# Patient Record
Sex: FEMALE | Race: BLACK OR AFRICAN AMERICAN | Employment: UNEMPLOYED | URBAN - METROPOLITAN AREA
[De-identification: names, ages, dates, MRNs, and addresses within clinical notes are randomized per-mention and may not be internally consistent; named-entity substitution may affect disease eponyms.]

---

## 2017-07-25 ENCOUNTER — HOSPITAL ENCOUNTER (OUTPATIENT)
Dept: PREADMISSION TESTING | Age: 62
Discharge: HOME | End: 2017-07-25
Payer: MEDICARE

## 2017-07-25 VITALS
HEIGHT: 63 IN | SYSTOLIC BLOOD PRESSURE: 127 MMHG | BODY MASS INDEX: 37.74 KG/M2 | HEART RATE: 73 BPM | WEIGHT: 213 LBS | DIASTOLIC BLOOD PRESSURE: 84 MMHG | OXYGEN SATURATION: 99 % | TEMPERATURE: 97.6 F

## 2017-07-25 LAB
ABO + RH BLD: NORMAL
ANION GAP BLD CALC-SCNC: 3 MMOL/L (ref 5–15)
APPEARANCE UR: CLEAR
ATRIAL RATE: 69 BPM
BACTERIA URNS QL MICRO: NEGATIVE /HPF
BILIRUB UR QL: NEGATIVE
BLOOD GROUP ANTIBODIES SERPL: NORMAL
BUN SERPL-MCNC: 17 MG/DL (ref 6–20)
BUN/CREAT SERPL: 19 (ref 12–20)
CALCIUM SERPL-MCNC: 9.1 MG/DL (ref 8.5–10.1)
CALCULATED P AXIS, ECG09: 11 DEGREES
CALCULATED R AXIS, ECG10: -14 DEGREES
CALCULATED T AXIS, ECG11: -61 DEGREES
CHLORIDE SERPL-SCNC: 103 MMOL/L (ref 97–108)
CO2 SERPL-SCNC: 30 MMOL/L (ref 21–32)
COLOR UR: ABNORMAL
CREAT SERPL-MCNC: 0.88 MG/DL (ref 0.55–1.02)
DIAGNOSIS, 93000: NORMAL
EPITH CASTS URNS QL MICRO: ABNORMAL /LPF
ERYTHROCYTE [DISTWIDTH] IN BLOOD BY AUTOMATED COUNT: 15.4 % (ref 11.5–14.5)
EST. AVERAGE GLUCOSE BLD GHB EST-MCNC: 223 MG/DL
GLUCOSE SERPL-MCNC: 174 MG/DL (ref 65–100)
GLUCOSE UR STRIP.AUTO-MCNC: 250 MG/DL
HBA1C MFR BLD: 9.4 % (ref 4.2–6.3)
HCT VFR BLD AUTO: 37.6 % (ref 35–47)
HGB BLD-MCNC: 11.9 G/DL (ref 11.5–16)
HGB UR QL STRIP: NEGATIVE
HYALINE CASTS URNS QL MICRO: ABNORMAL /LPF (ref 0–5)
INR PPP: 1 (ref 0.9–1.1)
KETONES UR QL STRIP.AUTO: NEGATIVE MG/DL
LEUKOCYTE ESTERASE UR QL STRIP.AUTO: NEGATIVE
MCH RBC QN AUTO: 25.4 PG (ref 26–34)
MCHC RBC AUTO-ENTMCNC: 31.6 G/DL (ref 30–36.5)
MCV RBC AUTO: 80.3 FL (ref 80–99)
NITRITE UR QL STRIP.AUTO: NEGATIVE
P-R INTERVAL, ECG05: 154 MS
PH UR STRIP: 5.5 [PH] (ref 5–8)
PLATELET # BLD AUTO: 272 K/UL (ref 150–400)
POTASSIUM SERPL-SCNC: 4.2 MMOL/L (ref 3.5–5.1)
PROT UR STRIP-MCNC: NEGATIVE MG/DL
PROTHROMBIN TIME: 9.9 SEC (ref 9–11.1)
Q-T INTERVAL, ECG07: 380 MS
QRS DURATION, ECG06: 90 MS
QTC CALCULATION (BEZET), ECG08: 407 MS
RBC # BLD AUTO: 4.68 M/UL (ref 3.8–5.2)
RBC #/AREA URNS HPF: ABNORMAL /HPF (ref 0–5)
SODIUM SERPL-SCNC: 136 MMOL/L (ref 136–145)
SP GR UR REFRACTOMETRY: 1.02 (ref 1–1.03)
SPECIMEN EXP DATE BLD: NORMAL
UA: UC IF INDICATED,UAUC: ABNORMAL
UROBILINOGEN UR QL STRIP.AUTO: 0.2 EU/DL (ref 0.2–1)
VENTRICULAR RATE, ECG03: 69 BPM
WBC # BLD AUTO: 10.7 K/UL (ref 3.6–11)
WBC URNS QL MICRO: ABNORMAL /HPF (ref 0–4)

## 2017-07-25 PROCEDURE — 85610 PROTHROMBIN TIME: CPT | Performed by: ORTHOPAEDIC SURGERY

## 2017-07-25 PROCEDURE — 93005 ELECTROCARDIOGRAM TRACING: CPT

## 2017-07-25 PROCEDURE — 81001 URINALYSIS AUTO W/SCOPE: CPT | Performed by: ORTHOPAEDIC SURGERY

## 2017-07-25 PROCEDURE — 86900 BLOOD TYPING SEROLOGIC ABO: CPT | Performed by: ORTHOPAEDIC SURGERY

## 2017-07-25 PROCEDURE — 80048 BASIC METABOLIC PNL TOTAL CA: CPT | Performed by: ORTHOPAEDIC SURGERY

## 2017-07-25 PROCEDURE — 85027 COMPLETE CBC AUTOMATED: CPT | Performed by: ORTHOPAEDIC SURGERY

## 2017-07-25 PROCEDURE — 36415 COLL VENOUS BLD VENIPUNCTURE: CPT | Performed by: ORTHOPAEDIC SURGERY

## 2017-07-25 PROCEDURE — 83036 HEMOGLOBIN GLYCOSYLATED A1C: CPT | Performed by: ORTHOPAEDIC SURGERY

## 2017-07-25 RX ORDER — INSULIN ASPART 100 [IU]/ML
20 INJECTION, SUSPENSION SUBCUTANEOUS 2 TIMES DAILY
COMMUNITY
End: 2018-11-15

## 2017-07-25 RX ORDER — FLUTICASONE PROPIONATE AND SALMETEROL 250; 50 UG/1; UG/1
1 POWDER RESPIRATORY (INHALATION) EVERY 12 HOURS
COMMUNITY
End: 2021-05-18 | Stop reason: SDUPTHER

## 2017-07-25 RX ORDER — IBUPROFEN 200 MG
200 TABLET ORAL
COMMUNITY
End: 2017-08-17

## 2017-07-25 NOTE — ADVANCED PRACTICE NURSE
Orthopedic pre-op assessment and planning form sent for scanning. Preoperative instructions reviewed with patient. Patient given 2-6 packs of CHG wipes. Instructions reviewed on use of CHG wipes. Patient given SSI infection FAQS sheet, as well as a  MRSA/MSSA treatment instruction sheet  With an explanation to patient that they will be notified if treatment instructions need to be initiated. Patient was given the opportunity to ask questions on the information provided. The patient was counseled on the dangers of tobacco use, and was advised to quit. Reviewed strategies to maximize success, including written materials. GIVEN DIABETES CONTROL MATTERS HANDOUT. Patient informed of need for follow up with PCP regarding HIGH risk for SAM and possibility of need for sleep study. Advised that patients with SAM are at increased risk for adverse clinical outcomes ranging from decreased daytime alertness and quality of life to cardiovascular morbidities and mortality to increase risk for hospitalization.

## 2017-07-26 ENCOUNTER — TELEPHONE (OUTPATIENT)
Dept: DIABETES SERVICES | Age: 62
End: 2017-07-26

## 2017-07-26 LAB
BACTERIA SPEC CULT: NORMAL
BACTERIA SPEC CULT: NORMAL
SERVICE CMNT-IMP: NORMAL

## 2017-07-26 NOTE — ADVANCED PRACTICE NURSE
Results faxed and called to  Dr. Dorla Najjar office and message left with Gregory JASSO and order [facility] for [facility]. Hemoglobin A1C 9.4. Also informed of laceration with metal object mid-6/2017, and abnormal EKG being sent to anesthesia for review. Labs and EKG faxed to PCP at request of patient with confirmation of receipt.

## 2017-07-26 NOTE — ADVANCED PRACTICE NURSE
Abnormal EKG reviewed by Dr Tino Mixon, anesthesiologist. States \"patient needs cardiologist appt\". Faxed PAT testing reports (and fax confirmation received) to Dr. Dorla Najjar office. Called at 1600 on 7/26/17 (left message on Radha's voice mail) RE: abnormal EKG, patient needs cardiac clearance per Dr Tino Mixon.

## 2017-08-07 ENCOUNTER — OFFICE VISIT (OUTPATIENT)
Dept: CARDIOLOGY CLINIC | Age: 62
End: 2017-08-07

## 2017-08-07 VITALS
HEART RATE: 76 BPM | BODY MASS INDEX: 38.45 KG/M2 | WEIGHT: 217 LBS | HEIGHT: 63 IN | RESPIRATION RATE: 20 BRPM | SYSTOLIC BLOOD PRESSURE: 140 MMHG | OXYGEN SATURATION: 97 % | DIASTOLIC BLOOD PRESSURE: 78 MMHG

## 2017-08-07 DIAGNOSIS — R94.31 ABNORMAL ECG: Primary | ICD-10-CM

## 2017-08-07 PROBLEM — J45.909 ASTHMA: Status: ACTIVE | Noted: 2017-08-07

## 2017-08-07 PROBLEM — E11.9 TYPE II DIABETES MELLITUS (HCC): Status: ACTIVE | Noted: 2017-08-07

## 2017-08-07 PROBLEM — I10 HTN (HYPERTENSION): Status: ACTIVE | Noted: 2017-08-07

## 2017-08-07 PROBLEM — F20.9 SCHIZOPHRENIA (HCC): Status: ACTIVE | Noted: 2017-08-07

## 2017-08-07 NOTE — PROGRESS NOTES
HISTORY OF PRESENT ILLNESS  Jesús Rosa is a 63 y.o. female. Patient with h/o AODM, HTN , TOBACCO abuse, Asthma, here for cardiac evaluation prior to cervical laminectomy  She has also h/o schizophrenia followed by psychiatrist she tells me and on disability for it  Past Medical History:   Diagnosis Date    Arthritis     Asthma     Diabetes ([address]     Hypertension     Pancreatitis      Past Surgical History:   Procedure Laterality Date    HX BUNIONECTOMY Bilateral 1977    HX HERNIA REPAIR  2002    HX PARTIAL HYSTERECTOMY  1980     Social History     Social History    Marital status: SINGLE     Spouse name: N/A    Number of children: N/A    Years of education: N/A     Occupational History    Not on file. Social History Main Topics    Smoking status: Current Every Day Smoker     Packs/day: 0.25     Years: 20.00    Smokeless tobacco: Never Used    Alcohol use Yes      Comment: RARE Q 3 MONTHS    Drug use: No    Sexual activity: Not on file     Other Topics Concern    Not on file     Social History Narrative       HPI  She denies cp or sob but not very active mostly c/o cervical pain with numbness and radiation down to her left arm  Review of Systems   Respiratory: Negative. Cardiovascular: Negative. Physical Exam  Physical Exam   Blood pressure 140/78, pulse 76, resp. rate 20, height 5' 3\" (1.6 m), weight 98.4 kg (217 lb), SpO2 97 %. Constitutional: She is oriented to person, place, and time. She appears well-developed and well-nourished. No distress. HENT: Head: Normocephalic. Eyes: No scleral icterus. Neck: Normal range of motion. Neck supple. No JVD present. No tracheal deviation present. Cardiovascular: Normal rate, regular rhythm, normal heart sounds and intact distal pulses. Exam reveals no gallop and no friction rub. No murmur heard. Pulmonary/Chest: Effort normal and breath sounds normal. No stridor. No respiratory distress, wheezes or  rales.    Abdominal: She exhibits no distension. Musculoskeletal: She exhibits no edema. Neurological: She is alert and oriented to person, place, and time. Coordination normal.   Skin: Skin is warm. No rash noted. Not diaphoretic. No erythema. Psychiatric:  Normal mood and affect. Behavior is normal.   Current Outpatient Prescriptions on File Prior to Visit   Medication Sig Dispense Refill    fluticasone-salmeterol (ADVAIR DISKUS) 250-50 mcg/dose diskus inhaler Take 1 Puff by inhalation every twelve (12) hours.  insulin aspart protamine/insulin aspart (NOVOLOG MIX 70-30 FLEXPEN) 100 unit/mL (70-30) inpn 20 Units by SubCUTAneous route two (2) times a day. Indications: type 2 diabetes mellitus      ibuprofen (ADVIL) 200 mg tablet Take 200 mg by mouth every six (6) hours as needed for Pain.  glimepiride (AMARYL) 4 mg tablet Take 4 mg by mouth two (2) times a day.  estradiol (ESTRACE) 1 mg tablet Take 1 mg by mouth daily.  metoprolol tartrate (LOPRESSOR) 25 mg tablet Take 25 mg by mouth two (2) times a day.  amLODIPine (NORVASC) 5 mg tablet Take 5 mg by mouth daily.  montelukast (SINGULAIR) 10 mg tablet Take 10 mg by mouth nightly. No current facility-administered medications on file prior to visit. ASSESSMENT and PLAN  Abnormal ECG:  ECG on 7/25/17 with NSR st and ilt and LVH, she has multiple risk factors for CAD including DM, tobacco abuse, htn and strong FH for cad. St and t changes may  Be related only to lvh but this needs to be further proven  Discussed options. Proceed with nuclear stress test prior to surgery. If normal proceed as planned. we will schedule it at [facility] since she lives in that area  HTN: controlled  HLD: closely followed by her pcp  Tobacco abuse: cessation stressed  Schizophrenia: closely followed by psych no suicidal or homicidal ideation  For cervical laminectomy with Dr. Tayler Aquino  See her back prn if stress test is normal    ADDENDUM of 8/15/17: nuclear stress test with no ischemia and EF 52%  At this time the patient is at an acceptable cardiac risk to proceed with cervical laminectomy as planned

## 2017-08-07 NOTE — PATIENT INSTRUCTIONS
lexiscan test [facility]  will call you with date and time) will call result

## 2017-08-07 NOTE — MR AVS SNAPSHOT
Visit Information Date & Time Provider Department Dept. Phone Encounter #  
 8/7/2017 10:00 AM Hang Johnson MD [facility] [phone] [id number] Upcoming Health Maintenance Date Due Hepatitis C Screening 1955 Pneumococcal 19-64 Medium Risk (1 of 1 - PPSV23) 12/13/1976 DTaP/Tdap/Td series (1 - Tdap) 12/13/1978 PAP AKA CERVICAL CYTOLOGY 12/13/1978 BREAST CANCER SCRN MAMMOGRAM 12/13/2007 FOBT Q 1 YEAR AGE 50-75 12/13/2007 INFLUENZA AGE 9 TO ADULT 8/1/2017 Allergies as of 8/7/2017  Review Complete On: 8/7/9506 By: Charla Valdez RN No Known Allergies Current Immunizations  Never Reviewed No immunizations on file. Not reviewed this visit Vitals BP Pulse Resp Height(growth percentile) Weight(growth percentile) SpO2  
 140/78 (BP 1 Location: Right arm, BP Patient Position: Sitting) 76 20 5' 3\" (1.6 m) 217 lb (98.4 kg) 97% BMI OB Status Smoking Status 38.44 kg/m2 Hysterectomy Current Every Day Smoker BMI and BSA Data Body Mass Index Body Surface Area  
 38.44 kg/m 2 2.09 m 2 Preferred Pharmacy Pharmacy Name Phone [facility] [phone] Your Updated Medication List  
  
   
This list is accurate as of: 8/7/17 11:15 AM.  Always use your most recent med list.  
  
  
  
  
 Ekaterina Shelby 250-50 mcg/dose diskus inhaler Generic drug:  fluticasone-salmeterol Take 1 Puff by inhalation every twelve (12) hours. ADVIL 200 mg tablet Generic drug:  ibuprofen Take 200 mg by mouth every six (6) hours as needed for Pain. amLODIPine 5 mg tablet Commonly known as:  Sherrye Acron Take 5 mg by mouth daily. estradiol 1 mg tablet Commonly known as:  ESTRACE Take 1 mg by mouth daily. glimepiride 4 mg tablet Commonly known as:  AMARYL Take 4 mg by mouth two (2) times a day. metoprolol tartrate 25 mg tablet Commonly known as:  LOPRESSOR Take 25 mg by mouth two (2) times a day. montelukast 10 mg tablet Commonly known as:  SINGULAIR Take 10 mg by mouth nightly. NovoLOG Mix 70-30 FlexPen 100 unit/mL (70-30) Inpn Generic drug:  insulin aspart protamine/insulin aspart 20 Units by SubCUTAneous route two (2) times a day. Indications: type 2 diabetes mellitus Patient Instructions   
lexiscan test [facility]  will call you wit resulth date and time) will call Introducing [facility]! [facility] introduces [employer] patient portal. Now you can access parts of your medical record, email your doctor's office, and request medication refills online. 1. In your internet browser, go to [email or web address] "[employer] 2. Click on the First Time User? Click Here link in the Sign In box. You will see the New Member Sign Up page. 3. Enter your [employer] Access Code exactly as it appears below. You will not need to use this code after youve completed the sign-up process. If you do not sign up before the expiration date, you must request a new code. · [employer] Access Code: [email or web address] Expires: 10/31/2017  8:00 AM 
 
4. Enter the last four digits of your Social Security Number (xxxx) and Date of Birth (mm/dd/yyyy) as indicated and click Submit. You will be taken to the next sign-up page. 5. Create a [employer] ID. This will be your [employer] login ID and cannot be changed, so think of one that is secure and easy to remember. 6. Create a [employer] password. You can change your password at any time. 7. Enter your Password Reset Question and Answer. This can be used at a later time if you forget your password. 8. Enter your e-mail address. You will receive e-mail notification when new information is available in [address] 9. Click Sign Up. You can now view and download portions of your medical record. 10. Click the Download Summary menu link to download a portable copy of your medical information. If you have questions, please visit the Frequently Asked Questions section of the [employer] website. Remember, [employer] is NOT to be used for urgent needs. For medical emergencies, dial 911. Now available from your iPhone and Android! Please provide this summary of care documentation to your next provider. Your primary care clinician is listed as Merle Pérez. If you have any questions after today's visit, please call [phone].

## 2017-08-16 ENCOUNTER — APPOINTMENT (OUTPATIENT)
Dept: GENERAL RADIOLOGY | Age: 62
End: 2017-08-16
Attending: ORTHOPAEDIC SURGERY
Payer: MEDICARE

## 2017-08-16 ENCOUNTER — ANESTHESIA (OUTPATIENT)
Dept: SURGERY | Age: 62
End: 2017-08-16
Payer: MEDICARE

## 2017-08-16 ENCOUNTER — ANESTHESIA EVENT (OUTPATIENT)
Dept: SURGERY | Age: 62
End: 2017-08-16
Payer: MEDICARE

## 2017-08-16 ENCOUNTER — HOSPITAL ENCOUNTER (OUTPATIENT)
Age: 62
Setting detail: OBSERVATION
Discharge: HOME | End: 2017-08-17
Attending: ORTHOPAEDIC SURGERY | Admitting: ORTHOPAEDIC SURGERY
Payer: MEDICARE

## 2017-08-16 PROBLEM — M48.02 CERVICAL STENOSIS OF SPINAL CANAL: Status: ACTIVE | Noted: 2017-08-16

## 2017-08-16 LAB
ABO + RH BLD: NORMAL
BLOOD GROUP ANTIBODIES SERPL: NORMAL
GLUCOSE BLD STRIP.AUTO-MCNC: 123 MG/DL (ref 65–100)
GLUCOSE BLD STRIP.AUTO-MCNC: 194 MG/DL (ref 65–100)
GLUCOSE BLD STRIP.AUTO-MCNC: 90 MG/DL (ref 65–100)
SERVICE CMNT-IMP: ABNORMAL
SERVICE CMNT-IMP: ABNORMAL
SERVICE CMNT-IMP: NORMAL
SPECIMEN EXP DATE BLD: NORMAL

## 2017-08-16 PROCEDURE — 99218 HC RM OBSERVATION: CPT

## 2017-08-16 PROCEDURE — 76000 FLUOROSCOPY <1 HR PHYS/QHP: CPT

## 2017-08-16 PROCEDURE — 77030019908 HC STETH ESOPH SIMS -A: Performed by: ANESTHESIOLOGY

## 2017-08-16 PROCEDURE — C1713 ANCHOR/SCREW BN/BN,TIS/BN: HCPCS | Performed by: ORTHOPAEDIC SURGERY

## 2017-08-16 PROCEDURE — 74011000250 HC RX REV CODE- 250

## 2017-08-16 PROCEDURE — 82962 GLUCOSE BLOOD TEST: CPT

## 2017-08-16 PROCEDURE — 77030008684 HC TU ET CUF COVD -B: Performed by: ANESTHESIOLOGY

## 2017-08-16 PROCEDURE — 77030029684 HC NEB SM VOL KT MONA -A

## 2017-08-16 PROCEDURE — 76010000162 HC OR TIME 1.5 TO 2 HR INTENSV-TIER 1: Performed by: ORTHOPAEDIC SURGERY

## 2017-08-16 PROCEDURE — 77030011267 HC ELECTRD BLD COVD -A: Performed by: ORTHOPAEDIC SURGERY

## 2017-08-16 PROCEDURE — 74011000250 HC RX REV CODE- 250: Performed by: ANESTHESIOLOGY

## 2017-08-16 PROCEDURE — 72040 X-RAY EXAM NECK SPINE 2-3 VW: CPT

## 2017-08-16 PROCEDURE — 74011636637 HC RX REV CODE- 636/637: Performed by: ORTHOPAEDIC SURGERY

## 2017-08-16 PROCEDURE — 77030018836 HC SOL IRR NACL ICUM -A: Performed by: ORTHOPAEDIC SURGERY

## 2017-08-16 PROCEDURE — 77030020268 HC MISC GENERAL SUPPLY: Performed by: ORTHOPAEDIC SURGERY

## 2017-08-16 PROCEDURE — 77030031139 HC SUT VCRL2 J&J -A: Performed by: ORTHOPAEDIC SURGERY

## 2017-08-16 PROCEDURE — C1821 INTERSPINOUS IMPLANT: HCPCS | Performed by: ORTHOPAEDIC SURGERY

## 2017-08-16 PROCEDURE — 77030013079 HC BLNKT BAIR HGGR 3M -A: Performed by: ANESTHESIOLOGY

## 2017-08-16 PROCEDURE — 77030032490 HC SLV COMPR SCD KNE COVD -B: Performed by: ORTHOPAEDIC SURGERY

## 2017-08-16 PROCEDURE — 77030020782 HC GWN BAIR PAWS FLX 3M -B

## 2017-08-16 PROCEDURE — 74011250636 HC RX REV CODE- 250/636

## 2017-08-16 PROCEDURE — 74011000250 HC RX REV CODE- 250: Performed by: ORTHOPAEDIC SURGERY

## 2017-08-16 PROCEDURE — 77030026438 HC STYL ET INTUB CARD -A: Performed by: ANESTHESIOLOGY

## 2017-08-16 PROCEDURE — 77030034475 HC MISC IMPL SPN: Performed by: ORTHOPAEDIC SURGERY

## 2017-08-16 PROCEDURE — 77030010507 HC ADH SKN DERMBND J&J -B: Performed by: ORTHOPAEDIC SURGERY

## 2017-08-16 PROCEDURE — 51798 US URINE CAPACITY MEASURE: CPT

## 2017-08-16 PROCEDURE — 77030004391 HC BUR FLUT MEDT -C: Performed by: ORTHOPAEDIC SURGERY

## 2017-08-16 PROCEDURE — 77030012893

## 2017-08-16 PROCEDURE — 74011000258 HC RX REV CODE- 258

## 2017-08-16 PROCEDURE — 74011000272 HC RX REV CODE- 272: Performed by: ORTHOPAEDIC SURGERY

## 2017-08-16 PROCEDURE — 76210000016 HC OR PH I REC 1 TO 1.5 HR: Performed by: ORTHOPAEDIC SURGERY

## 2017-08-16 PROCEDURE — 76060000034 HC ANESTHESIA 1.5 TO 2 HR: Performed by: ORTHOPAEDIC SURGERY

## 2017-08-16 PROCEDURE — 74011250636 HC RX REV CODE- 250/636: Performed by: ORTHOPAEDIC SURGERY

## 2017-08-16 PROCEDURE — 77030003666 HC NDL SPINAL BD -A: Performed by: ORTHOPAEDIC SURGERY

## 2017-08-16 PROCEDURE — 74011250636 HC RX REV CODE- 250/636: Performed by: ANESTHESIOLOGY

## 2017-08-16 PROCEDURE — 94640 AIRWAY INHALATION TREATMENT: CPT

## 2017-08-16 PROCEDURE — 74011250637 HC RX REV CODE- 250/637: Performed by: NURSE PRACTITIONER

## 2017-08-16 PROCEDURE — 77030014647 HC SEAL FBRN TISSL BAXT -D: Performed by: ORTHOPAEDIC SURGERY

## 2017-08-16 PROCEDURE — 77030012406 HC DRN WND PENRS BARD -A: Performed by: ORTHOPAEDIC SURGERY

## 2017-08-16 PROCEDURE — 74011250637 HC RX REV CODE- 250/637: Performed by: ORTHOPAEDIC SURGERY

## 2017-08-16 PROCEDURE — 36415 COLL VENOUS BLD VENIPUNCTURE: CPT | Performed by: NURSE PRACTITIONER

## 2017-08-16 PROCEDURE — 86900 BLOOD TYPING SEROLOGIC ABO: CPT | Performed by: NURSE PRACTITIONER

## 2017-08-16 DEVICE — PLATE SPNL L14MM ANT CERV TI 1 LEV SKYLINE: Type: IMPLANTABLE DEVICE | Site: SPINE CERVICAL | Status: FUNCTIONAL

## 2017-08-16 DEVICE — GRAFT BNE SUB SM CANC FRZN MORSELIZED W/ VIABLE CELL: Type: IMPLANTABLE DEVICE | Site: SPINE CERVICAL | Status: FUNCTIONAL

## 2017-08-16 DEVICE — SCREW SPNL L14MM DIA4MM ANT CERV TI SELF DRL VAR ANG FULL: Type: IMPLANTABLE DEVICE | Site: SPINE CERVICAL | Status: FUNCTIONAL

## 2017-08-16 RX ORDER — LIDOCAINE HYDROCHLORIDE 10 MG/ML
0.1 INJECTION, SOLUTION EPIDURAL; INFILTRATION; INTRACAUDAL; PERINEURAL AS NEEDED
Status: DISCONTINUED | OUTPATIENT
Start: 2017-08-16 | End: 2017-08-16 | Stop reason: HOSPADM

## 2017-08-16 RX ORDER — FENTANYL CITRATE 50 UG/ML
50 INJECTION, SOLUTION INTRAMUSCULAR; INTRAVENOUS AS NEEDED
Status: DISCONTINUED | OUTPATIENT
Start: 2017-08-16 | End: 2017-08-16 | Stop reason: HOSPADM

## 2017-08-16 RX ORDER — SODIUM CHLORIDE 0.9 % (FLUSH) 0.9 %
5-10 SYRINGE (ML) INJECTION AS NEEDED
Status: DISCONTINUED | OUTPATIENT
Start: 2017-08-16 | End: 2017-08-16 | Stop reason: HOSPADM

## 2017-08-16 RX ORDER — SODIUM CHLORIDE, SODIUM LACTATE, POTASSIUM CHLORIDE, CALCIUM CHLORIDE 600; 310; 30; 20 MG/100ML; MG/100ML; MG/100ML; MG/100ML
75 INJECTION, SOLUTION INTRAVENOUS CONTINUOUS
Status: DISCONTINUED | OUTPATIENT
Start: 2017-08-16 | End: 2017-08-16 | Stop reason: HOSPADM

## 2017-08-16 RX ORDER — HYDROMORPHONE HYDROCHLORIDE 2 MG/ML
INJECTION, SOLUTION INTRAMUSCULAR; INTRAVENOUS; SUBCUTANEOUS AS NEEDED
Status: DISCONTINUED | OUTPATIENT
Start: 2017-08-16 | End: 2017-08-16 | Stop reason: HOSPADM

## 2017-08-16 RX ORDER — BUDESONIDE 0.5 MG/2ML
500 INHALANT ORAL
Status: DISCONTINUED | OUTPATIENT
Start: 2017-08-16 | End: 2017-08-17 | Stop reason: HOSPADM

## 2017-08-16 RX ORDER — DEXAMETHASONE SODIUM PHOSPHATE 4 MG/ML
INJECTION, SOLUTION INTRA-ARTICULAR; INTRALESIONAL; INTRAMUSCULAR; INTRAVENOUS; SOFT TISSUE AS NEEDED
Status: DISCONTINUED | OUTPATIENT
Start: 2017-08-16 | End: 2017-08-16 | Stop reason: HOSPADM

## 2017-08-16 RX ORDER — CEFAZOLIN SODIUM IN 0.9 % NACL 2 G/50 ML
2 INTRAVENOUS SOLUTION, PIGGYBACK (ML) INTRAVENOUS EVERY 8 HOURS
Status: COMPLETED | OUTPATIENT
Start: 2017-08-16 | End: 2017-08-17

## 2017-08-16 RX ORDER — ONDANSETRON 2 MG/ML
4 INJECTION INTRAMUSCULAR; INTRAVENOUS AS NEEDED
Status: DISCONTINUED | OUTPATIENT
Start: 2017-08-16 | End: 2017-08-16 | Stop reason: HOSPADM

## 2017-08-16 RX ORDER — PROPOFOL 10 MG/ML
INJECTION, EMULSION INTRAVENOUS AS NEEDED
Status: DISCONTINUED | OUTPATIENT
Start: 2017-08-16 | End: 2017-08-16 | Stop reason: HOSPADM

## 2017-08-16 RX ORDER — MAGNESIUM SULFATE 100 %
4 CRYSTALS MISCELLANEOUS AS NEEDED
Status: DISCONTINUED | OUTPATIENT
Start: 2017-08-16 | End: 2017-08-17 | Stop reason: HOSPADM

## 2017-08-16 RX ORDER — SODIUM CHLORIDE 9 MG/ML
50 INJECTION, SOLUTION INTRAVENOUS CONTINUOUS
Status: DISCONTINUED | OUTPATIENT
Start: 2017-08-16 | End: 2017-08-16 | Stop reason: HOSPADM

## 2017-08-16 RX ORDER — AMLODIPINE BESYLATE 5 MG/1
5 TABLET ORAL DAILY
Status: DISCONTINUED | OUTPATIENT
Start: 2017-08-17 | End: 2017-08-17 | Stop reason: HOSPADM

## 2017-08-16 RX ORDER — SODIUM CHLORIDE 0.9 % (FLUSH) 0.9 %
5-10 SYRINGE (ML) INJECTION EVERY 8 HOURS
Status: DISCONTINUED | OUTPATIENT
Start: 2017-08-17 | End: 2017-08-17 | Stop reason: HOSPADM

## 2017-08-16 RX ORDER — CEFAZOLIN SODIUM IN 0.9 % NACL 2 G/100 ML
PLASTIC BAG, INJECTION (ML) INTRAVENOUS AS NEEDED
Status: DISCONTINUED | OUTPATIENT
Start: 2017-08-16 | End: 2017-08-16 | Stop reason: HOSPADM

## 2017-08-16 RX ORDER — ACETAMINOPHEN 325 MG/1
650 TABLET ORAL EVERY 6 HOURS
Status: DISCONTINUED | OUTPATIENT
Start: 2017-08-16 | End: 2017-08-17 | Stop reason: HOSPADM

## 2017-08-16 RX ORDER — MIDAZOLAM HYDROCHLORIDE 1 MG/ML
1 INJECTION, SOLUTION INTRAMUSCULAR; INTRAVENOUS AS NEEDED
Status: DISCONTINUED | OUTPATIENT
Start: 2017-08-16 | End: 2017-08-16 | Stop reason: HOSPADM

## 2017-08-16 RX ORDER — CLONIDINE HYDROCHLORIDE 0.1 MG/1
0.1 TABLET ORAL
Status: DISCONTINUED | OUTPATIENT
Start: 2017-08-16 | End: 2017-08-17 | Stop reason: HOSPADM

## 2017-08-16 RX ORDER — SODIUM CHLORIDE, SODIUM LACTATE, POTASSIUM CHLORIDE, CALCIUM CHLORIDE 600; 310; 30; 20 MG/100ML; MG/100ML; MG/100ML; MG/100ML
INJECTION, SOLUTION INTRAVENOUS
Status: DISCONTINUED | OUTPATIENT
Start: 2017-08-16 | End: 2017-08-16 | Stop reason: HOSPADM

## 2017-08-16 RX ORDER — ROCURONIUM BROMIDE 10 MG/ML
INJECTION, SOLUTION INTRAVENOUS AS NEEDED
Status: DISCONTINUED | OUTPATIENT
Start: 2017-08-16 | End: 2017-08-16 | Stop reason: HOSPADM

## 2017-08-16 RX ORDER — MIDAZOLAM HYDROCHLORIDE 1 MG/ML
INJECTION, SOLUTION INTRAMUSCULAR; INTRAVENOUS AS NEEDED
Status: DISCONTINUED | OUTPATIENT
Start: 2017-08-16 | End: 2017-08-16 | Stop reason: HOSPADM

## 2017-08-16 RX ORDER — OXYCODONE HYDROCHLORIDE 5 MG/1
5 TABLET ORAL
Status: DISCONTINUED | OUTPATIENT
Start: 2017-08-16 | End: 2017-08-17 | Stop reason: HOSPADM

## 2017-08-16 RX ORDER — FENTANYL CITRATE 50 UG/ML
INJECTION, SOLUTION INTRAMUSCULAR; INTRAVENOUS AS NEEDED
Status: DISCONTINUED | OUTPATIENT
Start: 2017-08-16 | End: 2017-08-16 | Stop reason: HOSPADM

## 2017-08-16 RX ORDER — MIDAZOLAM HYDROCHLORIDE 1 MG/ML
0.5 INJECTION, SOLUTION INTRAMUSCULAR; INTRAVENOUS
Status: DISCONTINUED | OUTPATIENT
Start: 2017-08-16 | End: 2017-08-16 | Stop reason: HOSPADM

## 2017-08-16 RX ORDER — HYDROMORPHONE HCL IN 0.9% NACL 15 MG/30ML
PATIENT CONTROLLED ANALGESIA VIAL INTRAVENOUS CONTINUOUS
Status: DISCONTINUED | OUTPATIENT
Start: 2017-08-16 | End: 2017-08-17 | Stop reason: HOSPADM

## 2017-08-16 RX ORDER — ONDANSETRON 2 MG/ML
4 INJECTION INTRAMUSCULAR; INTRAVENOUS
Status: DISCONTINUED | OUTPATIENT
Start: 2017-08-16 | End: 2017-08-17 | Stop reason: HOSPADM

## 2017-08-16 RX ORDER — ONDANSETRON 2 MG/ML
INJECTION INTRAMUSCULAR; INTRAVENOUS AS NEEDED
Status: DISCONTINUED | OUTPATIENT
Start: 2017-08-16 | End: 2017-08-16 | Stop reason: HOSPADM

## 2017-08-16 RX ORDER — FACIAL-BODY WIPES
10 EACH TOPICAL DAILY PRN
Status: DISCONTINUED | OUTPATIENT
Start: 2017-08-18 | End: 2017-08-17 | Stop reason: HOSPADM

## 2017-08-16 RX ORDER — METOPROLOL TARTRATE 25 MG/1
25 TABLET, FILM COATED ORAL 2 TIMES DAILY
Status: DISCONTINUED | OUTPATIENT
Start: 2017-08-16 | End: 2017-08-17 | Stop reason: HOSPADM

## 2017-08-16 RX ORDER — FENTANYL CITRATE 50 UG/ML
25 INJECTION, SOLUTION INTRAMUSCULAR; INTRAVENOUS
Status: DISCONTINUED | OUTPATIENT
Start: 2017-08-16 | End: 2017-08-16 | Stop reason: HOSPADM

## 2017-08-16 RX ORDER — SUCCINYLCHOLINE CHLORIDE 20 MG/ML
INJECTION INTRAMUSCULAR; INTRAVENOUS AS NEEDED
Status: DISCONTINUED | OUTPATIENT
Start: 2017-08-16 | End: 2017-08-16 | Stop reason: HOSPADM

## 2017-08-16 RX ORDER — MORPHINE SULFATE 10 MG/ML
2 INJECTION, SOLUTION INTRAMUSCULAR; INTRAVENOUS
Status: DISCONTINUED | OUTPATIENT
Start: 2017-08-16 | End: 2017-08-16 | Stop reason: HOSPADM

## 2017-08-16 RX ORDER — HYDROMORPHONE HYDROCHLORIDE 1 MG/ML
0.2 INJECTION, SOLUTION INTRAMUSCULAR; INTRAVENOUS; SUBCUTANEOUS
Status: DISCONTINUED | OUTPATIENT
Start: 2017-08-16 | End: 2017-08-16 | Stop reason: HOSPADM

## 2017-08-16 RX ORDER — AMOXICILLIN 250 MG
1 CAPSULE ORAL 2 TIMES DAILY
Status: DISCONTINUED | OUTPATIENT
Start: 2017-08-16 | End: 2017-08-17 | Stop reason: HOSPADM

## 2017-08-16 RX ORDER — CEFAZOLIN SODIUM IN 0.9 % NACL 2 G/50 ML
2 INTRAVENOUS SOLUTION, PIGGYBACK (ML) INTRAVENOUS ONCE
Status: DISCONTINUED | OUTPATIENT
Start: 2017-08-16 | End: 2017-08-16 | Stop reason: HOSPADM

## 2017-08-16 RX ORDER — OXYCODONE AND ACETAMINOPHEN 5; 325 MG/1; MG/1
1 TABLET ORAL AS NEEDED
Status: DISCONTINUED | OUTPATIENT
Start: 2017-08-16 | End: 2017-08-16 | Stop reason: HOSPADM

## 2017-08-16 RX ORDER — POLYETHYLENE GLYCOL 3350 17 G/17G
17 POWDER, FOR SOLUTION ORAL DAILY
Status: DISCONTINUED | OUTPATIENT
Start: 2017-08-17 | End: 2017-08-17 | Stop reason: HOSPADM

## 2017-08-16 RX ORDER — DIPHENHYDRAMINE HCL 12.5MG/5ML
12.5 ELIXIR ORAL
Status: DISCONTINUED | OUTPATIENT
Start: 2017-08-16 | End: 2017-08-17 | Stop reason: HOSPADM

## 2017-08-16 RX ORDER — OXYCODONE HYDROCHLORIDE 5 MG/1
10 TABLET ORAL
Status: DISCONTINUED | OUTPATIENT
Start: 2017-08-16 | End: 2017-08-17 | Stop reason: HOSPADM

## 2017-08-16 RX ORDER — DEXTROSE MONOHYDRATE 100 MG/ML
125-250 INJECTION, SOLUTION INTRAVENOUS AS NEEDED
Status: DISCONTINUED | OUTPATIENT
Start: 2017-08-16 | End: 2017-08-17 | Stop reason: HOSPADM

## 2017-08-16 RX ORDER — DIPHENHYDRAMINE HYDROCHLORIDE 50 MG/ML
12.5 INJECTION, SOLUTION INTRAMUSCULAR; INTRAVENOUS AS NEEDED
Status: DISCONTINUED | OUTPATIENT
Start: 2017-08-16 | End: 2017-08-16 | Stop reason: HOSPADM

## 2017-08-16 RX ORDER — GLIMEPIRIDE 2 MG/1
4 TABLET ORAL 2 TIMES DAILY
Status: DISCONTINUED | OUTPATIENT
Start: 2017-08-16 | End: 2017-08-17 | Stop reason: HOSPADM

## 2017-08-16 RX ORDER — BUPIVACAINE HYDROCHLORIDE AND EPINEPHRINE 5; 5 MG/ML; UG/ML
INJECTION, SOLUTION EPIDURAL; INTRACAUDAL; PERINEURAL AS NEEDED
Status: DISCONTINUED | OUTPATIENT
Start: 2017-08-16 | End: 2017-08-16 | Stop reason: HOSPADM

## 2017-08-16 RX ORDER — SODIUM CHLORIDE 0.9 % (FLUSH) 0.9 %
5-10 SYRINGE (ML) INJECTION EVERY 8 HOURS
Status: DISCONTINUED | OUTPATIENT
Start: 2017-08-16 | End: 2017-08-16 | Stop reason: HOSPADM

## 2017-08-16 RX ORDER — SODIUM CHLORIDE 0.9 % (FLUSH) 0.9 %
5-10 SYRINGE (ML) INJECTION AS NEEDED
Status: DISCONTINUED | OUTPATIENT
Start: 2017-08-16 | End: 2017-08-17 | Stop reason: HOSPADM

## 2017-08-16 RX ORDER — NALOXONE HYDROCHLORIDE 0.4 MG/ML
0.4 INJECTION, SOLUTION INTRAMUSCULAR; INTRAVENOUS; SUBCUTANEOUS AS NEEDED
Status: DISCONTINUED | OUTPATIENT
Start: 2017-08-16 | End: 2017-08-17 | Stop reason: HOSPADM

## 2017-08-16 RX ORDER — MONTELUKAST SODIUM 10 MG/1
10 TABLET ORAL
Status: DISCONTINUED | OUTPATIENT
Start: 2017-08-16 | End: 2017-08-17 | Stop reason: HOSPADM

## 2017-08-16 RX ORDER — FLUTICASONE PROPIONATE AND SALMETEROL 250; 50 UG/1; UG/1
1 POWDER RESPIRATORY (INHALATION) EVERY 12 HOURS
Status: DISCONTINUED | OUTPATIENT
Start: 2017-08-16 | End: 2017-08-16 | Stop reason: CLARIF

## 2017-08-16 RX ORDER — ARFORMOTEROL TARTRATE 15 UG/2ML
15 SOLUTION RESPIRATORY (INHALATION)
Status: DISCONTINUED | OUTPATIENT
Start: 2017-08-16 | End: 2017-08-17 | Stop reason: HOSPADM

## 2017-08-16 RX ORDER — PHENYLEPHRINE HCL IN 0.9% NACL 0.4MG/10ML
SYRINGE (ML) INTRAVENOUS AS NEEDED
Status: DISCONTINUED | OUTPATIENT
Start: 2017-08-16 | End: 2017-08-16 | Stop reason: HOSPADM

## 2017-08-16 RX ORDER — INSULIN LISPRO 100 [IU]/ML
INJECTION, SOLUTION INTRAVENOUS; SUBCUTANEOUS
Status: DISCONTINUED | OUTPATIENT
Start: 2017-08-16 | End: 2017-08-17 | Stop reason: HOSPADM

## 2017-08-16 RX ORDER — SODIUM CHLORIDE 9 MG/ML
125 INJECTION, SOLUTION INTRAVENOUS CONTINUOUS
Status: DISCONTINUED | OUTPATIENT
Start: 2017-08-16 | End: 2017-08-17 | Stop reason: HOSPADM

## 2017-08-16 RX ORDER — ESTRADIOL 1 MG/1
1 TABLET ORAL DAILY
Status: DISCONTINUED | OUTPATIENT
Start: 2017-08-17 | End: 2017-08-17 | Stop reason: HOSPADM

## 2017-08-16 RX ADMIN — MIDAZOLAM HYDROCHLORIDE 2 MG: 1 INJECTION, SOLUTION INTRAMUSCULAR; INTRAVENOUS at 11:07

## 2017-08-16 RX ADMIN — ACETAMINOPHEN 650 MG: 325 TABLET, FILM COATED ORAL at 22:36

## 2017-08-16 RX ADMIN — LIDOCAINE HYDROCHLORIDE 0.1 ML: 10 INJECTION, SOLUTION EPIDURAL; INFILTRATION; INTRACAUDAL; PERINEURAL at 10:17

## 2017-08-16 RX ADMIN — Medication 80 MCG: at 12:23

## 2017-08-16 RX ADMIN — BUDESONIDE 500 MCG: 0.5 INHALANT RESPIRATORY (INHALATION) at 20:12

## 2017-08-16 RX ADMIN — SODIUM CHLORIDE, SODIUM LACTATE, POTASSIUM CHLORIDE, AND CALCIUM CHLORIDE 75 ML/HR: 600; 310; 30; 20 INJECTION, SOLUTION INTRAVENOUS at 10:17

## 2017-08-16 RX ADMIN — GLIMEPIRIDE 4 MG: 2 TABLET ORAL at 18:46

## 2017-08-16 RX ADMIN — ROCURONIUM BROMIDE 10 MG: 10 INJECTION, SOLUTION INTRAVENOUS at 12:25

## 2017-08-16 RX ADMIN — INSULIN LISPRO 2 UNITS: 100 INJECTION, SOLUTION INTRAVENOUS; SUBCUTANEOUS at 16:06

## 2017-08-16 RX ADMIN — DEXAMETHASONE SODIUM PHOSPHATE 4 MG: 4 INJECTION, SOLUTION INTRA-ARTICULAR; INTRALESIONAL; INTRAMUSCULAR; INTRAVENOUS; SOFT TISSUE at 11:13

## 2017-08-16 RX ADMIN — ACETAMINOPHEN 650 MG: 325 TABLET, FILM COATED ORAL at 16:06

## 2017-08-16 RX ADMIN — ARFORMOTEROL TARTRATE 15 MCG: 15 SOLUTION RESPIRATORY (INHALATION) at 20:12

## 2017-08-16 RX ADMIN — METOPROLOL TARTRATE 25 MG: 25 TABLET ORAL at 20:51

## 2017-08-16 RX ADMIN — ROCURONIUM BROMIDE 35 MG: 10 INJECTION, SOLUTION INTRAVENOUS at 11:32

## 2017-08-16 RX ADMIN — Medication 80 MCG: at 12:28

## 2017-08-16 RX ADMIN — ROCURONIUM BROMIDE 10 MG: 10 INJECTION, SOLUTION INTRAVENOUS at 11:48

## 2017-08-16 RX ADMIN — FENTANYL CITRATE 50 MCG: 50 INJECTION, SOLUTION INTRAMUSCULAR; INTRAVENOUS at 11:13

## 2017-08-16 RX ADMIN — FENTANYL CITRATE 25 MCG: 50 INJECTION, SOLUTION INTRAMUSCULAR; INTRAVENOUS at 13:46

## 2017-08-16 RX ADMIN — PROPOFOL 170 MG: 10 INJECTION, EMULSION INTRAVENOUS at 11:13

## 2017-08-16 RX ADMIN — MONTELUKAST SODIUM 10 MG: 10 TABLET, FILM COATED ORAL at 22:36

## 2017-08-16 RX ADMIN — DOCUSATE SODIUM AND SENNOSIDES 1 TABLET: 8.6; 5 TABLET, FILM COATED ORAL at 18:46

## 2017-08-16 RX ADMIN — Medication: at 14:08

## 2017-08-16 RX ADMIN — Medication 80 MCG: at 11:59

## 2017-08-16 RX ADMIN — PROPOFOL 50 MG: 10 INJECTION, EMULSION INTRAVENOUS at 12:45

## 2017-08-16 RX ADMIN — SODIUM CHLORIDE, SODIUM LACTATE, POTASSIUM CHLORIDE, CALCIUM CHLORIDE: 600; 310; 30; 20 INJECTION, SOLUTION INTRAVENOUS at 11:07

## 2017-08-16 RX ADMIN — SODIUM CHLORIDE 125 ML/HR: 900 INJECTION, SOLUTION INTRAVENOUS at 14:40

## 2017-08-16 RX ADMIN — Medication 2 G: at 11:20

## 2017-08-16 RX ADMIN — HYDROMORPHONE HYDROCHLORIDE 1 MG: 2 INJECTION, SOLUTION INTRAMUSCULAR; INTRAVENOUS; SUBCUTANEOUS at 11:32

## 2017-08-16 RX ADMIN — FENTANYL CITRATE 25 MCG: 50 INJECTION, SOLUTION INTRAMUSCULAR; INTRAVENOUS at 13:26

## 2017-08-16 RX ADMIN — SUCCINYLCHOLINE CHLORIDE 150 MG: 20 INJECTION INTRAMUSCULAR; INTRAVENOUS at 11:13

## 2017-08-16 RX ADMIN — ONDANSETRON 4 MG: 2 INJECTION INTRAMUSCULAR; INTRAVENOUS at 12:45

## 2017-08-16 RX ADMIN — ROCURONIUM BROMIDE 5 MG: 10 INJECTION, SOLUTION INTRAVENOUS at 11:13

## 2017-08-16 RX ADMIN — CEFAZOLIN 2 G: 1 INJECTION, POWDER, FOR SOLUTION INTRAMUSCULAR; INTRAVENOUS; PARENTERAL at 18:46

## 2017-08-16 RX ADMIN — SODIUM CHLORIDE 125 ML/HR: 900 INJECTION, SOLUTION INTRAVENOUS at 22:34

## 2017-08-16 NOTE — PROGRESS NOTES
Bedside and Verbal shift change report given to Dalton Pizarro RN (oncoming nurse) by Danny Curling (offgoing nurse). Report included the following information SBAR, Kardex, OR Summary, Intake/Output and MAR.

## 2017-08-16 NOTE — PROGRESS NOTES
Primary Nurse Radha Velasquez and Santino Head, RN performed a dual skin assessment on this patient No impairment noted  Miguel Ángel score is 21

## 2017-08-16 NOTE — ANESTHESIA PREPROCEDURE EVALUATION
Anesthetic History   No history of anesthetic complications            Review of Systems / Medical History  Patient summary reviewed, nursing notes reviewed and pertinent labs reviewed    Pulmonary  Within defined limits          Asthma        Neuro/Psych   Within defined limits           Cardiovascular  Within defined limits  Hypertension                   GI/Hepatic/Renal  Within defined limits              Endo/Other  Within defined limits  Diabetes         Other Findings              Physical Exam    Airway  Mallampati: II  TM Distance: > 6 cm  Neck ROM: normal range of motion   Mouth opening: Normal     Cardiovascular  Regular rate and rhythm,  S1 and S2 normal,  no murmur, click, rub, or gallop             Dental  No notable dental hx       Pulmonary  Breath sounds clear to auscultation               Abdominal  GI exam deferred       Other Findings            Anesthetic Plan    ASA: 2  Anesthesia type: general          Induction: Intravenous  Anesthetic plan and risks discussed with: Patient

## 2017-08-16 NOTE — BRIEF OP NOTE
BRIEF OPERATIVE NOTE    Date of Procedure: 8/16/2017   Preoperative Diagnosis: CERVICAL STENOSIS, CERVICAL DISC HERNIATION  Postoperative Diagnosis: CERVICAL STENOSIS, CERVICAL DISC HERNIATION    Procedure(s):  C6-C7 ACDF  Surgeon(s) and Role: Belén Abdullahi MD - Primary         Assistant Staff:  Nurse Practitioner: Shaan Pandya NP    Surgical Staff:  Circ-1: Estevan Kaba RN  Circ-Relief: Ya Wilson RN  Circ-Intern: Vivek Wooten  Radiology Technician: RT William  Scrub RN-1: Leann Miles RN  Scrub RN-Relief: Jd Monzon RN  Nurse Practitioner: Shaan Pandya NP  Float Staff: Ana Rosa River RN  Event Time In   Incision Start 1135   Incision Close      Anesthesia: General   Estimated Blood Loss: min  Specimens: * No specimens in log *   Findings: stenosis   Complications: none  Implants:   Implant Name Type Inv.  Item Serial No.  Lot No. LRB No. Used Action   GRAFT BNE ELITE UNRULY  --  - [id number]  GRAFT BNE ELITE [employer]  --  [id number] MUSCULOSKELETAL TRANS N/A N/A 1 Implanted   Cervical Spacer Ti Coated, Interbody Fusion Device   [id number] [employer] N/A N/A 1 Implanted   PLATE CERV ANTR 1 LVL 14MM TI -- SKYLINE - SN/A  PLATE CERV ANTR 1 LVL 14MM TI -- SKYLINE N/A JNJ DEPUY SPINE N/A N/A 1 Implanted   SCR CERV ANTR VA SD 14MM TI -- SKYLINE - SN/A   SCR CERV ANTR VA SD 14MM TI -- SKYLINE N/A JNJ DEPUY SPINE N/A N/A 1 Implanted

## 2017-08-16 NOTE — PERIOP NOTES
TRANSFER - OUT REPORT:    Verbal report given to Samson(name) on Ajit Kingsley  being transferred to [facility](unit) for progression of care. Report consisted of patients Situation, Background, Assessment and   Recommendations(SBAR). Time Pre op antibiotic given:1120  Anesthesia Stop time: 6127  Mcclain Present on Transfer to floor:n  Order for Mcclain on Chart:0    Information from the following report(s) SBAR, OR Summary, Intake/Output and MAR was reviewed with the receiving nurse. Opportunity for questions and clarification was provided. Is the patient on 02? YES       L/Min 2       Other 0    Is the patient on a monitor? NO    Is the nurse transporting with the patient? NO    Surgical Waiting Area notified of patient's transfer from PACU? YES      The following personal items collected during your admission accompanied patient upon transfer:   Dental Appliance: Dental Appliances: None  Vision: Visual Aid: Glasses, At home  Hearing Aid:    Jewelry: Jewelry: None  Clothing: Clothing: Pants, Footwear, Undergarments, Socks, Shirt (sent to [employer])  Other Valuables:  Other Valuables: None  Valuables sent to safe:

## 2017-08-16 NOTE — PERIOP NOTES
Patient: Zackery Marquez MRN: [id number]  SSN: [id number]   YOB: 1955  Age: 63 y.o. Sex: female     Patient is status post Procedure(s):  C6-C7 ACDF. Surgeon(s) and Role: Carloz Cancino MD - Primary    Local/Dose/Irrigation:                    Peripheral IV 08/16/17 Left Hand (Active)          Hemovac Left; Anterior Neck (Active)   Site Assessment Clean, dry, & intact 8/16/2017 12:28 PM   Dressing Status Clean, dry, & intact 8/16/2017 12:28 PM   Drainage Description Serosanguinous 8/16/2017 12:28 PM   Status Patent; Charged 8/16/2017 12:28 PM      Airway - Endotracheal Tube 08/16/17 Oral (Active)                   Dressing/Packing:  Wound Neck Anterior-DRESSING TYPE: 4 x 4;Topical skin adhesive/glue (Tape) (08/16/17 1240)  Splint/Cast: Wound Neck Anterior-SPLINT TYPE/MATERIAL: Cervical Collar]    Other:

## 2017-08-16 NOTE — ANESTHESIA POSTPROCEDURE EVALUATION
Post-Anesthesia Evaluation and Assessment    Patient: Piotr Porras MRN: [id number]  SSN: [id number]    YOB: 1955  Age: 63 y.o. Sex: female       Cardiovascular Function/Vital Signs  Visit Vitals    /76    Pulse 77    Temp 36.3 °C (97.3 °F)    Resp 16    Ht 5' 3\" (1.6 m)    Wt 96.6 kg (213 lb)    SpO2 94%    BMI 37.73 kg/m2       Patient is status post general anesthesia for Procedure(s):  C6-C7 ANTERIOR CERVICAL DISCECTOMY WITH FUSION. Nausea/Vomiting: None    Postoperative hydration reviewed and adequate. Pain:  Pain Scale 1: Visual (08/16/17 1324)  Pain Intensity 1: 5 (08/16/17 1324)   Managed    Neurological Status:   Neuro (WDL): Within Defined Limits (08/16/17 1307)  Neuro  LUE Motor Response: Purposeful (moderate ) (08/16/17 1307)  RUE Motor Response: Purposeful (moderate ) (08/16/17 1307)   At baseline    Mental Status and Level of Consciousness: Alert and oriented     Pulmonary Status:   O2 Device: Nasal cannula (08/16/17 1651)   Adequate oxygenation and airway patent    Complications related to anesthesia: None    Post-anesthesia assessment completed.  No concerns    Signed By: Anabel García DO     August 16, 2017

## 2017-08-17 VITALS
HEIGHT: 63 IN | WEIGHT: 213 LBS | SYSTOLIC BLOOD PRESSURE: 165 MMHG | OXYGEN SATURATION: 93 % | TEMPERATURE: 98.6 F | DIASTOLIC BLOOD PRESSURE: 75 MMHG | BODY MASS INDEX: 37.74 KG/M2 | HEART RATE: 74 BPM | RESPIRATION RATE: 14 BRPM

## 2017-08-17 LAB
GLUCOSE BLD STRIP.AUTO-MCNC: 142 MG/DL (ref 65–100)
SERVICE CMNT-IMP: ABNORMAL

## 2017-08-17 PROCEDURE — 74011250636 HC RX REV CODE- 250/636: Performed by: ORTHOPAEDIC SURGERY

## 2017-08-17 PROCEDURE — 99218 HC RM OBSERVATION: CPT

## 2017-08-17 PROCEDURE — 74011000250 HC RX REV CODE- 250: Performed by: ORTHOPAEDIC SURGERY

## 2017-08-17 PROCEDURE — 74011636637 HC RX REV CODE- 636/637: Performed by: ORTHOPAEDIC SURGERY

## 2017-08-17 PROCEDURE — 74011250637 HC RX REV CODE- 250/637: Performed by: NURSE PRACTITIONER

## 2017-08-17 PROCEDURE — 74011250637 HC RX REV CODE- 250/637: Performed by: ORTHOPAEDIC SURGERY

## 2017-08-17 PROCEDURE — 94640 AIRWAY INHALATION TREATMENT: CPT

## 2017-08-17 PROCEDURE — 74011636637 HC RX REV CODE- 636/637: Performed by: NURSE PRACTITIONER

## 2017-08-17 PROCEDURE — 77010033678 HC OXYGEN DAILY

## 2017-08-17 PROCEDURE — 82962 GLUCOSE BLOOD TEST: CPT

## 2017-08-17 RX ORDER — CYCLOBENZAPRINE HCL 10 MG
5 TABLET ORAL
Status: DISCONTINUED | OUTPATIENT
Start: 2017-08-17 | End: 2017-08-17 | Stop reason: HOSPADM

## 2017-08-17 RX ORDER — OXYCODONE HYDROCHLORIDE 5 MG/1
5-10 TABLET ORAL
Qty: 60 TAB | Refills: 0 | Status: SHIPPED | OUTPATIENT
Start: 2017-08-17 | End: 2018-06-18

## 2017-08-17 RX ADMIN — AMLODIPINE BESYLATE 5 MG: 5 TABLET ORAL at 08:56

## 2017-08-17 RX ADMIN — SODIUM CHLORIDE 125 ML/HR: 900 INJECTION, SOLUTION INTRAVENOUS at 07:20

## 2017-08-17 RX ADMIN — CLONIDINE HYDROCHLORIDE 0.1 MG: 0.1 TABLET ORAL at 07:16

## 2017-08-17 RX ADMIN — CYCLOBENZAPRINE HYDROCHLORIDE 5 MG: 10 TABLET, FILM COATED ORAL at 08:58

## 2017-08-17 RX ADMIN — METOPROLOL TARTRATE 25 MG: 25 TABLET ORAL at 08:56

## 2017-08-17 RX ADMIN — ESTRADIOL 1 MG: 1 TABLET ORAL at 08:56

## 2017-08-17 RX ADMIN — DOCUSATE SODIUM AND SENNOSIDES 1 TABLET: 8.6; 5 TABLET, FILM COATED ORAL at 08:56

## 2017-08-17 RX ADMIN — POLYETHYLENE GLYCOL 3350 17 G: 17 POWDER, FOR SOLUTION ORAL at 08:53

## 2017-08-17 RX ADMIN — INSULIN LISPRO 10 UNITS: 100 INJECTION, SUSPENSION SUBCUTANEOUS at 09:44

## 2017-08-17 RX ADMIN — CEFAZOLIN 2 G: 1 INJECTION, POWDER, FOR SOLUTION INTRAMUSCULAR; INTRAVENOUS; PARENTERAL at 03:35

## 2017-08-17 RX ADMIN — ACETAMINOPHEN 650 MG: 325 TABLET, FILM COATED ORAL at 03:36

## 2017-08-17 RX ADMIN — BUDESONIDE 500 MCG: 0.5 INHALANT RESPIRATORY (INHALATION) at 07:27

## 2017-08-17 RX ADMIN — GLIMEPIRIDE 4 MG: 2 TABLET ORAL at 08:56

## 2017-08-17 RX ADMIN — OXYCODONE HYDROCHLORIDE 10 MG: 5 TABLET ORAL at 10:52

## 2017-08-17 RX ADMIN — ARFORMOTEROL TARTRATE 15 MCG: 15 SOLUTION RESPIRATORY (INHALATION) at 07:27

## 2017-08-17 RX ADMIN — Medication 10 ML: at 07:20

## 2017-08-17 RX ADMIN — CLONIDINE HYDROCHLORIDE 0.1 MG: 0.1 TABLET ORAL at 00:29

## 2017-08-17 RX ADMIN — ACETAMINOPHEN 650 MG: 325 TABLET, FILM COATED ORAL at 09:47

## 2017-08-17 RX ADMIN — INSULIN LISPRO 2 UNITS: 100 INJECTION, SOLUTION INTRAVENOUS; SUBCUTANEOUS at 07:17

## 2017-08-17 NOTE — PROGRESS NOTES
Bedside and Verbal shift change report given to [address] [place] [address] (oncoming nurse) by Brittany Robb RN (offgoing nurse). Report included the following information SBAR, Kardex, OR Summary, Intake/Output and MAR.

## 2017-08-17 NOTE — PROGRESS NOTES
Orthopedic Spine Progress Note  Post Op day: 1 Day Post-Op    2017 8:12 AM     Sade Dang    Vital Signs:    Patient Vitals for the past 8 hrs:   BP Temp Pulse Resp SpO2   17 0728 - - - - 94 %   17 0715 174/80 - 74 - -   17 0400 178/82 97.6 °F (36.4 °C) 76 15 95 %   17 0238 (!) 138/91 98.1 °F (36.7 °C) 74 16 97 %   17 0026 177/88 98.1 °F (36.7 °C) 79 16 96 %     Temp (24hrs), Av.9 °F (36.6 °C), Min:97 °F (36.1 °C), Max:98.5 °F (36.9 °C)      Intake/Output:   0701 -  1900  In: -   Out: 200 [Urine:200]  08/15 190 -  0700  In: 9634 [P.O.:944; I.V.:2567]  Out: 1075 [Urine:1025; Drains:25]    Pain Control:   Pain Assessment  Pain Scale 1: Numeric (0 - 10)  Pain Intensity 1: 6  Pain Onset 1: S/P post op surgical pain. Pain Location 1: Incisional, Neck, Throat, Spine, cervical (Patient's pain is in her throat.)  Pain Orientation 1: Anterior, Inner  Pain Description 1: Sore  Pain Intervention(s) 1: Distraction, Emotional support, Family Support, Rest    LAB:    No results for input(s): HCT, HGB, HCTEXT, HGBEXT in the last 72 hours. Lab Results   Component Value Date/Time    Sodium 136 2017 11:39 AM    Potassium 4.2 2017 11:39 AM    Chloride 103 2017 11:39 AM    CO2 30 2017 11:39 AM    Glucose 174 2017 11:39 AM    BUN 17 2017 11:39 AM    Creatinine 0.88 2017 11:39 AM    Calcium 9.1 2017 11:39 AM       Subjective:  Sade Dang is a 63 y.o. female s/p a  Procedure(s):  C6-C7 ANTERIOR CERVICAL DISCECTOMY WITH FUSION   Procedure(s):  C6-C7 ANTERIOR CERVICAL DISCECTOMY WITH FUSION. Tolerating diet. Objective: General: alert, cooperative, no distress. Gastrointestinal:  Soft, non-tender. Neurological: Neurovascular exam within normal limits. Sensation stable. Motor: unchanged C5-T1 and L2-S1. No arm pain  Musculoskeletal:  Radha's sign negative in bilateral lower extremities.   Calves soft, supple, non-tender upon palpation or with passive stretch. Skin: Incision - clean, dry and intact. No significant erythema or swelling. Dressing: clean, dry, and intact     PT/OT:   Gait:                      Assessment:    s/p Procedure(s):  C6-C7 ANTERIOR CERVICAL DISCECTOMY WITH FUSION    Active Problems:    Cervical stenosis of spinal canal (8/16/2017)         Plan:     1. Continue PT/OT  2. Continue established methods of pain control  3. VTE Prophylaxes - TEDS &/or SCDs              Discharge To:       Signed By: Tamara Osei MD

## 2017-08-17 NOTE — DIABETES MGMT
[facility] Ortho Education/Consult Note    Recommendations/ Comments: Met with patient to assess home management and provide post op blood sugar guidelines. Patient was groggy, but answered questions. She reports that her A1C had been 10.35, but that she had been on steroids and her MD added insulin at that time, so 9.45 is an improvement. She checks her blood sugars and states it ranges from the 90's to 140's now that she is on insulin. She is not interested in classes locally due to living in the [facility], but will inquire about education with PCP. Chart reviewed and initial evaluation complete on Ajit Kingsley. Patient is a 63 y.o. female with a history of Type 2 Diabetes on oral agent (monotherapy): glimepiride (Amaryl)  insulin injections: Humalog : 75/25 20 units BID at home. A1c:   Lab Results   Component Value Date/Time    Hemoglobin A1c 9.4 2017 11:39 AM       Assessed and instructed patient on the following:   · risk of wound infection/ delayed healing   · interpretation of lab results, blood sugar goals, complications of diabetes mellitus, hypoglycemia prevention and treatment, insulin adjustments and nutrition. Encouraged the following: dietary modifications: eliminate sugary drinks, regular blood sugar monitorin times daily       Provided patient with the following: [x]            Survival skills education materials               [x]            BG guidelines for post-op patients                  [x]            Outpatient DTC contact number                   Recent Glucose Results: Lab Results   Component Value Date/Time    GLUCPOC 142 (H) 2017 05:53 AM    GLUCPOC 194 (H) 2017 04:01 PM    GLUCPOC 123 (H) 2017 01:58 PM        Lab Results   Component Value Date/Time    Creatinine 0.88 2017 11:39 AM     Estimated Creatinine Clearance: 76.2 mL/min (based on Cr of 0.88). Active Orders   Diet    DIET GI LITE (POST SURGICAL) No Conc.  Sweets        PO intake: Patient Vitals for the past 72 hrs:   % Diet Eaten   08/16/17 2237 80 %         Current hospital DM medication: Amaryl 4 mg BID, Humalog 75/25 10 units BID and Humalog for correction, normal sensitivity    Will continue to follow as needed. Thank you.    George Patel MS, RN, CDE

## 2017-08-17 NOTE — PROGRESS NOTES
Spiritual Care Partner Volunteer visited patient in [address] on 8/17/17. Documented by:  Megan Iqbal M.Div.    Paging Service [phone])

## 2017-08-17 NOTE — PROGRESS NOTES
Problem: Falls - Risk of  Goal: *Absence of Falls  Document Kitty Fall Risk and appropriate interventions in the flowsheet.    Outcome: Progressing Towards Goal  Fall Risk Interventions:  Mobility Interventions: Communicate number of staff needed for ambulation/transfer, OT consult for ADLs, Patient to call before getting OOB, PT Consult for mobility concerns, PT Consult for assist device competence, Strengthening exercises (ROM-active/passive)     Mentation Interventions: Adequate sleep, hydration, pain control, Door open when patient unattended, Eyeglasses and hearing aids, Family/sitter at bedside, Gait belt with transfers/ambulation, Increase mobility     Medication Interventions: Patient to call before getting OOB, Teach patient to arise slowly     Elimination Interventions: Call light in reach, Patient to call for help with toileting needs, Toilet paper/wipes in reach, Toileting schedule/hourly rounds     History of Falls Interventions: Door open when patient unattended

## 2017-08-17 NOTE — DISCHARGE INSTRUCTIONS
After Hospital Care Plan:  Discharge Instructions Cervical (Neck) Spine Surgery Dr. Yumiko Dubon     Patient Name: Silke Jacobs    Date of procedure: 8/16/2017      Date of discharge:     Procedure: Procedure(s):  C6-C7 ANTERIOR CERVICAL DISCECTOMY WITH FUSION      PCP: Solis Godoy MD      Follow up appointments  -Follow up with Dr. Yumiko Dubon in 2 weeks. Call [phone] to make an appointment as soon as you get home from the hospital.    When to call your Orthopaedic Surgeon:  -Difficulty swallowing that is worse than when you left the hospital.  -Signs of infection-if your incision is red; continues to have drainage; drainage has a foul odor or if you have a persistent fever over 101 degrees for 24 hours  -Nausea or vomiting, severe headache  -Changes in sensation in your arms or legs (numbness, tingling, loss of color)  -Increased weakness-greater than before your surgery  -Severe pain or pain not relieved by medications  -Signs of a blood clot in your leg-calf pain, tenderness, redness, swelling of lower leg    When to call your Primary Care Physician:  -Concerns about medical conditions such as diabetes, high blood pressure, asthma, congestive heart failure  -Call if blood sugars are elevated, persistent headache or dizziness, coughing or congestion, constipation or diarrhea, burning with urination, abnormal heart rate    When to call 911 and go to the nearest emergency room:  -Acute onset of chest pain, shortness of breath, difficulty breathing    Activity  -You are going home a well person, be as active as possible. Your only exercise should be walking. Start with short frequent walks and increase your walking distance each day.  -Limit the amount of time you sit to 20-30 minute intervals. Sitting for prolonged periods of time will be uncomfortable for you following surgery.   - Do NOT lift anything over 5 pounds  -Do NOT do any neck exercises until you have been instructed by your doctor  -When you are in bed, you may lay on your back or on either side. Do NOT lie on your stomach    Cervical Collar (Aspen Collar)  -You are required to wear your cervical collar at all times; except while showering. You may remove the collar long enough to change the pads when needed and to change your dressing each day  -Do not bend or twist when your brace is off. It is best to have someone assist you when changing the pads and your dressing to prevent you from bending your neck. Diet  -resume usual diet; drink plenty of fluids; eat foods high in fiber  -It is important to have regular bowel movements. Pain medications may cause constipation. You may want to take a stool softener (such as Senokot-S or Colace) to prevent constipation.  -If constipation occurs, take a laxative (such as Dulcolax tablets, Milk of Magnesia, or a suppository). Laxatives should only be used if the above preventable measures have failed and you still have not had a bowel movement after three days    Driving  -You may not drive or return to work until instructed by your physician. However, you may ride in the car for short periods of time. Incision Care  -You may take brief showers but do not run the water run directly onto the wound. After showering or bathing, remove the wet dressing and gently blot the wound dry with a soft towel.  -Do not rub or apply any lotions or ointments to your incision site.   -Do not soak or scrub your wound  -Keep a dry dressing (ABD and paper tape) on your incision and have it changed daily for 14 days after surgery; more often if your incision is draining. Have your caregiver wash their hands thoroughly before changing your dressing.  -You will have absorbable sutures and steristrips (white tape) on your incision. Leave the steristrips on until they fall off. Showering  -You may shower in approximately 2 days after your surgery.    -Leave the dressing on during your shower.   Do NOT allow the water to run directly onto your dressing. Once you get out of the shower, put on a dry dressing.  -Do not take a tub bath. Preventing blood clots  -You have been given T.E.D. stockings to wear. Continue to wear these for 7 days after your discharge. Put them on in the morning and take them off at night.    -They are used to increase your circulation and prevent blood clots from forming in your legs  -T. E.D. stockings can be machine washed, temperature not to exceed 160° F (71°C) and machine dried for 15 to 20 minutes, temperature not to exceed 250° F (121°C). Pain management  -Take pain medication as prescribed; decrease the amount you use as your pain lessens  -Do not wait until you are in extreme pain to take your medication.  -Avoid alcoholic beverages while taking pain medication    Pain Medication Safety  DO:  -Read the Medication Guide   -Take your medicine exactly as prescribed   -Store your medicine away from children and in a safe place   -Flush unused medicine down the toilet   -Call your healthcare provider for medical advice about side effects. You may report side effects to FDA at 8-103-FDA-0271.   -Please be aware that many medications contain Tylenol. We do not want you to over medicate so please read the information below as a guide. Do not take more than 4 Grams of Tylenol in a 24 hour period.   (There are 1000 milligrams in one Gram)                                                                                                                                                                                                                                                                                                                                                                  Percocet contains 325 mg of Tylenol per tablet (do not take more than 12 tablets in 24 hours)  Lortab contains 500 mg of Tylenol per tablet (do not take more than 8 tablets in 24 hours)  Norco contains 325 mg of Tylenol per tablet (do not take more than 12 tablets in 24 hours). DO NOT:  -Do not give your medicine to others   -Do not take medicine unless it was prescribed for you   -Do not stop taking your medicine without talking to your healthcare provider   -Do not break, chew, crush, dissolve, or inject your medicine. If you cannot  swallow your medicine whole, talk to your healthcare provider.  -Do not drink alcohol while taking this medicine  -Do not take anti-inflammatory medications or aspirin unless instructed by your physician.

## 2017-08-17 NOTE — OP NOTES
[address] [facility] [address] [place] [address], [address]   OP NOTE       Name:  Cayetano Britton   MR#:  [id number]   :  1955   Account #:  [de-identified]    Surgery Date:  2017   Date of Adm:  2017       PREOPERATIVE DIAGNOSES   1. Cervical stenosis. 2. Herniated disk. 3. Radiculitis. POSTOPERATIVE DIAGNOSES   1. Cervical stenosis. 2. Herniated disk. 3. Radiculitis. PROCEDURES PERFORMED   1. Anterior cervical diskectomy and fusion, C6-C7.   2. Anterior cervical plating using 12 mm [place] DePuy plate. 3. Placement of interbody cage using a [employer] Ascendant cage,   7-mm in height. 4. Use of operating microscope. ESTIMATED BLOOD LOSS: Minimal.    COMPLICATIONS: None. ANESTHESIA: General.    SPECIMENS REMOVED: None. INDICATION FOR PROCEDURE: The patient is a pleasant female   who underwent conservative management for disk herniation and   stenosis, C6-C7. After not improving, she elected to proceed with   surgical intervention. She understood the risks of surgery including   CSF leak, nerve damage, infection, perioperative morbidity and   mortality, nerve damage, paralysis, problems swallowing, problems   breathing, esophageal injury, epidural hematoma, need for further   surgery, adjacent segment disease, nonunion. She elected to proceed. DESCRIPTION OF PROCEDURE: The patient was laid supine on the   operating table, prepped and draped in normal fashion using alcohol   and DuraPrep. Incision was made. Soft tissue was dissected down to   the platysma, which was incised transversely. The plane between the   esophagus and carotid was taken down to the prevertebral fascia. The   soft tissue was dissected out [id number]. The Shadow-Line   retractor was placed. Clayton pins were placed. Diskectomy was done. Endplates were burred to bleeding parallel bone. The PLL was incised.    A large amount of disk was removed, especially in the left foramina   compared to the right. After full decompression of bilateral foramina,   the wounds were copiously irrigated. The 7 trial was placed and fit well. Allograft bone was then placed in the cage and then the cage was   placed inside the C6-C7 interspace. The wounds were irrigated. A   plate was placed with 14 mm screws. The locking mechanism was   torqued to an audible click. Meticulous hemostasis was maintained. The fascia was closed with 3-0 Vicryl. Skin was closed with 3-0 Vicryl,   4-0 Vicryl and Dermabond. A deep drain was placed before closure. There were no complications of the procedure. I, Dr. Wendy Gavin, did the procedure myself under the magnification of the   operating room microscope.         MD CAMILLA Dempsey / DANIELLE   D:  08/16/2017   12:52   T:  08/16/2017   20:58   Job #:  [id number]

## 2017-08-17 NOTE — DISCHARGE SUMMARY
[address] [place] [address]   [address]  [phone]     Discharge Summary       PATIENT ID: Bina Moreira  MRN: [id number]   YOB: 1955    DATE OF ADMISSION: 8/16/2017  8:51 AM    DATE OF DISCHARGE: 8/17/2017   PRIMARY CARE PROVIDER: Hayde Lewis MD     CONSULTATIONS: None    PROCEDURES/SURGERIES: Procedure(s):  C6-C7 ANTERIOR CERVICAL DISCECTOMY WITH FUSION    History of Present Illness:  Bina Moreira is a 63 y.o. female with a prior medical history significant for DMII, hypertension, asthma, and cervical radiculopathy. She has been dealing with neck pain and left arm pain, numbness and tingling for many months. Her symptoms have been refractory to nonoperative management. Her imaging revealed a left-sided disc protrusion causing severe foraminal stenosis. After failing conservative therapy and a discussion of the risks, benefits, alternatives, perioperative course, and potential complications of surgery, she consented to undergo a Procedure(s):  C6-C7 ANTERIOR CERVICAL DISCECTOMY WITH FUSION. Hospital Course:  Bina Moreira tolerated the procedure well. She was transferred  to the recovery room in stable condition. After a brief stay the patient was then transferred to the Spinal Surgery Unit at [address] [place] [address].  On postoperative day #1, the dressing was clean and dry, she was neurovascularly intact. The patient was afebrile and vital signs were stable. Calves were soft and non-tender bilaterally. The patient was tolerating a regular diet, voiding, and mobilizing without difficulty. She had no dysphagia, vocal hoarseness or trouble breathing. [place] Gheens made satisfactory progress with physical therapy and was discharged to Home in stable condition on postoperative day 1.   She was provided with routine postoperative instructions and advised to follow up Niki Roldan MD in 2 weeks following discharge from the hospital.        DISCHARGE ASSESSMENT / PLAN:      1.  1 Day Post-Op Procedure(s):  C6-C7 ANTERIOR CERVICAL DISCECTOMY WITH FUSION   - Pain managed with PRN oxycodone   - Cervical collar in place. Incision c/d/i. No surrounding erythema or edema   - Tolerating diet without complaints of n/v. Swallowing without difficulty. - VTE prophylaxis: TEDs & SCDs. Instructed patient to continue TEDs for 1 week following discharge from hospital. Encouraged mobility   -Encouraged Incentive spirometer    2. Hypertension   - Clonidine given post op for SBP in 170's. Likely d/t pain   - Continue home metoprolol and amlodipine    3.  Diabetes Mellitus type II, poorly controlled   - Hgba1c 9.4   - fbg postoperatively    -resume home Amaryl and humalog mix with breakfast and dinner   - appreciate DTC consult   - Encourage f/u with PCP regarding medication adjustments to reach a1c goal             FOLLOW UP APPOINTMENTS:    Follow-up Information     Follow up With Details Comments Ousmane Peralta MD   [address] [facility] [phone]             ADDITIONAL CARE RECOMMENDATIONS:     When to call your Orthopaedic Surgeon:  -Difficulty swallowing that is worse than when you left the hospital.  -Signs of infection-if your incision is red; continues to have drainage; drainage has a foul odor or if you have a persistent fever over 101 degrees for 24 hours  -Nausea or vomiting, severe headache  -Changes in sensation in your arms or legs (numbness, tingling, loss of color)  -Increased weakness-greater than before your surgery  -Severe pain or pain not relieved by medications  -Signs of a blood clot in your leg-calf pain, tenderness, redness, swelling of lower leg    When to call your Primary Care Physician:  -Concerns about medical conditions such as diabetes, high blood pressure, asthma, congestive heart failure  -Call if blood sugars are elevated, persistent headache or dizziness, coughing or congestion, constipation or diarrhea, burning with urination, abnormal heart rate    When to call 911 and go to the nearest emergency room:  -Acute onset of chest pain, shortness of breath, difficulty breathing    Activity  -You are going home a well person, be as active as possible. Your only exercise should be walking. Start with short frequent walks and increase your walking distance each day.  -Limit the amount of time you sit to 20-30 minute intervals. Sitting for prolonged periods of time will be uncomfortable for you following surgery. - Do NOT lift anything over 5 pounds  -Do NOT do any neck exercises until you have been instructed by your doctor  -When you are in bed, you may lay on your back or on either side. Do NOT lie on your stomach    Cervical Collar (Aspen Collar)  -You are required to wear your cervical collar at all times; except while showering. You may remove the collar long enough to change the pads when needed and to change your dressing each day  -Do not bend or twist when your brace is off. It is best to have someone assist you when changing the pads and your dressing to prevent you from bending your neck. Diet  -resume usual diet; drink plenty of fluids; eat foods high in fiber  -It is important to have regular bowel movements. Pain medications may cause constipation. You may want to take a stool softener (such as Senokot-S or Colace) to prevent constipation.  -If constipation occurs, take a laxative (such as Dulcolax tablets, Milk of Magnesia, or a suppository). Laxatives should only be used if the above preventable measures have failed and you still have not had a bowel movement after three days    Driving  -You may not drive or return to work until instructed by your physician. However, you may ride in the car for short periods of time. Incision Care  -You may take brief showers but do not run the water run directly onto the wound.  After showering or bathing, remove the wet dressing and gently blot the wound dry with a soft towel.  -Do not rub or apply any lotions or ointments to your incision site.   -Do not soak or scrub your wound  -Keep a dry dressing (ABD and paper tape) on your incision and have it changed daily for 14 days after surgery; more often if your incision is draining. Have your caregiver wash their hands thoroughly before changing your dressing.  -You will have absorbable sutures and steristrips (white tape) on your incision. Leave the steristrips on until they fall off. Showering  -You may shower in approximately 2 days after your surgery.    -Leave the dressing on during your shower. Do NOT allow the water to run directly onto your dressing. Once you get out of the shower, put on a dry dressing.  -Do not take a tub bath. Preventing blood clots  -You have been given T.E.D. stockings to wear. Continue to wear these for 7 days after your discharge. Put them on in the morning and take them off at night.    -They are used to increase your circulation and prevent blood clots from forming in your legs  -T. E.D. stockings can be machine washed, temperature not to exceed 160° F (71°C) and machine dried for 15 to 20 minutes, temperature not to exceed 250° F (121°C). Pain management  -Take pain medication as prescribed; decrease the amount you use as your pain lessens  -Do not wait until you are in extreme pain to take your medication.  -Avoid alcoholic beverages while taking pain medication    Pain Medication Safety  DO:  -Read the Medication Guide   -Take your medicine exactly as prescribed   -Store your medicine away from children and in a safe place   -Flush unused medicine down the toilet   -Call your healthcare provider for medical advice about side effects. You may report side effects to FDA at 6-633-FDA-8347.   -Please be aware that many medications contain Tylenol.   We do not want you to over medicate so please read the information below as a guide. Do not take more than 4 Grams of Tylenol in a 24 hour period. (There are 1000 milligrams in one Gram)                                                                                                                                                                                                                                                                                                                                                                  Percocet contains 325 mg of Tylenol per tablet (do not take more than 12 tablets in 24 hours)  Lortab contains 500 mg of Tylenol per tablet (do not take more than 8 tablets in 24 hours)  Norco contains 325 mg of Tylenol per tablet (do not take more than 12 tablets in 24 hours). DO NOT:  -Do not give your medicine to others   -Do not take medicine unless it was prescribed for you   -Do not stop taking your medicine without talking to your healthcare provider   -Do not break, chew, crush, dissolve, or inject your medicine. If you cannot  swallow your medicine whole, talk to your healthcare provider.  -Do not drink alcohol while taking this medicine  -Do not take anti-inflammatory medications or aspirin unless instructed by your physician. DISCHARGE MEDICATIONS:  Discharge Medication List as of 8/17/2017 11:05 AM      START taking these medications    Details   oxyCODONE IR (ROXICODONE) 5 mg immediate release tablet Take 1-2 Tabs by mouth every three (3) hours as needed. Max Daily Amount: 80 mg., Print, Disp-60 Tab, R-0         CONTINUE these medications which have NOT CHANGED    Details   fluticasone-salmeterol (ADVAIR DISKUS) 250-50 mcg/dose diskus inhaler Take 1 Puff by inhalation every twelve (12) hours. , Historical Med      insulin aspart protamine/insulin aspart (NOVOLOG MIX 70-30 FLEXPEN) 100 unit/mL (70-30) inpn 20 Units by SubCUTAneous route two (2) times a day.  Indications: type 2 diabetes mellitus, Historical Med glimepiride (AMARYL) 4 mg tablet Take 4 mg by mouth two (2) times a day., Historical Med      metoprolol tartrate (LOPRESSOR) 25 mg tablet Take 25 mg by mouth two (2) times a day., Historical Med      amLODIPine (NORVASC) 5 mg tablet Take 5 mg by mouth daily. , Historical Med      montelukast (SINGULAIR) 10 mg tablet Take 10 mg by mouth nightly., Historical Med      estradiol (ESTRACE) 1 mg tablet Take 1 mg by mouth daily. , Historical Med         STOP taking these medications       ibuprofen (ADVIL) 200 mg tablet Comments:   Reason for Stopping:               PHYSICAL EXAMINATION AT DISCHARGE:  General: Pleasant, alert, cooperative, no distress. EENT: EOMI. Anicteric sclerae. Oral mucous moist, oropharynx benign. Resp: CTA bilaterally. No wheezing/rhonchi/rales. No accessory muscle use. CV: Regular rhythm, normal rate, no murmurs, gallops, rubs. No cyanosis or clubbing. No edema appreciated in the extremities. Gastrointestinal:  Soft, non-tender, non-distended. normoactive bowel sounds, no hepatosplenomegaly  Neurological: Follows commands. GAMBOA. Speech clear. Sensation intact to light touch. Motor: unchanged C5-T1 and L2-S1. Musculoskeletal:  Calves soft, supple, non-tender upon palpation or with passive stretch. Psych: Good insight. Not anxious nor agitated. Skin: Good turgor. No rashes or lesions. Incision - clean, dry and intact. No significant erythema or swelling.       CHRONIC MEDICAL DIAGNOSES:  Problem List as of 8/17/2017  Date Reviewed: 8/16/2017          Codes Class Noted - Resolved    Cervical stenosis of spinal canal ICD-10-CM: M48.02  ICD-9-CM: 723.0  8/16/2017 - Present        Abnormal ECG ICD-10-CM: R94.31  ICD-9-CM: 794.31  8/7/2017 - Present        HTN (hypertension) ICD-10-CM: I10  ICD-9-CM: 401.9  8/7/2017 - Present        Schizophrenia ([facility] ICD-10-CM: F20.9  ICD-9-CM: 295.90  8/7/2017 - Present        Type II diabetes mellitus ([facility] ICD-10-CM: E11.9  ICD-9-CM: 250.00  8/7/2017 - Present        Asthma ICD-10-CM: J45.909  ICD-9-CM: 493.90  8/7/2017 - Present              Signed:   Allyson Hayes NP  8/17/2017  12:03 PM

## 2018-10-30 PROBLEM — K85.30 DRUG-INDUCED ACUTE PANCREATITIS: Status: ACTIVE | Noted: 2018-10-30

## 2018-10-30 PROBLEM — K85.90 PANCREATITIS: Status: ACTIVE | Noted: 2018-10-30

## 2018-11-10 PROBLEM — Z87.19 HISTORY OF PANCREATITIS: Status: ACTIVE | Noted: 2018-10-30

## 2018-11-10 PROBLEM — F20.9 SCHIZOPHRENIA (HCC): Chronic | Status: ACTIVE | Noted: 2017-08-07

## 2018-11-10 PROBLEM — E11.9 TYPE II DIABETES MELLITUS (HCC): Chronic | Status: ACTIVE | Noted: 2017-08-07

## 2018-11-10 PROBLEM — I71.40 ABDOMINAL AORTIC ANEURYSM (AAA) 3.0 CM TO 5.0 CM IN DIAMETER IN FEMALE: Chronic | Status: ACTIVE | Noted: 2018-11-10

## 2018-11-10 PROBLEM — Z87.19 HISTORY OF PANCREATITIS: Chronic | Status: ACTIVE | Noted: 2018-10-30

## 2018-11-10 PROBLEM — I10 HTN (HYPERTENSION): Chronic | Status: ACTIVE | Noted: 2017-08-07

## 2018-11-10 PROBLEM — R10.13 INTRACTABLE EPIGASTRIC ABDOMINAL PAIN: Status: ACTIVE | Noted: 2018-11-10

## 2018-11-10 PROBLEM — R10.9 INTRACTABLE ABDOMINAL PAIN: Status: ACTIVE | Noted: 2018-11-10

## 2019-07-08 PROBLEM — L02.91 ABSCESS: Status: ACTIVE | Noted: 2019-07-08

## 2019-07-24 ENCOUNTER — OFFICE VISIT (OUTPATIENT)
Dept: SURGERY | Age: 64
End: 2019-07-24

## 2019-07-24 VITALS
HEART RATE: 56 BPM | HEIGHT: 65 IN | TEMPERATURE: 97.9 F | DIASTOLIC BLOOD PRESSURE: 59 MMHG | WEIGHT: 198.9 LBS | SYSTOLIC BLOOD PRESSURE: 161 MMHG | BODY MASS INDEX: 33.14 KG/M2

## 2019-07-24 DIAGNOSIS — L24.A9 WOUND DRAINAGE: ICD-10-CM

## 2019-07-24 DIAGNOSIS — R10.33 UMBILICAL PAIN: Primary | ICD-10-CM

## 2019-07-24 DIAGNOSIS — L02.91 ABSCESS: ICD-10-CM

## 2019-07-24 NOTE — PATIENT INSTRUCTIONS
Skin Abscess: Care Instructions  Your Care Instructions    A skin abscess is a bacterial infection that forms a pocket of pus. A boil is a kind of skin abscess. The doctor may have cut an opening in the abscess so that the pus can drain out. You may have gauze in the cut so that the abscess will stay open and keep draining. You may need antibiotics. You will need to follow up with your doctor to make sure the infection has gone away. The doctor has checked you carefully, but problems can develop later. If you notice any problems or new symptoms, get medical treatment right away. Follow-up care is a key part of your treatment and safety. Be sure to make and go to all appointments, and call your doctor if you are having problems. It's also a good idea to know your test results and keep a list of the medicines you take. How can you care for yourself at home? · Apply warm and dry compresses, a heating pad set on low, or a hot water bottle 3 or 4 times a day for pain. Keep a cloth between the heat source and your skin. · If your doctor prescribed antibiotics, take them as directed. Do not stop taking them just because you feel better. You need to take the full course of antibiotics. · Take pain medicines exactly as directed. ? If the doctor gave you a prescription medicine for pain, take it as prescribed. ? If you are not taking a prescription pain medicine, ask your doctor if you can take an over-the-counter medicine. · Keep your bandage clean and dry. Change the bandage whenever it gets wet or dirty, or at least one time a day. · If the abscess was packed with gauze:  ? Keep follow-up appointments to have the gauze changed or removed. If the doctor instructed you to remove the gauze, follow the instructions you were given for how to remove it. ? After the gauze is removed, soak the area in warm water for 15 to 20 minutes 2 times a day, until the wound closes. When should you call for help?   Call your doctor now or seek immediate medical care if:    · You have signs of worsening infection, such as:  ? Increased pain, swelling, warmth, or redness. ? Red streaks leading from the infected skin. ? Pus draining from the wound. ? A fever.    Watch closely for changes in your health, and be sure to contact your doctor if:    · You do not get better as expected. Where can you learn more? Go to http://cherise-robi.info/. Enter B038 in the search box to learn more about \"Skin Abscess: Care Instructions. \"  Current as of: April 1, 2019  Content Version: 12.1  © 2392-4030 [employer], [employer]. Care instructions adapted under license by [employer] (which disclaims liability or warranty for this information). If you have questions about a medical condition or this instruction, always ask your healthcare professional. [address] any warranty or liability for your use of this information.

## 2019-07-24 NOTE — PROGRESS NOTES
Em Curtis is a 66 y.o. female who presents today with the following:  Chief Complaint   Patient presents with    Skin Problem     umbilical       HPI    67-LBEC-EGP female who was actually seen by Dr. Chante Hernandez on July 8 in the hospital for an umbilical abscess. The patient states that about a month ago she developed some tenderness near her umbilicus. She had an umbilical hernia repair in the remote past.  On further questioning and review it looks like back in November she had an episode of abdominal pain and had a CT of the abdomen that was essentially normal other than the fact that she may have had an omental infarct just below the umbilicus. At any rate a month ago she started developing some tenderness near her umbilicus and this progressively worsened and she developed some swelling. She was seen in the emergency department had an incision and drainage of umbilical abscess. A CT scan of the abdomen was obtained at that point and showed a 2 x 2 x 2 by 2 x 2 x 1 collection at the umbilicus that was thought to be compatible with a postoperative abscess. Underlying this, extending from the peritoneal surface into the omental fat there is a linear area of induration that it was difficult to exclude a fistulous tract from the anterior abdominal wall to the collection. 3 days later she was seen by her primary care physician and direct admitted to the hospital for IV antibiotics and a surgical consult. Dr. Chante Hernandez saw her at this point and felt that since the abscess was draining and no further intervention was needed. She was discharged home after 1 day of IV antibiotics. She states the area closed over but now has started to re-drain and she presented to the emergency department 2 days ago. They saw no signs of significant inflammation and sent her home. The anticipation was that she would follow-up with Dr. Chante Hernandez however she was placed on my schedule today and so I have evaluated her.   Past Medical History:   Diagnosis Date    Arthritis     Asthma     Diabetes ([facility] [address]     Hypertension     Menopause     Pancreatitis        Past Surgical History:   Procedure Laterality Date    HX BUNIONECTOMY Bilateral 1977    HX HERNIA REPAIR  2002    HX PARTIAL HYSTERECTOMY  1980       Social History     Socioeconomic History    Marital status: SINGLE     Spouse name: Not on file    Number of children: Not on file    Years of education: Not on file    Highest education level: Not on file   Occupational History    Not on file   Social Needs    Financial resource strain: Not on file    Food insecurity:     Worry: Not on file     Inability: Not on file    Transportation needs:     Medical: Not on file     Non-medical: Not on file   Tobacco Use    Smoking status: Current Every Day Smoker     Packs/day: 0.25     Years: 20.00     Pack years: 5.00    Smokeless tobacco: Never Used   Substance and Sexual Activity    Alcohol use: Yes     Comment: RARE Q 3 MONTHS    Drug use: No    Sexual activity: Not on file   Lifestyle    Physical activity:     Days per week: Not on file     Minutes per session: Not on file    Stress: Not on file   Relationships    Social connections:     Talks on phone: Not on file     Gets together: Not on file     Attends [employer] service: Not on file     Active member of club or organization: Not on file     Attends meetings of clubs or organizations: Not on file     Relationship status: Not on file    Intimate partner violence:     Fear of current or ex partner: Not on file     Emotionally abused: Not on file     Physically abused: Not on file     Forced sexual activity: Not on file   Other Topics Concern    Not on file   Social History Narrative    Not on file       Family History   Problem Relation Age of Onset    Other Mother         ANEURYSM    Breast Cancer Mother     Diabetes Mother     Lung Disease Father         EMPHYSEMA    Crohn's Disease Sister     Heart Disease Sister  Diabetes Maternal Uncle     Heart Disease Maternal Uncle     Diabetes Paternal Uncle     Heart Disease Paternal Uncle     Breast Cancer Maternal Aunt     Anesth Problems Neg Hx        Allergies   Allergen Reactions    Morphine Unknown (comments)    Tramadol Other (comments)     \"funny feeling\"       Current Outpatient Medications   Medication Sig    clopidogrel (PLAVIX) 75 mg tab     cyclobenzaprine (FLEXERIL) 10 mg tablet     ascorbic acid, vitamin C, (VITAMIN C) 500 mg tablet Take 500 mg by mouth.  multivitamin (ONE A DAY) tablet Take 1 Tab by mouth.  amLODIPine (NORVASC) 10 mg tablet Take 10 mg by mouth daily.  insulin glargine (LANTUS,BASAGLAR) 100 unit/mL (3 mL) inpn 40 Units by SubCUTAneous route daily.  ipratropium (ATROVENT) 42 mcg (0.06 %) nasal spray 2 Sprays by Nasal route daily.  simvastatin (ZOCOR) 20 mg tablet Take 20 mg by mouth nightly.  fluticasone-salmeterol (ADVAIR DISKUS) 250-50 mcg/dose diskus inhaler Take 1 Puff by inhalation every twelve (12) hours.  glimepiride (AMARYL) 4 mg tablet Take 4 mg by mouth two (2) times a day.  metoprolol tartrate (LOPRESSOR) 25 mg tablet Take 25 mg by mouth two (2) times a day.  montelukast (SINGULAIR) 10 mg tablet Take 10 mg by mouth nightly.  HYDROcodone-acetaminophen (NORCO) 5-325 mg per tablet Take 1 Tab by mouth every four (4) hours as needed for Pain. Max Daily Amount: 6 Tabs.  oxyCODONE IR (ROXICODONE) 5 mg immediate release tablet Take 1 Tab by mouth every three (3) hours as needed. Max Daily Amount: 40 mg.    ondansetron (ZOFRAN ODT) 4 mg disintegrating tablet Take 1 Tab by mouth every eight (8) hours as needed for Nausea. No current facility-administered medications for this visit. The above histories, medications and allergies have been reviewed.     ROS    Visit Vitals  /59 (BP 1 Location: Left arm, BP Patient Position: Sitting)   Pulse (!) 56   Temp 97.9 °F (36.6 °C) (Oral)   Ht 5' 5\" (1.651 m)   Wt 198 lb 14.4 oz (90.2 kg)   BMI 33.10 kg/m²     Physical Exam   Constitutional: She is well-developed, well-nourished, and in no distress. Abdominal:   At the level the umbilicus there is a scar in the supraumbilical region. Just to the right of the midline there is an area where she seropurulent drainage. There is no real induration. She does appear to be hypersensitive in this region. Cultures obtained today. 1. Umbilical pain  Suspect residual abscess and possible fistula. I would like to get a CT scan with IV and oral contrast to further delineate see if there is evidence of a fistulous tract. We also will try and have her follow-up back with Dr. Leonor Palumbo since she has initiated the work-up from the surgical standpoint for this problem. - CT ABD PELV W CONT; Future  - AEROBIC BACTERIAL CULTURE    2. Wound drainage  Culture obtained. She has been instructed that when she showers to gently squeeze on the surrounding tissue and then pat the area dry and place a dressing.  - AEROBIC BACTERIAL CULTURE    3. Abscess  See what the CT shows. She does not appear to have palpable abscess that needs to be drained further at this point. Follow-up and Dispositions    · Return in about 1 week (around 7/31/2019) for recheck.          Christofer Abebe MD

## 2019-08-06 ENCOUNTER — OFFICE VISIT (OUTPATIENT)
Dept: SURGERY | Age: 64
End: 2019-08-06

## 2019-08-06 VITALS
WEIGHT: 198 LBS | HEIGHT: 65 IN | DIASTOLIC BLOOD PRESSURE: 60 MMHG | BODY MASS INDEX: 32.99 KG/M2 | SYSTOLIC BLOOD PRESSURE: 144 MMHG | HEART RATE: 76 BPM

## 2019-08-06 DIAGNOSIS — R10.33 UMBILICAL PAIN: Primary | ICD-10-CM

## 2019-08-06 DIAGNOSIS — L02.91 ABSCESS: ICD-10-CM

## 2019-08-06 DIAGNOSIS — L24.A9 WOUND DRAINAGE: ICD-10-CM

## 2019-08-06 RX ORDER — SODIUM CHLORIDE, SODIUM LACTATE, POTASSIUM CHLORIDE, CALCIUM CHLORIDE 600; 310; 30; 20 MG/100ML; MG/100ML; MG/100ML; MG/100ML
200 INJECTION, SOLUTION INTRAVENOUS CONTINUOUS
Status: CANCELLED | OUTPATIENT
Start: 2019-08-06 | End: 2019-08-07

## 2019-08-06 NOTE — PROGRESS NOTES
[address] [facility], [address]  Phone: [phone] Fax: [phone]                                           HISTORY AND PHYSICAL EXAM    NAME: Lucius Elizabeth  : 1955    Chief Complaint:   Drainage  At umbilicus    History: Lucius Elizabeth is a 66 y.o. female who initially presented to the ER with a painful umbilicus and abscess that was drained by the ER doctor. I saw her  in the hospital for this umbilical abscess. The patient states that about a month ago she developed some tenderness near her umbilicus. She had an umbilical hernia repair in the remote past.  On review it looks like back in November she had an episode of abdominal pain and had a CT of the abdomen that was essentially normal other than the fact that she may have had an omental infarct just below the umbilicus. A month ago she started developing some tenderness near her umbilicus and this progressively worsened and she developed some swelling. When she was seen in the emergency department had an incision and drainage of umbilical abscess, a CT scan of the abdomen was obtained at that point and showed a 2 x 2 x 2 by 2 x 2 x 1 collection at the umbilicus that was thought to be compatible with a postoperative abscess. Underlying this, extending from the peritoneal surface into the omental fat there is a linear area of induration that it was difficult to exclude a fistulous tract from the anterior abdominal wall to the collection. She was admitted to the hospital for IV antibiotics. When I  saw her at this point in the hospital and felt the abscess was draining well and no further intervention was needed. She was discharged home after 1 day of IV antibiotics. She states the area closed over but now has started to re-drain and she presented to the emergency department again. They saw no signs of significant inflammation and sent her home. The patient followed up with Dr. Paulino Ferguson on 19 and was evaluated. There was still some drainage and it was cultured with no growth. Repeat CT scan ordered for 7/31/19 was very similar to the previous scan but the fluid was decreased a little. Past Medical History:           Past Medical History:   Diagnosis Date    Arthritis     Asthma     Diabetes ([address]     Hypertension     Menopause     Pancreatitis      Past Surgical History:   Procedure Laterality Date    HX BUNIONECTOMY Bilateral 1977    HX HERNIA REPAIR  2002    HX PARTIAL HYSTERECTOMY  1980        Current Outpatient Medications   Medication Sig Dispense Refill    clopidogrel (PLAVIX) 75 mg tab       cyclobenzaprine (FLEXERIL) 10 mg tablet       ascorbic acid, vitamin C, (VITAMIN C) 500 mg tablet Take 500 mg by mouth.  multivitamin (ONE A DAY) tablet Take 1 Tab by mouth.  amLODIPine (NORVASC) 10 mg tablet Take 10 mg by mouth daily.  insulin glargine (LANTUS,BASAGLAR) 100 unit/mL (3 mL) inpn 40 Units by SubCUTAneous route daily.  ipratropium (ATROVENT) 42 mcg (0.06 %) nasal spray 2 Sprays by Nasal route daily.  simvastatin (ZOCOR) 20 mg tablet Take 20 mg by mouth nightly.  oxyCODONE IR (ROXICODONE) 5 mg immediate release tablet Take 1 Tab by mouth every three (3) hours as needed. Max Daily Amount: 40 mg. 15 Tab 0    ondansetron (ZOFRAN ODT) 4 mg disintegrating tablet Take 1 Tab by mouth every eight (8) hours as needed for Nausea. 10 Tab 0    fluticasone-salmeterol (ADVAIR DISKUS) 250-50 mcg/dose diskus inhaler Take 1 Puff by inhalation every twelve (12) hours.  glimepiride (AMARYL) 4 mg tablet Take 4 mg by mouth two (2) times a day.  metoprolol tartrate (LOPRESSOR) 25 mg tablet Take 25 mg by mouth two (2) times a day.  montelukast (SINGULAIR) 10 mg tablet Take 10 mg by mouth nightly.  HYDROcodone-acetaminophen (NORCO) 5-325 mg per tablet Take 1 Tab by mouth every four (4) hours as needed for Pain. Max Daily Amount: 6 Tabs.  10 Tab 0     Allergies Allergen Reactions    Morphine Unknown (comments)    Tramadol Other (comments)     \"funny feeling\"     Social History     Socioeconomic History    Marital status: SINGLE     Spouse name: Not on file    Number of children: Not on file    Years of education: Not on file    Highest education level: Not on file   Occupational History    Not on file   Social Needs    Financial resource strain: Not on file    Food insecurity:     Worry: Not on file     Inability: Not on file    Transportation needs:     Medical: Not on file     Non-medical: Not on file   Tobacco Use    Smoking status: Current Every Day Smoker     Packs/day: 0.25     Years: 20.00     Pack years: 5.00    Smokeless tobacco: Never Used   Substance and Sexual Activity    Alcohol use: Yes     Comment: RARE Q 3 MONTHS    Drug use: No    Sexual activity: Not on file   Lifestyle    Physical activity:     Days per week: Not on file     Minutes per session: Not on file    Stress: Not on file   Relationships    Social connections:     Talks on phone: Not on file     Gets together: Not on file     Attends [employer] service: Not on file     Active member of club or organization: Not on file     Attends meetings of clubs or organizations: Not on file     Relationship status: Not on file    Intimate partner violence:     Fear of current or ex partner: Not on file     Emotionally abused: Not on file     Physically abused: Not on file     Forced sexual activity: Not on file   Other Topics Concern    Not on file   Social History Narrative    Not on file     Family History   Problem Relation Age of Onset    Other Mother         ANEURYSM    Breast Cancer Mother     Diabetes Mother     Lung Disease Father         EMPHYSEMA    Crohn's Disease Sister     Heart Disease Sister     Diabetes Maternal Uncle     Heart Disease Maternal Uncle     Diabetes Paternal Uncle     Heart Disease Paternal Uncle     Breast Cancer Maternal Aunt     Anesth Problems Neg Hx        Patient Active Problem List   Diagnosis Code    Abnormal ECG R94.31    HTN (hypertension) I10    Schizophrenia ([facility] [address] F20.9    Type II diabetes mellitus ([facility] E11.9    Asthma J45.909    Cervical stenosis of spinal canal M48.02    History of pancreatitis Z87.19    Drug-induced acute pancreatitis K85.30    Abdominal aortic aneurysm (AAA) 3.0 cm to 5.0 cm in diameter in female ([facility] [address] I71.4    Intractable epigastric abdominal pain R10.13    Intractable abdominal pain R10.9    Abscess D38.52    Umbilical pain S53.29       Review of Systems:    Review of Systems   Constitutional: Negative for chills, fever, malaise/fatigue and weight loss. HENT: Negative for congestion, ear discharge, ear pain, hearing loss, nosebleeds, sore throat and tinnitus. Eyes: Negative for blurred vision, double vision, pain, discharge and redness. Respiratory: Positive for shortness of breath and wheezing. Negative for cough and sputum production. Cardiovascular: Negative for chest pain, palpitations and leg swelling. Gastrointestinal: Positive for abdominal pain. Negative for blood in stool, constipation, diarrhea, heartburn, melena, nausea and vomiting. Genitourinary: Negative for frequency, hematuria and urgency. Musculoskeletal: Positive for joint pain and neck pain. Negative for back pain. Skin: Negative for itching and rash. Neurological: Negative for dizziness, tremors, focal weakness, seizures, weakness and headaches. Endo/Heme/Allergies: Does not bruise/bleed easily. Psychiatric/Behavioral: Negative for depression and memory loss. The patient is not nervous/anxious and does not have insomnia. Physical Exam:  Visit Vitals  /60 (BP 1 Location: Left arm, BP Patient Position: Sitting)   Pulse 76   Ht 5' 5\" (1.651 m)   Wt 198 lb (89.8 kg)   BMI 32.95 kg/m²       Physical Exam   Constitutional: She is oriented to person, place, and time.  She appears well-developed and well-nourished. HENT:   Head: Normocephalic and atraumatic. Right Ear: External ear normal.   Left Ear: External ear normal.   Nose: Nose normal.   Mouth/Throat: Oropharynx is clear and moist. No oropharyngeal exudate. Eyes: Pupils are equal, round, and reactive to light. Conjunctivae and EOM are normal. Right eye exhibits no discharge. Left eye exhibits no discharge. No scleral icterus. Neck: Neck supple. No tracheal deviation present. No thyromegaly present. Cardiovascular: Normal rate, regular rhythm and normal heart sounds. Exam reveals no gallop and no friction rub. No murmur heard. Pulmonary/Chest: Effort normal and breath sounds normal. No respiratory distress. She has no wheezes. She has no rales. Abdominal: Soft. Bowel sounds are normal. She exhibits no distension and no mass. There is no tenderness. Almost center of the scar is a 1 mm opening with one to two drops of pus. She is very sensitive and it is difficult to examine her. No cellulitis noted   Musculoskeletal: Normal range of motion. Lymphadenopathy:     She has no cervical adenopathy. Neurological: She is alert and oriented to person, place, and time. Skin: Skin is warm and dry. No rash noted. No erythema. Psychiatric: She has a normal mood and affect. Her behavior is normal. Judgment and thought content normal.         Impression:  Chronic draining sinus tract of umbilicus with underlying pocket of fluid. Some question of intraabdominal involvement. History of umbilical hernia repair in 2002  Questionable also incision used with stent placement     Plan:  Explore wound and clean out the wound and look for a cause of this  May need to go into the abdomen, patient is aware  Risks and complications, including infection, were explained to patient and they voiced understanding.     Damian Urias M.D.

## 2019-08-14 PROBLEM — R10.33 UMBILICAL PAIN: Status: RESOLVED | Noted: 2019-07-24 | Resolved: 2019-08-14

## 2019-08-14 PROBLEM — T85.79XA INFECTED HERNIOPLASTY MESH (HCC): Status: ACTIVE | Noted: 2019-08-14

## 2019-08-14 PROBLEM — K63.2 SMALL BOWEL FISTULA: Status: ACTIVE | Noted: 2019-08-14

## 2019-08-14 PROBLEM — Z41.9 SURGERY, ELECTIVE: Status: ACTIVE | Noted: 2019-08-14

## 2019-08-14 PROBLEM — Z41.9 SURGERY, ELECTIVE: Status: RESOLVED | Noted: 2019-08-14 | Resolved: 2019-08-14

## 2019-08-14 PROBLEM — K63.2 SMALL BOWEL FISTULA: Status: RESOLVED | Noted: 2019-08-14 | Resolved: 2019-08-14

## 2019-08-14 PROBLEM — L02.91 ABSCESS: Status: RESOLVED | Noted: 2019-07-08 | Resolved: 2019-08-14

## 2019-08-14 PROBLEM — T85.79XA INFECTED HERNIOPLASTY MESH (HCC): Status: RESOLVED | Noted: 2019-08-14 | Resolved: 2019-08-14

## 2019-08-18 PROBLEM — R11.10 VOMITING: Status: ACTIVE | Noted: 2019-08-18

## 2019-08-22 ENCOUNTER — PATIENT OUTREACH (OUTPATIENT)
Dept: FAMILY MEDICINE CLINIC | Age: 64
End: 2019-08-22

## 2019-08-22 NOTE — PROGRESS NOTES
19: NN left message for patient and Kelvin Portillo (on [facility]) to return call.       19      Hospital Discharge Follow-Up      Date/Time:  2019 11:58 AM    Patient was admitted to [facility] on 19 and discharged on 19 for:    Apple Sullivan 64: possible post op ileus with some mild stricture of transverse colon from post op edema  Subcutaneous seroma incision     OTHER DIAGNOSES CONTRIBUTING TO HOSPITAL STAY:diabetes    The [facility] physician discharge summary was available at the time of outreach. Patient was contacted within one business day of discharge. Challenges reviewed with the provider:   - Patient reports having \"loose and liquid stools\" since discharge. Having bowel movement at least once a day, sometimes twice a day. - Reports \"Appetite is coming back. \"   - Taking Cipro 500mg, one tablet two times per day for ten days. Patient reports she has not missed any doses and is taking as ordered. - A1c was 6.5 on 19.   - Hgb was 9.7 and HCT was 32.0 on 19. Component      Latest Ref Rng & Units 2019           5:50 AM 10:16 AM  5:45 PM   HGB      11.5 - 16.0 g/dL 9.7 (L) 9.9 (L) 10.6 (L)   HCT      35.0 - 47.0 % 32.0 (L) 31.4 (L) 34.4 (L)   MCV      80.0 - 99.0 FL 78.8 (L) 75.7 (L) 77.1 (L)   MCH      26.0 - 34.0 PG 23.9 (L) 23.9 (L) 23.8 (L)     Component      Latest Ref Rng & Units 2019           5:50 AM 10:16 AM  5:45 PM   RDW      11.5 - 14.5 % 17.8 (H) 17.4 (H) 17.5 (H)     Method of communication with provider :chart routing    Inpatient RRAT score: 27 on 19. Was this a readmission? No, [facility] admission 19 to 8-15-19 was observation status. Patient stated reason for the readmission: n/a    Nurse Navigator (NN) contacted the patient by telephone to perform post hospital discharge assessment. Verified name and  with patient as identifiers.  Provided introduction to self, and explanation of the Nurse Navigator role. Reviewed discharge instructions and red flags with patient who verbalized understanding. Patient given an opportunity to ask questions and does not have any further questions or concerns at this time. The patient agrees to contact the PCP office for questions related to their healthcare. NN provided contact information for future reference. Disease Specific:   N/A    Summary of patient's problems:  1. Patient reports having \"loose and liquid stools\" since discharge. Having bowel movement at least once a day, sometimes twice a day. 2. Reports \"Appetite is coming back. \"   3. Taking Cipro 500mg, one tablet two times per day for ten days. Patient reports she has not missed any doses and is taking as ordered. 4. A1c was 6.5 on 8-18-19.   5. Hgb was 9.7 and HCT was 32.0 on 8-21-19. Home Health orders at discharge: [address]: n/a  Date of initial visit: [address] ED ordered/company: n/a  Durable Medical Equipment received: n/a    Barriers to care? Lacking support system    Advance Care Planning:   Does patient have an Advance Directive:  not on file; education provided     Medications per [facility] After Visit Summary dated 8-21-19:   New Medications at Discharge: none  Changed Medications at Discharge: none  Discontinued Medications at Discharge:   HYDROcodone-acetaminophen  mg tablet  Commonly known as: [facility]    Medication reconciliation was performed with patient, who verbalizes understanding of administration of home medications. There were no barriers to obtaining medications identified at this time. Patient states her medications are affordable and her pharmacy will deliver to her residence. Referral to Pharm D needed: no     Current Outpatient Medications   Medication Sig    ciprofloxacin HCl (CIPRO) 500 mg tablet Take 1 Tab by mouth two (2) times a day for 10 days.     clopidogrel (PLAVIX) 75 mg tab 75 mg daily.  ascorbic acid, vitamin C, (VITAMIN C) 500 mg tablet Take 500 mg by mouth.  multivitamin (ONE A DAY) tablet Take 1 Tab by mouth.  amLODIPine (NORVASC) 10 mg tablet Take 10 mg by mouth daily.  insulin glargine (LANTUS,BASAGLAR) 100 unit/mL (3 mL) inpn 40 Units by SubCUTAneous route daily.  ipratropium (ATROVENT) 42 mcg (0.06 %) nasal spray 2 Sprays by Nasal route daily.  fluticasone-salmeterol (ADVAIR DISKUS) 250-50 mcg/dose diskus inhaler Take 1 Puff by inhalation every twelve (12) hours.  glimepiride (AMARYL) 4 mg tablet Take 4 mg by mouth two (2) times a day.  metoprolol tartrate (LOPRESSOR) 25 mg tablet Take 25 mg by mouth two (2) times a day.  montelukast (SINGULAIR) 10 mg tablet Take 10 mg by mouth nightly. No current facility-administered medications for this visit. There are no discontinued medications. [facility] follow up appointment(s):   Future Appointments   Date Time Provider Chance Sakshi   8/26/2019 11:50 AM Zee Dominguez MD [address]   9/3/2019  2:00 PM MD Vane Spivey   9/5/2019  2:50 PM MD Vane Spivey      Non-[facility] follow up appointment(s): None noted at this time. [facility]:  out of service area     Goals Addressed                 This Visit's Progress     Attends follow-up appointments as directed. 8-23-19: Patient has ARLENE visit scheduled with Dr. Louise Perera on 8-26-19 at 11:50am. Patient has general surgery follow-up appointments scheduled with Dr. Hans Goddard on 9-3-19 and 9-5-19. Tip Douglas Returns to baseline activity level.        8-23-19: Patient states she is not currently exercising and would like to join an exercise class/group that may provide encouragement. Patient is currently walking the interior of her home as needed only. Patient states she is feeling well and is getting stronger each day.  NN will reassess level of activity on next phone conversation with patient and NN will attempt to reach patient within 14-18 days. Lucas Mosley resources in place to maintain patient in the community (ie. Home Health, DME equipment, refer to, medication assistant plan, etc.)        8-23-19: Patient reports she does not have family/children that live nearby and she is not able to depend on any neighbors for support. Patient states she would need to call on leonid based contacts should she need assistance and does not believe this would be reliable either. Patient lacks support system.  Susan Chavarria

## 2019-08-30 ENCOUNTER — HOSPITAL ENCOUNTER (INPATIENT)
Age: 64
LOS: 8 days | Discharge: HOME HEALTH | DRG: 330 | End: 2019-09-07
Attending: EMERGENCY MEDICINE | Admitting: SURGERY
Payer: MEDICARE

## 2019-08-30 ENCOUNTER — APPOINTMENT (OUTPATIENT)
Dept: CT IMAGING | Age: 64
DRG: 330 | End: 2019-08-30
Attending: EMERGENCY MEDICINE
Payer: MEDICARE

## 2019-08-30 DIAGNOSIS — K56.699 LARGE BOWEL STRICTURE (HCC): ICD-10-CM

## 2019-08-30 DIAGNOSIS — K56.609 LARGE BOWEL OBSTRUCTION (HCC): Primary | ICD-10-CM

## 2019-08-30 PROBLEM — K56.690 OTHER PARTIAL INTESTINAL OBSTRUCTION (HCC): Status: ACTIVE | Noted: 2019-08-30

## 2019-08-30 LAB
ALBUMIN SERPL-MCNC: 3.3 G/DL (ref 3.5–5)
ALBUMIN/GLOB SERPL: 0.7 {RATIO} (ref 1.1–2.2)
ALP SERPL-CCNC: 84 U/L (ref 45–117)
ALT SERPL-CCNC: 18 U/L (ref 12–78)
ANION GAP SERPL CALC-SCNC: 8 MMOL/L (ref 5–15)
APPEARANCE UR: CLEAR
AST SERPL-CCNC: 16 U/L (ref 15–37)
BACTERIA URNS QL MICRO: NEGATIVE /HPF
BASOPHILS # BLD: 0.1 K/UL (ref 0–0.1)
BASOPHILS NFR BLD: 1 % (ref 0–1)
BILIRUB SERPL-MCNC: 0.3 MG/DL (ref 0.2–1)
BILIRUB UR QL: NEGATIVE
BUN SERPL-MCNC: 12 MG/DL (ref 6–20)
BUN/CREAT SERPL: 12 (ref 12–20)
CALCIUM SERPL-MCNC: 9.7 MG/DL (ref 8.5–10.1)
CHLORIDE SERPL-SCNC: 109 MMOL/L (ref 97–108)
CO2 SERPL-SCNC: 25 MMOL/L (ref 21–32)
COLOR UR: ABNORMAL
CREAT SERPL-MCNC: 1 MG/DL (ref 0.55–1.02)
DIFFERENTIAL METHOD BLD: ABNORMAL
EOSINOPHIL # BLD: 0.2 K/UL (ref 0–0.4)
EOSINOPHIL NFR BLD: 2 % (ref 0–7)
EPITH CASTS URNS QL MICRO: ABNORMAL /LPF
ERYTHROCYTE [DISTWIDTH] IN BLOOD BY AUTOMATED COUNT: 19.2 % (ref 11.5–14.5)
GLOBULIN SER CALC-MCNC: 4.8 G/DL (ref 2–4)
GLUCOSE SERPL-MCNC: 123 MG/DL (ref 65–100)
GLUCOSE UR STRIP.AUTO-MCNC: NEGATIVE MG/DL
HCT VFR BLD AUTO: 38.6 % (ref 35–47)
HGB BLD-MCNC: 11.9 G/DL (ref 11.5–16)
HGB UR QL STRIP: NEGATIVE
HYALINE CASTS URNS QL MICRO: ABNORMAL /LPF (ref 0–5)
IMM GRANULOCYTES # BLD AUTO: 0 K/UL (ref 0–0.04)
IMM GRANULOCYTES NFR BLD AUTO: 0 % (ref 0–0.5)
KETONES UR QL STRIP.AUTO: ABNORMAL MG/DL
LEUKOCYTE ESTERASE UR QL STRIP.AUTO: NEGATIVE
LIPASE SERPL-CCNC: 82 U/L (ref 73–393)
LYMPHOCYTES # BLD: 2.7 K/UL (ref 0.8–3.5)
LYMPHOCYTES NFR BLD: 25 % (ref 12–49)
MCH RBC QN AUTO: 23.6 PG (ref 26–34)
MCHC RBC AUTO-ENTMCNC: 30.8 G/DL (ref 30–36.5)
MCV RBC AUTO: 76.4 FL (ref 80–99)
MONOCYTES # BLD: 0.7 K/UL (ref 0–1)
MONOCYTES NFR BLD: 7 % (ref 5–13)
NEUTS SEG # BLD: 7.1 K/UL (ref 1.8–8)
NEUTS SEG NFR BLD: 65 % (ref 32–75)
NITRITE UR QL STRIP.AUTO: NEGATIVE
NRBC # BLD: 0 K/UL (ref 0–0.01)
NRBC BLD-RTO: 0 PER 100 WBC
PH UR STRIP: 5.5 [PH] (ref 5–8)
PLATELET # BLD AUTO: 363 K/UL (ref 150–400)
PMV BLD AUTO: 11.7 FL (ref 8.9–12.9)
POTASSIUM SERPL-SCNC: 4 MMOL/L (ref 3.5–5.1)
PROT SERPL-MCNC: 8.1 G/DL (ref 6.4–8.2)
PROT UR STRIP-MCNC: NEGATIVE MG/DL
RBC # BLD AUTO: 5.05 M/UL (ref 3.8–5.2)
RBC #/AREA URNS HPF: ABNORMAL /HPF (ref 0–5)
SODIUM SERPL-SCNC: 142 MMOL/L (ref 136–145)
SP GR UR REFRACTOMETRY: 1.01 (ref 1–1.03)
UA: UC IF INDICATED,UAUC: ABNORMAL
UROBILINOGEN UR QL STRIP.AUTO: 0.2 EU/DL (ref 0.2–1)
WBC # BLD AUTO: 10.7 K/UL (ref 3.6–11)
WBC URNS QL MICRO: ABNORMAL /HPF (ref 0–4)

## 2019-08-30 PROCEDURE — 74011636320 HC RX REV CODE- 636/320: Performed by: EMERGENCY MEDICINE

## 2019-08-30 PROCEDURE — 74011250636 HC RX REV CODE- 250/636: Performed by: EMERGENCY MEDICINE

## 2019-08-30 PROCEDURE — 74011250637 HC RX REV CODE- 250/637: Performed by: SURGERY

## 2019-08-30 PROCEDURE — 99284 EMERGENCY DEPT VISIT MOD MDM: CPT

## 2019-08-30 PROCEDURE — 74011000258 HC RX REV CODE- 258: Performed by: EMERGENCY MEDICINE

## 2019-08-30 PROCEDURE — 74177 CT ABD & PELVIS W/CONTRAST: CPT

## 2019-08-30 PROCEDURE — 96374 THER/PROPH/DIAG INJ IV PUSH: CPT

## 2019-08-30 PROCEDURE — 82962 GLUCOSE BLOOD TEST: CPT

## 2019-08-30 PROCEDURE — 80053 COMPREHEN METABOLIC PANEL: CPT

## 2019-08-30 PROCEDURE — 36415 COLL VENOUS BLD VENIPUNCTURE: CPT

## 2019-08-30 PROCEDURE — 85025 COMPLETE CBC W/AUTO DIFF WBC: CPT

## 2019-08-30 PROCEDURE — 81001 URINALYSIS AUTO W/SCOPE: CPT

## 2019-08-30 PROCEDURE — 83690 ASSAY OF LIPASE: CPT

## 2019-08-30 PROCEDURE — 65270000029 HC RM PRIVATE

## 2019-08-30 RX ORDER — HYDROMORPHONE HYDROCHLORIDE 1 MG/ML
1 INJECTION, SOLUTION INTRAMUSCULAR; INTRAVENOUS; SUBCUTANEOUS ONCE
Status: COMPLETED | OUTPATIENT
Start: 2019-08-30 | End: 2019-08-30

## 2019-08-30 RX ORDER — MONTELUKAST SODIUM 10 MG/1
10 TABLET ORAL
Status: DISCONTINUED | OUTPATIENT
Start: 2019-08-30 | End: 2019-09-07 | Stop reason: HOSPADM

## 2019-08-30 RX ORDER — IPRATROPIUM BROMIDE AND ALBUTEROL SULFATE 2.5; .5 MG/3ML; MG/3ML
3 SOLUTION RESPIRATORY (INHALATION)
Status: DISCONTINUED | OUTPATIENT
Start: 2019-08-30 | End: 2019-09-07 | Stop reason: HOSPADM

## 2019-08-30 RX ORDER — DEXTROSE 50 % IN WATER (D50W) INTRAVENOUS SYRINGE
12.5-25 AS NEEDED
Status: DISCONTINUED | OUTPATIENT
Start: 2019-08-30 | End: 2019-08-30

## 2019-08-30 RX ORDER — SODIUM CHLORIDE 0.9 % (FLUSH) 0.9 %
5-40 SYRINGE (ML) INJECTION EVERY 8 HOURS
Status: DISCONTINUED | OUTPATIENT
Start: 2019-08-30 | End: 2019-09-07 | Stop reason: HOSPADM

## 2019-08-30 RX ORDER — FLUTICASONE FUROATE AND VILANTEROL 200; 25 UG/1; UG/1
1 POWDER RESPIRATORY (INHALATION) DAILY
Status: DISCONTINUED | OUTPATIENT
Start: 2019-08-31 | End: 2019-09-07 | Stop reason: HOSPADM

## 2019-08-30 RX ORDER — AMLODIPINE BESYLATE 5 MG/1
10 TABLET ORAL DAILY
Status: DISCONTINUED | OUTPATIENT
Start: 2019-08-31 | End: 2019-09-07 | Stop reason: HOSPADM

## 2019-08-30 RX ORDER — FLUTICASONE PROPIONATE AND SALMETEROL 250; 50 UG/1; UG/1
1 POWDER RESPIRATORY (INHALATION) EVERY 12 HOURS
Status: DISCONTINUED | OUTPATIENT
Start: 2019-08-30 | End: 2019-09-03

## 2019-08-30 RX ORDER — SODIUM CHLORIDE 0.9 % (FLUSH) 0.9 %
10 SYRINGE (ML) INJECTION
Status: COMPLETED | OUTPATIENT
Start: 2019-08-30 | End: 2019-08-30

## 2019-08-30 RX ORDER — ACETAMINOPHEN 325 MG/1
650 TABLET ORAL
Status: DISCONTINUED | OUTPATIENT
Start: 2019-08-30 | End: 2019-09-07 | Stop reason: HOSPADM

## 2019-08-30 RX ORDER — HYDRALAZINE HYDROCHLORIDE 20 MG/ML
5 INJECTION INTRAMUSCULAR; INTRAVENOUS
Status: DISCONTINUED | OUTPATIENT
Start: 2019-08-30 | End: 2019-09-07 | Stop reason: HOSPADM

## 2019-08-30 RX ORDER — HYDROMORPHONE HYDROCHLORIDE 1 MG/ML
0.5 INJECTION, SOLUTION INTRAMUSCULAR; INTRAVENOUS; SUBCUTANEOUS
Status: DISCONTINUED | OUTPATIENT
Start: 2019-08-30 | End: 2019-09-04 | Stop reason: SDUPTHER

## 2019-08-30 RX ORDER — OXYCODONE HYDROCHLORIDE 5 MG/1
10 TABLET ORAL
Status: DISCONTINUED | OUTPATIENT
Start: 2019-08-30 | End: 2019-09-07 | Stop reason: HOSPADM

## 2019-08-30 RX ORDER — HYDRALAZINE HYDROCHLORIDE 20 MG/ML
5 INJECTION INTRAMUSCULAR; INTRAVENOUS ONCE
Status: DISCONTINUED | OUTPATIENT
Start: 2019-08-30 | End: 2019-08-30

## 2019-08-30 RX ORDER — IPRATROPIUM BROMIDE 42 UG/1
2 SPRAY, METERED NASAL DAILY PRN
Status: DISCONTINUED | OUTPATIENT
Start: 2019-08-30 | End: 2019-09-07 | Stop reason: HOSPADM

## 2019-08-30 RX ORDER — MAGNESIUM SULFATE 100 %
4 CRYSTALS MISCELLANEOUS AS NEEDED
Status: DISCONTINUED | OUTPATIENT
Start: 2019-08-30 | End: 2019-09-07 | Stop reason: HOSPADM

## 2019-08-30 RX ORDER — INSULIN LISPRO 100 [IU]/ML
INJECTION, SOLUTION INTRAVENOUS; SUBCUTANEOUS EVERY 6 HOURS
Status: DISCONTINUED | OUTPATIENT
Start: 2019-08-30 | End: 2019-09-04

## 2019-08-30 RX ORDER — ONDANSETRON 2 MG/ML
4 INJECTION INTRAMUSCULAR; INTRAVENOUS
Status: DISCONTINUED | OUTPATIENT
Start: 2019-08-30 | End: 2019-09-07 | Stop reason: HOSPADM

## 2019-08-30 RX ORDER — SODIUM CHLORIDE 0.9 % (FLUSH) 0.9 %
5-40 SYRINGE (ML) INJECTION AS NEEDED
Status: DISCONTINUED | OUTPATIENT
Start: 2019-08-30 | End: 2019-09-07 | Stop reason: HOSPADM

## 2019-08-30 RX ORDER — DIPHENHYDRAMINE HYDROCHLORIDE 50 MG/ML
12.5 INJECTION, SOLUTION INTRAMUSCULAR; INTRAVENOUS
Status: ACTIVE | OUTPATIENT
Start: 2019-08-30 | End: 2019-08-31

## 2019-08-30 RX ORDER — SIMVASTATIN 40 MG/1
40 TABLET, FILM COATED ORAL
COMMUNITY
End: 2020-05-07

## 2019-08-30 RX ORDER — DEXTROSE MONOHYDRATE 100 MG/ML
125-250 INJECTION, SOLUTION INTRAVENOUS AS NEEDED
Status: DISCONTINUED | OUTPATIENT
Start: 2019-08-30 | End: 2019-09-07 | Stop reason: HOSPADM

## 2019-08-30 RX ORDER — OXYCODONE HYDROCHLORIDE 5 MG/1
5 TABLET ORAL
Status: DISCONTINUED | OUTPATIENT
Start: 2019-08-30 | End: 2019-09-07 | Stop reason: HOSPADM

## 2019-08-30 RX ORDER — DEXTROSE MONOHYDRATE AND SODIUM CHLORIDE 5; .9 G/100ML; G/100ML
100 INJECTION, SOLUTION INTRAVENOUS CONTINUOUS
Status: DISCONTINUED | OUTPATIENT
Start: 2019-08-30 | End: 2019-09-01

## 2019-08-30 RX ORDER — METOPROLOL TARTRATE 25 MG/1
25 TABLET, FILM COATED ORAL 2 TIMES DAILY
Status: DISCONTINUED | OUTPATIENT
Start: 2019-08-30 | End: 2019-09-07 | Stop reason: HOSPADM

## 2019-08-30 RX ADMIN — Medication 10 ML: at 16:52

## 2019-08-30 RX ADMIN — DEXTROSE MONOHYDRATE AND SODIUM CHLORIDE 100 ML/HR: 5; .9 INJECTION, SOLUTION INTRAVENOUS at 15:33

## 2019-08-30 RX ADMIN — Medication 10 ML: at 22:04

## 2019-08-30 RX ADMIN — IOPAMIDOL 100 ML: 755 INJECTION, SOLUTION INTRAVENOUS at 16:52

## 2019-08-30 RX ADMIN — METOPROLOL TARTRATE 25 MG: 25 TABLET ORAL at 22:03

## 2019-08-30 RX ADMIN — OXYCODONE HYDROCHLORIDE 10 MG: 5 TABLET ORAL at 22:03

## 2019-08-30 RX ADMIN — MONTELUKAST 10 MG: 10 TABLET, FILM COATED ORAL at 22:03

## 2019-08-30 RX ADMIN — HYDROMORPHONE HYDROCHLORIDE 1 MG: 1 INJECTION, SOLUTION INTRAMUSCULAR; INTRAVENOUS; SUBCUTANEOUS at 15:33

## 2019-08-30 NOTE — ED NOTES
Bedside and Verbal shift change report given to Jojo Weiner (oncoming nurse) by Ольга Monroe RN (offgoing nurse). Report included the following information SBAR, Kardex, ED Summary and MAR.

## 2019-08-30 NOTE — PROGRESS NOTES
CM visited pt. in room, introduced self and role. Pt verbalized understanding. CM verified demographic information at bedside. Transition of Care Plan:   Pt to ED via family for CC abdominal pain. Pt reports abdominal pain since sugery 8/14/19 at [facility]. Pt states PCP referred her to ED due to \"Ill feeling, vomiting\". 1) Pt in ED awaiting disposition  2) Family to transport at [place]  3) Pt may benefit from [facility] services at [place]  4) Pt would benefit from PCP apt at [place]      Reason for Readmission:     Abdominal pain          RRAT Score and Risk Level:     27     Level of Readmission:    Level 1       Care Conference scheduled:   CM to discuss during IDR's       Resources/supports as identified by patient/family:   Family, toño very supportive per pt       Top Challenges facing patient (as identified by patient/family and CM): [address] [place] [address] Medicare, Medicaid of [place]    Medication Costs   No concerns   [address] [facility]. Transportation    Pt drives at baseline, family or fisumi available to transport at [address] [place] [address] arrangements  Pt states she lives with toño in a 1 story home with 3 MARILOU. Self-care/ADLs/Cognition    Pt is alert and oriented x 4, pt is independent of ADL's/IADL's at baseline and drives. Pt states she has a cane and walker in the home if needed. Current Advanced Directive/Advance Care Plan:  Not on file           Plan for utilizing home health:    Pt states she has used [facility], pt denies SNF/IRF stays    Care Management Interventions  PCP Verified by CM: Yes  Last Visit to PCP: 08/26/19  Mode of Transport at Discharge:  Other (see comment)(toño)  Transition of Care Consult (CM Consult): Discharge Planning  Discharge Durable Medical Equipment: No(rolling walker)  Physical Therapy Consult: No  Occupational Therapy Consult: No  Speech Therapy Consult: No  Current Support Network: Own Home, Family Lives Nearby(pt lives with toño)  Confirm Follow Up Transport: Family  Plan discussed with Pt/Family/Caregiver: Yes(cm spoke with pt at bedside)  Discharge Location  Discharge Placement: Home with home health     CM to remain available for support as needed    Cheryle Rilee.  RN, BSN  [address] [place] [address]  [phone]

## 2019-08-30 NOTE — ED NOTES
Bedside and Verbal shift change report given to Bryon Neal RN (oncoming nurse) by Ana Johnston RN (offgoing nurse). Report included the following information SBAR.

## 2019-08-30 NOTE — ED PROVIDER NOTES
EMERGENCY DEPARTMENT HISTORY AND PHYSICAL EXAM      Date: 8/30/2019  Patient Name: Maritza Schultz  Patient Age and Sex: 66 y.o. female    History of Presenting Illness     Chief Complaint   Patient presents with    Abdominal Pain     pt reports ongoing pain to mid abdomen since surgery, pt reports surgery at [facility] on [facility]/14[facility] for abdominal abscess, states she was referred here by her PCP for further eval       History Provided By: Patient and Patient's     HPI: Maritza Schultz, 66 y.o. female c/o periumbilical abdominal pain, worsening for weeks. 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 This was resected and repaired by Dr. Burleigh Leventhal at [facility] 2 wks ago. Since then pt endorses generalized abd pain, R>L, loose stools, pain at incision site. No drainage, no F/C. Nausea, no vomiting. Location:  abdomen  Quality:    Pain, aching/stabbing  Severity:  Mod/severe  Duration: weeks  Timing:    constanst worsening  Context:  Recent surgery  Modifying factors: none  Associated symptoms: loose stool, poor appetite    Pt denies any other alleviating or exacerbating factors. There are no other complaints, changes or physical findings at this time.      Past Medical History:   Diagnosis Date    Arthritis     Asthma     Diabetes ([address]     Hypertension     Menopause     Pancreatitis      Past Surgical History:   Procedure Laterality Date    HX BUNIONECTOMY Bilateral 1977    HX HERNIA REPAIR  2002    HX PARTIAL HYSTERECTOMY  1980       PCP: Cheryl Reese MD    Past History   Past Medical History:  Past Medical History:   Diagnosis Date    Arthritis     Asthma     Diabetes ([address]     Hypertension     Menopause     Pancreatitis        Past Surgical History:  Past Surgical History:   Procedure Laterality Date    HX BUNIONECTOMY Bilateral 1977    HX HERNIA REPAIR  2002   [facility] Family History:  Family History   Problem Relation Age of Onset    Other Mother         ANEURYSM    Breast Cancer Mother     Diabetes Mother     Lung Disease Father         EMPHYSEMA    Crohn's Disease Sister     Heart Disease Sister     Diabetes Maternal Uncle     Heart Disease Maternal Uncle     Diabetes Paternal Uncle     Heart Disease Paternal Uncle     Breast Cancer Maternal Aunt     Anesth Problems Neg Hx        Social History:  Social History     Tobacco Use    Smoking status: Current Every Day Smoker     Packs/day: 0.25     Years: 20.00     Pack years: 5.00    Smokeless tobacco: Never Used   Substance Use Topics    Alcohol use: Yes     Comment: RARE Q 3 MONTHS    Drug use: No       Allergies: Allergies   Allergen Reactions    Morphine Unknown (comments), Hives and Itching    Tramadol Other (comments) and Itching     \"funny feeling\"       Current Medications:  No current facility-administered medications on file prior to encounter. Current Outpatient Medications on File Prior to Encounter   Medication Sig Dispense Refill    simvastatin (ZOCOR) 40 mg tablet Take 40 mg by mouth nightly.  HYDROcodone-acetaminophen (NORCO) 5-325 mg per tablet Take 1 Tab by mouth every twelve (12) hours as needed for Pain for up to 5 days. Max Daily Amount: 2 Tabs. 10 Tab 0    clopidogrel (PLAVIX) 75 mg tab Take 75 mg by mouth daily.  ascorbic acid, vitamin C, (VITAMIN C) 500 mg tablet Take 500 mg by mouth daily.  multivitamin (ONE A DAY) tablet Take 1 Tab by mouth.  amLODIPine (NORVASC) 10 mg tablet Take 10 mg by mouth daily.  insulin glargine (LANTUS,BASAGLAR) 100 unit/mL (3 mL) inpn 25 Units by SubCUTAneous route daily.  ipratropium (ATROVENT) 42 mcg (0.06 %) nasal spray 2 Sprays by Nasal route daily as needed.  fluticasone-salmeterol (ADVAIR DISKUS) 250-50 mcg/dose diskus inhaler Take 1 Puff by inhalation every twelve (12) hours.       metoprolol tartrate (LOPRESSOR) 25 mg tablet Take 25 mg by mouth two (2) times a day.  montelukast (SINGULAIR) 10 mg tablet Take 10 mg by mouth nightly. Review of Systems   Review of Systems   Constitutional: Negative for fever. Gastrointestinal: Positive for abdominal pain, diarrhea and nausea. All other systems reviewed and are negative. Physical Exam   Vital Signs  Patient Vitals for the past 24 hrs:   Temp Pulse Resp BP SpO2   08/30/19 1830 -- (!) 57 -- 180/70 98 %   08/30/19 1745 -- (!) 56 -- 168/55 99 %   08/30/19 1700 -- -- -- 156/67 100 %   08/30/19 1530 -- 66 16 145/65 99 %   08/30/19 1324 98.1 °F (36.7 °C) 62 16 184/78 100 %       Physical Exam   Constitutional: She is oriented to person, place, and time. She appears well-developed and well-nourished. No distress. HENT:   Head: Normocephalic and atraumatic. Eyes: Conjunctivae are normal. Right eye exhibits no discharge. Left eye exhibits no discharge. Neck: Normal range of motion. Neck supple. Cardiovascular: Normal rate, regular rhythm and normal heart sounds. No murmur heard. Pulmonary/Chest: Effort normal and breath sounds normal. No respiratory distress. She has no wheezes. Abdominal: Soft. She exhibits no distension. There is tenderness in the right lower quadrant, periumbilical area, suprapubic area and left lower quadrant. There is guarding. There is no rigidity. Incision clean and dry. No erythema, induration, or palpable fluid collection. See photo of umbilicus   Musculoskeletal: Normal range of motion. She exhibits no deformity. Neurological: She is alert and oriented to person, place, and time. Skin: Skin is warm and dry. No rash noted. Psychiatric: She has a normal mood and affect. Her behavior is normal. Thought content normal.   Nursing note and vitals reviewed.           Diagnostic Study Results   Labs  Recent Results (from the past 24 hour(s))   CBC WITH AUTOMATED DIFF    Collection Time: 08/30/19  1:33 PM Result Value Ref Range    WBC 10.7 3.6 - 11.0 K/uL    RBC 5.05 3.80 - 5.20 M/uL    HGB 11.9 11.5 - 16.0 g/dL    HCT 38.6 35.0 - 47.0 %    MCV 76.4 (L) 80.0 - 99.0 FL    MCH 23.6 (L) 26.0 - 34.0 PG    MCHC 30.8 30.0 - 36.5 g/dL    RDW 19.2 (H) 11.5 - 14.5 %    PLATELET 059 708 - 250 K/uL    MPV 11.7 8.9 - 12.9 FL    NRBC 0.0 0  WBC    ABSOLUTE NRBC 0.00 0.00 - 0.01 K/uL    NEUTROPHILS 65 32 - 75 %    LYMPHOCYTES 25 12 - 49 %    MONOCYTES 7 5 - 13 %    EOSINOPHILS 2 0 - 7 %    BASOPHILS 1 0 - 1 %    IMMATURE GRANULOCYTES 0 0.0 - 0.5 %    ABS. NEUTROPHILS 7.1 1.8 - 8.0 K/UL    ABS. LYMPHOCYTES 2.7 0.8 - 3.5 K/UL    ABS. MONOCYTES 0.7 0.0 - 1.0 K/UL    ABS. EOSINOPHILS 0.2 0.0 - 0.4 K/UL    ABS. BASOPHILS 0.1 0.0 - 0.1 K/UL    ABS. IMM. GRANS. 0.0 0.00 - 0.04 K/UL    DF AUTOMATED     METABOLIC PANEL, COMPREHENSIVE    Collection Time: 08/30/19  2:53 PM   Result Value Ref Range    Sodium 142 136 - 145 mmol/L    Potassium 4.0 3.5 - 5.1 mmol/L    Chloride 109 (H) 97 - 108 mmol/L    CO2 25 21 - 32 mmol/L    Anion gap 8 5 - 15 mmol/L    Glucose 123 (H) 65 - 100 mg/dL    BUN 12 6 - 20 MG/DL    Creatinine 1.00 0.55 - 1.02 MG/DL    BUN/Creatinine ratio 12 12 - 20      GFR est AA >60 >60 ml/min/1.73m2    GFR est non-AA 56 (L) >60 ml/min/1.73m2    Calcium 9.7 8.5 - 10.1 MG/DL    Bilirubin, total 0.3 0.2 - 1.0 MG/DL    ALT (SGPT) 18 12 - 78 U/L    AST (SGOT) 16 15 - 37 U/L    Alk.  phosphatase 84 45 - 117 U/L    Protein, total 8.1 6.4 - 8.2 g/dL    Albumin 3.3 (L) 3.5 - 5.0 g/dL    Globulin 4.8 (H) 2.0 - 4.0 g/dL    A-G Ratio 0.7 (L) 1.1 - 2.2     LIPASE    Collection Time: 08/30/19  2:53 PM   Result Value Ref Range    Lipase 82 73 - 393 U/L   URINALYSIS W/ REFLEX CULTURE    Collection Time: 08/30/19  5:43 PM   Result Value Ref Range    Color YELLOW/STRAW      Appearance CLEAR CLEAR      Specific gravity 1.015 1.003 - 1.030      pH (UA) 5.5 5.0 - 8.0      Protein NEGATIVE  NEG mg/dL    Glucose NEGATIVE  NEG mg/dL    Ketone TRACE (A) NEG mg/dL    Bilirubin NEGATIVE  NEG      Blood NEGATIVE  NEG      Urobilinogen 0.2 0.2 - 1.0 EU/dL    Nitrites NEGATIVE  NEG      Leukocyte Esterase NEGATIVE  NEG      UA:UC IF INDICATED CULTURE NOT INDICATED BY UA RESULT CNI      WBC 0-4 0 - 4 /hpf    RBC 0-5 0 - 5 /hpf    Epithelial cells MODERATE (A) FEW /lpf    Bacteria NEGATIVE  NEG /hpf    Hyaline cast 2-5 0 - 5 /lpf       Radiologic Studies  CT ABD PELV W CONT   Final Result   IMPRESSION:      1. Redemonstrated inflammatory stricture in the mid transverse colon with severe   luminal narrowing and resultant large bowel obstruction of the right colon. Right colonic distention has mildly worsened since 8/20/2019.   2. No evidence of small bowel obstruction. 3. Stable unorganized incisional fluid in the ventral abdominal subcutaneous   soft tissues. 4. Remaining stable findings as above. XR BA ENEMA    (Results Pending)     CT Results  (Last 48 hours)               08/30/19 1652  CT ABD PELV W CONT Final result    Impression:  IMPRESSION:       1. Redemonstrated inflammatory stricture in the mid transverse colon with severe   luminal narrowing and resultant large bowel obstruction of the right colon. Right colonic distention has mildly worsened since 8/20/2019.   2. No evidence of small bowel obstruction. 3. Stable unorganized incisional fluid in the ventral abdominal subcutaneous   soft tissues. 4. Remaining stable findings as above. Narrative:  EXAM:  CT ABD PELV W CONT       INDICATION: Post operative infection, abdomen pelvis; POD 14 from resection of   small bowel fistula and infected mesh at umbilicus, worsening pain        COMPARISON: CT abdomen pelvis 8/20/2019. CONTRAST:  100 mL of Isovue-370. TECHNIQUE:    Following the uneventful intravenous administration of contrast, thin axial   images were obtained through the abdomen and pelvis. Coronal and sagittal   reconstructions were generated.  Oral contrast was not administered. CT dose   reduction was achieved through use of a standardized protocol tailored for this   examination and automatic exposure control for dose modulation. FINDINGS:    Lower Thorax:   Lung Bases: Left lower lobe subsegmental linear atelectasis. No pleural   effusion. Heart: The heart is normal in size. No pericardial effusion. Abdomen/Pelvis:   Liver:  No focal liver lesions. Biliary system: Gallbladder is unremarkable. No intrahepatic or extrahepatic   biliary ductal dilatation. Spleen: Normal.       Pancreas: Normal.       Kidneys/Ureters/Bladder: No renal masses. No renal or ureteral calculi. No   hydronephrosis or hydroureter. The bladder is normal.       Adrenals: Normal.       Stomach/bowel: Severe stricture in the mid transverse colon, which appears to   have mildly worsened since prior exam with severe luminal narrowing. There is   unchanged surrounding fat stranding. There is interval worsening of dilation of   the proximal transverse colon and right colon, which is full of stool. The   distal large bowel is completely decompressed. The appendix is normal. No free   intraperitoneal air noted. Reproductive Organs: Status post hysterectomy. No suspicious adnexal masses. There is a stable right ovarian cyst measuring 2.1 cm. Vasculature: Stable stent grafts in the abdominal aorta and bilateral common   iliac arteries, as well as the left external iliac artery. These are patent. Stable severe calcific atherosclerosis with ulcerated plaque in the upper   abdominal aorta. Portal vein, SMV, and splenic vein are patent. Nodes: No pathologically enlarged lymph nodes. Multiple surgical clips are again   noted in the bilateral inguinal regions. Fluid: No significant change in amount of unorganized fluid in the ventral   subcutaneous soft tissues within the incision measuring 3.9 x 3.1 cm.  No   well-organized fluid collection is identified. Bones/Soft Tissue: No acute fractures or aggressive osseous lesions are seen. CXR Results  (Last 48 hours)    None          Procedures     Procedures    Medical Decision Making     Provider Notes (Medical Decision Making):   67 yo F with weeks of abd pain after recent surgery for infected umbilical mesh. Quite tender on exam.  Abd soft, no evidence of fistula or abscess. No fever or leukocytosis. Check CT abd/pelvis with IV/PO contrast.  Consider surgery consult if significant findings. Fluids, Dilaudid for pain (morphine allergy). Sheryle Stage, MD  3:32 PM      Laboratories reassuring. CT scan demonstrates large bowel dilation and a stricture point in the transverse colon, decompressed distally. This is consistent with some type of inflammatory stricture. No evidence of acute fluid collection or intra-abdominal abscess. No antibiotics indicated at this time. Spoke with Dr. Luan Wan of general surgery, will admit to his service for further evaluation, treatment, also likely GI consult while inpatient. Armando Gonzalez MD        Consult required? Yes, Dr. Gabby Gabriel, surgery      Medications Administered During ED Course:  Medications   dextrose 5% and 0.9% NaCl infusion (100 mL/hr IntraVENous New Bag 8/30/19 1533)   HYDROmorphone (PF) (DILAUDID) injection 1 mg (1 mg IntraVENous Given 8/30/19 1533)   iopamidol (ISOVUE-370) 76 % injection 100 mL (100 mL IntraVENous Given 8/30/19 1652)   sodium chloride (NS) flush 10 mL (10 mL IntraVENous Given 8/30/19 1652)          Diagnosis     Disposition:  Admitted    Clinical Impression:   1. Large bowel stricture (HCC)    2. Large bowel obstruction ([address]        Attestation:  I personally performed the services described in this documentation on this date 8/30/2019 for patient Kia Stevenson. Sheryle Stage, MD        I was the first provider for this patient on this visit.   To the best of my ability I reviewed relevant prior medical records, electrocardiograms, laboratories, and radiologic studies. The patient's presenting problems were discussed, and the patient was in agreement with the care plan formulated and outlined with them. Please note that this dictation was completed with Dragon voice recognition software. Quite often unanticipated grammatical, syntax, homophones, and other interpretive errors are inadvertently transcribed by the computer software. Please disregard these errors and excuse any errors that have escaped final proofreading.

## 2019-08-30 NOTE — PROGRESS NOTES
Pharmacy Clarification of the Prior to Admission Medication Regimen Retrospective to the Admission Medication Reconciliation    The patient was was interviewed regarding clarification of the prior to admission medication regimen. Patient's  was present in room and obtained permission from patient to discuss drug regimen with visitor(s) present. Patient was questioned regarding use of any other inhalers, topical products, over the counter medications, herbal medications, vitamin products or ophthalmic/nasal/otic medication use. Information Obtained From: RX Query, Patient    Recommendations/Findings: The following amendments were made to the patient's active medication list on file at [facility]:     1) Additions:  simvastatin (ZOCOR) 40 mg tablet    2) Removals:   Glimepiride    3) Changes:  ascorbic acid, vitamin C, (VITAMIN C) 500 mg tablet (Old regimen: 500 mg /New regimen: 500 mg daily)  insulin glargine (LANTUS,BASAGLAR) 100 unit/mL (3 mL) inpn (Old regimen: 40 units daily /New regimen: 25 units daily)    4) Pertinent Pharmacy Findings: None     PTA medication list was corrected to the following:     Prior to Admission Medications   Prescriptions Last Dose Informant Patient Reported? Taking? HYDROcodone-acetaminophen (NORCO) 5-325 mg per tablet 8/29/2019 at Unknown time Self No Yes   Sig: Take 1 Tab by mouth every twelve (12) hours as needed for Pain for up to 5 days. Max Daily Amount: 2 Tabs. amLODIPine (NORVASC) 10 mg tablet 8/30/2019 at Unknown time Self Yes Yes   Sig: Take 10 mg by mouth daily. ascorbic acid, vitamin C, (VITAMIN C) 500 mg tablet 8/30/2019 at Unknown time Self Yes Yes   Sig: Take 500 mg by mouth daily. clopidogrel (PLAVIX) 75 mg tab 8/30/2019 at Unknown time Self Yes Yes   Sig: Take 75 mg by mouth daily. fluticasone-salmeterol (ADVAIR DISKUS) 250-50 mcg/dose diskus inhaler 7/30/2019 at Unknown time Self Yes Yes   Sig: Take 1 Puff by inhalation every twelve (12) hours. insulin glargine (LANTUS,BASAGLAR) 100 unit/mL (3 mL) inpn 2019 at Unknown time Self Yes Yes   Si Units by SubCUTAneous route daily. ipratropium (ATROVENT) 42 mcg (0.06 %) nasal spray 2019 at Unknown time Self Yes Yes   Si Sprays by Nasal route daily as needed. metoprolol tartrate (LOPRESSOR) 25 mg tablet 2019 at Unknown time Self Yes Yes   Sig: Take 25 mg by mouth two (2) times a day. montelukast (SINGULAIR) 10 mg tablet 2019 at Unknown time Self Yes Yes   Sig: Take 10 mg by mouth nightly. multivitamin (ONE A DAY) tablet 2019 at Unknown time Self Yes Yes   Sig: Take 1 Tab by mouth. simvastatin (ZOCOR) 40 mg tablet 2019 at Unknown time Self Yes Yes   Sig: Take 40 mg by mouth nightly.       Facility-Administered Medications: None          Thank you,  Delvis Sharif  Medication History Pharmacy Technician

## 2019-08-31 ENCOUNTER — APPOINTMENT (OUTPATIENT)
Dept: GENERAL RADIOLOGY | Age: 64
DRG: 330 | End: 2019-08-31
Attending: SURGERY
Payer: MEDICARE

## 2019-08-31 ENCOUNTER — ANESTHESIA EVENT (OUTPATIENT)
Dept: SURGERY | Age: 64
DRG: 330 | End: 2019-08-31
Payer: MEDICARE

## 2019-08-31 ENCOUNTER — ANESTHESIA (OUTPATIENT)
Dept: SURGERY | Age: 64
DRG: 330 | End: 2019-08-31
Payer: MEDICARE

## 2019-08-31 LAB
ALBUMIN SERPL-MCNC: 2.9 G/DL (ref 3.5–5)
ALBUMIN/GLOB SERPL: 0.7 {RATIO} (ref 1.1–2.2)
ALP SERPL-CCNC: 75 U/L (ref 45–117)
ALT SERPL-CCNC: 16 U/L (ref 12–78)
ANION GAP SERPL CALC-SCNC: 7 MMOL/L (ref 5–15)
AST SERPL-CCNC: 14 U/L (ref 15–37)
ATRIAL RATE: 71 BPM
BILIRUB SERPL-MCNC: 0.4 MG/DL (ref 0.2–1)
BUN SERPL-MCNC: 8 MG/DL (ref 6–20)
BUN/CREAT SERPL: 10 (ref 12–20)
CALCIUM SERPL-MCNC: 8.9 MG/DL (ref 8.5–10.1)
CALCULATED P AXIS, ECG09: 53 DEGREES
CALCULATED R AXIS, ECG10: -7 DEGREES
CALCULATED T AXIS, ECG11: -49 DEGREES
CHLORIDE SERPL-SCNC: 111 MMOL/L (ref 97–108)
CO2 SERPL-SCNC: 24 MMOL/L (ref 21–32)
CREAT SERPL-MCNC: 0.79 MG/DL (ref 0.55–1.02)
DIAGNOSIS, 93000: NORMAL
ERYTHROCYTE [DISTWIDTH] IN BLOOD BY AUTOMATED COUNT: 18.2 % (ref 11.5–14.5)
GLOBULIN SER CALC-MCNC: 4.2 G/DL (ref 2–4)
GLUCOSE BLD STRIP.AUTO-MCNC: 153 MG/DL (ref 65–100)
GLUCOSE BLD STRIP.AUTO-MCNC: 155 MG/DL (ref 65–100)
GLUCOSE BLD STRIP.AUTO-MCNC: 205 MG/DL (ref 65–100)
GLUCOSE SERPL-MCNC: 143 MG/DL (ref 65–100)
HCT VFR BLD AUTO: 33.9 % (ref 35–47)
HGB BLD-MCNC: 10.8 G/DL (ref 11.5–16)
MCH RBC QN AUTO: 23.9 PG (ref 26–34)
MCHC RBC AUTO-ENTMCNC: 31.9 G/DL (ref 30–36.5)
MCV RBC AUTO: 75.2 FL (ref 80–99)
NRBC # BLD: 0 K/UL (ref 0–0.01)
NRBC BLD-RTO: 0 PER 100 WBC
P-R INTERVAL, ECG05: 152 MS
PLATELET # BLD AUTO: 344 K/UL (ref 150–400)
PMV BLD AUTO: 11.4 FL (ref 8.9–12.9)
POTASSIUM SERPL-SCNC: 4 MMOL/L (ref 3.5–5.1)
PROT SERPL-MCNC: 7.1 G/DL (ref 6.4–8.2)
Q-T INTERVAL, ECG07: 404 MS
QRS DURATION, ECG06: 94 MS
QTC CALCULATION (BEZET), ECG08: 439 MS
RBC # BLD AUTO: 4.51 M/UL (ref 3.8–5.2)
SERVICE CMNT-IMP: ABNORMAL
SODIUM SERPL-SCNC: 142 MMOL/L (ref 136–145)
VENTRICULAR RATE, ECG03: 71 BPM
WBC # BLD AUTO: 9.4 K/UL (ref 3.6–11)

## 2019-08-31 PROCEDURE — 74019 RADEX ABDOMEN 2 VIEWS: CPT

## 2019-08-31 PROCEDURE — 74011000258 HC RX REV CODE- 258: Performed by: EMERGENCY MEDICINE

## 2019-08-31 PROCEDURE — 65270000029 HC RM PRIVATE

## 2019-08-31 PROCEDURE — 85027 COMPLETE CBC AUTOMATED: CPT

## 2019-08-31 PROCEDURE — 93005 ELECTROCARDIOGRAM TRACING: CPT

## 2019-08-31 PROCEDURE — 74011250636 HC RX REV CODE- 250/636: Performed by: SURGERY

## 2019-08-31 PROCEDURE — 86900 BLOOD TYPING SEROLOGIC ABO: CPT

## 2019-08-31 PROCEDURE — 86920 COMPATIBILITY TEST SPIN: CPT

## 2019-08-31 PROCEDURE — 36415 COLL VENOUS BLD VENIPUNCTURE: CPT

## 2019-08-31 PROCEDURE — 74011250637 HC RX REV CODE- 250/637: Performed by: SURGERY

## 2019-08-31 PROCEDURE — 80053 COMPREHEN METABOLIC PANEL: CPT

## 2019-08-31 PROCEDURE — 74011000258 HC RX REV CODE- 258: Performed by: SURGERY

## 2019-08-31 PROCEDURE — 74011636637 HC RX REV CODE- 636/637: Performed by: INTERNAL MEDICINE

## 2019-08-31 RX ORDER — POLYETHYLENE GLYCOL 3350 17 G/17G
17 POWDER, FOR SOLUTION ORAL 3 TIMES DAILY
Status: DISCONTINUED | OUTPATIENT
Start: 2019-08-31 | End: 2019-09-01

## 2019-08-31 RX ORDER — METRONIDAZOLE 250 MG/1
500 TABLET ORAL 3 TIMES DAILY
Status: DISPENSED | OUTPATIENT
Start: 2019-08-31 | End: 2019-09-01

## 2019-08-31 RX ORDER — NEOMYCIN SULFATE 500 MG/1
1000 TABLET ORAL 3 TIMES DAILY
Status: DISPENSED | OUTPATIENT
Start: 2019-08-31 | End: 2019-09-01

## 2019-08-31 RX ADMIN — POLYETHYLENE GLYCOL 3350 17 G: 17 POWDER, FOR SOLUTION ORAL at 10:59

## 2019-08-31 RX ADMIN — OXYCODONE HYDROCHLORIDE 10 MG: 5 TABLET ORAL at 05:33

## 2019-08-31 RX ADMIN — DEXTROSE MONOHYDRATE AND SODIUM CHLORIDE 100 ML/HR: 5; .9 INJECTION, SOLUTION INTRAVENOUS at 18:20

## 2019-08-31 RX ADMIN — DEXTROSE MONOHYDRATE AND SODIUM CHLORIDE 100 ML/HR: 5; .9 INJECTION, SOLUTION INTRAVENOUS at 08:37

## 2019-08-31 RX ADMIN — INSULIN LISPRO 2 UNITS: 100 INJECTION, SOLUTION INTRAVENOUS; SUBCUTANEOUS at 17:23

## 2019-08-31 RX ADMIN — METOPROLOL TARTRATE 25 MG: 25 TABLET ORAL at 08:30

## 2019-08-31 RX ADMIN — METRONIDAZOLE 500 MG: 250 TABLET ORAL at 18:18

## 2019-08-31 RX ADMIN — METOPROLOL TARTRATE 25 MG: 25 TABLET ORAL at 18:18

## 2019-08-31 RX ADMIN — HYDROMORPHONE HYDROCHLORIDE 0.5 MG: 1 INJECTION, SOLUTION INTRAMUSCULAR; INTRAVENOUS; SUBCUTANEOUS at 10:58

## 2019-08-31 RX ADMIN — ONDANSETRON 4 MG: 2 INJECTION INTRAMUSCULAR; INTRAVENOUS at 05:34

## 2019-08-31 RX ADMIN — INSULIN LISPRO 2 UNITS: 100 INJECTION, SOLUTION INTRAVENOUS; SUBCUTANEOUS at 00:04

## 2019-08-31 RX ADMIN — METRONIDAZOLE 500 MG: 250 TABLET ORAL at 10:58

## 2019-08-31 RX ADMIN — AMLODIPINE BESYLATE 10 MG: 5 TABLET ORAL at 08:30

## 2019-08-31 RX ADMIN — POLYETHYLENE GLYCOL 3350 17 G: 17 POWDER, FOR SOLUTION ORAL at 18:17

## 2019-08-31 RX ADMIN — OXYCODONE HYDROCHLORIDE 10 MG: 5 TABLET ORAL at 18:58

## 2019-08-31 RX ADMIN — PIPERACILLIN SODIUM,TAZOBACTAM SODIUM 3.38 G: 3; .375 INJECTION, POWDER, FOR SOLUTION INTRAVENOUS at 10:59

## 2019-08-31 RX ADMIN — INSULIN LISPRO 2 UNITS: 100 INJECTION, SOLUTION INTRAVENOUS; SUBCUTANEOUS at 05:34

## 2019-08-31 RX ADMIN — PIPERACILLIN SODIUM,TAZOBACTAM SODIUM 3.38 G: 3; .375 INJECTION, POWDER, FOR SOLUTION INTRAVENOUS at 18:18

## 2019-08-31 RX ADMIN — NEOMYCIN SULFATE 1000 MG: 500 TABLET ORAL at 10:58

## 2019-08-31 NOTE — PROGRESS NOTES
Hospitalist Consult Service Progress Note    NAME: Qasim Navarrete   :  1955   MRN:  [id number]       Assessment / Plan:  HTN- stable & acceptable  Considering NPO due to bowel obstruction, will cont to hold norvasc and place PRN nitropaste and IV hydralazine    DM2- FS ok  Considering NPO, Con to hold Lantus and cont SSI lispro Q 6 hrs for now  Plan to allow clear liquids for today to see if pt tolerates- otherwise plan for Exploratory laparotomy noted/NPO p [place]    Asthma  Continue Px inhaler  Place PRN Nebs    PAD with history of left leg BiPAP (pt doesn't remember name of surgery but suspect fim-pop bipass)  Holding Plavix for possible surgery, resume once ok with surgery    AAA s/p Stents per patient    Hyperlipidemia  Holding statins due to Bowel obstruction    Smoker  Counseled for cesation  She claims she will be fine without nicotine patch    Code Status: Full  DVT Prophylaxis: Per Surgery  Recommended Disposition: Home w/Family eventually     Subjective:     Chief Complaint / Reason for Physician Visit: f/U SBO, DM, HTN  \"I am fine\". Discussed with RN events overnight. Review of Systems:  Symptom Y/N Comments  Symptom Y/N Comments   Fever/Chills n   Chest Pain n    Poor Appetite n   Edema n    Cough n   Abdominal Pain y    Sputum n   Joint Pain     SOB/BECKER    Pruritis/Rash     Nausea/vomit y improved  Tolerating PT/OT y    Diarrhea    Tolerating Diet  NPO   Constipation    Other       Could NOT obtain due to:      Objective:     VITALS:   Last 24hrs VS reviewed since prior progress note.  Most recent are:  Patient Vitals for the past 24 hrs:   Temp Pulse Resp BP SpO2   19 0830 -- 60 -- -- --   19 0829 -- -- -- 149/66 --   19 0736 98.6 °F (37 °C) (!) 54 18 168/57 98 %   19 0406 98.1 °F (36.7 °C) 66 18 170/49 94 %   19 2359 98.4 °F (36.9 °C) 68 16 143/47 94 %   19 2141 98.1 °F (36.7 °C) 61 17 147/81 92 %   19 -- -- -- (!) 130/96 98 %   19 1830 -- (!) 57 -- 180/70 98 %   08/30/19 1745 -- (!) 56 -- 168/55 99 %   08/30/19 1700 -- -- -- 156/67 100 %   08/30/19 1530 -- 66 16 145/65 99 %   08/30/19 1324 98.1 °F (36.7 °C) 62 16 184/78 100 %       Intake/Output Summary (Last 24 hours) at 8/31/2019 1048  Last data filed at 8/31/2019 0740  Gross per 24 hour   Intake 998.34 ml   Output 0 ml   Net 998.34 ml        PHYSICAL EXAM:  General: WD, WN. Alert, cooperative, no acute distress    EENT:  EOMI. Anicteric sclerae. MMM  Resp:  CTA bilaterally, no wheezing or rales. No accessory muscle use  CV:  Regular  rhythm,  No edema  GI:  Soft, Non distended, Non tender.  +Bowel sounds  Neurologic:  Alert and oriented X 3, normal speech,   Psych:   Good insight. Not anxious nor agitated  Skin:  No rashes. No jaundice    Reviewed most current lab test results and cultures  YES  Reviewed most current radiology test results   YES  Review and summation of old records today    NO  Reviewed patient's current orders and MAR    YES  PMH/SH reviewed - no change compared to H&P  ________________________________________________________________________  Care Plan discussed with:    Comments   Patient x    Family  x    RN x    Care Manager     Consultant                        Multidiciplinary team rounds were held today with , nursing, pharmacist and clinical coordinator. Patient's plan of care was discussed; medications were reviewed and discharge planning was addressed.      ________________________________________________________________________  Total NON critical care TIME:  36   Minutes    Total CRITICAL CARE TIME Spent:   Minutes non procedure based      Comments   >50% of visit spent in counseling and coordination of care     ________________________________________________________________________  Dina Bence, MD     Procedures: see electronic medical records for all procedures/Xrays and details which were not copied into this note but were reviewed prior to creation of Plan. LABS:  I reviewed today's most current labs and imaging studies.   Pertinent labs include:  Recent Labs     08/31/19  0539 08/30/19  1333   WBC 9.4 10.7   HGB 10.8* 11.9   HCT 33.9* 38.6    363     Recent Labs     08/31/19  0539 08/30/19  1453    142   K 4.0 4.0   * 109*   CO2 24 25   * 123*   BUN 8 12   CREA 0.79 1.00   CA 8.9 9.7   ALB 2.9* 3.3*   TBILI 0.4 0.3   SGOT 14* 16   ALT 16 18       Signed: Oracio Benitez MD

## 2019-08-31 NOTE — PROGRESS NOTES
Called radiology regarding abd XR w/ gastrofin enema. No radiologist here until 0700 and per tech unsure if exam can be completed before 0800. Per radiology tech he will notify nursing of any updates.

## 2019-08-31 NOTE — PROGRESS NOTES
Notified by Ginette Ruth the xray supervisor that there wont be a radiologist available until 0930. RN called MD Myriam Humphrey at [facility] to attempt to find a radiologist available sooner however there is not one. MD Herrera's number is # [phone].

## 2019-08-31 NOTE — PROGRESS NOTES
Admit Date: 2019    POD Day of Surgery    Procedure:  Procedure(s):  LAPAROTOMY EXPLORATORY FOR LARGE BOWEL OBSTRUCTION    Subjective:     Patient with less abd pain, and less N/V  And some diarrhea. Pt abdomen exam benign,  Lengthy review with pt and radiollgy and on CT recon,  Transverse colon stricture 5-10 mm, doubt contrast enema of much help,  Discussion with GI,  And colonoscopy of limited benefit clinically and pathologically and likely wont change operative course even if malignant and stent unlikely to help. Reviewed with pt and family and rather than laparotomy today,  Trial to see if pt can tolerate PO liquids and mild miralax to try light gi prep and then consider laparotomy transverse ? Extended right colectomy and anastamosis v. Colostomy/ileostomy , benefits, risks alt reviewed and pt concurs above. Morbidity mortalisty wt, DM Htn and smoking     Pt and fiance understand potential extent colon resection local v.  Ext. Right  + or - anastamosis v ostomy and risks bowel leak and reop if anastamosis    Check CEA   Review of Systems   Constitutional: Negative. Respiratory: Negative. Cardiovascular: Negative. Gastrointestinal:        Improved abd pain and N/V    Neurological: Negative. Hematological: Negative. Psychiatric/Behavioral: Negative. All other systems reviewed and are negative. Objective:     Blood pressure 149/66, pulse 60, temperature 98.6 °F (37 °C), resp. rate 18, height 5' 5\" (1.651 m), weight 87.1 kg (192 lb 0.3 oz), SpO2 98 %. Temp (24hrs), Av.3 °F (36.8 °C), Min:98.1 °F (36.7 °C), Max:98.6 °F (37 °C)          Physical Exam   Nursing note and vitals reviewed. Constitutional: She is oriented to person, place, and time. She appears well-developed and well-nourished. HENT:   Head: Normocephalic. Eyes: Conjunctivae and EOM are normal.   Neck: Neck supple. Cardiovascular: Normal rate and regular rhythm.    Pulmonary/Chest: Effort normal and breath sounds normal.   Abdominal: Soft. There is no tenderness. Musculoskeletal: Normal range of motion. Neurological: She is alert and oriented to person, place, and time. Skin: Skin is warm and dry. Psychiatric: She has a normal mood and affect. Her behavior is normal. Judgment and thought content normal.          Labs:   Recent Results (from the past 24 hour(s))   CBC WITH AUTOMATED DIFF    Collection Time: 08/30/19  1:33 PM   Result Value Ref Range    WBC 10.7 3.6 - 11.0 K/uL    RBC 5.05 3.80 - 5.20 M/uL    HGB 11.9 11.5 - 16.0 g/dL    HCT 38.6 35.0 - 47.0 %    MCV 76.4 (L) 80.0 - 99.0 FL    MCH 23.6 (L) 26.0 - 34.0 PG    MCHC 30.8 30.0 - 36.5 g/dL    RDW 19.2 (H) 11.5 - 14.5 %    PLATELET 035 137 - 306 K/uL    MPV 11.7 8.9 - 12.9 FL    NRBC 0.0 0  WBC    ABSOLUTE NRBC 0.00 0.00 - 0.01 K/uL    NEUTROPHILS 65 32 - 75 %    LYMPHOCYTES 25 12 - 49 %    MONOCYTES 7 5 - 13 %    EOSINOPHILS 2 0 - 7 %    BASOPHILS 1 0 - 1 %    IMMATURE GRANULOCYTES 0 0.0 - 0.5 %    ABS. NEUTROPHILS 7.1 1.8 - 8.0 K/UL    ABS. LYMPHOCYTES 2.7 0.8 - 3.5 K/UL    ABS. MONOCYTES 0.7 0.0 - 1.0 K/UL    ABS. EOSINOPHILS 0.2 0.0 - 0.4 K/UL    ABS. BASOPHILS 0.1 0.0 - 0.1 K/UL    ABS. IMM. GRANS. 0.0 0.00 - 0.04 K/UL    DF AUTOMATED     METABOLIC PANEL, COMPREHENSIVE    Collection Time: 08/30/19  2:53 PM   Result Value Ref Range    Sodium 142 136 - 145 mmol/L    Potassium 4.0 3.5 - 5.1 mmol/L    Chloride 109 (H) 97 - 108 mmol/L    CO2 25 21 - 32 mmol/L    Anion gap 8 5 - 15 mmol/L    Glucose 123 (H) 65 - 100 mg/dL    BUN 12 6 - 20 MG/DL    Creatinine 1.00 0.55 - 1.02 MG/DL    BUN/Creatinine ratio 12 12 - 20      GFR est AA >60 >60 ml/min/1.73m2    GFR est non-AA 56 (L) >60 ml/min/1.73m2    Calcium 9.7 8.5 - 10.1 MG/DL    Bilirubin, total 0.3 0.2 - 1.0 MG/DL    ALT (SGPT) 18 12 - 78 U/L    AST (SGOT) 16 15 - 37 U/L    Alk.  phosphatase 84 45 - 117 U/L    Protein, total 8.1 6.4 - 8.2 g/dL    Albumin 3.3 (L) 3.5 - 5.0 g/dL    Globulin 4.8 (H) 2.0 - 4.0 g/dL    A-G Ratio 0.7 (L) 1.1 - 2.2     LIPASE    Collection Time: 08/30/19  2:53 PM   Result Value Ref Range    Lipase 82 73 - 393 U/L   URINALYSIS W/ REFLEX CULTURE    Collection Time: 08/30/19  5:43 PM   Result Value Ref Range    Color YELLOW/STRAW      Appearance CLEAR CLEAR      Specific gravity 1.015 1.003 - 1.030      pH (UA) 5.5 5.0 - 8.0      Protein NEGATIVE  NEG mg/dL    Glucose NEGATIVE  NEG mg/dL    Ketone TRACE (A) NEG mg/dL    Bilirubin NEGATIVE  NEG      Blood NEGATIVE  NEG      Urobilinogen 0.2 0.2 - 1.0 EU/dL    Nitrites NEGATIVE  NEG      Leukocyte Esterase NEGATIVE  NEG      UA:UC IF INDICATED CULTURE NOT INDICATED BY UA RESULT CNI      WBC 0-4 0 - 4 /hpf    RBC 0-5 0 - 5 /hpf    Epithelial cells MODERATE (A) FEW /lpf    Bacteria NEGATIVE  NEG /hpf    Hyaline cast 2-5 0 - 5 /lpf   GLUCOSE, POC    Collection Time: 08/30/19 11:58 PM   Result Value Ref Range    Glucose (POC) 205 (H) 65 - 100 mg/dL    Performed by Luana Rogers (RN)    GLUCOSE, POC    Collection Time: 08/31/19  4:58 AM   Result Value Ref Range    Glucose (POC) 153 (H) 65 - 100 mg/dL    Performed by Ashley Mullen    TYPE & SCREEN    Collection Time: 08/31/19  5:39 AM   Result Value Ref Range    Crossmatch Expiration 09/03/2019     ABO/Rh(D) A POSITIVE     Antibody screen NEG    METABOLIC PANEL, COMPREHENSIVE    Collection Time: 08/31/19  5:39 AM   Result Value Ref Range    Sodium 142 136 - 145 mmol/L    Potassium 4.0 3.5 - 5.1 mmol/L    Chloride 111 (H) 97 - 108 mmol/L    CO2 24 21 - 32 mmol/L    Anion gap 7 5 - 15 mmol/L    Glucose 143 (H) 65 - 100 mg/dL    BUN 8 6 - 20 MG/DL    Creatinine 0.79 0.55 - 1.02 MG/DL    BUN/Creatinine ratio 10 (L) 12 - 20      GFR est AA >60 >60 ml/min/1.73m2    GFR est non-AA >60 >60 ml/min/1.73m2    Calcium 8.9 8.5 - 10.1 MG/DL    Bilirubin, total 0.4 0.2 - 1.0 MG/DL    ALT (SGPT) 16 12 - 78 U/L    AST (SGOT) 14 (L) 15 - 37 U/L    Alk.  phosphatase 75 45 - 117 U/L    Protein, total 7.1 6.4 - 8.2 g/dL    Albumin 2.9 (L) 3.5 - 5.0 g/dL    Globulin 4.2 (H) 2.0 - 4.0 g/dL    A-G Ratio 0.7 (L) 1.1 - 2.2     CBC W/O DIFF    Collection Time: 08/31/19  5:39 AM   Result Value Ref Range    WBC 9.4 3.6 - 11.0 K/uL    RBC 4.51 3.80 - 5.20 M/uL    HGB 10.8 (L) 11.5 - 16.0 g/dL    HCT 33.9 (L) 35.0 - 47.0 %    MCV 75.2 (L) 80.0 - 99.0 FL    MCH 23.9 (L) 26.0 - 34.0 PG    MCHC 31.9 30.0 - 36.5 g/dL    RDW 18.2 (H) 11.5 - 14.5 %    PLATELET 108 677 - 799 K/uL    MPV 11.4 8.9 - 12.9 FL    NRBC 0.0 0  WBC    ABSOLUTE NRBC 0.00 0.00 - 0.01 K/uL       Data Review images and reports reviewed    Assessment:     Active Problems:    HTN (hypertension) (8/7/2017)      Type II diabetes mellitus ([facility] [address] (8/7/2017)      History of pancreatitis (10/30/2018)      Other partial intestinal obstruction ([address] (8/30/2019)      Bowel obstruction ([facility] [address] (8/30/2019)        Plan/Recommendations/Medical Decision Making:     Continue present treatment  Patient with less abd pain, and less N/V  And some diarrhea. Pt abdomen exam benign,  Lengthy review with pt and radiollgy and on CT recon,  Transverse colon stricture 5-10 mm, doubt contrast enema of much help,  Discussion with GI,  And colonoscopy of limited benefit clinically and pathologically and likely wont change operative course even if malignant and stent unlikely to help. Reviewed with pt and family and rather than laparotomy today,  Trial to see if pt can tolerate PO liquids and mild miralax to try light gi prep and then consider laparotomy transverse ? Extended right colectomy and anastamosis v. Colostomy/ileostomy , benefits, risks alt reviewed and pt concurs above. Morbidity mortalisty wt, DM Htn and smoking     Also allow plavix to wear off     Pt and fiance understand potential extent colon resection local v.  Ext.  Right  + or - anastamosis v ostomy and risks bowel leak and reop if anastamosis    Check CEA    Face to face tnad time   54 min   Samir Clemente MD Delorise Line  ED [facility]

## 2019-08-31 NOTE — PROGRESS NOTES
OK to proceed with scheduled surgical procedure today, assuming no acute changes in clinical status or lab derangements overnight and patient remained NPO overnight.

## 2019-08-31 NOTE — ANESTHESIA PREPROCEDURE EVALUATION
Anesthetic History   No history of anesthetic complications            Review of Systems / Medical History  Patient summary reviewed, nursing notes reviewed and pertinent labs reviewed    Pulmonary          Smoker (tobacco)  Asthma     Comments: Current Every Day Smoker - 5 pack years   Neuro/Psych         Psychiatric history (schizophrenia)    Comments: Cervical stenosis of spinal canal  Hx Schizophrenia  Cardiovascular    Hypertension                Comments: AAA 3 to 5 cm   GI/Hepatic/Renal               Comments: Large Bowel obstruction  Hx pancreatitis Endo/Other    Diabetes    Obesity and arthritis     Other Findings            Physical Exam    Airway  Mallampati: III  TM Distance: 4 - 6 cm  Neck ROM: normal range of motion   Mouth opening: Normal     Cardiovascular    Rhythm: regular  Rate: normal         Dental      Comments: Multiple missing teeth.   Denies any loose teeth   Pulmonary  Breath sounds clear to auscultation               Abdominal  GI exam deferred       Other Findings            Anesthetic Plan    ASA: 3  Anesthesia type: general    Monitoring Plan: BIS      Induction: Intravenous  Anesthetic plan and risks discussed with: Patient

## 2019-08-31 NOTE — CONSULTS
Hospitalist Consultation Note    NAME:  Maurilio Ireland   :   1955   MRN:   [id number]     ATTENDING: Ana Cristina Cope MD  PCP:  Vandana Ace MD    Date/Time:  2019 8:08 PM      Recommendations/Plan:     HTN  Considering NPO due to bowel obstruction, will hold norvasc and place PRN nitropaste and IV hydralazine    DM2  Considering NPO, hold Lantus and place on SSI    Asthma  Continue Px inhaler  Place PRN Nebs    PAD with history of left leg BiPAP (pt doesn't remember name of surgery but suspect fim-pop bipass)  Holding Plavix for possible surgery, resume once ok with surgery    AAA s/p Stents per patient    Hyperlipidemia  Holding statins due to Bowel obstruction    Smoker  Counseled for cesation  She claims she will be fine without nicotine patch    Code Status: Full  DVT Prophylaxis: Per Surgery    Thanks for the consult. Dr Savannah Edwards to resume care on consult tomorrow 7am      Subjective:     REQUESTING PHYSICIAN: Ana Cristina Cope    REASON FOR CONSULT:      Frida Mcdaniel is a 66 y.o.  female who I was asked to see for management of multiple medical problems including Dm, HTN, PAD, Smoker. Pt claims she is been feeling sick since last month and underwent surgery couple of weeks ago but continue to have 8/10, diffuse abdominal pain without much relief. She also reported nausea and vomiting. Pt denies any fever, chills, cough, shortness of breath, problems urination. In ED pt noted to have bowel obstruction and surgery requested consult for medical management.      Past Medical History:   Diagnosis Date    Arthritis     Asthma     Diabetes ([address]     Hypertension     Menopause     Pancreatitis       Past Surgical History:   Procedure Laterality Date    HX BUNIONECTOMY Bilateral     HX HERNIA REPAIR      HX PARTIAL HYSTERECTOMY       Social History     Tobacco Use    Smoking status: Current Every Day Smoker     Packs/day: 0.25     Years: 20.00     Pack years: 5.00    Smokeless tobacco: Never Used   Substance Use Topics    Alcohol use: Yes     Comment: RARE Q 3 MONTHS      Family History   Problem Relation Age of Onset    Other Mother         ANEURYSM    Breast Cancer Mother     Diabetes Mother     Lung Disease Father         EMPHYSEMA    Crohn's Disease Sister     Heart Disease Sister     Diabetes Maternal Uncle     Heart Disease Maternal Uncle     Diabetes Paternal Uncle     Heart Disease Paternal Uncle     Breast Cancer Maternal Aunt     Anesth Problems Neg Hx        Allergies   Allergen Reactions    Morphine Unknown (comments), Hives and Itching    Tramadol Other (comments) and Itching     \"funny feeling\"      Prior to Admission medications    Medication Sig Start Date End Date Taking? Authorizing Provider   simvastatin (ZOCOR) 40 mg tablet Take 40 mg by mouth nightly. Yes Provider, Historical   HYDROcodone-acetaminophen (NORCO) 5-325 mg per tablet Take 1 Tab by mouth every twelve (12) hours as needed for Pain for up to 5 days. Max Daily Amount: 2 Tabs. 8/26/19 8/31/19 Yes Yunior Curry MD   clopidogrel (PLAVIX) 75 mg tab Take 75 mg by mouth daily. 7/2/19  Yes Provider, Historical   ascorbic acid, vitamin C, (VITAMIN C) 500 mg tablet Take 500 mg by mouth daily. Yes Provider, Historical   multivitamin (ONE A DAY) tablet Take 1 Tab by mouth. 2/26/19 2/26/20 Yes Provider, Historical   amLODIPine (NORVASC) 10 mg tablet Take 10 mg by mouth daily. 11/5/18  Yes Provider, Historical   insulin glargine (LANTUS,BASAGLAR) 100 unit/mL (3 mL) inpn 25 Units by SubCUTAneous route daily. 10/4/18  Yes Provider, Historical   ipratropium (ATROVENT) 42 mcg (0.06 %) nasal spray 2 Sprays by Nasal route daily as needed. 6/29/18  Yes Provider, Historical   fluticasone-salmeterol (ADVAIR DISKUS) 250-50 mcg/dose diskus inhaler Take 1 Puff by inhalation every twelve (12) hours.    Yes Provider, Historical   metoprolol tartrate (LOPRESSOR) 25 mg tablet Take 25 mg by mouth two (2) times a day. Yes Sydney, MD Walker   montelukast (SINGULAIR) 10 mg tablet Take 10 mg by mouth nightly. Yes Sydney, MD Walker       REVIEW OF SYSTEMS:     Total of 12 systems reviewed as follows:   I am not able to complete the review of systems because: The patient is intubated and sedated    The patient has altered mental status due to his acute medical problems    The patient has baseline aphasia from prior stroke(s)    The patient has baseline dementia and is not reliable historian                 POSITIVE= underlined text  Negative = text not underlined  General:  fever, chills, sweats, generalized weakness, weight loss/gain,      loss of appetite   Eyes:    blurred vision, eye pain, loss of vision, double vision  ENT:    rhinorrhea, pharyngitis   Respiratory:   cough, sputum production, SOB, wheezing, BECKER, pleuritic pain   Cardiology:   chest pain, palpitations, orthopnea, PND, edema, syncope   Gastrointestinal:  abdominal pain , N/V, dysphagia, diarrhea, constipation, bleeding   Genitourinary:  frequency, urgency, dysuria, hematuria, incontinence   Muskuloskeletal :  arthralgia, myalgia   Hematology:  easy bruising, nose or gum bleeding, lymphadenopathy   Dermatological: rash, ulceration, pruritis   Endocrine:   hot flashes or polydipsia   Neurological:  headache, dizziness, confusion, focal weakness, paresthesia,     Speech difficulties, memory loss, gait disturbance  Psychological: Feelings of anxiety, depression, agitation    Objective:   VITALS:    Visit Vitals  /70   Pulse (!) 57   Temp 98.1 °F (36.7 °C)   Resp 16   Ht 5' 5\" (1.651 m)   Wt 87.1 kg (192 lb 0.3 oz)   SpO2 98%   BMI 31.95 kg/m²     Temp (24hrs), Av.1 °F (36.7 °C), Min:98.1 °F (36.7 °C), Max:98.1 °F (36.7 °C)      PHYSICAL EXAM:   General:    Alert, cooperative, no distress, appears stated age.      HEENT: Atraumatic, anicteric sclerae, pink conjunctivae     No oral ulcers, mucosa moist, throat clear  Neck:  Supple, symmetrical, thyroid: non tender  Lungs:   Clear to auscultation bilaterally. No Wheezing or Rhonchi. No rales. Chest wall:  No tenderness  No Accessory muscle use. Heart:   Regular  rhythm,  No  murmur   No edema  Abdomen:   Soft, tender. distended. Bowel sounds hypoactive  Extremities: No cyanosis. No clubbing  Skin:     Not pale. Not Jaundiced  No rashes   Psych:  Good insight. Not depressed. Not anxious or agitated. Neurologic: EOMs intact. No facial asymmetry. No aphasia or slurred speech. Symmetrical strength, Alert and oriented X 4.     _______________________________________________________________________  Care Plan discussed with:    Comments   Patient y    Family      RN y    Care Manager                    Consultant:  y General Surgery   ____________________________________________________________________  TOTAL TIME:  60 mins    Comments    y Reviewed previous records   >50% of visit spent in counseling and coordination of care y Discussion with patient and questions answered       Critical Care Provided     Minutes non procedure based  ________________________________________________________________________  Signed: Riley Dillard MD      Procedures: see electronic medical records for all procedures/Xrays and details which were not copied into this note but were reviewed prior to creation of Plan.     LAB DATA REVIEWED:    Recent Results (from the past 24 hour(s))   CBC WITH AUTOMATED DIFF    Collection Time: 08/30/19  1:33 PM   Result Value Ref Range    WBC 10.7 3.6 - 11.0 K/uL    RBC 5.05 3.80 - 5.20 M/uL    HGB 11.9 11.5 - 16.0 g/dL    HCT 38.6 35.0 - 47.0 %    MCV 76.4 (L) 80.0 - 99.0 FL    MCH 23.6 (L) 26.0 - 34.0 PG    MCHC 30.8 30.0 - 36.5 g/dL    RDW 19.2 (H) 11.5 - 14.5 %    PLATELET 435 958 - 900 K/uL    MPV 11.7 8.9 - 12.9 FL    NRBC 0.0 0  WBC    ABSOLUTE NRBC 0.00 0.00 - 0.01 K/uL    NEUTROPHILS 65 32 - 75 %    LYMPHOCYTES 25 12 - 49 %    MONOCYTES 7 5 - 13 %    EOSINOPHILS 2 0 - 7 % BASOPHILS 1 0 - 1 %    IMMATURE GRANULOCYTES 0 0.0 - 0.5 %    ABS. NEUTROPHILS 7.1 1.8 - 8.0 K/UL    ABS. LYMPHOCYTES 2.7 0.8 - 3.5 K/UL    ABS. MONOCYTES 0.7 0.0 - 1.0 K/UL    ABS. EOSINOPHILS 0.2 0.0 - 0.4 K/UL    ABS. BASOPHILS 0.1 0.0 - 0.1 K/UL    ABS. IMM. GRANS. 0.0 0.00 - 0.04 K/UL    DF AUTOMATED     METABOLIC PANEL, COMPREHENSIVE    Collection Time: 08/30/19  2:53 PM   Result Value Ref Range    Sodium 142 136 - 145 mmol/L    Potassium 4.0 3.5 - 5.1 mmol/L    Chloride 109 (H) 97 - 108 mmol/L    CO2 25 21 - 32 mmol/L    Anion gap 8 5 - 15 mmol/L    Glucose 123 (H) 65 - 100 mg/dL    BUN 12 6 - 20 MG/DL    Creatinine 1.00 0.55 - 1.02 MG/DL    BUN/Creatinine ratio 12 12 - 20      GFR est AA >60 >60 ml/min/1.73m2    GFR est non-AA 56 (L) >60 ml/min/1.73m2    Calcium 9.7 8.5 - 10.1 MG/DL    Bilirubin, total 0.3 0.2 - 1.0 MG/DL    ALT (SGPT) 18 12 - 78 U/L    AST (SGOT) 16 15 - 37 U/L    Alk.  phosphatase 84 45 - 117 U/L    Protein, total 8.1 6.4 - 8.2 g/dL    Albumin 3.3 (L) 3.5 - 5.0 g/dL    Globulin 4.8 (H) 2.0 - 4.0 g/dL    A-G Ratio 0.7 (L) 1.1 - 2.2     LIPASE    Collection Time: 08/30/19  2:53 PM   Result Value Ref Range    Lipase 82 73 - 393 U/L   URINALYSIS W/ REFLEX CULTURE    Collection Time: 08/30/19  5:43 PM   Result Value Ref Range    Color YELLOW/STRAW      Appearance CLEAR CLEAR      Specific gravity 1.015 1.003 - 1.030      pH (UA) 5.5 5.0 - 8.0      Protein NEGATIVE  NEG mg/dL    Glucose NEGATIVE  NEG mg/dL    Ketone TRACE (A) NEG mg/dL    Bilirubin NEGATIVE  NEG      Blood NEGATIVE  NEG      Urobilinogen 0.2 0.2 - 1.0 EU/dL    Nitrites NEGATIVE  NEG      Leukocyte Esterase NEGATIVE  NEG      UA:UC IF INDICATED CULTURE NOT INDICATED BY UA RESULT CNI      WBC 0-4 0 - 4 /hpf    RBC 0-5 0 - 5 /hpf    Epithelial cells MODERATE (A) FEW /lpf    Bacteria NEGATIVE  NEG /hpf    Hyaline cast 2-5 0 - 5 /lpf       _____________________________  Hospitalist: Stephania Kanner, MD

## 2019-08-31 NOTE — PROGRESS NOTES
General Surgery End of Shift Nursing Note    Bedside shift change report given to Yumiko Saba RN (oncoming nurse) by 1919 Jeana Mondragon RN (offgoing nurse). Report included the following information SBAR and Recent Results. Shift worked:   [id number]   Summary of shift:    Patient c/o Nausea w/o vomiting, medicated w/ prn zofran and oxycodone for abd pain. VSS, hypertensive this am but hydralazine prn parameters start at sys of 175.  Attempted to arrange w/ radiology time to complete abd xray w/ gastrofin contrast enema, no radiologist in house until at least 4149 and uncertain if they will perform exam. Technologist will call with any updates on a time to complete the exam.    Issues for physician to address:   None         Yamilka Murry, MINGO

## 2019-08-31 NOTE — PROGRESS NOTES
Spoke with Dr. Sapna Oliver regarding XR Enema order, would like done tonight if possible, tomorrow OK as well as long as before OR time (1000). Radiology contacted regarding order, XR tech spoke with radiologist, will plan for morning.

## 2019-08-31 NOTE — PROGRESS NOTES
Problem: Diabetes Self-Management  Goal: *Disease process and treatment process  Description  Define diabetes and identify own type of diabetes; list 3 options for treating diabetes. Outcome: Progressing Towards Goal  Goal: *Incorporating nutritional management into lifestyle  Description  Describe effect of type, amount and timing of food on blood glucose; list 3 methods for planning meals. Outcome: Progressing Towards Goal  Goal: *Incorporating physical activity into lifestyle  Description  State effect of exercise on blood glucose levels. Outcome: Progressing Towards Goal  Goal: *Developing strategies to promote health/change behavior  Description  Define the ABC's of diabetes; identify appropriate screenings, schedule and personal plan for screenings. Outcome: Progressing Towards Goal  Goal: *Using medications safely  Description  State effect of diabetes medications on diabetes; name diabetes medication taking, action and side effects. Outcome: Progressing Towards Goal  Goal: *Monitoring blood glucose, interpreting and using results  Description  Identify recommended blood glucose targets  and personal targets. Outcome: Progressing Towards Goal  Goal: *Prevention, detection, treatment of acute complications  Description  List symptoms of hyper- and hypoglycemia; describe how to treat low blood sugar and actions for lowering  high blood glucose level. Outcome: Progressing Towards Goal  Goal: *Prevention, detection and treatment of chronic complications  Description  Define the natural course of diabetes and describe the relationship of blood glucose levels to long term complications of diabetes.   Outcome: Progressing Towards Goal  Goal: *Developing strategies to address psychosocial issues  Description  Describe feelings about living with diabetes; identify support needed and support network  Outcome: Progressing Towards Goal  Goal: *Insulin pump training  Outcome: Progressing Towards Goal  Goal: *Sick day guidelines  Outcome: Progressing Towards Goal  Goal: *Patient Specific Goal (EDIT GOAL, INSERT TEXT)  Outcome: Progressing Towards Goal     Problem: Patient Education: Go to Patient Education Activity  Goal: Patient/Family Education  Outcome: Progressing Towards Goal     Problem: Falls - Risk of  Goal: *Absence of Falls  Description  Document Moni Payment Fall Risk and appropriate interventions in the flowsheet. Outcome: Progressing Towards Goal  Note:   Fall Risk Interventions:            Medication Interventions: Patient to call before getting OOB         History of Falls Interventions:  Investigate reason for fall(slipped getting into car)         Problem: Patient Education: Go to Patient Education Activity  Goal: Patient/Family Education  Outcome: Progressing Towards Goal

## 2019-08-31 NOTE — H&P
History and Physical    Surgery History and Physical    Subjective:      Alaina Silva is a 66 y.o. 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 Malignant, but increasing pain and N/ V,  CBC and lytes, ok,, last colonoscopy 7 yr prev per pt, report not readily available. Pt with other medical issues with DM, HTN, smoking , obesity,  And recent surgery. Pt with increasing abd pain, anorexia, and N/v . Pt has not had FU with her surgeon in [address] [place] [address] other than post op admit. Review of oop note Dr Tona Hidalgo describes small bowel repair at time of mesh removal and no bowel on pathology. Pt sent to [facility] by PCP today       Of note pt with Hx  AAA stent earlier in 2019 at [facility], Dr Moises Duvall, and pt on Plavix for that per pt, and no hx CAD, and 2017 Cardiac stress test with no ischemia and EF LV  52%.    Past Medical History:   Diagnosis Date    Arthritis     Asthma     Diabetes ([address]     Hypertension     Menopause     Pancreatitis      Past Surgical History:   Procedure Laterality Date    HX BUNIONECTOMY Bilateral 1977    HX HERNIA REPAIR  2002    HX PARTIAL HYSTERECTOMY  1980      Family History   Problem Relation Age of Onset    Other Mother         ANEURYSM    Breast Cancer Mother     Diabetes Mother     Lung Disease Father         EMPHYSEMA    Crohn's Disease Sister     Heart Disease Sister     Diabetes Maternal Uncle     Heart Disease Maternal Uncle     Diabetes Paternal Uncle     Heart Disease KUB, 4/11/2017:



History: Check stent placement



This exam is correlated with an outside CT study from 4/10/2017. A right

ureteral stent has been placed in satisfactory position. The calculus which

previously was positioned in the proximal right ureter appears to have been

displaced back into the right renal collecting system. A similar sized

calculus is again noted in the lower pole of the left kidney. The abdominal

gas pattern is unremarkable. There is no evidence of organomegaly.



IMPRESSION:

1. The right ureteral stent is in satisfactory position.

2. The proximal right ureteral calculus has been displaced back into the right

renal collecting system.

3. Nonobstructing left intrarenal calculus. Paternal Uncle     Breast Cancer Maternal Aunt     Anesth Problems Neg Hx      Social History     Tobacco Use    Smoking status: Current Every Day Smoker     Packs/day: 0.25     Years: 20.00     Pack years: 5.00    Smokeless tobacco: Never Used   Substance Use Topics    Alcohol use: Yes     Comment: RARE Q 3 MONTHS      Prior to Admission medications    Medication Sig Start Date End Date Taking? Authorizing Provider   simvastatin (ZOCOR) 40 mg tablet Take 40 mg by mouth nightly. Yes Provider, Historical   HYDROcodone-acetaminophen (NORCO) 5-325 mg per tablet Take 1 Tab by mouth every twelve (12) hours as needed for Pain for up to 5 days. Max Daily Amount: 2 Tabs. 8/26/19 8/31/19 Yes Jack Curry MD   clopidogrel (PLAVIX) 75 mg tab Take 75 mg by mouth daily. 7/2/19  Yes Provider, Historical   ascorbic acid, vitamin C, (VITAMIN C) 500 mg tablet Take 500 mg by mouth daily. Yes Provider, Historical   multivitamin (ONE A DAY) tablet Take 1 Tab by mouth. 2/26/19 2/26/20 Yes Provider, Historical   amLODIPine (NORVASC) 10 mg tablet Take 10 mg by mouth daily. 11/5/18  Yes Provider, Historical   insulin glargine (LANTUS,BASAGLAR) 100 unit/mL (3 mL) inpn 25 Units by SubCUTAneous route daily. 10/4/18  Yes Provider, Historical   ipratropium (ATROVENT) 42 mcg (0.06 %) nasal spray 2 Sprays by Nasal route daily as needed. 6/29/18  Yes Provider, Historical   fluticasone-salmeterol (ADVAIR DISKUS) 250-50 mcg/dose diskus inhaler Take 1 Puff by inhalation every twelve (12) hours. Yes Provider, Historical   metoprolol tartrate (LOPRESSOR) 25 mg tablet Take 25 mg by mouth two (2) times a day. Yes Other, MD Walker   montelukast (SINGULAIR) 10 mg tablet Take 10 mg by mouth nightly.    Yes Other, MD Walker      Allergies   Allergen Reactions    Morphine Unknown (comments), Hives and Itching    Tramadol Other (comments) and Itching     \"funny feeling\"       Review of Systems   Constitutional: Positive for appetite change and fatigue. HENT: Negative. Eyes: Negative. Respiratory: Negative. Cardiovascular: Negative. Gastrointestinal: Positive for abdominal distention, abdominal pain, constipation, nausea and vomiting. Endocrine: Negative. Genitourinary: Negative. Musculoskeletal: Negative. Neurological: Negative. Psychiatric/Behavioral: Negative. Objective:     Patient Vitals for the past 8 hrs:   BP Temp Pulse Resp SpO2 Height Weight   19 1830 180/70 -- (!) 57 -- 98 % -- --   19 1745 168/55 -- (!) 56 -- 99 % -- --   19 1700 156/67 -- -- -- 100 % -- --   19 1530 145/65 -- 66 16 99 % -- --   19 1324 184/78 98.1 °F (36.7 °C) 62 16 100 % 5' 5\" (1.651 m) 87.1 kg (192 lb 0.3 oz)       Temp (24hrs), Av.1 °F (36.7 °C), Min:98.1 °F (36.7 °C), Max:98.1 °F (36.7 °C)      Physical Exam   Nursing note and vitals reviewed. Constitutional: She is oriented to person, place, and time. She appears well-developed and well-nourished. No distress. Pt frustrated appering ,  Pt with fiance . Obese    HENT:   Head: Normocephalic and atraumatic. Eyes: Conjunctivae are normal.   Neck: Neck supple. Cardiovascular: Normal rate and regular rhythm. Pulmonary/Chest: Effort normal and breath sounds normal. She has no wheezes. She has no rales. Abdominal:       Musculoskeletal: Normal range of motion. Neurological: She is alert and oriented to person, place, and time. Skin: Skin is warm and dry. Psychiatric: She has a normal mood and affect.  Her behavior is normal. Judgment and thought content normal.       Assessment:     Active Problems:    HTN (hypertension) (2017)      Type II diabetes mellitus ([address] (2017)      History of pancreatitis (10/30/2018)      Other partial intestinal obstruction ([address] (2019)      Bowel obstruction ([address] (2019)        Plan:     Ana Cristina Nguyen is a 66 y.o. 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 Malignant, but increasing pain and N/ V,  CBC and lytes, ok,, last colonoscopy 7 yr prev per pt, report not readily available. Pt with other medical issues with DM, HTN, smoking , obesity,  And recent surgery. Pt with increasing abd pain, anorexia, and N/v . Pt has not had FU with her surgeon in [address] other than post op admit. Review of oop note Dr Saúl Sylvester describes small bowel repair at time of mesh removal and no bowel on pathology. Pt sent to [facility] by PCP today       Of note pt with Hx  AAA stent earlier in 2019 at [place] [address], Dr Gal España, and pt on Plavix for that per pt, and no hx CAD, and 2017 Cardiac stress test with no ischemia and EF LV  52%. Discussed the risk of surgery including but not limited to below ,  and the risks of general anesthetic. The patient understands the risks; any and all questions were answered to the patient's satisfaction. Possible laparotomy , bowel resection , colostomy, other potential procedures, repairs and blood transfusion    Morbidity, mortality and op risks all increased given medical issues and plavix    Gastrograffin enema to assess extent colon stricture and need for resection or whether attempt GI prep ,  ? Colostomy or anastamosis, but if tight and no prep then unlikely primary anastamosis,. PT advised quit smoking an dl lose wt    IM Hospitalist consult med management    Consider GI consult but doubt colonoscopy  Of much benefit.     Time and management and face to face 85 min         Signed By: Paulette Mathews MD   ED [facility]    August 30, 2019

## 2019-08-31 NOTE — PROGRESS NOTES
1500--bedside report received from emmanuel younger pt resting in bed, talking on phone, no complaints at this time, call bell in reach family at bedside, assessment as noted    1700--pt assisted to bathroom, back to bed, has no complaints at this time, family at bedside    1858--pt crying, \"my stomach hurts so bad\", pt requesting oxycodone, (MAR), call bell in reach    2030--pt resting quietly in bed, call bell in reach    2200--reports \"I throw up my pills\", does not want any medication at this time    2330- OOB to bathroom 2 BMs    0330--am labs drawn per orders,CHG bath complete, 2 tylenol given for mild abd, pain, prn zofran  also given , up to bathroom for small BM  call bell in reach assessment as noted    0600--2nd CHG bath complete    0700--surgical and blood consent signed and on chart, pt sitting on side of bed, on phone call bell in reach    0715--bedside report given to chalino, pacu, awaiting transfer to OR    0725--pt to OR with OR staff    0725--bedside report given to emmanuel grant who is assuming care of pt

## 2019-09-01 LAB
CEA SERPL-MCNC: 0.5 NG/ML
GLUCOSE BLD STRIP.AUTO-MCNC: 128 MG/DL (ref 65–100)
GLUCOSE BLD STRIP.AUTO-MCNC: 145 MG/DL (ref 65–100)
GLUCOSE BLD STRIP.AUTO-MCNC: 178 MG/DL (ref 65–100)
GLUCOSE BLD STRIP.AUTO-MCNC: 188 MG/DL (ref 65–100)
GLUCOSE BLD STRIP.AUTO-MCNC: 238 MG/DL (ref 65–100)
GLUCOSE BLD STRIP.AUTO-MCNC: 241 MG/DL (ref 65–100)
SERVICE CMNT-IMP: ABNORMAL

## 2019-09-01 PROCEDURE — 74011250637 HC RX REV CODE- 250/637: Performed by: SURGERY

## 2019-09-01 PROCEDURE — 0DBB0ZZ EXCISION OF ILEUM, OPEN APPROACH: ICD-10-PCS | Performed by: SURGERY

## 2019-09-01 PROCEDURE — 77030034818 HC STPLR INT GIA COVD -C: Performed by: SURGERY

## 2019-09-01 PROCEDURE — 74011250636 HC RX REV CODE- 250/636: Performed by: NURSE ANESTHETIST, CERTIFIED REGISTERED

## 2019-09-01 PROCEDURE — 0DTF0ZZ RESECTION OF RIGHT LARGE INTESTINE, OPEN APPROACH: ICD-10-PCS | Performed by: SURGERY

## 2019-09-01 PROCEDURE — 77030008771 HC TU NG SALEM SUMP -A: Performed by: ANESTHESIOLOGY

## 2019-09-01 PROCEDURE — 77030033639 HC SHR ENDO COAG HARM 36 J&J -E: Performed by: SURGERY

## 2019-09-01 PROCEDURE — 74011250636 HC RX REV CODE- 250/636: Performed by: SURGERY

## 2019-09-01 PROCEDURE — 74011000250 HC RX REV CODE- 250

## 2019-09-01 PROCEDURE — 74011000258 HC RX REV CODE- 258: Performed by: EMERGENCY MEDICINE

## 2019-09-01 PROCEDURE — 74011000258 HC RX REV CODE- 258: Performed by: SURGERY

## 2019-09-01 PROCEDURE — 94762 N-INVAS EAR/PLS OXIMTRY CONT: CPT

## 2019-09-01 PROCEDURE — 77030002996 HC SUT SLK J&J -A: Performed by: SURGERY

## 2019-09-01 PROCEDURE — 74011250636 HC RX REV CODE- 250/636

## 2019-09-01 PROCEDURE — 77030034850: Performed by: SURGERY

## 2019-09-01 PROCEDURE — 77030018836 HC SOL IRR NACL ICUM -A: Performed by: SURGERY

## 2019-09-01 PROCEDURE — 74011000250 HC RX REV CODE- 250: Performed by: NURSE ANESTHETIST, CERTIFIED REGISTERED

## 2019-09-01 PROCEDURE — 77030015424 HC RELD STPLR TA COVD -B: Performed by: SURGERY

## 2019-09-01 PROCEDURE — 82378 CARCINOEMBRYONIC ANTIGEN: CPT

## 2019-09-01 PROCEDURE — 77030021678 HC GLIDESCP STAT DISP VERT -B: Performed by: ANESTHESIOLOGY

## 2019-09-01 PROCEDURE — 77030025240 HC RELD STPL GIA 2 COVD -C: Performed by: SURGERY

## 2019-09-01 PROCEDURE — 74011636637 HC RX REV CODE- 636/637: Performed by: SURGERY

## 2019-09-01 PROCEDURE — 77030011640 HC PAD GRND REM COVD -A: Performed by: SURGERY

## 2019-09-01 PROCEDURE — 65270000029 HC RM PRIVATE

## 2019-09-01 PROCEDURE — 76010000132 HC OR TIME 2.5 TO 3 HR: Performed by: SURGERY

## 2019-09-01 PROCEDURE — 74011250636 HC RX REV CODE- 250/636: Performed by: ANESTHESIOLOGY

## 2019-09-01 PROCEDURE — 77030008467 HC STPLR SKN COVD -B: Performed by: SURGERY

## 2019-09-01 PROCEDURE — 77030011267 HC ELECTRD BLD COVD -A: Performed by: SURGERY

## 2019-09-01 PROCEDURE — 76210000017 HC OR PH I REC 1.5 TO 2 HR: Performed by: SURGERY

## 2019-09-01 PROCEDURE — 74011000250 HC RX REV CODE- 250: Performed by: SURGERY

## 2019-09-01 PROCEDURE — 77030031139 HC SUT VCRL2 J&J -A: Performed by: SURGERY

## 2019-09-01 PROCEDURE — 77030020782 HC GWN BAIR PAWS FLX 3M -B

## 2019-09-01 PROCEDURE — 76060000036 HC ANESTHESIA 2.5 TO 3 HR: Performed by: SURGERY

## 2019-09-01 PROCEDURE — 88307 TISSUE EXAM BY PATHOLOGIST: CPT

## 2019-09-01 PROCEDURE — 36415 COLL VENOUS BLD VENIPUNCTURE: CPT

## 2019-09-01 PROCEDURE — 82962 GLUCOSE BLOOD TEST: CPT

## 2019-09-01 PROCEDURE — 77030008684 HC TU ET CUF COVD -B: Performed by: ANESTHESIOLOGY

## 2019-09-01 PROCEDURE — 77030019908 HC STETH ESOPH SIMS -A: Performed by: ANESTHESIOLOGY

## 2019-09-01 PROCEDURE — 74011000272 HC RX REV CODE- 272: Performed by: SURGERY

## 2019-09-01 RX ORDER — ONDANSETRON 2 MG/ML
INJECTION INTRAMUSCULAR; INTRAVENOUS AS NEEDED
Status: DISCONTINUED | OUTPATIENT
Start: 2019-09-01 | End: 2019-09-01 | Stop reason: HOSPADM

## 2019-09-01 RX ORDER — SUCCINYLCHOLINE CHLORIDE 20 MG/ML
INJECTION INTRAMUSCULAR; INTRAVENOUS AS NEEDED
Status: DISCONTINUED | OUTPATIENT
Start: 2019-09-01 | End: 2019-09-01 | Stop reason: HOSPADM

## 2019-09-01 RX ORDER — DEXTROSE, SODIUM CHLORIDE, AND POTASSIUM CHLORIDE 5; .9; .15 G/100ML; G/100ML; G/100ML
INJECTION INTRAVENOUS
Status: DISPENSED
Start: 2019-09-01 | End: 2019-09-02

## 2019-09-01 RX ORDER — LIDOCAINE HYDROCHLORIDE 20 MG/ML
INJECTION, SOLUTION EPIDURAL; INFILTRATION; INTRACAUDAL; PERINEURAL AS NEEDED
Status: DISCONTINUED | OUTPATIENT
Start: 2019-09-01 | End: 2019-09-01 | Stop reason: HOSPADM

## 2019-09-01 RX ORDER — SODIUM CHLORIDE 0.9 % (FLUSH) 0.9 %
5-40 SYRINGE (ML) INJECTION AS NEEDED
Status: DISCONTINUED | OUTPATIENT
Start: 2019-09-01 | End: 2019-09-01

## 2019-09-01 RX ORDER — GLYCOPYRROLATE 0.2 MG/ML
INJECTION INTRAMUSCULAR; INTRAVENOUS AS NEEDED
Status: DISCONTINUED | OUTPATIENT
Start: 2019-09-01 | End: 2019-09-01 | Stop reason: HOSPADM

## 2019-09-01 RX ORDER — NEOSTIGMINE METHYLSULFATE 1 MG/ML
INJECTION INTRAVENOUS AS NEEDED
Status: DISCONTINUED | OUTPATIENT
Start: 2019-09-01 | End: 2019-09-01 | Stop reason: HOSPADM

## 2019-09-01 RX ORDER — HYDROMORPHONE HCL/0.9% NACL/PF 0.5 MG/ML
PLASTIC BAG, INJECTION (ML) INTRAVENOUS CONTINUOUS
Status: DISCONTINUED | OUTPATIENT
Start: 2019-09-01 | End: 2019-09-04

## 2019-09-01 RX ORDER — LIDOCAINE HYDROCHLORIDE 10 MG/ML
0.1 INJECTION, SOLUTION EPIDURAL; INFILTRATION; INTRACAUDAL; PERINEURAL AS NEEDED
Status: DISCONTINUED | OUTPATIENT
Start: 2019-09-01 | End: 2019-09-01 | Stop reason: HOSPADM

## 2019-09-01 RX ORDER — FENTANYL CITRATE 50 UG/ML
25 INJECTION, SOLUTION INTRAMUSCULAR; INTRAVENOUS
Status: DISCONTINUED | OUTPATIENT
Start: 2019-09-01 | End: 2019-09-01

## 2019-09-01 RX ORDER — PHENYLEPHRINE HCL IN 0.9% NACL 0.4MG/10ML
SYRINGE (ML) INTRAVENOUS AS NEEDED
Status: DISCONTINUED | OUTPATIENT
Start: 2019-09-01 | End: 2019-09-01 | Stop reason: HOSPADM

## 2019-09-01 RX ORDER — DIPHENHYDRAMINE HYDROCHLORIDE 50 MG/ML
12.5 INJECTION, SOLUTION INTRAMUSCULAR; INTRAVENOUS AS NEEDED
Status: DISCONTINUED | OUTPATIENT
Start: 2019-09-01 | End: 2019-09-01

## 2019-09-01 RX ORDER — HYDROMORPHONE HYDROCHLORIDE 2 MG/ML
INJECTION, SOLUTION INTRAMUSCULAR; INTRAVENOUS; SUBCUTANEOUS AS NEEDED
Status: DISCONTINUED | OUTPATIENT
Start: 2019-09-01 | End: 2019-09-01 | Stop reason: HOSPADM

## 2019-09-01 RX ORDER — HYDROMORPHONE HCL/0.9% NACL/PF 0.5 MG/ML
PLASTIC BAG, INJECTION (ML) INTRAVENOUS
Status: COMPLETED
Start: 2019-09-01 | End: 2019-09-01

## 2019-09-01 RX ORDER — HYDROMORPHONE HYDROCHLORIDE 1 MG/ML
.2-.5 INJECTION, SOLUTION INTRAMUSCULAR; INTRAVENOUS; SUBCUTANEOUS
Status: DISCONTINUED | OUTPATIENT
Start: 2019-09-01 | End: 2019-09-01

## 2019-09-01 RX ORDER — ROCURONIUM BROMIDE 10 MG/ML
INJECTION, SOLUTION INTRAVENOUS AS NEEDED
Status: DISCONTINUED | OUTPATIENT
Start: 2019-09-01 | End: 2019-09-01 | Stop reason: HOSPADM

## 2019-09-01 RX ORDER — SODIUM CHLORIDE 0.9 % (FLUSH) 0.9 %
5-40 SYRINGE (ML) INJECTION EVERY 8 HOURS
Status: DISCONTINUED | OUTPATIENT
Start: 2019-09-01 | End: 2019-09-01

## 2019-09-01 RX ORDER — MIDAZOLAM HYDROCHLORIDE 1 MG/ML
0.5 INJECTION, SOLUTION INTRAMUSCULAR; INTRAVENOUS
Status: DISCONTINUED | OUTPATIENT
Start: 2019-09-01 | End: 2019-09-01

## 2019-09-01 RX ORDER — SODIUM CHLORIDE, SODIUM LACTATE, POTASSIUM CHLORIDE, CALCIUM CHLORIDE 600; 310; 30; 20 MG/100ML; MG/100ML; MG/100ML; MG/100ML
INJECTION, SOLUTION INTRAVENOUS
Status: DISCONTINUED | OUTPATIENT
Start: 2019-09-01 | End: 2019-09-01 | Stop reason: HOSPADM

## 2019-09-01 RX ORDER — ENOXAPARIN SODIUM 100 MG/ML
40 INJECTION SUBCUTANEOUS EVERY 24 HOURS
Status: DISCONTINUED | OUTPATIENT
Start: 2019-09-01 | End: 2019-09-07 | Stop reason: HOSPADM

## 2019-09-01 RX ORDER — ACETAMINOPHEN 10 MG/ML
1000 INJECTION, SOLUTION INTRAVENOUS
Status: ACTIVE | OUTPATIENT
Start: 2019-09-01 | End: 2019-09-02

## 2019-09-01 RX ORDER — FENTANYL CITRATE 50 UG/ML
50 INJECTION, SOLUTION INTRAMUSCULAR; INTRAVENOUS AS NEEDED
Status: COMPLETED | OUTPATIENT
Start: 2019-09-01 | End: 2019-09-01

## 2019-09-01 RX ORDER — FENTANYL CITRATE 50 UG/ML
INJECTION, SOLUTION INTRAMUSCULAR; INTRAVENOUS
Status: COMPLETED
Start: 2019-09-01 | End: 2019-09-01

## 2019-09-01 RX ORDER — ACETAMINOPHEN 10 MG/ML
1000 INJECTION, SOLUTION INTRAVENOUS ONCE
Status: COMPLETED | OUTPATIENT
Start: 2019-09-01 | End: 2019-09-01

## 2019-09-01 RX ORDER — PROPOFOL 10 MG/ML
INJECTION, EMULSION INTRAVENOUS AS NEEDED
Status: DISCONTINUED | OUTPATIENT
Start: 2019-09-01 | End: 2019-09-01 | Stop reason: HOSPADM

## 2019-09-01 RX ORDER — HYDROMORPHONE HCL IN 0.9% NACL 15 MG/30ML
PATIENT CONTROLLED ANALGESIA VIAL INTRAVENOUS CONTINUOUS
Status: DISCONTINUED | OUTPATIENT
Start: 2019-09-01 | End: 2019-09-01 | Stop reason: CLARIF

## 2019-09-01 RX ORDER — FENTANYL CITRATE 50 UG/ML
INJECTION, SOLUTION INTRAMUSCULAR; INTRAVENOUS AS NEEDED
Status: DISCONTINUED | OUTPATIENT
Start: 2019-09-01 | End: 2019-09-01 | Stop reason: HOSPADM

## 2019-09-01 RX ORDER — ACETAMINOPHEN 10 MG/ML
INJECTION, SOLUTION INTRAVENOUS
Status: DISPENSED
Start: 2019-09-01 | End: 2019-09-01

## 2019-09-01 RX ORDER — DEXTROSE, SODIUM CHLORIDE, AND POTASSIUM CHLORIDE 5; .9; .15 G/100ML; G/100ML; G/100ML
25 INJECTION INTRAVENOUS CONTINUOUS
Status: DISCONTINUED | OUTPATIENT
Start: 2019-09-01 | End: 2019-09-05

## 2019-09-01 RX ORDER — MIDAZOLAM HYDROCHLORIDE 1 MG/ML
INJECTION, SOLUTION INTRAMUSCULAR; INTRAVENOUS AS NEEDED
Status: DISCONTINUED | OUTPATIENT
Start: 2019-09-01 | End: 2019-09-01 | Stop reason: HOSPADM

## 2019-09-01 RX ADMIN — ROCURONIUM BROMIDE 10 MG: 10 INJECTION INTRAVENOUS at 08:54

## 2019-09-01 RX ADMIN — INSULIN LISPRO 3 UNITS: 100 INJECTION, SOLUTION INTRAVENOUS; SUBCUTANEOUS at 14:54

## 2019-09-01 RX ADMIN — ACETAMINOPHEN 1000 MG: 10 INJECTION, SOLUTION INTRAVENOUS at 11:30

## 2019-09-01 RX ADMIN — PIPERACILLIN SODIUM,TAZOBACTAM SODIUM 3.38 G: 3; .375 INJECTION, POWDER, FOR SOLUTION INTRAVENOUS at 03:58

## 2019-09-01 RX ADMIN — PHENYLEPHRINE HYDROCHLORIDE 25 MCG/MIN: 10 INJECTION INTRAVENOUS at 09:16

## 2019-09-01 RX ADMIN — Medication 10 ML: at 14:55

## 2019-09-01 RX ADMIN — ACETAMINOPHEN 650 MG: 325 TABLET ORAL at 03:54

## 2019-09-01 RX ADMIN — ENOXAPARIN SODIUM 40 MG: 40 INJECTION SUBCUTANEOUS at 23:01

## 2019-09-01 RX ADMIN — METOPROLOL TARTRATE 25 MG: 25 TABLET ORAL at 17:19

## 2019-09-01 RX ADMIN — Medication 10 ML: at 23:02

## 2019-09-01 RX ADMIN — MONTELUKAST 10 MG: 10 TABLET, FILM COATED ORAL at 23:01

## 2019-09-01 RX ADMIN — Medication: at 11:51

## 2019-09-01 RX ADMIN — FENTANYL CITRATE 25 MCG: 50 INJECTION, SOLUTION INTRAMUSCULAR; INTRAVENOUS at 11:23

## 2019-09-01 RX ADMIN — PIPERACILLIN SODIUM,TAZOBACTAM SODIUM 3.38 G: 3; .375 INJECTION, POWDER, FOR SOLUTION INTRAVENOUS at 23:00

## 2019-09-01 RX ADMIN — PROPOFOL 160 MG: 10 INJECTION, EMULSION INTRAVENOUS at 08:24

## 2019-09-01 RX ADMIN — Medication 40 MCG: at 08:30

## 2019-09-01 RX ADMIN — INSULIN LISPRO 3 UNITS: 100 INJECTION, SOLUTION INTRAVENOUS; SUBCUTANEOUS at 17:56

## 2019-09-01 RX ADMIN — SODIUM CHLORIDE, POTASSIUM CHLORIDE, SODIUM LACTATE AND CALCIUM CHLORIDE: 600; 310; 30; 20 INJECTION, SOLUTION INTRAVENOUS at 07:30

## 2019-09-01 RX ADMIN — MIDAZOLAM HYDROCHLORIDE 1 MG: 1 INJECTION INTRAMUSCULAR; INTRAVENOUS at 08:22

## 2019-09-01 RX ADMIN — FENTANYL CITRATE 25 MCG: 50 INJECTION, SOLUTION INTRAMUSCULAR; INTRAVENOUS at 07:55

## 2019-09-01 RX ADMIN — ONDANSETRON HYDROCHLORIDE 4 MG: 2 INJECTION, SOLUTION INTRAMUSCULAR; INTRAVENOUS at 10:25

## 2019-09-01 RX ADMIN — ROCURONIUM BROMIDE 5 MG: 10 INJECTION INTRAVENOUS at 08:24

## 2019-09-01 RX ADMIN — DEXTROSE MONOHYDRATE, SODIUM CHLORIDE, AND POTASSIUM CHLORIDE 125 ML/HR: 50; 9; 1.49 INJECTION, SOLUTION INTRAVENOUS at 12:47

## 2019-09-01 RX ADMIN — FENTANYL CITRATE 25 MCG: 50 INJECTION, SOLUTION INTRAMUSCULAR; INTRAVENOUS at 07:47

## 2019-09-01 RX ADMIN — SODIUM CHLORIDE 1000 ML: 900 INJECTION, SOLUTION INTRAVENOUS at 14:55

## 2019-09-01 RX ADMIN — Medication 40 MCG: at 08:45

## 2019-09-01 RX ADMIN — MIDAZOLAM HYDROCHLORIDE 1 MG: 1 INJECTION INTRAMUSCULAR; INTRAVENOUS at 08:16

## 2019-09-01 RX ADMIN — Medication 40 MCG: at 09:10

## 2019-09-01 RX ADMIN — FLUTICASONE FUROATE AND VILANTEROL TRIFENATATE 1 PUFF: 200; 25 POWDER RESPIRATORY (INHALATION) at 14:54

## 2019-09-01 RX ADMIN — Medication 10 ML: at 04:02

## 2019-09-01 RX ADMIN — FENTANYL CITRATE 25 MCG: 50 INJECTION, SOLUTION INTRAMUSCULAR; INTRAVENOUS at 11:48

## 2019-09-01 RX ADMIN — ONDANSETRON 4 MG: 2 INJECTION INTRAMUSCULAR; INTRAVENOUS at 03:54

## 2019-09-01 RX ADMIN — DEXTROSE MONOHYDRATE AND SODIUM CHLORIDE 100 ML/HR: 5; .9 INJECTION, SOLUTION INTRAVENOUS at 04:07

## 2019-09-01 RX ADMIN — NEOSTIGMINE METHYLSULFATE 3 MG: 1 INJECTION INTRAVENOUS at 10:48

## 2019-09-01 RX ADMIN — FENTANYL CITRATE 100 MCG: 50 INJECTION, SOLUTION INTRAMUSCULAR; INTRAVENOUS at 08:24

## 2019-09-01 RX ADMIN — LIDOCAINE HYDROCHLORIDE 100 MG: 20 INJECTION, SOLUTION INTRAVENOUS at 08:24

## 2019-09-01 RX ADMIN — ROCURONIUM BROMIDE 35 MG: 10 INJECTION INTRAVENOUS at 08:32

## 2019-09-01 RX ADMIN — SODIUM CHLORIDE, POTASSIUM CHLORIDE, SODIUM LACTATE AND CALCIUM CHLORIDE: 600; 310; 30; 20 INJECTION, SOLUTION INTRAVENOUS at 09:46

## 2019-09-01 RX ADMIN — GLYCOPYRROLATE 0.4 MG: 0.2 INJECTION, SOLUTION INTRAMUSCULAR; INTRAVENOUS at 10:48

## 2019-09-01 RX ADMIN — PIPERACILLIN SODIUM,TAZOBACTAM SODIUM 3.38 G: 3; .375 INJECTION, POWDER, FOR SOLUTION INTRAVENOUS at 17:14

## 2019-09-01 RX ADMIN — HYDROMORPHONE HYDROCHLORIDE 0.5 MG: 2 INJECTION, SOLUTION INTRAMUSCULAR; INTRAVENOUS; SUBCUTANEOUS at 11:03

## 2019-09-01 RX ADMIN — HYDROMORPHONE HYDROCHLORIDE 0.5 MG: 2 INJECTION, SOLUTION INTRAMUSCULAR; INTRAVENOUS; SUBCUTANEOUS at 08:54

## 2019-09-01 RX ADMIN — FENTANYL CITRATE 25 MCG: 50 INJECTION, SOLUTION INTRAMUSCULAR; INTRAVENOUS at 11:16

## 2019-09-01 RX ADMIN — SODIUM CHLORIDE 1000 ML: 900 INJECTION, SOLUTION INTRAVENOUS at 11:30

## 2019-09-01 RX ADMIN — PIPERACILLIN SODIUM,TAZOBACTAM SODIUM 3.38 G: 3; .375 INJECTION, POWDER, FOR SOLUTION INTRAVENOUS at 08:35

## 2019-09-01 RX ADMIN — SUCCINYLCHOLINE CHLORIDE 140 MG: 20 INJECTION, SOLUTION INTRAMUSCULAR; INTRAVENOUS at 08:24

## 2019-09-01 RX ADMIN — FENTANYL CITRATE 25 MCG: 50 INJECTION, SOLUTION INTRAMUSCULAR; INTRAVENOUS at 08:06

## 2019-09-01 RX ADMIN — Medication 80 MCG: at 09:15

## 2019-09-01 NOTE — PROGRESS NOTES
Hospitalist Consult Service Progress Note    NAME: Rich Duff   :  1955   MRN:  [id number]       Assessment / Plan:    :  Had surgery done still has NG tube in. If she can tolerate PO to restart Norvasc. Restart Lantus at lower dose from tomorrow hopefully if she starts clear liquids in the mean time monitor sugars and continue SSI. HTN- stable & acceptable  Considering NPO due to bowel obstruction, will cont to hold norvasc and place PRN nitropaste and IV hydralazine    DM2- FS ok  Considering NPO, Con to hold Lantus and cont SSI lispro Q 6 hrs for now      Asthma  Continue Px inhaler  Place PRN Nebs    PAD with history of left leg BiPAP (pt doesn't remember name of surgery but suspect fim-pop bipass)  Holding Plavix for possible surgery, resume once ok with surgery    AAA s/p Stents per patient    Hyperlipidemia  Holding statins due to Bowel obstruction    Smoker  Counseled for cesation  She claims she will be fine without nicotine patch    Code Status: Full  DVT Prophylaxis: Per Surgery  Recommended Disposition: Home w/Family eventually     Subjective:     Chief Complaint / Reason for Physician Visit: f/U SBO, DM, HTN  \"I am fine\". Discussed with RN events overnight. Review of Systems:  Symptom Y/N Comments  Symptom Y/N Comments   Fever/Chills n   Chest Pain n    Poor Appetite n   Edema n    Cough n   Abdominal Pain y    Sputum n   Joint Pain     SOB/BECKER    Pruritis/Rash     Nausea/vomit y improved  Tolerating PT/OT y    Diarrhea    Tolerating Diet  NPO   Constipation    Other       Could NOT obtain due to:      Objective:     VITALS:   Last 24hrs VS reviewed since prior progress note.  Most recent are:  Patient Vitals for the past 24 hrs:   Temp Pulse Resp BP SpO2   19 1523 98 °F (36.7 °C) 69 15 158/53 97 %   19 1448 98 °F (36.7 °C) 68 16 158/51 98 %   19 1359 97.9 °F (36.6 °C) 62 12 149/47 99 %   19 1329 98.4 °F (36.9 °C) 69 16 148/40 98 %   19 1300 98.3 °F (36.8 °C) 65 18 141/44 98 %   09/01/19 1245 -- 62 19 (!) 133/39 98 %   09/01/19 1230 -- 60 19 133/45 100 %   09/01/19 1227 -- 61 21 138/44 100 %   09/01/19 1215 -- (!) 58 22 123/40 100 %   09/01/19 1200 -- 62 17 138/49 100 %   09/01/19 1145 -- (!) 58 29 148/46 100 %   09/01/19 1130 -- (!) 57 24 114/76 100 %   09/01/19 1120 -- (!) 55 20 (!) 154/35 100 %   09/01/19 1115 -- (!) 54 21 149/48 100 %   09/01/19 1110 -- (!) 54 21 113/75 100 %   09/01/19 1105 98.9 °F (37.2 °C) (!) 55 14 101/41 100 %   09/01/19 1103 98.9 °F (37.2 °C) (!) 53 18 (!) 67/46 100 %   09/01/19 0809 -- (!) 55 14 -- 99 %   09/01/19 0800 -- (!) 55 15 159/45 100 %   09/01/19 0755 -- (!) 57 16 150/40 99 %   09/01/19 0750 -- (!) 58 13 154/50 99 %   09/01/19 0747 -- (!) 57 14 160/55 100 %   09/01/19 0726 98.3 °F (36.8 °C) 63 15 147/53 100 %   09/01/19 0413 98.3 °F (36.8 °C) 70 18 142/59 96 %   08/31/19 2233 98.4 °F (36.9 °C) 72 18 140/43 95 %   08/31/19 1950 97.6 °F (36.4 °C) 65 18 157/69 93 %   08/31/19 1750 98 °F (36.7 °C) 60 18 144/56 100 %       Intake/Output Summary (Last 24 hours) at 9/1/2019 1556  Last data filed at 9/1/2019 1300  Gross per 24 hour   Intake 6258.75 ml   Output 205 ml   Net 6053.75 ml        PHYSICAL EXAM:  General: WD, WN. Alert, cooperative, no acute distress    EENT:  EOMI. Anicteric sclerae. MMM  Resp:  CTA bilaterally, no wheezing or rales. No accessory muscle use  CV:  Regular  rhythm,  No edema  GI:  Soft, Non distended, Non tender.  +Bowel sounds  Neurologic:  Alert and oriented X 3, normal speech,   Psych:   Good insight. Not anxious nor agitated  Skin:  No rashes.   No jaundice    Reviewed most current lab test results and cultures  YES  Reviewed most current radiology test results   YES  Review and summation of old records today    NO  Reviewed patient's current orders and MAR    YES  PMH/SH reviewed - no change compared to H&P  ________________________________________________________________________  Care Plan discussed with:    Comments   Patient x    Family  x    RN x    Care Manager     Consultant                        Multidiciplinary team rounds were held today with , nursing, pharmacist and clinical coordinator. Patient's plan of care was discussed; medications were reviewed and discharge planning was addressed. ________________________________________________________________________  Total NON critical care TIME:  36   Minutes    Total CRITICAL CARE TIME Spent:   Minutes non procedure based      Comments   >50% of visit spent in counseling and coordination of care     ________________________________________________________________________  Darlyn Bob MD     Procedures: see electronic medical records for all procedures/Xrays and details which were not copied into this note but were reviewed prior to creation of Plan. LABS:  I reviewed today's most current labs and imaging studies.   Pertinent labs include:  Recent Labs     08/31/19  0539 08/30/19  1333   WBC 9.4 10.7   HGB 10.8* 11.9   HCT 33.9* 38.6    363     Recent Labs     08/31/19  0539 08/30/19  1453    142   K 4.0 4.0   * 109*   CO2 24 25   * 123*   BUN 8 12   CREA 0.79 1.00   CA 8.9 9.7   ALB 2.9* 3.3*   TBILI 0.4 0.3   SGOT 14* 16   ALT 16 18       Signed: Darlyn Bob MD

## 2019-09-01 NOTE — PERIOP NOTES
8749 Dr. Isaias Herman in to see patient
Handoff Report from Operating Room to PACU    Report received from BETTY Kiran RN and Kristie Cristina CRNA regarding Em Curtis. Surgeon(s):  Audra Branham MD  And Procedure(s) (LRB):  EXPLORATORY LAPAROTOMY, EXTENSIVE SMALL BOWEL ADHESIOLYSIS, ILEOCOLONIC ANASTAMOSIS, TRANSVERSE AND RIGHT COLECTOMY, AND  APPENDECTOMY (N/A)  confirmed   with allergies, drains and dressings discussed. Anesthesia type, drugs, patient history, complications, estimated blood loss, vital signs, intake and output, and last pain medication, lines, reversal medications and temperature were reviewed. Initial BP 60's, CRNA restarted Nate infusion and provided pain medication. Patient is constantly moaning and complaining of pain in Abdomen and lower back.
TRANSFER - IN REPORT:    Verbal report received from [id number] Ronda Moreno RN(name) on Dumike Melina  being received from 2340(unit) for ordered procedure      Report consisted of patients Situation, Background, Assessment and   Recommendations(SBAR). Information from the following report(s) SBAR, Kardex, ED Summary, OR Summary, Procedure Summary, Intake/Output, MAR, Accordion, Recent Results, Med Rec Status, Cardiac Rhythm No Telemetry, Alarm Parameters , Pre Procedure Checklist, Procedure Verification and Quality Measures was reviewed with the receiving nurse. Opportunity for questions and clarification was provided. Assessment completed upon patients arrival to unit and care assumed.
TRANSFER - IN REPORT:    Verbal report received from Sherry Miller 1137 on Rita Iqbal  being received from 2119 (unit) for ordered procedure      Report consisted of patients Situation, Background, Assessment and   Recommendations(SBAR). Information from the following report(s) SBAR, Procedure Summary, Intake/Output, MAR, Recent Results and Cardiac Rhythm NSR was reviewed with the receiving nurse. Opportunity for questions and clarification was provided. Assessment completed upon patients arrival to unit and care assumed.
clear to auscultation bilaterally/respirations non-labored/breath sounds equal

## 2019-09-01 NOTE — PROGRESS NOTES
General Surgery End of Shift Nursing Note    Bedside shift change report given to Libertad Patino (oncoming nurse) by Nazario Mauro (offgoing nurse). Report included the following information SBAR, Kardex, OR Summary, Intake/Output, MAR, Recent Results and Cardiac Rhythm NSR. Shift worked:   7 am to 7 pm   Summary of shift:    Pt had surgery today. VSS recorded. Insulin given for elevated blood glucose levels. Pt given 1 L bolus while on the unit (post surgery). Mcclain output: 300 cc. Issues for physician to address:   none     Number times ambulated in hallway past shift: 0    Number of times OOB to chair past shift: 0    Pain Management:  Current medication: dilaudid PCA  Patient states pain is manageable on current pain medication: YES    GI:    Current diet:  DIET NPO Except Meds  DIET NPO With Ice Chips, Except Meds    Tolerating current diet: YES  Passing flatus: NO  Last Bowel Movement: today before surgery       Respiratory:    Incentive Spirometer at bedside: YES  Patient instructed on use: YES    Patient Safety:    Falls Score: 4  Bed Alarm On? No  Sitter?  No    Oval Rushing

## 2019-09-01 NOTE — ANESTHESIA POSTPROCEDURE EVALUATION
Procedure(s):  EXPLORATORY LAPAROTOMY, EXTENSIVE SMALL BOWEL ADHESIOLYSIS, ILEOCOLONIC ANASTAMOSIS, TRANSVERSE AND RIGHT COLECTOMY, AND  APPENDECTOMY. general    Anesthesia Post Evaluation        Patient location during evaluation: PACU  Note status: Adequate. Level of consciousness: responsive to verbal stimuli and sleepy but conscious  Pain management: satisfactory to patient  Airway patency: patent  Anesthetic complications: no  Cardiovascular status: acceptable  Respiratory status: acceptable  Hydration status: acceptable  Comments: +Post-Anesthesia Evaluation and Assessment    Patient: Renee Ng MRN: [id number]  SSN: [id number]   YOB: 1955  Age: 66 y.o. Sex: female      Cardiovascular Function/Vital Signs    BP (!) 133/39 (BP 1 Location: Right arm, BP Patient Position: At rest)   Pulse 62   Temp 36.8 °C (98.3 °F)   Resp 19   Ht 5' 5\" (1.651 m)   Wt 87.1 kg (192 lb 0.3 oz)   SpO2 98%   BMI 31.95 kg/m²     Patient is status post Procedure(s):  EXPLORATORY LAPAROTOMY, EXTENSIVE SMALL BOWEL ADHESIOLYSIS, ILEOCOLONIC ANASTAMOSIS, TRANSVERSE AND RIGHT COLECTOMY, AND  APPENDECTOMY. Nausea/Vomiting: Controlled. Postoperative hydration reviewed and adequate. Pain:  Pain Scale 1: FLACC (09/01/19 1245)  Pain Intensity 1: 0 (09/01/19 1245)   Managed. Neurological Status:   Neuro (WDL): Exceptions to WDL (09/01/19 1103)   At baseline. Mental Status and Level of Consciousness: Arousable. Pulmonary Status:   O2 Device: Room air (09/01/19 1245)   Adequate oxygenation and airway patent. Complications related to anesthesia: None    Post-anesthesia assessment completed. No concerns.     Signed By: Jacobo Nicolas MD    9/1/2019  Post anesthesia nausea and vomiting:  controlled      Vitals Value Taken Time   /44 9/1/2019  1:00 PM   Temp 36.8 °C (98.3 °F) 9/1/2019  1:00 PM   Pulse 67 9/1/2019  1:10 PM   Resp 19 9/1/2019  1:10 PM   SpO2 97 % 9/1/2019  1:09 PM   Vitals shown include unvalidated device data.

## 2019-09-01 NOTE — PROGRESS NOTES
Xrays yesterday better, good BMS and pt concurs surgery today as outlined worsening stricture colon,  Hopefully anastmosis  , pt seen at 7 am today in pt room

## 2019-09-01 NOTE — PROGRESS NOTES
1110 Patient arrived from OR hypotensive and Nate resumed by CRNA. She is moaning and guarding her abdomen when aroused with slight stimulation, touch, or noise. Unable to provide pain medications at this time secondary to labile BP. Dr. Della Bingham and Dr. Manuela Modi are aware. Urine output during the case was only noted at 30 ml's. Currently minimal output in kinsey at this time. Discussed urine output with Dr. Della Bingham and Dr. Manuela Modi. Order received for NS bolus. Attempted to start second IV but failed x2. Ice pack placed on abdomen and encouraged patient to squeeze and brace abdomen when coughing. 1145 Still arousing and moaning. Reviewed current prn pain medications with Dr. Della Bingham. Orders received from Dr. Della Bingham for pain management. Gunner Denise 54 and bill Lang. Allowed time for questions and answers. 1245 TRANSFER - OUT REPORT:    Verbal report given to Tiffanie AGUILA(name) on Alin Apple  being transferred to [facility](unit) for ordered procedure       Report consisted of patients Situation, Background, Assessment and   Recommendations(SBAR). Information from the following report(s) SBAR, Kardex, ED Summary, OR Summary, Procedure Summary, Intake/Output, MAR, Accordion, Recent Results, Med Rec Status, Cardiac Rhythm NSR, Alarm Parameters , Pre Procedure Checklist, Procedure Verification and Quality Measures was reviewed with the receiving nurse. Opportunity for questions and clarification was provided.       Patient transported with:   Monitor  Registered Nurse

## 2019-09-01 NOTE — BRIEF OP NOTE
BRIEF OPERATIVE NOTE    Date of Procedure: 9/1/2019   Preoperative Diagnosis: BOWEL OBSTRUCTION  Postoperative Diagnosis: TRANSVERSE COLON MASS   Stricture  Benign or malignant, with extensive pelvic adhesions,   Procedure(s):  EXPLORATORY LAPAROTOMY, EXTENSIVE SMALL BOWEL ADHESIOLYSIS, ILEOCOLONIC ANASTAMOSIS, TRANSVERSE AND RIGHT COLECTOMY, AND  APPENDECTOMYv and separate resection 16 cm terminal ileum sec to devascularization at [facility]) and Role:     Chandu Das MD - Primary         Surgical Assistant: Sarai Redding    Surgical Staff:  Circ-1: Angelina Guaman RN  Scrub Tech-1: Nima Arana Asst-1: Zohra Bernard Staff: Floridalma Ford RN; Shon BULL  Event Time In Time Out   Incision Start 09/01/2019 0854    Incision Close 09/01/2019 1053      Anesthesia: General   Estimated Blood Loss: 150 ml   Specimens:   ID Type Source Tests Collected by Time Destination   1 : PROXIMAL TRANSVERSE AND RIGHT COLON, SMALL BOWEL(TERMINAL ILEUM); APPENDIX Preservative Colon  Tavon Leyva MD 9/1/2019 6323 Pathology      Findings: stricture , benign or malignant of transverse colon at prev surgery site 2 weeks prev at [address] [place] [address]. Complications: none  Implants: * No implants in log *    Op not Dict.  Doc #  : [id number]

## 2019-09-02 LAB
ANION GAP SERPL CALC-SCNC: 7 MMOL/L (ref 5–15)
BUN SERPL-MCNC: 7 MG/DL (ref 6–20)
BUN/CREAT SERPL: 6 (ref 12–20)
CALCIUM SERPL-MCNC: 8.1 MG/DL (ref 8.5–10.1)
CHLORIDE SERPL-SCNC: 117 MMOL/L (ref 97–108)
CO2 SERPL-SCNC: 22 MMOL/L (ref 21–32)
CREAT SERPL-MCNC: 1.11 MG/DL (ref 0.55–1.02)
ERYTHROCYTE [DISTWIDTH] IN BLOOD BY AUTOMATED COUNT: 18.6 % (ref 11.5–14.5)
GLUCOSE BLD STRIP.AUTO-MCNC: 215 MG/DL (ref 65–100)
GLUCOSE BLD STRIP.AUTO-MCNC: 221 MG/DL (ref 65–100)
GLUCOSE BLD STRIP.AUTO-MCNC: 271 MG/DL (ref 65–100)
GLUCOSE SERPL-MCNC: 233 MG/DL (ref 65–100)
HCT VFR BLD AUTO: 27.3 % (ref 35–47)
HGB BLD-MCNC: 8.3 G/DL (ref 11.5–16)
MCH RBC QN AUTO: 23.9 PG (ref 26–34)
MCHC RBC AUTO-ENTMCNC: 30.4 G/DL (ref 30–36.5)
MCV RBC AUTO: 78.7 FL (ref 80–99)
NRBC # BLD: 0 K/UL (ref 0–0.01)
NRBC BLD-RTO: 0 PER 100 WBC
PLATELET # BLD AUTO: 272 K/UL (ref 150–400)
PMV BLD AUTO: 11.9 FL (ref 8.9–12.9)
POTASSIUM SERPL-SCNC: 4.3 MMOL/L (ref 3.5–5.1)
RBC # BLD AUTO: 3.47 M/UL (ref 3.8–5.2)
SERVICE CMNT-IMP: ABNORMAL
SODIUM SERPL-SCNC: 146 MMOL/L (ref 136–145)
WBC # BLD AUTO: 12 K/UL (ref 3.6–11)

## 2019-09-02 PROCEDURE — 74011000258 HC RX REV CODE- 258: Performed by: SURGERY

## 2019-09-02 PROCEDURE — 36415 COLL VENOUS BLD VENIPUNCTURE: CPT

## 2019-09-02 PROCEDURE — 74011250637 HC RX REV CODE- 250/637: Performed by: SURGERY

## 2019-09-02 PROCEDURE — 80048 BASIC METABOLIC PNL TOTAL CA: CPT

## 2019-09-02 PROCEDURE — 65270000029 HC RM PRIVATE

## 2019-09-02 PROCEDURE — 74011636637 HC RX REV CODE- 636/637: Performed by: SURGERY

## 2019-09-02 PROCEDURE — 74011250636 HC RX REV CODE- 250/636: Performed by: SURGERY

## 2019-09-02 PROCEDURE — 82962 GLUCOSE BLOOD TEST: CPT

## 2019-09-02 PROCEDURE — 94760 N-INVAS EAR/PLS OXIMETRY 1: CPT

## 2019-09-02 PROCEDURE — 85027 COMPLETE CBC AUTOMATED: CPT

## 2019-09-02 RX ORDER — KETOROLAC TROMETHAMINE 30 MG/ML
30 INJECTION, SOLUTION INTRAMUSCULAR; INTRAVENOUS
Status: DISPENSED | OUTPATIENT
Start: 2019-09-02 | End: 2019-09-05

## 2019-09-02 RX ORDER — INSULIN GLARGINE 100 [IU]/ML
15 INJECTION, SOLUTION SUBCUTANEOUS
Status: DISCONTINUED | OUTPATIENT
Start: 2019-09-02 | End: 2019-09-05

## 2019-09-02 RX ADMIN — METOPROLOL TARTRATE 25 MG: 25 TABLET ORAL at 18:18

## 2019-09-02 RX ADMIN — Medication 10 ML: at 06:17

## 2019-09-02 RX ADMIN — KETOROLAC TROMETHAMINE 30 MG: 30 INJECTION, SOLUTION INTRAMUSCULAR at 21:30

## 2019-09-02 RX ADMIN — FLUTICASONE FUROATE AND VILANTEROL TRIFENATATE 1 PUFF: 200; 25 POWDER RESPIRATORY (INHALATION) at 09:19

## 2019-09-02 RX ADMIN — INSULIN LISPRO 3 UNITS: 100 INJECTION, SOLUTION INTRAVENOUS; SUBCUTANEOUS at 12:07

## 2019-09-02 RX ADMIN — KETOROLAC TROMETHAMINE 30 MG: 30 INJECTION, SOLUTION INTRAMUSCULAR at 12:11

## 2019-09-02 RX ADMIN — PIPERACILLIN SODIUM,TAZOBACTAM SODIUM 3.38 G: 3; .375 INJECTION, POWDER, FOR SOLUTION INTRAVENOUS at 16:01

## 2019-09-02 RX ADMIN — ENOXAPARIN SODIUM 40 MG: 40 INJECTION SUBCUTANEOUS at 21:31

## 2019-09-02 RX ADMIN — Medication 10 ML: at 21:31

## 2019-09-02 RX ADMIN — HYDROMORPHONE HYDROCHLORIDE 0.5 MG: 1 INJECTION, SOLUTION INTRAMUSCULAR; INTRAVENOUS; SUBCUTANEOUS at 08:52

## 2019-09-02 RX ADMIN — AMLODIPINE BESYLATE 10 MG: 5 TABLET ORAL at 09:20

## 2019-09-02 RX ADMIN — PIPERACILLIN SODIUM,TAZOBACTAM SODIUM 3.38 G: 3; .375 INJECTION, POWDER, FOR SOLUTION INTRAVENOUS at 08:45

## 2019-09-02 RX ADMIN — MONTELUKAST 10 MG: 10 TABLET, FILM COATED ORAL at 21:32

## 2019-09-02 RX ADMIN — DEXTROSE MONOHYDRATE, SODIUM CHLORIDE, AND POTASSIUM CHLORIDE 125 ML/HR: 50; 9; 1.49 INJECTION, SOLUTION INTRAVENOUS at 08:46

## 2019-09-02 RX ADMIN — DEXTROSE MONOHYDRATE, SODIUM CHLORIDE, AND POTASSIUM CHLORIDE 125 ML/HR: 50; 9; 1.49 INJECTION, SOLUTION INTRAVENOUS at 00:37

## 2019-09-02 RX ADMIN — METOPROLOL TARTRATE 25 MG: 25 TABLET ORAL at 09:20

## 2019-09-02 RX ADMIN — INSULIN LISPRO 3 UNITS: 100 INJECTION, SOLUTION INTRAVENOUS; SUBCUTANEOUS at 18:18

## 2019-09-02 RX ADMIN — INSULIN LISPRO 5 UNITS: 100 INJECTION, SOLUTION INTRAVENOUS; SUBCUTANEOUS at 06:16

## 2019-09-02 NOTE — PROGRESS NOTES
Bedside and Verbal shift change report given to [address] (oncoming nurse) by Sloane Ross (offgoing nurse). Report included the following information SBAR, Kardex and Intake/Output. 1900 Pt resting in bed, complains of abd pain, educated on use of PCA, PCA verified with Cornelius RN, also educated use of incentive spirometer. Family at bedside, verbalized understanding    2300 Pt states pain is 8/10, again educated pt on importance of using PCA    0400 Pt asleep in bed, Sinus tach on monitor, pt states pain is a little better, educated again on PCA button. AM labs drawn    Bedside and Verbal shift change report given to Alex Cota (oncoming nurse) by Joaquín Ramirez RN (offgoing nurse). Report included the following information SBAR, Kardex and Intake/Output.

## 2019-09-02 NOTE — PROGRESS NOTES
Admit Date: 2019    POD 1 Day Post-Op    Procedure:  Procedure(s):  EXPLORATORY LAPAROTOMY, EXTENSIVE SMALL BOWEL ADHESIOLYSIS, ILEOCOLONIC ANASTAMOSIS, TRANSVERSE AND RIGHT COLECTOMY, AND  APPENDECTOMY    Subjective:     Patient nl post op pain , but pt sleepy,  Min NGT output and pt eating ice, UO ok,  Labs noted, HGB anemia nl post op           Review of Systems   Constitutional: Negative. Eyes: Negative. Respiratory: Negative. Gastrointestinal: Positive for abdominal pain. Neurological:        Pt drowsy,  DC Basal on PCA   Psychiatric/Behavioral: Negative. Objective:     Blood pressure 150/55, pulse (!) 106, temperature 100 °F (37.8 °C), resp. rate 20, height 5' 5\" (1.651 m), weight 87.1 kg (192 lb 0.3 oz), SpO2 97 %. Temp (24hrs), Av.5 °F (36.9 °C), Min:97.9 °F (36.6 °C), Max:100 °F (37.8 °C)          Physical Exam   Nursing note and vitals reviewed. Constitutional: She is oriented to person, place, and time. She appears well-developed and well-nourished. Cardiovascular: Regular rhythm. Pulmonary/Chest: Effort normal and breath sounds normal.   Abdominal:   Soft, nl post op tenderness, expected skin SQ drain effluent. , no complcation    Neurological: She is alert and oriented to person, place, and time. Psychiatric: She has a normal mood and affect.  Her behavior is normal.   Drowsy           Labs:   Recent Results (from the past 24 hour(s))   GLUCOSE, POC    Collection Time: 19  1:47 PM   Result Value Ref Range    Glucose (POC) 238 (H) 65 - 100 mg/dL    Performed by [address] [place] [address], POC    Collection Time: 19  5:42 PM   Result Value Ref Range    Glucose (POC) 241 (H) 65 - 100 mg/dL    Performed by Nikolai Doss (PCT)    GLUCOSE, POC    Collection Time: 19 10:53 PM   Result Value Ref Range    Glucose (POC) 188 (H) 65 - 100 mg/dL    Performed by Dayron Mary PCT    CBC W/O DIFF    Collection Time: 19  5:10 AM   Result Value Ref Range WBC 12.0 (H) 3.6 - 11.0 K/uL    RBC 3.47 (L) 3.80 - 5.20 M/uL    HGB 8.3 (L) 11.5 - 16.0 g/dL    HCT 27.3 (L) 35.0 - 47.0 %    MCV 78.7 (L) 80.0 - 99.0 FL    MCH 23.9 (L) 26.0 - 34.0 PG    MCHC 30.4 30.0 - 36.5 g/dL    RDW 18.6 (H) 11.5 - 14.5 %    PLATELET 757 083 - 566 K/uL    MPV 11.9 8.9 - 12.9 FL    NRBC 0.0 0  WBC    ABSOLUTE NRBC 0.00 0.00 - 9.75 K/uL   METABOLIC PANEL, BASIC    Collection Time: 09/02/19  5:10 AM   Result Value Ref Range    Sodium 146 (H) 136 - 145 mmol/L    Potassium 4.3 3.5 - 5.1 mmol/L    Chloride 117 (H) 97 - 108 mmol/L    CO2 22 21 - 32 mmol/L    Anion gap 7 5 - 15 mmol/L    Glucose 233 (H) 65 - 100 mg/dL    BUN 7 6 - 20 MG/DL    Creatinine 1.11 (H) 0.55 - 1.02 MG/DL    BUN/Creatinine ratio 6 (L) 12 - 20      GFR est AA >60 >60 ml/min/1.73m2    GFR est non-AA 50 (L) >60 ml/min/1.73m2    Calcium 8.1 (L) 8.5 - 10.1 MG/DL   GLUCOSE, POC    Collection Time: 09/02/19  5:50 AM   Result Value Ref Range    Glucose (POC) 271 (H) 65 - 100 mg/dL    Performed by Althea MOODY    GLUCOSE, POC    Collection Time: 09/02/19 11:13 AM   Result Value Ref Range    Glucose (POC) 215 (H) 65 - 100 mg/dL    Performed by Cordelia Lees*        Data Review images and reports reviewed    Assessment:     Active Problems:    HTN (hypertension) (8/7/2017)      Type II diabetes mellitus ([facility] [address] (8/7/2017)      History of pancreatitis (10/30/2018)      Other partial intestinal obstruction ([address] (8/30/2019)      Bowel obstruction (HCC) (8/30/2019)        Plan/Recommendations/Medical Decision Making:     Continue present treatment     Clamp and ?  DC NGT if min residual    DC [place]    Path PND and CEA nl   Decrease IVG and FU Hgb,    PO Diet if NGT out  DC Basal on PCA    OOB ambulate     No complications noted     Javier Kennedy MD , [facility]  ED [facility]

## 2019-09-02 NOTE — PROGRESS NOTES
Hospitalist Consult Service Progress Note    NAME: Bishnu Jones   :  1955   MRN:  [id number]       Assessment / Plan:    :  Had surgery done still has NG tube in. If she can tolerate PO to restart Norvasc. Restart Lantus at lower dose from tomorrow hopefully if she starts clear liquids in the mean time monitor sugars and continue SSI.  :  Has NG tube in on ice chips. Restart Lantus at lower dose advance with diet. Continue on oral anti hypertensive medications as she takes on home. HTN- stable & acceptable  Restarted on home meds    DM2-         Asthma  Continue Px inhaler  Place PRN Nebs    PAD with history of left leg BiPAP (pt doesn't remember name of surgery but suspect fim-pop bipass)  Holding Plavix for possible surgery, resume once ok with surgery    AAA s/p Stents per patient    Hyperlipidemia  Holding statins due to Bowel obstruction    Smoker  Counseled for cesation  She claims she will be fine without nicotine patch    Code Status: Full  DVT Prophylaxis: Per Surgery  Recommended Disposition: Home w/Family eventually     Subjective:     Chief Complaint / Reason for Physician Visit: f/U SBO, DM, HTN  \"I am fine\". Discussed with RN events overnight. Review of Systems:  Symptom Y/N Comments  Symptom Y/N Comments   Fever/Chills n   Chest Pain n    Poor Appetite n   Edema n    Cough n   Abdominal Pain y    Sputum n   Joint Pain     SOB/BECKER    Pruritis/Rash     Nausea/vomit y improved  Tolerating PT/OT y    Diarrhea    Tolerating Diet  NPO   Constipation    Other       Could NOT obtain due to:      Objective:     VITALS:   Last 24hrs VS reviewed since prior progress note.  Most recent are:  Patient Vitals for the past 24 hrs:   Temp Pulse Resp BP SpO2   19 0736 99.8 °F (37.7 °C) (!) 119 20 150/59 97 %   19 0403 98.6 °F (37 °C) (!) 111 18 151/65 90 %   19 0000 98.2 °F (36.8 °C) 100 15 143/61 99 %   19 1939 98 °F (36.7 °C) 91 16 150/41 100 %   19 1719 -- 80 -- 150/60 --   09/01/19 1523 98 °F (36.7 °C) 69 15 158/53 97 %   09/01/19 1448 98 °F (36.7 °C) 68 16 158/51 98 %   09/01/19 1359 97.9 °F (36.6 °C) 62 12 149/47 99 %   09/01/19 1329 98.4 °F (36.9 °C) 69 16 148/40 98 %   09/01/19 1300 98.3 °F (36.8 °C) 65 18 141/44 98 %   09/01/19 1245 -- 62 19 (!) 133/39 98 %   09/01/19 1230 -- 60 19 133/45 100 %   09/01/19 1227 -- 61 21 138/44 100 %   09/01/19 1215 -- (!) 58 22 123/40 100 %   09/01/19 1200 -- 62 17 138/49 100 %   09/01/19 1145 -- (!) 58 29 148/46 100 %   09/01/19 1130 -- (!) 57 24 114/76 100 %   09/01/19 1120 -- (!) 55 20 (!) 154/35 100 %   09/01/19 1115 -- (!) 54 21 149/48 100 %   09/01/19 1110 -- (!) 54 21 113/75 100 %   09/01/19 1105 98.9 °F (37.2 °C) (!) 55 14 101/41 100 %   09/01/19 1103 98.9 °F (37.2 °C) (!) 53 18 (!) 67/46 100 %       Intake/Output Summary (Last 24 hours) at 9/2/2019 0827  Last data filed at 9/2/2019 0618  Gross per 24 hour   Intake 81365.34 ml   Output 1230 ml   Net 9923.34 ml        PHYSICAL EXAM:  General: WD, WN. Alert, cooperative, no acute distress    EENT:  EOMI. Anicteric sclerae. MMM  Resp:  CTA bilaterally, no wheezing or rales. No accessory muscle use  CV:  Regular  rhythm,  No edema  GI:  Soft, Non distended, Non tender.  +Bowel sounds  Neurologic:  Alert and oriented X 3, normal speech,   Psych:   Good insight. Not anxious nor agitated  Skin:  No rashes. No jaundice    Reviewed most current lab test results and cultures  YES  Reviewed most current radiology test results   YES  Review and summation of old records today    NO  Reviewed patient's current orders and MAR    YES  PMH/SH reviewed - no change compared to H&P  ________________________________________________________________________  Care Plan discussed with:    Comments   Patient x    Family  x    RN x    Care Manager     Consultant                        Multidiciplinary team rounds were held today with , nursing, pharmacist and clinical coordinator. Patient's plan of care was discussed; medications were reviewed and discharge planning was addressed. ________________________________________________________________________  Total NON critical care TIME:  36   Minutes    Total CRITICAL CARE TIME Spent:   Minutes non procedure based      Comments   >50% of visit spent in counseling and coordination of care     ________________________________________________________________________  Eneida Abernathy MD     Procedures: see electronic medical records for all procedures/Xrays and details which were not copied into this note but were reviewed prior to creation of Plan. LABS:  I reviewed today's most current labs and imaging studies.   Pertinent labs include:  Recent Labs     09/02/19  0510 08/31/19  0539 08/30/19  1333   WBC 12.0* 9.4 10.7   HGB 8.3* 10.8* 11.9   HCT 27.3* 33.9* 38.6    344 363     Recent Labs     09/02/19  0510 08/31/19  0539 08/30/19  1453   * 142 142   K 4.3 4.0 4.0   * 111* 109*   CO2 22 24 25   * 143* 123*   BUN 7 8 12   CREA 1.11* 0.79 1.00   CA 8.1* 8.9 9.7   ALB  --  2.9* 3.3*   TBILI  --  0.4 0.3   SGOT  --  14* 16   ALT  --  16 18       Signed: Eneida Abernathy MD

## 2019-09-02 NOTE — OP NOTES
[facility]  OPERATIVE REPORT    Name:  Jeannette York  MR#:  [id number]  :  1955  ACCOUNT #:  [de-identified]  DATE OF SERVICE:  2019      PREOPERATIVE DIAGNOSES:  1. Worsening transverse colonic stricture benign versus malignant. 2.  Status post abdominal wall mesh removal and bowel repair by Dr. Ryan Aguilar of [facility] mid 2019.  3.  Worsening acute on chronic large bowel obstruction. POSTOPERATIVE DIAGNOSES:  1. Benign versus malignant transverse colon stricture with worsening acute on chronic obstruction. 2.  Soft tissue subcutaneous seroma. 3.  Dense pelvic small bowel adhesions from previous hysterectomy. PROCEDURE PERFORMED:  1. Exploratory laparotomy. 2.  Extended right hemicolectomy into the mid-transverse colon resection with ileocolonic anastomosis. 3.  Extensive pelvic adhesiolysis of small bowel over 60 minutes. 4.  Additional resection of terminal ileum approximately 15-18 cm due to vascular compromise during adhesiolysis. 5.  Removal of previous abdominal wall scar. SURGEON:  Ezekiel Yen MD.    Juhi Ramires . ANESTHESIA:  General.    COMPLICATIONS:  non. SPECIMENS REMOVED:  Right and transvers colon and appendix and ileum  . IMPLANTS:  None . ESTIMATED BLOOD LOSS:  150 ml . INDICATIONS:  The patient is a 65-year-old female who had a mesh hernia repair apparently many years ago at [facility] and two weeks ago had mesh removal and by review of the operative note, small bowel repair or resection, although no bowel seen on the pathology specimen and the patient presented to [facility] two weeks postoperatively with large bowel obstruction, progressive stricturing that has progressively worsened over the last six weeks on serial CT scans.   I was able to gently cleanse the colon with light Miralax prep and clear liquids and oral antibiotic prep and proceeded with the resection of the stricture and due to potential malignancy, extended colectomy to the right. Pathology is pending. DESCRIPTION:  After appropriate site verification and the patient was already under general anesthesia. The patient was already on IV Zosyn given the inflammatory process in the subcutaneous tissue and around possible fistula to the large bowel and recent surgery, and the abdomen was prepped and draped with ChloraPrep and site verification, consent reviewed with the operative team.  A midline incision was made to include the recent surgery at [facility] and resect the skin and subcutaneous tissue and there was a clear seroma pocket approximately golf ball sized in the subcutaneous tissue down to the fascia. The fascia was opened above the previous surgery site and peritoneum entered and careful dissection was made around the previous surgical site and 4 cm area of induration that was adherent to the omentum and large bowel. The area of previous surgery was not on the small bowel. It was at the large bowel area so if there was any previous bowel repaired, it must have been large bowel. This previously adherent fascia was left on the transverse colon while further dissection was undertaken and the peritoneum entered. Using LigaSure hemosealing agent, the omentum was divided distally, where it was adherent to the pelvic side wall and this allowed reflection of the transverse colon and omentum superiorly and if we can clear that the area of previous surgery two weeks ago in [facility] was actually adherent to the mid-transverse colon in the area of the stricture. Unclear as to whether this had been a previous fistula or not. The small bowel was entirely intact; however, the small bowel in the pelvis was densely adherent in the pelvis.   It was unclear to whether this was a malignant or merely inflammatory stricture and it needed resection and the transverse colon was divided approximately 8-10 cm distal to the strictured area using a GOYO-75 regular wire stapler and the terminal ileum divided including the appendix removal at the cecum. Sequentially using the LigaSure device, the mesentery to the right colon and transverse colon was resected taking care to leave the duodenum, stomach, and right ureter intact with no injury, but to do a complete extended right colectomy and transverse colectomy in case this was a malignant stricture. In addition, due to the previous surgery and stricturing, it was going to be technically very difficult to mobilize the right colon over the distal transverse colon anyway without compromise. Once the specimen was removed, it was sent for permanent pathology. In order to mobilize the small bowel, terminal ileal up into the transverse colon area, I had to do extensive adhesiolysis and multiple loops of small bowel in the pelvis taking over an hour between sharp and some cautery dissection and at the completion of this, there was significant mesenteric defects and I was concerned about the vascular supply to the small bowel, even though the small bowel looked okay, I was concerned this could be comprised, and therefore, an additional 15-18 cm of small bowel, terminal ileum was resected using GOYO 75 stapler and sent separately. LigaSure was used to divide the mesentery. The sigmoid colon and rest of the pelvis was all intact without injury. The terminal ileal stump remaining with good vascular supply was anastomosed to the transverse colon distally using side-to-side functional end-to-end technique with the GOYO 75 regular wire stapler, side-to-side anastomosis and the remaining defect closed using the TL 90 stapler and the TL-90 stapler line reinforced with interrupted 3-0 silk sutures. This left a widely patent anastomosis. No evidence of leak and good vascular supply and the mesentery defect closed using running and interrupted 3-0 silk suture.   The bowel was in complete orientation with no evidence of twist or volvulus and no evidence of injury or other anastomoses or previous surgery. This was traced back to the duodenal ligament of Treitz. The abdomen was irrigated with saline and evacuated. No active bleeding and instrument and lap pad counts were correct and the fascia was then closed using running double stranded #1 PDS suture from above and below meeting in the middle and tied and 0 Vicryl interrupted PDS suture for reinforcement. Subcutaneous tissue irrigated with saline and Penrose drain left in the subcutaneous tissue exiting superiorly and inferiorly, secured using silk to the skin edge, exiting at superior and inferior edges and subcutaneous tissue and deep dermis closed with 2-0 Vicryl interrupted deep dermal suture and skin staples and dressed with gauze. The patient was awakened and taken to the recovery room in stable condition.       Kilo Hoff MD [id number]  D:  09/01/2019 12:02  T:  09/01/2019 22:14  JOB #:  [id number]  CC:  MD Aleisha Gannon MD Harrison Quarto, MD

## 2019-09-02 NOTE — PROGRESS NOTES
General Surgery End of Shift Nursing Note    Bedside shift change report given to Saranya Rivera (oncoming nurse) by Marge Gibbs (offgoing nurse). Report included the following information SBAR. Shift worked:   7a-7p   Summary of shift:    Patient having some pain, well controlled. Catheter out at 11am, voided before 6pm. NGT clamped at 10am, residual at 4 hours was 50ml, NGT removed. Issues for physician to address:   none     Number times ambulated in hallway past shift: 0    Number of times OOB to chair past shift: 0    Pain Management:  Current medication: Dilaudid PCA, Toradol  Patient states pain is manageable on current pain medication: YES    GI:    Current diet:  DIET FULL LIQUID    Tolerating current diet: YES  Passing flatus: YES  Last Bowel Movement: yesterday       Respiratory:    Incentive Spirometer at bedside: YES  Patient instructed on use: YES    Patient Safety:    Falls Score: 4  Bed Alarm On? No  Sitter?  No    Jesi French

## 2019-09-02 NOTE — PROGRESS NOTES
Pts IV leaking, attempts x4 by three RNs unsuccessful. Call to ED at 18:50 to assist with [address] and ED tech. ED Charge will send up a tech asap. Currently delay in IV abx and PCA.

## 2019-09-03 LAB
ERYTHROCYTE [DISTWIDTH] IN BLOOD BY AUTOMATED COUNT: 18.6 % (ref 11.5–14.5)
GLUCOSE BLD STRIP.AUTO-MCNC: 202 MG/DL (ref 65–100)
GLUCOSE BLD STRIP.AUTO-MCNC: 203 MG/DL (ref 65–100)
GLUCOSE BLD STRIP.AUTO-MCNC: 208 MG/DL (ref 65–100)
GLUCOSE BLD STRIP.AUTO-MCNC: 209 MG/DL (ref 65–100)
GLUCOSE BLD STRIP.AUTO-MCNC: 257 MG/DL (ref 65–100)
HCT VFR BLD AUTO: 23.8 % (ref 35–47)
HGB BLD-MCNC: 7.6 G/DL (ref 11.5–16)
MCH RBC QN AUTO: 24.5 PG (ref 26–34)
MCHC RBC AUTO-ENTMCNC: 31.9 G/DL (ref 30–36.5)
MCV RBC AUTO: 76.8 FL (ref 80–99)
NRBC # BLD: 0 K/UL (ref 0–0.01)
NRBC BLD-RTO: 0 PER 100 WBC
PLATELET # BLD AUTO: 216 K/UL (ref 150–400)
PMV BLD AUTO: 12.1 FL (ref 8.9–12.9)
RBC # BLD AUTO: 3.1 M/UL (ref 3.8–5.2)
SERVICE CMNT-IMP: ABNORMAL
WBC # BLD AUTO: 18.8 K/UL (ref 3.6–11)

## 2019-09-03 PROCEDURE — 74011636637 HC RX REV CODE- 636/637: Performed by: SURGERY

## 2019-09-03 PROCEDURE — 36415 COLL VENOUS BLD VENIPUNCTURE: CPT

## 2019-09-03 PROCEDURE — 74011000258 HC RX REV CODE- 258: Performed by: SURGERY

## 2019-09-03 PROCEDURE — 36430 TRANSFUSION BLD/BLD COMPNT: CPT

## 2019-09-03 PROCEDURE — 65270000029 HC RM PRIVATE

## 2019-09-03 PROCEDURE — P9016 RBC LEUKOCYTES REDUCED: HCPCS

## 2019-09-03 PROCEDURE — 74011250636 HC RX REV CODE- 250/636: Performed by: SURGERY

## 2019-09-03 PROCEDURE — 82962 GLUCOSE BLOOD TEST: CPT

## 2019-09-03 PROCEDURE — 30233N1 TRANSFUSION OF NONAUTOLOGOUS RED BLOOD CELLS INTO PERIPHERAL VEIN, PERCUTANEOUS APPROACH: ICD-10-PCS | Performed by: SURGERY

## 2019-09-03 PROCEDURE — 85027 COMPLETE CBC AUTOMATED: CPT

## 2019-09-03 PROCEDURE — 74011250637 HC RX REV CODE- 250/637: Performed by: SURGERY

## 2019-09-03 PROCEDURE — 74011636637 HC RX REV CODE- 636/637: Performed by: HOSPITALIST

## 2019-09-03 RX ADMIN — INSULIN LISPRO 3 UNITS: 100 INJECTION, SOLUTION INTRAVENOUS; SUBCUTANEOUS at 00:47

## 2019-09-03 RX ADMIN — INSULIN LISPRO 5 UNITS: 100 INJECTION, SOLUTION INTRAVENOUS; SUBCUTANEOUS at 07:28

## 2019-09-03 RX ADMIN — INSULIN LISPRO 3 UNITS: 100 INJECTION, SOLUTION INTRAVENOUS; SUBCUTANEOUS at 18:38

## 2019-09-03 RX ADMIN — INSULIN GLARGINE 15 UNITS: 100 INJECTION, SOLUTION SUBCUTANEOUS at 22:59

## 2019-09-03 RX ADMIN — KETOROLAC TROMETHAMINE 30 MG: 30 INJECTION, SOLUTION INTRAMUSCULAR at 23:00

## 2019-09-03 RX ADMIN — Medication 10 ML: at 23:00

## 2019-09-03 RX ADMIN — PIPERACILLIN SODIUM,TAZOBACTAM SODIUM 3.38 G: 3; .375 INJECTION, POWDER, FOR SOLUTION INTRAVENOUS at 08:25

## 2019-09-03 RX ADMIN — ACETAMINOPHEN 650 MG: 325 TABLET ORAL at 11:43

## 2019-09-03 RX ADMIN — INSULIN LISPRO 3 UNITS: 100 INJECTION, SOLUTION INTRAVENOUS; SUBCUTANEOUS at 23:00

## 2019-09-03 RX ADMIN — METOPROLOL TARTRATE 25 MG: 25 TABLET ORAL at 18:27

## 2019-09-03 RX ADMIN — INSULIN GLARGINE 15 UNITS: 100 INJECTION, SOLUTION SUBCUTANEOUS at 00:47

## 2019-09-03 RX ADMIN — KETOROLAC TROMETHAMINE 30 MG: 30 INJECTION, SOLUTION INTRAMUSCULAR at 08:33

## 2019-09-03 RX ADMIN — INSULIN LISPRO 3 UNITS: 100 INJECTION, SOLUTION INTRAVENOUS; SUBCUTANEOUS at 11:38

## 2019-09-03 RX ADMIN — DEXTROSE MONOHYDRATE, SODIUM CHLORIDE, AND POTASSIUM CHLORIDE 75 ML/HR: 50; 9; 1.49 INJECTION, SOLUTION INTRAVENOUS at 18:27

## 2019-09-03 RX ADMIN — MONTELUKAST 10 MG: 10 TABLET, FILM COATED ORAL at 23:00

## 2019-09-03 RX ADMIN — AMLODIPINE BESYLATE 10 MG: 5 TABLET ORAL at 08:26

## 2019-09-03 RX ADMIN — Medication 10 ML: at 07:28

## 2019-09-03 RX ADMIN — PIPERACILLIN SODIUM,TAZOBACTAM SODIUM 3.38 G: 3; .375 INJECTION, POWDER, FOR SOLUTION INTRAVENOUS at 00:36

## 2019-09-03 RX ADMIN — METOPROLOL TARTRATE 25 MG: 25 TABLET ORAL at 08:26

## 2019-09-03 RX ADMIN — DEXTROSE MONOHYDRATE, SODIUM CHLORIDE, AND POTASSIUM CHLORIDE 75 ML/HR: 50; 9; 1.49 INJECTION, SOLUTION INTRAVENOUS at 03:45

## 2019-09-03 RX ADMIN — ENOXAPARIN SODIUM 40 MG: 40 INJECTION SUBCUTANEOUS at 22:59

## 2019-09-03 RX ADMIN — KETOROLAC TROMETHAMINE 30 MG: 30 INJECTION, SOLUTION INTRAMUSCULAR at 15:58

## 2019-09-03 RX ADMIN — FLUTICASONE FUROATE AND VILANTEROL TRIFENATATE 1 PUFF: 200; 25 POWDER RESPIRATORY (INHALATION) at 08:25

## 2019-09-03 NOTE — PROGRESS NOTES
SURGERY PROGRESS NOTE      Admit Date: 2019    POD 2 Days Post-Op    Procedure: Procedure(s):  EXPLORATORY LAPAROTOMY, EXTENSIVE SMALL BOWEL ADHESIOLYSIS, ILEOCOLONIC ANASTAMOSIS, TRANSVERSE AND RIGHT COLECTOMY, AND  APPENDECTOMY      Subjective:     Patient complains of abdominal soreness and fatigue. Denies nausea. No flatus yet      Objective:     Visit Vitals  /44   Pulse 96   Temp 99.1 °F (37.3 °C)   Resp 20   Ht 5' 5\" (1.651 m)   Wt 87.1 kg (192 lb 0.3 oz)   SpO2 90%   BMI 31.95 kg/m²        Temp (24hrs), Av.1 °F (37.8 °C), Min:99.1 °F (37.3 °C), Max:101.4 °F (38.6 °C)      No intake/output data recorded.  1901 -  0700  In: 61 [P.O.:60]  Out: 1525 [Urine:1075]    Physical Exam:    General:  alert, cooperative, no distress, appears stated age   Abdomen: soft, bowel sounds active, non-tender   Incision:   healing well, no drainage, no erythema, no hernia, no seroma, no swelling, no dehiscence, incision well approximated. Penrose removed            Lab Results   Component Value Date/Time    WBC 18.8 (H) 2019 03:46 AM    HGB 7.6 (L) 2019 03:46 AM    HCT 23.8 (L) 2019 03:46 AM    PLATELET 278  03:46 AM    MCV 76.8 (L) 2019 03:46 AM     Lab Results   Component Value Date/Time    GFR est non-AA 50 (L) 2019 05:10 AM    GFR est AA >60 2019 05:10 AM    Creatinine 1.11 (H) 2019 05:10 AM    BUN 7 2019 05:10 AM    Sodium 146 (H) 2019 05:10 AM    Potassium 4.3 2019 05:10 AM    Chloride 117 (H) 2019 05:10 AM    CO2 22 2019 05:10 AM    Magnesium 1.8 2019 10:16 AM       Assessment:     Active Problems:    HTN (hypertension) (2017)      Type II diabetes mellitus ([facility] [address] (2017)      History of pancreatitis (10/30/2018)      Other partial intestinal obstruction ([facility] [address] (2019)      Bowel obstruction ([address] (2019)    POD#2 -  Stable. Hemoglobin drifted down to 7.6.   Given low grade tachycardia and report acute blood loose during surgery, I recommend 1 unit PRBC for acute blood los anemia that is symptomatic    Plan:       Continue present treatment   Transfuse one unit

## 2019-09-03 NOTE — PROGRESS NOTES
General Surgery End of Shift Nursing Note    Bedside shift change report given to Michelle Stubbs RN (oncoming nurse) by Timmy Perea (offgoing nurse). Report included the following information SBAR, Kardex, Intake/Output, MAR and Recent Results. Shift worked:   7P-7A   Summary of shift:    No acute events overnight. Surgical dressing CDI. OOB with SBA. Pain well managed with PCA. Tolerating diet. No n/v. Issues for physician to address:        Number times ambulated in hallway past shift: 0    Number of times OOB to chair past shift: 2    Pain Management:  Current medication: Dilaudid PCA  Patient states pain is manageable on current pain medication: YES    GI:    Current diet:  DIET FULL LIQUID    Tolerating current diet: YES  Passing flatus: YES    Respiratory:    Incentive Spirometer at bedside: YES  Patient instructed on use: YES    Patient Safety:    Falls Score: 2  Bed Alarm On? Refused  Sitter?  No    Uzma Lynch

## 2019-09-03 NOTE — DIABETES MGMT
[facility]    [facility] Progress Note    Recommendations/ Comments: If appropriate, please consider increasing patient's lantus to 20 units daily considering high blood sugars and patients home dose of 25 units. Current hospital DM medication: 15 units of lantus once daily, insulin lispro correctional with meals and hs. Chart reviewed on Yancy Neal. Patient is a 66 y.o. female with type 2 diabetes on 25 units of lantus at home. A1c:   Lab Results   Component Value Date/Time    Hemoglobin A1c 6.5 (H) 08/18/2019 05:45 PM    Hemoglobin A1c 6.9 (H) 07/08/2019 04:44 PM       Recent Glucose Results:   Lab Results   Component Value Date/Time    GLUCPOC 209 (H) 09/03/2019 11:29 AM    GLUCPOC 257 (H) 09/03/2019 07:22 AM    GLUCPOC 202 (H) 09/03/2019 12:01 AM        Lab Results   Component Value Date/Time    Creatinine 1.11 (H) 09/02/2019 05:10 AM     Estimated Creatinine Clearance: 58 mL/min (A) (based on SCr of 1.11 mg/dL (H)). Active Orders   Diet    DIET FULL LIQUID        PO intake:   Patient Vitals for the past 72 hrs:   % Diet Eaten   09/02/19 0917 0 %       Will continue to follow as needed. Thank you.       Time spent: 8 minutes    Steven Pereira

## 2019-09-03 NOTE — PROGRESS NOTES
Hospitalist Progress Note    NAME: Judith Veliz   :  1955   MRN:  [id number]       Assessment / Plan:    30.0 - 39.9 Obese / Body mass index is 31.95 kg/m². Small bowel obstruction, resolving  -General surgery following, input is appreciated  -Status post exploratory laparotomy with d right hemicolectomy  -NG tube out  -Advance p.o. as tolerated  -Serial abdominal examination    History of hypertension, stable  Continue amlodipine as tolerated    History of asthma, not in exacerbation  -Intermittent  -Continue albuterol as needed and inhaled corticosteroids as tolerated    History of AAA  -Hemodynamically stable  -Status post stent placement as the patient reported    History of hyperlipidemia  -Statin was held because of the bowel obstruction, will resume once p.o. tolerated    History of tobacco use  -Patient was encouraged to quit        Code status: Full  Prophylaxis: Lovenox  Recommended Disposition: SAH/Rehab     Subjective:       Patient was seen and examined this morning. No acute events overnight. She denies any shortness of breath or wheezing. Discussed with RN events overnight. Review of Systems:  Symptom Y/N Comments  Symptom Y/N Comments   Fever/Chills N   Chest Pain N    Poor Appetite N   Edema N    Cough N   Abdominal Pain N    Sputum N   Joint Pain     SOB/BECKER N   Pruritis/Rash     Nausea/vomit    Tolerating PT/OT     Diarrhea    Tolerating Diet     Constipation    Other         Objective:     VITALS:   Last 24hrs VS reviewed since prior progress note.  Most recent are:  Patient Vitals for the past 24 hrs:   Temp Pulse Resp BP SpO2   19 0745 99.8 °F (37.7 °C) (!) 110 18 149/56 90 %   19 0259 99.9 °F (37.7 °C) 100 18 144/52 96 %   19 2144 -- -- -- -- 91 %   19 1935 (!) 101.4 °F (38.6 °C) (!) 103 25 163/79 (!) 86 %   19 1749 -- (!) 106 22 146/49 94 %   19 1452 99.8 °F (37.7 °C) 95 20 143/53 97 %   19 1109 100 °F (37.8 °C) (!) 106 20 150/55 97 %       Intake/Output Summary (Last 24 hours) at 9/3/2019 0807  Last data filed at 9/2/2019 0917  Gross per 24 hour   Intake 60 ml   Output 800 ml   Net -740 ml        PHYSICAL EXAM:  General: WD, WN. Alert, cooperative, no acute distress    EENT:  EOMI. Anicteric sclerae. MMM  Resp:  CTA bilaterally, no wheezing or rales. No accessory muscle use  CV:  Regular  rhythm,  No edema  GI:  Soft, Non distended, Non tender.  +Bowel sounds  Neurologic:  Alert and oriented X 3, normal speech,   Psych:   Good insight. Not anxious nor agitated  Skin:  No rashes. No jaundice    Reviewed most current lab test results and cultures  YES  Reviewed most current radiology test results   YES  Review and summation of old records today    NO  Reviewed patient's current orders and MAR    YES  PMH/SH reviewed - no change compared to H&P  ________________________________________________________________________  Care Plan discussed with:    Comments   Patient X    Family  X    RN X    Care Manager X    Consultant  X                      Multidiciplinary team rounds were held today with , nursing, pharmacist and clinical coordinator. Patient's plan of care was discussed; medications were reviewed and discharge planning was addressed. ________________________________________________________________________  Total NON critical care TIME:  35   Minutes    Total CRITICAL CARE TIME Spent:   Minutes non procedure based      Comments   >50% of visit spent in counseling and coordination of care     ________________________________________________________________________  Alisha Canseco MD     Procedures: see electronic medical records for all procedures/Xrays and details which were not copied into this note but were reviewed prior to creation of Plan. LABS:  I reviewed today's most current labs and imaging studies.   Pertinent labs include:  Recent Labs     09/03/19  0346 09/02/19  0510   WBC 18.8* 12.0*   HGB 7.6* 8.3*   HCT 23.8* 27.3*    272     Recent Labs     09/02/19  0510   *   K 4.3   *   CO2 22   *   BUN 7   CREA 1.11*   CA 8.1*       Signed: Kevin Miranda MD

## 2019-09-03 NOTE — PROGRESS NOTES
Patient has orders for a blood transfusion; requested PICC team to place 2nd IV access for transfusion. Patient required IV placement under ultrasound guidance on 9/2.

## 2019-09-04 LAB
ABO + RH BLD: NORMAL
BLD PROD TYP BPU: NORMAL
BLOOD GROUP ANTIBODIES SERPL: NORMAL
BPU ID: NORMAL
CROSSMATCH RESULT,%XM: NORMAL
ERYTHROCYTE [DISTWIDTH] IN BLOOD BY AUTOMATED COUNT: 18.6 % (ref 11.5–14.5)
GLUCOSE BLD STRIP.AUTO-MCNC: 159 MG/DL (ref 65–100)
GLUCOSE BLD STRIP.AUTO-MCNC: 179 MG/DL (ref 65–100)
GLUCOSE BLD STRIP.AUTO-MCNC: 190 MG/DL (ref 65–100)
GLUCOSE BLD STRIP.AUTO-MCNC: 208 MG/DL (ref 65–100)
HCT VFR BLD AUTO: 30.5 % (ref 35–47)
HGB BLD-MCNC: 9.3 G/DL (ref 11.5–16)
MCH RBC QN AUTO: 23.8 PG (ref 26–34)
MCHC RBC AUTO-ENTMCNC: 30.5 G/DL (ref 30–36.5)
MCV RBC AUTO: 78.2 FL (ref 80–99)
NRBC # BLD: 0 K/UL (ref 0–0.01)
NRBC BLD-RTO: 0 PER 100 WBC
PLATELET # BLD AUTO: 172 K/UL (ref 150–400)
PMV BLD AUTO: 12.8 FL (ref 8.9–12.9)
RBC # BLD AUTO: 3.9 M/UL (ref 3.8–5.2)
SERVICE CMNT-IMP: ABNORMAL
SPECIMEN EXP DATE BLD: NORMAL
STATUS OF UNIT,%ST: NORMAL
UNIT DIVISION, %UDIV: 0
WBC # BLD AUTO: 17.3 K/UL (ref 3.6–11)

## 2019-09-04 PROCEDURE — 36415 COLL VENOUS BLD VENIPUNCTURE: CPT

## 2019-09-04 PROCEDURE — 82962 GLUCOSE BLOOD TEST: CPT

## 2019-09-04 PROCEDURE — 65270000029 HC RM PRIVATE

## 2019-09-04 PROCEDURE — 74011250637 HC RX REV CODE- 250/637: Performed by: SURGERY

## 2019-09-04 PROCEDURE — 97116 GAIT TRAINING THERAPY: CPT | Performed by: PHYSICAL THERAPIST

## 2019-09-04 PROCEDURE — 74011636637 HC RX REV CODE- 636/637: Performed by: SURGERY

## 2019-09-04 PROCEDURE — 51700 IRRIGATION OF BLADDER: CPT

## 2019-09-04 PROCEDURE — 74011250636 HC RX REV CODE- 250/636: Performed by: SURGERY

## 2019-09-04 PROCEDURE — 74011250637 HC RX REV CODE- 250/637: Performed by: NURSE PRACTITIONER

## 2019-09-04 PROCEDURE — 74011636637 HC RX REV CODE- 636/637: Performed by: HOSPITALIST

## 2019-09-04 PROCEDURE — 85027 COMPLETE CBC AUTOMATED: CPT

## 2019-09-04 PROCEDURE — 97161 PT EVAL LOW COMPLEX 20 MIN: CPT | Performed by: PHYSICAL THERAPIST

## 2019-09-04 RX ORDER — INSULIN LISPRO 100 [IU]/ML
INJECTION, SOLUTION INTRAVENOUS; SUBCUTANEOUS
Status: DISCONTINUED | OUTPATIENT
Start: 2019-09-04 | End: 2019-09-07 | Stop reason: HOSPADM

## 2019-09-04 RX ORDER — HYDROMORPHONE HYDROCHLORIDE 1 MG/ML
0.5 INJECTION, SOLUTION INTRAMUSCULAR; INTRAVENOUS; SUBCUTANEOUS
Status: DISCONTINUED | OUTPATIENT
Start: 2019-09-04 | End: 2019-09-07 | Stop reason: HOSPADM

## 2019-09-04 RX ORDER — GUAIFENESIN 600 MG/1
600 TABLET, EXTENDED RELEASE ORAL EVERY 12 HOURS
Status: DISCONTINUED | OUTPATIENT
Start: 2019-09-04 | End: 2019-09-07 | Stop reason: HOSPADM

## 2019-09-04 RX ADMIN — Medication 10 ML: at 11:32

## 2019-09-04 RX ADMIN — AMLODIPINE BESYLATE 10 MG: 5 TABLET ORAL at 08:48

## 2019-09-04 RX ADMIN — DEXTROSE MONOHYDRATE, SODIUM CHLORIDE, AND POTASSIUM CHLORIDE 75 ML/HR: 50; 9; 1.49 INJECTION, SOLUTION INTRAVENOUS at 08:49

## 2019-09-04 RX ADMIN — FLUTICASONE FUROATE AND VILANTEROL TRIFENATATE 1 PUFF: 200; 25 POWDER RESPIRATORY (INHALATION) at 08:53

## 2019-09-04 RX ADMIN — METOPROLOL TARTRATE 25 MG: 25 TABLET ORAL at 08:48

## 2019-09-04 RX ADMIN — OXYCODONE HYDROCHLORIDE 10 MG: 5 TABLET ORAL at 08:48

## 2019-09-04 RX ADMIN — Medication 10 ML: at 22:33

## 2019-09-04 RX ADMIN — Medication 10 ML: at 14:04

## 2019-09-04 RX ADMIN — MONTELUKAST 10 MG: 10 TABLET, FILM COATED ORAL at 22:32

## 2019-09-04 RX ADMIN — GUAIFENESIN 600 MG: 600 TABLET, EXTENDED RELEASE ORAL at 22:32

## 2019-09-04 RX ADMIN — INSULIN LISPRO 2 UNITS: 100 INJECTION, SOLUTION INTRAVENOUS; SUBCUTANEOUS at 17:14

## 2019-09-04 RX ADMIN — ENOXAPARIN SODIUM 40 MG: 40 INJECTION SUBCUTANEOUS at 22:32

## 2019-09-04 RX ADMIN — GUAIFENESIN 600 MG: 600 TABLET, EXTENDED RELEASE ORAL at 14:09

## 2019-09-04 RX ADMIN — DEXTROSE MONOHYDRATE, SODIUM CHLORIDE, AND POTASSIUM CHLORIDE 75 ML/HR: 50; 9; 1.49 INJECTION, SOLUTION INTRAVENOUS at 22:51

## 2019-09-04 RX ADMIN — INSULIN LISPRO 3 UNITS: 100 INJECTION, SOLUTION INTRAVENOUS; SUBCUTANEOUS at 13:30

## 2019-09-04 RX ADMIN — OXYCODONE HYDROCHLORIDE 10 MG: 5 TABLET ORAL at 13:30

## 2019-09-04 RX ADMIN — METOPROLOL TARTRATE 25 MG: 25 TABLET ORAL at 17:14

## 2019-09-04 RX ADMIN — HYDROMORPHONE HYDROCHLORIDE 0.5 MG: 1 INJECTION, SOLUTION INTRAMUSCULAR; INTRAVENOUS; SUBCUTANEOUS at 16:15

## 2019-09-04 RX ADMIN — OXYCODONE HYDROCHLORIDE 10 MG: 5 TABLET ORAL at 18:38

## 2019-09-04 RX ADMIN — OXYCODONE HYDROCHLORIDE 10 MG: 5 TABLET ORAL at 22:32

## 2019-09-04 RX ADMIN — INSULIN GLARGINE 15 UNITS: 100 INJECTION, SOLUTION SUBCUTANEOUS at 22:32

## 2019-09-04 RX ADMIN — INSULIN LISPRO 2 UNITS: 100 INJECTION, SOLUTION INTRAVENOUS; SUBCUTANEOUS at 05:10

## 2019-09-04 NOTE — PROGRESS NOTES
SURGERY PROGRESS NOTE      Admit Date: 2019    POD 3 Days Post-Op    Procedure: Procedure(s):  EXPLORATORY LAPAROTOMY, EXTENSIVE SMALL BOWEL ADHESIOLYSIS, ILEOCOLONIC ANASTAMOSIS, TRANSVERSE AND RIGHT COLECTOMY, AND  APPENDECTOMY      Subjective:     Patient states she feels bloated. Denies nausea. Tolerating PO. Passing flatus and has had 3 liquid bowel movements      Objective:     Visit Vitals  /55   Pulse 97   Temp 99.4 °F (37.4 °C)   Resp 18   Ht 5' 5\" (1.651 m)   Wt 87.1 kg (192 lb 0.3 oz)   SpO2 92% Comment: room air   BMI 31.95 kg/m²        Temp (24hrs), Av.7 °F (37.1 °C), Min:98.2 °F (36.8 °C), Max:99.4 °F (37.4 °C)      No intake/output data recorded.    1901 -  0700  In: 1258.8 [I.V.:937.5]  Out: 0     Physical Exam:    General:  alert, cooperative, no distress, appears stated age   Abdomen: soft, bowel sounds hypoactive, non-tender   Incision:   healing well, no drainage, no erythema, no hernia, no seroma, no swelling, no dehiscence, incision well approximated             Assessment:     Active Problems:    HTN (hypertension) (2017)      Type II diabetes mellitus ([facility] [address] (2017)      History of pancreatitis (10/30/2018)      Other partial intestinal obstruction (HCC) (2019)      Bowel obstruction ([address] (2019)      Improving    Plan:       GI lite diet   OOB ambulating

## 2019-09-04 NOTE — PROGRESS NOTES
General Surgery End of Shift Nursing Note    Bedside shift change report given to Bobbi Ch (oncoming nurse) by Nohelia Weeks (offgoing nurse). Report included the following information SBAR. Shift worked:   7a-7p   Summary of shift:    Patient received blood transfusion today. Dr. Jefferson Saba removed penrose drain at bedside. Pain well controlled. Issues for physician to address:   none     Number times ambulated in hallway past shift: 0    Number of times OOB to chair past shift: 3    Pain Management:  Current medication: Dilaudid PCA, Toradol  Patient states pain is manageable on current pain medication: YES    GI:    Current diet:  DIET FULL LIQUID    Tolerating current diet: YES  Passing flatus: YES  Last Bowel Movement: several days ago       Respiratory:    Incentive Spirometer at bedside: YES  Patient instructed on use: YES    Patient Safety:    Falls Score: 3  Bed Alarm On? No  Sitter?  No    Jesi French

## 2019-09-04 NOTE — PROGRESS NOTES
General Surgery End of Shift Nursing Note    Bedside shift change report given to [address]  RN (oncoming nurse) by Kev Peña RN (offgoing nurse). Report included the following information SBAR and Recent Results. Shift worked:   [id number]   Summary of shift:    Patient had 2 watery bms overnight. States she had 3 yesterday on day shift. Discussed with charge RN need for stool specimen, cdiff criteria, per charge will discuss with infection control first. Otherwise no significant events. Issues for physician to address:   Watery bms, total of 5 yesterday including overnight last night. Number times ambulated in hallway past shift: 0    Number of times OOB to chair past shift: 0    Pain Management:  Current medication: Dilaudid PCA continuous, prn toradol. Patient states pain is manageable on current pain medication: YES    GI:    Current diet:  DIET FULL LIQUID    Tolerating current diet: YES  Passing flatus: YES  Last Bowel Movement: today   Appearance: watery brown/green    Respiratory:    Incentive Spirometer at bedside: YES  Patient instructed on use: YES    Patient Safety:    Falls Score: 3  Bed Alarm On? No  Sitter?  No    Peter Hankins RN

## 2019-09-04 NOTE — PROGRESS NOTES
Hospitalist Progress Note    NAME: Tamara Hua   :  1955   MRN:  [id number]       Assessment / Plan:    30.0 - 39.9 Obese / Body mass index is 31.95 kg/m². Small bowel obstruction, resolving  -General surgery following  -Status post exploratory laparotomy with d right hemicolectomy  -NG tube out  -Advance p.o. as tolerated as per Gen surgery recommendations  -Serial abdominal examination    History of hypertension, controlled  -Continue amlodipine as tolerated    History of asthma, not in exacerbation  -Intermittent  -Continue albuterol as needed and inhaled corticosteroids as tolerated  -she needs PFTs as out patient  -smoking cessation     Acute blood loss anemia  -Hbg is trending down slowly  - f/u Hbg today and if is still trending down, then H&H q 8 hr  - transfused one unit of PRBCs today     History of AAA  -Hemodynamically stable  -Status post stent placement as the patient reported  -smoking cessation     History of hyperlipidemia  -Statin was held because of the bowel obstruction, will resume once p.o. tolerated    History of tobacco use  -Patient was encouraged to quit        Code status: Full  Prophylaxis: Lovenox  Recommended Disposition: SAH/Rehab     Subjective:       Patient was seen and examined this morning. No acute events overnight. Still in pain. Discussed with RN events overnight. Review of Systems:  Symptom Y/N Comments  Symptom Y/N Comments   Fever/Chills N   Chest Pain N    Poor Appetite N   Edema N    Cough N   Abdominal Pain y    Sputum N   Joint Pain     SOB/BECKER N   Pruritis/Rash     Nausea/vomit    Tolerating PT/OT     Diarrhea    Tolerating Diet     Constipation    Other         Objective:     VITALS:   Last 24hrs VS reviewed since prior progress note.  Most recent are:  Patient Vitals for the past 24 hrs:   Temp Pulse Resp BP SpO2   19 0737 99.4 °F (37.4 °C) 97 18 138/55 92 %   19 0328 98.7 °F (37.1 °C) 90 16 128/46 94 %   19 2306 98.6 °F (37 °C) 89 16 146/64 95 %   09/03/19 2007 98.5 °F (36.9 °C) 85 18 139/53 95 %   09/03/19 1900 98.8 °F (37.1 °C) 87 16 158/62 96 %   09/03/19 1829 98.2 °F (36.8 °C) 86 16 145/73 97 %   09/03/19 1800 98.5 °F (36.9 °C) 88 16 149/76 97 %   09/03/19 1730 98.3 °F (36.8 °C) 84 16 143/67 96 %   09/03/19 1659 98.7 °F (37.1 °C) 86 17 116/62 97 %   09/03/19 1644 99 °F (37.2 °C) 86 16 123/56 97 %   09/03/19 1629 99.1 °F (37.3 °C) 87 16 104/60 99 %   09/03/19 1200 -- 96 -- -- --   09/03/19 1127 99.1 °F (37.3 °C) 88 20 129/44 90 %       Intake/Output Summary (Last 24 hours) at 9/4/2019 0811  Last data filed at 9/4/2019 0804  Gross per 24 hour   Intake 1258.8 ml   Output 0 ml   Net 1258.8 ml        PHYSICAL EXAM:  General: WD, WN. Alert, cooperative, no acute distress    EENT:  EOMI. Anicteric sclerae. MMM  Resp:  CTA bilaterally, no wheezing or rales. No accessory muscle use  CV:  Regular  rhythm,  No edema  GI:  Soft, Non distended, mild tenderness.  +Bowel sounds  Neurologic:  Alert and oriented X 3, normal speech,   Psych:   Good insight. Not anxious nor agitated  Skin:  No rashes. No jaundice    Reviewed most current lab test results and cultures  YES  Reviewed most current radiology test results   YES  Review and summation of old records today    NO  Reviewed patient's current orders and MAR    YES  PMH/SH reviewed - no change compared to H&P  ________________________________________________________________________  Care Plan discussed with:    Comments   Patient X    Family  X    RN X    Care Manager X    Consultant  X                      Multidiciplinary team rounds were held today with , nursing, pharmacist and clinical coordinator. Patient's plan of care was discussed; medications were reviewed and discharge planning was addressed.      ________________________________________________________________________  Total NON critical care TIME:  35   Minutes    Total CRITICAL CARE TIME Spent:   Minutes non procedure based      Comments   >50% of visit spent in counseling and coordination of care     ________________________________________________________________________  Daysi Pringle MD     Procedures: see electronic medical records for all procedures/Xrays and details which were not copied into this note but were reviewed prior to creation of Plan. LABS:  I reviewed today's most current labs and imaging studies.   Pertinent labs include:  Recent Labs     09/03/19  0346 09/02/19  0510   WBC 18.8* 12.0*   HGB 7.6* 8.3*   HCT 23.8* 27.3*    272     Recent Labs     09/02/19  0510   *   K 4.3   *   CO2 22   *   BUN 7   CREA 1.11*   CA 8.1*       Signed: Daysi Pringle MD

## 2019-09-04 NOTE — PROGRESS NOTES
Surgery NP SOAP Note    Patient with elevated WBC. Nursing raised concern about this. Patient is clinically doing well. Dr. Joyce Alvarado notified and feels WBC is expected post-op elevation and should return to normal as she progresses.      Arsenio Gagnon, ABIMBOLA

## 2019-09-04 NOTE — DIABETES MGMT
[facility]    [facility] Progress Note    Recommendations/ Comments: If appropriate, please consider increasing patient's lantus to 20 units daily considering high blood sugars and patients home dose of 25 units. Diet to be changed to include carb consistent. Current hospital DM medication: 15 units of lantus once daily, insulin lispro correctional with meals and hs. Chart reviewed on [facility]. Patient is a 66 y.o. female with type 2 diabetes on 25 units of lantus at home. A1c:   Lab Results   Component Value Date/Time    Hemoglobin A1c 6.5 (H) 08/18/2019 05:45 PM    Hemoglobin A1c 6.9 (H) 07/08/2019 04:44 PM       Recent Glucose Results:   Lab Results   Component Value Date/Time    GLUCPOC 208 (H) 09/04/2019 12:50 PM    GLUCPOC 159 (H) 09/04/2019 05:05 AM    GLUCPOC 208 (H) 09/03/2019 10:54 PM        Lab Results   Component Value Date/Time    Creatinine 1.11 (H) 09/02/2019 05:10 AM     Estimated Creatinine Clearance: 58 mL/min (A) (based on SCr of 1.11 mg/dL (H)). Active Orders   Diet    DIET GI LITE (POST SURGICAL)        PO intake:   Patient Vitals for the past 72 hrs:   % Diet Eaten   09/04/19 1030 100 %   09/04/19 0804 0 %   09/02/19 0917 0 %       Will continue to follow as needed. Thank you.       Time spent: 3 minutes    [address]

## 2019-09-04 NOTE — PROGRESS NOTES
Problem: Mobility Impaired (Adult and Pediatric)  Goal: *Acute Goals and Plan of Care (Insert Text)  Description  FUNCTIONAL STATUS PRIOR TO ADMISSION: Patient was independent and active without use of DME.    HOME SUPPORT PRIOR TO ADMISSION: The patient lived with  but did not require assist.    Physical Therapy Goals  Initiated 9/4/2019  1. Patient will move from supine to sit and sit to supine  in bed with independence within 7 day(s). 2.  Patient will transfer from bed to chair and chair to bed with independence using the least restrictive device within 7 day(s). 3.  Patient will perform sit to stand with independence within 7 day(s). 4.  Patient will ambulate with supervision/set-up for 150 feet with the least restrictive device within 7 day(s). 5.  Patient will ascend/descend 3 stairs with 1 handrail(s) with minimal assistance/contact guard assist within 7 day(s). Note:   PHYSICAL THERAPY EVALUATION  Patient: Binh Street (85 y.o. female)  Date: 9/4/2019  Primary Diagnosis: Bowel obstruction (HCC) [K56.609]  Procedure(s) (LRB):  EXPLORATORY LAPAROTOMY, EXTENSIVE SMALL BOWEL ADHESIOLYSIS, ILEOCOLONIC ANASTAMOSIS, TRANSVERSE AND RIGHT COLECTOMY, AND  APPENDECTOMY (N/A) 3 Days Post-Op   Precautions: standard        ASSESSMENT  Based on the objective data described below, the patient presents with generalized weakness, slightly impaired balance, and decreased activity tolerance secondary to recent surgery. Patient willing get OOB and ambulate. As patient returning to bed at end of session, had uncontrolled watery BM so assisted to bathroom. RN notified. Patient will likely progress quickly once pain controlled. Current Level of Function Impacting Discharge (mobility/balance): CGA    Functional Outcome Measure: The patient scored 75/100 on the Barthel Index outcome measure which is indicative of minor difficulty with mobility? ADL's. .      Other factors to consider for discharge: pain control     Patient will benefit from skilled therapy intervention to address the above noted impairments. PLAN :  Recommendations and Planned Interventions: bed mobility training, transfer training, gait training and therapeutic activities      Frequency/Duration: Patient will be followed by physical therapy:  3 times a week to address goals. Recommendation for discharge: (in order for the patient to meet his/her long term goals)  No skilled physical therapy/ follow up rehabilitation needs identified at this time. Will continue to assess over next session or 2. This discharge recommendation:  Has not yet been discussed the attending provider and/or case management    Equipment recommendations for successful discharge (if) home: none         SUBJECTIVE:   Patient stated I just got up.     OBJECTIVE DATA SUMMARY:   HISTORY:    Past Medical History:   Diagnosis Date    Arthritis     Asthma     Diabetes ([facility] [address]     Hypertension     Menopause     Pancreatitis      Past Surgical History:   Procedure Laterality Date    HX BUNIONECTOMY Bilateral 1977    HX HERNIA REPAIR  2002    HX PARTIAL HYSTERECTOMY  1980       Personal factors and/or comorbidities impacting plan of care: none noted    Home Situation  Home Environment: Private residence  # Steps to Enter: 3  Rails to Enter: Yes  One/Two Story Residence: One story  Living Alone: No  Support Systems: Family member(s)  Patient Expects to be Discharged to[de-identified] Private residence  Current DME Used/Available at Home: Rhina Randle    EXAMINATION/PRESENTATION/DECISION MAKING:   Critical Behavior:  Neurologic State: Alert  Orientation Level: Oriented X4  Cognition: Appropriate decision making, Follows commands  Safety/Judgement: Awareness of environment, Insight into deficits  Hearing:   Auditory  Auditory Impairment: None  Skin:  extremities intact  Edema: none noted  Range Of Motion:  AROM: Within functional limits           PROM: Within functional limits           Strength: Strength: Generally decreased, functional                    Tone & Sensation:   Tone: Normal              Sensation: Intact               Coordination:  Coordination: Within functional limits  Vision:      Functional Mobility:  Bed Mobility:  Rolling: Supervision; Additional time  Supine to Sit: Contact guard assistance; Additional time  Sit to Supine: Contact guard assistance; Additional time  Scooting: Supervision  Transfers:  Sit to Stand: Contact guard assistance; Additional time  Stand to Sit: Contact guard assistance; Additional time        Bed to Chair: Contact guard assistance              Balance:   Sitting: Intact  Standing: Impaired  Standing - Static: Good;Constant support  Standing - Dynamic : Fair;Constant support  Ambulation/Gait Training:  Distance (ft): 50 Feet (ft)  Assistive Device: Gait belt(HHA x 1)  Ambulation - Level of Assistance: Contact guard assistance        Gait Abnormalities: Decreased step clearance  Right Side Weight Bearing: Full  Left Side Weight Bearing: Full        Speed/Jesica: Pace decreased (<100 feet/min)  Step Length: Left shortened;Right shortened                                   Functional Measure  Barthel Index:    Bathin  Bladder: 10  Bowels: 5  Groomin  Dressin  Feeding: 10  Mobility: 10  Stairs: 0  Toilet Use: 10  Transfer (Bed to Chair and Back): 15  Total: 75/100       The Barthel ADL Index: Guidelines  1. The index should be used as a record of what a patient does, not as a record of what a patient could do. 2. The main aim is to establish degree of independence from any help, physical or verbal, however minor and for whatever reason. 3. The need for supervision renders the patient not independent. 4. A patient's performance should be established using the best available evidence. Asking the patient, friends/relatives and nurses are the usual sources, but direct observation and common sense are also important. However direct testing is not needed.   5. Usually the patient's performance over the preceding 24-48 hours is important, but occasionally longer periods will be relevant. 6. Middle categories imply that the patient supplies over 50 per cent of the effort. 7. Use of aids to be independent is allowed. Flavio Colbert., Barthel, D.W. (7680). Functional evaluation: the Barthel Index. [facility] (14)2. ERIKA May Hillery Pair., Neli Mendez., [place], [address] [place] [address] (). Measuring the change indisability after inpatient rehabilitation; comparison of the responsiveness of the Barthel Index and Functional [place] Measure. Journal of Neurology, Neurosurgery, and Psychiatry, 66(4), 353-246. Bernardino Villalta, [place].GITA, FAUSTINO Blake, & Eliana Villarreal M.A. (2004.) Assessment of post-stroke quality of life in cost-effectiveness studies: The usefulness of the Barthel Index and the EuroQoL-5D. Quality of Life Research, 15, 173-96            Physical Therapy Evaluation Charge Determination   History Examination Presentation Decision-Making   MEDIUM  Complexity : 1-2 comorbidities / personal factors will impact the outcome/ POC  MEDIUM Complexity : 3 Standardized tests and measures addressing body structure, function, activity limitation and / or participation in recreation  LOW Complexity : Stable, uncomplicated  LOW Complexity : FOTO score of       Based on the above components, the patient evaluation is determined to be of the following complexity level: LOW     Pain Ratin/10; RN notified    Activity Tolerance:   Fair  Please refer to the flowsheet for vital signs taken during this treatment. After treatment patient left in no apparent distress:   Supine in bed, Call bell within reach, Caregiver / family present and Side rails x 3    COMMUNICATION/EDUCATION:   The patients plan of care was discussed with: Registered Nurse. Fall prevention education was provided and the patient/caregiver indicated understanding.  and Patient/family agree to work toward stated goals and plan of care.     Thank you for this referral.  Lito Elias, PT   Time Calculation: 18 mins

## 2019-09-04 NOTE — PROGRESS NOTES
General Surgery End of Shift Nursing Note    Bedside shift change report given to [place] (oncoming nurse) by [id number] (offgoing nurse). Report included the following information SBAR, Kardex, OR Summary, Intake/Output, MAR and Recent Results. Shift worked:   7a-7p   Summary of shift:    PCA discontinued. Roxicodone and dilaudid given for pain. Patient had a several small bowel movements today. Still not passing flatus. Issues for physician to address:   None      Number times ambulated in hallway past shift: 0    Number of times OOB to chair past shift: 1    Pain Management:  Current medication: Dilaudid or Roxicodone   Patient states pain is manageable on current pain medication: YES    GI:    Current diet:  DIET GI LITE (POST SURGICAL) No [employer]ets    Tolerating current diet: YES  Passing flatus: NO  Last Bowel Movement: today       Patient Safety:    Falls Score: 4  Bed Alarm On? No  Sitter?  No    Betty Jacques

## 2019-09-04 NOTE — PROGRESS NOTES
OT referral received, chart reviewed, and attempted to see patient for evaluation. Patient is presently complaining of 7/10 abdominal pain and has asked that OT evaluation be deferred until tomorrow. Patient stated that she had reported her pain to nursing. Will defer per patient request and follow up tomorrow for evaluation.

## 2019-09-05 LAB
ANION GAP SERPL CALC-SCNC: 6 MMOL/L (ref 5–15)
BUN SERPL-MCNC: 3 MG/DL (ref 6–20)
BUN/CREAT SERPL: 4 (ref 12–20)
CALCIUM SERPL-MCNC: 8.3 MG/DL (ref 8.5–10.1)
CHLORIDE SERPL-SCNC: 112 MMOL/L (ref 97–108)
CO2 SERPL-SCNC: 24 MMOL/L (ref 21–32)
CREAT SERPL-MCNC: 0.79 MG/DL (ref 0.55–1.02)
ERYTHROCYTE [DISTWIDTH] IN BLOOD BY AUTOMATED COUNT: 18.1 % (ref 11.5–14.5)
GLUCOSE BLD STRIP.AUTO-MCNC: 156 MG/DL (ref 65–100)
GLUCOSE BLD STRIP.AUTO-MCNC: 163 MG/DL (ref 65–100)
GLUCOSE BLD STRIP.AUTO-MCNC: 186 MG/DL (ref 65–100)
GLUCOSE BLD STRIP.AUTO-MCNC: 190 MG/DL (ref 65–100)
GLUCOSE SERPL-MCNC: 186 MG/DL (ref 65–100)
HCT VFR BLD AUTO: 26.8 % (ref 35–47)
HGB BLD-MCNC: 8.3 G/DL (ref 11.5–16)
IRON SATN MFR SERPL: 11 % (ref 20–50)
IRON SERPL-MCNC: 15 UG/DL (ref 35–150)
MCH RBC QN AUTO: 23.2 PG (ref 26–34)
MCHC RBC AUTO-ENTMCNC: 31 G/DL (ref 30–36.5)
MCV RBC AUTO: 74.9 FL (ref 80–99)
NRBC # BLD: 0 K/UL (ref 0–0.01)
NRBC BLD-RTO: 0 PER 100 WBC
PLATELET # BLD AUTO: 228 K/UL (ref 150–400)
PMV BLD AUTO: 12.3 FL (ref 8.9–12.9)
POTASSIUM SERPL-SCNC: 3.3 MMOL/L (ref 3.5–5.1)
RBC # BLD AUTO: 3.58 M/UL (ref 3.8–5.2)
SERVICE CMNT-IMP: ABNORMAL
SODIUM SERPL-SCNC: 142 MMOL/L (ref 136–145)
TIBC SERPL-MCNC: 141 UG/DL (ref 250–450)
WBC # BLD AUTO: 11.1 K/UL (ref 3.6–11)

## 2019-09-05 PROCEDURE — 94760 N-INVAS EAR/PLS OXIMETRY 1: CPT

## 2019-09-05 PROCEDURE — 77010033678 HC OXYGEN DAILY

## 2019-09-05 PROCEDURE — 74011250636 HC RX REV CODE- 250/636: Performed by: SURGERY

## 2019-09-05 PROCEDURE — 74011636637 HC RX REV CODE- 636/637: Performed by: SURGERY

## 2019-09-05 PROCEDURE — 74011250637 HC RX REV CODE- 250/637: Performed by: SURGERY

## 2019-09-05 PROCEDURE — 65270000029 HC RM PRIVATE

## 2019-09-05 PROCEDURE — 80048 BASIC METABOLIC PNL TOTAL CA: CPT

## 2019-09-05 PROCEDURE — 82962 GLUCOSE BLOOD TEST: CPT

## 2019-09-05 PROCEDURE — 97165 OT EVAL LOW COMPLEX 30 MIN: CPT

## 2019-09-05 PROCEDURE — 36415 COLL VENOUS BLD VENIPUNCTURE: CPT

## 2019-09-05 PROCEDURE — 83540 ASSAY OF IRON: CPT

## 2019-09-05 PROCEDURE — 74011250637 HC RX REV CODE- 250/637: Performed by: NURSE PRACTITIONER

## 2019-09-05 PROCEDURE — 85027 COMPLETE CBC AUTOMATED: CPT

## 2019-09-05 PROCEDURE — 97116 GAIT TRAINING THERAPY: CPT

## 2019-09-05 PROCEDURE — 74011250637 HC RX REV CODE- 250/637: Performed by: INTERNAL MEDICINE

## 2019-09-05 RX ORDER — LANOLIN ALCOHOL/MO/W.PET/CERES
1 CREAM (GRAM) TOPICAL EVERY OTHER DAY
Status: DISCONTINUED | OUTPATIENT
Start: 2019-09-05 | End: 2019-09-07 | Stop reason: HOSPADM

## 2019-09-05 RX ORDER — INSULIN GLARGINE 100 [IU]/ML
20 INJECTION, SOLUTION SUBCUTANEOUS
Status: DISCONTINUED | OUTPATIENT
Start: 2019-09-05 | End: 2019-09-07 | Stop reason: HOSPADM

## 2019-09-05 RX ORDER — SODIUM CHLORIDE AND POTASSIUM CHLORIDE .9; .15 G/100ML; G/100ML
SOLUTION INTRAVENOUS CONTINUOUS
Status: DISCONTINUED | OUTPATIENT
Start: 2019-09-05 | End: 2019-09-07 | Stop reason: HOSPADM

## 2019-09-05 RX ORDER — NITROFURANTOIN 25; 75 MG/1; MG/1
100 CAPSULE ORAL 2 TIMES DAILY
Status: DISCONTINUED | OUTPATIENT
Start: 2019-09-05 | End: 2019-09-07 | Stop reason: HOSPADM

## 2019-09-05 RX ADMIN — GUAIFENESIN 600 MG: 600 TABLET, EXTENDED RELEASE ORAL at 21:10

## 2019-09-05 RX ADMIN — KETOROLAC TROMETHAMINE 30 MG: 30 INJECTION, SOLUTION INTRAMUSCULAR at 02:30

## 2019-09-05 RX ADMIN — INSULIN GLARGINE 20 UNITS: 100 INJECTION, SOLUTION SUBCUTANEOUS at 21:10

## 2019-09-05 RX ADMIN — INSULIN LISPRO 2 UNITS: 100 INJECTION, SOLUTION INTRAVENOUS; SUBCUTANEOUS at 08:26

## 2019-09-05 RX ADMIN — Medication 10 ML: at 21:10

## 2019-09-05 RX ADMIN — AMLODIPINE BESYLATE 10 MG: 5 TABLET ORAL at 08:11

## 2019-09-05 RX ADMIN — INSULIN LISPRO 2 UNITS: 100 INJECTION, SOLUTION INTRAVENOUS; SUBCUTANEOUS at 12:17

## 2019-09-05 RX ADMIN — GUAIFENESIN 600 MG: 600 TABLET, EXTENDED RELEASE ORAL at 08:12

## 2019-09-05 RX ADMIN — NITROFURANTOIN MONOHYDRATE/MACROCRYSTALLINE 100 MG: 25; 75 CAPSULE ORAL at 17:27

## 2019-09-05 RX ADMIN — OXYCODONE HYDROCHLORIDE 5 MG: 5 TABLET ORAL at 13:28

## 2019-09-05 RX ADMIN — FERROUS SULFATE TAB 325 MG (65 MG ELEMENTAL FE) 325 MG: 325 (65 FE) TAB at 10:20

## 2019-09-05 RX ADMIN — INSULIN LISPRO 2 UNITS: 100 INJECTION, SOLUTION INTRAVENOUS; SUBCUTANEOUS at 17:27

## 2019-09-05 RX ADMIN — SODIUM CHLORIDE AND POTASSIUM CHLORIDE: 9; 1.49 INJECTION, SOLUTION INTRAVENOUS at 12:10

## 2019-09-05 RX ADMIN — OXYCODONE HYDROCHLORIDE 5 MG: 5 TABLET ORAL at 17:34

## 2019-09-05 RX ADMIN — OXYCODONE HYDROCHLORIDE 5 MG: 5 TABLET ORAL at 08:12

## 2019-09-05 RX ADMIN — MONTELUKAST 10 MG: 10 TABLET, FILM COATED ORAL at 21:09

## 2019-09-05 RX ADMIN — METOPROLOL TARTRATE 25 MG: 25 TABLET ORAL at 08:12

## 2019-09-05 RX ADMIN — ENOXAPARIN SODIUM 40 MG: 40 INJECTION SUBCUTANEOUS at 21:10

## 2019-09-05 RX ADMIN — OXYCODONE HYDROCHLORIDE 10 MG: 5 TABLET ORAL at 21:10

## 2019-09-05 RX ADMIN — OXYCODONE HYDROCHLORIDE 10 MG: 5 TABLET ORAL at 02:30

## 2019-09-05 RX ADMIN — METOPROLOL TARTRATE 25 MG: 25 TABLET ORAL at 17:27

## 2019-09-05 RX ADMIN — HYDROMORPHONE HYDROCHLORIDE 0.5 MG: 1 INJECTION, SOLUTION INTRAMUSCULAR; INTRAVENOUS; SUBCUTANEOUS at 04:22

## 2019-09-05 RX ADMIN — HYDROMORPHONE HYDROCHLORIDE 0.5 MG: 1 INJECTION, SOLUTION INTRAMUSCULAR; INTRAVENOUS; SUBCUTANEOUS at 01:47

## 2019-09-05 RX ADMIN — FLUTICASONE FUROATE AND VILANTEROL TRIFENATATE 1 PUFF: 200; 25 POWDER RESPIRATORY (INHALATION) at 08:14

## 2019-09-05 RX ADMIN — Medication 10 ML: at 17:35

## 2019-09-05 NOTE — PROGRESS NOTES
OT Note:    Orders received, chart reviewed and patient evaluated by Occupational Therapy. Pt performed mobility with CGA then SBA w/o AD. Pt safe to discharge home with continued use of Rolling Walker PRN for mobility from OT perspective. Otherwise she is performing her ADLs seated with Modified [place]. Skilled acute/post-acute OT is not indicated at this time. OT will sign off.      Full evaluation to follow.      Alexander Ambriz, OTR/L

## 2019-09-05 NOTE — PROGRESS NOTES
Problem: Mobility Impaired (Adult and Pediatric)  Goal: *Acute Goals and Plan of Care (Insert Text)  Description  FUNCTIONAL STATUS PRIOR TO ADMISSION: Patient was independent and active without use of DME.    HOME SUPPORT PRIOR TO ADMISSION: The patient lived with  but did not require assist.    Physical Therapy Goals  Initiated 9/4/2019  1. Patient will move from supine to sit and sit to supine  in bed with independence within 7 day(s). 2.  Patient will transfer from bed to chair and chair to bed with independence using the least restrictive device within 7 day(s). 3.  Patient will perform sit to stand with independence within 7 day(s). 4.  Patient will ambulate with supervision/set-up for 150 feet with the least restrictive device within 7 day(s). 5.  Patient will ascend/descend 3 stairs with 1 handrail(s) with minimal assistance/contact guard assist within 7 day(s). Outcome: Progressing Towards Goal   PHYSICAL THERAPY TREATMENT  Patient: Judith Veliz (78 y.o. female)  Date: 9/5/2019  Diagnosis: Bowel [facility] ([facility] [K56.609] <principal problem not specified>  Procedure(s) (LRB):  EXPLORATORY LAPAROTOMY, EXTENSIVE SMALL BOWEL ADHESIOLYSIS, ILEOCOLONIC ANASTAMOSIS, TRANSVERSE AND RIGHT COLECTOMY, AND  APPENDECTOMY (N/A) 4 Days Post-Op  Precautions:    Chart, physical therapy assessment, plan of care and goals were reviewed. ASSESSMENT  Based on the objective data described below, good improvement in overall activity tolerance and progress toward PT goals. She has a history of PAD and complained of fatigue in her legs with walking PTA and during this session. She demonstrates increased trunk sway when her legs get tired and may benefit from having a rollator walker to allow sitting rests as needed to relieve claudication and prevent instability when fatigued. She will not need PT intervention at home once discharged.     Current Level of Function Impacting Discharge (mobility/balance): modified independent with supine to sit and sit to stand transfers. Bed to chair transfers supervision only needed. Gait tolerated for 350 feet with 3 standing rests and no devices. Her gait stability decreased with increased trunk sway when she would get fatigued each time. Demonstrated modified independent going up/down 3 steps with bilateral rails. Other factors to consider for discharge: PAD and asthma          PLAN :  Patient continues to benefit from skilled intervention to address the above impairments. Continue treatment per established plan of care. to address goals. Recommendation for discharge: (in order for the patient to meet his/her long term goals)  No skilled physical therapy/ follow up rehabilitation needs identified at this time. This discharge recommendation:  Has been made in collaboration with the attending provider and/or case management    Equipment recommendations for successful discharge (if) home: rollator       SUBJECTIVE:   Patient stated I hope to go home tomorrow.     OBJECTIVE DATA SUMMARY:   Critical Behavior:  Neurologic State: Drowsy  Orientation Level: Oriented X4  Cognition: Appropriate decision making, Appropriate for age attention/concentration, Appropriate safety awareness, Follows commands  Safety/Judgement: Awareness of environment, Insight into deficits  Functional Mobility Training:  Bed Mobility:  Rolling: Modified independent  Supine to Sit: Modified independent  Sit to Supine: Stand-by assistance  Scooting: Independent        Transfers:  Sit to Stand: Modified independent  Stand to Sit: Independent        Bed to Chair: Supervision                    Balance:  Sitting: Intact  Standing: Impaired  Standing - Static: Good; Unsupported  Standing - Dynamic : Fair;Unsupported  Ambulation/Gait Training:  Distance (ft): 350 Feet (ft)  Assistive Device: Gait belt  Ambulation - Level of Assistance: Stand-by assistance     Gait Description (WDL): Exceptions to [facility]  Gait Abnormalities: Path deviations;Trunk sway increased(trunk sway increased as patient became more fatigued)              Speed/Jesica: Pace decreased (<100 feet/min)  Step Length: Right shortened;Left shortened                    Stairs:  Number of Stairs Trained: 3  Stairs - Level of Assistance: Modified independent   Rail Use: Both    Pain Ratin/10 abdominal pain per patient. Felt she could still do PT without further pain meds. Activity Tolerance:   Fair and requires frequent rest breaks  Please refer to the flowsheet for vital signs taken during this treatment.     After treatment patient left in no apparent distress:   Supine in bed, Call bell within reach and Caregiver / family present    COMMUNICATION/COLLABORATION:   The patients plan of care was discussed with: Occupational Therapist, Registered Nurse and     Chris Avila, PT   Time Calculation: 17 mins

## 2019-09-05 NOTE — PROGRESS NOTES
General Surgery End of Shift Nursing Note    Bedside shift change report given to Amie Perkins RN (oncoming nurse) by Joy Soriano RN (offgoing nurse). Report included the following information SBAR and Recent Results. Shift worked:   [id number]   Summary of shift:    VSS, no significant changes overnight. Patient did have issues with pain control early this am, gave available prns. Placed on 2L while sleeping due to her oxygen level occasionally dipping down to 88-89% while sleeping, no c/o of SOB. Issues for physician to address:   None     Number times ambulated in hallway past shift: 0    Number of times OOB to chair past shift: 0    Pain Management:  Current medication: PRN oxycodone 5-10 mg, 30mg IV toradol, and 0.5 mg IV dilaudid. Patient states pain is manageable on current pain medication: YES    GI:    Current diet:  DIET GI LITE (POST SURGICAL) No Conc. Sweets    Tolerating current diet: YES  Passing flatus: YES  Last Bowel Movement: yesterday       Respiratory:    Incentive Spirometer at bedside: YES  Patient instructed on use: YES    Patient Safety:    Falls Score: 4  Bed Alarm On? No  Sitter?  No    Regan Mcdonough RN

## 2019-09-05 NOTE — DIABETES MGMT
[facility]    [facility] Progress Note    Recommendations/ Comments: If appropriate, please consider increasing patient's lantus to 20 units daily considering high blood sugars and patients home dose of 25 units. Please also remove dextrose from IVF. - message sent to Dr. Mccoy Brochnazario via [employer]    [facility] DM medication: 15 units of lantus once daily, insulin lispro correctional with meals and hs. Chart reviewed on [facility]. Patient is a 66 y.o. female with type 2 diabetes on 25 units of lantus at home. A1c:   Lab Results   Component Value Date/Time    Hemoglobin A1c 6.5 (H) 08/18/2019 05:45 PM    Hemoglobin A1c 6.9 (H) 07/08/2019 04:44 PM       Recent Glucose Results:   Lab Results   Component Value Date/Time     (H) 09/05/2019 02:39 AM    GLUCPOC 190 (H) 09/05/2019 08:18 AM    GLUCPOC 190 (H) 09/04/2019 08:49 PM    GLUCPOC 179 (H) 09/04/2019 04:39 PM        Lab Results   Component Value Date/Time    Creatinine 0.79 09/05/2019 02:39 AM     Estimated Creatinine Clearance: 81.5 mL/min (based on SCr of 0.79 mg/dL). Active Orders   Diet    DIET GI LITE (POST SURGICAL) No Conc. Sweets        PO intake:   Patient Vitals for the past 72 hrs:   % Diet Eaten   09/04/19 1030 100 %   09/04/19 0804 0 %       Will continue to follow as needed. Thank you.       Time spent: 3 minutes    Janusz Hairston

## 2019-09-05 NOTE — PROGRESS NOTES
OCCUPATIONAL THERAPY EVALUATION/DISCHARGE  Patient: Renee Ng (74 y.o. female)  Date: 9/5/2019  Primary Diagnosis: Bowel obstruction ([facility]) [K56.609]  Procedure(s) (LRB):  EXPLORATORY LAPAROTOMY, EXTENSIVE SMALL BOWEL ADHESIOLYSIS, ILEOCOLONIC ANASTAMOSIS, TRANSVERSE AND RIGHT COLECTOMY, AND  APPENDECTOMY (N/A) 4 Days Post-Op   Precautions:        ASSESSMENT  Based on the objective data described below, the patient presents just below her Mod I baseline with performing her dynamic ADL routine and is most limited d/t decreased activity tolerance. She performed toilet transfer and with SBA w/o AD and brief ambulation back to chair with CGA x1 as Pt noted to lean on EOB for support. Overall she is most limited d/t abdominal p! yet can perform ADLs seated with Mod I and standing with SBA. Pt is not in need of acute/skilled acute OT at this time. Current Level of Function (ADLs/self-care): SBA with transfers and standing ADLs w/o AD; Mod I with ADLs if seated. Functional Outcome Measure: The patient scored 70/100 on the Barthel Index outcome measure which is indicative of mild impairment with ADL task performance and related functional mobility. Other factors to consider for discharge: Abdominal p! PLAN :  Recommend with staff: None    Recommendation for discharge: (in order for the patient to meet his/her long term goals)  No skilled occupational therapy/ follow up rehabilitation needs identified at this time. This discharge recommendation:  A follow-up discussion with the attending provider and/or case management is planned    Equipment recommendations for successful discharge: patient owns DME required for discharge       SUBJECTIVE:   Patient stated I just feel a little unsteady sometimes.     OBJECTIVE DATA SUMMARY:   HISTORY:   Past Medical History:   Diagnosis Date    Arthritis     Asthma     Diabetes ([facility]     Hypertension     Menopause     Pancreatitis      Past Surgical History: Procedure Laterality Date    HX BUNIONECTOMY Bilateral 1977  [address] HERNIA REPAIR  2002    HX PARTIAL HYSTERECTOMY  1980       Prior Level of Function/Environment/Context: Lives with fiance. Independent with ADL/IADL tasks using RW PRN. Drives. Denies Hx of falls yet admits to occasional furniture leaning during mobility. Expanded or extensive additional review of patient history:   Home Situation  Home Environment: Private residence  # Steps to Enter: 3  Rails to Enter: No  One/Two Story Residence: One story  Living Alone: No  Support Systems: Family member(s)  Patient Expects to be Discharged to[de-identified] Private residence  Current DME Used/Available at Home: Walker, rolling, Shower chair  Tub or Shower Type: Tub/Shower combination    Hand dominance: Right    EXAMINATION OF PERFORMANCE DEFICITS:  Cognitive/Behavioral Status:  Neurologic State: Alert  Orientation Level: Oriented X4  Cognition: Appropriate decision making; Appropriate for age attention/concentration; Appropriate safety awareness; Follows commands             Skin: Abdominal dressing intact    Edema: None    Hearing: Auditory  Auditory Impairment: None    Vision/Perceptual:                           Acuity: Within Defined Limits;Able to read clock/calendar on wall without difficulty    Corrective Lenses: Reading glasses    Range of Motion:  AROM: Within functional limits  PROM: Within functional limits                      Strength:  Strength: Generally decreased, functional                Coordination:  Coordination: Within functional limits  Fine Motor Skills-Upper: Left Intact; Right Intact    Gross Motor Skills-Upper: Left Intact; Right Intact    Tone & Sensation:  Tone: Normal  Sensation: Intact                      Balance:  Sitting: Intact  Standing: Impaired; Without support  Standing - Static: Good; Unsupported  Standing - Dynamic : Fair;Unsupported    Functional Mobility and Transfers for ADLs:  Bed Mobility:  Rolling: (received seated in chair)    Transfers:  Sit to Stand: Contact guard assistance;Stand-by assistance(initially CGA, then progressed to SBA w/ toilet transfer)  Stand to Sit: Stand-by assistance  Bathroom Mobility: Stand-by assistance  Toilet Transfer : Stand-by assistance  Assistive Device : (hands-free)    ADL Assessment:  Feeding: Independent    Oral Facial Hygiene/Grooming: Independent    Bathing: Stand-by assistance(if standing)    Upper Body Dressing: Independent    Lower Body Dressing: Stand-by assistance(with standing aspects only)    Toileting: Stand by assistance(with standing aspects only)                ADL Intervention and task modifications:                           Lower Body Dressing Assistance  Socks: Independent  Position Performed: Seated in chair  Cues: Khalif Miranda      Functional Measure:  Barthel Index:    Bathin  Bladder: 10  Bowels: 5  Groomin  Dressin  Feeding: 10  Mobility: 10  Stairs: 0  Toilet Use: 10  Transfer (Bed to Chair and Back): 10  Total: 70/100        The Barthel ADL Index: Guidelines  1. The index should be used as a record of what a patient does, not as a record of what a patient could do. 2. The main aim is to establish degree of independence from any help, physical or verbal, however minor and for whatever reason. 3. The need for supervision renders the patient not independent. 4. A patient's performance should be established using the best available evidence. Asking the patient, friends/relatives and nurses are the usual sources, but direct observation and common sense are also important. However direct testing is not needed. 5. Usually the patient's performance over the preceding 24-48 hours is important, but occasionally longer periods will be relevant. 6. Middle categories imply that the patient supplies over 50 per cent of the effort. 7. Use of aids to be independent is allowed. Yesenia Foss., Barthel, D.W. (0700). Functional evaluation: the Barthel Index.  [address] [facility] [employer] ([address], [place], Najma Murry., Jim Nair., Vandana Reynolds. (1999). Measuring the change indisability after inpatient rehabilitation; comparison of the responsiveness of the Barthel Index and Functional [place] Measure. Journal of Neurology, Neurosurgery, and Psychiatry, 66(4), 453-517. AGATHA Zimmerman, FAUTSINO Blake, & Pablo Galeano M.A. (2004.) Assessment of post-stroke quality of life in cost-effectiveness studies: The usefulness of the Barthel Index and the EuroQoL-5D. Quality of Life Research, 15, 546-32       Occupational Therapy Evaluation Charge Determination   History Examination Decision-Making   LOW Complexity : Brief history review  LOW Complexity : 1-3 performance deficits relating to physical, cognitive , or psychosocial skils that result in activity limitations and / or participation restrictions  LOW Complexity : No comorbidities that affect functional and no verbal or physical assistance needed to complete eval tasks       Based on the above components, the patient evaluation is determined to be of the following complexity level: LOW   Pain Ratin/10 abdominal p! that Pt reports has been ongoing. Activity Tolerance:   Fair  Please refer to the flowsheet for vital signs taken during this treatment. After treatment patient left in no apparent distress:    Sitting in chair and Call bell within reach    COMMUNICATION/EDUCATION:   The patients plan of care was discussed with: Physical Therapist, Registered Nurse and Patient.     Thank you for this referral.  Santa Sanchez OTR/L  Time Calculation: 17 mins

## 2019-09-05 NOTE — PROGRESS NOTES
Hospitalist Progress Note    NAME: Alin Escobar   :  1955   MRN:  [id number]       Assessment / Plan:    30.0 - 39.9 Obese / Body mass index is 31.95 kg/m². Small bowel obstruction, resolving  -General surgery following  -Status post exploratory laparotomy with d right hemicolectomy  -Advance p.o. as tolerated as per Gen surgery recommendations  -Serial abdominal examination    History of hypertension, controlled  -Continue amlodipine as tolerated    History of asthma, not in exacerbation  -Intermittent  -Continue albuterol as needed and inhaled corticosteroids as tolerated  -she needs PFTs as out patient  -smoking cessation     Acute blood loss anemia  - chronic microcytic anemia  -Hbg is trending down slowly  - f/u Hbg today and if is still trending down, then H&H q 8 hr  - transfused one unit of PRBCs yesterday  - will start iron 325 mg every other day  - iron studies    History of AAA  -Hemodynamically stable  -Status post stent placement as the patient reported  -smoking cessation     History of hyperlipidemia  -Statin was held because of the bowel obstruction, will resume once p.o. tolerated    History of tobacco use  -Patient was encouraged to quit        Code status: Full  Prophylaxis: Lovenox  Recommended Disposition: SAH/Rehab     Subjective:       Patient was seen and examined this morning. No acute events overnight. Today she reported that her pain is improving and she had BM. Discussed with RN events overnight. Review of Systems:  Symptom Y/N Comments  Symptom Y/N Comments   Fever/Chills N   Chest Pain N    Poor Appetite N   Edema N    Cough N   Abdominal Pain y    Sputum N   Joint Pain     SOB/BECKER N   Pruritis/Rash     Nausea/vomit    Tolerating PT/OT     Diarrhea    Tolerating Diet     Constipation    Other         Objective:     VITALS:   Last 24hrs VS reviewed since prior progress note.  Most recent are:  Patient Vitals for the past 24 hrs:   Temp Pulse Resp BP SpO2 09/05/19 0814 97.7 °F (36.5 °C) 79 16 124/61 99 %   09/05/19 0244 98.7 °F (37.1 °C) 92 18 147/58 94 %   09/04/19 2252 98.3 °F (36.8 °C) 90 18 151/62 93 %   09/04/19 2004 99.3 °F (37.4 °C) 88 16 159/70 93 %   09/04/19 1618 99.3 °F (37.4 °C) 93 14 148/50 97 %   09/04/19 1251 99.4 °F (37.4 °C) 97 16 136/42 93 %       Intake/Output Summary (Last 24 hours) at 9/5/2019 0907  Last data filed at 9/5/2019 0741  Gross per 24 hour   Intake 2332.5 ml   Output 0 ml   Net 2332.5 ml        PHYSICAL EXAM:  General: WD, WN. Alert, cooperative, no acute distress    EENT:  EOMI. Anicteric sclerae. MMM  Resp:  CTA bilaterally, no wheezing or rales. No accessory muscle use  CV:  Regular  rhythm,  No edema  GI:  Soft, Non distended, mild tenderness.  +Bowel sounds  Neurologic:  Alert and oriented X 3, normal speech,   Psych:   Good insight. Not anxious nor agitated  Skin:  No rashes. No jaundice    Reviewed most current lab test results and cultures  YES  Reviewed most current radiology test results   YES  Review and summation of old records today    NO  Reviewed patient's current orders and MAR    YES  PMH/ reviewed - no change compared to H&P  ________________________________________________________________________  Care Plan discussed with:    Comments   Patient X    Family  X    RN X    Care Manager X    Consultant  X                      Multidiciplinary team rounds were held today with , nursing, pharmacist and clinical coordinator. Patient's plan of care was discussed; medications were reviewed and discharge planning was addressed.      ________________________________________________________________________  Total NON critical care TIME:  35   Minutes    Total CRITICAL CARE TIME Spent:   Minutes non procedure based      Comments   >50% of visit spent in counseling and coordination of care     ________________________________________________________________________  Elenor Custard, MD     Procedures: see electronic medical records for all procedures/Xrays and details which were not copied into this note but were reviewed prior to creation of Plan. LABS:  I reviewed today's most current labs and imaging studies.   Pertinent labs include:  Recent Labs     09/05/19  0239 09/04/19  1213 09/03/19  0346   WBC 11.1* 17.3* 18.8*   HGB 8.3* 9.3* 7.6*   HCT 26.8* 30.5* 23.8*    172 216     Recent Labs     09/05/19  0239      K 3.3*   *   CO2 24   *   BUN 3*   CREA 0.79   CA 8.3*       Signed: Kevin Miranda MD

## 2019-09-05 NOTE — PROGRESS NOTES
SURGERY PROGRESS NOTE      Admit Date: 2019    POD 4 Days Post-Op    Procedure: Procedure(s):  EXPLORATORY LAPAROTOMY, EXTENSIVE SMALL BOWEL ADHESIOLYSIS, ILEOCOLONIC ANASTAMOSIS, TRANSVERSE AND RIGHT COLECTOMY, AND  APPENDECTOMY      Subjective:     Patient denies nausea. Tolerating PO. Having loose BMs. Unclear on flatus.   Feels bloated and full quickly       Objective:     Visit Vitals  /61   Pulse 79   Temp 97.7 °F (36.5 °C)   Resp 16   Ht 5' 5\" (1.651 m)   Wt 87.1 kg (192 lb 0.3 oz)   SpO2 99%   BMI 31.95 kg/m²        Temp (24hrs), Av.8 °F (37.1 °C), Min:97.7 °F (36.5 °C), Max:99.4 °F (37.4 °C)      701 -  190  In: 297.5 [I.V.:297.5]  Out: -   1901 -  0700  In: 2972.5 [P.O.:480; I.V.:2492.5]  Out: 0     Physical Exam:    General:  alert, cooperative, no distress, appears stated age   Abdomen: soft, bowel sounds active, non-tender   Incision:   healing well, no drainage, no erythema, no hernia, no seroma, no swelling, no dehiscence, incision well approximated           Lab Results   Component Value Date/Time    WBC 11.1 (H) 2019 02:39 AM    HGB 8.3 (L) 2019 02:39 AM    HCT 26.8 (L) 2019 02:39 AM    PLATELET 573  02:39 AM    MCV 74.9 (L) 2019 02:39 AM     Lab Results   Component Value Date/Time    GFR est non-AA >60 2019 02:39 AM    GFR est AA >60 2019 02:39 AM    Creatinine 0.79 2019 02:39 AM    BUN 3 (L) 2019 02:39 AM    Sodium 142 2019 02:39 AM    Potassium 3.3 (L) 2019 02:39 AM    Chloride 112 (H) 2019 02:39 AM    CO2 24 2019 02:39 AM    Magnesium 1.8 2019 10:16 AM       Assessment:     Active Problems:    HTN (hypertension) (2017)      Type II diabetes mellitus ([facility] (2017)      History of pancreatitis (10/30/2018)      Other partial intestinal obstruction ([facility] (2019)      Bowel obstruction ([facility] (2019)      Improving    Plan:       Discharge planning for tomorrow

## 2019-09-05 NOTE — PROGRESS NOTES
Spiritual Care Assessment/Progress Note  [facility]      NAME: Rima Norris      MRN: [id number]  AGE: 66 y.o. SEX: female  Orthodoxy Affiliation: Episcopalian   Language: English     9/5/2019     Total Time (in minutes): 7     Spiritual Assessment begun in [facility] GENERAL SURGERY through conversation with:         [x]Patient        [] Family    [] Friend(s)        Reason for Consult: Initial/Spiritual assessment, patient floor     Spiritual beliefs: (Please include comment if needed)     [] Identifies with a leonid tradition:         [] Supported by a leonid community:            [] Claims no spiritual orientation:           [] Seeking spiritual identity:                [] Adheres to an individual form of spirituality:           [x] Not able to assess:                           Identified resources for coping:      [] Prayer                               [] Music                  [] Guided Imagery     [] Family/friends                 [] Pet visits     [] Devotional reading                         [x] Unknown     [] Other:                                               Interventions offered during this visit: (See comments for more details)    Patient Interventions: Initial/Spiritual assessment, patient floor, Affirmation of emotions/emotional suffering           Plan of Care:     [] Support spiritual and/or cultural needs    [] Support AMD and/or advance care planning process      [] Support grieving process   [] Coordinate Rites and/or Rituals    [] Coordination with community clergy   [x] No spiritual needs identified at this time   [] Detailed Plan of Care below (See Comments)  [] Make referral to Music Therapy  [] Make referral to Pet Therapy     [] Make referral to Addiction services  [] Make referral to [facility]  [] Make referral to Spiritual Care Partner  [] No future visits requested        [x] Follow up visits as needed     Comments:   Initial visit on Gen Surg for spiritual assessment. No family/friends present. Patient declined visit at this time saying she is doing fine.   Advised her of  availability     FLIP Najera, [address] [facility], [address] [place] [address]     Paging Service  [phone])

## 2019-09-05 NOTE — PROGRESS NOTES
General Surgery End of Shift Nursing Note    Bedside shift change report given to [place] (oncoming nurse) by Tiffanie Castro RN (offgoing nurse). Report included the following information SBAR. Shift worked:   7am-7pm   Summary of shift:  Patient in bed this morning complaining of pain ( given Georgie 8:12)    Maintenance fluids changed to NS with 20K    Tele monitoring was DC'd    Lantus 20 unit at bedtime ordered    Burning during urination    Light pink bleeding during urination    Baclofen prescribed    Worked with PT and OT today         Issues for physician to address:        Number times ambulated in hallway past shift: 3    Number of times OOB to chair past shift: 1    Pain Management:  Current medication: Roxicodone  Patient states pain is manageable on current pain medication: YES    GI:    Current diet:  DIET GI LITE (POST SURGICAL) No Conc. Sweets    Tolerating current diet: YES  Passing flatus: YES  Last Bowel Movement: yesterday   Appearance:     Respiratory:    Incentive Spirometer at bedside: YES  Patient instructed on use: YES    Patient Safety:    Falls Score: 4  Bed Alarm On? Not applicable  Sitter?  Not applicable    Tiffanie Castro

## 2019-09-06 ENCOUNTER — APPOINTMENT (OUTPATIENT)
Dept: CT IMAGING | Age: 64
DRG: 330 | End: 2019-09-06
Attending: SURGERY
Payer: MEDICARE

## 2019-09-06 LAB
ANION GAP SERPL CALC-SCNC: 6 MMOL/L (ref 5–15)
BUN SERPL-MCNC: 4 MG/DL (ref 6–20)
BUN/CREAT SERPL: 6 (ref 12–20)
CALCIUM SERPL-MCNC: 8.5 MG/DL (ref 8.5–10.1)
CHLORIDE SERPL-SCNC: 112 MMOL/L (ref 97–108)
CO2 SERPL-SCNC: 24 MMOL/L (ref 21–32)
CREAT SERPL-MCNC: 0.7 MG/DL (ref 0.55–1.02)
ERYTHROCYTE [DISTWIDTH] IN BLOOD BY AUTOMATED COUNT: 17.7 % (ref 11.5–14.5)
GLUCOSE BLD STRIP.AUTO-MCNC: 120 MG/DL (ref 65–100)
GLUCOSE BLD STRIP.AUTO-MCNC: 126 MG/DL (ref 65–100)
GLUCOSE BLD STRIP.AUTO-MCNC: 149 MG/DL (ref 65–100)
GLUCOSE BLD STRIP.AUTO-MCNC: 160 MG/DL (ref 65–100)
GLUCOSE SERPL-MCNC: 130 MG/DL (ref 65–100)
HCT VFR BLD AUTO: 25.4 % (ref 35–47)
HGB BLD-MCNC: 8.2 G/DL (ref 11.5–16)
MCH RBC QN AUTO: 24.3 PG (ref 26–34)
MCHC RBC AUTO-ENTMCNC: 32.3 G/DL (ref 30–36.5)
MCV RBC AUTO: 75.4 FL (ref 80–99)
NRBC # BLD: 0.02 K/UL (ref 0–0.01)
NRBC BLD-RTO: 0.2 PER 100 WBC
PLATELET # BLD AUTO: 236 K/UL (ref 150–400)
PMV BLD AUTO: 11.1 FL (ref 8.9–12.9)
POTASSIUM SERPL-SCNC: 3.4 MMOL/L (ref 3.5–5.1)
RBC # BLD AUTO: 3.37 M/UL (ref 3.8–5.2)
SERVICE CMNT-IMP: ABNORMAL
SODIUM SERPL-SCNC: 142 MMOL/L (ref 136–145)
WBC # BLD AUTO: 10 K/UL (ref 3.6–11)

## 2019-09-06 PROCEDURE — 82962 GLUCOSE BLOOD TEST: CPT

## 2019-09-06 PROCEDURE — 36415 COLL VENOUS BLD VENIPUNCTURE: CPT

## 2019-09-06 PROCEDURE — 74177 CT ABD & PELVIS W/CONTRAST: CPT

## 2019-09-06 PROCEDURE — 77010033678 HC OXYGEN DAILY

## 2019-09-06 PROCEDURE — 74011250637 HC RX REV CODE- 250/637: Performed by: NURSE PRACTITIONER

## 2019-09-06 PROCEDURE — 74011636637 HC RX REV CODE- 636/637: Performed by: SURGERY

## 2019-09-06 PROCEDURE — 74011000255 HC RX REV CODE- 255: Performed by: SURGERY

## 2019-09-06 PROCEDURE — 65270000029 HC RM PRIVATE

## 2019-09-06 PROCEDURE — 85027 COMPLETE CBC AUTOMATED: CPT

## 2019-09-06 PROCEDURE — 74011250636 HC RX REV CODE- 250/636: Performed by: SURGERY

## 2019-09-06 PROCEDURE — 74011636320 HC RX REV CODE- 636/320: Performed by: SURGERY

## 2019-09-06 PROCEDURE — 80048 BASIC METABOLIC PNL TOTAL CA: CPT

## 2019-09-06 PROCEDURE — 74011250637 HC RX REV CODE- 250/637: Performed by: SURGERY

## 2019-09-06 PROCEDURE — 94760 N-INVAS EAR/PLS OXIMETRY 1: CPT

## 2019-09-06 RX ORDER — BARIUM SULFATE 20 MG/ML
900 SUSPENSION ORAL
Status: COMPLETED | OUTPATIENT
Start: 2019-09-06 | End: 2019-09-06

## 2019-09-06 RX ORDER — SODIUM CHLORIDE 0.9 % (FLUSH) 0.9 %
10 SYRINGE (ML) INJECTION
Status: COMPLETED | OUTPATIENT
Start: 2019-09-06 | End: 2019-09-06

## 2019-09-06 RX ADMIN — OXYCODONE HYDROCHLORIDE 5 MG: 5 TABLET ORAL at 21:12

## 2019-09-06 RX ADMIN — FLUTICASONE FUROATE AND VILANTEROL TRIFENATATE 1 PUFF: 200; 25 POWDER RESPIRATORY (INHALATION) at 09:45

## 2019-09-06 RX ADMIN — GUAIFENESIN 600 MG: 600 TABLET, EXTENDED RELEASE ORAL at 09:44

## 2019-09-06 RX ADMIN — Medication 10 ML: at 11:46

## 2019-09-06 RX ADMIN — Medication 10 ML: at 21:15

## 2019-09-06 RX ADMIN — IPRATROPIUM BROMIDE 2 SPRAY: 42 SPRAY NASAL at 09:45

## 2019-09-06 RX ADMIN — ENOXAPARIN SODIUM 40 MG: 40 INJECTION SUBCUTANEOUS at 21:06

## 2019-09-06 RX ADMIN — METOPROLOL TARTRATE 25 MG: 25 TABLET ORAL at 09:45

## 2019-09-06 RX ADMIN — NITROFURANTOIN MONOHYDRATE/MACROCRYSTALLINE 100 MG: 25; 75 CAPSULE ORAL at 17:09

## 2019-09-06 RX ADMIN — METOPROLOL TARTRATE 25 MG: 25 TABLET ORAL at 17:06

## 2019-09-06 RX ADMIN — IOPAMIDOL 100 ML: 755 INJECTION, SOLUTION INTRAVENOUS at 11:46

## 2019-09-06 RX ADMIN — Medication 10 ML: at 17:11

## 2019-09-06 RX ADMIN — AMLODIPINE BESYLATE 10 MG: 5 TABLET ORAL at 09:44

## 2019-09-06 RX ADMIN — GUAIFENESIN 600 MG: 600 TABLET, EXTENDED RELEASE ORAL at 21:06

## 2019-09-06 RX ADMIN — OXYCODONE HYDROCHLORIDE 10 MG: 5 TABLET ORAL at 17:10

## 2019-09-06 RX ADMIN — INSULIN GLARGINE 20 UNITS: 100 INJECTION, SOLUTION SUBCUTANEOUS at 21:06

## 2019-09-06 RX ADMIN — NITROFURANTOIN MONOHYDRATE/MACROCRYSTALLINE 100 MG: 25; 75 CAPSULE ORAL at 09:44

## 2019-09-06 RX ADMIN — MONTELUKAST 10 MG: 10 TABLET, FILM COATED ORAL at 21:06

## 2019-09-06 RX ADMIN — OXYCODONE HYDROCHLORIDE 10 MG: 5 TABLET ORAL at 03:20

## 2019-09-06 RX ADMIN — BARIUM SULFATE 900 ML: 20 SUSPENSION ORAL at 09:52

## 2019-09-06 NOTE — PROGRESS NOTES
Physical Therapy  9/6/2019    Patient off the floor for CT. Will follow up as time allows this afternoon.     Thank Damaris Sánchez, PT, DPT

## 2019-09-06 NOTE — PROGRESS NOTES
SURGERY PROGRESS NOTE      Admit Date: 2019    POD 5 Days Post-Op    Procedure: Procedure(s):  EXPLORATORY LAPAROTOMY, EXTENSIVE SMALL BOWEL ADHESIOLYSIS, ILEOCOLONIC ANASTAMOSIS, TRANSVERSE AND RIGHT COLECTOMY, AND  APPENDECTOMY      Subjective:     Patient complains of worsening abdominal pain, malaise, and bloating       Objective:     Visit Vitals  /66 (BP 1 Location: Right arm, BP Patient Position: At rest)   Pulse 90   Temp 97.7 °F (36.5 °C)   Resp 16   Ht 5' 5\" (1.651 m)   Wt 87.1 kg (192 lb 0.3 oz)   SpO2 96%   BMI 31.95 kg/m²        Temp (24hrs), Av.3 °F (36.8 °C), Min:97.7 °F (36.5 °C), Max:99.6 °F (37.6 °C)      No intake/output data recorded.  1901 -  0700  In: 1422.9 [I.V.:1422.9]  Out: 0     Physical Exam:    General:  alert, cooperative, no distress, appears stated age   Abdomen: soft, distended, bowel sounds hypoactive, angel   Incision:   healing well, no drainage, no erythema, no hernia, no seroma, no swelling, no dehiscence, incision well approximated           Lab Results   Component Value Date/Time    WBC 10.0 2019 03:23 AM    HGB 8.2 (L) 2019 03:23 AM    HCT 25.4 (L) 2019 03:23 AM    PLATELET 554  03:23 AM    MCV 75.4 (L) 2019 03:23 AM     Lab Results   Component Value Date/Time    GFR est non-AA >60 2019 03:23 AM    GFR est AA >60 2019 03:23 AM    Creatinine 0.70 2019 03:23 AM    BUN 4 (L) 2019 03:23 AM    Sodium 142 2019 03:23 AM    Potassium 3.4 (L) 2019 03:23 AM    Chloride 112 (H) 2019 03:23 AM    CO2 24 2019 03:23 AM    Magnesium 1.8 2019 10:16 AM       Assessment:     Active Problems:    HTN (hypertension) (2017)      Type II diabetes mellitus ([address] (2017)      History of pancreatitis (10/30/2018)      Other partial intestinal obstruction ([address] (2019)      Bowel obstruction ([address] (2019)    POD#5 segmental colon resection with worsening pain.   Will get CT scan to assess for abscess or other intraabdominal problem     Plan:       CT ABD

## 2019-09-06 NOTE — PROGRESS NOTES
General Surgery End of Shift Nursing Note    Bedside shift change report given to MINGO Soriano (oncoming nurse) by Jann Ellis RN (offgoing nurse). Report included the following information SBAR and Recent Results. Shift worked:   8311-0415   Summary of shift:    No changes overnight. Medicated for pain prn. VSS. Placed on 2L NC again tonight for O2 sats of 88% while sleeping. Issues for physician to address:   Possible need for O2     Number times ambulated in hallway past shift: 0    Number of times OOB to chair past shift: 0    Pain Management:  Current medication: prn 5-10 mg oxycodone  Patient states pain is manageable on current pain medication: YES    GI:    Current diet:  DIET GI LITE (POST SURGICAL) No Conc. Sweets    Tolerating current diet: YES  Passing flatus: YES  Last Bowel Movement: yesterday   Appearance: diarrhea  Respiratory:    Incentive Spirometer at bedside: YES  Patient instructed on use: YES    Patient Safety:    Falls Score: 4  Bed Alarm On? No  Sitter?  No    Violeta Owens RN

## 2019-09-06 NOTE — PROGRESS NOTES
Initial Nutrition Assessment:    INTERVENTIONS/RECOMMENDATIONS:   · Meals/Snacks: General/healthful diet:  Diet progression as tolerated. Enc po. ASSESSMENT:   9/6:  Chart reviewed; med noted for POD#5 EXPLORATORY LAPAROTOMY, EXTENSIVE SMALL BOWEL ADHESIOLYSIS, ILEOCOLONIC ANASTAMOSIS, TRANSVERSE AND RIGHT COLECTOMY, AND  APPENDECTOMY. Hx of DM, pancreatitis. Diet Order: Other (comment)(GI Lite)  % Eaten:    Patient Vitals for the past 72 hrs:   % Diet Eaten   09/04/19 1030 100 %   09/04/19 0804 0 %     Pertinent Medications: [x]Reviewed []Other  Pertinent Labs: [x]Reviewed []Other  Food Allergies: [x]None []Other   Last BM:    [x]Active     []Hyperactive  []Hypoactive       [] Absent BS  Skin:    [] Intact   [x] Incision  [] Breakdown  [] Other:    Anthropometrics:   Height: 5' 5\" (165.1 cm) Weight: 87.1 kg (192 lb 0.3 oz)   IBW (%IBW):   ( ) UBW (%UBW):   (  %)   Last Weight Metrics:  Weight Loss Metrics 8/30/2019 8/26/2019 8/18/2019 8/14/2019 8/6/2019 7/24/2019 7/22/2019   Today's Wt 192 lb 0.3 oz 195 lb 3.2 oz 196 lb 3.2 oz 211 lb 9.6 oz 198 lb 198 lb 14.4 oz 197 lb   BMI 31.95 kg/m2 32.48 kg/m2 32.65 kg/m2 35.21 kg/m2 32.95 kg/m2 33.1 kg/m2 32.78 kg/m2       BMI: Body mass index is 31.95 kg/m². This BMI is indicative of:   []Underweight    []Normal    [x]Overweight    [] Obesity   [] Extreme Obesity (BMI>40)     Estimated Nutrition Needs (Based on):   1866 Kcals/day(BMR (1436) x 1. 3AF) , 87 g(1.0 g/kg bw) Protein  Carbohydrate:  At Least 130 g/day  Fluids: 1900 mL/day (1ml/kcal)    NUTRITION DIAGNOSES:   Problem:  Inadequate protein-energy intake      Etiology: related to bowel obstruction     Signs/Symptoms: as evidenced by slow diet progression      NUTRITION INTERVENTIONS:  Meals/Snacks: General/healthful diet                  GOAL:   PO intake at least 50% of meals next 5-7 days    LEARNING NEEDS (Diet, Food/Nutrient-Drug Interaction):    [x] None Identified   [] Identified and Education Provided/Documented   [] Identified and Pt declined/was not appropriate     Cultureal, Yarsani, OR Ethnic Dietary Needs:    [x] None Identified   [] Identified and Addressed     [x] Interdisciplinary Care Plan Reviewed/Documented    [x] Discharge Planning:  Continue diet as tolerated     MONITORING /EVALUATION:   Food/Nutrient Intake Outcomes:  Total energy intake  Physical Signs/Symptoms Outcomes: Weight/weight change    NUTRITION RISK:    [] Patient At Nutritional Risk    [] Patient Not At Nutritional Risk    PT SEEN FOR:    []  MD Consult: []Calorie Count      []Diabetic Diet Education        []Diet Education     []Electrolyte Management     []General Nutrition Management and Supplements     []Management of Tube Feeding     []TPN Recommendations    []  RN Referral:  []MST score >=2     []Enteral/Parenteral Nutrition PTA     []Pregnant: Gestational DM or Multigestation     []Pressure Ulcer/Wound Care needs        []  Low BMI  [x]  MAGNUS Ji Nunez, [address] [place] [address]  Pager [phone]  Weekend Pager [phone]

## 2019-09-06 NOTE — PROGRESS NOTES
Hospitalist Progress Note    NAME: Alaina Silva   :  1955   MRN:  [id number]       Assessment / Plan:    30.0 - 39.9 Obese / Body mass index is 31.95 kg/m². Small bowel obstruction, resolving  -General surgery following  -Status post exploratory laparotomy with d right hemicolectomy  -Advance p.o. as tolerated as per Gen surgery recommendations  -Serial abdominal examination    History of hypertension, uncontrolled  -Continue amlodipine as tolerated  - likely 2/2 to pain too  - since oral intake is limited now, will add IV hydralazin 10 mg pr for SBP >150 mmHg    History of asthma, not in exacerbation  -Intermittent  -Continue albuterol as needed and inhaled corticosteroids as tolerated  -she needs PFTs as out patient  -smoking cessation     Acute blood loss anemia  - chronic microcytic anemia  - iron studies are compatible with BELINDA and ACD  -Hbg is trending down slowly  - f/u Hbg today and if is still trending down, then H&H q 8 hr  - transfused one unit of PRBCs yesterday  - iron 325 mg every other day  - she will require further work up as out patient EGD/colonosscopy/Celiac disease screening    History of AAA  -Hemodynamically stable  -Status post stent placement as the patient reported  -smoking cessation     History of hyperlipidemia  -Statin was held because of the bowel obstruction, will resume once p.o. tolerated    History of tobacco use  -Patient was encouraged to quit        Code status: Full  Prophylaxis: Lovenox  Recommended Disposition: SAH/Rehab     Subjective:       Patient was seen and examined this morning. No acute events overnight. Today she reported \"I am having abdominal pain again and I am not getting enough food\". Patient was informed about the necessity of limiting her oral intake now due to the recent SBO. All her questions answered. Discussed with RN events overnight.      Review of Systems:  Symptom Y/N Comments  Symptom Y/N Comments   Fever/Chills N   Chest Pain N Poor Appetite N   Edema N    Cough N   Abdominal Pain y    Sputum N   Joint Pain     SOB/BECKER N   Pruritis/Rash     Nausea/vomit    Tolerating PT/OT     Diarrhea    Tolerating Diet     Constipation    Other         Objective:     VITALS:   Last 24hrs VS reviewed since prior progress note. Most recent are:  Patient Vitals for the past 24 hrs:   Temp Pulse Resp BP SpO2   09/06/19 0805 97.7 °F (36.5 °C) 90 16 156/66 96 %   09/06/19 0316 98.6 °F (37 °C) 91 16 156/71 92 %   09/06/19 0026 99.6 °F (37.6 °C) 96 16 155/63 94 %   09/05/19 1954 98.1 °F (36.7 °C) 90 16 145/56 93 %   09/05/19 1527 98 °F (36.7 °C) 82 16 138/52 99 %   09/05/19 1214 98 °F (36.7 °C) 73 18 -- 99 %       Intake/Output Summary (Last 24 hours) at 9/6/2019 0930  Last data filed at 9/6/2019 0659  Gross per 24 hour   Intake 470.41 ml   Output --   Net 470.41 ml        PHYSICAL EXAM:  General: WD, WN. Alert, cooperative, no acute distress    EENT:  EOMI. Anicteric sclerae. MMM  Resp:  CTA bilaterally, no wheezing or rales. No accessory muscle use  CV:  Regular  rhythm,  No edema  GI:  Soft, Non distended, mild tenderness.  +Bowel sounds  Neurologic:  Alert and oriented X 3, normal speech,   Psych:   Good insight. Not anxious nor agitated  Skin:  No rashes. No jaundice    Reviewed most current lab test results and cultures  YES  Reviewed most current radiology test results   YES  Review and summation of old records today    NO  Reviewed patient's current orders and MAR    YES  PMH/SH reviewed - no change compared to H&P  ________________________________________________________________________  Care Plan discussed with:    Comments   Patient X    Family  X    RN X    Care Manager X    Consultant  X                      Multidiciplinary team rounds were held today with , nursing, pharmacist and clinical coordinator. Patient's plan of care was discussed; medications were reviewed and discharge planning was addressed. ________________________________________________________________________  Total NON critical care TIME:  35   Minutes    Total CRITICAL CARE TIME Spent:   Minutes non procedure based      Comments   >50% of visit spent in counseling and coordination of care     ________________________________________________________________________  Snehal Torre MD     Procedures: see electronic medical records for all procedures/Xrays and details which were not copied into this note but were reviewed prior to creation of Plan. LABS:  I reviewed today's most current labs and imaging studies.   Pertinent labs include:  Recent Labs     09/06/19 0323 09/05/19 0239 09/04/19  1213   WBC 10.0 11.1* 17.3*   HGB 8.2* 8.3* 9.3*   HCT 25.4* 26.8* 30.5*    228 172     Recent Labs     09/06/19 0323 09/05/19  0239    142   K 3.4* 3.3*   * 112*   CO2 24 24   * 186*   BUN 4* 3*   CREA 0.70 0.79   CA 8.5 8.3*       Signed: Snehal Torre MD

## 2019-09-06 NOTE — PROGRESS NOTES
Problem: Diabetes Self-Management  Goal: *Disease process and treatment process  Description  Define diabetes and identify own type of diabetes; list 3 options for treating diabetes. Outcome: Progressing Towards Goal     Problem: Falls - Risk of  Goal: *Absence of Falls  Description  Document Gem Rubalcava Fall Risk and appropriate interventions in the flowsheet.   Outcome: Progressing Towards Goal  Note:   Fall Risk Interventions:  Mobility Interventions: Bed/chair exit alarm         Medication Interventions: Bed/chair exit alarm    Elimination Interventions: Bed/chair exit alarm, Call light in reach    History of Falls Interventions: Bed/chair exit alarm

## 2019-09-07 VITALS
HEIGHT: 65 IN | BODY MASS INDEX: 31.99 KG/M2 | DIASTOLIC BLOOD PRESSURE: 58 MMHG | RESPIRATION RATE: 18 BRPM | TEMPERATURE: 98.6 F | OXYGEN SATURATION: 94 % | WEIGHT: 192.02 LBS | HEART RATE: 87 BPM | SYSTOLIC BLOOD PRESSURE: 148 MMHG

## 2019-09-07 LAB
ANION GAP SERPL CALC-SCNC: 5 MMOL/L (ref 5–15)
BUN SERPL-MCNC: 4 MG/DL (ref 6–20)
BUN/CREAT SERPL: 6 (ref 12–20)
CALCIUM SERPL-MCNC: 9 MG/DL (ref 8.5–10.1)
CHLORIDE SERPL-SCNC: 108 MMOL/L (ref 97–108)
CO2 SERPL-SCNC: 30 MMOL/L (ref 21–32)
CREAT SERPL-MCNC: 0.65 MG/DL (ref 0.55–1.02)
ERYTHROCYTE [DISTWIDTH] IN BLOOD BY AUTOMATED COUNT: 17.3 % (ref 11.5–14.5)
GLUCOSE BLD STRIP.AUTO-MCNC: 85 MG/DL (ref 65–100)
GLUCOSE SERPL-MCNC: 81 MG/DL (ref 65–100)
HCT VFR BLD AUTO: 24.7 % (ref 35–47)
HGB BLD-MCNC: 7.9 G/DL (ref 11.5–16)
MCH RBC QN AUTO: 24.2 PG (ref 26–34)
MCHC RBC AUTO-ENTMCNC: 32 G/DL (ref 30–36.5)
MCV RBC AUTO: 75.8 FL (ref 80–99)
NRBC # BLD: 0 K/UL (ref 0–0.01)
NRBC BLD-RTO: 0 PER 100 WBC
PLATELET # BLD AUTO: 266 K/UL (ref 150–400)
PMV BLD AUTO: 11.5 FL (ref 8.9–12.9)
POTASSIUM SERPL-SCNC: 3 MMOL/L (ref 3.5–5.1)
RBC # BLD AUTO: 3.26 M/UL (ref 3.8–5.2)
SERVICE CMNT-IMP: NORMAL
SODIUM SERPL-SCNC: 143 MMOL/L (ref 136–145)
WBC # BLD AUTO: 9.1 K/UL (ref 3.6–11)

## 2019-09-07 PROCEDURE — 85027 COMPLETE CBC AUTOMATED: CPT

## 2019-09-07 PROCEDURE — 74011250637 HC RX REV CODE- 250/637: Performed by: NURSE PRACTITIONER

## 2019-09-07 PROCEDURE — 36415 COLL VENOUS BLD VENIPUNCTURE: CPT

## 2019-09-07 PROCEDURE — 74011250637 HC RX REV CODE- 250/637: Performed by: INTERNAL MEDICINE

## 2019-09-07 PROCEDURE — 82962 GLUCOSE BLOOD TEST: CPT

## 2019-09-07 PROCEDURE — 74011250637 HC RX REV CODE- 250/637: Performed by: SURGERY

## 2019-09-07 PROCEDURE — 94760 N-INVAS EAR/PLS OXIMETRY 1: CPT

## 2019-09-07 PROCEDURE — 77010033678 HC OXYGEN DAILY

## 2019-09-07 PROCEDURE — 80048 BASIC METABOLIC PNL TOTAL CA: CPT

## 2019-09-07 RX ORDER — POTASSIUM CHLORIDE 20 MEQ/1
20 TABLET, EXTENDED RELEASE ORAL 2 TIMES DAILY
Status: DISCONTINUED | OUTPATIENT
Start: 2019-09-07 | End: 2019-09-07 | Stop reason: HOSPADM

## 2019-09-07 RX ORDER — OXYCODONE HYDROCHLORIDE 10 MG/1
10 TABLET ORAL
Qty: 28 TAB | Refills: 0 | Status: SHIPPED | OUTPATIENT
Start: 2019-09-07 | End: 2019-09-10

## 2019-09-07 RX ADMIN — NITROFURANTOIN MONOHYDRATE/MACROCRYSTALLINE 100 MG: 25; 75 CAPSULE ORAL at 08:47

## 2019-09-07 RX ADMIN — FLUTICASONE FUROATE AND VILANTEROL TRIFENATATE 1 PUFF: 200; 25 POWDER RESPIRATORY (INHALATION) at 09:08

## 2019-09-07 RX ADMIN — OXYCODONE HYDROCHLORIDE 10 MG: 5 TABLET ORAL at 02:48

## 2019-09-07 RX ADMIN — POTASSIUM CHLORIDE 20 MEQ: 20 TABLET, EXTENDED RELEASE ORAL at 08:46

## 2019-09-07 RX ADMIN — METOPROLOL TARTRATE 25 MG: 25 TABLET ORAL at 08:47

## 2019-09-07 RX ADMIN — Medication 10 ML: at 05:05

## 2019-09-07 RX ADMIN — AMLODIPINE BESYLATE 10 MG: 5 TABLET ORAL at 08:47

## 2019-09-07 RX ADMIN — FERROUS SULFATE TAB 325 MG (65 MG ELEMENTAL FE) 325 MG: 325 (65 FE) TAB at 08:47

## 2019-09-07 RX ADMIN — Medication 10 ML: at 09:10

## 2019-09-07 RX ADMIN — GUAIFENESIN 600 MG: 600 TABLET, EXTENDED RELEASE ORAL at 08:47

## 2019-09-07 RX ADMIN — OXYCODONE HYDROCHLORIDE 10 MG: 5 TABLET ORAL at 08:46

## 2019-09-07 NOTE — DISCHARGE SUMMARY
DISCHARGE SUMMARY      Patient ID:  Yazmin Prescott  [id number]  17 y.o.  1955    Admit date: 8/30/2019    Discharge date and time: 9/7/2019 10:18 AM     Admitting Physician: Nasra Sultana MD     Discharge Physician: Victor Hugo Saldana MD      Admission Diagnoses: Bowel obstruction ([address] [M68.579]    Discharge Diagnoses: Active Problems:    HTN (hypertension) (8/7/2017)      Type II diabetes mellitus ([address] (8/7/2017)      History of pancreatitis (10/30/2018)      Other partial intestinal obstruction ([address] (8/30/2019)      Bowel obstruction ([address] (8/30/2019)        Procedures: Procedure(s):  EXPLORATORY LAPAROTOMY, EXTENSIVE SMALL BOWEL ADHESIOLYSIS, ILEOCOLONIC ANASTAMOSIS, TRANSVERSE AND RIGHT COLECTOMY, AND  APPENDECTOMY    Consults: general surgery        Hospital Course:   Patient was admitted with a stricture of her transverse colon. She was managed with bowel rest and additional imaging was done during her the first two days of her admission. She showed no signs of improvement. She was taken to the OR on POD#3 and underwent a segmental colon resection. Pathology showed it was a benign stricture from adhesions and synthetic mesh. She had relatively quick return of bowel function. By POD#4 she was tolerating a general diet and having bowel movements. She had some increasing pain so a CT scan was done and POD#5. It showed normal postoperative findings. She felt much better on POD#6 she she was discharged home. On discharge patient is without pain, ambulating, and tolerating her diet. D/C Disposition: home     Patient Instructions:   Cannot display discharge medications since this patient is not currently admitted.       Diet: resume pre-hospital diet    Follow-up with Dr William Daugherty next week for staple removal        Signed:  Victor Hugo Saldana MD  9/7/2019  10:36 AM

## 2019-09-07 NOTE — PROGRESS NOTES
Hospitalist Progress Note    NAME: Mari Hooker   :  1955   MRN:  [id number]       Assessment / Plan:    30.0 - 39.9 Obese / Body mass index is 31.95 kg/m². Small bowel obstruction, resolving  -General surgery following  -Status post exploratory laparotomy with d right hemicolectomy  -Advance p.o. as tolerated as per Gen surgery recommendations  -Serial abdominal examination  - for CT abdomen today    History of hypertension, uncontrolled  -Continue amlodipine as tolerated  - likely 2/2 to pain too  - since oral intake is limited now, will add IV hydralazin 10 mg pr for SBP >150 mmHg    History of asthma, not in exacerbation  -Intermittent  -Continue albuterol as needed and inhaled corticosteroids as tolerated  -she needs PFTs as out patient  -smoking cessation     Acute blood loss anemia  - chronic microcytic anemia  - iron studies are compatible with BELINDA and ACD  -Hbg is trending down slowly  - f/u Hbg today and if is still trending down, then H&H q 8 hr  - transfused one unit of PRBCs yesterday  - iron 325 mg every other day  - she will require further work up as out patient EGD/colonosscopy/Celiac disease screening    History of AAA  -Hemodynamically stable  -Status post stent placement as the patient reported  -smoking cessation     History of hyperlipidemia  -Statin was held because of the bowel obstruction, will resume once p.o. tolerated    History of tobacco use  -Patient was encouraged to quit        Code status: Full  Prophylaxis: Lovenox  Recommended Disposition: SAH/Rehab     Subjective:       Patient was seen and examined this morning. No acute events overnight. Today she reported \"pain is still there\". Family at bedside, all questions answered. Discussed with RN events overnight.      Review of Systems:  Symptom Y/N Comments  Symptom Y/N Comments   Fever/Chills N   Chest Pain N    Poor Appetite N   Edema N    Cough N   Abdominal Pain y    Sputum N   Joint Pain     SOB/BECKER N Pruritis/Rash     Nausea/vomit    Tolerating PT/OT     Diarrhea    Tolerating Diet     Constipation    Other         Objective:     VITALS:   Last 24hrs VS reviewed since prior progress note. Most recent are:  Patient Vitals for the past 24 hrs:   Temp Pulse Resp BP SpO2   09/07/19 0732 98.6 °F (37 °C) 87 18 148/58 98 %   09/07/19 0235 99.4 °F (37.4 °C) 86 18 152/59 98 %   09/06/19 2254 98.9 °F (37.2 °C) 81 17 148/51 97 %   09/06/19 1837 99.7 °F (37.6 °C) 80 16 152/60 96 %   09/06/19 1600 99.2 °F (37.3 °C) 88 16 144/59 98 %       Intake/Output Summary (Last 24 hours) at 9/7/2019 0939  Last data filed at 9/7/2019 0659  Gross per 24 hour   Intake 600 ml   Output --   Net 600 ml        PHYSICAL EXAM:  General: WD, WN. Alert, cooperative, no acute distress    EENT:  EOMI. Anicteric sclerae. MMM  Resp:  CTA bilaterally, no wheezing or rales. No accessory muscle use  CV:  Regular  rhythm,  No edema  GI:  Soft, Non distended, mild tenderness.  +Bowel sounds  Neurologic:  Alert and oriented X 3, normal speech,   Psych:   Good insight. Not anxious nor agitated  Skin:  No rashes. No jaundice    Reviewed most current lab test results and cultures  YES  Reviewed most current radiology test results   YES  Review and summation of old records today    NO  Reviewed patient's current orders and MAR    YES  PMH/SH reviewed - no change compared to H&P  ________________________________________________________________________  Care Plan discussed with:    Comments   Patient X    Family  X    RN X    Care Manager     Consultant                        Multidiciplinary team rounds were held today with , nursing, pharmacist and clinical coordinator. Patient's plan of care was discussed; medications were reviewed and discharge planning was addressed.      ________________________________________________________________________  Total NON critical care TIME:  35   Minutes    Total CRITICAL CARE TIME Spent:   Minutes non procedure based      Comments   >50% of visit spent in counseling and coordination of care     ________________________________________________________________________  Radha Husain MD     Procedures: see electronic medical records for all procedures/Xrays and details which were not copied into this note but were reviewed prior to creation of Plan. LABS:  I reviewed today's most current labs and imaging studies.   Pertinent labs include:  Recent Labs     09/07/19 0347 09/06/19 0323 09/05/19  0239   WBC 9.1 10.0 11.1*   HGB 7.9* 8.2* 8.3*   HCT 24.7* 25.4* 26.8*    236 228     Recent Labs     09/07/19 0347 09/06/19 0323 09/05/19  0239    142 142   K 3.0* 3.4* 3.3*    112* 112*   CO2 30 24 24   GLU 81 130* 186*   BUN 4* 4* 3*   CREA 0.65 0.70 0.79   CA 9.0 8.5 8.3*       Signed: Radha Husain MD

## 2019-09-07 NOTE — DISCHARGE INSTRUCTIONS
Patient Education        Open Bowel Resection: What to Expect at [address] are likely to have pain that comes and goes for the next few days after bowel surgery. You may have bowel cramps, and your cut (incision) may hurt. You may also feel like you have the flu. You may have a low fever and feel tired and nauseated. This is common. You should feel better after a week and will probably be back to normal in 2 to 3 weeks. This care sheet gives you a general idea about how long it will take for you to recover. But each person recovers at a different pace. Follow the steps below to get better as quickly as possible. How can you care for yourself at home? Activity    · Rest when you feel tired. Getting enough sleep will help you recover.     · Try to walk each day. Start by walking a little more than you did the day before. Bit by bit, increase the amount you walk. Walking boosts blood flow and helps prevent pneumonia and constipation.     · Avoid strenuous activities, such as biking, jogging, weight lifting, or aerobic exercise, until your doctor says it is okay.     · Ask your doctor when you can drive again.     · You will probably need to take 3 to 4 weeks off from work. It depends on the type of work you do and how you feel. You may need to take off 4 to 6 weeks if you lift heavy objects in your job.     · You may shower 24 to 48 hours after surgery, if your doctor says it is okay. Pat the cut (incision) dry. Do not take a bath for the first 2 weeks, or until your doctor tells you it is okay.     · Ask your doctor when it is okay for you to have sex. Diet    · You may not have much appetite after the surgery. But try to eat a healthy diet. Your doctor will tell you about any foods you should not eat.     · Eat a low-fiber diet for several weeks after surgery. Eat many small meals throughout the day. Add high-fiber foods a little at a time.     · Eat yogurt.  It puts good bacteria into your colon and helps prevent diarrhea.     · Try to avoid nuts, seeds, and corn for a while. They may be hard to digest.     · You may need to take vitamins that contain sodium and potassium. Ask your doctor.     · Drink plenty of fluids to avoid becoming dehydrated. Medicines    · Your doctor will tell you if and when you can restart your medicines. He or she will also give you instructions about taking any new medicines.     · If you take blood thinners, such as warfarin (Coumadin), clopidogrel (Plavix), or aspirin, be sure to talk to your doctor. He or she will tell you if and when to start taking those medicines again. Make sure that you understand exactly what your doctor wants you to do.     · Take pain medicines exactly as directed. ? If the doctor gave you a prescription medicine for pain, take it as prescribed. ? If you are not taking a prescription pain medicine, ask your doctor if you can take an over-the-counter medicine. ? Do not take two or more pain medicines at the same time unless the doctor told you to. Many pain medicines have acetaminophen, which is Tylenol. Too much acetaminophen (Tylenol) can be harmful.     · If you think your pain medicine is making you sick to your stomach:  ? Take your medicine after meals (unless your doctor tells you not to). ? Ask your doctor for a different pain medicine.     · If your doctor prescribed antibiotics, take them as directed. Do not stop taking them just because you feel better. You need to take the full course of antibiotics.     · You may need to take some medicines in a different form. You will be told whether to crush pills or take a liquid form of the medicine.     · If your doctor gives you a stool softener, take it as directed. Incision care    · If you have strips of tape on the incision, leave the tape on for a week or until it falls off.     · Wash the area daily with warm, soapy water, and pat it dry.    Follow-up care is a key part of your treatment and safety. Be sure to make and go to all appointments, and call your doctor if you are having problems. It's also a good idea to know your test results and keep a list of the medicines you take. When should you call for help? Call 911 anytime you think you may need emergency care. For example, call if:    · You passed out (lost consciousness).     · You are short of breath.    Call your doctor now or seek immediate medical care if:    · You are sick to your stomach and cannot drink fluids or keep them down.     · You have signs of a blood clot in your leg (called a deep vein thrombosis), such as:  ? Pain in your calf, back of the knee, thigh, or groin. ? Redness and swelling in your leg or groin.     · You have signs of infection, such as:  ? Increased pain, swelling, warmth, or redness. ? Red streaks leading from the incision. ? Pus draining from the incision. ? A fever.     · You have pain that does not get better after you take pain medicine.     · You have loose stitches, or your incision comes open.     · Bright red blood has soaked through the bandage.     · You cannot pass stools or gas.    Watch closely for any changes in your health, and be sure to contact your doctor if you have any problems. Where can you learn more? Go to http://cherise-robi.info/. Enter 608 [id number] in the search box to learn more about \"Open Bowel Resection: What to Expect at Home. \"  Current as of: November 7, 2018  Content Version: 12.1  © 4604-8982 Healthwise, Incorporated. Care instructions adapted under license by [employer] (which disclaims liability or warranty for this information). If you have questions about a medical condition or this instruction, always ask your healthcare professional. [place] [address] any warranty or liability for your use of this information. Please call [phone] to make a follow up appointment with Andrzej Ohara or Elie in 2 weeks [employer] [facility] of [address] [facility], [address] [place] [address], [address] [facility]  [place], [place]  [phone]  Fax [phone]

## 2019-09-07 NOTE — PROGRESS NOTES
Problem: Diabetes Self-Management  Goal: *Disease process and treatment process  Description  Define diabetes and identify own type of diabetes; list 3 options for treating diabetes. Outcome: Progressing Towards Goal  Goal: *Incorporating nutritional management into lifestyle  Description  Describe effect of type, amount and timing of food on blood glucose; list 3 methods for planning meals. Outcome: Progressing Towards Goal  Goal: *Incorporating physical activity into lifestyle  Description  State effect of exercise on blood glucose levels. Outcome: Progressing Towards Goal  Goal: *Developing strategies to promote health/change behavior  Description  Define the ABC's of diabetes; identify appropriate screenings, schedule and personal plan for screenings. Outcome: Progressing Towards Goal  Goal: *Using medications safely  Description  State effect of diabetes medications on diabetes; name diabetes medication taking, action and side effects. Outcome: Progressing Towards Goal  Goal: *Monitoring blood glucose, interpreting and using results  Description  Identify recommended blood glucose targets  and personal targets. Outcome: Progressing Towards Goal  Goal: *Prevention, detection, treatment of acute complications  Description  List symptoms of hyper- and hypoglycemia; describe how to treat low blood sugar and actions for lowering  high blood glucose level. Outcome: Progressing Towards Goal  Goal: *Prevention, detection and treatment of chronic complications  Description  Define the natural course of diabetes and describe the relationship of blood glucose levels to long term complications of diabetes.   Outcome: Progressing Towards Goal  Goal: *Developing strategies to address psychosocial issues  Description  Describe feelings about living with diabetes; identify support needed and support network  Outcome: Progressing Towards Goal  Goal: *Insulin pump training  Outcome: Progressing Towards Goal  Goal: *Sick day guidelines  Outcome: Progressing Towards Goal  Goal: *Patient Specific Goal (EDIT GOAL, INSERT TEXT)  Outcome: Progressing Towards Goal     Problem: Patient Education: Go to Patient Education Activity  Goal: Patient/Family Education  Outcome: Progressing Towards Goal     Problem: Falls - Risk of  Goal: *Absence of Falls  Description  Document Yesenia Lee Fall Risk and appropriate interventions in the flowsheet.   Outcome: Progressing Towards Goal  Note:   Fall Risk Interventions:  Mobility Interventions: Patient to call before getting OOB         Medication Interventions: Patient to call before getting OOB, Teach patient to arise slowly    Elimination Interventions: Call light in reach    History of Falls Interventions: Bed/chair exit alarm         Problem: Patient Education: Go to Patient Education Activity  Goal: Patient/Family Education  Outcome: Progressing Towards Goal     Problem: Breathing Pattern - Ineffective  Goal: *Absence of hypoxia  Outcome: Progressing Towards Goal     Problem: Patient Education: Go to Patient Education Activity  Goal: Patient/Family Education  Outcome: Progressing Towards Goal

## 2019-09-07 NOTE — PROGRESS NOTES
End of Shift Note      Bedside shift change report given to  Roxene Prader, RN (incoming nurse) by Llye Martino (outgoing nurse) on [facility]. Report included the following information SBAR, MAR and Quality Measures. Shift Summary:  Pain meds given as needed. VSS. No acute events overnight      Issues for Physician to Address:       Patient on Cardiac Monitoring?     [] Yes  [x] No    Rhythm:         Shift Events        Lyle Martino

## 2019-09-07 NOTE — ROUTINE PROCESS
Pt's IV discontinued as pt is being discharged. Catheter tip intact, no redness or swelling noted at insertion site. Band-aid applied. Pt discharged per MD order. Pt has all personal belongings, one prescription, and discharge instructions. Discharge instructions gone over with pt. Pt does not have any further questions regarding discharge.

## 2019-09-10 ENCOUNTER — PATIENT OUTREACH (OUTPATIENT)
Dept: FAMILY MEDICINE CLINIC | Age: 64
End: 2019-09-10

## 2019-09-10 NOTE — PROGRESS NOTES
Hospital Discharge Follow-Up      Date/Time:  9/10/2019 3:34 PM    Patient was admitted to [facility] on 8-30-19 and discharged on 9-7-19 for:    Discharge Diagnoses: Active Problems:    HTN (hypertension) (8/7/2017)     Type II diabetes mellitus ([facility] [address] (8/7/2017)     History of pancreatitis (10/30/2018)     Other partial intestinal obstruction (HCC) (8/30/2019)     Bowel obstruction ([facility] [address] (8/30/2019)    The [facility] physician discharge summary was available at the time of outreach. Patient was contacted within two business days of discharge. Challenges reviewed with the provider   - Reports a \"little sore in stomach area. .\" Rates pain #7.  - Poor appetite, drinking \"a plenty of fluids. \"  - No nausea since discharge and has had 2-3 loose/liquidy stools since discharge. - Lost approximately 10 pounds over last 2 weeks. - Abdominal bandage in center of abdomen, above naval, has quarter-sized area of dried blood, per patient. - Hbg of 7.9 and HCT of 24.7 on 9-7-19. Component      Latest Ref Rng & Units 9/7/2019 9/6/2019 9/5/2019 9/4/2019           3:47 AM  3:23 AM  2:39 AM 12:13 PM   RBC      3.80 - 5.20 M/uL 3.26 (L) 3.37 (L) 3.58 (L) 3.90   HGB      11.5 - 16.0 g/dL 7.9 (L) 8.2 (L) 8.3 (L) 9.3 (L)   HCT      35.0 - 47.0 % 24.7 (L) 25.4 (L) 26.8 (L) 30.5 (L)     Component      Latest Ref Rng & Units 9/3/2019 9/2/2019           3:46 AM  5:10 AM   RBC      3.80 - 5.20 M/uL 3.10 (L) 3.47 (L)   HGB      11.5 - 16.0 g/dL 7.6 (L) 8.3 (L)   HCT      35.0 - 47.0 % 23.8 (L) 27.3 (L)     Component      Latest Ref Rng & Units 9/7/2019 9/6/2019 9/5/2019 9/2/2019           3:47 AM  3:23 AM  2:39 AM  5:10 AM   Potassium      3.5 - 5.1 mmol/L 3.0 (L) 3.4 (L) 3.3 (L) 4.3     Method of communication with provider :chart routing    Inpatient RRAT score: 32 on 9-7-19. Was this a readmission? yes   Patient stated reason for the readmission: \"I had severe pain in my stomach. \"     Nurse Navigator (NN) contacted the patient by telephone to perform post hospital discharge assessment. Verified name and  with patient as identifiers. Provided introduction to self, and explanation of the Nurse Navigator role. Reviewed discharge instructions and red flags with patient who verbalized understanding. Patient given an opportunity to ask questions and does not have any further questions or concerns at this time. The patient agrees to contact the PCP office for questions related to their healthcare. NN provided contact information for future reference. Disease Specific:   N/A    Summary of patient's problems:  1. Reports a \"little sore in stomach area. .\" Rates pain #7 on 1-10 scale today, using oxycodone as ordered. 2. Poor appetite, drinking \"a plenty of fluids. \" Patient states she can barely take in two small meals a day. 3. No nausea since discharge and has had 2-3 loose/liquidy stools since discharge. 4. Lost approximately 10 pounds over last 2 weeks. 5. Abdominal bandage in center of abdomen, above naval, has quarter-sized area of dried blood, per patient. Home health was not ordered at discharge for wound care. NN spoke with MIKHAIL Vela, inpatient case management, at [facility], who discussed with physician and stated that patient's bandage could remain in place until she saw surgeon on Friday, 19. NN reinforced to patient the importance of keeping Friday's appointment with general surgery and patient states she will do so. 6. Patient declines sooner ARLENE visit with a different provider, desires to keep her 19 ARLENE visit in place with Dr. Jess Fierro. 7. Hbg of 7.9 and HCT of 24.7 on 19. Home Health orders at discharge: [address]: n/a  Date of initial visit: [facility] ordered/company: n/a  Durable Medical Equipment received: n/a    Barriers to care? None noted at this time.      Advance Care Planning:   Does patient have an Advance Directive:  not on file; education provided     Medications per ED [facility] After Visit Summary dated 9-7-19:   New Medications at Discharge:   Start  oxyCODONE IR 10 mg Tab immediate release tablet  Commonly known as: ROXICODONE  Take 1 Tab by mouth every four (4) hours as needed for Pain for up to 3 days. Max Daily Amount: 60 mg.  10 mg    Changed Medications at Discharge: none    Discontinued Medications at Discharge: none    Medication reconciliation was performed with patient, who verbalizes understanding of administration of home medications. There were no barriers to obtaining medications identified at this time. Referral to Pharm D needed: no     Current Outpatient Medications   Medication Sig    oxyCODONE IR (ROXICODONE) 10 mg tab immediate release tablet Take 1 Tab by mouth every four (4) hours as needed for Pain for up to 3 days. Max Daily Amount: 60 mg.    simvastatin (ZOCOR) 40 mg tablet Take 40 mg by mouth nightly.  clopidogrel (PLAVIX) 75 mg tab Take 75 mg by mouth daily.  ascorbic acid, vitamin C, (VITAMIN C) 500 mg tablet Take 500 mg by mouth daily.  multivitamin (ONE A DAY) tablet Take 1 Tab by mouth.  amLODIPine (NORVASC) 10 mg tablet Take 10 mg by mouth daily.  insulin glargine (LANTUS,BASAGLAR) 100 unit/mL (3 mL) inpn 25 Units by SubCUTAneous route daily.  ipratropium (ATROVENT) 42 mcg (0.06 %) nasal spray 2 Sprays by Nasal route daily as needed.  fluticasone-salmeterol (ADVAIR DISKUS) 250-50 mcg/dose diskus inhaler Take 1 Puff by inhalation every twelve (12) hours.  metoprolol tartrate (LOPRESSOR) 25 mg tablet Take 25 mg by mouth two (2) times a day.  montelukast (SINGULAIR) 10 mg tablet Take 10 mg by mouth nightly. No current facility-administered medications for this visit. There are no discontinued medications.     [facility] follow up appointment(s):   Future Appointments   Date Time Provider Chance Cantor   9/13/2019  1:00 PM Magda Gabriel MD [address] [place] [address]   9/16/2019 11:30 AM Ezequiel Curry MD [facility] MAIN [place]LAINE [place]      Non-[facility] follow up appointment(s): None noted at this time. [facility]:  out of service area     Goals      Attends follow-up appointments as directed. 8-23-19: Patient has ARLENE visit scheduled with Dr. Cl Arias on 8-26-19 at 11:50am. Patient has general surgery follow-up appointments scheduled with Dr. Vivek Yu on 9-3-19 and 9-5-19. Cara Hammans     9-10-19: Patient was readmitted to [address] [place] [address] from 8-30-19 to 9-7-19 for bowel obstruction. Patient has ARLENE visit scheduled with Dr. Kristopher Lopez. Antoinette/PCP on 9-16-19 (offered sooner appointment with different provider and patient declined). Patient has general surgery follow-up scheduled with Dr. Car Lynne on 9-13-19. Asmita Luevano Returns to baseline activity level.      8-23-19: Patient states she is not currently exercising and would like to join an exercise class/group that may provide encouragement. Patient is currently walking the interior of her home as needed only. Patient states she is feeling well and is getting stronger each day. NN will reassess level of activity on next phone conversation with patient and NN will attempt to reach patient within 14-18 days. Rubina Pinaleigh ann     9-10-19: Patient is experiencing soreness/discomfort in her abdominal area today, still not active at home and not back to baseline. NN will reassess level of activity on next phone conversation and NN will attempt to reach patient within 7-14 days. Lucas Mosley resources in place to maintain patient in the community (ie. Home Health, DME equipment, refer to, medication assistant plan, etc.)      8-23-19: Patient reports she does not have family/children that live nearby and she is not able to depend on any neighbors for support. Patient states she would need to call on leonid based contacts should she need assistance and does not believe this would be reliable either. Patient lacks support system. FÁTIMA     9-10-19:  Today, patient reports she would be able to call upon family members as well as leonid based contacts should she need assistance. Patient's support system appears to be widening; however NN will reassess on next phone conversation and NN will attempt to reach patient within 7-14 days.  Larry Aviles

## 2019-09-13 ENCOUNTER — OFFICE VISIT (OUTPATIENT)
Dept: SURGERY | Age: 64
End: 2019-09-13

## 2019-09-13 VITALS
HEIGHT: 65 IN | OXYGEN SATURATION: 96 % | DIASTOLIC BLOOD PRESSURE: 76 MMHG | BODY MASS INDEX: 30.16 KG/M2 | WEIGHT: 181 LBS | SYSTOLIC BLOOD PRESSURE: 148 MMHG | HEART RATE: 90 BPM | TEMPERATURE: 98.7 F

## 2019-09-13 DIAGNOSIS — K56.52 INTESTINAL ADHESIONS WITH COMPLETE OBSTRUCTION (HCC): Primary | ICD-10-CM

## 2019-09-13 NOTE — PROGRESS NOTES
SUBJECTIVE: Korey Hirsch is a 66 y.o. female is seen for a routine postop check s/p right colectomy for bowel obstruction. Reports no problems with the wound or other issues. Activity, diet and bowels are normal. No pain. OBJECTIVE: Appears well. Wounds are well healed without complications or infection. Staples removed. ASSESSMENT: normal postoperative course, doing well. PLAN: Patient is instructed to avoid heavy lifting for 4 more weeks. Return PRN for any problems or concerns. One additional refill of oxycodone ordered.

## 2019-09-13 NOTE — PROGRESS NOTES
Chief Complaint   Patient presents with    Post OP Follow Up     Exploratory laparotomy, extended right hemicolectomy 9/1/19     1. Have you been to the ER, urgent care clinic since your last visit? Hospitalized since your last visit? No    2. Have you seen or consulted any other health care providers outside of the [address] [place] [address] since your last visit? Include any pap smears or colon screening.  No

## 2019-10-14 PROBLEM — E11.51 TYPE 2 DIABETES MELLITUS WITH PERIPHERAL VASCULAR DISEASE (HCC): Status: ACTIVE | Noted: 2019-10-14

## 2019-11-22 ENCOUNTER — OFFICE VISIT (OUTPATIENT)
Dept: SURGERY | Age: 64
End: 2019-11-22

## 2019-11-22 VITALS
TEMPERATURE: 98.4 F | HEART RATE: 80 BPM | OXYGEN SATURATION: 98 % | WEIGHT: 175.7 LBS | SYSTOLIC BLOOD PRESSURE: 116 MMHG | HEIGHT: 65 IN | DIASTOLIC BLOOD PRESSURE: 81 MMHG | BODY MASS INDEX: 29.27 KG/M2

## 2019-11-22 DIAGNOSIS — L02.91 ABSCESS: Primary | ICD-10-CM

## 2019-11-22 RX ORDER — HYDROCODONE BITARTRATE AND ACETAMINOPHEN 5; 325 MG/1; MG/1
1 TABLET ORAL
Qty: 10 TAB | Refills: 0 | Status: SHIPPED | OUTPATIENT
Start: 2019-11-22 | End: 2019-11-25

## 2019-11-22 RX ORDER — LIDOCAINE HYDROCHLORIDE AND EPINEPHRINE 10; 10 MG/ML; UG/ML
10 INJECTION, SOLUTION INFILTRATION; PERINEURAL ONCE
Qty: 1 VIAL | Refills: 0
Start: 2019-11-22 | End: 2019-11-22

## 2019-11-22 NOTE — PROGRESS NOTES
[facility] AT [address] [facility]  OFFICE PROCEDURE PROGRESS NOTE        Chart reviewed for the following:   [place]on [place] LPN, have reviewed the History, Physical and updated the Allergic reactions for [address] performed immediately prior to start of procedure:   [place] LPN, have performed the following reviews on Josephine Cornejo prior to the start of the procedure:            * Patient was identified by name and date of birth   * Agreement on procedure being performed was verified  * Risks and Benefits explained to the patient  * Procedure site verified and marked as necessary  * Patient was positioned for comfort  * Consent was signed and verified     Time: 1430      Date of procedure: 11/22/2019    Procedure performed by:  Yolanda Denny MD    Provider assisted by: Ignacio Lawrence LPN    Patient assisted by: self    How tolerated by patient: tolerated the procedure well with no complications    Post Procedural Pain Scale: 0 - No Hurt    Comments: none

## 2019-11-22 NOTE — PROGRESS NOTES
SUBJECTIVE: Jerardo Morse is a 66 y.o. female is seen for follow-up after early September 2019 emergent laparotomy and transverse colectomy and removal of abdominal wall mesh for colonic stricture and fistula. Patient did well originally, she does continue to smoke for which have asked her to quit, but she has had persistent drainage from the inferior aspect of the incision where the Penrose drain was placed intraoperatively and removed later. Her PCP referred her back for surgical evaluation. She apparently has been on antibiotics through PCP without improvement  OBJECTIVE: Appears well. Neuro C A and O x3  CV-  Reg rate  Lungs:  CTA  Abd  soft, subjective tenderness around the inferior aspect of the incision but patient admits that she has a very poor pain tolerance and is very sensitive. But there is no signs of infection, but there is a 1-1.5 cm area of poor healing skin and probably some fat necrosis beneath the skin causing some chronic granulation and poor wound healing      ASSESSMENT: Likely soft tissue fat necrosis and chronic granuloma could be related to absorbable PDS suture dissolving but more likely related to previous Penrose drain placement     Plan: Reviewed with patient and her long-term male , that would recommend exploring this and exercising and debriding the soft tissue to allow for better wound healing and patient concurred and understood benefits risks alternatives. Procedure:  With appropriate consent and site verification and alcohol prep and drape, local anesthesia was injected and sharp 11 blade knife dissection removed 1.5 cm area of chronically compromised skin and subcutaneous tissue 1 cm diameter back to healthy skin and fat and probe revealed no significant sinus tract no evidence of other infection or other concerning drainage and wound packed with iodoform quarter inch gauze patient to remove tomorrow, patient can shower, and repacked with gauze and follow-up in 2-3 weeks.   No need for antibiotics    Patient did request something for pain orally

## 2019-11-22 NOTE — PROGRESS NOTES
Chief Complaint   Patient presents with    Post OP Follow Up     WOUND CHECK     1. Have you been to the ER, urgent care clinic since your last visit? Hospitalized since your last visit? NO    2. Have you seen or consulted any other health care providers outside of the [address] [place] [address] since your last visit? Include any pap smears or colon screening.  NO

## 2020-06-03 ENCOUNTER — OFFICE VISIT (OUTPATIENT)
Dept: SURGERY | Age: 65
End: 2020-06-03

## 2020-06-03 VITALS
OXYGEN SATURATION: 94 % | SYSTOLIC BLOOD PRESSURE: 138 MMHG | BODY MASS INDEX: 30.49 KG/M2 | TEMPERATURE: 97.5 F | WEIGHT: 183 LBS | HEIGHT: 65 IN | DIASTOLIC BLOOD PRESSURE: 60 MMHG | HEART RATE: 70 BPM

## 2020-06-03 DIAGNOSIS — R19.7 DIARRHEA, UNSPECIFIED TYPE: Primary | ICD-10-CM

## 2020-06-03 NOTE — PROGRESS NOTES
1. Have you been to the ER, urgent care clinic since your last visit? Hospitalized since your last visit? No    2. Have you seen or consulted any other health care providers outside of the [address] [place] [address] since your last visit? Include any pap smears or colon screening.  No

## 2020-06-03 NOTE — PATIENT INSTRUCTIONS
Learning About Colonoscopy  What is a colonoscopy? A colonoscopy is a test (also called a procedure) that lets a doctor look inside your large intestine. The doctor uses a thin, lighted tube called a colonoscope. The doctor uses it to look for small growths called polyps, colon or rectal cancer (colorectal cancer), or other problems like bleeding. During the procedure, the doctor can take samples of tissue. The samples can then be checked for cancer or other conditions. The doctor can also take out polyps. How is a colonoscopy done? This procedure is done in a doctor's office or a clinic or hospital. You will get medicine to help you relax and not feel pain. Some people find that they don't remember having the test because of the medicine. The doctor gently moves the colonoscope, or scope, through the colon. The scope is also a small video camera. It lets the doctor see the colon and take pictures. How do you prepare for the procedure? You need to clean out your colon before the procedure so the doctor can see all of your colon. This process may start a day or two before the test. This depends on which \"colon prep\" your doctor recommends. To clean your colon, you stop eating solid foods and drink only clear liquids. You can have water, tea, coffee, clear juices, clear broths, flavored ice pops, and gelatin (such as Jell-O). Do not drink anything red or purple. The day or night before the procedure, you drink a large amount of a special liquid. This causes loose, frequent stools. You will go to the bathroom a lot. It's very important to drink all of the liquid. If you have problems drinking it, call your doctor. Some people don't go to work or do their usual activities on the day of the prep. Arrange to have someone take you home after the test.  What can you expect after a colonoscopy? Your doctor will tell you when you can eat and do your usual activities.   Drink a lot of fluid after the test to replace the fluids you may have lost during the colon prep. But don't drink alcohol. Your doctor will talk to you about when you'll need your next colonoscopy. The results of your test and your risk for colorectal cancer will help your doctor decide how often you need to be checked. After the test, you may be bloated or have gas pains. You may need to pass gas. If a biopsy was done or a polyp was removed, you may have streaks of blood in your stool (feces) for a few days. If polyps were taken out, your doctor may tell you to avoid taking aspirin and nonsteroidal anti-inflammatory drugs (NSAIDs) for 7 to 14 days. Problems such as heavy rectal bleeding may not occur until several weeks after the test. This isn't common. But it can happen after polyps are removed. Follow-up care is a key part of your treatment and safety. Be sure to make and go to all appointments, and call your doctor if you are having problems. It's also a good idea to know your test results and keep a list of the medicines you take. Where can you learn more? Go to http://cherise-robi.info/  Enter Z368 in the search box to learn more about \"Learning About Colonoscopy. \"  Current as of: August 22, 2019               Content Version: 12.5  © 0201-8606 [employer]. Care instructions adapted under license by [employer] (which disclaims liability or warranty for this information). If you have questions about a medical condition or this instruction, always ask your healthcare professional. [place] [address] any warranty or liability for your use of this information.

## 2020-06-03 NOTE — PROGRESS NOTES
Key Phillip is a 60 y.o. female who presents today with the following:  No chief complaint on file. ROSALINA Felipe is a 73-year-old female who is referred to us today by Dr. Mike Wilkes for diarrhea. She has a somewhat complicated history. She apparently at some point had an umbilical hernia with mesh. 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 This was operated on by Dr. Kizzy Barrientos in August 2019. 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 The patient had extensive adhesions and a segment of small bowel was also removed secondary to multiple mesenteric defects. She states ever since the surgery she has diarrhea with up to 10 loose stools a day. She denies any abdominal pain with this. She denies any melena or hematochezia. She did have a colonoscopy about 8 years ago that she believes was negative. She has a history of peripheral vascular disease and is status post a revascularization procedure of her left lower extremity. She is on Plavix and 81 mg aspirin daily. She still smokes but it is only 1 or 2 cigarettes a day. She denies any alcohol.   Past Medical History:   Diagnosis Date    Arthritis     Asthma     Diabetes ([facility] [address]     Hypertension     Menopause     Pancreatitis        Past Surgical History:   Procedure Laterality Date    ABDOMEN SURGERY PROC UNLISTED  09/01/2019    Exploratory laparotomy, extended right hemicolectomy    HX BUNIONECTOMY Bilateral 1977    HX HERNIA REPAIR  2002    HX PARTIAL HYSTERECTOMY  1980       Social History     Socioeconomic History    Marital status: SINGLE     Spouse name: Not on file    Number of children: Not on file    Years of education: Not on file    Highest education level: Not on file   Occupational History    Not on file   Social Needs    Financial resource strain: Not on file  Food insecurity     Worry: Not on file     Inability: Not on file    Transportation needs     Medical: Not on file     Non-medical: Not on file   Tobacco Use    Smoking status: Current Every Day Smoker     Packs/day: 0.25     Years: 20.00     Pack years: 5.00    Smokeless tobacco: Never Used   Substance and Sexual Activity    Alcohol use: Not Currently     Comment: RARE Q 3 MONTHS    Drug use: No    Sexual activity: Not Currently   Lifestyle    Physical activity     Days per week: Not on file     Minutes per session: Not on file    Stress: Not on file   Relationships    Social connections     Talks on phone: Not on file     Gets together: Not on file     Attends [employer] service: Not on file     Active member of club or organization: Not on file     Attends meetings of clubs or organizations: Not on file     Relationship status: Not on file    Intimate partner violence     Fear of current or ex partner: Not on file     Emotionally abused: Not on file     Physically abused: Not on file     Forced sexual activity: Not on file   Other Topics Concern    Not on file   Social History Narrative    Not on file       Family History   Problem Relation Age of Onset    Other Mother         ANEURYSM    Breast Cancer Mother     Diabetes Mother     Lung Disease Father         EMPHYSEMA    Crohn's Disease Sister     Heart Disease Sister     Diabetes Maternal Uncle     Heart Disease Maternal Uncle     Diabetes Paternal Uncle     Heart Disease Paternal Uncle     Breast Cancer Maternal Aunt     Anesth Problems Neg Hx        Allergies   Allergen Reactions    Morphine Unknown (comments), Hives and Itching    Tramadol Other (comments) and Itching     \"funny feeling\"       Current Outpatient Medications   Medication Sig    Insulin Needles, Disposable, (BD Ultra-Fine Short Pen Needle) 31 gauge x 5/16\" ndle by Does Not Apply route daily. E11.9.   Use daily for insulin injection    Nebulizer & Compressor machine 1 Each by Does Not Apply route three (3) times daily. Indications: J44.9    simvastatin (ZOCOR) 40 mg tablet Take 1 tablet (40 mg total) by mouth At Bedtime    montelukast (SINGULAIR) 10 mg tablet TAKE 1 TABLET BY MOUTH DAILY AT BEDTIME    insulin glargine (LANTUS,BASAGLAR) 100 unit/mL (3 mL) inpn 25 Units by SubCUTAneous route daily.  clopidogreL (PLAVIX) 75 mg tab Take 75 mg by mouth.  alcohol swabs (BD SINGLE USE SWABS REGULAR) padm 1 Swab by Apply Externally route daily.  amLODIPine (NORVASC) 10 mg tablet Take 1 Tab by mouth daily.  TRUE METRIX GLUCOSE TEST STRIP strip     TRUE METRIX LEVEL 1 soln     TRUEPLUS LANCETS 28 gauge misc     Blood-Glucose Meter (TRUE METRIX AIR GLUCOSE METER) misc by Does Not Apply route.  ascorbic acid, vitamin C, (VITAMIN C) 500 mg tablet Take 500 mg by mouth daily.  ipratropium (ATROVENT) 42 mcg (0.06 %) nasal spray 2 Sprays by Nasal route daily as needed.  fluticasone-salmeterol (ADVAIR DISKUS) 250-50 mcg/dose diskus inhaler Take 1 Puff by inhalation every twelve (12) hours.  metoprolol tartrate (LOPRESSOR) 25 mg tablet Take 25 mg by mouth two (2) times a day. No current facility-administered medications for this visit. The above histories, medications and allergies have been reviewed. ROS    Visit Vitals  /60 (BP 1 Location: Left arm, BP Patient Position: Sitting)   Pulse 70   Temp 97.5 °F (36.4 °C) (Temporal)   Ht 5' 5\" (1.651 m)   Wt 183 lb (83 kg)   SpO2 94%   BMI 30.45 kg/m²     Physical Exam  Constitutional:       Appearance: Normal appearance. Cardiovascular:      Rate and Rhythm: Normal rate and regular rhythm. Pulmonary:      Effort: Pulmonary effort is normal.   Abdominal:      General: There is no distension. Palpations: Abdomen is soft. There is no mass. Tenderness: There is no abdominal tenderness.       Comments: Midline incision which is healed well   Neurological:      Mental Status: She is alert.         1. Diarrhea, unspecified type  She has been on antibiotics for multiple courses over the last year. Although unlikely that the source is infectious I think it is reasonable to at least check for C. difficile. - C DIFFICILE TOXIN A & B BY EIA    The most likely source of her diarrhea is the fact that she has had removal of over half of her colon and a portion of small bowel. I believe this is rapid transient diarrhea. She has not had a colonoscopy in 8 years and is certainly worthwhile to see if there is evidence of any other abnormalities. Recommend colonoscopy. The procedure was explained in detail including the risks and benefits. Risks shared included risks of missed lesions, incomplete exam, colon injury or perforation. Risks associated with anesthesia were also discussed. The patient wishes to proceed and we will schedule. The patient was counseled at length about the risks of calin Covid-19 during their perioperative period and any recovery window from their procedure. The patient was made aware that calin Covid-19  may worsen their prognosis for recovering from their procedure and lend to a higher morbidity and/or mortality risk. All material risks, benefits, and reasonable alternatives including postponing the procedure were discussed. The patient does  wish to proceed with the procedure at this time. Follow-up and Dispositions    · Return for post procedure.          Hiram Villanueva MD

## 2020-06-17 ENCOUNTER — TELEPHONE (OUTPATIENT)
Dept: SURGERY | Age: 65
End: 2020-06-17

## 2020-06-17 RX ORDER — VANCOMYCIN HYDROCHLORIDE 125 MG/1
125 CAPSULE ORAL 4 TIMES DAILY
Qty: 40 CAP | Refills: 0 | Status: SHIPPED | OUTPATIENT
Start: 2020-06-17 | End: 2020-06-26

## 2020-06-17 NOTE — TELEPHONE ENCOUNTER
Pt identified with 2 identifiers, made aware of positive c diff results. Pt also aware of rx at pharmacy. Vancomycin 125mg po x10 days.

## 2020-07-06 DIAGNOSIS — Z86.19 HX OF CLOSTRIDIUM DIFFICILE INFECTION: Primary | ICD-10-CM

## 2020-08-05 ENCOUNTER — DOCUMENTATION ONLY (OUTPATIENT)
Dept: SURGERY | Age: 65
End: 2020-08-05

## 2020-08-05 RX ORDER — CHOLESTYRAMINE LIGHT 4 G/5.7G
4 POWDER, FOR SUSPENSION ORAL DAILY
Qty: 120 G | Refills: 3 | Status: SHIPPED | OUTPATIENT
Start: 2020-08-05 | End: 2020-09-04

## 2020-08-05 NOTE — PROGRESS NOTES
Pt was identified with two identifiers. She was given results of her colonoscopy per Dr Domenica Paulino. She had a normal colonoscopy and is to repeat in 5yrs. We have sent a RX to her pharmacy for some cholestyramine for the diarrhea she has been having. Pt stated understanding and had no questions.

## 2020-12-31 ENCOUNTER — HOSPITAL ENCOUNTER (EMERGENCY)
Age: 65
Discharge: HOME | End: 2020-12-31
Attending: EMERGENCY MEDICINE
Payer: MEDICARE

## 2020-12-31 ENCOUNTER — APPOINTMENT (OUTPATIENT)
Dept: CT IMAGING | Age: 65
End: 2020-12-31
Attending: EMERGENCY MEDICINE
Payer: MEDICARE

## 2020-12-31 VITALS
SYSTOLIC BLOOD PRESSURE: 162 MMHG | RESPIRATION RATE: 18 BRPM | HEART RATE: 66 BPM | DIASTOLIC BLOOD PRESSURE: 72 MMHG | TEMPERATURE: 98.6 F | OXYGEN SATURATION: 100 %

## 2020-12-31 DIAGNOSIS — R06.02 SOB (SHORTNESS OF BREATH): Primary | ICD-10-CM

## 2020-12-31 PROCEDURE — 71275 CT ANGIOGRAPHY CHEST: CPT

## 2020-12-31 PROCEDURE — 74011000636 HC RX REV CODE- 636: Performed by: EMERGENCY MEDICINE

## 2020-12-31 PROCEDURE — 99284 EMERGENCY DEPT VISIT MOD MDM: CPT

## 2020-12-31 RX ADMIN — IOPAMIDOL 54 ML: 755 INJECTION, SOLUTION INTRAVENOUS at 20:38

## 2020-12-31 NOTE — ED NOTES
Assumed care of patient from [facility]. She states that she went in with shortness of breath and itching all over. She states that she was given 2 neb treatments while there and her shortness of breath has improved. Also given benadryl for the itching and is no longer itchy.

## 2021-01-01 NOTE — ED NOTES
Bedside shift change report given to Dominique (oncoming nurse) by Alanna Tellez (offgoing nurse). Report included the following information SBAR, Kardex, ED Summary, [facility] and Recent Results.

## 2021-01-01 NOTE — ED NOTES
Patient discharged to home via uber ride. Discharge instructions reviewed with patient with no further questions. Patient transported to waiting room via wheelchair, ambulatory to outside.  A/Ox4 VSS

## 2021-01-01 NOTE — ED PROVIDER NOTES
EMERGENCY DEPARTMENT HISTORY AND PHYSICAL EXAM      Date: 12/31/2020  Patient Name: Ambrosio Mcburney    Please note that this dictation was completed with [employer], the computer voice recognition software. Quite often unanticipated grammatical, syntax, homophones, and other interpretive errors are inadvertently transcribed by the computer software. Please disregard these errors. Please excuse any errors that have escaped final proofreading. History of Presenting Illness     Chief Complaint   Patient presents with    Shortness of Breath     Patient states that she began feeling short of breath and lightheaded last night. She also states that she is itching all over, benadryl was given at [facility] prior to arrival.        History Provided By: Patient, prior chart    HPI: Ambrosio Mcburney, 63 y.o. female, Presenting the emergency department complaining of shortness of breath and itching. She was transferred here from [facility] for rule out of PE. She reports that she has been having shortness of breath and itching which started about 2 to 3 days ago. Shortness of breath is improved. No associated chest pain. No fevers chills or cough. She did have a femoral artery bypass approximately 2 weeks ago. Her laboratory work-up at [facility] was relatively unremarkable except for an elevated D-dimer. Her chest x-ray was clear. The decision was made to send the patient here for a V/Q study as they were unable to obtain proper IV access for CTA.     PCP: Mariama Gutiérrez MD    Current Facility-Administered Medications on File Prior to Encounter   Medication Dose Route Frequency Provider Last Rate Last Admin    [COMPLETED] albuterol-ipratropium (DUO-NEB) 2.5 MG-0.5 MG/3 ML  3 mL Nebulization Q15MIN Sarai Wallace MD   3 mL at 12/31/20 1109    [COMPLETED] diphenhydrAMINE (BENADRYL) injection 25 mg  25 mg IntraVENous NOW Sarai Wallace MD   25 mg at 12/31/20 1039    [COMPLETED] methylPREDNISolone (PF) (Solu-MEDROL) injection 125 mg  125 mg IntraVENous NOW Gustavo Beck MD   125 mg at 12/31/20 1039    [COMPLETED] famotidine (PF) (PEPCID) 20 mg in 0.9% sodium chloride 10 mL injection  20 mg IntraVENous NOW Gustavo Beck MD   20 mg at 12/31/20 1039    [DISCONTINUED] famotidine (PF) (PEPCID) injection 20 mg  20 mg IntraVENous NOW Gustavo Beck MD        [DISCONTINUED] 0.9% sodium chloride infusion  25 mL/hr IntraVENous CONTINUOUS Annie Anastasia Flower MD   Stopped at 12/31/20 1630    [DISCONTINUED] iopamidoL (ISOVUE-370) 76 % injection 100 mL  100 mL IntraVENous RAD Kobi Metcalf MD         Current Outpatient Medications on File Prior to Encounter   Medication Sig Dispense Refill    oxyCODONE-acetaminophen (PERCOCET) 5-325 mg per tablet Take 1-2 Tabs by mouth every six (6) hours as needed.  multivitamin with iron tablet Take 1 Tab by mouth daily.  amLODIPine (NORVASC) 10 mg tablet TAKE 1 TABLET BY MOUTH EVERY DAY 90 Tab 2    montelukast (SINGULAIR) 10 mg tablet TAKE 1 TABLET BY MOUTH DAILY AT BEDTIME 30 Tab 4    lancets (TRUEplus Lancets) 28 gauge misc TEST BLOOD SUGAR EVERY  Lancet 5    glucose blood VI test strips (True Metrix Glucose Test Strip) strip TEST BLOOD SUGAR EVERY  Strip 5    insulin glargine (Lantus Solostar U-100 Insulin) 100 unit/mL (3 mL) inpn 20 Units by SubCUTAneous route daily. 30 mL 5    acetaminophen (Tylenol Extra Strength) 500 mg tablet Take  by mouth every six (6) hours as needed for Pain.  hydroCHLOROthiazide (HYDRODIURIL) 12.5 mg tablet Take 1 Tab by mouth daily. 90 Tab 4    metoprolol tartrate (LOPRESSOR) 25 mg tablet Take 1 Tab by mouth two (2) times a day. 180 Tab 4    aspirin delayed-release 81 mg tablet Take 81 mg by mouth daily.  Insulin Needles, Disposable, (BD Ultra-Fine Short Pen Needle) 31 gauge x 5/16\" ndle by Does Not Apply route daily. E11.9.   Use daily for insulin injection 100 Pen Needle 5    Nebulizer & Compressor machine 1 Each by Does Not Apply route three (3) times daily. Indications: J44.9 1 Each 0    simvastatin (ZOCOR) 40 mg tablet Take 1 tablet (40 mg total) by mouth At Bedtime 90 Tab 2    clopidogreL (PLAVIX) 75 mg tab Take 75 mg by mouth.  alcohol swabs (BD SINGLE USE SWABS REGULAR) padm 1 Swab by Apply Externally route daily. 100 Pad 5    TRUE METRIX LEVEL 1 soln       Blood-Glucose Meter (TRUE METRIX AIR GLUCOSE METER) misc by Does Not Apply route.  ascorbic acid, vitamin C, (VITAMIN C) 500 mg tablet Take 500 mg by mouth daily.  ipratropium (ATROVENT) 42 mcg (0.06 %) nasal spray 2 Sprays by Nasal route daily as needed.  fluticasone-salmeterol (ADVAIR DISKUS) 250-50 mcg/dose diskus inhaler Take 1 Puff by inhalation every twelve (12) hours.          Past History     Past Medical History:  Past Medical History:   Diagnosis Date    Arthritis     Asthma     Diabetes ([address]     Hypertension     Menopause     Pancreatitis        Past Surgical History:  Past Surgical History:   Procedure Laterality Date    ABDOMEN SURGERY PROC UNLISTED  09/01/2019    Exploratory laparotomy, extended right hemicolectomy    COLONOSCOPY N/A 7/28/2020    COLONOSCOPY performed by Shea [facility]argas [facility] at [facility] HX BUNIONECTOMY Bilateral 1977    [address]  2002    HX PARTIAL HYSTERECTOMY  1980       Family History:  Family History   Problem Relation Age of Onset    Other Mother         ANEURYSM    Breast Cancer Mother     Diabetes Mother     Lung Disease Father         EMPHYSEMA    Crohn's Disease Sister     Heart Disease Sister     Diabetes Maternal Uncle     Heart Disease Maternal Uncle     Diabetes Paternal Uncle     Heart Disease Paternal Uncle     Breast Cancer Maternal Aunt     Anesth Problems Neg Hx        Social History:  Social History     Tobacco Use    Smoking status: Former Smoker     Packs/day: 0.25     Years: 20.00     Pack years: 5.00     Quit date: 2020     Years since quittin.5    Smokeless tobacco: Never Used   Substance Use Topics    Alcohol use: Not Currently     Comment: RARE Q 3 MONTHS    Drug use: No       Allergies: Allergies   Allergen Reactions    Lisinopril Other (comments)    Morphine Unknown (comments), Hives and Itching    Tramadol Other (comments) and Itching     \"funny feeling\"         Review of Systems   Review of Systems   Constitutional: Negative for chills and fever. HENT: Negative for congestion and sore throat. Eyes: Negative for visual disturbance. Respiratory: Positive for shortness of breath. Negative for cough. Cardiovascular: Negative for chest pain and leg swelling. Gastrointestinal: Negative for abdominal pain, blood in stool, diarrhea and nausea. Endocrine: Negative for polyuria. Genitourinary: Negative for dysuria, flank pain, vaginal bleeding and vaginal discharge. Musculoskeletal: Negative for myalgias. Skin: Negative for rash. Allergic/Immunologic: Negative for immunocompromised state. Neurological: Negative for weakness and headaches. Psychiatric/Behavioral: Negative for confusion. Physical Exam   Physical Exam  Vitals signs and nursing note reviewed. Constitutional:       Appearance: She is well-developed. She is obese. HENT:      Head: Normocephalic and atraumatic. Eyes:      General:         Right eye: No discharge. Left eye: No discharge. Conjunctiva/sclera: Conjunctivae normal.      Pupils: Pupils are equal, round, and reactive to light. Neck:      Musculoskeletal: Normal range of motion and neck supple. Trachea: No tracheal deviation. Cardiovascular:      Rate and Rhythm: Normal rate and regular rhythm. Heart sounds: Normal heart sounds. No murmur. Pulmonary:      Effort: Pulmonary effort is normal. No respiratory distress. Breath sounds: Normal breath sounds. No wheezing or rales.    Abdominal:      General: Bowel sounds are normal.      Palpations: Abdomen is soft. Tenderness: There is no abdominal tenderness. There is no guarding or rebound. Musculoskeletal: Normal range of motion. General: No tenderness or deformity. Skin:     General: Skin is warm and dry. Findings: No erythema or rash. Neurological:      Mental Status: She is alert and oriented to person, place, and time. Psychiatric:         Behavior: Behavior normal.         Diagnostic Study Results     Labs -     Recent Results (from the past 12 hour(s))   CBC WITH AUTOMATED DIFF    Collection Time: 12/31/20 10:09 AM   Result Value Ref Range    WBC 9.1 3.6 - 11.0 K/uL    RBC 3.76 (L) 3.80 - 5.20 M/uL    HGB 9.8 (L) 11.5 - 16.0 g/dL    HCT 31.2 (L) 35.0 - 47.0 %    MCV 83.0 80.0 - 99.0 FL    MCH 26.1 26.0 - 34.0 PG    MCHC 31.4 30.0 - 36.5 g/dL    RDW 17.9 (H) 11.5 - 14.5 %    PLATELET 020 223 - 641 K/uL    MPV 10.9 8.9 - 12.9 FL    NRBC 0.0 0  WBC    ABSOLUTE NRBC 0.00 0.00 - 0.01 K/uL    NEUTROPHILS 54 32 - 75 %    LYMPHOCYTES 32 12 - 49 %    MONOCYTES 8 5 - 13 %    EOSINOPHILS 5 0 - 7 %    BASOPHILS 1 0 - 1 %    IMMATURE GRANULOCYTES 0 0.0 - 0.5 %    ABS. NEUTROPHILS 4.9 1.8 - 8.0 K/UL    ABS. LYMPHOCYTES 2.9 0.8 - 3.5 K/UL    ABS. MONOCYTES 0.7 0.0 - 1.0 K/UL    ABS. EOSINOPHILS 0.4 0.0 - 0.4 K/UL    ABS. BASOPHILS 0.1 0.0 - 0.1 K/UL    ABS. IMM.  GRANS. 0.0 0.00 - 0.04 K/UL    DF AUTOMATED     METABOLIC PANEL, COMPREHENSIVE    Collection Time: 12/31/20 10:09 AM   Result Value Ref Range    Sodium 140 136 - 145 mmol/L    Potassium 3.3 (L) 3.5 - 5.1 mmol/L    Chloride 104 97 - 108 mmol/L    CO2 26 21 - 32 mmol/L    Anion gap 10 5 - 15 mmol/L    Glucose 149 (H) 65 - 100 mg/dL    BUN 12 6 - 20 MG/DL    Creatinine 0.91 0.55 - 1.02 MG/DL    BUN/Creatinine ratio 13 12 - 20      GFR est AA >60 >60 ml/min/1.73m2    GFR est non-AA >60 >60 ml/min/1.73m2    Calcium 9.1 8.5 - 10.1 MG/DL    Bilirubin, total 0.2 0.2 - 1.0 MG/DL    ALT (SGPT) 24 12 - 78 U/L AST (SGOT) 18 15 - 37 U/L    Alk. phosphatase 102 45 - 117 U/L    Protein, total 7.3 6.4 - 8.2 g/dL    Albumin 3.1 (L) 3.5 - 5.0 g/dL    Globulin 4.2 (H) 2.0 - 4.0 g/dL    A-G Ratio 0.7 (L) 1.1 - 2.2     TROPONIN I    Collection Time: 12/31/20 10:09 AM   Result Value Ref Range    Troponin-I, Qt. <0.05 <0.05 ng/mL   D DIMER    Collection Time: 12/31/20 10:09 AM   Result Value Ref Range    D-dimer 4.50 (H) 0.00 - 0.65 mg/L FEU   SAMPLES BEING HELD    Collection Time: 12/31/20 10:09 AM   Result Value Ref Range    SAMPLES BEING HELD 1SST, 1RED     COMMENT        Add-on orders for these samples will be processed based on acceptable specimen integrity and analyte stability, which may vary by analyte. EKG, 12 LEAD, INITIAL    Collection Time: 12/31/20 10:30 AM   Result Value Ref Range    Ventricular Rate 58 BPM    Atrial Rate 58 BPM    P-R Interval 134 ms    QRS Duration 100 ms    Q-T Interval 412 ms    QTC Calculation (Bezet) 404 ms    Calculated P Axis 38 degrees    Calculated R Axis -8 degrees    Calculated T Axis -56 degrees    Diagnosis       Sinus bradycardia  Moderate voltage criteria for LVH, may be normal variant  T wave abnormality, consider inferolateral ischemia  Abnormal ECG  When compared with ECG of 31-AUG-2019 04:48,  No significant change was found         Radiologic Studies -   CTA CHEST W OR W WO CONT    (Results Pending)     CT Results  (Last 48 hours)    None        CXR Results  (Last 48 hours)               12/31/20 0948  XR CHEST PORT Final result    Impression:  IMPRESSION: No acute abnormality is identified. Narrative:  EXAM:  XR CHEST PORT       INDICATION:  Chest pain       COMPARISON:  2019       FINDINGS: A portable AP radiograph of the chest was obtained at 944 hours. Postoperative changes are noted lower cervical spine. The lungs are clear. Heart size is borderline enlarged. Bony structures are unchanged.                     Medical Decision Making   I am the first provider for this patient. I reviewed the vital signs, available nursing notes, past medical history, past surgical history, family history and social history. Vital Signs-Reviewed the patient's vital signs. Patient Vitals for the past 12 hrs:   Temp Pulse Resp BP SpO2   12/31/20 1915 98.6 °F (37 °C) -- -- -- --   12/31/20 1831 -- -- -- -- 100 %   12/31/20 1830 -- -- -- 112/84 99 %   12/31/20 1825 98.2 °F (36.8 °C) 66 18 (!) 156/60 99 %   12/31/20 1821 98.2 °F (36.8 °C) 67 18 (!) 156/60 100 %           Records Reviewed:   Nursing notes, Prior visits     Provider Notes (Medical Decision Making): There is no reason the patient could not have a CTA if we can obtain after access. We will use ultrasound guidance to get venous access and obtain CTA as this is the preferred study for this patient. Disposition pending imaging. If CT is negative she can be discharged    ED Course:   Initial assessment performed. The patients presenting problems have been discussed, and they are in agreement with the care plan formulated and outlined with them. I have encouraged them to ask questions as they arise throughout their visit. Critical Care Time:   none    Disposition:    DISCHARGE NOTE  Patients results have been reviewed with them. Patient and/or family have verbally conveyed their understanding and agreement of the patient's signs, symptoms, diagnosis, treatment and prognosis and additionally agree to follow up as recommended or return to the Emergency Room should their condition change or have any new concerns prior to their follow-up appointment. Patient verbally agrees with the care-plan and verbally conveys that all of their questions have been answered.    Discharge instructions have also been provided to the patient with some educational information regarding their diagnosis as well a list of reasons why they would want to return to the ER prior to their follow-up appointment should their condition change. PLAN:  1. Current Discharge Medication List        2. Follow-up Information    None         Return to ED if worse     Diagnosis     Clinical Impression: No diagnosis found. Attestations:   This note was completed by Jaylan Fuentes DO

## 2021-01-01 NOTE — ED NOTES
Bedside and Verbal shift change report given to [address] (oncoming nurse) by Brandon Longo (offgoing nurse). Report included the following information SBAR, Kardex, ED Summary, [facility] and Recent Results.

## 2021-01-15 ENCOUNTER — PATIENT OUTREACH (OUTPATIENT)
Dept: CASE MANAGEMENT | Age: 66
End: 2021-01-15

## 2021-01-15 NOTE — PROGRESS NOTES
Patient contacted regarding Tanner Bowen. Discussed COVID-19 related testing which was pending at this time. Test results were pending. Patient informed of results, if available? Pending  Patient was given pulse ox for home oxygen monitoring- will enroll in Loop for monitoring. Care Transition Nurse/ Ambulatory Care Manager contacted the patient by telephone to perform post discharge assessment. Call within 2 business days of discharge: Yes Verified name and  with patient as identifiers. Provided introduction to self, and explanation of the CTN/ACM role, and reason for call due to risk factors for infection and/or exposure to COVID-19. Symptoms reviewed with patient who verbalized the following symptoms: fatigue, chills or shaking, vomiting, diarrhea and weakness      Due to no new or worsening symptoms encounter was not routed to provider for escalation. Discussed follow-up appointments. If no appointment was previously scheduled, appointment scheduling offered:  no --Patient has PCP follow up scheduled for 21  [facility] follow up appointment(s):   Future Appointments   Date Time Provider Chance Cantor   2021  8:30 AM Stanford Curry MD [address] [place] [address] Drive     [id number] [place] [address]  follow up appointment(s): none reported   Advance Care Planning:   Does patient have an Advance Directive:  not on file -education deferred to later contact. Patient wishes to add her niece Eliazar Lozano, [phone] to her medical agent list.      Patient has following risk factors of: asthma, diabetes and Hep C and recent surgery. CTN/ACM reviewed discharge instructions, medical action plan and red flags such as increased shortness of breath, increasing fever and signs of decompensation with patient who verbalized understanding. Discussed exposure protocols and quarantine with CDC Guidelines What to do if you are sick with coronavirus disease 2019.  Patient was given an opportunity for questions and concerns.  The patient agrees to contact the [facility] exposure line [phone], local [facility] department [address]  [phone] and PCP office for questions related to their healthcare. CTN/ACM provided contact information for future needs. Reviewed and educated patient on any new and changed medications related to discharge diagnosis Patient reports she has obtained discharge medications and is taking as prescribed. Patient/family/caregiver given information for [employer] and agrees to enroll yes  Patient's preferred e-mail: n/a   Patient's preferred phone number: [phone]  Based on Loop alert triggers, patient will be contacted by nurse care manager for worsening symptoms. Pt will be further monitored by COVID Loop Team based on severity of symptoms and risk factors.  DMB

## 2021-01-15 NOTE — ACP (ADVANCE CARE PLANNING)
Advance Care Planning:   Does patient have an Advance Directive:  not on file -education deferred to later contact.   Patient wishes to add her niece Jenna Woodruff, [phone] to her medical agent list.

## 2021-02-23 PROBLEM — I71.40 ABDOMINAL AORTIC ANEURYSM (AAA) 3.0 CM TO 5.0 CM IN DIAMETER IN FEMALE: Chronic | Status: RESOLVED | Noted: 2018-11-10 | Resolved: 2021-02-23

## 2021-02-23 PROBLEM — F20.9 SCHIZOPHRENIA (HCC): Chronic | Status: RESOLVED | Noted: 2017-08-07 | Resolved: 2021-02-23

## 2021-03-19 ENCOUNTER — OFFICE VISIT (OUTPATIENT)
Dept: FAMILY MEDICINE CLINIC | Age: 66
End: 2021-03-19
Payer: MEDICARE

## 2021-03-19 VITALS
HEART RATE: 80 BPM | TEMPERATURE: 98.1 F | HEIGHT: 65 IN | SYSTOLIC BLOOD PRESSURE: 139 MMHG | BODY MASS INDEX: 29.92 KG/M2 | DIASTOLIC BLOOD PRESSURE: 62 MMHG | WEIGHT: 179.6 LBS | RESPIRATION RATE: 20 BRPM | OXYGEN SATURATION: 100 %

## 2021-03-19 DIAGNOSIS — L29.9 ITCHY SKIN: ICD-10-CM

## 2021-03-19 DIAGNOSIS — I10 ESSENTIAL HYPERTENSION: Primary | Chronic | ICD-10-CM

## 2021-03-19 DIAGNOSIS — E11.51 TYPE 2 DIABETES MELLITUS WITH PERIPHERAL VASCULAR DISEASE (HCC): ICD-10-CM

## 2021-03-19 PROCEDURE — G8754 DIAS BP LESS 90: HCPCS | Performed by: NURSE PRACTITIONER

## 2021-03-19 PROCEDURE — 99214 OFFICE O/P EST MOD 30 MIN: CPT | Performed by: NURSE PRACTITIONER

## 2021-03-19 PROCEDURE — G8432 DEP SCR NOT DOC, RNG: HCPCS | Performed by: NURSE PRACTITIONER

## 2021-03-19 PROCEDURE — G8752 SYS BP LESS 140: HCPCS | Performed by: NURSE PRACTITIONER

## 2021-03-19 PROCEDURE — G8427 DOCREV CUR MEDS BY ELIG CLIN: HCPCS | Performed by: NURSE PRACTITIONER

## 2021-03-19 PROCEDURE — G8417 CALC BMI ABV UP PARAM F/U: HCPCS | Performed by: NURSE PRACTITIONER

## 2021-03-19 PROCEDURE — 3017F COLORECTAL CA SCREEN DOC REV: CPT | Performed by: NURSE PRACTITIONER

## 2021-03-19 PROCEDURE — G9899 SCRN MAM PERF RSLTS DOC: HCPCS | Performed by: NURSE PRACTITIONER

## 2021-03-19 PROCEDURE — 3051F HG A1C>EQUAL 7.0%<8.0%: CPT | Performed by: NURSE PRACTITIONER

## 2021-03-19 PROCEDURE — 2022F DILAT RTA XM EVC RTNOPTHY: CPT | Performed by: NURSE PRACTITIONER

## 2021-03-19 RX ORDER — HYDROCHLOROTHIAZIDE 12.5 MG/1
CAPSULE ORAL
COMMUNITY
Start: 2021-02-05 | End: 2021-04-15

## 2021-03-19 RX ORDER — CLOPIDOGREL BISULFATE 75 MG/1
75 TABLET ORAL DAILY
COMMUNITY
Start: 2021-03-04

## 2021-03-19 RX ORDER — LEVOCETIRIZINE DIHYDROCHLORIDE 5 MG/1
5 TABLET, FILM COATED ORAL DAILY
Qty: 90 TAB | Refills: 4 | Status: SHIPPED | OUTPATIENT
Start: 2021-03-19 | End: 2022-10-17 | Stop reason: SDUPTHER

## 2021-03-19 NOTE — PROGRESS NOTES
Chief Complaint   Patient presents with    Diabetes     Blood sugar 182 today    Hypertension     check up       3 most recent PHQ Screens 3/19/2021   Little interest or pleasure in doing things Not at all   Feeling down, depressed, irritable, or hopeless Not at all   Total Score PHQ 2 0     Learning Assessment 2/23/2021   PRIMARY LEARNER Patient   PRIMARY LANGUAGE ENGLISH   LEARNER PREFERENCE PRIMARY LISTENING   ANSWERED BY patient    RELATIONSHIP SELF     Fall Risk Assessment, last 12 mths 3/19/2021   Able to walk? Yes   Fall in past 12 months? 0   Do you feel unsteady? 0   Are you worried about falling 0   Number of falls in past 12 months -   Fall with injury? -     Abuse Screening Questionnaire 3/19/2021   Do you ever feel afraid of your partner? N   Are you in a relationship with someone who physically or mentally threatens you? N   Is it safe for you to go home? Y     ADL Assessment 3/19/2021   Feeding yourself No Help Needed   Getting from bed to chair No Help Needed   Getting dressed No Help Needed   Bathing or showering No Help Needed   Walk across the room (includes cane/walker) No Help Needed   Using the telphone No Help Needed   Taking your medications No Help Needed   Preparing meals No Help Needed   Managing money (expenses/bills) No Help Needed   Moderately strenuous housework (laundry) No Help Needed   Shopping for personal items (toiletries/medicines) No Help Needed   Shopping for groceries No Help Needed   Driving No Help Needed   Climbing a flight of stairs No Help Needed   Getting to places beyond walking distances No Help Needed     1. Have you been to the ER, urgent care clinic since your last visit? Hospitalized since your last visit? No    2. Have you seen or consulted any other health care providers outside of the [address] [place] [address] [place] since your last visit? Include any pap smears or colon screening.  No    Chief Complaint   Patient presents with    Diabetes     Blood sugar 182 today    Hypertension     check up         Visit Vitals  Pulse 80   Temp 98.1 °F (36.7 °C) (Temporal)   Resp 20   Ht 5' 5\" (1.651 m)   Wt 179 lb 9.6 oz (81.5 kg)   SpO2 100%   BMI 29.89 kg/m²       Pain Scale: 0 - No pain/10  Pain Location:     Mendez Jenkins is a 63 y.o. female presenting for/with:    Diabetes (check up) and Hypertension      Symptom review:    NO  Fever   NO  Shaking chills  NO  Cough  NO  Body aches  NO  Coughing up blood  NO  Chest congestion  NO  Chest pain  NO  Shortness of breath  NO  Profound Loss of smell/taste  NO  Nausea/Vomiting   NO  Loose stool/Diarrhea  NO  any skin issues    Patient Risk Factors Reviewed as follows:  NO  have you been in Close contact with confirmed COVID19 patient   NO  History of recent travel to affected geographical areas within the past 14 days  NO  COPD  NO  Active Cancer/Leukemia/Lymphoma/Chemotherapy  NO  Oral steroid use  NO  Pregnant  NO  Diabetes Mellitus  NO  Heart disease  NO  Asthma  NO Health care worker at home  NO Health care worker  NO Is there a Pregnant Woman in the home  NO Dialysis pt in the home   NO a large number of people living in the home

## 2021-03-19 NOTE — PROGRESS NOTES
Chief Complaint   Patient presents with    Diabetes     Blood sugar 182 today    Hypertension     check up         HPI:      Juana Cardozo is a 63 y.o. female. Previously cleaned for our office. 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 10/30/18 CTA at [facility] AAA 3.1 x 2.9 cm, and there is a focal penetrating ulcer (increased in size from ). 18 EVAR.  10/21/20 AVE-patent endograft-no leaks. Right Fem-Pop 20 with Dr Sharyn Pack at [facility].       T2DM:  Last A1c 7.9%. Glucose is labile. Running between 120 and 220. Currently on 15 units of Lantus daily. HTN:  Currently on amlodipine 10 mg, Labetalol 100 and hydrochlorothiazide 12.5 mg. No side effects. New Issues:  She was switched to Labetalol last visit. BP is much improved. She is still complaining of itchy skin. Worse after eating \"red\" products such as tomato sauce and ketchup. Negative alpha gal panel last month. She is interested in allergy testing. Allergies   Allergen Reactions    Lisinopril Other (comments)    Gabapentin Itching    Morphine Unknown (comments), Hives and Itching    Tramadol Other (comments) and Itching     \"funny feeling\"       Current Outpatient Medications   Medication Sig    clopidogreL (PLAVIX) 75 mg tab     hydroCHLOROthiazide (MICROZIDE) 12.5 mg capsule     labetaloL (NORMODYNE) 100 mg tablet Take 1 Tab by mouth two (2) times a day. Indications: Bood pressure    diphenhydrAMINE (BenadryL) 25 mg capsule Take 25 mg by mouth every six (6) hours as needed.  simvastatin (ZOCOR) 40 mg tablet TAKE 1 TABLET BY MOUTH EVERY NIGHT AT BEDTIME    BD Single Use Swabs Regular padm APPLY  EXTERNALLY EVERY DAY    ascorbic acid, vitamin C, (Vitamin C) 500 mg tablet Take 1 Tab by mouth two (2) times a day.  zinc sulfate 220 mg tablet Take 1 Tab by mouth two (2) times a day.     albuterol (PROVENTIL HFA, VENTOLIN HFA, PROAIR HFA) 90 mcg/actuation inhaler Take 2 Puffs by inhalation every four (4) hours as needed for Wheezing.  multivitamin with iron tablet Take 1 Tab by mouth daily.  amLODIPine (NORVASC) 10 mg tablet TAKE 1 TABLET BY MOUTH EVERY DAY    montelukast (SINGULAIR) 10 mg tablet TAKE 1 TABLET BY MOUTH DAILY AT BEDTIME    lancets (TRUEplus Lancets) 28 gauge misc TEST BLOOD SUGAR EVERY DAY    glucose blood VI test strips (True Metrix Glucose Test Strip) strip TEST BLOOD SUGAR EVERY DAY    insulin glargine (Lantus Solostar U-100 Insulin) 100 unit/mL (3 mL) inpn 20 Units by SubCUTAneous route daily.  acetaminophen (Tylenol Extra Strength) 500 mg tablet Take  by mouth every six (6) hours as needed for Pain.  hydroCHLOROthiazide (HYDRODIURIL) 12.5 mg tablet Take 1 Tab by mouth daily.  aspirin delayed-release 81 mg tablet Take 81 mg by mouth daily.  Insulin Needles, Disposable, (BD Ultra-Fine Short Pen Needle) 31 gauge x 5/16\" ndle by Does Not Apply route daily. E11.9. Use daily for insulin injection    Nebulizer & Compressor machine 1 Each by Does Not Apply route three (3) times daily. Indications: J44.9    Blood-Glucose Meter (TRUE METRIX AIR GLUCOSE METER) misc by Does Not Apply route.  ipratropium (ATROVENT) 42 mcg (0.06 %) nasal spray 2 Sprays by Nasal route daily as needed.  fluticasone-salmeterol (ADVAIR DISKUS) 250-50 mcg/dose diskus inhaler Take 1 Puff by inhalation every twelve (12) hours. No current facility-administered medications for this visit.         Past Medical History:   Diagnosis Date    Arthritis     Asthma     Diabetes ([facility] [address]     Hypertension     Menopause     Pancreatitis        Past Surgical History:   Procedure Laterality Date    COLONOSCOPY N/A 7/28/2020    COLONOSCOPY performed by Ryanne Bender MD at [facility] [address] HX BUNIONECTOMY Bilateral 1977    [address]  [address]    HX PARTIAL HYSTERECTOMY  1980    CO ABDOMEN SURGERY [address] UNLISTED  09/01/2019    Exploratory laparotomy, extended right hemicolectomy       Social History     Socioeconomic History    Marital status: SINGLE     Spouse name: Not on file    Number of children: Not on file    Years of education: Not on file    Highest education level: Not on file   Tobacco Use    Smoking status: Former Smoker     Packs/day: 0.25     Years: 20.00     Pack years: 5.00     Quit date: 2020     Years since quittin.7    Smokeless tobacco: Never Used   Substance and Sexual Activity    Alcohol use: Not Currently     Comment: RARE Q 3 MONTHS    Drug use: No    Sexual activity: Yes       Family History   Problem Relation Age of Onset    Other Mother         ANEURYSM    Breast Cancer Mother     Diabetes Mother     Lung Disease Father         EMPHYSEMA    Crohn's Disease Sister     Heart Disease Sister     Diabetes Maternal Uncle     Heart Disease Maternal Uncle     Diabetes Paternal Uncle     Heart Disease Paternal Uncle     Breast Cancer Maternal Aunt     Anesth Problems Neg Hx        Above history reviewed. ROS:  Denies fever, chills, cough, chest pain, SOB,  nausea, vomiting, or diarrhea. Denies wt loss, wt gain, hemoptysis, hematochezia or melena. Physical Examination:    /62 (BP 1 Location: Left arm, BP Patient Position: Sitting, BP Cuff Size: Adult long)   Pulse 80   Temp 98.1 °F (36.7 °C) (Temporal)   Resp 20   Ht 5' 5\" (1.651 m)   Wt 179 lb 9.6 oz (81.5 kg)   SpO2 100%   BMI 29.89 kg/m²     General: Alert and Ox3, Fluent speech  Neck:  Supple, no adenopathy, JVD, mass or bruit  Chest:  Clear to Ausculation, without wheezes, rales, rubs or ronchi  Cardiac: RRR  Extremities:  No cyanosis, clubbing or edema  Neurologic:  Ambulatory without assist, CN 2-12 grossly intact. Moves all extremities. Skin: no rash  Lymphadenopathy: no cervical or supraclavicular nodes    ASSESSMENT AND PLAN:     1. Essential hypertension  Much improved  Continue current medications     2.  Type 2 diabetes mellitus with peripheral vascular disease ([facility] [address]  Stable at this time  Continue BG checks BID    3. Itchy skin  Add Xyzal daily  - levocetirizine (XYZAL) 5 mg tablet; Take 1 Tab by mouth daily. Indications: itching of skin  Dispense: 90 Tab;  Refill: 4  - REFERRAL TO ALLERGY     RTC in 3 months    Xiang Tello NP

## 2021-04-12 ENCOUNTER — APPOINTMENT (OUTPATIENT)
Dept: CT IMAGING | Age: 66
End: 2021-04-12
Attending: EMERGENCY MEDICINE
Payer: MEDICARE

## 2021-04-12 ENCOUNTER — HOSPITAL ENCOUNTER (EMERGENCY)
Age: 66
Discharge: HOME | End: 2021-04-12
Attending: EMERGENCY MEDICINE
Payer: MEDICARE

## 2021-04-12 ENCOUNTER — APPOINTMENT (OUTPATIENT)
Dept: GENERAL RADIOLOGY | Age: 66
End: 2021-04-12
Attending: EMERGENCY MEDICINE
Payer: MEDICARE

## 2021-04-12 VITALS
TEMPERATURE: 98.8 F | HEART RATE: 66 BPM | DIASTOLIC BLOOD PRESSURE: 49 MMHG | RESPIRATION RATE: 16 BRPM | OXYGEN SATURATION: 100 % | SYSTOLIC BLOOD PRESSURE: 160 MMHG | HEIGHT: 65 IN | WEIGHT: 175.71 LBS | BODY MASS INDEX: 29.27 KG/M2

## 2021-04-12 DIAGNOSIS — R07.89 ATYPICAL CHEST PAIN: Primary | ICD-10-CM

## 2021-04-12 LAB
ALBUMIN SERPL-MCNC: 3.1 G/DL (ref 3.5–5)
ALBUMIN/GLOB SERPL: 0.7 {RATIO} (ref 1.1–2.2)
ALP SERPL-CCNC: 107 U/L (ref 45–117)
ALT SERPL-CCNC: 32 U/L (ref 12–78)
ANION GAP SERPL CALC-SCNC: 12 MMOL/L (ref 5–15)
AST SERPL-CCNC: 52 U/L (ref 15–37)
BASOPHILS # BLD: 0.1 K/UL (ref 0–0.1)
BASOPHILS NFR BLD: 1 % (ref 0–1)
BILIRUB SERPL-MCNC: 0.3 MG/DL (ref 0.2–1)
BNP SERPL-MCNC: 208 PG/ML (ref 0–125)
BNP SERPL-MCNC: 218 PG/ML (ref 0–125)
BUN SERPL-MCNC: 10 MG/DL (ref 6–20)
BUN/CREAT SERPL: 11 (ref 12–20)
CALCIUM SERPL-MCNC: 9.5 MG/DL (ref 8.5–10.1)
CHLORIDE SERPL-SCNC: 102 MMOL/L (ref 97–108)
CK MB CFR SERPL CALC: NORMAL % (ref 0–2.5)
CK MB SERPL-MCNC: <1 NG/ML (ref 5–25)
CK SERPL-CCNC: 131 U/L (ref 26–192)
CO2 SERPL-SCNC: 24 MMOL/L (ref 21–32)
CREAT SERPL-MCNC: 0.92 MG/DL (ref 0.55–1.02)
D DIMER PPP FEU-MCNC: 1.73 MG/L FEU (ref 0–0.65)
DIFFERENTIAL METHOD BLD: ABNORMAL
EOSINOPHIL # BLD: 0.2 K/UL (ref 0–0.4)
EOSINOPHIL NFR BLD: 3 % (ref 0–7)
ERYTHROCYTE [DISTWIDTH] IN BLOOD BY AUTOMATED COUNT: 15.6 % (ref 11.5–14.5)
GLOBULIN SER CALC-MCNC: 4.3 G/DL (ref 2–4)
GLUCOSE SERPL-MCNC: 273 MG/DL (ref 65–100)
HCT VFR BLD AUTO: 26.6 % (ref 35–47)
HGB BLD-MCNC: 8.1 G/DL (ref 11.5–16)
IMM GRANULOCYTES # BLD AUTO: 0 K/UL (ref 0–0.04)
IMM GRANULOCYTES NFR BLD AUTO: 0 % (ref 0–0.5)
INR PPP: 1 (ref 0.9–1.1)
LYMPHOCYTES # BLD: 2.9 K/UL (ref 0.8–3.5)
LYMPHOCYTES NFR BLD: 37 % (ref 12–49)
MCH RBC QN AUTO: 23.8 PG (ref 26–34)
MCHC RBC AUTO-ENTMCNC: 30.5 G/DL (ref 30–36.5)
MCV RBC AUTO: 78.2 FL (ref 80–99)
MONOCYTES # BLD: 0.6 K/UL (ref 0–1)
MONOCYTES NFR BLD: 7 % (ref 5–13)
NEUTS SEG # BLD: 4.2 K/UL (ref 1.8–8)
NEUTS SEG NFR BLD: 52 % (ref 32–75)
NRBC # BLD: 0 K/UL (ref 0–0.01)
NRBC BLD-RTO: 0 PER 100 WBC
PLATELET # BLD AUTO: 328 K/UL (ref 150–400)
PMV BLD AUTO: 11.2 FL (ref 8.9–12.9)
POTASSIUM SERPL-SCNC: 4.3 MMOL/L (ref 3.5–5.1)
PROT SERPL-MCNC: 7.4 G/DL (ref 6.4–8.2)
PROTHROMBIN TIME: 10.2 SEC (ref 9–11.1)
RBC # BLD AUTO: 3.4 M/UL (ref 3.8–5.2)
SODIUM SERPL-SCNC: 138 MMOL/L (ref 136–145)
TROPONIN I SERPL-MCNC: <0.05 NG/ML
TROPONIN I SERPL-MCNC: <0.05 NG/ML
WBC # BLD AUTO: 8.1 K/UL (ref 3.6–11)

## 2021-04-12 PROCEDURE — 96375 TX/PRO/DX INJ NEW DRUG ADDON: CPT

## 2021-04-12 PROCEDURE — 99285 EMERGENCY DEPT VISIT HI MDM: CPT

## 2021-04-12 PROCEDURE — 74011000636 HC RX REV CODE- 636: Performed by: EMERGENCY MEDICINE

## 2021-04-12 PROCEDURE — 84484 ASSAY OF TROPONIN QUANT: CPT

## 2021-04-12 PROCEDURE — 94762 N-INVAS EAR/PLS OXIMTRY CONT: CPT

## 2021-04-12 PROCEDURE — 93005 ELECTROCARDIOGRAM TRACING: CPT

## 2021-04-12 PROCEDURE — 80053 COMPREHEN METABOLIC PANEL: CPT

## 2021-04-12 PROCEDURE — 85025 COMPLETE CBC W/AUTO DIFF WBC: CPT

## 2021-04-12 PROCEDURE — 74011250637 HC RX REV CODE- 250/637: Performed by: EMERGENCY MEDICINE

## 2021-04-12 PROCEDURE — 36415 COLL VENOUS BLD VENIPUNCTURE: CPT

## 2021-04-12 PROCEDURE — 82550 ASSAY OF CK (CPK): CPT

## 2021-04-12 PROCEDURE — 83880 ASSAY OF NATRIURETIC PEPTIDE: CPT

## 2021-04-12 PROCEDURE — 85610 PROTHROMBIN TIME: CPT

## 2021-04-12 PROCEDURE — 85379 FIBRIN DEGRADATION QUANT: CPT

## 2021-04-12 PROCEDURE — 74011250636 HC RX REV CODE- 250/636: Performed by: EMERGENCY MEDICINE

## 2021-04-12 PROCEDURE — 71275 CT ANGIOGRAPHY CHEST: CPT

## 2021-04-12 PROCEDURE — 74022 RADEX COMPL AQT ABD SERIES: CPT

## 2021-04-12 PROCEDURE — 96374 THER/PROPH/DIAG INJ IV PUSH: CPT

## 2021-04-12 RX ORDER — ONDANSETRON 2 MG/ML
8 INJECTION INTRAMUSCULAR; INTRAVENOUS
Status: COMPLETED | OUTPATIENT
Start: 2021-04-12 | End: 2021-04-12

## 2021-04-12 RX ORDER — NITROGLYCERIN 0.4 MG/1
0.4 TABLET SUBLINGUAL AS NEEDED
Status: DISCONTINUED | OUTPATIENT
Start: 2021-04-12 | End: 2021-04-12 | Stop reason: HOSPADM

## 2021-04-12 RX ORDER — HYDROMORPHONE HYDROCHLORIDE 1 MG/ML
0.5 INJECTION, SOLUTION INTRAMUSCULAR; INTRAVENOUS; SUBCUTANEOUS
Status: DISCONTINUED | OUTPATIENT
Start: 2021-04-12 | End: 2021-04-12 | Stop reason: HOSPADM

## 2021-04-12 RX ORDER — GUAIFENESIN 100 MG/5ML
324 LIQUID (ML) ORAL
Status: COMPLETED | OUTPATIENT
Start: 2021-04-12 | End: 2021-04-12

## 2021-04-12 RX ORDER — HYDROMORPHONE HYDROCHLORIDE 1 MG/ML
0.5 INJECTION, SOLUTION INTRAMUSCULAR; INTRAVENOUS; SUBCUTANEOUS
Status: COMPLETED | OUTPATIENT
Start: 2021-04-12 | End: 2021-04-12

## 2021-04-12 RX ORDER — SODIUM CHLORIDE 0.9 % (FLUSH) 0.9 %
5-10 SYRINGE (ML) INJECTION ONCE
Status: COMPLETED | OUTPATIENT
Start: 2021-04-12 | End: 2021-04-12

## 2021-04-12 RX ADMIN — NITROGLYCERIN 0.4 MG: 0.4 TABLET, ORALLY DISINTEGRATING SUBLINGUAL at 12:38

## 2021-04-12 RX ADMIN — ONDANSETRON 8 MG: 2 INJECTION INTRAMUSCULAR; INTRAVENOUS at 12:06

## 2021-04-12 RX ADMIN — HYDROMORPHONE HYDROCHLORIDE 0.5 MG: 1 INJECTION, SOLUTION INTRAMUSCULAR; INTRAVENOUS; SUBCUTANEOUS at 13:10

## 2021-04-12 RX ADMIN — Medication 10 ML: at 13:13

## 2021-04-12 RX ADMIN — IOPAMIDOL 100 ML: 755 INJECTION, SOLUTION INTRAVENOUS at 14:16

## 2021-04-12 RX ADMIN — NITROGLYCERIN 0.4 MG: 0.4 TABLET, ORALLY DISINTEGRATING SUBLINGUAL at 12:10

## 2021-04-12 RX ADMIN — ASPIRIN 81 MG CHEWABLE TABLET 324 MG: 81 TABLET CHEWABLE at 12:05

## 2021-04-12 NOTE — ED NOTES
I have reviewed discharge instructions with the patient and spouse. The patient verbalized understanding. Discharge medications discussed with patient and spouse. No questions at this time. Ambulated without difficulty.

## 2021-04-12 NOTE — ED PROVIDER NOTES
EMERGENCY DEPARTMENT HISTORY AND PHYSICAL EXAM          Date: 4/12/2021  Patient Name: Stanley Castanon    History of Presenting Illness     Chief Complaint   Patient presents with    Chest Pain      started last night around 0900, pain got worse. denies any Nausea or vomiting . C/o SOB       History Provided By: Patient    HPI: Stanley Castanon is a 63 y.o. female, pmhx diabetes, hypertension, high cholesterol, asthma, pancreatitis, who presents ambulatory to the ED c/o chest pain    Patient explains she ate dinner yesterday evening was feeling fine and then around 9 PM went to lay down and she started to feel pain in her chest.  She states it has been a constant 8 out of 10 since last night which she describes as a tightness that is nonradiating and located in the center of her chest.  She tried drinking some ginger ale that did not help her symptoms. She did not try any other medications. She does feel nauseous and feels short of breath and states her pain worsens anytime she ambulates or takes a deep breath. She denies any fevers, chills, vomiting, diarrhea as well as any abdominal pain. She denies any sick contacts but notes she has a mild cough. Patient notes she got the Covid vaccine yesterday. PCP: Otf Bennett MD    Allergies: Gabapentin, morphine, tramadol  Social Hx: Former tobacco, -vaping, -EtOH, -Illicit Drugs; There are no other complaints, changes, or physical findings at this time. Current Outpatient Medications   Medication Sig Dispense Refill    montelukast (SINGULAIR) 10 mg tablet TAKE 1 TABLET BY MOUTH DAILY AT BEDTIME 30 Tab 3    clopidogreL (PLAVIX) 75 mg tab       hydroCHLOROthiazide (MICROZIDE) 12.5 mg capsule       levocetirizine (XYZAL) 5 mg tablet Take 1 Tab by mouth daily. Indications: itching of skin 90 Tab 4    labetaloL (NORMODYNE) 100 mg tablet Take 1 Tab by mouth two (2) times a day.  Indications: Bood pressure 180 Tab 4    diphenhydrAMINE (BenadryL) 25 mg capsule Take 25 mg by mouth every six (6) hours as needed.  simvastatin (ZOCOR) 40 mg tablet TAKE 1 TABLET BY MOUTH EVERY NIGHT AT BEDTIME 90 Tab 4    BD Single Use Swabs Regular padm APPLY  EXTERNALLY EVERY  Pad 5    ascorbic acid, vitamin C, (Vitamin C) 500 mg tablet Take 1 Tab by mouth two (2) times a day. 24 Tab 0    zinc sulfate 220 mg tablet Take 1 Tab by mouth two (2) times a day. 24 Tab 0    albuterol (PROVENTIL HFA, VENTOLIN HFA, PROAIR HFA) 90 mcg/actuation inhaler Take 2 Puffs by inhalation every four (4) hours as needed for Wheezing. 1 Inhaler 0    multivitamin with iron tablet Take 1 Tab by mouth daily.  amLODIPine (NORVASC) 10 mg tablet TAKE 1 TABLET BY MOUTH EVERY DAY 90 Tab 2    lancets (TRUEplus Lancets) 28 gauge misc TEST BLOOD SUGAR EVERY  Lancet 5    glucose blood VI test strips (True Metrix Glucose Test Strip) strip TEST BLOOD SUGAR EVERY  Strip 5    insulin glargine (Lantus Solostar U-100 Insulin) 100 unit/mL (3 mL) inpn 20 Units by SubCUTAneous route daily. 30 mL 5    acetaminophen (Tylenol Extra Strength) 500 mg tablet Take  by mouth every six (6) hours as needed for Pain.  hydroCHLOROthiazide (HYDRODIURIL) 12.5 mg tablet Take 1 Tab by mouth daily. 90 Tab 4    aspirin delayed-release 81 mg tablet Take 81 mg by mouth daily.  Insulin Needles, Disposable, (BD Ultra-Fine Short Pen Needle) 31 gauge x 5/16\" ndle by Does Not Apply route daily. E11.9. Use daily for insulin injection 100 Pen Needle 5    Nebulizer & Compressor machine 1 Each by Does Not Apply route three (3) times daily. Indications: J44.9 1 Each 0    Blood-Glucose Meter (TRUE METRIX AIR GLUCOSE METER) misc by Does Not Apply route.  ipratropium (ATROVENT) 42 mcg (0.06 %) nasal spray 2 Sprays by Nasal route daily as needed.  fluticasone-salmeterol (ADVAIR DISKUS) 250-50 mcg/dose diskus inhaler Take 1 Puff by inhalation every twelve (12) hours.          Past History     Past Medical History:  Past Medical History:   Diagnosis Date    Arthritis     Asthma     Diabetes ([address]     Hypertension     Menopause     Pancreatitis        Past Surgical History:  Past Surgical History:   Procedure Laterality Date    COLONOSCOPY N/A 2020    COLONOSCOPY performed by Martell Ontiveros MD at [facility] [address] HX BUNIONECTOMY Bilateral     [address]      HX PARTIAL HYSTERECTOMY  1980    [facility]    Exploratory laparotomy, extended right hemicolectomy       Family History:  Family History   Problem Relation Age of Onset    Other Mother         ANEURYSM    Breast Cancer Mother     Diabetes Mother     Lung Disease Father         EMPHYSEMA    Crohn's Disease Sister     Heart Disease Sister     Diabetes Maternal Uncle     Heart Disease Maternal Uncle     Diabetes Paternal Uncle     Heart Disease Paternal Uncle     Breast Cancer Maternal Aunt     Anesth Problems Neg Hx        Social History:  Social History     Tobacco Use    Smoking status: Former Smoker     Packs/day: 0.25     Years: 20.00     Pack years: 5.00     Quit date: 2020     Years since quittin.7    Smokeless tobacco: Never Used   Substance Use Topics    Alcohol use: Not Currently     Comment: RARE Q 3 MONTHS    Drug use: No       Allergies: Allergies   Allergen Reactions    Lisinopril Other (comments)    Gabapentin Itching    Morphine Unknown (comments), Hives and Itching    Tramadol Other (comments) and Itching     \"funny feeling\"         Review of Systems   Review of Systems   Constitutional: Negative for activity change, appetite change, chills, fever and unexpected weight change. HENT: Negative for congestion. Eyes: Negative for pain and visual disturbance. Respiratory: Positive for chest tightness. Negative for cough and shortness of breath. Cardiovascular: Positive for chest pain. Negative for leg swelling.    Gastrointestinal: Negative for abdominal distention, abdominal pain, anal bleeding, diarrhea, nausea and vomiting. Genitourinary: Negative for dysuria. Musculoskeletal: Negative for back pain. Skin: Negative for rash. Neurological: Negative for headaches. Physical Exam   Physical Exam  Vitals signs and nursing note reviewed. Constitutional:       Appearance: She is well-developed. She is not diaphoretic. Comments: Obese middle-aged female who appears tearful in moderate distress secondary to pain   HENT:      Head: Normocephalic and atraumatic. Eyes:      General:         Right eye: No discharge. Left eye: No discharge. Conjunctiva/sclera: Conjunctivae normal.      Pupils: Pupils are equal, round, and reactive to light. Comments: Disconjugate gaze   Neck:      Musculoskeletal: Normal range of motion and neck supple. Cardiovascular:      Rate and Rhythm: Normal rate and regular rhythm. Heart sounds: Normal heart sounds. No murmur. No friction rub. No gallop. No S3 or S4 sounds. Comments: Chest tender to palpation bilateral sides of the sternum  Pulmonary:      Effort: Pulmonary effort is normal. No accessory muscle usage or respiratory distress. Breath sounds: Normal breath sounds. No decreased breath sounds, wheezing, rhonchi or rales. Abdominal:      General: Bowel sounds are normal. There is no distension or abdominal bruit. Palpations: Abdomen is soft. There is no hepatomegaly or mass. Tenderness: There is no abdominal tenderness. There is no guarding or rebound. Musculoskeletal: Normal range of motion. Right lower leg: She exhibits no tenderness. No edema. Left lower leg: She exhibits no tenderness. No edema. Skin:     General: Skin is warm and dry. Capillary Refill: Capillary refill takes less than 2 seconds. Coloration: Skin is not pale. Findings: No erythema or rash. Nails: There is no clubbing.      Neurological:      Mental Status: She is alert and oriented to person, place, and time. Cranial Nerves: No cranial nerve deficit. Motor: No abnormal muscle tone. Diagnostic Study Results     Labs -     Recent Results (from the past 12 hour(s))   EKG, 12 LEAD, INITIAL    Collection Time: 04/12/21 11:55 AM   Result Value Ref Range    Ventricular Rate 81 BPM    Atrial Rate 81 BPM    P-R Interval 152 ms    QRS Duration 90 ms    Q-T Interval 370 ms    QTC Calculation (Bezet) 429 ms    Calculated P Axis 64 degrees    Calculated R Axis 22 degrees    Calculated T Axis -54 degrees    Diagnosis       Normal sinus rhythm  T wave abnormality, consider inferior ischemia  Abnormal ECG  When compared with ECG of 31-DEC-2020 10:30,  Nonspecific T wave abnormality has replaced inverted T waves in Anterolateral   leads     CBC WITH AUTOMATED DIFF    Collection Time: 04/12/21 12:05 PM   Result Value Ref Range    WBC 8.1 3.6 - 11.0 K/uL    RBC 3.40 (L) 3.80 - 5.20 M/uL    HGB 8.1 (L) 11.5 - 16.0 g/dL    HCT 26.6 (L) 35.0 - 47.0 %    MCV 78.2 (L) 80.0 - 99.0 FL    MCH 23.8 (L) 26.0 - 34.0 PG    MCHC 30.5 30.0 - 36.5 g/dL    RDW 15.6 (H) 11.5 - 14.5 %    PLATELET 460 957 - 762 K/uL    MPV 11.2 8.9 - 12.9 FL    NRBC 0.0 0  WBC    ABSOLUTE NRBC 0.00 0.00 - 0.01 K/uL    NEUTROPHILS 52 32 - 75 %    LYMPHOCYTES 37 12 - 49 %    MONOCYTES 7 5 - 13 %    EOSINOPHILS 3 0 - 7 %    BASOPHILS 1 0 - 1 %    IMMATURE GRANULOCYTES 0 0.0 - 0.5 %    ABS. NEUTROPHILS 4.2 1.8 - 8.0 K/UL    ABS. LYMPHOCYTES 2.9 0.8 - 3.5 K/UL    ABS. MONOCYTES 0.6 0.0 - 1.0 K/UL    ABS. EOSINOPHILS 0.2 0.0 - 0.4 K/UL    ABS. BASOPHILS 0.1 0.0 - 0.1 K/UL    ABS. IMM.  GRANS. 0.0 0.00 - 0.04 K/UL    DF AUTOMATED     METABOLIC PANEL, COMPREHENSIVE    Collection Time: 04/12/21 12:05 PM   Result Value Ref Range    Sodium 138 136 - 145 mmol/L    Potassium 4.3 3.5 - 5.1 mmol/L    Chloride 102 97 - 108 mmol/L    CO2 24 21 - 32 mmol/L    Anion gap 12 5 - 15 mmol/L    Glucose 273 (H) 65 - 100 mg/dL BUN 10 6 - 20 MG/DL    Creatinine 0.92 0.55 - 1.02 MG/DL    BUN/Creatinine ratio 11 (L) 12 - 20      GFR est AA >60 >60 ml/min/1.73m2    GFR est non-AA >60 >60 ml/min/1.73m2    Calcium 9.5 8.5 - 10.1 MG/DL    Bilirubin, total 0.3 0.2 - 1.0 MG/DL    ALT (SGPT) 32 12 - 78 U/L    AST (SGOT) 52 (H) 15 - 37 U/L    Alk.  phosphatase 107 45 - 117 U/L    Protein, total 7.4 6.4 - 8.2 g/dL    Albumin 3.1 (L) 3.5 - 5.0 g/dL    Globulin 4.3 (H) 2.0 - 4.0 g/dL    A-G Ratio 0.7 (L) 1.1 - 2.2     TROPONIN I    Collection Time: 04/12/21 12:05 PM   Result Value Ref Range    Troponin-I, Qt. <0.05 <0.05 ng/mL   CK W/ CKMB & INDEX    Collection Time: 04/12/21 12:05 PM   Result Value Ref Range    CK - MB <1.0 <3.6 NG/ML    CK-MB Index Cannot be calculated 0.0 - 2.5       26 - 192 U/L   NT-PRO BNP    Collection Time: 04/12/21 12:05 PM   Result Value Ref Range    NT pro- (H) 0 - 125 PG/ML   PROTHROMBIN TIME + INR    Collection Time: 04/12/21  1:02 PM   Result Value Ref Range    INR 1.0 0.9 - 1.1      Prothrombin time 10.2 9.0 - 11.1 sec   NT-PRO BNP    Collection Time: 04/12/21  1:02 PM   Result Value Ref Range    NT pro- (H) 0 - 125 PG/ML   D DIMER    Collection Time: 04/12/21  1:02 PM   Result Value Ref Range    D-dimer 1.73 (H) 0.00 - 0.65 mg/L FEU   TROPONIN I    Collection Time: 04/12/21  3:21 PM   Result Value Ref Range    Troponin-I, Qt. <0.05 <0.05 ng/mL   EKG, 12 LEAD, SUBSEQUENT    Collection Time: 04/12/21  3:29 PM   Result Value Ref Range    Ventricular Rate 72 BPM    Atrial Rate 72 BPM    P-R Interval 174 ms    QRS Duration 96 ms    Q-T Interval 410 ms    QTC Calculation (Bezet) 448 ms    Calculated P Axis 48 degrees    Calculated R Axis -5 degrees    Calculated T Axis -50 degrees    Diagnosis       Normal sinus rhythm with sinus arrhythmia  Minimal voltage criteria for LVH, may be normal variant  T wave abnormality, consider inferior ischemia  T wave abnormality, consider anterolateral ischemia  Abnormal ECG  When compared with ECG of 12-APR-2021 11:55,  MANUAL COMPARISON REQUIRED, DATA IS UNCONFIRMED         Radiologic Studies -   CTA CHEST W OR W WO CONT   Final Result   1. No pulmonary embolism, no acute vascular abnormality. No acute airspace   disease. 2. Cardiomegaly. XR ABD ACUTE W 1 V CHEST   Final Result   No acute cardiopulmonary disease. Nonspecific bowel gas pattern. No   evidence of perforation. CT Results  (Last 48 hours)               04/12/21 1431  CTA CHEST W OR W WO CONT Final result    Impression:  1. No pulmonary embolism, no acute vascular abnormality. No acute airspace   disease. 2. Cardiomegaly. Narrative:  EXAM:  CTA CHEST W OR W WO CONT       INDICATION: Chest pain, elevated d-dimer       COMPARISON: CT of the chest dated December 31, 2020       TECHNIQUE: Helical thin section chest CT following uneventful intravenous   administration of nonionic contrast 100 mL of isovue 370 according to   departmental PE protocol. Coronal and sagittal reformats were performed. 3D post   processing was performed. CT dose reduction was achieved through the use of a   standardized protocol tailored for this examination and automatic exposure   control for dose modulation. FINDINGS: This is a good quality study for the evaluation of pulmonary embolism   to the first subsegmental arterial level. There is no pulmonary embolism to this   level. THYROID: No nodule. MEDIASTINUM: No mass or lymphadenopathy. MELBA: No mass or lymphadenopathy. THORACIC AORTA: No aneurysm. HEART: Cardiomegaly   ESOPHAGUS: No wall thickening or dilatation. TRACHEA/BRONCHI: Patent. PLEURA: No effusion or pneumothorax. LUNGS: No nodule, mass, or airspace disease. UPPER ABDOMEN: Partially visualized abdominal aortic endograft. BONES: Anterior plate fixation at X3-7.                CXR Results  (Last 48 hours)               04/12/21 1249  XR ABD ACUTE W 1 V CHEST Final result    Impression:  No acute cardiopulmonary disease. Nonspecific bowel gas pattern. No   evidence of perforation. Narrative:  INDICATION:  epigastric pain        Exam: AP view of the chest, supine and upright frontal views of the abdomen. FINDINGS: Cardiomediastinal silhouette is within normal limits. Lungs are clear   bilaterally. Pleural spaces are normal. Osseous structures are intact. There is a nonspecific bowel gas pattern. No dilated loop of bowel or air-fluid   level is seen. There is no evidence of free intraperitoneal gas. No pathologic   calcification is seen. Osseous structures are intact. Vascular stents overlie   the expected location of the abdominal aorta, left and right common iliac   arteries and left external iliac artery. Multiple surgical clips overlie the   pelvis. Medical Decision Making   I am the first provider for this patient. I reviewed the vital signs, available nursing notes, past medical history, past surgical history, family history and social history. Vital Signs-Reviewed the patient's vital signs.   Patient Vitals for the past 12 hrs:   Temp Pulse Resp BP SpO2   04/12/21 1600 -- -- 16 (!) 160/49 100 %   04/12/21 1545 -- -- 16 (!) 168/60 100 %   04/12/21 1530 -- -- -- (!) 145/40 100 %   04/12/21 1500 -- 66 12 (!) 164/66 99 %   04/12/21 1447 -- 73 14 (!) 164/45 98 %   04/12/21 1432 -- 70 14 -- 100 %   04/12/21 1402 -- 70 13 (!) 154/56 99 %   04/12/21 1347 -- 65 13 (!) 166/59 99 %   04/12/21 1332 -- 76 13 (!) 159/62 99 %   04/12/21 1317 -- 79 12 (!) 163/54 98 %   04/12/21 1303 -- -- 20 (!) 153/56 100 %   04/12/21 1302 -- -- -- (!) 140/70 100 %   04/12/21 1232 -- 74 12 (!) 156/56 100 %   04/12/21 1217 -- 79 12 (!) 158/64 99 %   04/12/21 1202 -- 75 22 (!) 152/61 100 %   04/12/21 1158 98.8 °F (37.1 °C) 86 20 (!) 141/64 100 %   04/12/21 1154 -- -- -- (!) 141/64 --       Pulse Oximetry Analysis - 100% on RA    Records Reviewed: Nursing Notes, Old Medical Records, Previous Radiology Studies and Previous Laboratory Studies    Provider Notes (Medical Decision Making):   MDM: Middle-aged female with high risk factors currently on Plavix who states she does not have a cardiologist, presenting with epigastric constant pain for the past 15 hours. Patient is hemodynamically stable with low concern for dissection, PE, pneumonia, acute MI.    ED Course:   Initial assessment performed. The patients presenting problems have been discussed, and they are in agreement with the care plan formulated and outlined with them. I have encouraged them to ask questions as they arise throughout their visit. EKG interpretation: (Preliminary)  Rhythm: Sinus rhythm at a rate of 81 bpm; normal MT; normal QRS; normal QTC and normal axis. T wave inversions noted in 2 3 and aVF with Q waves in lead III and further T wave inversions in V4 and V5. Unable to open and review prior EKGs however, in reviewing the interpretation, it sounds unchanged and similar to previous. This EKG was interpreted by ED Provider Yoselin Hernandez MD    Repeat EKG at 329:   Normal sinus at a rate of 72 bpm; normal MT; normal QRS; normal QTC with left axis deviation. T wave inversions in 2 3 and aVF as well as V4 and 5 unchanged. This EKG was interpreted by ED Provider Yoselin Hernandez MD    PROGRESS NOTE:    ED Course as of Apr 12 1934   Mon Apr 12, 2021   1236 Continues with chest pain although slightly better. Repeat nitroglycerin to be given. Blood pressure remained stable. [JT]   1503 Patient appears comfortable but continues to states she is having intermittent upper chest pains. CTA negative and all labs normal.  Repeat EKG and troponin to be obtained.   Spouse is at bedside and they are visibly arguing in the ER with loud, raised voices    [JT]      ED Course User Index  [JT] Anthony Smith MD        Discharge note:  Pt re-evaluated and noted to be feeling better, ready for discharge. Updated pt on all final lab and x-ray findings. Will follow up as instructed. All questions have been answered, pt voiced understanding and agreement with plan. Specific return precautions provided as well as instructions to return to the ED should sx worsen at any time. Vital signs stable for discharge. Critical Care Time:   0      Diagnosis     Clinical Impression:   1. Atypical chest pain        PLAN:  1. Discharge Medication List as of 4/12/2021  3:58 PM        2. Follow-up Information     Follow up With Specialties Details Why Contact Info    Marcelo Zarate MD Internal Medicine Schedule an appointment as soon as possible for a visit   [address]      Maxim Harrison MD Cardiology, Internal Medicine Schedule an appointment as soon as possible for a visit  for stress testing [address] [facility]      [address] Emergency Medicine  If symptoms worsen [address]  [address] [phone]        Return to ED if worse     Disposition:  Home       Please note, this dictation was completed with [employer], the computer voice recognition software. Quite often unanticipated grammatical, syntax, homophones, and other interpretive errors are inadvertently transcribed by the computer software. Please disregard these errors. Please excuse any errors that have escaped final proof reading.

## 2021-04-14 LAB
ATRIAL RATE: 72 BPM
ATRIAL RATE: 81 BPM
CALCULATED P AXIS, ECG09: 48 DEGREES
CALCULATED P AXIS, ECG09: 64 DEGREES
CALCULATED R AXIS, ECG10: -5 DEGREES
CALCULATED R AXIS, ECG10: 22 DEGREES
CALCULATED T AXIS, ECG11: -50 DEGREES
CALCULATED T AXIS, ECG11: -54 DEGREES
DIAGNOSIS, 93000: NORMAL
DIAGNOSIS, 93000: NORMAL
P-R INTERVAL, ECG05: 152 MS
P-R INTERVAL, ECG05: 174 MS
Q-T INTERVAL, ECG07: 370 MS
Q-T INTERVAL, ECG07: 410 MS
QRS DURATION, ECG06: 90 MS
QRS DURATION, ECG06: 96 MS
QTC CALCULATION (BEZET), ECG08: 429 MS
QTC CALCULATION (BEZET), ECG08: 448 MS
VENTRICULAR RATE, ECG03: 72 BPM
VENTRICULAR RATE, ECG03: 81 BPM

## 2021-04-15 ENCOUNTER — OFFICE VISIT (OUTPATIENT)
Dept: FAMILY MEDICINE CLINIC | Age: 66
End: 2021-04-15
Payer: MEDICARE

## 2021-04-15 VITALS
HEIGHT: 65 IN | BODY MASS INDEX: 29.85 KG/M2 | DIASTOLIC BLOOD PRESSURE: 92 MMHG | RESPIRATION RATE: 22 BRPM | OXYGEN SATURATION: 99 % | WEIGHT: 179.2 LBS | SYSTOLIC BLOOD PRESSURE: 162 MMHG | TEMPERATURE: 97.3 F | HEART RATE: 84 BPM

## 2021-04-15 DIAGNOSIS — I10 ESSENTIAL HYPERTENSION: Chronic | ICD-10-CM

## 2021-04-15 DIAGNOSIS — R07.9 CHEST PAIN AT REST: Primary | ICD-10-CM

## 2021-04-15 PROCEDURE — 3017F COLORECTAL CA SCREEN DOC REV: CPT | Performed by: FAMILY MEDICINE

## 2021-04-15 PROCEDURE — 93010 ELECTROCARDIOGRAM REPORT: CPT | Performed by: FAMILY MEDICINE

## 2021-04-15 PROCEDURE — G8755 DIAS BP > OR = 90: HCPCS | Performed by: FAMILY MEDICINE

## 2021-04-15 PROCEDURE — G9899 SCRN MAM PERF RSLTS DOC: HCPCS | Performed by: FAMILY MEDICINE

## 2021-04-15 PROCEDURE — 99214 OFFICE O/P EST MOD 30 MIN: CPT | Performed by: FAMILY MEDICINE

## 2021-04-15 PROCEDURE — G8753 SYS BP > OR = 140: HCPCS | Performed by: FAMILY MEDICINE

## 2021-04-15 PROCEDURE — G8417 CALC BMI ABV UP PARAM F/U: HCPCS | Performed by: FAMILY MEDICINE

## 2021-04-15 PROCEDURE — G8510 SCR DEP NEG, NO PLAN REQD: HCPCS | Performed by: FAMILY MEDICINE

## 2021-04-15 PROCEDURE — G8427 DOCREV CUR MEDS BY ELIG CLIN: HCPCS | Performed by: FAMILY MEDICINE

## 2021-04-15 RX ORDER — PHENOL/SODIUM PHENOLATE
20 AEROSOL, SPRAY (ML) MUCOUS MEMBRANE DAILY
Qty: 30 TAB | Refills: 1 | Status: SHIPPED | OUTPATIENT
Start: 2021-04-15 | End: 2022-05-29 | Stop reason: ALTCHOICE

## 2021-04-15 RX ORDER — HYDROCHLOROTHIAZIDE 25 MG/1
25 TABLET ORAL DAILY
Qty: 30 TAB | Refills: 1 | Status: SHIPPED | OUTPATIENT
Start: 2021-04-15 | End: 2021-06-18

## 2021-04-15 NOTE — PROGRESS NOTES
Chief Complaint   Patient presents with    Chest Pain     follow up from ED on monday.  Hypertension     3 most recent PHQ Screens 4/15/2021   Little interest or pleasure in doing things Not at all   Feeling down, depressed, irritable, or hopeless Not at all   Total Score PHQ 2 0     Learning Assessment 4/15/2021   PRIMARY LEARNER Patient   PRIMARY LANGUAGE ENGLISH   LEARNER PREFERENCE PRIMARY READING     LISTENING   ANSWERED BY patient   RELATIONSHIP SELF     Fall Risk Assessment, last 12 mths 4/15/2021   Able to walk? Yes   Fall in past 12 months? 0   Do you feel unsteady? 0   Are you worried about falling 0   Number of falls in past 12 months -   Fall with injury? -     Abuse Screening Questionnaire 4/15/2021   Do you ever feel afraid of your partner? N   Are you in a relationship with someone who physically or mentally threatens you? N   Is it safe for you to go home? Y     ADL Assessment 4/15/2021   Feeding yourself No Help Needed   Getting from bed to chair No Help Needed   Getting dressed No Help Needed   Bathing or showering No Help Needed   Walk across the room (includes cane/walker) No Help Needed   Using the telphone No Help Needed   Taking your medications No Help Needed   Preparing meals No Help Needed   Managing money (expenses/bills) No Help Needed   Moderately strenuous housework (laundry) No Help Needed   Shopping for personal items (toiletries/medicines) No Help Needed   Shopping for groceries No Help Needed   Driving No Help Needed   Climbing a flight of stairs No Help Needed   Getting to places beyond walking distances No Help Needed     1. Have you been to the ER, urgent care clinic since your last visit? Hospitalized since your last visit? No    2. Have you seen or consulted any other health care providers outside of the [address] [place] [address] [place] since your last visit? Include any pap smears or colon screening.  No      Chief Complaint   Patient presents with    Chest Pain     follow up from ED on monday.      Hypertension         Visit Vitals  BP (!) 162/92 (BP 1 Location: Left arm, BP Patient Position: Sitting, BP Cuff Size: Adult long)   Pulse 84   Temp 97.3 °F (36.3 °C) (Temporal)   Resp 22   Ht 5' 5\" (1.651 m)   Wt 179 lb 3.2 oz (81.3 kg)   SpO2 99%   BMI 29.82 kg/m²       Pain Scale: 8/10  Pain Location: Chest    Marce Mustafa is a 63 y.o. female presenting for/with:    Chest Pain (follow up from ED on monday. ) and Hypertension      Symptom review:    NO  Fever   NO  Shaking chills  NO  Cough  NO  Body aches  NO  Coughing up blood  NO  Chest congestion  NO  Chest pain  NO  Shortness of breath  NO  Profound Loss of smell/taste  NO  Nausea/Vomiting   NO  Loose stool/Diarrhea  NO  any skin issues    Patient Risk Factors Reviewed as follows:  NO  have you been in Close contact with confirmed COVID19 patient   NO  History of recent travel to affected geographical areas within the past 14 days  NO  COPD  NO  Active Cancer/Leukemia/Lymphoma/Chemotherapy  NO  Oral steroid use  NO  Pregnant  NO  Diabetes Mellitus  NO  Heart disease  NO  Asthma  NO Health care worker at home  [address] care worker  NO Is there a Pregnant Woman in the home  NO Dialysis pt in the home   NO a large number of people living in the home

## 2021-04-15 NOTE — PROGRESS NOTES
4/15/2021      Chief Complaint   Patient presents with    Chest Pain     follow up from ED on monday.  Hypertension         History of Present Illness:         is a 63 y.o. female seen in ED on 4/12 for CP at rest, negative ecg, enzymes and CTA. Still having same pain which she describes as a tightness. Also notes worse BECKER and pain in both thighs when she walks. Known PVD and is followed by Dr. Thom Tate. Last cardiac testing was perfusion stress in 2017. Set up to see Dr. Emanuel Bray on 4/19 through ED. On clopidegril and statin for PVD. Has RAD, this feels different than her wheezing. Allergies   Allergen Reactions    Lisinopril Other (comments)    Gabapentin Itching    Morphine Unknown (comments), Hives and Itching    Tramadol Other (comments) and Itching     \"funny feeling\"       Current Outpatient Medications   Medication Sig    montelukast (SINGULAIR) 10 mg tablet TAKE 1 TABLET BY MOUTH DAILY AT BEDTIME    clopidogreL (PLAVIX) 75 mg tab     hydroCHLOROthiazide (MICROZIDE) 12.5 mg capsule     levocetirizine (XYZAL) 5 mg tablet Take 1 Tab by mouth daily. Indications: itching of skin    labetaloL (NORMODYNE) 100 mg tablet Take 1 Tab by mouth two (2) times a day. Indications: Bood pressure    diphenhydrAMINE (BenadryL) 25 mg capsule Take 25 mg by mouth every six (6) hours as needed.  simvastatin (ZOCOR) 40 mg tablet TAKE 1 TABLET BY MOUTH EVERY NIGHT AT BEDTIME    BD Single Use Swabs Regular padm APPLY  EXTERNALLY EVERY DAY    ascorbic acid, vitamin C, (Vitamin C) 500 mg tablet Take 1 Tab by mouth two (2) times a day.  zinc sulfate 220 mg tablet Take 1 Tab by mouth two (2) times a day.  albuterol (PROVENTIL HFA, VENTOLIN HFA, PROAIR HFA) 90 mcg/actuation inhaler Take 2 Puffs by inhalation every four (4) hours as needed for Wheezing.  multivitamin with iron tablet Take 1 Tab by mouth daily.     amLODIPine (NORVASC) 10 mg tablet TAKE 1 TABLET BY MOUTH EVERY DAY    lancets (TRUEplus Lancets) 28 gauge misc TEST BLOOD SUGAR EVERY DAY    glucose blood VI test strips (True Metrix Glucose Test Strip) strip TEST BLOOD SUGAR EVERY DAY    insulin glargine (Lantus Solostar U-100 Insulin) 100 unit/mL (3 mL) inpn 20 Units by SubCUTAneous route daily.  acetaminophen (Tylenol Extra Strength) 500 mg tablet Take  by mouth every six (6) hours as needed for Pain.  hydroCHLOROthiazide (HYDRODIURIL) 12.5 mg tablet Take 1 Tab by mouth daily.  aspirin delayed-release 81 mg tablet Take 81 mg by mouth daily.  Insulin Needles, Disposable, (BD Ultra-Fine Short Pen Needle) 31 gauge x 5/16\" ndle by Does Not Apply route daily. E11.9. Use daily for insulin injection    Nebulizer & Compressor machine 1 Each by Does Not Apply route three (3) times daily. Indications: J44.9    Blood-Glucose Meter (TRUE METRIX AIR GLUCOSE METER) misc by Does Not Apply route.  ipratropium (ATROVENT) 42 mcg (0.06 %) nasal spray 2 Sprays by Nasal route daily as needed.  fluticasone-salmeterol (ADVAIR DISKUS) 250-50 mcg/dose diskus inhaler Take 1 Puff by inhalation every twelve (12) hours. No current facility-administered medications for this visit. Physical Examination:    Visit Vitals  BP (!) 162/92 (BP 1 Location: Left arm, BP Patient Position: Sitting, BP Cuff Size: Adult long)   Pulse 84   Temp 97.3 °F (36.3 °C) (Temporal)   Resp 22   Ht 5' 5\" (1.651 m)   Wt 179 lb 3.2 oz (81.3 kg)   SpO2 99%   BMI 29.82 kg/m²      General:  Alert, cooperative, no distress. HEENT:  Normocephalic, without obvious abnormality, atraumatic. Conjunctivae/corneas clear. Pupils equal, round, reactive to light. Extraocular movements intact. TMs and external canals normal bilaterally. Nasal mucosa and oropharynx clear. Lungs: Clear to auscultation bilaterally. Chest wall:  No tenderness or deformity. Heart:  Regular rate and rhythm, S1, S2 normal, no murmur, click, rub, or gallop. Abdomen:   Soft, non-tender.  Bowel sounds normal. No masses. No organomegaly. Extremities: Extremities normal, atraumatic, no cyanosis or edema. Pulses: 2+ and symmetric all extremities. Skin: Skin color, texture, turgor normal. No rashes or lesions. Lymph nodes: Cervical, supraclavicular, and axillary nodes normal.   Neurologic: CNII-XII intact. Normal strength, sensation, and reflexes throughout.     ecg- NSR with inferior T wave inversions unchanged from 4/12 or from 2019    [address]    1. Chest pain at rest  Multiple risk factors, no evidence of recent diagnostics. Sees Dr. Richard Martinez 4/19. Stressed importance of this and criteria for return to ED before this  - AMB POC EKG ROUTINE W/ 12 LEADS, INTER & REP  - Omeprazole delayed release (PRILOSEC D/R) 20 mg tablet; Take 1 Tab by mouth daily. Dispense: 30 Tab; Refill: 1    2. Essential hypertension  Increase HCTZ  - hydroCHLOROthiazide (HYDRODIURIL) 25 mg tablet; Take 1 Tab by mouth daily. Dispense: 30 Tab;  Refill: 1            Orders Placed This Encounter    AMB POC EKG ROUTINE W/ 12 LEADS, INTER & REP     Order Specific Question:   Reason for Exam:     Answer:   JUAN Cruz MD

## 2021-04-21 ENCOUNTER — APPOINTMENT (OUTPATIENT)
Dept: GENERAL RADIOLOGY | Age: 66
DRG: 812 | End: 2021-04-21
Attending: EMERGENCY MEDICINE
Payer: MEDICARE

## 2021-04-21 ENCOUNTER — DOCUMENTATION ONLY (OUTPATIENT)
Dept: CARDIOLOGY CLINIC | Age: 66
End: 2021-04-21

## 2021-04-21 ENCOUNTER — HOSPITAL ENCOUNTER (INPATIENT)
Age: 66
LOS: 2 days | Discharge: HOME | DRG: 812 | End: 2021-04-23
Attending: EMERGENCY MEDICINE | Admitting: INTERNAL MEDICINE
Payer: MEDICARE

## 2021-04-21 DIAGNOSIS — D50.0 IRON DEFICIENCY ANEMIA DUE TO CHRONIC BLOOD LOSS: Primary | ICD-10-CM

## 2021-04-21 DIAGNOSIS — D50.0 ANEMIA DUE TO CHRONIC BLOOD LOSS: ICD-10-CM

## 2021-04-21 PROBLEM — E78.5 HYPERLIPIDEMIA: Chronic | Status: ACTIVE | Noted: 2021-04-21

## 2021-04-21 PROBLEM — D64.9 SYMPTOMATIC ANEMIA: Status: ACTIVE | Noted: 2021-04-21

## 2021-04-21 PROBLEM — I73.9 PVD (PERIPHERAL VASCULAR DISEASE) (HCC): Chronic | Status: ACTIVE | Noted: 2021-04-21

## 2021-04-21 LAB
ALBUMIN SERPL-MCNC: 3.2 G/DL (ref 3.5–5)
ALBUMIN/GLOB SERPL: 0.7 {RATIO} (ref 1.1–2.2)
ALP SERPL-CCNC: 106 U/L (ref 45–117)
ALT SERPL-CCNC: 24 U/L (ref 12–78)
ANION GAP SERPL CALC-SCNC: 13 MMOL/L (ref 5–15)
AST SERPL-CCNC: 18 U/L (ref 15–37)
ATRIAL RATE: 83 BPM
BASOPHILS # BLD: 0.1 K/UL (ref 0–0.1)
BASOPHILS NFR BLD: 1 % (ref 0–1)
BILIRUB SERPL-MCNC: 0.2 MG/DL (ref 0.2–1)
BNP SERPL-MCNC: 140 PG/ML (ref 0–125)
BUN SERPL-MCNC: 14 MG/DL (ref 6–20)
BUN/CREAT SERPL: 13 (ref 12–20)
CALCIUM SERPL-MCNC: 9.6 MG/DL (ref 8.5–10.1)
CALCULATED P AXIS, ECG09: 64 DEGREES
CALCULATED R AXIS, ECG10: 6 DEGREES
CALCULATED T AXIS, ECG11: -65 DEGREES
CHLORIDE SERPL-SCNC: 98 MMOL/L (ref 97–108)
CO2 SERPL-SCNC: 26 MMOL/L (ref 21–32)
CREAT SERPL-MCNC: 1.09 MG/DL (ref 0.55–1.02)
DIAGNOSIS, 93000: NORMAL
DIFFERENTIAL METHOD BLD: ABNORMAL
EOSINOPHIL # BLD: 0.2 K/UL (ref 0–0.4)
EOSINOPHIL NFR BLD: 2 % (ref 0–7)
ERYTHROCYTE [DISTWIDTH] IN BLOOD BY AUTOMATED COUNT: 15.7 % (ref 11.5–14.5)
GLOBULIN SER CALC-MCNC: 4.3 G/DL (ref 2–4)
GLUCOSE BLD STRIP.AUTO-MCNC: 250 MG/DL (ref 65–100)
GLUCOSE BLD STRIP.AUTO-MCNC: 323 MG/DL (ref 65–100)
GLUCOSE SERPL-MCNC: 361 MG/DL (ref 65–100)
HCT VFR BLD AUTO: 22.4 % (ref 35–47)
HEMOCCULT STL QL: POSITIVE
HGB BLD-MCNC: 6.6 G/DL (ref 11.5–16)
HISTORY CHECKED?,CKHIST: NORMAL
HISTORY CHECKED?,CKHIST: NORMAL
IMM GRANULOCYTES # BLD AUTO: 0 K/UL (ref 0–0.04)
IMM GRANULOCYTES NFR BLD AUTO: 0 % (ref 0–0.5)
INR PPP: 1 (ref 0.9–1.1)
LYMPHOCYTES # BLD: 2.3 K/UL (ref 0.8–3.5)
LYMPHOCYTES NFR BLD: 25 % (ref 12–49)
MCH RBC QN AUTO: 22.9 PG (ref 26–34)
MCHC RBC AUTO-ENTMCNC: 29.5 G/DL (ref 30–36.5)
MCV RBC AUTO: 77.8 FL (ref 80–99)
MONOCYTES # BLD: 0.6 K/UL (ref 0–1)
MONOCYTES NFR BLD: 7 % (ref 5–13)
NEUTS SEG # BLD: 5.9 K/UL (ref 1.8–8)
NEUTS SEG NFR BLD: 64 % (ref 32–75)
NRBC # BLD: 0.03 K/UL (ref 0–0.01)
NRBC BLD-RTO: 0.3 PER 100 WBC
P-R INTERVAL, ECG05: 148 MS
PLATELET # BLD AUTO: 271 K/UL (ref 150–400)
PMV BLD AUTO: 11.6 FL (ref 8.9–12.9)
POTASSIUM SERPL-SCNC: 3.8 MMOL/L (ref 3.5–5.1)
PROT SERPL-MCNC: 7.5 G/DL (ref 6.4–8.2)
PROTHROMBIN TIME: 9.6 SEC (ref 9–11.1)
Q-T INTERVAL, ECG07: 370 MS
QRS DURATION, ECG06: 90 MS
QTC CALCULATION (BEZET), ECG08: 434 MS
RBC # BLD AUTO: 2.88 M/UL (ref 3.8–5.2)
SERVICE CMNT-IMP: ABNORMAL
SERVICE CMNT-IMP: ABNORMAL
SODIUM SERPL-SCNC: 137 MMOL/L (ref 136–145)
TROPONIN I SERPL-MCNC: <0.05 NG/ML
VENTRICULAR RATE, ECG03: 83 BPM
WBC # BLD AUTO: 9.2 K/UL (ref 3.6–11)

## 2021-04-21 PROCEDURE — 65270000029 HC RM PRIVATE

## 2021-04-21 PROCEDURE — 99221 1ST HOSP IP/OBS SF/LOW 40: CPT | Performed by: SURGERY

## 2021-04-21 PROCEDURE — 85025 COMPLETE CBC W/AUTO DIFF WBC: CPT

## 2021-04-21 PROCEDURE — 85610 PROTHROMBIN TIME: CPT

## 2021-04-21 PROCEDURE — 82962 GLUCOSE BLOOD TEST: CPT

## 2021-04-21 PROCEDURE — 82272 OCCULT BLD FECES 1-3 TESTS: CPT

## 2021-04-21 PROCEDURE — 84484 ASSAY OF TROPONIN QUANT: CPT

## 2021-04-21 PROCEDURE — 71045 X-RAY EXAM CHEST 1 VIEW: CPT

## 2021-04-21 PROCEDURE — 83880 ASSAY OF NATRIURETIC PEPTIDE: CPT

## 2021-04-21 PROCEDURE — 74011636637 HC RX REV CODE- 636/637: Performed by: INTERNAL MEDICINE

## 2021-04-21 PROCEDURE — 86901 BLOOD TYPING SEROLOGIC RH(D): CPT

## 2021-04-21 PROCEDURE — P9016 RBC LEUKOCYTES REDUCED: HCPCS

## 2021-04-21 PROCEDURE — 80053 COMPREHEN METABOLIC PANEL: CPT

## 2021-04-21 PROCEDURE — 96374 THER/PROPH/DIAG INJ IV PUSH: CPT

## 2021-04-21 PROCEDURE — 99285 EMERGENCY DEPT VISIT HI MDM: CPT

## 2021-04-21 PROCEDURE — 36415 COLL VENOUS BLD VENIPUNCTURE: CPT

## 2021-04-21 PROCEDURE — 30233N1 TRANSFUSION OF NONAUTOLOGOUS RED BLOOD CELLS INTO PERIPHERAL VEIN, PERCUTANEOUS APPROACH: ICD-10-PCS | Performed by: SURGERY

## 2021-04-21 PROCEDURE — 93005 ELECTROCARDIOGRAM TRACING: CPT

## 2021-04-21 PROCEDURE — 74011250637 HC RX REV CODE- 250/637: Performed by: INTERNAL MEDICINE

## 2021-04-21 PROCEDURE — 36430 TRANSFUSION BLD/BLD COMPNT: CPT

## 2021-04-21 PROCEDURE — 74011250636 HC RX REV CODE- 250/636: Performed by: EMERGENCY MEDICINE

## 2021-04-21 RX ORDER — LABETALOL 100 MG/1
100 TABLET, FILM COATED ORAL 2 TIMES DAILY
Status: DISCONTINUED | OUTPATIENT
Start: 2021-04-21 | End: 2021-04-23 | Stop reason: HOSPADM

## 2021-04-21 RX ORDER — HYDROMORPHONE HYDROCHLORIDE 1 MG/ML
0.5 INJECTION, SOLUTION INTRAMUSCULAR; INTRAVENOUS; SUBCUTANEOUS ONCE
Status: COMPLETED | OUTPATIENT
Start: 2021-04-21 | End: 2021-04-21

## 2021-04-21 RX ORDER — ASPIRIN 81 MG/1
81 TABLET ORAL DAILY
Status: DISCONTINUED | OUTPATIENT
Start: 2021-04-22 | End: 2021-04-23 | Stop reason: HOSPADM

## 2021-04-21 RX ORDER — AMLODIPINE BESYLATE 10 MG/1
10 TABLET ORAL DAILY
Status: DISCONTINUED | OUTPATIENT
Start: 2021-04-22 | End: 2021-04-23 | Stop reason: HOSPADM

## 2021-04-21 RX ORDER — POLYETHYLENE GLYCOL 3350 17 G/17G
17 POWDER, FOR SOLUTION ORAL DAILY PRN
Status: DISCONTINUED | OUTPATIENT
Start: 2021-04-21 | End: 2021-04-23 | Stop reason: HOSPADM

## 2021-04-21 RX ORDER — SODIUM CHLORIDE 0.9 % (FLUSH) 0.9 %
5-40 SYRINGE (ML) INJECTION EVERY 8 HOURS
Status: DISCONTINUED | OUTPATIENT
Start: 2021-04-21 | End: 2021-04-23 | Stop reason: HOSPADM

## 2021-04-21 RX ORDER — MONTELUKAST SODIUM 10 MG/1
10 TABLET ORAL
Status: DISCONTINUED | OUTPATIENT
Start: 2021-04-21 | End: 2021-04-23 | Stop reason: HOSPADM

## 2021-04-21 RX ORDER — ENOXAPARIN SODIUM 100 MG/ML
40 INJECTION SUBCUTANEOUS DAILY
Status: DISCONTINUED | OUTPATIENT
Start: 2021-04-22 | End: 2021-04-22

## 2021-04-21 RX ORDER — INSULIN GLARGINE 100 [IU]/ML
15 INJECTION, SOLUTION SUBCUTANEOUS
Status: DISCONTINUED | OUTPATIENT
Start: 2021-04-21 | End: 2021-04-23 | Stop reason: HOSPADM

## 2021-04-21 RX ORDER — PANTOPRAZOLE SODIUM 40 MG/1
40 TABLET, DELAYED RELEASE ORAL
Status: DISCONTINUED | OUTPATIENT
Start: 2021-04-21 | End: 2021-04-22

## 2021-04-21 RX ORDER — ACETAMINOPHEN 325 MG/1
650 TABLET ORAL
Status: DISCONTINUED | OUTPATIENT
Start: 2021-04-21 | End: 2021-04-23 | Stop reason: HOSPADM

## 2021-04-21 RX ORDER — SODIUM CHLORIDE 9 MG/ML
250 INJECTION, SOLUTION INTRAVENOUS AS NEEDED
Status: DISCONTINUED | OUTPATIENT
Start: 2021-04-21 | End: 2021-04-23 | Stop reason: HOSPADM

## 2021-04-21 RX ORDER — ACETAMINOPHEN 325 MG/1
650 TABLET ORAL
Status: DISCONTINUED | OUTPATIENT
Start: 2021-04-21 | End: 2021-04-21

## 2021-04-21 RX ORDER — SODIUM CHLORIDE 0.9 % (FLUSH) 0.9 %
5-40 SYRINGE (ML) INJECTION AS NEEDED
Status: DISCONTINUED | OUTPATIENT
Start: 2021-04-21 | End: 2021-04-23 | Stop reason: HOSPADM

## 2021-04-21 RX ORDER — ONDANSETRON 2 MG/ML
4 INJECTION INTRAMUSCULAR; INTRAVENOUS
Status: DISCONTINUED | OUTPATIENT
Start: 2021-04-21 | End: 2021-04-23 | Stop reason: HOSPADM

## 2021-04-21 RX ORDER — INSULIN GLARGINE 100 [IU]/ML
10 INJECTION, SOLUTION SUBCUTANEOUS
Status: DISCONTINUED | OUTPATIENT
Start: 2021-04-21 | End: 2021-04-21

## 2021-04-21 RX ORDER — CLOPIDOGREL BISULFATE 75 MG/1
75 TABLET ORAL DAILY
Status: DISCONTINUED | OUTPATIENT
Start: 2021-04-22 | End: 2021-04-23 | Stop reason: HOSPADM

## 2021-04-21 RX ORDER — ACETAMINOPHEN 650 MG/1
650 SUPPOSITORY RECTAL
Status: DISCONTINUED | OUTPATIENT
Start: 2021-04-21 | End: 2021-04-23 | Stop reason: HOSPADM

## 2021-04-21 RX ADMIN — INSULIN GLARGINE 15 UNITS: 100 INJECTION, SOLUTION SUBCUTANEOUS at 22:37

## 2021-04-21 RX ADMIN — MONTELUKAST 10 MG: 10 TABLET, FILM COATED ORAL at 22:24

## 2021-04-21 RX ADMIN — ACETAMINOPHEN 650 MG: 325 TABLET ORAL at 21:08

## 2021-04-21 RX ADMIN — HYDROMORPHONE HYDROCHLORIDE 0.5 MG: 1 INJECTION, SOLUTION INTRAMUSCULAR; INTRAVENOUS; SUBCUTANEOUS at 11:34

## 2021-04-21 NOTE — H&P
[facility]   Admission History & Physical        4/21/2021 12:45 PM  Patient: Vanesa Mann 1955  PCP: Hailee Boss MD    HISTORY  Chief Complaint:   Chief Complaint   Patient presents with    Chest Pain       HPI: 63 y.o. female presenting for admission to [facility] for further evaluation and treatment for Anemia due to chronic blood loss. She  has a past medical history of Arthritis, Asthma, Diabetes ([address] Hypertension, Menopause, and Pancreatitis. . She presents to the ED with c/o atypical chest pain. Has been to PCP and ED recently with similar symptoms. She c/o CP and SOB / [address]. Exercise tolerance reduced from baseline. Symptoms have progressed over the past week. Was referred to Cardiolgy who referred her back to ED for assessment. Pt denies fever, congestion, cough. She had been in the ED for c/o chest pain on Apr 12th. She had atypical intense cramping pain in rib margins. She was Rx Prilosec and referred to cardiology. She as seen by PCP 4/15 for nonexertional CP described as tightness. Also had pain in thighs a/w BECKER. Has h/o PVD followed by Dr. Suraj Adams.  Last Nuclear Stress was done in 2017. Reported 1. No scintigraphic evidence of ischemia or infarct. 2. Normal wall motion with calculated left ventricular ejection fraction of 52%. Troponin tested 4/12,15,21 all within normal limits. NT proBNP 140. Pt was treated for COVID 19 Jan 2021.      Past Medical History:  Past Medical History:   Diagnosis Date    Arthritis     Asthma     Diabetes ([address]     Hypertension     Menopause     Pancreatitis        Past Surgical History:  Past Surgical History:   Procedure Laterality Date    COLONOSCOPY N/A 7/28/2020    COLONOSCOPY performed by Shea Vargas MD at [facility] HX BUNIONECTOMY Bilateral 1977    [address]  2002    HX PARTIAL HYSTERECTOMY  1980    HI ABDOMEN SURGERY [address] UNLISTED  09/01/2019    Exploratory laparotomy, extended right hemicolectomy       Medication:  Prior to Admission medications    Medication Sig Start Date End Date Taking? Authorizing Provider   Omeprazole delayed release (PRILOSEC D/R) 20 mg tablet Take 1 Tab by mouth daily. 4/15/21   Sheela Woods MD   hydroCHLOROthiazide (HYDRODIURIL) 25 mg tablet Take 1 Tab by mouth daily. 4/15/21   Sheela Woods MD   montelukast (SINGULAIR) 10 mg tablet TAKE 1 TABLET BY MOUTH DAILY AT BEDTIME 4/1/21   Chiki Hollingsworth NP   clopidogreL (PLAVIX) 75 mg tab  3/4/21   Provider, Historical   levocetirizine (XYZAL) 5 mg tablet Take 1 Tab by mouth daily. Indications: itching of skin 3/19/21   Chiki Hollingsworth NP   labetaloL (NORMODYNE) 100 mg tablet Take 1 Tab by mouth two (2) times a day. Indications: Bood pressure 3/4/21   Brandon Leonard NP   diphenhydrAMINE (BenadryL) 25 mg capsule Take 25 mg by mouth every six (6) hours as needed. Provider, Historical   simvastatin (ZOCOR) 40 mg tablet TAKE 1 TABLET BY MOUTH EVERY NIGHT AT BEDTIME 2/5/21   Carolee Leal MD   BD Single Use Swabs Regular padm APPLY  EXTERNALLY EVERY DAY 1/23/21   Lissa Curry MD   ascorbic acid, vitamin C, (Vitamin C) 500 mg tablet Take 1 Tab by mouth two (2) times a day. 1/14/21   Cyrus Pickens MD   zinc sulfate 220 mg tablet Take 1 Tab by mouth two (2) times a day. 1/14/21   Cyrus Pickens MD   albuterol (PROVENTIL HFA, VENTOLIN HFA, PROAIR HFA) 90 mcg/actuation inhaler Take 2 Puffs by inhalation every four (4) hours as needed for Wheezing. 1/14/21   Cyrus Pickens MD   multivitamin with iron tablet Take 1 Tab by mouth daily.     Provider, Historical   amLODIPine (NORVASC) 10 mg tablet TAKE 1 TABLET BY MOUTH EVERY DAY 11/26/20   Carolee Leal MD   lancets (TRUEplus Lancets) 28 gauge misc TEST BLOOD SUGAR EVERY DAY 10/8/20   Lissa Curry MD   glucose blood VI test strips (True Metrix Glucose Test Strip) strip TEST BLOOD SUGAR EVERY DAY 10/8/20   Lissa Lewis MD   insulin glargine (Lantus Solostar U-100 Insulin) 100 unit/mL (3 mL) inpn 20 Units by SubCUTAneous route daily. 20   Gregory Sarabia MD   acetaminophen (Tylenol Extra Strength) 500 mg tablet Take  by mouth every six (6) hours as needed for Pain. Provider, Historical   aspirin delayed-release 81 mg tablet Take 81 mg by mouth daily. Provider, Historical   Insulin Needles, Disposable, (BD Ultra-Fine Short Pen Needle) 31 gauge x \" ndle by Does Not Apply route daily. E11.9. Use daily for insulin injection 20   Stanford Curry MD   Nebulizer & Compressor machine 1 Each by Does Not Apply route three (3) times daily. Indications: J44.9 20   Gregory Sarabia MD   Blood-Glucose Meter (TRUE METRIX AIR GLUCOSE METER) misc by Does Not Apply route. Provider, Historical   ipratropium (ATROVENT) 42 mcg (0.06 %) nasal spray 2 Sprays by Nasal route daily as needed. 18   Provider, Historical   fluticasone-salmeterol (ADVAIR DISKUS) 250-50 mcg/dose diskus inhaler Take 1 Puff by inhalation every twelve (12) hours. Provider, Historical       Allergies:   Allergies   Allergen Reactions    Lisinopril Other (comments)    Gabapentin Itching    Morphine Unknown (comments), Hives and Itching    Tramadol Other (comments) and Itching     \"funny feeling\"       Social History:  Social History     Tobacco Use    Smoking status: Former Smoker     Packs/day: 0.25     Years: 20.00     Pack years: 5.00     Quit date: 2020     Years since quittin.8    Smokeless tobacco: Never Used   Substance Use Topics    Alcohol use: Not Currently     Comment: RARE Q 3 MONTHS    Drug use: No       Family History:  Family History   Problem Relation Age of Onset    Other Mother         ANEURYSM    Breast Cancer Mother     Diabetes Mother     Lung Disease Father         EMPHYSEMA    Crohn's Disease Sister     Heart Disease Sister     Diabetes Maternal Uncle     Heart Disease Maternal Uncle     Diabetes Paternal Uncle     Heart Disease Paternal Uncle     Breast Cancer Maternal Aunt     Anesth Problems Neg Hx        ROS:  Total of 12 systems reviewed as follows:  POSITIVE= bolded text  Negative = text not bolded       General:  fever, chills, sweats, generalized weakness, weight loss/gain, loss of appetite   Eyes:    blurred vision, eye pain, loss of vision, double vision  ENT:    rhinorrhea, pharyngitis   Respiratory:  cough, sputum production, SOB, BECKER, wheezing, pleuritic pain   Cardiology:   chest pain, palpitations, orthopnea, PND, edema, syncope   Gastrointestinal:  abdominal pain , N/V, diarrhea, dysphagia, constipation, bleeding   Genitourinary:  frequency, urgency, dysuria, hematuria, incontinence, prostatism   Muskuloskeletal: arthralgia, myalgia, back pain  Hematology:   easy bruising, nose or gum bleeding, lymphadenopathy   Dermatological: rash, ulceration, pruritis, color change / jaundice  Endocrine:   hot flashes or polydipsia   Neurological:  headache, dizziness, confusion, focal weakness, paresthesia, speech difficulties, memory loss, gait difficulty  Psychological: feelings of anxiety, depression, agitation      PHYSICAL EXAM:  Patient Vitals for the past 24 hrs:   Temp Pulse Resp BP SpO2   04/21/21 1215 -- 81 23 (!) 163/58 100 %   04/21/21 1201 -- 69 21 (!) 167/53 100 %   04/21/21 1145 -- 75 15 (!) 138/43 100 %   04/21/21 1117 -- 85 25 (!) 86/60 100 %   04/21/21 1100 -- 82 23 (!) 143/77 100 %   04/21/21 1046 -- 77 24 (!) 151/38 100 %   04/21/21 0924 98.8 °F (37.1 °C) 83 18 (!) 105/58 100 %       General:    Alert, cooperative, no distress, appears stated age. Periods of severe pain along L costal margin she rubs to clear  HEENT: Atraumatic, anicteric sclerae, pale conjunctivae     No oral ulcers, mucosa moist, throat clear, dentition fair  Neck:  Supple, symmetrical;   thyroid non tender  Lungs:   Clear to auscultation bilaterally. No wheezing or rhonchi. No rales. Chest wall:  No tenderness. No accessory muscle use. Heart:   Regular rhythm. No  murmur. No edema  Abdomen:   Soft, non-tender. Not distended. Bowel sounds normal  Extremities: No cyanosis. No clubbing      Capillary refill normal,  Radial pulse 2+,  DP 1+  Skin:     Not pale. Not jaundiced. No rashes   Psych:  Not depressed. Not anxious or agitated. Neurologic: EOMs intact. No facial asymmetry. No aphasia or slurred speech. Symmetrical strength, Sensation grossly intact. Alert and oriented X 4. Lab Data Reviewed:    Recent Results (from the past 24 hour(s))   EKG, 12 LEAD, INITIAL    Collection Time: 04/21/21  9:26 AM   Result Value Ref Range    Ventricular Rate 83 BPM    Atrial Rate 83 BPM    P-R Interval 148 ms    QRS Duration 90 ms    Q-T Interval 370 ms    QTC Calculation (Bezet) 434 ms    Calculated P Axis 64 degrees    Calculated R Axis 6 degrees    Calculated T Axis -65 degrees    Diagnosis       Normal sinus rhythm  Minimal voltage criteria for LVH, may be normal variant  ST & T wave abnormality, consider inferolateral ischemia  Abnormal ECG  When compared with ECG of 12-APR-2021 15:29,  No significant change was found     CBC WITH AUTOMATED DIFF    Collection Time: 04/21/21  9:40 AM   Result Value Ref Range    WBC 9.2 3.6 - 11.0 K/uL    RBC 2.88 (L) 3.80 - 5.20 M/uL    HGB 6.6 (L) 11.5 - 16.0 g/dL    HCT 22.4 (L) 35.0 - 47.0 %    MCV 77.8 (L) 80.0 - 99.0 FL    MCH 22.9 (L) 26.0 - 34.0 PG    MCHC 29.5 (L) 30.0 - 36.5 g/dL    RDW 15.7 (H) 11.5 - 14.5 %    PLATELET 255 063 - 689 K/uL    MPV 11.6 8.9 - 12.9 FL    NRBC 0.3 (H) 0  WBC    ABSOLUTE NRBC 0.03 (H) 0.00 - 0.01 K/uL    NEUTROPHILS 64 32 - 75 %    LYMPHOCYTES 25 12 - 49 %    MONOCYTES 7 5 - 13 %    EOSINOPHILS 2 0 - 7 %    BASOPHILS 1 0 - 1 %    IMMATURE GRANULOCYTES 0 0.0 - 0.5 %    ABS. NEUTROPHILS 5.9 1.8 - 8.0 K/UL    ABS. LYMPHOCYTES 2.3 0.8 - 3.5 K/UL    ABS. MONOCYTES 0.6 0.0 - 1.0 K/UL    ABS. EOSINOPHILS 0.2 0.0 - 0.4 K/UL    ABS.  BASOPHILS 0.1 0.0 - 0.1 K/UL    ABS. IMM. GRANS. 0.0 0.00 - 0.04 K/UL    DF AUTOMATED     METABOLIC PANEL, COMPREHENSIVE    Collection Time: 04/21/21  9:40 AM   Result Value Ref Range    Sodium 137 136 - 145 mmol/L    Potassium 3.8 3.5 - 5.1 mmol/L    Chloride 98 97 - 108 mmol/L    CO2 26 21 - 32 mmol/L    Anion gap 13 5 - 15 mmol/L    Glucose 361 (H) 65 - 100 mg/dL    BUN 14 6 - 20 MG/DL    Creatinine 1.09 (H) 0.55 - 1.02 MG/DL    BUN/Creatinine ratio 13 12 - 20      GFR est AA >60 >60 ml/min/1.73m2    GFR est non-AA 51 (L) >60 ml/min/1.73m2    Calcium 9.6 8.5 - 10.1 MG/DL    Bilirubin, total 0.2 0.2 - 1.0 MG/DL    ALT (SGPT) 24 12 - 78 U/L    AST (SGOT) 18 15 - 37 U/L    Alk. phosphatase 106 45 - 117 U/L    Protein, total 7.5 6.4 - 8.2 g/dL    Albumin 3.2 (L) 3.5 - 5.0 g/dL    Globulin 4.3 (H) 2.0 - 4.0 g/dL    A-G Ratio 0.7 (L) 1.1 - 2.2     PROTHROMBIN TIME + INR    Collection Time: 04/21/21  9:40 AM   Result Value Ref Range    INR 1.0 0.9 - 1.1      Prothrombin time 9.6 9.0 - 11.1 sec   TROPONIN I    Collection Time: 04/21/21  9:40 AM   Result Value Ref Range    Troponin-I, Qt. <0.05 <0.05 ng/mL   NT-PRO BNP    Collection Time: 04/21/21  9:40 AM   Result Value Ref Range    NT pro- (H) 0 - 125 PG/ML   TYPE & SCREEN    Collection Time: 04/21/21 10:41 AM   Result Value Ref Range    Crossmatch Expiration 04/24/2021,2359     ABO/Rh(D) A POSITIVE     Antibody screen NEG     Unit number [id number]     Blood component type [facility]     Unit division 00     Status of unit ISSUED     Crossmatch result Compatible     Unit number [id number]     Blood component type RC LR     Unit division 00     Status of unit ALLOCATED     Crossmatch result Compatible        EKG: NSR 83, LVH, NSSTTWC     Radiology:  XR CHEST SNGL V   Final Result   Clear lungs.           Care Plan discussed with:   Patient x    Family     RN x         Consultant Dr. Eli Castro     Expected  Disposition:   Home with Family x   HH/PT/OT/RN    SNF/LTC KRISTAL      TOTAL TIME:  61 Minutes      Comments    x Reviewed previous records   >50% of visit spent in counseling and coordination of care x Discussion with patient and/or family and questions answered       _______________________________________________________  Given the patient's current clinical presentation, I have a high level of concern for decompensation if discharged from the emergency department. Complex decision making was performed, which includes reviewing the patient's available past medical records, laboratory results, and x-ray films. My assessment of this patient's clinical condition and my plan of care is as follows.     ASSESSMENT / PLAN    Principal Problem:    Anemia due to chronic blood loss (4/21/2021)  Active Problems:    Symptomatic anemia (4/21/2021)  Upper abd pain  Has h/o diverticulosis and pancreatitis  Suspect PUD  Consult GS for EDG in AM  Transfuse 2 units pRBC  Protonix 40mg bid    Results for Krysten Cheney (MRN [id number]) as of 4/21/2021 12:58   1/14/2021 07:30 2/23/2021 15:33 4/12/2021 12:05 4/21/2021 09:40   HGB 10.7 (L) 10.5 (L) 8.1 (L) 6.6 (L)       Type 2 diabetes mellitus with peripheral vascular disease ([facility] (10/14/2019)  BS qid ac/hs  Lantus 15u daily is routine      HTN (hypertension) (8/7/2017)  Hold HCTZ till d/c  Cont tx Norvasc 10mg daily      Hyperlipidemia (4/21/2021)  Resume Zocor on d/c      Asthma (8/7/2017)  Smoking 5-10 cigs daiy  Using Advair in home  Rx Pulmocort / Angela Broach q12 qt      PVD (peripheral vascular disease) ([facility] [facility] (4/21/2021)  Post Op Dec 2020  ASA / Plavix - hold Plavix today, monitor        SAFETY:   Code Status:Full  DVT prophylaxis:SCD's or Sequential Compression Device  Stress Ulcer prophylaxis: Protonix bid  Bladder catheter:no  Family Contact Info:  Primary Emergency ContactChar Sidles, Home Phone: [phone]  Bedded: [facility] Room ED01/01  Disposition: TBD, likely home when stable  Admission status:  Inpatient    -Tentative plan of care discussed with patient / family, who demonstrated understanding and is in agreement to the above  -Case was reviewed with the ED Provider, MD Lillie De Leon MD  [facility] Hospitalist  [phone]

## 2021-04-21 NOTE — ED NOTES
TRANSFER - OUT REPORT:    Verbal report given to Malcom Yap RN(name) on Isaiah Goddard  being transferred to Med Surg (unit) for routine progression of care       Report consisted of patients Situation, Background, Assessment and   Recommendations(SBAR). Information from the following report(s) SBAR, Kardex and ED Summary was reviewed with the receiving nurse. Lines:   Peripheral IV 04/21/21 Left Wrist (Active)   Site Assessment Clean, dry, & intact 04/21/21 1306   Phlebitis Assessment 0 04/21/21 1306   Infiltration Assessment 0 04/21/21 1306        Opportunity for questions and clarification was provided.       Patient transported with:   blood

## 2021-04-21 NOTE — CONSULTS
Surgery Consult    Subjective:      Zane Harrell is a 63 y.o. female who presented to the emergency department for shortness of breath and chest pain. This is a second presentation in the last 9 days. She initially presented to the emergency department for this on April 12 with chest pain that she rated as an 8 out of 10. She tried drinking ginger ale with no improvement. She was found to be in moderate distress from the pain and tearful. She had chest tenderness bilaterally at that point but no abdominal tenderness was documented. She had a mildly elevated D-dimer at 1.73 and a hemoglobin of 8.1 and hematocrit of 26.6 and she had a CTA that showed no PE or vascular abnormality although the upper end of an endograft in the abdomen was noted. The patient has continued to have this chest pain since then. She was actually scheduled to see Dr. Lorin Guerrero today for evaluation of her chest pain even though she had negative troponins during her ED evaluation. When she went to the office of Dr. Lorin Guerrero today she was having shortness of breath and chest pain and they directed her to the emergency department. In the emergency department today she was continued to have the chest pain. Her hemoglobin was 6.6 and hematocrit of 22.4 and she was admitted with the presumption of acute blood loss anemia and the assumption was a GI bleed. The patient denies any nausea or vomiting. She denies any hematochezia. She states that she has had dark stools ever since she had a right hemicolectomy back in September 2019. She had a negative colonoscopy performed by myself in July 2020. She has a number of vascular issues. She apparently had an abdominal aortic aneurysm that was treated with an endograft at [facility] in December 2018. She also had a right femoropopliteal bypass in December 2020.   Approximately 2 weeks after this she developed chest pain as well and was seen initially at [facility] and subsequently transferred to [facility] where she had a CTA that was negative and discharged to home. Since her procedure she has been on Plavix and aspirin. She has never had a gastric ulcer. She has had no problems eating. She is hungry now. Her pain is intermittent. She may go minutes where she is having no pain and then developed fairly severe pain which results in her being in significant distress.       Past Medical History:   Diagnosis Date    Arthritis     Asthma     Diabetes ([address]     Hypertension     Menopause     Pancreatitis      Past Surgical History:   Procedure Laterality Date    COLONOSCOPY N/A 2020    COLONOSCOPY performed by Saul Tomas MD at [facility] [address] HX BUNIONECTOMY Bilateral     [address]      HX PARTIAL HYSTERECTOMY  1980    IA ABDOMEN SURGERY [facility]  2019    Exploratory laparotomy, extended right hemicolectomy      Family History   Problem Relation Age of Onset    Other Mother         ANEURYSM    Breast Cancer Mother     Diabetes Mother     Lung Disease Father         EMPHYSEMA    Crohn's Disease Sister     Heart Disease Sister     Diabetes Maternal Uncle     Heart Disease Maternal Uncle     Diabetes Paternal Uncle     Heart Disease Paternal Uncle     Breast Cancer Maternal Aunt     Anesth Problems Neg Hx      Social History     Socioeconomic History    Marital status: SINGLE     Spouse name: Not on file    Number of children: Not on file    Years of education: Not on file    Highest education level: Not on file   Tobacco Use    Smoking status: Former Smoker     Packs/day: 0.25     Years: 20.00     Pack years: 5.00     Quit date: 2020     Years since quittin.8    Smokeless tobacco: Never Used   Substance and Sexual Activity    Alcohol use: Not Currently     Comment: RARE Q 3 MONTHS    Drug use: No    Sexual activity: Yes      Current Facility-Administered Medications   Medication Dose Route Frequency Provider Last Rate Last Admin    pantoprazole (PROTONIX) 40 mg in 0.9% sodium chloride 10 mL injection  40 mg IntraVENous NOW Halima Miramontes MD   Stopped at 04/21/21 1128    [START ON 4/22/2021] amLODIPine (NORVASC) tablet 10 mg  10 mg Oral DAILY Halima Miramontes MD        [START ON 4/22/2021] aspirin delayed-release tablet 81 mg  81 mg Oral DAILY Halima Miramontes MD        [START ON 4/22/2021] clopidogreL (PLAVIX) tablet 75 mg  75 mg Oral DAILY Halima Miramontes MD        labetaloL (NORMODYNE) tablet 100 mg  100 mg Oral BID Halima Miramontes MD        montelukast (SINGULAIR) tablet 10 mg  10 mg Oral QHS Halima Miramontes MD        pantoprazole (PROTONIX) tablet 40 mg  40 mg Oral ACB&D Halima Miramontes MD        sodium chloride (NS) flush 5-40 mL  5-40 mL IntraVENous Q8H Halima Miramontes MD   Stopped at 04/21/21 1500    sodium chloride (NS) flush 5-40 mL  5-40 mL IntraVENous PRN Halima Miramontes MD        acetaminophen (TYLENOL) tablet 650 mg  650 mg Oral Q6H PRN Halima Miramontes MD        Or    acetaminophen (TYLENOL) suppository 650 mg  650 mg Rectal Q6H PRN Halima Miramontes MD        polyethylene glycol (MIRALAX) packet 17 g  17 g Oral DAILY PRN Halima Miramontes MD        [START ON 4/22/2021] enoxaparin (LOVENOX) injection 40 mg  40 mg SubCUTAneous DAILY Halima Miramontes MD        [facility]) injection 4 mg  4 mg IntraVENous Q6H PRN Halima Miramontes MD        0.9% sodium chloride infusion 250 mL  250 mL IntraVENous PRN Halima Miramontes MD        insulin glargine (LANTUS) injection 15 Units  15 Units SubCUTAneous DAILY WITH DINNER Halima Miramontes MD            Allergies   Allergen Reactions    Lisinopril Other (comments)    Gabapentin Itching    Morphine Unknown (comments), Hives and Itching    Tramadol Other (comments) and Itching     \"funny feeling\"       Review of Systems:  Pertinent items are noted in the History of Present Illness.     Objective:        Patient Vitals for the past 8 hrs:   BP Temp Pulse Resp SpO2   21 1745 (!) 156/70 98.5 °F (36.9 °C) 78 18 100 %   21 1645 (!) 165/78 98.2 °F (36.8 °C) 74 20 99 %   21 1615 (!) 166/74 98.5 °F (36.9 °C) 74 20 100 %   21 1515 (!) 175/69 98.1 °F (36.7 °C) 76 18 100 %   21 1445 (!) 176/78 98.5 °F (36.9 °C) 76 20 99 %   21 1430 (!) 177/75 98.5 °F (36.9 °C) 77 18 100 %   21 1425 (!) 168/62 -- 82 19 98 %   21 1420 (!) 168/60 -- 76 16 100 %   21 1415 (!) 168/60 98.4 °F (36.9 °C) 70 18 100 %   21 1408 (!) 170/58 98.4 °F (36.9 °C) 70 18 100 %   21 1400 (!) 170/58 98.4 °F (36.9 °C) 70 -- 100 %   21 1356 (!) 157/66 99.3 °F (37.4 °C) 82 18 100 %   21 1331 (!) 212/85 -- 89 17 95 %   21 1317 (!) 211/75 -- 89 14 98 %   21 1300 (!) 157/66 -- 90 18 100 %   21 1249 (!) 142/82 -- 84 20 100 %   21 1247 (!) 142/82 98.1 °F (36.7 °C) 76 20 100 %   21 1215 (!) 163/58 -- 81 23 100 %   21 1201 (!) 167/53 -- 69 21 100 %   21 1145 (!) 138/43 -- 75 15 100 %   21 1117 (!) 86/60 -- 85 25 100 %   21 1100 (!) 143/77 -- 82 23 100 %   21 1046 (!) 151/38 -- 77 24 100 %       Temp (24hrs), Av.5 °F (36.9 °C), Min:98.1 °F (36.7 °C), Max:99.3 °F (37.4 °C)      Physical Exam:  GENERAL: alert, cooperative, mild distress, appears stated age, LUNG: clear to auscultation bilaterally, HEART: regular rate and rhythm, S1, S2 normal, no murmur, click, rub or gallop, ABDOMEN: soft, non-tender.  Bowel sounds normal. No masses,  no organomegaly    Recent Results (from the past 24 hour(s))   EKG, 12 LEAD, INITIAL    Collection Time: 21  9:26 AM   Result Value Ref Range    Ventricular Rate 83 BPM    Atrial Rate 83 BPM    P-R Interval 148 ms    QRS Duration 90 ms    Q-T Interval 370 ms    QTC Calculation (Bezet) 434 ms    Calculated P Axis 64 degrees    Calculated R Axis 6 degrees    Calculated T Axis -65 degrees    Diagnosis       Normal sinus rhythm  Minimal voltage criteria for LVH, may be normal variant  ST & T wave abnormality, consider inferolateral ischemia  Abnormal ECG  When compared with ECG of 12-APR-2021 15:29,  No significant change was found     CBC WITH AUTOMATED DIFF    Collection Time: 04/21/21  9:40 AM   Result Value Ref Range    WBC 9.2 3.6 - 11.0 K/uL    RBC 2.88 (L) 3.80 - 5.20 M/uL    HGB 6.6 (L) 11.5 - 16.0 g/dL    HCT 22.4 (L) 35.0 - 47.0 %    MCV 77.8 (L) 80.0 - 99.0 FL    MCH 22.9 (L) 26.0 - 34.0 PG    MCHC 29.5 (L) 30.0 - 36.5 g/dL    RDW 15.7 (H) 11.5 - 14.5 %    PLATELET 363 046 - 894 K/uL    MPV 11.6 8.9 - 12.9 FL    NRBC 0.3 (H) 0  WBC    ABSOLUTE NRBC 0.03 (H) 0.00 - 0.01 K/uL    NEUTROPHILS 64 32 - 75 %    LYMPHOCYTES 25 12 - 49 %    MONOCYTES 7 5 - 13 %    EOSINOPHILS 2 0 - 7 %    BASOPHILS 1 0 - 1 %    IMMATURE GRANULOCYTES 0 0.0 - 0.5 %    ABS. NEUTROPHILS 5.9 1.8 - 8.0 K/UL    ABS. LYMPHOCYTES 2.3 0.8 - 3.5 K/UL    ABS. MONOCYTES 0.6 0.0 - 1.0 K/UL    ABS. EOSINOPHILS 0.2 0.0 - 0.4 K/UL    ABS. BASOPHILS 0.1 0.0 - 0.1 K/UL    ABS. IMM. GRANS. 0.0 0.00 - 0.04 K/UL    DF AUTOMATED     METABOLIC PANEL, COMPREHENSIVE    Collection Time: 04/21/21  9:40 AM   Result Value Ref Range    Sodium 137 136 - 145 mmol/L    Potassium 3.8 3.5 - 5.1 mmol/L    Chloride 98 97 - 108 mmol/L    CO2 26 21 - 32 mmol/L    Anion gap 13 5 - 15 mmol/L    Glucose 361 (H) 65 - 100 mg/dL    BUN 14 6 - 20 MG/DL    Creatinine 1.09 (H) 0.55 - 1.02 MG/DL    BUN/Creatinine ratio 13 12 - 20      GFR est AA >60 >60 ml/min/1.73m2    GFR est non-AA 51 (L) >60 ml/min/1.73m2    Calcium 9.6 8.5 - 10.1 MG/DL    Bilirubin, total 0.2 0.2 - 1.0 MG/DL    ALT (SGPT) 24 12 - 78 U/L    AST (SGOT) 18 15 - 37 U/L    Alk.  phosphatase 106 45 - 117 U/L    Protein, total 7.5 6.4 - 8.2 g/dL    Albumin 3.2 (L) 3.5 - 5.0 g/dL    Globulin 4.3 (H) 2.0 - 4.0 g/dL    A-G Ratio 0.7 (L) 1.1 - 2.2     PROTHROMBIN TIME + INR    Collection Time: 04/21/21  9:40 AM   Result Value Ref Range    INR 1.0 0.9 - 1.1 Prothrombin time 9.6 9.0 - 11.1 sec   TROPONIN I    Collection Time: 04/21/21  9:40 AM   Result Value Ref Range    Troponin-I, Qt. <0.05 <0.05 ng/mL   NT-PRO BNP    Collection Time: 04/21/21  9:40 AM   Result Value Ref Range    NT pro- (H) 0 - 125 PG/ML   RBC, ALLOCATE    Collection Time: 04/21/21 10:30 AM   Result Value Ref Range    HISTORY CHECKED? Historical check performed    TYPE & SCREEN    Collection Time: 04/21/21 10:41 AM   Result Value Ref Range    Crossmatch Expiration 04/24/2021,2359     ABO/Rh(D) A POSITIVE     Antibody screen NEG     Unit number [id number]     Blood component type [facility]     Unit division 00     Status of unit DISCARDED,INCINERATED     Crossmatch result Compatible     Unit number [id number]     Blood component type [facility]     Unit division 00     Status of unit ISSUED     Crossmatch result Compatible    RBC, ALLOCATE    Collection Time: 04/21/21 10:41 AM   Result Value Ref Range    HISTORY CHECKED?  Historical check performed    OCCULT BLOOD, STOOL    Collection Time: 04/21/21  5:20 PM   Result Value Ref Range    Occult blood, stool Positive (A) NEG     GLUCOSE, POC    Collection Time: 04/21/21  5:26 PM   Result Value Ref Range    Glucose (POC) 250 (H) 65 - 100 mg/dL    Performed by [address]:     Hospital Problems  Date Reviewed: 3/19/2021          Codes Class Noted POA    * (Principal) Anemia due to chronic blood loss ICD-10-CM: D50.0  ICD-9-CM: 280.0  4/21/2021 Yes        Symptomatic anemia ICD-10-CM: D64.9  ICD-9-CM: 285.9  4/21/2021 Yes        PVD (peripheral vascular disease) (HCC) (Chronic) ICD-10-CM: I73.9  ICD-9-CM: 443.9  4/21/2021 Yes        Hyperlipidemia (Chronic) ICD-10-CM: E78.5  ICD-9-CM: 272.4  4/21/2021 Yes        Type 2 diabetes mellitus with peripheral vascular disease ([facility] [address] ICD-10-CM: E11.51  ICD-9-CM: 250.70, 443.81  10/14/2019 Yes        HTN (hypertension) (Chronic) ICD-10-CM: I10  ICD-9-CM: 401.9  8/7/2017 Yes        Asthma ICD-10-CM: J45.909  ICD-9-CM: 493.90  8/7/2017 Yes            Progressive worsening anemia. In the last 9 days her hemoglobin has dropped from 8.1-6.6. No prior history of GI bleed. Heme positive stool on exam today. She has chest pain and shortness of breath which has not resolved in the last 9 days. CTA was negative for pulmonary embolism or vascular abnormality in the chest.  Plan:     Discussed at length with Dr. Santos Tellez. She is in the process of receiving a 2 unit transfusion. Her chest pain is concerning although it is not consistent. We will see how she responds to the 2 unit transfusion tonight that he has initiated. If her chest pain resolves we can plan on possible EGD. We will reassess posttransfusion.

## 2021-04-21 NOTE — ED PROVIDER NOTES
EMERGENCY DEPARTMENT HISTORY AND PHYSICAL EXAM          Date: 4/21/2021  Patient Name: Ambrosio Mcburney    History of Presenting Illness     Chief Complaint   Patient presents with    Chest Pain       History Provided By: Patient    HPI: Ambrosio Mcburney is a 63 y.o. female, pmhx listed below, who presents to the ED c/o chest pain and shortness of breath. Pt reports this happens with exertion. Worsening over the past week. Came on ED last week and had work-up that was negative, was referred to Cardiology. Pt went to see Cardiology today and was sent to ED for eval.  No leg swelling. No fever/cough. Pt had COVID in January. No at home treatments for symptoms prior to arrival.        PCP: Mariama Gutiérrez MD    There are no other complaints, changes, or physical findings at this time.          Past History       Past Medical History:  Past Medical History:   Diagnosis Date    Arthritis     Asthma     Diabetes ([facility] [address]     Hypertension     Menopause     Pancreatitis        Past Surgical History:  Past Surgical History:   Procedure Laterality Date    COLONOSCOPY N/A 7/28/2020    COLONOSCOPY performed by Aleah Richard MD at [address] [place] [address] Bilateral 1977    [address]  [address]    HX PARTIAL HYSTERECTOMY  1980    AL ABDOMEN SURGERY [address] UNLISTED  09/01/2019    Exploratory laparotomy, extended right hemicolectomy       Family History:  Family History   Problem Relation Age of Onset    Other Mother         ANEURYSM    Breast Cancer Mother     Diabetes Mother     Lung Disease Father         EMPHYSEMA    Crohn's Disease Sister     Heart Disease Sister     Diabetes Maternal Uncle     Heart Disease Maternal Uncle     Diabetes Paternal Uncle     Heart Disease Paternal Uncle     Breast Cancer Maternal Aunt     Anesth Problems Neg Hx        Social History:  Social History     Tobacco Use    Smoking status: Former Smoker     Packs/day: 0.25     Years: 20.00     Pack years: 5.00     Quit date: 2020     Years since quittin.8    Smokeless tobacco: Never Used   Substance Use Topics    Alcohol use: Not Currently     Comment: RARE Q 3 MONTHS    Drug use: No       Current Facility-Administered Medications   Medication Dose Route Frequency Provider Last Rate Last Admin    pantoprazole (PROTONIX) 40 mg in 0.9% sodium chloride 10 mL injection  40 mg IntraVENous NOW Tiffanie Wright MD   Stopped at 21 1128    acetaminophen (TYLENOL) tablet 650 mg  650 mg Oral Q6H PRN Tiffanie Wright MD         Current Outpatient Medications   Medication Sig Dispense Refill    Omeprazole delayed release (PRILOSEC D/R) 20 mg tablet Take 1 Tab by mouth daily. 30 Tab 1    hydroCHLOROthiazide (HYDRODIURIL) 25 mg tablet Take 1 Tab by mouth daily. 30 Tab 1    montelukast (SINGULAIR) 10 mg tablet TAKE 1 TABLET BY MOUTH DAILY AT BEDTIME 30 Tab 3    clopidogreL (PLAVIX) 75 mg tab       levocetirizine (XYZAL) 5 mg tablet Take 1 Tab by mouth daily. Indications: itching of skin 90 Tab 4    labetaloL (NORMODYNE) 100 mg tablet Take 1 Tab by mouth two (2) times a day. Indications: Bood pressure 180 Tab 4    diphenhydrAMINE (BenadryL) 25 mg capsule Take 25 mg by mouth every six (6) hours as needed.  simvastatin (ZOCOR) 40 mg tablet TAKE 1 TABLET BY MOUTH EVERY NIGHT AT BEDTIME 90 Tab 4    BD Single Use Swabs Regular padm APPLY  EXTERNALLY EVERY  Pad 5    ascorbic acid, vitamin C, (Vitamin C) 500 mg tablet Take 1 Tab by mouth two (2) times a day. 24 Tab 0    zinc sulfate 220 mg tablet Take 1 Tab by mouth two (2) times a day. 24 Tab 0    albuterol (PROVENTIL HFA, VENTOLIN HFA, PROAIR HFA) 90 mcg/actuation inhaler Take 2 Puffs by inhalation every four (4) hours as needed for Wheezing. 1 Inhaler 0    multivitamin with iron tablet Take 1 Tab by mouth daily.       amLODIPine (NORVASC) 10 mg tablet TAKE 1 TABLET BY MOUTH EVERY DAY 90 Tab 2    lancets (TRUEplus Lancets) 28 gauge misc TEST BLOOD SUGAR EVERY  Lancet 5    glucose blood VI test strips (True Metrix Glucose Test Strip) strip TEST BLOOD SUGAR EVERY  Strip 5    insulin glargine (Lantus Solostar U-100 Insulin) 100 unit/mL (3 mL) inpn 20 Units by SubCUTAneous route daily. 30 mL 5    acetaminophen (Tylenol Extra Strength) 500 mg tablet Take  by mouth every six (6) hours as needed for Pain.  aspirin delayed-release 81 mg tablet Take 81 mg by mouth daily.  Insulin Needles, Disposable, (BD Ultra-Fine Short Pen Needle) 31 gauge x 5/16\" ndle by Does Not Apply route daily. E11.9. Use daily for insulin injection 100 Pen Needle 5    Nebulizer & Compressor machine 1 Each by Does Not Apply route three (3) times daily. Indications: J44.9 1 Each 0    Blood-Glucose Meter (TRUE METRIX AIR GLUCOSE METER) misc by Does Not Apply route.  ipratropium (ATROVENT) 42 mcg (0.06 %) nasal spray 2 Sprays by Nasal route daily as needed.  fluticasone-salmeterol (ADVAIR DISKUS) 250-50 mcg/dose diskus inhaler Take 1 Puff by inhalation every twelve (12) hours. Allergies: Allergies   Allergen Reactions    Lisinopril Other (comments)    Gabapentin Itching    Morphine Unknown (comments), Hives and Itching    Tramadol Other (comments) and Itching     \"funny feeling\"         Review of Systems   Review of Systems   Constitutional: Negative for chills and fever. HENT: Negative for congestion. Eyes: Negative for pain. Respiratory: Positive for shortness of breath. Cardiovascular: Positive for chest pain. Gastrointestinal: Negative for abdominal pain. Genitourinary: Negative for flank pain. Musculoskeletal: Negative for back pain. Neurological: Negative for headaches. Psychiatric/Behavioral: Negative for agitation. Physical Exam     Vital Signs-Reviewed the patient's vital signs.   Patient Vitals for the past 12 hrs:   Temp Pulse Resp BP SpO2   04/21/21 1408 98.4 °F (36.9 °C) 70 18 (!) 170/58 100 %   04/21/21 1400 98.4 °F (36.9 °C) 70 -- (!) 170/58 100 %   04/21/21 1356 99.3 °F (37.4 °C) 82 18 (!) 157/66 100 %   04/21/21 1331 -- 89 17 (!) 212/85 95 %   04/21/21 1317 -- 89 14 (!) 211/75 98 %   04/21/21 1300 -- 90 18 (!) 157/66 100 %   04/21/21 1249 -- 84 20 (!) 142/82 100 %   04/21/21 1247 98.1 °F (36.7 °C) 76 20 (!) 142/82 100 %   04/21/21 1215 -- 81 23 (!) 163/58 100 %   04/21/21 1201 -- 69 21 (!) 167/53 100 %   04/21/21 1145 -- 75 15 (!) 138/43 100 %   04/21/21 1117 -- 85 25 (!) 86/60 100 %   04/21/21 1100 -- 82 23 (!) 143/77 100 %   04/21/21 1046 -- 77 24 (!) 151/38 100 %   04/21/21 0924 98.8 °F (37.1 °C) 83 18 (!) 105/58 100 %       Physical Exam  Constitutional:       Appearance: Normal appearance. HENT:      Head: Normocephalic and atraumatic. Mouth/Throat:      Mouth: Mucous membranes are moist.   Eyes:      Pupils: Pupils are equal, round, and reactive to light. Cardiovascular:      Rate and Rhythm: Normal rate and regular rhythm. Pulmonary:      Effort: Pulmonary effort is normal.      Breath sounds: Normal breath sounds. Abdominal:      Tenderness: There is no abdominal tenderness. Musculoskeletal:         General: No swelling. Skin:     General: Skin is warm and dry. Neurological:      Mental Status: She is alert and oriented to person, place, and time.    Psychiatric:         Mood and Affect: Mood normal.         Diagnostic Study Results     Labs -     Recent Results (from the past 12 hour(s))   EKG, 12 LEAD, INITIAL    Collection Time: 04/21/21  9:26 AM   Result Value Ref Range    Ventricular Rate 83 BPM    Atrial Rate 83 BPM    P-R Interval 148 ms    QRS Duration 90 ms    Q-T Interval 370 ms    QTC Calculation (Bezet) 434 ms    Calculated P Axis 64 degrees    Calculated R Axis 6 degrees    Calculated T Axis -65 degrees    Diagnosis       Normal sinus rhythm  Minimal voltage criteria for LVH, may be normal variant  ST & T wave abnormality, consider inferolateral ischemia  Abnormal ECG  When compared with ECG of 12-APR-2021 15:29,  No significant change was found     CBC WITH AUTOMATED DIFF    Collection Time: 04/21/21  9:40 AM   Result Value Ref Range    WBC 9.2 3.6 - 11.0 K/uL    RBC 2.88 (L) 3.80 - 5.20 M/uL    HGB 6.6 (L) 11.5 - 16.0 g/dL    HCT 22.4 (L) 35.0 - 47.0 %    MCV 77.8 (L) 80.0 - 99.0 FL    MCH 22.9 (L) 26.0 - 34.0 PG    MCHC 29.5 (L) 30.0 - 36.5 g/dL    RDW 15.7 (H) 11.5 - 14.5 %    PLATELET 241 676 - 561 K/uL    MPV 11.6 8.9 - 12.9 FL    NRBC 0.3 (H) 0  WBC    ABSOLUTE NRBC 0.03 (H) 0.00 - 0.01 K/uL    NEUTROPHILS 64 32 - 75 %    LYMPHOCYTES 25 12 - 49 %    MONOCYTES 7 5 - 13 %    EOSINOPHILS 2 0 - 7 %    BASOPHILS 1 0 - 1 %    IMMATURE GRANULOCYTES 0 0.0 - 0.5 %    ABS. NEUTROPHILS 5.9 1.8 - 8.0 K/UL    ABS. LYMPHOCYTES 2.3 0.8 - 3.5 K/UL    ABS. MONOCYTES 0.6 0.0 - 1.0 K/UL    ABS. EOSINOPHILS 0.2 0.0 - 0.4 K/UL    ABS. BASOPHILS 0.1 0.0 - 0.1 K/UL    ABS. IMM. GRANS. 0.0 0.00 - 0.04 K/UL    DF AUTOMATED     METABOLIC PANEL, COMPREHENSIVE    Collection Time: 04/21/21  9:40 AM   Result Value Ref Range    Sodium 137 136 - 145 mmol/L    Potassium 3.8 3.5 - 5.1 mmol/L    Chloride 98 97 - 108 mmol/L    CO2 26 21 - 32 mmol/L    Anion gap 13 5 - 15 mmol/L    Glucose 361 (H) 65 - 100 mg/dL    BUN 14 6 - 20 MG/DL    Creatinine 1.09 (H) 0.55 - 1.02 MG/DL    BUN/Creatinine ratio 13 12 - 20      GFR est AA >60 >60 ml/min/1.73m2    GFR est non-AA 51 (L) >60 ml/min/1.73m2    Calcium 9.6 8.5 - 10.1 MG/DL    Bilirubin, total 0.2 0.2 - 1.0 MG/DL    ALT (SGPT) 24 12 - 78 U/L    AST (SGOT) 18 15 - 37 U/L    Alk.  phosphatase 106 45 - 117 U/L    Protein, total 7.5 6.4 - 8.2 g/dL    Albumin 3.2 (L) 3.5 - 5.0 g/dL    Globulin 4.3 (H) 2.0 - 4.0 g/dL    A-G Ratio 0.7 (L) 1.1 - 2.2     PROTHROMBIN TIME + INR    Collection Time: 04/21/21  9:40 AM   Result Value Ref Range    INR 1.0 0.9 - 1.1      Prothrombin time 9.6 9.0 - 11.1 sec   TROPONIN I    Collection Time: 04/21/21  9:40 AM   Result Value Ref Range    Troponin-I, Qt. <0.05 <0.05 ng/mL   NT-PRO BNP    Collection Time: 04/21/21  9:40 AM   Result Value Ref Range    NT pro- (H) 0 - 125 PG/ML   RBC, ALLOCATE    Collection Time: 04/21/21 10:30 AM   Result Value Ref Range    HISTORY CHECKED? Historical check performed    TYPE & SCREEN    Collection Time: 04/21/21 10:41 AM   Result Value Ref Range    Crossmatch Expiration 04/24/2021,2359     ABO/Rh(D) A POSITIVE     Antibody screen NEG     Unit number [id number]     Blood component type  LR     Unit division 00     Status of unit DISCARDED,INCINERATED     Crossmatch result Compatible     Unit number [id number]     Blood component type  LR     Unit division 00     Status of unit ISSUED     Crossmatch result Compatible        Radiologic Studies -   XR CHEST SNGL V   Final Result   Clear lungs. CT Results  (Last 48 hours)    None        CXR Results  (Last 48 hours)               04/21/21 0947  XR CHEST SNGL V Final result    Impression:  Clear lungs. Narrative:  PORTABLE CHEST RADIOGRAPH/S: 4/21/2021 9:47 AM       INDICATION: Dyspnea. COMPARISON: 4/12/2021, 1/14/2021, 12/31/2020. TECHNIQUE: Portable frontal upright radiograph/s of the chest.       FINDINGS:    The lungs are clear. The central airways are patent. No pneumothorax or pleural   effusion. EKG interpretation: (Preliminary)  Rhythm: Sinus, narrow QRS, no ST elevation  This EKG was interpreted by ED Provider Delwin Fothergill, MD    Medical Decision Making   I am the first provider for this patient. I reviewed the vital signs, available nursing notes, past medical history, past surgical history, family history and social history. Records Reviewed: Nursing Notes and Old Medical Records    Provider Notes (Medical Decision Making):   MDM: 65-year-old female with worsening dyspnea on exertion and epigastric/chest pain.   Concerning for acute coronary syndrome although patient had serially negative troponins last week and has normal EKG today. Also concerning for PE however patient had a normal PE study last week. Will check basic labs and chest x-ray now. May require admission or more advanced testing pending results. Initial assessment performed. The patients presenting problems have been discussed, and they are in agreement with the care plan formulated and outlined with them. I have encouraged them to ask questions as they arise throughout their visit. PROGRESS NOTE:  ED Course as of Apr 21 1418 Wed Apr 21, 2021   1127 Ultrasound guided IV placed. Case discussed with Dr. Evelyne Boland for admission. [PV]      ED Course User Index  [PV] Maru Cast MD        Procedure Note - Venous Access:   Performed by Kelby Negro MD .    Immediately prior to the procedure, the patient was reevaluated and found suitable for the planned procedure and any planned medications. Immediately prior to the procedure a time out was called to verify the correct patient, procedure, equipment, staff, and marking as appropriate. Insertion Date: 4/21/2021  Procedure Location:  ED. Condition: Emergency. Consent:  YES. Method: Ultrasound guidance  Site Prep: ChloraPrep. Procedure: R AC venous catheter placement    Number of Attempts:  2 (L AC and R AC) Indication: access    Complication None. Performed By:performed the above procedure myself. The procedure was tolerated well. Diagnosis     Clinical Impression:   1. Iron deficiency anemia due to chronic blood loss            Disposition:  Admitted    Current Discharge Medication List            Please note, this dictation was completed with [employer], the computer voice recognition software. Quite often unanticipated grammatical, syntax, homophones, and other interpretive errors are inadvertently transcribed by the computer software. Please disregard these errors. Please excuse any errors that have escaped final proof reading.

## 2021-04-21 NOTE — PROGRESS NOTES
Approached by Fanny Gonzalez advised that pt needs medical attention due to c/o sob,chest tightness. Verified patient with two patient identifiers. /70  HR 60 regular rhythm (palpated)   TEMP: 96 F  O2, 100% on room air  R:22  A&Ox3  speech clear, gait abnormal.    Reviewed ER visit from 4/12. Pt states that she has not been exposed to COVID-19, received one dose of covid vaccine and scheduled for 2nd tomorrow. I cannot locate any testing for covid-19 during ER visit on 4/12. Strongly recommended that 911 be called or  take her to ER asap. Pt refused wheelchair and 911.  walked pt to car. Called ER and report given to Kera Henriquez and Maribell. Maribell reports that pt has checked in to ER.

## 2021-04-21 NOTE — Clinical Note
Patient Class[de-identified] OBSERVATION [681]   Type of Bed: Medical [8]   Reason for Observation: anemia   Admitting Diagnosis: Anemia [581078]   Admitting Physician: Teodora Hodge [[id number]]   Attending Physician: Teodora Hodge [[id number]]

## 2021-04-22 ENCOUNTER — ANESTHESIA EVENT (OUTPATIENT)
Dept: SURGERY | Age: 66
DRG: 812 | End: 2021-04-22
Payer: MEDICARE

## 2021-04-22 ENCOUNTER — ANESTHESIA (OUTPATIENT)
Dept: SURGERY | Age: 66
DRG: 812 | End: 2021-04-22
Payer: MEDICARE

## 2021-04-22 LAB
ANION GAP SERPL CALC-SCNC: 9 MMOL/L (ref 5–15)
BASOPHILS # BLD: 0.1 K/UL (ref 0–0.1)
BASOPHILS NFR BLD: 1 % (ref 0–1)
BUN SERPL-MCNC: 10 MG/DL (ref 6–20)
BUN/CREAT SERPL: 12 (ref 12–20)
CALCIUM SERPL-MCNC: 9.2 MG/DL (ref 8.5–10.1)
CHLORIDE SERPL-SCNC: 102 MMOL/L (ref 97–108)
CO2 SERPL-SCNC: 29 MMOL/L (ref 21–32)
CREAT SERPL-MCNC: 0.83 MG/DL (ref 0.55–1.02)
DIFFERENTIAL METHOD BLD: ABNORMAL
EOSINOPHIL # BLD: 0.2 K/UL (ref 0–0.4)
EOSINOPHIL NFR BLD: 3 % (ref 0–7)
ERYTHROCYTE [DISTWIDTH] IN BLOOD BY AUTOMATED COUNT: 16.3 % (ref 11.5–14.5)
GLUCOSE BLD STRIP.AUTO-MCNC: 203 MG/DL (ref 65–100)
GLUCOSE BLD STRIP.AUTO-MCNC: 287 MG/DL (ref 65–100)
GLUCOSE BLD STRIP.AUTO-MCNC: 309 MG/DL (ref 65–100)
GLUCOSE BLD STRIP.AUTO-MCNC: 344 MG/DL (ref 65–100)
GLUCOSE BLD STRIP.AUTO-MCNC: 345 MG/DL (ref 65–100)
GLUCOSE SERPL-MCNC: 228 MG/DL (ref 65–100)
HCT VFR BLD AUTO: 28.2 % (ref 35–47)
HGB BLD-MCNC: 9.4 G/DL (ref 11.5–16)
HISTORY CHECKED?,CKHIST: NORMAL
IMM GRANULOCYTES # BLD AUTO: 0 K/UL (ref 0–0.04)
IMM GRANULOCYTES NFR BLD AUTO: 0 % (ref 0–0.5)
LYMPHOCYTES # BLD: 1.7 K/UL (ref 0.8–3.5)
LYMPHOCYTES NFR BLD: 20 % (ref 12–49)
MAGNESIUM SERPL-MCNC: 2 MG/DL (ref 1.6–2.4)
MCH RBC QN AUTO: 25.6 PG (ref 26–34)
MCHC RBC AUTO-ENTMCNC: 33.3 G/DL (ref 30–36.5)
MCV RBC AUTO: 76.8 FL (ref 80–99)
MONOCYTES # BLD: 0.7 K/UL (ref 0–1)
MONOCYTES NFR BLD: 8 % (ref 5–13)
NEUTS SEG # BLD: 6 K/UL (ref 1.8–8)
NEUTS SEG NFR BLD: 69 % (ref 32–75)
NRBC # BLD: 0.02 K/UL (ref 0–0.01)
NRBC BLD-RTO: 0.2 PER 100 WBC
PLATELET # BLD AUTO: 283 K/UL (ref 150–400)
PMV BLD AUTO: 11 FL (ref 8.9–12.9)
POTASSIUM SERPL-SCNC: 3.3 MMOL/L (ref 3.5–5.1)
RBC # BLD AUTO: 3.67 M/UL (ref 3.8–5.2)
SERVICE CMNT-IMP: ABNORMAL
SODIUM SERPL-SCNC: 140 MMOL/L (ref 136–145)
WBC # BLD AUTO: 8.8 K/UL (ref 3.6–11)

## 2021-04-22 PROCEDURE — 74011250636 HC RX REV CODE- 250/636: Performed by: ANESTHESIOLOGY

## 2021-04-22 PROCEDURE — 65270000029 HC RM PRIVATE

## 2021-04-22 PROCEDURE — 74011000250 HC RX REV CODE- 250: Performed by: ANESTHESIOLOGY

## 2021-04-22 PROCEDURE — 2709999900 HC NON-CHARGEABLE SUPPLY: Performed by: SURGERY

## 2021-04-22 PROCEDURE — 82962 GLUCOSE BLOOD TEST: CPT

## 2021-04-22 PROCEDURE — 80048 BASIC METABOLIC PNL TOTAL CA: CPT

## 2021-04-22 PROCEDURE — 76010000154 HC OR TIME FIRST 0.5 HR: Performed by: SURGERY

## 2021-04-22 PROCEDURE — 76060000031 HC ANESTHESIA FIRST 0.5 HR: Performed by: SURGERY

## 2021-04-22 PROCEDURE — 99232 SBSQ HOSP IP/OBS MODERATE 35: CPT | Performed by: SURGERY

## 2021-04-22 PROCEDURE — 77030018850 HC STOCK ANTIEMB THG COVD -A

## 2021-04-22 PROCEDURE — 85025 COMPLETE CBC W/AUTO DIFF WBC: CPT

## 2021-04-22 PROCEDURE — 74011636637 HC RX REV CODE- 636/637: Performed by: INTERNAL MEDICINE

## 2021-04-22 PROCEDURE — 83735 ASSAY OF MAGNESIUM: CPT

## 2021-04-22 PROCEDURE — 31622 DX BRONCHOSCOPE/WASH: CPT | Performed by: SURGERY

## 2021-04-22 PROCEDURE — 77030027957 HC TBNG IRR ENDOGTR BUSS -B: Performed by: SURGERY

## 2021-04-22 PROCEDURE — 0DJ08ZZ INSPECTION OF UPPER INTESTINAL TRACT, VIA NATURAL OR ARTIFICIAL OPENING ENDOSCOPIC: ICD-10-PCS | Performed by: SURGERY

## 2021-04-22 PROCEDURE — 36415 COLL VENOUS BLD VENIPUNCTURE: CPT

## 2021-04-22 PROCEDURE — 74011250637 HC RX REV CODE- 250/637: Performed by: INTERNAL MEDICINE

## 2021-04-22 PROCEDURE — 76210000063 HC OR PH I REC FIRST 0.5 HR: Performed by: SURGERY

## 2021-04-22 RX ORDER — INSULIN LISPRO 100 [IU]/ML
8 INJECTION, SOLUTION INTRAVENOUS; SUBCUTANEOUS ONCE
Status: COMPLETED | OUTPATIENT
Start: 2021-04-22 | End: 2021-04-22

## 2021-04-22 RX ORDER — PANTOPRAZOLE SODIUM 40 MG/1
40 TABLET, DELAYED RELEASE ORAL
Status: DISCONTINUED | OUTPATIENT
Start: 2021-04-23 | End: 2021-04-23 | Stop reason: HOSPADM

## 2021-04-22 RX ORDER — GLYCOPYRROLATE 0.2 MG/ML
INJECTION INTRAMUSCULAR; INTRAVENOUS AS NEEDED
Status: DISCONTINUED | OUTPATIENT
Start: 2021-04-22 | End: 2021-04-22 | Stop reason: HOSPADM

## 2021-04-22 RX ORDER — PROPOFOL 10 MG/ML
INJECTION, EMULSION INTRAVENOUS AS NEEDED
Status: DISCONTINUED | OUTPATIENT
Start: 2021-04-22 | End: 2021-04-22 | Stop reason: HOSPADM

## 2021-04-22 RX ORDER — MIDAZOLAM HYDROCHLORIDE 1 MG/ML
INJECTION, SOLUTION INTRAMUSCULAR; INTRAVENOUS AS NEEDED
Status: DISCONTINUED | OUTPATIENT
Start: 2021-04-22 | End: 2021-04-22 | Stop reason: HOSPADM

## 2021-04-22 RX ORDER — SODIUM CHLORIDE, SODIUM LACTATE, POTASSIUM CHLORIDE, CALCIUM CHLORIDE 600; 310; 30; 20 MG/100ML; MG/100ML; MG/100ML; MG/100ML
25 INJECTION, SOLUTION INTRAVENOUS CONTINUOUS
Status: DISCONTINUED | OUTPATIENT
Start: 2021-04-22 | End: 2021-04-22 | Stop reason: HOSPADM

## 2021-04-22 RX ORDER — FERROUS SULFATE 324(65)MG
1 TABLET, DELAYED RELEASE (ENTERIC COATED) ORAL
Status: DISCONTINUED | OUTPATIENT
Start: 2021-04-23 | End: 2021-04-23 | Stop reason: HOSPADM

## 2021-04-22 RX ADMIN — LABETALOL HYDROCHLORIDE 100 MG: 100 TABLET, FILM COATED ORAL at 11:39

## 2021-04-22 RX ADMIN — Medication 5 ML: at 06:44

## 2021-04-22 RX ADMIN — PROPOFOL 20 MG: 10 INJECTION, EMULSION INTRAVENOUS at 08:29

## 2021-04-22 RX ADMIN — INSULIN GLARGINE 15 UNITS: 100 INJECTION, SOLUTION SUBCUTANEOUS at 17:52

## 2021-04-22 RX ADMIN — PROPOFOL 30 MG: 10 INJECTION, EMULSION INTRAVENOUS at 08:31

## 2021-04-22 RX ADMIN — MIDAZOLAM 4 MG: 1 INJECTION INTRAMUSCULAR; INTRAVENOUS at 08:26

## 2021-04-22 RX ADMIN — MONTELUKAST 10 MG: 10 TABLET, FILM COATED ORAL at 22:19

## 2021-04-22 RX ADMIN — CLOPIDOGREL BISULFATE 75 MG: 75 TABLET ORAL at 11:39

## 2021-04-22 RX ADMIN — Medication 5 ML: at 22:21

## 2021-04-22 RX ADMIN — ASPIRIN 81 MG: 81 TABLET, COATED ORAL at 11:39

## 2021-04-22 RX ADMIN — BENZOCAINE, BUTAMBEN, AND TETRACAINE HYDROCHLORIDE 1 SPRAY: .028; .004; .004 AEROSOL, SPRAY TOPICAL at 08:25

## 2021-04-22 RX ADMIN — AMLODIPINE BESYLATE 10 MG: 10 TABLET ORAL at 11:39

## 2021-04-22 RX ADMIN — PROPOFOL 30 MG: 10 INJECTION, EMULSION INTRAVENOUS at 08:28

## 2021-04-22 RX ADMIN — Medication 10 ML: at 14:33

## 2021-04-22 RX ADMIN — LABETALOL HYDROCHLORIDE 100 MG: 100 TABLET, FILM COATED ORAL at 17:54

## 2021-04-22 RX ADMIN — SODIUM CHLORIDE, POTASSIUM CHLORIDE, SODIUM LACTATE AND CALCIUM CHLORIDE 25 ML/HR: 600; 310; 30; 20 INJECTION, SOLUTION INTRAVENOUS at 08:20

## 2021-04-22 RX ADMIN — INSULIN LISPRO 8 UNITS: 100 INJECTION, SOLUTION INTRAVENOUS; SUBCUTANEOUS at 20:37

## 2021-04-22 RX ADMIN — PANTOPRAZOLE SODIUM 40 MG: 40 TABLET, DELAYED RELEASE ORAL at 06:42

## 2021-04-22 RX ADMIN — ACETAMINOPHEN 650 MG: 325 TABLET ORAL at 18:50

## 2021-04-22 RX ADMIN — GLYCOPYRROLATE 0.2 MG: 0.2 INJECTION, SOLUTION INTRAMUSCULAR; INTRAVENOUS at 08:15

## 2021-04-22 NOTE — OP NOTES
[facility]  OPERATIVE REPORT    Name:  Kellen Mondragon  MR#:  [id number]  :  1955  ACCOUNT #:  [de-identified]  DATE OF SERVICE:  2021    PREOPERATIVE DIAGNOSES:  Gastrointestinal bleed and anemia. POSTOPERATIVE DIAGNOSES:  Gastrointestinal bleed and anemia. PROCEDURE PERFORMED:  EGD. SURGEON:  Maribel Collado MD    ASSISTANT:  None. ANESTHESIA:  MAC.    COMPLICATIONS:  None. SPECIMENS REMOVED:  None. IMPLANTS:  None. ESTIMATED BLOOD LOSS:  Zero. FINDINGS:  1.  No etiology of anemia found and no evidence of upper GI bleed. 2.  Minimal gastritis. DISPOSITION:  Stable. PROCEDURE:  The patient was brought to the operative theater. Monitoring devices and nasal cannula O2 were placed per Anesthesia and the patient was placed in left side down decubitus position. The posterior pharynx was sprayed with Cetacaine spray per Anesthesia. A bite block was inserted in her mouth. IV sedation was administered and a time-out was performed. A well-lubricated Olympus gastroscope was introduced through the bite block down the posterior pharynx. It was advanced down the length of the esophagus and through the EG junction and into the stomach. The stomach was insufflated with air. Initial evaluation of the stomach showed no evidence of old blood or any signs of active bleeding. The scope was advanced through the pylorus into the second portion of the duodenum. No evidence of old blood or ulcers or masses or new blood was identified in the second portion of the duodenum or the duodenal bulb. The scope was then pulled back. The antrum was evaluated further with no evidence of any active bleeding or ulcers or masses. The scope was then retroflexed without evidence of significant hiatal hernia. The patient did have some mild gastritis, but there was no evidence of this was bleeding or had any signs of recent bleeding.   The scope was then straightened out and pulled back through the EG junction, which was distinct at 37 cm. The stomach was desufflated. The scope was pulled back through the length of the esophagus without any other abnormalities noted. The patient tolerated the procedure well and was transferred to the PACU in stable condition. These findings were shared with Dr. Aide Amos.       Maye Montesinos MD      [id number]  D:  04/22/2021 8:41  T:  04/22/2021 9:55  JOB #:  [id number]

## 2021-04-22 NOTE — PROGRESS NOTES
Problem: Risk for Spread of Infection  Goal: Prevent transmission of infectious organism to others  Description: Prevent the transmission of infectious organisms to other patients, staff members, and visitors. Outcome: Progressing Towards Goal     Problem: Patient Education:  Go to Education Activity  Goal: Patient/Family Education  Outcome: Progressing Towards Goal     Problem: Falls - Risk of  Goal: *Absence of Falls  Description: Document Earlene Donald Fall Risk and appropriate interventions in the flowsheet. Outcome: Progressing Towards Goal  Note: Fall Risk Interventions:            Medication Interventions: Patient to call before getting OOB    Elimination Interventions: Call light in reach, Patient to call for help with toileting needs, Stay With Me (per policy)              Problem: Patient Education: Go to Patient Education Activity  Goal: Patient/Family Education  Outcome: Progressing Towards Goal     Problem: Anemia Care Plan (Adult and Pediatric)  Goal: *Labs within defined limits  Outcome: Progressing Towards Goal  Goal: *Tolerates increased activity  Outcome: Progressing Towards Goal     Problem: Hypertension  Goal: *Blood pressure within specified parameters  Outcome: Progressing Towards Goal  Goal: *Labs within defined limits  Outcome: Progressing Towards Goal  Pt. Is very independent, but willing to call for help when she needs to ambulate to the bathroom. She states she is feeling much better.

## 2021-04-22 NOTE — ANESTHESIA PREPROCEDURE EVALUATION
Relevant Problems   RESPIRATORY SYSTEM   (+) Asthma      CARDIOVASCULAR   (+) HTN (hypertension)      ENDOCRINE   (+) Type 2 diabetes mellitus with peripheral vascular disease (HCC)   (+) Type II diabetes mellitus (HCC)      HEMATOLOGY   (+) Anemia due to chronic blood loss   (+) Symptomatic anemia       Anesthetic History     PONV          Review of Systems / Medical History  Patient summary reviewed, nursing notes reviewed and pertinent labs reviewed    Pulmonary          Smoker (current)  Asthma        Neuro/Psych         Psychiatric history (schizophrenia)     Cardiovascular    Hypertension          PAD      Comments: AAA followed 3-5 cm   GI/Hepatic/Renal               Comments: H/O pancreatitis, chronic pain Endo/Other    Diabetes    Anemia     Other Findings            Physical Exam    Airway  Mallampati: II  TM Distance: > 6 cm  Neck ROM: normal range of motion   Mouth opening: Normal     Cardiovascular    Rhythm: regular  Rate: normal         Dental    Dentition: Upper partial plate     Pulmonary  Breath sounds clear to auscultation               Abdominal  GI exam deferred       Other Findings            Anesthetic Plan    ASA: 3  Anesthesia type: MAC          Induction: Intravenous  Anesthetic plan and risks discussed with: Patient

## 2021-04-22 NOTE — PROGRESS NOTES
Received from ER. First unit of PRBCs infusing, see Vitals tab. Pt without any blood infusion complications.  C/o intermittent R rib and R lower abd pain that resolves with walking or standing

## 2021-04-22 NOTE — PERIOP NOTES
TRANSFER - OUT REPORT:    Verbal report given to Jeny SINGH jim Goddard  being transferred to Room 200 for routine post - op       Report consisted of patients Situation, Background, Assessment and   Recommendations(SBAR). Information from the following report(s) SBAR, Procedure Summary, Intake/Output, MAR and Recent Results was reviewed with the receiving nurse. Lines:   Peripheral IV 04/21/21 Left Wrist (Active)   Site Assessment Clean, dry, & intact 04/21/21 1306   Phlebitis Assessment 0 04/21/21 1306   Infiltration Assessment 0 04/21/21 1306       Peripheral IV 04/22/21 Posterior;Right Hand (Active)   Site Assessment Clean, dry, & intact 04/22/21 0900   Phlebitis Assessment 0 04/22/21 0900   Infiltration Assessment 0 04/22/21 0900   Dressing Status Clean, dry, & intact 04/22/21 0900   Dressing Type Transparent;Tape 04/22/21 0900   Hub Color/Line Status Blue 04/22/21 0900        Opportunity for questions and clarification was provided.       Patient transported with:   Registered Nurse

## 2021-04-22 NOTE — PROGRESS NOTES
TRANSFER - IN REPORT:    Verbal report received from [place] [id number] on Merissa Blazer  being received from Room 200 for ordered procedure      Report consisted of patients Situation, Background, Assessment and   Recommendations(SBAR). Information from the following report(s) SBAR, MAR and Recent Results was reviewed with the receiving nurse. Opportunity for questions and clarification was provided. Assessment completed upon patients arrival to unit and care assumed.

## 2021-04-22 NOTE — PROGRESS NOTES
Care Management Interventions  PCP Verified by CM: Kassie Lott MD)  Last Visit to PCP: 04/15/21  Palliative Care Criteria Met (RRAT>21 & CHF Dx)?: No(No MD Order)  Mode of Transport at Discharge: Other (see comment)(Boyfriend )  Transition of Care Consult (CM Consult): Discharge Planning  Physical Therapy Consult: No  Occupational Therapy Consult: No  Speech Therapy Consult: No  Current Support Network: Lives with Spouse(Boyfriend )  Confirm Follow Up Transport: Family  The Plan for Transition of Care is Related to the Following Treatment Goals : Treat symptomatic anemia  Discharge Location  Discharge Placement: Home    Patient lives at home with her boyfriend Chantel Azevedo. She uses a walker at home. She does NOT have an ACP document and is not interested in one at this time. Instructed her to contact CM if she is interested during her stay or contact her PCP if she is interested in one after discharge. Patient was in observation status at first but is now in inpatient status. MOON and THOMAS letters explained to patient. She stated understanding and signed. Medicare pt has received, reviewed, and signed 1st IM letter informing them of their right to appeal the discharge. Signed copy has been placed on pt bedside chart. Care management information given to patient and instructed to call if she has any questions or concerns. Reason for Admission:   Anemia                     RUR Score:     19% MODERATE             PCP: First and Last name:   Elizabeth Escobar MD     Name of Practice: [address] [place] [address]    Are you a current patient: Yes/No: yes   Approximate date of last visit: 4/15/21 (last saw Dr. Pipo Strange)    Can you participate in a virtual visit if needed: Yes     Do you (patient/family) have any concerns for transition/discharge?      No               Plan for utilizing home health:   TBD     Current Advanced Directive/Advance Care Plan:  Full Code      Healthcare Decision Maker:   Click here to complete HealthCare Decision Makers including selection of the Healthcare Decision Maker Relationship (ie \"Primary\")            Primary Decision Maker: Nito Ruiz - Other Relative - [phone]    Secondary Decision Maker: Jeremy Ponce - Other Relative - [phone]    Transition of Care Plan:      Home when medically stable

## 2021-04-22 NOTE — PROGRESS NOTES
Bedside and Verbal shift change report given to  BETTY Smith RN (oncoming nurse) by Rama Erickson LPN (offgoing nurse). Report included the following information SBAR, Kardex and MAR.

## 2021-04-22 NOTE — PROGRESS NOTES
[facility]  Hospitalist Progress Note    NAME: Robyn Day   :  1955   MRN:  [id number]     Total duration of encounter: 1 day      Interim Hospital Summary: 63 y.o. female who presented on 2021 with Anemia due to chronic blood loss. She has a past medical history of Arthritis, Asthma, Diabetes ([address] Hypertension, Menopause, and Pancreatitis. Rachelle Finney Pt presenting to ED with c/o SOB and atypical costal margic pains. Labs noted for Hg down to 6.6. Pt admitted for 2u pRBC. Prior w/u as noted below. EDG  was noted for minimal gastritis not c/w current blood loss. Low MCV c/w Fe Deg - begin tx. Cont Protonix 40mg every day. Colonoscopy 2019 w/o lesion. No GenSx service available through the weekend. If stable may d/c home with GI f/u scheduled for possible Colonoscopy and Capsule endoscopy. Subjective:     Chief Complaint / Reason for Physician Visit  \"better\". Discussed with RN   Walking in room w/o SOB or CP  Does not like hospital foods - eating little  No BM noted today    Claims diarrhea with 10 stools / day for the past 2 years (in presents of daughter)  W/u on Epic as noted below    Review of Systems:  Symptom Y/N Comments  Symptom Y/N Comments   Fever/Chills n   Chest Pain n    Poor Appetite y   Edema n    Cough n   Abdominal Pain n    Sputum n   Joint Pain n    SOB/BECKER n  better today  Pruritis/Rash n    Nausea/vomit n   Tolerating PT/OT     Diarrhea n   Tolerating Diet ?     Constipation n   Other         Current Facility-Administered Medications:     [START ON 2021] pantoprazole (PROTONIX) tablet 40 mg, 40 mg, Oral, ACB, Abida Novoa MD    amLODIPine (NORVASC) tablet 10 mg, 10 mg, Oral, DAILY, Abida Novoa MD, 10 mg at 21 1139    aspirin delayed-release tablet 81 mg, 81 mg, Oral, DAILY, Abida Novoa MD, 81 mg at 21 1139    clopidogreL (PLAVIX) tablet 75 mg, 75 mg, Oral, DAILY, Abida Novoa MD, 75 mg at 21 1139   labetaloL (NORMODYNE) tablet 100 mg, 100 mg, Oral, BID, Warren Ding MD, 100 mg at 04/22/21 1754    montelukast (SINGULAIR) tablet 10 mg, 10 mg, Oral, QHS, Warren Ding MD, 10 mg at 04/21/21 2224    sodium chloride (NS) flush 5-40 mL, 5-40 mL, IntraVENous, Q8H, Warren Ding MD, 10 mL at 04/22/21 1433    sodium chloride (NS) flush 5-40 mL, 5-40 mL, IntraVENous, PRN, Warren Ding MD    acetaminophen (TYLENOL) tablet 650 mg, 650 mg, Oral, Q6H PRN, 650 mg at 04/21/21 2108 **OR** acetaminophen (TYLENOL) suppository 650 mg, 650 mg, Rectal, Q6H PRN, Warren Ding MD    polyethylene glycol (MIRALAX) packet 17 g, 17 g, Oral, DAILY PRN, Warren Ding MD    [facility]etron [facility]) injection 4 mg, 4 mg, IntraVENous, Q6H PRN, Warren Ding MD    0.9% sodium chloride infusion 250 mL, 250 mL, IntraVENous, PRN, Warren Ding MD    insulin glargine (LANTUS) injection 15 Units, 15 Units, SubCUTAneous, DAILY WITH Rafael Altamirano MD, 15 Units at 04/22/21 1752    0.9% sodium chloride infusion 250 mL, 250 mL, IntraVENous, PRN, Warren Ding MD    Objective:     VITALS:   Patient Vitals for the past 12 hrs:   Temp Pulse Resp BP SpO2   04/22/21 1505 97.8 °F (36.6 °C) 82 16 (!) 172/60 100 %   04/22/21 1405 98 °F (36.7 °C) 84 16 (!) 158/59 100 %   04/22/21 1305 97.9 °F (36.6 °C) 100 16 (!) 183/81 100 %   04/22/21 1205 98 °F (36.7 °C) 90 16 (!) 154/70 100 %   04/22/21 1135 97.4 °F (36.3 °C) 90 16 (!) 176/68 99 %   04/22/21 1105 98 °F (36.7 °C) 94 16 (!) 162/68 100 %   04/22/21 1035 98 °F (36.7 °C) 100 16 133/70 100 %   04/22/21 1005 98.1 °F (36.7 °C) 86 16 (!) 157/69 99 %   04/22/21 0950 98.1 °F (36.7 °C) 88 16 (!) 153/69 99 %   04/22/21 0935 98.3 °F (36.8 °C) 89 16 (!) 140/67 100 %   04/22/21 0920 98.3 °F (36.8 °C) 90 16 (!) 157/64 99 %   04/22/21 0905 -- 94 16 (!) 143/61 97 %   04/22/21 0903 97.8 °F (36.6 °C) 94 18 130/60 97 %   04/22/21 0900 -- 96 18 130/60 --   04/22/21 0855 -- 92 17 (!) 143/67 100 %   04/22/21 0850 -- 94 17 129/62 --   21 0845 -- 97 17 139/64 100 %   21 0844 97.9 °F (36.6 °C) (!) 102 16 (!) 121/54 96 %   21 0843 -- -- 15 (!) 121/54 97 %   21 0842 97.9 °F (36.6 °C) -- 16 -- --   21 0757 98.3 °F (36.8 °C) 68 16 (!) 154/67 98 %       Intake/Output Summary (Last 24 hours) at 2021 1830  Last data filed at 2021 1042  Gross per 24 hour   Intake 540 ml   Output --   Net 540 ml        PHYSICAL EXAM:  General: WD, WN. Alert, cooperative, no acute distress    EENT:  EOMI. Anicteric sclerae. MMM  Resp:  CTA bilaterally, no wheezing or rales. No accessory muscle use  CV:  Regular  rhythm,  No edema  GI:  Soft, Non distended, Non tender. +Bowel sounds  Neurologic:  Alert and oriented X 3, normal speech, nonfocal  Psych: Moderately anxious / agitated  Skin:  No rashes. No jaundice    LABS:  I reviewed today's most current labs and imaging studies. Pertinent labs include:  Recent Labs     21  0608 21  0940   WBC 8.8 9.2   HGB 9.4* 6.6*   HCT 28.2* 22.4*    271     Recent Labs     21  0608 21  0940    137   K 3.3* 3.8    98   CO2 29 26   * 361*   BUN 10 14   CREA 0.83 1.09*   CA 9.2 9.6   MG 2.0  --    ALB  --  3.2*   TBILI  --  0.2   ALT  --  24   INR  --  1.0     Results for Nikunj Corporal (MRN [id number]) as of 2021 18:31   2021 09:33 2021 17:20   Occult blood, stool  Positive (A)   C. DIFFICILE (DNA) Pos    C. DIFFICILE AG & TOXIN A/B Indeterminate      RADIOLOGY:  CXR :  The lungs are clear. The central airways are patent. No pneumothorax or pleural effusion.    IMPRESSION:  clear lungs    EK/21 NSR 83, LVH, NSSTTWC     PROCEDURES:  PREOPERATIVE DIAGNOSES:  Gastrointestinal bleed and anemia.   POSTOPERATIVE DIAGNOSES:  Gastrointestinal bleed and anemia.   PROCEDURE PERFORMED:  EGD.   SURGEON:  Lorenza Millard MD  FINDINGS:  1.  No etiology of anemia found and no evidence of upper GI bleed.   2.  Minimal gastritis. Recent testing Epic:  CTA CHEST 4/12/21  1. No pulmonary embolism, no acute vascular abnormality. No acute airspace disease. 2. Cardiomegaly. MRI MRCP Oct 2018  The gallbladder contains no filling defects. The common bile duct is normal. The  pancreatic duct is also normal in caliber.    The liver is normal in size with no mass lesion. The spleen, adrenals, and  kidneys are unremarkable. The pancreas is normal. There is no evidence of  pancreatic mass or fluid collection. IMPRESSION: Negative MRCP. U/S GB Nov 2018: There is no evidence of gallstones, gallbladder wall thickening or pericholecystic fluid.   The common bile duct is not enlarged, measuring 4 mm. There is no intrahepatic bile duct dilatation. IMPRESSION: Unremarkable right upper quadrant ultrasound. Alpha-Gal Panel March 2021:  Negative    COLONOSCOPY July 2019:  DATE OF SERVICE:  07/28/2020  POSTOPERATIVE DIAGNOSES:  Diarrhea and family history of colon polyps.   PROCEDURE PERFORMED:  Colonoscopy   SURGEON:  Ayaan Moon MD   PROCEDURE:  The patient was brought to the operative theater. Monitoring devices and nasal cannula O2 were placed per Anesthesia, and the patient was placed in the left side down decubitus position. IV sedation was administered, and a time-out was performed. Digital rectal exam was performed, which was essentially normal.  A well-lubricated Olympus pediatric colonoscope was introduced into the patient's rectum and advanced to approximately 30 cm, at which point, we saw a staple line and sutures consistent with the anastomosis. This was a colon to small bowel anastomosis. The anastomosis was patent. No abnormalities were noted or seen in the anastomosis or in the 30 cm of colon that was present. In the rectum, the scope was retroflexed, which revealed internal hemorrhoids. No other lesions were seen.   The scope was then carefully withdrawn.   The feeling is that the patient's diarrhea may be at least partially secondary to the amount of residual colon that is present. We will discuss with her the options including the possibility of starting her on a trial of cholestyramine. SURGICAL PATH Sept 2019 Dr. Henry Elmore   Proximal and transverse colon, terminal ileum, and appendix, resection:   - Segment of colon with dense serosal adhesions and strictured area containing abscess cavity, fat necrosis, foreign body giant cell reaction, and polarizable foreign material consistent with suture material   - Proximal small bowel and distal colonic margins appear histologically viable   - Two detached segments of small bowel and colon with serosal adhesions   - Negative for dysplasia or malignancy   - Unremarkable appendix         Procedures: see electronic medical records for all procedures/Xrays and details which were not copied into this note but were reviewed prior to creation of Plan. Assessment / Plan:    Principal Problem:    Anemia due to chronic blood loss (4/21/2021)  Active Problems:    Symptomatic anemia (4/21/2021)  Upper abd pain  Has h/o diverticulosis and pancreatitis.   Dx with Cdiff Jan 2021 tx po Vancomycin  Suspected PUD on presentation but not seen on EGD today  GS for EDG  - noted for minimal gastritis not c/w blood loss  Transfused 2 units pRBC with Hg up to 9.4 with MCV 76  Protonix 40mg every day  Fe / TIBC / % sat ordered - likely Fe Def  Prior labs Sept 2019 noted Fe 15, TIBC 141, %sat 11 c/w chronic disease  Rx FeSO4 325mg daily      Type 2 diabetes mellitus with peripheral vascular disease ([facility] [address] (10/14/2019)  BS qid ac/hs  Lantus 15u daily is routine   Results for Irlanda Benoit (MRN [id number]) as of 4/22/2021 18:31   4/21/2021 17:26 4/21/2021 22:00 4/22/2021 07:50 4/22/2021 11:20 4/22/2021 16:23   GLU -  (H) 323 (H) 287 (H) 309 (H) 345 (H)   Give extra Humalog 8 units tonight  HGA1c was 7.6 at f/u March 5, 2021      HTN (hypertension) (8/7/2017)  Hold HCTZ till d/c  Cont tx Norvasc 10mg daily       Hyperlipidemia (4/21/2021)  Resume Zocor on d/c       Asthma (8/7/2017)  Smoking 5-10 cigs daiy  Using Advair in home  Rx Pulmocort / Koko Anguiano q12h       PVD (peripheral vascular disease) ([facility] [address] (4/21/2021)  Post Op Dec 2020  ASA / Plavix - hold Plavix today, monitor    _________________    Daughters from [place] come today feeling pt was in acute distress  They plan to assist her with additional f/u  We could call GI office in [facility] to see what time frame an appt is available  It Hg is stable uncertain a transfer for inpatient w/u is possible           SAFETY:   Code Status:Full  DVT prophylaxis:SCD's or Sequential Compression Device  Stress Ulcer prophylaxis: Protonix bid  Bladder catheter:no  Family Contact Info:  Primary Emergency Contact: Gretchen Aguilar, [place] Phone: [phone]  Bedded: [facility] Room ED01/01  Disposition: Home with outpt evaluation vs Transfer (if Hg dropping)  Admission status:  Inpatient      Reviewed most current lab test results and cultures  YES  Reviewed most current radiology test results   YES  Review and summation of old records today    NO  Reviewed patient's current orders and MAR    YES  PMH/SH reviewed - no change compared to H&P    Care Plan discussed with:                                   Comments  Patient x     Family  x 2 daughters from [place] in room   RN x     Care Manager x      Consultant                           Multidiciplinary team rounds were held today with , nursing, pharmacist and clinical coordinator. Patient's plan of care was discussed; medications were reviewed and discharge planning was addressed.         ____________________________________________    Total NON Critical Care TIME:  50   Minutes        Comments   >50% of visit spent in counseling and coordination of care   x      Signed: Lesa Gates MD  [facility] Hospitalist  [phone]

## 2021-04-22 NOTE — PROGRESS NOTES
Dr. Jasen Moralez notified of pt.'s increased bedside glucose of 309. Plan to see if restarting her Diabetic meds will improve her levels. Pt. Had strawberries from home prior to glucose obtained. Pt. Encouraged to decrease amount of fruits to control glucose levels.

## 2021-04-22 NOTE — PROGRESS NOTES
Spoke with Dr. Lynne Chino regarding this  patient and he wanted to follow up on how the patient had been feeling throughout the night. Initially the patient stated that they were still having the right lower abd pain that occurred with exertion. Patient was given tylenol 650 mg and was reassessed an hour later and the patient stated that the pain had subsided. Dr. Lynne Chino stated that he will be in this morning to assess the patient as the patient is scheduled for a EGD this morning. Patient has been NPO since midnight.

## 2021-04-22 NOTE — PROGRESS NOTES
Pt tolerated FL diet without n/v or abd pain. Drawhar in to see pt earlier for consult. Pt is to have one more unit of PRBCs this evening. She is aware and has no c/o at this time.

## 2021-04-22 NOTE — PROGRESS NOTES
Spiritual Care Assessment/Progress Note  Jc Farrell      NAME: Moises Castro      MRN: [id number]  AGE: 63 y.o. SEX: female  Christian Affiliation: Christianity   Language: English     4/22/2021     Total Time (in minutes): 7     Spiritual Assessment begun in [facility] MED/SURG through conversation with:         [x]Patient        [] Family    [] Friend(s)        Reason for Consult: Follow-up, routine     Spiritual beliefs: (Please include comment if needed)     [x] Identifies with a leonid tradition:         [] Supported by a leonid community:            [] Claims no spiritual orientation:           [] Seeking spiritual identity:                [] Adheres to an individual form of spirituality:           [] Not able to assess:                           Identified resources for coping:      [] Prayer                               [] Music                  [] Guided Imagery     [x] Family/friends                 [] Pet visits     [] Devotional reading                         [] Unknown     [] Other:                                             Interventions offered during this visit: (See comments for more details)    Patient Interventions: Affirmation of emotions/emotional suffering, Affirmation of leonid, Initial/Spiritual assessment, patient floor, Prayer (assurance of)           Plan of Care:     [x] Support spiritual and/or cultural needs    [] Support AMD and/or advance care planning process      [] Support grieving process   [] Coordinate Rites and/or Rituals    [] Coordination with community clergy   [] No spiritual needs identified at this time   [] Detailed Plan of Care below (See Comments)  [] Make referral to Music Therapy  [] Make referral to Pet Therapy     [] Make referral to Addiction services  [] Make referral to [facility]  [] Make referral to Spiritual Care Partner  [] No future visits requested        [] Follow up upon further referrals     Comments: Initial spiritual assessment in Rm 200. Patient/family shared about medical issues. Provided empathic listening and spiritual support. Advised of  Availability.   [phone] Paging [phone]

## 2021-04-22 NOTE — PROGRESS NOTES
Bedside shift change report given to Jean Uribe LPN (oncoming nurse) by Mitali Malik RN   (offgoing nurse). Report included the following information SBAR, Kardex, Intake/Output, MAR and Recent Results. Ness score 1  Bed/chair alarm is not in use. If in use it is set at the highest volume. Intravenous fluids were administered, none 0 ml/hr  Patient Vitals for the past 12 hrs:   Temp Pulse Resp BP SpO2   04/22/21 1505 97.8 °F (36.6 °C) 82 16 (!) 172/60 100 %   04/22/21 1405 98 °F (36.7 °C) 84 16 (!) 158/59 100 %   04/22/21 1305 97.9 °F (36.6 °C) 100 16 (!) 183/81 100 %   04/22/21 1205 98 °F (36.7 °C) 90 16 (!) 154/70 100 %   04/22/21 1135 97.4 °F (36.3 °C) 90 16 (!) 176/68 99 %   04/22/21 1105 98 °F (36.7 °C) 94 16 (!) 162/68 100 %   04/22/21 1035 98 °F (36.7 °C) 100 16 133/70 100 %   04/22/21 1005 98.1 °F (36.7 °C) 86 16 (!) 157/69 99 %   04/22/21 0950 98.1 °F (36.7 °C) 88 16 (!) 153/69 99 %   04/22/21 0935 98.3 °F (36.8 °C) 89 16 (!) 140/67 100 %   04/22/21 0920 98.3 °F (36.8 °C) 90 16 (!) 157/64 99 %   04/22/21 0905 -- 94 16 (!) 143/61 97 %   04/22/21 0903 97.8 °F (36.6 °C) 94 18 130/60 97 %   04/22/21 0900 -- 96 18 130/60 --   04/22/21 0855 -- 92 17 (!) 143/67 100 %   04/22/21 0850 -- 94 17 129/62 --   04/22/21 0845 -- 97 17 139/64 100 %   04/22/21 0844 97.9 °F (36.6 °C) (!) 102 16 (!) 121/54 96 %   04/22/21 0843 -- -- 15 (!) 121/54 97 %   04/22/21 0842 97.9 °F (36.6 °C) -- 16 -- --   04/22/21 0757 98.3 °F (36.8 °C) 68 16 (!) 154/67 98 %     No flowsheet data found. Lab results reviewed. For significant abnormal values and values requiring intervention, see assessment and plan.

## 2021-04-22 NOTE — PROGRESS NOTES
Patient's blood transfusion was started and patient tolerated well. RN (Stephanie) sitting with patient for the first 15 minutes. 4/22/21- 0212 Patient's blood transfusion is complete and patient tolerated infusion well. Patient's vitals remained stable throughout and patient did not have any reaction during the infusion.

## 2021-04-22 NOTE — PROGRESS NOTES
Bedside shift change report given to [address] (oncoming nurse) by Dominique Cruz RN (offgoing nurse). Report included the following information Kardex, Intake/Output and Recent Results.

## 2021-04-22 NOTE — PROGRESS NOTES
Report given to [facility] for pt. To have EGD procedure by Dr. Baudilio Torres. Consent signed and on chart. Pt.'s partial dentures removed and placed in labeled container at bedside. Pt. Present illness, medical history, recent VS and labs and recent transfusions shared with [place]. To OR via bed.

## 2021-04-22 NOTE — BRIEF OP NOTE
Brief Postoperative Note    Patient: Fidelia Neff  YOB: 1955  MRN: [id number]    Date of Procedure: 4/22/2021     Pre-Op Diagnosis: GI Bleed/Anemia    Post-Op Diagnosis: Same as preoperative diagnosis. Procedure(s):  ESOPHAGOGASTRODUODENOSCOPY (EGD)        Surgeon(s):  Kenneth Gayle MD    Surgical Assistant: None    Anesthesia: MAC     Estimated Blood Loss (mL): 0    Complications: None    Specimens: * No specimens in log *     Implants: * No implants in log *    Drains: * No LDAs found *    Findings: 1. No etiology of anemia found 2.  Minimal gastritis     Electronically Signed by Tracy Ruth MD on 4/22/2021 at 8:37 AM

## 2021-04-22 NOTE — PROGRESS NOTES
Problem: Falls - Risk of  Goal: *Absence of Falls  Description: Document Paris Barahona Fall Risk and appropriate interventions in the flowsheet.   Outcome: Progressing Towards Goal  Note: Fall Risk Interventions:            Medication Interventions: Patient to call before getting OOB    Elimination Interventions: Call light in reach, Patient to call for help with toileting needs              Problem: Patient Education: Go to Patient Education Activity  Goal: Patient/Family Education  Outcome: Progressing Towards Goal     Problem: Anemia Care Plan (Adult and Pediatric)  Goal: *Labs within defined limits  Outcome: Progressing Towards Goal  Goal: *Tolerates increased activity  Outcome: Progressing Towards Goal     Problem: Patient Education: Go to Patient Education Activity  Goal: Patient/Family Education  Outcome: Progressing Towards Goal

## 2021-04-22 NOTE — PROGRESS NOTES
General Daily Progress Note    Admit Date: 4/21/2021  Hospital day * No surgery found *      Subjective:     Patient feels better this am.  Less SOB. Decreased left costal pain. No nausea or vomiting. Has been NPO since midnight. Review of Systems  Pertinent items are noted in HPI. Objective:     Visit Vitals  BP (!) 147/67 (BP 1 Location: Right upper arm, BP Patient Position: At rest)   Pulse 80   Temp 98.7 °F (37.1 °C)   Resp 18   Ht 5' 5\" (1.651 m)   Wt 174 lb 13.2 oz (79.3 kg)   SpO2 99%   BMI 29.09 kg/m²     04/20 0701 - 04/21 1900  In: 655.8 [P.O.:360]  Out: -     Physical Exam: General appearance: alert, cooperative, no distress, appears stated age  Lungs: clear to auscultation bilaterally  Heart: regular rate and rhythm, S1, S2 normal, no murmur, click, rub or gallop  Abdomen: soft, non-tender.  Bowel sounds normal. No masses,  no organomegaly        Data Review   Recent Results (from the past 24 hour(s))   EKG, 12 LEAD, INITIAL    Collection Time: 04/21/21  9:26 AM   Result Value Ref Range    Ventricular Rate 83 BPM    Atrial Rate 83 BPM    P-R Interval 148 ms    QRS Duration 90 ms    Q-T Interval 370 ms    QTC Calculation (Bezet) 434 ms    Calculated P Axis 64 degrees    Calculated R Axis 6 degrees    Calculated T Axis -65 degrees    Diagnosis       Normal sinus rhythm  Minimal voltage criteria for LVH, may be normal variant  ST & T wave abnormality, consider inferolateral ischemia  Abnormal ECG  When compared with ECG of 12-APR-2021 15:29,  No significant change was found  Confirmed by Belén Ryan MD, --- ([id number]) on 4/21/2021 10:06:49 PM     CBC WITH AUTOMATED DIFF    Collection Time: 04/21/21  9:40 AM   Result Value Ref Range    WBC 9.2 3.6 - 11.0 K/uL    RBC 2.88 (L) 3.80 - 5.20 M/uL    HGB 6.6 (L) 11.5 - 16.0 g/dL    HCT 22.4 (L) 35.0 - 47.0 %    MCV 77.8 (L) 80.0 - 99.0 FL    MCH 22.9 (L) 26.0 - 34.0 PG    MCHC 29.5 (L) 30.0 - 36.5 g/dL    RDW 15.7 (H) 11.5 - 14.5 %    PLATELET 435 095 - 400 K/uL    MPV 11.6 8.9 - 12.9 FL    NRBC 0.3 (H) 0  WBC    ABSOLUTE NRBC 0.03 (H) 0.00 - 0.01 K/uL    NEUTROPHILS 64 32 - 75 %    LYMPHOCYTES 25 12 - 49 %    MONOCYTES 7 5 - 13 %    EOSINOPHILS 2 0 - 7 %    BASOPHILS 1 0 - 1 %    IMMATURE GRANULOCYTES 0 0.0 - 0.5 %    ABS. NEUTROPHILS 5.9 1.8 - 8.0 K/UL    ABS. LYMPHOCYTES 2.3 0.8 - 3.5 K/UL    ABS. MONOCYTES 0.6 0.0 - 1.0 K/UL    ABS. EOSINOPHILS 0.2 0.0 - 0.4 K/UL    ABS. BASOPHILS 0.1 0.0 - 0.1 K/UL    ABS. IMM. GRANS. 0.0 0.00 - 0.04 K/UL    DF AUTOMATED     METABOLIC PANEL, COMPREHENSIVE    Collection Time: 04/21/21  9:40 AM   Result Value Ref Range    Sodium 137 136 - 145 mmol/L    Potassium 3.8 3.5 - 5.1 mmol/L    Chloride 98 97 - 108 mmol/L    CO2 26 21 - 32 mmol/L    Anion gap 13 5 - 15 mmol/L    Glucose 361 (H) 65 - 100 mg/dL    BUN 14 6 - 20 MG/DL    Creatinine 1.09 (H) 0.55 - 1.02 MG/DL    BUN/Creatinine ratio 13 12 - 20      GFR est AA >60 >60 ml/min/1.73m2    GFR est non-AA 51 (L) >60 ml/min/1.73m2    Calcium 9.6 8.5 - 10.1 MG/DL    Bilirubin, total 0.2 0.2 - 1.0 MG/DL    ALT (SGPT) 24 12 - 78 U/L    AST (SGOT) 18 15 - 37 U/L    Alk. phosphatase 106 45 - 117 U/L    Protein, total 7.5 6.4 - 8.2 g/dL    Albumin 3.2 (L) 3.5 - 5.0 g/dL    Globulin 4.3 (H) 2.0 - 4.0 g/dL    A-G Ratio 0.7 (L) 1.1 - 2.2     PROTHROMBIN TIME + INR    Collection Time: 04/21/21  9:40 AM   Result Value Ref Range    INR 1.0 0.9 - 1.1      Prothrombin time 9.6 9.0 - 11.1 sec   TROPONIN I    Collection Time: 04/21/21  9:40 AM   Result Value Ref Range    Troponin-I, Qt. <0.05 <0.05 ng/mL   NT-PRO BNP    Collection Time: 04/21/21  9:40 AM   Result Value Ref Range    NT pro- (H) 0 - 125 PG/ML   RBC, ALLOCATE    Collection Time: 04/21/21 10:30 AM   Result Value Ref Range    HISTORY CHECKED?  Historical check performed    TYPE & SCREEN    Collection Time: 04/21/21 10:41 AM   Result Value Ref Range    Crossmatch Expiration 04/24/2021,2359     ABO/Rh(D) A POSITIVE Antibody screen NEG     Unit number [id number]     Blood component type [facility]     Unit division 00     Status of unit DISCARDED,INCINERATED     Crossmatch result Compatible     Unit number [id number]     Blood component type [facility]     Unit division 00     Status of unit TRANSFUSED     Crossmatch result Compatible     Unit number [id number]     Blood component type [facility]     Unit division 00     Status of unit ALLOCATED     Crossmatch result Compatible     Unit number [id number]     Blood component type [facility]     Unit division 00     Status of unit TRANSFUSED     Crossmatch result Compatible    RBC, ALLOCATE    Collection Time: 04/21/21 10:41 AM   Result Value Ref Range    HISTORY CHECKED? Historical check performed    OCCULT BLOOD, STOOL    Collection Time: 04/21/21  5:20 PM   Result Value Ref Range    Occult blood, stool Positive (A) NEG     GLUCOSE, POC    Collection Time: 04/21/21  5:26 PM   Result Value Ref Range    Glucose (POC) 250 (H) 65 - 100 mg/dL    Performed by Antoinette Hopkins, ALLOCATE    Collection Time: 04/21/21  8:15 PM   Result Value Ref Range    HISTORY CHECKED? Historical check performed    GLUCOSE, POC    Collection Time: 04/21/21 10:00 PM   Result Value Ref Range    Glucose (POC) 323 (H) 65 - 100 mg/dL    Performed by Wilbern Cooks    CBC WITH AUTOMATED DIFF    Collection Time: 04/22/21  6:08 AM   Result Value Ref Range    WBC 8.8 3.6 - 11.0 K/uL    RBC 3.67 (L) 3.80 - 5.20 M/uL    HGB 9.4 (L) 11.5 - 16.0 g/dL    HCT 28.2 (L) 35.0 - 47.0 %    MCV 76.8 (L) 80.0 - 99.0 FL    MCH 25.6 (L) 26.0 - 34.0 PG    MCHC 33.3 30.0 - 36.5 g/dL    RDW 16.3 (H) 11.5 - 14.5 %    PLATELET 685 146 - 567 K/uL    MPV 11.0 8.9 - 12.9 FL    NRBC 0.2 (H) 0  WBC    ABSOLUTE NRBC 0.02 (H) 0.00 - 0.01 K/uL    NEUTROPHILS 69 32 - 75 %    LYMPHOCYTES 20 12 - 49 %    MONOCYTES 8 5 - 13 %    EOSINOPHILS 3 0 - 7 %    BASOPHILS 1 0 - 1 %    IMMATURE GRANULOCYTES 0 0.0 - 0.5 %    ABS.  NEUTROPHILS 6.0 1.8 - 8.0 K/UL    ABS. LYMPHOCYTES 1.7 0.8 - 3.5 K/UL    ABS. MONOCYTES 0.7 0.0 - 1.0 K/UL    ABS. EOSINOPHILS 0.2 0.0 - 0.4 K/UL    ABS. BASOPHILS 0.1 0.0 - 0.1 K/UL    ABS. IMM. GRANS. 0.0 0.00 - 0.04 K/UL    DF AUTOMATED             Assessment:     Principal Problem:    Anemia due to chronic blood loss (4/21/2021)    Active Problems:    HTN (hypertension) (8/7/2017)      Asthma (8/7/2017)      Type 2 diabetes mellitus with peripheral vascular disease ([facility] (10/14/2019)      Symptomatic anemia (4/21/2021)      PVD (peripheral vascular disease) ([facility] (4/21/2021)      Hyperlipidemia (4/21/2021)      Anemia with suspect GI bleed  Plan:     Plan on EGD. Risks of procedure shared with patient including risks of perforation, aspiration and anesthesia complications. She wishes to proceed. The patient was counseled at length about the risks of calin Covid-19 during their perioperative period and any recovery window from their procedure. The patient was made aware that calin Covid-19  may worsen their prognosis for recovering from their procedure and lend to a higher morbidity and/or mortality risk. All material risks, benefits, and reasonable alternatives including postponing the procedure were discussed. The patient does  wish to proceed with the procedure at this time.

## 2021-04-22 NOTE — ANESTHESIA POSTPROCEDURE EVALUATION
Procedure(s):  ESOPHAGOGASTRODUODENOSCOPY (EGD)    . MAC    Anesthesia Post Evaluation      Multimodal analgesia: multimodal analgesia not used between 6 hours prior to anesthesia start to PACU discharge  Patient location during evaluation: bedside  Patient participation: complete - patient participated  Level of consciousness: awake and alert  Pain score: 0  Pain management: adequate  Airway patency: patent  Anesthetic complications: no  Cardiovascular status: acceptable  Respiratory status: acceptable  Hydration status: acceptable  Post anesthesia nausea and vomiting:  none  Final Post Anesthesia Temperature Assessment:  Normothermia (36.0-37.5 degrees C)      INITIAL Post-op Vital signs:   Vitals Value Taken Time   /61 04/22/21 0905   Temp 36.6 °C (97.8 °F) 04/22/21 0903   Pulse 92 04/22/21 0907   Resp 17 04/22/21 0907   SpO2 97 % 04/22/21 0907   Vitals shown include unvalidated device data.

## 2021-04-23 VITALS
SYSTOLIC BLOOD PRESSURE: 178 MMHG | RESPIRATION RATE: 18 BRPM | HEART RATE: 74 BPM | BODY MASS INDEX: 29.13 KG/M2 | HEIGHT: 65 IN | TEMPERATURE: 97.9 F | WEIGHT: 174.82 LBS | DIASTOLIC BLOOD PRESSURE: 80 MMHG | OXYGEN SATURATION: 100 %

## 2021-04-23 LAB
ANION GAP SERPL CALC-SCNC: 10 MMOL/L (ref 5–15)
BASOPHILS # BLD: 0.1 K/UL (ref 0–0.1)
BASOPHILS NFR BLD: 1 % (ref 0–1)
BUN SERPL-MCNC: 7 MG/DL (ref 6–20)
BUN/CREAT SERPL: 8 (ref 12–20)
CALCIUM SERPL-MCNC: 9.6 MG/DL (ref 8.5–10.1)
CHLORIDE SERPL-SCNC: 102 MMOL/L (ref 97–108)
CO2 SERPL-SCNC: 27 MMOL/L (ref 21–32)
CREAT SERPL-MCNC: 0.84 MG/DL (ref 0.55–1.02)
DIFFERENTIAL METHOD BLD: ABNORMAL
EOSINOPHIL # BLD: 0.3 K/UL (ref 0–0.4)
EOSINOPHIL NFR BLD: 4 % (ref 0–7)
ERYTHROCYTE [DISTWIDTH] IN BLOOD BY AUTOMATED COUNT: 16.6 % (ref 11.5–14.5)
FERRITIN SERPL-MCNC: 24 NG/ML (ref 8–252)
GLUCOSE BLD STRIP.AUTO-MCNC: 189 MG/DL (ref 65–100)
GLUCOSE SERPL-MCNC: 190 MG/DL (ref 65–100)
HCT VFR BLD AUTO: 28.9 % (ref 35–47)
HGB BLD-MCNC: 9.2 G/DL (ref 11.5–16)
IMM GRANULOCYTES # BLD AUTO: 0 K/UL (ref 0–0.04)
IMM GRANULOCYTES NFR BLD AUTO: 0 % (ref 0–0.5)
IRON SATN MFR SERPL: 6 % (ref 20–50)
IRON SERPL-MCNC: 22 UG/DL (ref 50–170)
LIPASE SERPL-CCNC: 103 U/L (ref 73–393)
LYMPHOCYTES # BLD: 1.9 K/UL (ref 0.8–3.5)
LYMPHOCYTES NFR BLD: 23 % (ref 12–49)
MCH RBC QN AUTO: 24.8 PG (ref 26–34)
MCHC RBC AUTO-ENTMCNC: 31.8 G/DL (ref 30–36.5)
MCV RBC AUTO: 77.9 FL (ref 80–99)
MONOCYTES # BLD: 0.8 K/UL (ref 0–1)
MONOCYTES NFR BLD: 10 % (ref 5–13)
NEUTS SEG # BLD: 5.4 K/UL (ref 1.8–8)
NEUTS SEG NFR BLD: 62 % (ref 32–75)
NRBC # BLD: 0 K/UL (ref 0–0.01)
NRBC BLD-RTO: 0 PER 100 WBC
PLATELET # BLD AUTO: 255 K/UL (ref 150–400)
PMV BLD AUTO: 11.3 FL (ref 8.9–12.9)
POTASSIUM SERPL-SCNC: 3.5 MMOL/L (ref 3.5–5.1)
RBC # BLD AUTO: 3.71 M/UL (ref 3.8–5.2)
SERVICE CMNT-IMP: ABNORMAL
SODIUM SERPL-SCNC: 139 MMOL/L (ref 136–145)
TIBC SERPL-MCNC: 381 UG/DL (ref 250–450)
WBC # BLD AUTO: 8.5 K/UL (ref 3.6–11)

## 2021-04-23 PROCEDURE — 36415 COLL VENOUS BLD VENIPUNCTURE: CPT

## 2021-04-23 PROCEDURE — 82962 GLUCOSE BLOOD TEST: CPT

## 2021-04-23 PROCEDURE — 82728 ASSAY OF FERRITIN: CPT

## 2021-04-23 PROCEDURE — 83540 ASSAY OF IRON: CPT

## 2021-04-23 PROCEDURE — 80048 BASIC METABOLIC PNL TOTAL CA: CPT

## 2021-04-23 PROCEDURE — 85025 COMPLETE CBC W/AUTO DIFF WBC: CPT

## 2021-04-23 PROCEDURE — 74011250637 HC RX REV CODE- 250/637: Performed by: INTERNAL MEDICINE

## 2021-04-23 PROCEDURE — 83690 ASSAY OF LIPASE: CPT

## 2021-04-23 RX ORDER — FERROUS SULFATE 324(65)MG
324 TABLET, DELAYED RELEASE (ENTERIC COATED) ORAL
Qty: 30 TAB | Refills: 0 | Status: SHIPPED | OUTPATIENT
Start: 2021-04-23 | End: 2021-06-04 | Stop reason: SDUPTHER

## 2021-04-23 RX ADMIN — PANTOPRAZOLE SODIUM 40 MG: 40 TABLET, DELAYED RELEASE ORAL at 06:32

## 2021-04-23 RX ADMIN — ASPIRIN 81 MG: 81 TABLET, COATED ORAL at 10:03

## 2021-04-23 RX ADMIN — Medication 5 ML: at 06:33

## 2021-04-23 RX ADMIN — AMLODIPINE BESYLATE 10 MG: 10 TABLET ORAL at 10:03

## 2021-04-23 RX ADMIN — FERROUS SULFATE TAB EC 324 MG (65 MG FE EQUIVALENT) 324 MG: 324 (65 FE) TABLET DELAYED RESPONSE at 10:03

## 2021-04-23 RX ADMIN — LABETALOL HYDROCHLORIDE 100 MG: 100 TABLET, FILM COATED ORAL at 10:04

## 2021-04-23 RX ADMIN — CLOPIDOGREL BISULFATE 75 MG: 75 TABLET ORAL at 10:04

## 2021-04-23 NOTE — PROGRESS NOTES
Discharge instructions reviewed with patient  Personal belongings returned: n/a  Home meds returned: n/a  To front entrance via ambulatory  Discharged home with  Caro Kaba @ [phone]

## 2021-04-23 NOTE — DISCHARGE SUMMARY
Physician Discharge Summary     Patient ID:    Merissa Mercado  [id number]  63 y.o.  1955    Admit date: 4/21/2021    Discharge date : 4/23/2021    Chronic Diagnoses:    Problem List as of 4/23/2021 Date Reviewed: 4/22/2021          Codes Class Noted - Resolved    * (Principal) Anemia due to chronic blood loss ICD-10-CM: D50.0  ICD-9-CM: 280.0  4/21/2021 - Present        Symptomatic anemia ICD-10-CM: D64.9  ICD-9-CM: 285.9  4/21/2021 - Present        PVD (peripheral vascular disease) (HCC) (Chronic) ICD-10-CM: I73.9  ICD-9-CM: 443.9  4/21/2021 - Present        Hyperlipidemia (Chronic) ICD-10-CM: E78.5  ICD-9-CM: 272.4  4/21/2021 - Present        Diarrhea ICD-10-CM: R19.7  ICD-9-CM: 787.91  6/3/2020 - Present        Type 2 diabetes mellitus with peripheral vascular disease ([facility] ICD-10-CM: E11.51  ICD-9-CM: 250.70, 443.81  10/14/2019 - Present        Other partial intestinal obstruction ([facility] ICD-10-CM: K56.690  ICD-9-CM: 560.89  8/30/2019 - Present        Bowel obstruction ([facility] ICD-10-CM: K37.781  ICD-9-CM: 560.9  8/30/2019 - Present        Vomiting ICD-10-CM: R11.10  ICD-9-CM: 787.03  8/18/2019 - Present        Intractable epigastric abdominal pain ICD-10-CM: R10.13  ICD-9-CM: 789.06  11/10/2018 - Present        Intractable abdominal pain ICD-10-CM: R10.9  ICD-9-CM: 789.00  11/10/2018 - Present        History of pancreatitis (Chronic) ICD-10-CM: Z87.19  ICD-9-CM: V12.79  10/30/2018 - Present        Drug-induced acute pancreatitis ICD-10-CM: K85.30  ICD-9-CM: 577.0, E980.5  10/30/2018 - Present        Cervical stenosis of spinal canal ICD-10-CM: M48.02  ICD-9-CM: 723.0  8/16/2017 - Present        Abnormal ECG ICD-10-CM: R94.31  ICD-9-CM: 794.31  8/7/2017 - Present        HTN (hypertension) (Chronic) ICD-10-CM: I10  ICD-9-CM: 401.9  8/7/2017 - Present        Type II diabetes mellitus ([facility] (Chronic) ICD-10-CM: E11.9  ICD-9-CM: 250.00  8/7/2017 - Present        Asthma ICD-10-CM: J45.909  ICD-9-CM: 493.90  8/7/2017 - Present        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  ICD-9-CM: 569.81  8/14/2019 - 8/14/2019        RESOLVED: Infected hernioplasty mesh ([facility] ICD-10-CM: T85.79XA  ICD-9-CM: 996.69  8/14/2019 - 8/14/2019        RESOLVED: Surgery, elective ICD-10-CM: Z41.9  ICD-9-CM: V50.9  8/14/2019 - 8/14/2019        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  ICD-9-CM: 569.81  8/14/2019 - 8/14/2019        RESOLVED: Umbilical pain PILAR-28-PE: R10.33  ICD-9-CM: 789.05  7/24/2019 - 8/14/2019        RESOLVED: Abscess ICD-10-CM: L02.91  ICD-9-CM: 682.9  7/8/2019 - 8/14/2019        RESOLVED: Abdominal aortic aneurysm (AAA) 3.0 cm to 5.0 cm in diameter in female [facility]) (Chronic) ICD-10-CM: I71.4  ICD-9-CM: 441.4  11/10/2018 - 2/23/2021        RESOLVED: Schizophrenia ([facility] (Chronic) ICD-10-CM: F20.9  ICD-9-CM: 295.90  8/7/2017 - 2/23/2021          22    Final Diagnoses:   Anemia due to chronic blood loss  Iron deficiency anemia  Costochondritis  Diabetes mellitus type 2  Hypertension  Chronic asthma  Osteoarthritis  Tobacco abuse  Hyperlipidemia  Peripheral vascular disease      Reason for Hospitalization:  Patient is a 29-year-old female is, asthma, diabetes, hypertension and pancreatitis who presented to the ED with chest pain and dyspnea on exertion. She has had previous cardiac work-up with negative stress test several years ago and an unremarkable echo. Troponins were negative    During the course of her work-up in the ED she was found to have a hemoglobin of 6.6 with Hemoccult positive stool. Hospital Course:   Patient was admitted with what appears to be chronic blood loss anemia. Iron studies suggested a component of iron deficiency and she was started on iron replacement therapy. She was given 2 units of blood and hemoglobin improved into the 9.2 range. She underwent upper endoscopy which showed gastritis but no evidence for bleeding.   Patient had a previous colonoscopy in 2019 which was unremarkable    Hemoglobin remained stable and plan is for discharge home with follow-up for consideration of outpatient colonoscopy and possible capsule endoscopy study if needed              Discharge Medications:   Current Discharge Medication List      START taking these medications    Details   ferrous sulfate 324 mg (65 mg iron) tablet Take 1 Tab by mouth daily (with breakfast). Qty: 30 Tab, Refills: 0         CONTINUE these medications which have NOT CHANGED    Details   Omeprazole delayed release (PRILOSEC D/R) 20 mg tablet Take 1 Tab by mouth daily. Qty: 30 Tab, Refills: 1    Associated Diagnoses: Chest pain at rest      hydroCHLOROthiazide (HYDRODIURIL) 25 mg tablet Take 1 Tab by mouth daily. Qty: 30 Tab, Refills: 1    Associated Diagnoses: Essential hypertension      montelukast (SINGULAIR) 10 mg tablet TAKE 1 TABLET BY MOUTH DAILY AT BEDTIME  Qty: 30 Tab, Refills: 3      clopidogreL (PLAVIX) 75 mg tab       levocetirizine (XYZAL) 5 mg tablet Take 1 Tab by mouth daily. Indications: itching of skin  Qty: 90 Tab, Refills: 4    Associated Diagnoses: Itchy skin      labetaloL (NORMODYNE) 100 mg tablet Take 1 Tab by mouth two (2) times a day. Indications: Bood pressure  Qty: 180 Tab, Refills: 4    Associated Diagnoses: Essential hypertension      simvastatin (ZOCOR) 40 mg tablet TAKE 1 TABLET BY MOUTH EVERY NIGHT AT BEDTIME  Qty: 90 Tab, Refills: 4      ascorbic acid, vitamin C, (Vitamin C) 500 mg tablet Take 1 Tab by mouth two (2) times a day. Qty: 24 Tab, Refills: 0      albuterol (PROVENTIL HFA, VENTOLIN HFA, PROAIR HFA) 90 mcg/actuation inhaler Take 2 Puffs by inhalation every four (4) hours as needed for Wheezing. Qty: 1 Inhaler, Refills: 0      multivitamin with iron tablet Take 1 Tab by mouth daily.       amLODIPine (NORVASC) 10 mg tablet TAKE 1 TABLET BY MOUTH EVERY DAY  Qty: 90 Tab, Refills: 2    Associated Diagnoses: Essential hypertension      lancets (TRUEplus Lancets) 28 gauge misc TEST BLOOD SUGAR EVERY DAY  Qty: 100 Lancet, Refills: 5      glucose blood VI test strips (True Metrix Glucose Test Strip) strip TEST BLOOD SUGAR EVERY DAY  Qty: 100 Strip, Refills: 5      insulin glargine (Lantus Solostar U-100 Insulin) 100 unit/mL (3 mL) inpn 20 Units by SubCUTAneous route daily. Qty: 30 mL, Refills: 5    Associated Diagnoses: Type 2 diabetes mellitus with peripheral vascular disease (HCC)      aspirin delayed-release 81 mg tablet Take 81 mg by mouth daily. Insulin Needles, Disposable, (BD Ultra-Fine Short Pen Needle) 31 gauge x 5/16\" ndle by Does Not Apply route daily. E11.9. Use daily for insulin injection  Qty: 100 Pen Needle, Refills: 5    Associated Diagnoses: Type 2 diabetes mellitus with peripheral vascular disease (HCC)      Blood-Glucose Meter (TRUE METRIX AIR GLUCOSE METER) misc by Does Not Apply route. diphenhydrAMINE (BenadryL) 25 mg capsule Take 25 mg by mouth every six (6) hours as needed. BD Single Use Swabs Regular padm APPLY  EXTERNALLY EVERY DAY  Qty: 100 Pad, Refills: 5      zinc sulfate 220 mg tablet Take 1 Tab by mouth two (2) times a day. Qty: 24 Tab, Refills: 0      acetaminophen (Tylenol Extra Strength) 500 mg tablet Take  by mouth every six (6) hours as needed for Pain. Nebulizer & Compressor machine 1 Each by Does Not Apply route three (3) times daily. Indications: J44.9  Qty: 1 Each, Refills: 0    Associated Diagnoses: Moderate persistent asthma without complication      ipratropium (ATROVENT) 42 mcg (0.06 %) nasal spray 2 Sprays by Nasal route daily as needed. fluticasone-salmeterol (ADVAIR DISKUS) 250-50 mcg/dose diskus inhaler Take 1 Puff by inhalation every twelve (12) hours. Follow up Care:    1. Maria Elena Sheppard MD in 1-2 weeks. Please call to set up an appointment shortly after discharge. Diet:  Cardiac Diet    Disposition:  Home.     Advanced Directive:   FULL    DNR      Discharge Exam:  General:  Alert, cooperative, no distress, appears stated age. Lungs:   Clear to auscultation bilaterally. Chest wall:  No tenderness or deformity. Heart:  Regular rate and rhythm, S1, S2 normal, no murmur, click, rub or gallop. Abdomen:   Soft, non-tender. Bowel sounds normal. No masses,  No organomegaly. Extremities: Extremities normal, atraumatic, no cyanosis or edema. Pulses: 2+ and symmetric all extremities. Skin: Skin color, texture, turgor normal. No rashes or lesions   Neurologic: CNII-XII intact. No gross sensory or motor deficits        CONSULTATIONS: General Surgery    Significant Diagnostic Studies:   4/21/2021: BUN 14 MG/DL (Ref range: 6 - 20 MG/DL); Calcium 9.6 MG/DL (Ref range: 8.5 - 10.1 MG/DL); CO2 26 mmol/L (Ref range: 21 - 32 mmol/L); Creatinine 1.09 MG/DL* (Ref range: 0.55 - 1.02 MG/DL); Glucose 361 mg/dL* (Ref range: 65 - 100 mg/dL); HCT 22.4 %* (Ref range: 35.0 - 47.0 %); HGB 6.6 g/dL* (Ref range: 11.5 - 16.0 g/dL); Potassium 3.8 mmol/L (Ref range: 3.5 - 5.1 mmol/L); Sodium 137 mmol/L (Ref range: 136 - 145 mmol/L)  4/22/2021: BUN 10 MG/DL (Ref range: 6 - 20 MG/DL); Calcium 9.2 MG/DL (Ref range: 8.5 - 10.1 MG/DL); CO2 29 mmol/L (Ref range: 21 - 32 mmol/L); Creatinine 0.83 MG/DL (Ref range: 0.55 - 1.02 MG/DL); Glucose 228 mg/dL* (Ref range: 65 - 100 mg/dL); HCT 28.2 %* (Ref range: 35.0 - 47.0 %); HGB 9.4 g/dL* (Ref range: 11.5 - 16.0 g/dL); Potassium 3.3 mmol/L* (Ref range: 3.5 - 5.1 mmol/L);  Sodium 140 mmol/L (Ref range: 136 - 145 mmol/L)  Recent Labs     04/23/21  0547 04/22/21  0608   WBC 8.5 8.8   HGB 9.2* 9.4*   HCT 28.9* 28.2*    283     Recent Labs     04/23/21  0547 04/22/21  0608 04/21/21  0940    140 137   K 3.5 3.3* 3.8    102 98   CO2 27 29 26   BUN 7 10 14   CREA 0.84 0.83 1.09*   * 228* 361*   CA 9.6 9.2 9.6   MG  --  2.0  --      Recent Labs     04/23/21  0547 04/21/21  0940   ALT  --  24   AP  --  106   TBILI  --  0.2   TP  --  7.5 ALB  --  3.2*   GLOB  --  4.3*   LPSE 103  --      Recent Labs     04/21/21  0940   INR 1.0   PTP 9.6      Recent Labs     04/23/21  0547   TIBC 381   PSAT 6*   FERR 24      No results for input(s): PH, PCO2, PO2 in the last 72 hours. No results for input(s): CPK, CKMB in the last 72 hours.     No lab exists for component: TROPONINI  Lab Results   Component Value Date/Time    Glucose (POC) 189 (H) 04/23/2021 06:31 AM    Glucose (POC) 203 (H) 04/22/2021 10:18 PM    Glucose (POC) 344 (H) 04/22/2021 08:07 PM    Glucose (POC) 345 (H) 04/22/2021 04:23 PM    Glucose (POC) 309 (H) 04/22/2021 11:20 AM       Discharge time spent 35 minutes    Signed:  Romy Mejia MD  4/23/2021  9:32 AM

## 2021-04-23 NOTE — PROGRESS NOTES
Care Management Interventions  PCP Verified by CM: Moy Ellison MD)  Last Visit to PCP: 04/15/21  Palliative Care Criteria Met (RRAT>21 & CHF Dx)?: No(No MD Order)  Mode of Transport at Discharge: Other (see comment)(Boyfriend )  Transition of Care Consult (CM Consult): Discharge Planning  Physical Therapy Consult: No  Occupational Therapy Consult: No  Speech Therapy Consult: No  Current Support Network: Lives with Spouse(Boyfriend )  Confirm Follow Up Transport: Family  The Plan for Transition of Care is Related to the Following Treatment Goals : Treat symptomatic anemia  Discharge Location  Discharge Placement: Home    Patient is being discharge today. She did NOT want home health. She is all dressed and ready to go just awaiting discharge papers. Her boyfriend is already here to pick her up. Instructed patient to contact case management if she has any questions or concerns after discharge. She states understanding. RUR: 18% LOW     Transition of Care (ARLENE) Plan:  Home     ARLENE Transportation:       How is patient being transported at discharge? Boyfriend POV      When? Today      Is transport scheduled? N/a     Follow-up appointment and transportation:     PCP? Dante Clarke MD 4/27/21 at 9:15am      Who is transporting to the follow-up appointment? POV       Is transport for follow up appointment scheduled? N/a     Communication plan (with patient/family): Patient aware of discharge for today and follow up appointment. Who is being called? Patient or Next of Kin? Responsible party? Patient       What number(s) is to be used? [phone]      What service provider is calling for [facility]? [facility]       When are they calling? 24--48 hours prior to appointment.        Click here to complete [employer] including selection of the Healthcare Decision Maker Relationship (ie \"Primary\")  [email or web address]

## 2021-04-23 NOTE — DISCHARGE INSTRUCTIONS
DISCHARGE SUMMARY from Nurse    PATIENT INSTRUCTIONS:    What to do at Home:  Recommended activity: Activity as tolerated,     If you experience any recurrent symptoms , please follow up with PCP or return to the ED. *  Please give a list of your current medications to your Primary Care Provider. *  Please update this list whenever your medications are discontinued, doses are      changed, or new medications (including over-the-counter products) are added. *  Please carry medication information at all times in case of emergency situations. These are general instructions for a healthy lifestyle:    No smoking/ No tobacco products/ Avoid exposure to second hand smoke  Surgeon General's Warning:  Quitting smoking now greatly reduces serious risk to your health. Obesity, smoking, and sedentary lifestyle greatly increases your risk for illness    A healthy diet, regular physical exercise & weight monitoring are important for maintaining a healthy lifestyle    You may be retaining fluid if you have a history of heart failure or if you experience any of the following symptoms:  Weight gain of 3 pounds or more overnight or 5 pounds in a week, increased swelling in our hands or feet or shortness of breath while lying flat in bed. Please call your doctor as soon as you notice any of these symptoms; do not wait until your next office visit. The discharge information has been reviewed with the patient. The patient verbalized understanding. Discharge medications reviewed with the patient and appropriate educational materials and side effects teaching were provided.   ___________________________________________________________________________________________________________________________________

## 2021-04-23 NOTE — PROGRESS NOTES
Bedside and Verbal shift change report given to KUNAL Andres RN (oncoming nurse) by Harpreet Mendoza LPN (offgoing nurse). Report included the following information SBAR, Kardex and MAR.

## 2021-04-23 NOTE — PROGRESS NOTES
Problem: Risk for Spread of Infection  Goal: Prevent transmission of infectious organism to others  Description: Prevent the transmission of infectious organisms to other patients, staff members, and visitors. Outcome: Progressing Towards Goal     Problem: Patient Education:  Go to Education Activity  Goal: Patient/Family Education  Outcome: Progressing Towards Goal     Problem: Falls - Risk of  Goal: *Absence of Falls  Description: Document David Whittaker Fall Risk and appropriate interventions in the flowsheet.   Outcome: Progressing Towards Goal  Note: Fall Risk Interventions:            Medication Interventions: Patient to call before getting OOB, Teach patient to arise slowly    Elimination Interventions: Call light in reach, Patient to call for help with toileting needs              Problem: Patient Education: Go to Patient Education Activity  Goal: Patient/Family Education  Outcome: Progressing Towards Goal

## 2021-04-25 LAB
ABO + RH BLD: NORMAL
BLD PROD TYP BPU: NORMAL
BLOOD GROUP ANTIBODIES SERPL: NORMAL
BPU ID: NORMAL
CROSSMATCH RESULT,%XM: NORMAL
SPECIMEN EXP DATE BLD: NORMAL
STATUS OF UNIT,%ST: NORMAL
UNIT DIVISION, %UDIV: 0

## 2021-04-26 ENCOUNTER — TELEPHONE (OUTPATIENT)
Dept: FAMILY MEDICINE CLINIC | Age: 66
End: 2021-04-26

## 2021-04-26 NOTE — TELEPHONE ENCOUNTER
Ashley Dewitt has been contacted regarding recent hospital admission. Current medications were reviewed with the patient/caregiver by telephone. Date of Admission:  4/21/2021     Date of Discharge: 4/23/2021     Facility patient was discharged from: [facility]     Reason for Admission: Anemia   Any medication changes? Yes, Added ferrous sulfate Qday     How is the patient feeling? \"Feeling fine\"     Does the patient have any questions or problems? When she can reschedule her COVID vaccine. Request referral to Dr Ferny Nicolas (GI)   Does the patient need transportation? No - will have fiance drive her to appointment   Follow-up Appointment date: Martell Reid 4/27/2021 @920       I advised the patient to bring her medications in the original bottles and to contact office, or go to either urgent care or emergency care if symptoms return before scheduled appointment.     Bria Jefferson 4/26/2021 8:31 AM

## 2021-04-27 ENCOUNTER — TELEPHONE (OUTPATIENT)
Dept: FAMILY MEDICINE CLINIC | Age: 66
End: 2021-04-27

## 2021-04-27 ENCOUNTER — OFFICE VISIT (OUTPATIENT)
Dept: FAMILY MEDICINE CLINIC | Age: 66
End: 2021-04-27
Payer: MEDICARE

## 2021-04-27 VITALS
OXYGEN SATURATION: 99 % | SYSTOLIC BLOOD PRESSURE: 118 MMHG | HEART RATE: 80 BPM | RESPIRATION RATE: 18 BRPM | TEMPERATURE: 97.2 F | WEIGHT: 176.6 LBS | BODY MASS INDEX: 29.42 KG/M2 | DIASTOLIC BLOOD PRESSURE: 70 MMHG | HEIGHT: 65 IN

## 2021-04-27 DIAGNOSIS — D64.9 ANEMIA, UNSPECIFIED TYPE: Primary | ICD-10-CM

## 2021-04-27 DIAGNOSIS — E11.51 TYPE 2 DIABETES MELLITUS WITH PERIPHERAL VASCULAR DISEASE (HCC): ICD-10-CM

## 2021-04-27 LAB — HGB BLD-MCNC: 9.4 G/DL

## 2021-04-27 PROCEDURE — 3017F COLORECTAL CA SCREEN DOC REV: CPT | Performed by: NURSE PRACTITIONER

## 2021-04-27 PROCEDURE — 3051F HG A1C>EQUAL 7.0%<8.0%: CPT | Performed by: NURSE PRACTITIONER

## 2021-04-27 PROCEDURE — G8417 CALC BMI ABV UP PARAM F/U: HCPCS | Performed by: NURSE PRACTITIONER

## 2021-04-27 PROCEDURE — 99214 OFFICE O/P EST MOD 30 MIN: CPT | Performed by: NURSE PRACTITIONER

## 2021-04-27 PROCEDURE — G8432 DEP SCR NOT DOC, RNG: HCPCS | Performed by: NURSE PRACTITIONER

## 2021-04-27 PROCEDURE — 85018 HEMOGLOBIN: CPT | Performed by: NURSE PRACTITIONER

## 2021-04-27 PROCEDURE — G8754 DIAS BP LESS 90: HCPCS | Performed by: NURSE PRACTITIONER

## 2021-04-27 PROCEDURE — G8427 DOCREV CUR MEDS BY ELIG CLIN: HCPCS | Performed by: NURSE PRACTITIONER

## 2021-04-27 PROCEDURE — G8752 SYS BP LESS 140: HCPCS | Performed by: NURSE PRACTITIONER

## 2021-04-27 PROCEDURE — 1111F DSCHRG MED/CURRENT MED MERGE: CPT | Performed by: NURSE PRACTITIONER

## 2021-04-27 PROCEDURE — 2022F DILAT RTA XM EVC RTNOPTHY: CPT | Performed by: NURSE PRACTITIONER

## 2021-04-27 PROCEDURE — G9899 SCRN MAM PERF RSLTS DOC: HCPCS | Performed by: NURSE PRACTITIONER

## 2021-04-27 NOTE — PROGRESS NOTES
Chief Complaint   Patient presents with    Transitions Of Care     Anemia         HPI:      Paige Manrique is a 63 y.o. female. New Issues:  She was seen at the hospitals ER for anemia on 4/21/21. She was admitted for a HGB of 6.6 and stool was found to be heme positive. She was transfused 2 units and was started in iron. Discharged home 4/23/21. She is still feeling tired and weak. Stools are still dark. Allergies   Allergen Reactions    Lisinopril Other (comments)    Gabapentin Itching    Morphine Unknown (comments), Hives and Itching    Tramadol Other (comments) and Itching     \"funny feeling\"       Current Outpatient Medications   Medication Sig    ferrous sulfate 324 mg (65 mg iron) tablet Take 1 Tab by mouth daily (with breakfast).  Omeprazole delayed release (PRILOSEC D/R) 20 mg tablet Take 1 Tab by mouth daily.  hydroCHLOROthiazide (HYDRODIURIL) 25 mg tablet Take 1 Tab by mouth daily.  montelukast (SINGULAIR) 10 mg tablet TAKE 1 TABLET BY MOUTH DAILY AT BEDTIME    clopidogreL (PLAVIX) 75 mg tab     levocetirizine (XYZAL) 5 mg tablet Take 1 Tab by mouth daily. Indications: itching of skin    labetaloL (NORMODYNE) 100 mg tablet Take 1 Tab by mouth two (2) times a day. Indications: Bood pressure    diphenhydrAMINE (BenadryL) 25 mg capsule Take 25 mg by mouth every six (6) hours as needed.  simvastatin (ZOCOR) 40 mg tablet TAKE 1 TABLET BY MOUTH EVERY NIGHT AT BEDTIME    BD Single Use Swabs Regular padm APPLY  EXTERNALLY EVERY DAY    ascorbic acid, vitamin C, (Vitamin C) 500 mg tablet Take 1 Tab by mouth two (2) times a day.  zinc sulfate 220 mg tablet Take 1 Tab by mouth two (2) times a day.  albuterol (PROVENTIL HFA, VENTOLIN HFA, PROAIR HFA) 90 mcg/actuation inhaler Take 2 Puffs by inhalation every four (4) hours as needed for Wheezing.  multivitamin with iron tablet Take 1 Tab by mouth daily.     amLODIPine (NORVASC) 10 mg tablet TAKE 1 TABLET BY MOUTH EVERY DAY    lancets (TRUEplus Lancets) 28 gauge misc TEST BLOOD SUGAR EVERY DAY    glucose blood VI test strips (True Metrix Glucose Test Strip) strip TEST BLOOD SUGAR EVERY DAY    insulin glargine (Lantus Solostar U-100 Insulin) 100 unit/mL (3 mL) inpn 20 Units by SubCUTAneous route daily.  acetaminophen (Tylenol Extra Strength) 500 mg tablet Take  by mouth every six (6) hours as needed for Pain.  aspirin delayed-release 81 mg tablet Take 81 mg by mouth daily.  Insulin Needles, Disposable, (BD Ultra-Fine Short Pen Needle) 31 gauge x 5/16\" ndle by Does Not Apply route daily. E11.9. Use daily for insulin injection    Nebulizer & Compressor machine 1 Each by Does Not Apply route three (3) times daily. Indications: J44.9    Blood-Glucose Meter (TRUE METRIX AIR GLUCOSE METER) misc by Does Not Apply route.  ipratropium (ATROVENT) 42 mcg (0.06 %) nasal spray 2 Sprays by Nasal route daily as needed.  fluticasone-salmeterol (ADVAIR DISKUS) 250-50 mcg/dose diskus inhaler Take 1 Puff by inhalation every twelve (12) hours. No current facility-administered medications for this visit.         Past Medical History:   Diagnosis Date    Arthritis     Asthma     Diabetes ([address]     Hypertension     Menopause     Pancreatitis        Past Surgical History:   Procedure Laterality Date    COLONOSCOPY N/A 7/28/2020    COLONOSCOPY performed by Clive Jha MD at hospitals [address] BUNIONECTOMY Bilateral 1977  [address] HERNIA REPAIR  2002    HX PARTIAL HYSTERECTOMY  1980    [place] [facility]  [facility]    Exploratory laparotomy, extended right hemicolectomy       Social History     Socioeconomic History    Marital status: SINGLE     Spouse name: Not on file    Number of children: Not on file    Years of education: Not on file    Highest education level: Not on file   Tobacco Use    Smoking status: Former Smoker     Packs/day: 0.25     Years: 20.00     Pack years: 5.00     Quit date: 6/30/2020 Years since quittin.8    Smokeless tobacco: Never Used   Substance and Sexual Activity    Alcohol use: Not Currently     Comment: RARE Q 3 MONTHS    Drug use: No    Sexual activity: Yes       Family History   Problem Relation Age of Onset    Other Mother         ANEURYSM    Breast Cancer Mother     Diabetes Mother     Lung Disease Father         EMPHYSEMA    Crohn's Disease Sister     Heart Disease Sister     Diabetes Maternal Uncle     Heart Disease Maternal Uncle     Diabetes Paternal Uncle     Heart Disease Paternal Uncle     Breast Cancer Maternal Aunt     Anesth Problems Neg Hx        Above history reviewed. ROS:  Denies fever, chills, cough, chest pain, SOB,  nausea, vomiting, or diarrhea. Denies wt loss, wt gain, hemoptysis, hematochezia or melena. Physical Examination:    /70 (BP 1 Location: Right arm, BP Patient Position: Sitting, BP Cuff Size: Adult long)   Pulse 80   Temp 97.2 °F (36.2 °C) (Temporal)   Resp 18   Ht 5' 5\" (1.651 m)   Wt 176 lb 9.6 oz (80.1 kg)   SpO2 99%   BMI 29.39 kg/m²     General: Alert and Ox3, Fluent speech  HEENT:  PERRLA, EOM intact, TMs, turbinates, pharynx normal.  No thyromegaly. No cervical adenopathy. Neck:  Supple, no adenopathy, JVD, mass or bruit  Chest:  Clear to Ausculation, without wheezes, rales, rubs or ronchi  Cardiac: RRR  Abdomen:  +BS, soft, nontender without palpable HSM  Extremities:  No cyanosis, clubbing or edema  Neurologic:  Ambulatory without assist, CN 2-12 grossly intact. Moves all extremities. Skin: no rash  Lymphadenopathy: no cervical or supraclavicular nodes    Results for orders placed or performed in visit on 21   [facility] POC HEMOGLOBIN (HGB)   Result Value Ref Range    Hemoglobin (POC) 9.4 G/DL      ASSESSMENT AND PLAN:     1.  Anemia, unspecified type  Hemoglobin stable  Up from 9.2-9.4 since discharge  Referral initiated to Dr. Ray Rodriguez in [address]  - [facility] POC HEMOGLOBIN (HGB)  - CBC WITH AUTOMATED DIFF; Future  - IRON PROFILE; Future  - VITAMIN B12; Future  - VITAMIN B12  - IRON PROFILE  - CBC WITH AUTOMATED DIFF  - REFERRAL TO GENERAL SURGERY    2. Type 2 diabetes mellitus with peripheral vascular disease (HCC)  Good control  Continue current regimen   - METABOLIC PANEL, COMPREHENSIVE;  Future  - METABOLIC PANEL, COMPREHENSIVE     RTC in 3 weeks    April Quezada NP

## 2021-04-27 NOTE — PROGRESS NOTES
Chief Complaint   Patient presents with    Transitions Of Care     Anemia     3 most recent PHQ Screens 4/27/2021   Little interest or pleasure in doing things Not at all   Feeling down, depressed, irritable, or hopeless Not at all   Total Score PHQ 2 0     Learning Assessment 4/27/2021   PRIMARY LEARNER Patient   PRIMARY LANGUAGE ENGLISH   LEARNER PREFERENCE PRIMARY READING     -   ANSWERED BY patient   RELATIONSHIP SELF     Fall Risk Assessment, last 12 mths 4/27/2021   Able to walk? Yes   Fall in past 12 months? 0   Do you feel unsteady? 0   Are you worried about falling 0   Number of falls in past 12 months -   Fall with injury? -     Abuse Screening Questionnaire 4/27/2021   Do you ever feel afraid of your partner? N   Are you in a relationship with someone who physically or mentally threatens you? N   Is it safe for you to go home? Y     ADL Assessment 4/27/2021   Feeding yourself No Help Needed   Getting from bed to chair No Help Needed   Getting dressed No Help Needed   Bathing or showering No Help Needed   Walk across the room (includes cane/walker) No Help Needed   Using the telphone No Help Needed   Taking your medications No Help Needed   Preparing meals No Help Needed   Managing money (expenses/bills) No Help Needed   Moderately strenuous housework (laundry) No Help Needed   Shopping for personal items (toiletries/medicines) No Help Needed   Shopping for groceries No Help Needed   Driving No Help Needed   Climbing a flight of stairs No Help Needed   Getting to places beyond walking distances No Help Needed     1. Have you been to the ER, urgent care clinic since your last visit? Yes  Hospitalized since your last visit? Yes, [facility] Anemia    2. Have you seen or consulted any other health care providers outside of the [address] [place] [address] [place] since your last visit? Include any pap smears or colon screening.  No      Chief Complaint   Patient presents with    Transitions Of Care     Anemia         Visit Vitals  Pulse 80   Temp 97.2 °F (36.2 °C) (Temporal)   Resp 18   Ht 5' 5\" (1.651 m)   Wt 176 lb 9.6 oz (80.1 kg)   SpO2 99%   BMI 29.39 kg/m²       Pain Scale: 0 - No pain/10  Pain Location:   Blood Sugar was 189 today.   Saintclair Jubilee is a 63 y.o. female presenting for/with:    Transitions Of Care (Anemia)      Symptom review:    NO  Fever   NO  Shaking chills  NO  Cough  NO  Body aches  NO  Coughing up blood  NO  Chest congestion  NO  Chest pain  NO  Shortness of breath  NO  Profound Loss of smell/taste  NO  Nausea/Vomiting   NO  Loose stool/Diarrhea  NO  any skin issues    Patient Risk Factors Reviewed as follows:  NO  have you been in Close contact with confirmed COVID19 patient   NO  History of recent travel to affected geographical areas within the past 14 days  NO  COPD  NO  Active Cancer/Leukemia/Lymphoma/Chemotherapy  NO  Oral steroid use  NO  Pregnant  NO  Diabetes Mellitus  NO  Heart disease  NO  Asthma  NO Health care worker at home  NO Health care worker  NO Is there a Pregnant Woman in the home  NO Dialysis pt in the home   NO a large number of people living in the home

## 2021-04-27 NOTE — PROGRESS NOTES
Results for orders placed or performed in visit on 04/27/21   AMB POC HEMOGLOBIN (HGB)   Result Value Ref Range    Hemoglobin (POC) 9.4 G/DL

## 2021-04-28 LAB
ALBUMIN SERPL-MCNC: 3.4 G/DL (ref 3.5–5)
ALBUMIN/GLOB SERPL: 0.9 {RATIO} (ref 1.1–2.2)
ALP SERPL-CCNC: 111 U/L (ref 45–117)
ALT SERPL-CCNC: 28 U/L (ref 12–78)
ANION GAP SERPL CALC-SCNC: 6 MMOL/L (ref 5–15)
AST SERPL-CCNC: 14 U/L (ref 15–37)
BASOPHILS # BLD: 0.1 K/UL (ref 0–0.1)
BASOPHILS NFR BLD: 1 % (ref 0–1)
BILIRUB SERPL-MCNC: 0.3 MG/DL (ref 0.2–1)
BUN SERPL-MCNC: 11 MG/DL (ref 6–20)
BUN/CREAT SERPL: 14 (ref 12–20)
CALCIUM SERPL-MCNC: 9.8 MG/DL (ref 8.5–10.1)
CHLORIDE SERPL-SCNC: 101 MMOL/L (ref 97–108)
CO2 SERPL-SCNC: 30 MMOL/L (ref 21–32)
CREAT SERPL-MCNC: 0.76 MG/DL (ref 0.55–1.02)
DIFFERENTIAL METHOD BLD: ABNORMAL
EOSINOPHIL # BLD: 0.3 K/UL (ref 0–0.4)
EOSINOPHIL NFR BLD: 3 % (ref 0–7)
ERYTHROCYTE [DISTWIDTH] IN BLOOD BY AUTOMATED COUNT: 18.8 % (ref 11.5–14.5)
GLOBULIN SER CALC-MCNC: 3.8 G/DL (ref 2–4)
GLUCOSE SERPL-MCNC: 272 MG/DL (ref 65–100)
HCT VFR BLD AUTO: 32.2 % (ref 35–47)
HGB BLD-MCNC: 9.6 G/DL (ref 11.5–16)
IMM GRANULOCYTES # BLD AUTO: 0 K/UL (ref 0–0.04)
IMM GRANULOCYTES NFR BLD AUTO: 0 % (ref 0–0.5)
IRON SATN MFR SERPL: 91 % (ref 20–50)
IRON SERPL-MCNC: 333 UG/DL (ref 35–150)
LYMPHOCYTES # BLD: 2.3 K/UL (ref 0.8–3.5)
LYMPHOCYTES NFR BLD: 23 % (ref 12–49)
MCH RBC QN AUTO: 24.7 PG (ref 26–34)
MCHC RBC AUTO-ENTMCNC: 29.8 G/DL (ref 30–36.5)
MCV RBC AUTO: 82.8 FL (ref 80–99)
MONOCYTES # BLD: 0.7 K/UL (ref 0–1)
MONOCYTES NFR BLD: 7 % (ref 5–13)
NEUTS SEG # BLD: 6.7 K/UL (ref 1.8–8)
NEUTS SEG NFR BLD: 66 % (ref 32–75)
NRBC # BLD: 0 K/UL (ref 0–0.01)
NRBC BLD-RTO: 0 PER 100 WBC
PLATELET # BLD AUTO: 266 K/UL (ref 150–400)
PMV BLD AUTO: 11.9 FL (ref 8.9–12.9)
POTASSIUM SERPL-SCNC: 3.6 MMOL/L (ref 3.5–5.1)
PROT SERPL-MCNC: 7.2 G/DL (ref 6.4–8.2)
RBC # BLD AUTO: 3.89 M/UL (ref 3.8–5.2)
SODIUM SERPL-SCNC: 137 MMOL/L (ref 136–145)
TIBC SERPL-MCNC: 366 UG/DL (ref 250–450)
VIT B12 SERPL-MCNC: 541 PG/ML (ref 193–986)
WBC # BLD AUTO: 10.1 K/UL (ref 3.6–11)

## 2021-04-28 NOTE — PROGRESS NOTES
Hemoglobin is stable. Plenty of iron in your system. Ok to back off to every other day. Glucose is high.

## 2021-05-05 ENCOUNTER — HOSPITAL ENCOUNTER (EMERGENCY)
Age: 66
Discharge: HOME | End: 2021-05-05
Attending: EMERGENCY MEDICINE
Payer: MEDICARE

## 2021-05-05 ENCOUNTER — APPOINTMENT (OUTPATIENT)
Dept: ULTRASOUND IMAGING | Age: 66
End: 2021-05-05
Attending: EMERGENCY MEDICINE
Payer: MEDICARE

## 2021-05-05 ENCOUNTER — APPOINTMENT (OUTPATIENT)
Dept: GENERAL RADIOLOGY | Age: 66
End: 2021-05-05
Attending: EMERGENCY MEDICINE
Payer: MEDICARE

## 2021-05-05 VITALS
RESPIRATION RATE: 22 BRPM | OXYGEN SATURATION: 100 % | HEIGHT: 68 IN | DIASTOLIC BLOOD PRESSURE: 45 MMHG | WEIGHT: 176.6 LBS | TEMPERATURE: 99.5 F | BODY MASS INDEX: 26.76 KG/M2 | SYSTOLIC BLOOD PRESSURE: 162 MMHG | HEART RATE: 70 BPM

## 2021-05-05 DIAGNOSIS — R06.02 SOB (SHORTNESS OF BREATH): Primary | ICD-10-CM

## 2021-05-05 LAB
ABO + RH BLD: NORMAL
ALBUMIN SERPL-MCNC: 3.1 G/DL (ref 3.5–5)
ALBUMIN/GLOB SERPL: 0.8 {RATIO} (ref 1.1–2.2)
ALP SERPL-CCNC: 97 U/L (ref 45–117)
ALT SERPL-CCNC: 24 U/L (ref 12–78)
ANION GAP SERPL CALC-SCNC: 10 MMOL/L (ref 5–15)
APPEARANCE UR: CLEAR
AST SERPL-CCNC: 17 U/L (ref 15–37)
BASOPHILS # BLD: 0.1 K/UL (ref 0–0.1)
BASOPHILS NFR BLD: 1 % (ref 0–1)
BILIRUB SERPL-MCNC: 0.2 MG/DL (ref 0.2–1)
BILIRUB UR QL: NEGATIVE
BLOOD GROUP ANTIBODIES SERPL: NORMAL
BNP SERPL-MCNC: 174 PG/ML (ref 0–125)
BUN SERPL-MCNC: 11 MG/DL (ref 6–20)
BUN/CREAT SERPL: 10 (ref 12–20)
CALCIUM SERPL-MCNC: 9.4 MG/DL (ref 8.5–10.1)
CHLORIDE SERPL-SCNC: 96 MMOL/L (ref 97–108)
CO2 SERPL-SCNC: 30 MMOL/L (ref 21–32)
COLOR UR: ABNORMAL
CREAT SERPL-MCNC: 1.06 MG/DL (ref 0.55–1.02)
DIFFERENTIAL METHOD BLD: ABNORMAL
EOSINOPHIL # BLD: 0.2 K/UL (ref 0–0.4)
EOSINOPHIL NFR BLD: 2 % (ref 0–7)
ERYTHROCYTE [DISTWIDTH] IN BLOOD BY AUTOMATED COUNT: 19.1 % (ref 11.5–14.5)
GLOBULIN SER CALC-MCNC: 4 G/DL (ref 2–4)
GLUCOSE SERPL-MCNC: 363 MG/DL (ref 65–100)
GLUCOSE UR STRIP.AUTO-MCNC: 250 MG/DL
HCT VFR BLD AUTO: 28.1 % (ref 35–47)
HGB BLD-MCNC: 8.8 G/DL (ref 11.5–16)
HGB UR QL STRIP: NEGATIVE
IMM GRANULOCYTES # BLD AUTO: 0 K/UL (ref 0–0.04)
IMM GRANULOCYTES NFR BLD AUTO: 0 % (ref 0–0.5)
KETONES UR QL STRIP.AUTO: ABNORMAL MG/DL
LEUKOCYTE ESTERASE UR QL STRIP.AUTO: NEGATIVE
LYMPHOCYTES # BLD: 2.2 K/UL (ref 0.8–3.5)
LYMPHOCYTES NFR BLD: 22 % (ref 12–49)
MCH RBC QN AUTO: 24.9 PG (ref 26–34)
MCHC RBC AUTO-ENTMCNC: 31.3 G/DL (ref 30–36.5)
MCV RBC AUTO: 79.4 FL (ref 80–99)
MONOCYTES # BLD: 0.7 K/UL (ref 0–1)
MONOCYTES NFR BLD: 7 % (ref 5–13)
NEUTS SEG # BLD: 6.8 K/UL (ref 1.8–8)
NEUTS SEG NFR BLD: 68 % (ref 32–75)
NITRITE UR QL STRIP.AUTO: NEGATIVE
NRBC # BLD: 0 K/UL (ref 0–0.01)
NRBC BLD-RTO: 0 PER 100 WBC
PH UR STRIP: 6.5 [PH] (ref 5–8)
PLATELET # BLD AUTO: 397 K/UL (ref 150–400)
PMV BLD AUTO: 10.9 FL (ref 8.9–12.9)
POTASSIUM SERPL-SCNC: 3 MMOL/L (ref 3.5–5.1)
PROT SERPL-MCNC: 7.1 G/DL (ref 6.4–8.2)
PROT UR STRIP-MCNC: NEGATIVE MG/DL
RBC # BLD AUTO: 3.54 M/UL (ref 3.8–5.2)
SODIUM SERPL-SCNC: 136 MMOL/L (ref 136–145)
SP GR UR REFRACTOMETRY: 1.01 (ref 1–1.03)
SPECIMEN EXP DATE BLD: NORMAL
TROPONIN I SERPL-MCNC: <0.05 NG/ML
TROPONIN I SERPL-MCNC: <0.05 NG/ML
UROBILINOGEN UR QL STRIP.AUTO: 0.2 EU/DL (ref 0.2–1)
WBC # BLD AUTO: 10.1 K/UL (ref 3.6–11)

## 2021-05-05 PROCEDURE — 96374 THER/PROPH/DIAG INJ IV PUSH: CPT

## 2021-05-05 PROCEDURE — 99285 EMERGENCY DEPT VISIT HI MDM: CPT

## 2021-05-05 PROCEDURE — 81003 URINALYSIS AUTO W/O SCOPE: CPT

## 2021-05-05 PROCEDURE — 83880 ASSAY OF NATRIURETIC PEPTIDE: CPT

## 2021-05-05 PROCEDURE — 74011250637 HC RX REV CODE- 250/637: Performed by: EMERGENCY MEDICINE

## 2021-05-05 PROCEDURE — 76705 ECHO EXAM OF ABDOMEN: CPT

## 2021-05-05 PROCEDURE — 71045 X-RAY EXAM CHEST 1 VIEW: CPT

## 2021-05-05 PROCEDURE — 36415 COLL VENOUS BLD VENIPUNCTURE: CPT

## 2021-05-05 PROCEDURE — 74011250636 HC RX REV CODE- 250/636: Performed by: EMERGENCY MEDICINE

## 2021-05-05 PROCEDURE — 93005 ELECTROCARDIOGRAM TRACING: CPT

## 2021-05-05 PROCEDURE — 86901 BLOOD TYPING SEROLOGIC RH(D): CPT

## 2021-05-05 PROCEDURE — 85025 COMPLETE CBC W/AUTO DIFF WBC: CPT

## 2021-05-05 PROCEDURE — 84484 ASSAY OF TROPONIN QUANT: CPT

## 2021-05-05 PROCEDURE — 80053 COMPREHEN METABOLIC PANEL: CPT

## 2021-05-05 PROCEDURE — 74011636637 HC RX REV CODE- 636/637: Performed by: EMERGENCY MEDICINE

## 2021-05-05 RX ORDER — POTASSIUM CHLORIDE 750 MG/1
40 TABLET, FILM COATED, EXTENDED RELEASE ORAL
Status: COMPLETED | OUTPATIENT
Start: 2021-05-05 | End: 2021-05-05

## 2021-05-05 RX ORDER — ACETAMINOPHEN 325 MG/1
975 TABLET ORAL
Status: COMPLETED | OUTPATIENT
Start: 2021-05-05 | End: 2021-05-05

## 2021-05-05 RX ORDER — HYDROMORPHONE HYDROCHLORIDE 1 MG/ML
0.5 INJECTION, SOLUTION INTRAMUSCULAR; INTRAVENOUS; SUBCUTANEOUS ONCE
Status: COMPLETED | OUTPATIENT
Start: 2021-05-05 | End: 2021-05-05

## 2021-05-05 RX ADMIN — POTASSIUM CHLORIDE 40 MEQ: 750 TABLET, EXTENDED RELEASE ORAL at 15:56

## 2021-05-05 RX ADMIN — HYDROMORPHONE HYDROCHLORIDE 0.5 MG: 1 INJECTION, SOLUTION INTRAMUSCULAR; INTRAVENOUS; SUBCUTANEOUS at 15:56

## 2021-05-05 RX ADMIN — ACETAMINOPHEN 975 MG: 325 TABLET ORAL at 14:10

## 2021-05-05 RX ADMIN — HUMAN INSULIN 6 UNITS: 100 INJECTION, SOLUTION SUBCUTANEOUS at 16:42

## 2021-05-05 NOTE — ED PROVIDER NOTES
EMERGENCY DEPARTMENT HISTORY AND PHYSICAL EXAM          Date: 5/5/2021  Patient Name: Saintclair Jubilee    History of Presenting Illness     Chief Complaint   Patient presents with    Shortness of Breath       History Provided By: Patient    HPI: Saintclair Jubilee is a 63 y.o. female, pmhx listed below, who presents to the ED c/o shortness of breath. Patient reports she has been feeling short of breath for months. Came to the emergency department and was admitted for anemia, transfused 2 weeks ago. States she has been having persistent shortness of breath since being discharged. Dyspnea on exertion. Not worse with lying flat. Chills today. Associated anterior chest pain. States symptoms mildly improved after transfusion but are now as bad as they were prior to admission. Continues to have intermittent right upper quadrant and right-sided chest pain. Has referral to see surgeon on May 18 for continued work-up. PCP: Lilli Tavarez MD    There are no other complaints, changes, or physical findings at this time.          Past History       Past Medical History:  Past Medical History:   Diagnosis Date    Arthritis     Asthma     Diabetes ([address]     Hypertension     Menopause     Pancreatitis        Past Surgical History:  Past Surgical History:   Procedure Laterality Date    COLONOSCOPY N/A 7/28/2020    COLONOSCOPY performed by Vishnu Shields MD at \[facility]\"" 1827 HX BUNIONECTOMY Bilateral 1977    [address]  2002    HX PARTIAL HYSTERECTOMY  1980    CA ABDOMEN SURGERY [address] UNLISTED  09/01/2019    Exploratory laparotomy, extended right hemicolectomy       Family History:  Family History   Problem Relation Age of Onset    Other Mother         ANEURYSM    Breast Cancer Mother     Diabetes Mother     Lung Disease Father         EMPHYSEMA    Crohn's Disease Sister     Heart Disease Sister     Diabetes Maternal Uncle     Heart Disease Maternal Uncle     Diabetes Paternal Uncle     Heart Disease Paternal Uncle     Breast Cancer Maternal Aunt     Anesth Problems Neg Hx        Social History:  Social History     Tobacco Use    Smoking status: Former Smoker     Packs/day: 0.25     Years: 20.00     Pack years: 5.00     Quit date: 2020     Years since quittin.8    Smokeless tobacco: Never Used   Substance Use Topics    Alcohol use: Not Currently     Comment: RARE Q 3 MONTHS    Drug use: No       Current Outpatient Medications   Medication Sig Dispense Refill    ferrous sulfate 324 mg (65 mg iron) tablet Take 1 Tab by mouth daily (with breakfast). 30 Tab 0    Omeprazole delayed release (PRILOSEC D/R) 20 mg tablet Take 1 Tab by mouth daily. 30 Tab 1    hydroCHLOROthiazide (HYDRODIURIL) 25 mg tablet Take 1 Tab by mouth daily. 30 Tab 1    montelukast (SINGULAIR) 10 mg tablet TAKE 1 TABLET BY MOUTH DAILY AT BEDTIME 30 Tab 3    clopidogreL (PLAVIX) 75 mg tab       levocetirizine (XYZAL) 5 mg tablet Take 1 Tab by mouth daily. Indications: itching of skin 90 Tab 4    labetaloL (NORMODYNE) 100 mg tablet Take 1 Tab by mouth two (2) times a day. Indications: Bood pressure 180 Tab 4    diphenhydrAMINE (BenadryL) 25 mg capsule Take 25 mg by mouth every six (6) hours as needed.  simvastatin (ZOCOR) 40 mg tablet TAKE 1 TABLET BY MOUTH EVERY NIGHT AT BEDTIME 90 Tab 4    BD Single Use Swabs Regular padm APPLY  EXTERNALLY EVERY  Pad 5    ascorbic acid, vitamin C, (Vitamin C) 500 mg tablet Take 1 Tab by mouth two (2) times a day. 24 Tab 0    zinc sulfate 220 mg tablet Take 1 Tab by mouth two (2) times a day. 24 Tab 0    albuterol (PROVENTIL HFA, VENTOLIN HFA, PROAIR HFA) 90 mcg/actuation inhaler Take 2 Puffs by inhalation every four (4) hours as needed for Wheezing. 1 Inhaler 0    multivitamin with iron tablet Take 1 Tab by mouth daily.       amLODIPine (NORVASC) 10 mg tablet TAKE 1 TABLET BY MOUTH EVERY DAY 90 Tab 2    lancets (TRUEplus Lancets) 28 gauge misc TEST BLOOD SUGAR EVERY  Lancet 5    glucose blood VI test strips (True Metrix Glucose Test Strip) strip TEST BLOOD SUGAR EVERY  Strip 5    insulin glargine (Lantus Solostar U-100 Insulin) 100 unit/mL (3 mL) inpn 20 Units by SubCUTAneous route daily. 30 mL 5    acetaminophen (Tylenol Extra Strength) 500 mg tablet Take  by mouth every six (6) hours as needed for Pain.  aspirin delayed-release 81 mg tablet Take 81 mg by mouth daily.  Insulin Needles, Disposable, (BD Ultra-Fine Short Pen Needle) 31 gauge x 5/16\" ndle by Does Not Apply route daily. E11.9. Use daily for insulin injection 100 Pen Needle 5    Nebulizer & Compressor machine 1 Each by Does Not Apply route three (3) times daily. Indications: J44.9 1 Each 0    Blood-Glucose Meter (TRUE METRIX AIR GLUCOSE METER) misc by Does Not Apply route.  ipratropium (ATROVENT) 42 mcg (0.06 %) nasal spray 2 Sprays by Nasal route daily as needed.  fluticasone-salmeterol (ADVAIR DISKUS) 250-50 mcg/dose diskus inhaler Take 1 Puff by inhalation every twelve (12) hours. Allergies: Allergies   Allergen Reactions    Lisinopril Other (comments)    Gabapentin Itching    Morphine Unknown (comments), Hives and Itching    Tramadol Other (comments) and Itching     \"funny feeling\"         Review of Systems   Review of Systems   Constitutional: Positive for fatigue. Negative for chills and fever. HENT: Negative for congestion. Eyes: Negative for pain. Respiratory: Positive for shortness of breath. Cardiovascular: Positive for chest pain. Gastrointestinal: Negative for abdominal pain. Genitourinary: Negative for flank pain. Musculoskeletal: Negative for back pain. Neurological: Negative for headaches. Psychiatric/Behavioral: Negative for agitation. Physical Exam     Vital Signs-Reviewed the patient's vital signs.   Patient Vitals for the past 12 hrs:   Temp Pulse Resp BP SpO2   05/05/21 1338 99.5 °F (37.5 °C) 97 22 (!) 151/58 100 % Physical Exam  Constitutional:       Appearance: Normal appearance. HENT:      Head: Normocephalic and atraumatic. Mouth/Throat:      Mouth: Mucous membranes are moist.   Eyes:      Pupils: Pupils are equal, round, and reactive to light. Cardiovascular:      Rate and Rhythm: Normal rate and regular rhythm. Pulmonary:      Effort: Pulmonary effort is normal.      Breath sounds: Normal breath sounds. Abdominal:      Tenderness: There is no abdominal tenderness. Musculoskeletal:         General: No swelling. Right lower leg: No edema. Left lower leg: No edema. Skin:     General: Skin is warm and dry. Neurological:      Mental Status: She is alert and oriented to person, place, and time.    Psychiatric:         Mood and Affect: Mood normal.         Diagnostic Study Results     Labs -     Recent Results (from the past 12 hour(s))   EKG, 12 LEAD, INITIAL    Collection Time: 05/05/21  1:40 PM   Result Value Ref Range    Ventricular Rate 80 BPM    Atrial Rate 80 BPM    P-R Interval 154 ms    QRS Duration 94 ms    Q-T Interval 386 ms    QTC Calculation (Bezet) 445 ms    Calculated P Axis 45 degrees    Calculated R Axis -11 degrees    Calculated T Axis -56 degrees    Diagnosis       Normal sinus rhythm  Moderate voltage criteria for LVH, may be normal variant  T wave abnormality, consider inferior ischemia  T wave abnormality, consider anterolateral ischemia  Abnormal ECG  When compared with ECG of 21-APR-2021 09:26,  No significant change was found     CBC WITH AUTOMATED DIFF    Collection Time: 05/05/21  2:05 PM   Result Value Ref Range    WBC 10.1 3.6 - 11.0 K/uL    RBC 3.54 (L) 3.80 - 5.20 M/uL    HGB 8.8 (L) 11.5 - 16.0 g/dL    HCT 28.1 (L) 35.0 - 47.0 %    MCV 79.4 (L) 80.0 - 99.0 FL    MCH 24.9 (L) 26.0 - 34.0 PG    MCHC 31.3 30.0 - 36.5 g/dL    RDW 19.1 (H) 11.5 - 14.5 %    PLATELET 490 965 - 575 K/uL    MPV 10.9 8.9 - 12.9 FL    NRBC 0.0 0  WBC    ABSOLUTE NRBC 0.00 0.00 - 0.01 K/uL    NEUTROPHILS 68 32 - 75 %    LYMPHOCYTES 22 12 - 49 %    MONOCYTES 7 5 - 13 %    EOSINOPHILS 2 0 - 7 %    BASOPHILS 1 0 - 1 %    IMMATURE GRANULOCYTES 0 0.0 - 0.5 %    ABS. NEUTROPHILS 6.8 1.8 - 8.0 K/UL    ABS. LYMPHOCYTES 2.2 0.8 - 3.5 K/UL    ABS. MONOCYTES 0.7 0.0 - 1.0 K/UL    ABS. EOSINOPHILS 0.2 0.0 - 0.4 K/UL    ABS. BASOPHILS 0.1 0.0 - 0.1 K/UL    ABS. IMM. GRANS. 0.0 0.00 - 0.04 K/UL    DF AUTOMATED     METABOLIC PANEL, COMPREHENSIVE    Collection Time: 05/05/21  2:05 PM   Result Value Ref Range    Sodium 136 136 - 145 mmol/L    Potassium 3.0 (L) 3.5 - 5.1 mmol/L    Chloride 96 (L) 97 - 108 mmol/L    CO2 30 21 - 32 mmol/L    Anion gap 10 5 - 15 mmol/L    Glucose 363 (H) 65 - 100 mg/dL    BUN 11 6 - 20 MG/DL    Creatinine 1.06 (H) 0.55 - 1.02 MG/DL    BUN/Creatinine ratio 10 (L) 12 - 20      GFR est AA >60 >60 ml/min/1.73m2    GFR est non-AA 52 (L) >60 ml/min/1.73m2    Calcium 9.4 8.5 - 10.1 MG/DL    Bilirubin, total 0.2 0.2 - 1.0 MG/DL    ALT (SGPT) 24 12 - 78 U/L    AST (SGOT) 17 15 - 37 U/L    Alk.  phosphatase 97 45 - 117 U/L    Protein, total 7.1 6.4 - 8.2 g/dL    Albumin 3.1 (L) 3.5 - 5.0 g/dL    Globulin 4.0 2.0 - 4.0 g/dL    A-G Ratio 0.8 (L) 1.1 - 2.2     TYPE & SCREEN    Collection Time: 05/05/21  2:05 PM   Result Value Ref Range    Crossmatch Expiration 05/08/2021,2359     ABO/Rh(D) A POSITIVE     Antibody screen NEG    TROPONIN I    Collection Time: 05/05/21  2:05 PM   Result Value Ref Range    Troponin-I, Qt. <0.05 <0.05 ng/mL   NT-PRO BNP    Collection Time: 05/05/21  2:05 PM   Result Value Ref Range    NT pro- (H) 0 - 125 PG/ML   URINALYSIS W/ RFLX MICROSCOPIC    Collection Time: 05/05/21  3:02 PM   Result Value Ref Range    Color YELLOW/STRAW      Appearance CLEAR CLEAR      Specific gravity 1.015 1.003 - 1.030      pH (UA) 6.5 5.0 - 8.0      Protein Negative NEG mg/dL    Glucose 250 (A) NEG mg/dL    Ketone TRACE (A) NEG mg/dL    Bilirubin Negative NEG      Blood Negative NEG      Urobilinogen 0.2 0.2 - 1.0 EU/dL    Nitrites Negative NEG      Leukocyte Esterase Negative NEG     EKG, 12 LEAD, INITIAL    Collection Time: 05/05/21  4:03 PM   Result Value Ref Range    Ventricular Rate 65 BPM    Atrial Rate 65 BPM    P-R Interval 152 ms    QRS Duration 92 ms    Q-T Interval 414 ms    QTC Calculation (Bezet) 430 ms    Calculated P Axis 67 degrees    Calculated R Axis -12 degrees    Calculated T Axis -69 degrees    Diagnosis       Normal sinus rhythm  Left ventricular hypertrophy with repolarization abnormality  Abnormal ECG  When compared with ECG of 05-MAY-2021 13:40,  MANUAL COMPARISON REQUIRED, DATA IS UNCONFIRMED     TROPONIN I    Collection Time: 05/05/21  5:17 PM   Result Value Ref Range    Troponin-I, Qt. <0.05 <0.05 ng/mL       Radiologic Studies -   US ABD LTD   Final Result   Hepatic steatosis. No acute abnormality. XR CHEST SNGL V   Final Result   No acute process identified. CT Results  (Last 48 hours)    None        CXR Results  (Last 48 hours)               05/05/21 1425  XR CHEST SNGL V Final result    Impression:  No acute process identified. Narrative:  Clinical history: shortness of breath   INDICATION:   shortness of breath   COMPARISON: 4/21/2021       FINDINGS:   AP portable upright view of the chest demonstrates a stable  cardiopericardial   silhouette. There is no pleural effusion. .There is no focal consolidation. .Status   post ACDF. Patient is on a cardiac monitor. EKG interpretation: (Preliminary)  Rhythm: Sinus, no ST elevation, narrow QRS  This EKG was interpreted by ED Provider Soraya Guevara MD    Medical Decision Making   I am the first provider for this patient. I reviewed the vital signs, available nursing notes, past medical history, past surgical history, family history and social history.     Records Reviewed: Nursing Notes, old medical records    Provider Notes (Medical Decision Making): MDM: 51-year-old female with persistent shortness of breath despite transfusion 2 weeks ago. Has outpatient follow-up with general surgery already scheduled. Reports she is continuing to have black stools but is also taking iron tablets. We will plan for lab work now including troponin and chest x-ray. Unclear disposition pending results of testing. Vital signs stable at the moment. Initial assessment performed. The patients presenting problems have been discussed, and they are in agreement with the care plan formulated and outlined with them. I have encouraged them to ask questions as they arise throughout their visit. PROGRESS NOTE:    Discussion with patient's primary care doctor. Patient has had a history of difficult to diagnose pain that required multiple ER visits. This is likely contributing to patient's 's frustration with work-up. Today, patient's hemoglobin is 8.8. This likely represents a slow decline from 2 weeks ago when patient was discharged. Troponins are serially negative. Chest x-ray and ultrasound are normal.  Patient appears comfortable in bed with no dyspnea or active pain. Patient needs to follow-up with GI, high suspicion for bleeding duodenal ulcer or alternate gastric etiology not diagnosed on initial endoscopy. We discussed reasons to return to the emergency department. Patient already has follow-up with PCP this week. Discharge note:  Pt re-evaluated and noted to be feeling better, ready for discharge. Updated pt on all final results. Will follow up as instructed. All questions have been answered, pt voiced understanding and agreement with plan. Specific return precautions provided as well as instructions to return to the ED should sx worsen at any time. Vital signs stable for discharge. Diagnosis     Clinical Impression:   1.  SOB (shortness of breath)            Disposition:  Discharged    Current Discharge Medication List            Please note, this dictation was completed with [employer] voice recognition software. Quite often unanticipated grammatical, syntax, homophones, and other interpretive errors are inadvertently transcribed by the computer software. Please disregard these errors. Please excuse any errors that have escaped final proof reading.

## 2021-05-05 NOTE — ED TRIAGE NOTES
Patient presents to ED with a complaint of SOB that started this morning. Per the patient she states she had a blood transfusion last week for her SOB and anemia. Patient complains of chills, denies fevers.

## 2021-05-10 ENCOUNTER — OFFICE VISIT (OUTPATIENT)
Dept: FAMILY MEDICINE CLINIC | Age: 66
End: 2021-05-10
Payer: MEDICARE

## 2021-05-10 VITALS
DIASTOLIC BLOOD PRESSURE: 72 MMHG | SYSTOLIC BLOOD PRESSURE: 139 MMHG | HEART RATE: 87 BPM | OXYGEN SATURATION: 100 % | BODY MASS INDEX: 26.34 KG/M2 | TEMPERATURE: 97.1 F | RESPIRATION RATE: 17 BRPM | WEIGHT: 173.8 LBS | HEIGHT: 68 IN

## 2021-05-10 DIAGNOSIS — R10.84 GENERALIZED ABDOMINAL PAIN: Primary | ICD-10-CM

## 2021-05-10 DIAGNOSIS — F51.01 PRIMARY INSOMNIA: ICD-10-CM

## 2021-05-10 PROCEDURE — G8754 DIAS BP LESS 90: HCPCS | Performed by: INTERNAL MEDICINE

## 2021-05-10 PROCEDURE — 3017F COLORECTAL CA SCREEN DOC REV: CPT | Performed by: INTERNAL MEDICINE

## 2021-05-10 PROCEDURE — G8432 DEP SCR NOT DOC, RNG: HCPCS | Performed by: INTERNAL MEDICINE

## 2021-05-10 PROCEDURE — G8417 CALC BMI ABV UP PARAM F/U: HCPCS | Performed by: INTERNAL MEDICINE

## 2021-05-10 PROCEDURE — G9899 SCRN MAM PERF RSLTS DOC: HCPCS | Performed by: INTERNAL MEDICINE

## 2021-05-10 PROCEDURE — G8752 SYS BP LESS 140: HCPCS | Performed by: INTERNAL MEDICINE

## 2021-05-10 PROCEDURE — 99214 OFFICE O/P EST MOD 30 MIN: CPT | Performed by: INTERNAL MEDICINE

## 2021-05-10 PROCEDURE — G8427 DOCREV CUR MEDS BY ELIG CLIN: HCPCS | Performed by: INTERNAL MEDICINE

## 2021-05-10 PROCEDURE — 1111F DSCHRG MED/CURRENT MED MERGE: CPT | Performed by: INTERNAL MEDICINE

## 2021-05-10 RX ORDER — MAG HYDROX/ALUMINUM HYD/SIMETH 200-200-20
30 SUSPENSION, ORAL (FINAL DOSE FORM) ORAL AS NEEDED
COMMUNITY
Start: 2021-05-06 | End: 2021-06-07

## 2021-05-10 RX ORDER — HYDROCODONE BITARTRATE AND ACETAMINOPHEN 5; 325 MG/1; MG/1
1 TABLET ORAL
COMMUNITY
Start: 2021-05-06 | End: 2021-11-08

## 2021-05-10 RX ORDER — LIDOCAINE HYDROCHLORIDE 20 MG/ML
10 SOLUTION OROPHARYNGEAL AS NEEDED
COMMUNITY
Start: 2021-05-06 | End: 2021-05-16

## 2021-05-10 RX ORDER — AMITRIPTYLINE HYDROCHLORIDE 25 MG/1
25 TABLET, FILM COATED ORAL
Qty: 30 TAB | Refills: 4 | Status: SHIPPED | OUTPATIENT
Start: 2021-05-10 | End: 2022-05-29 | Stop reason: ALTCHOICE

## 2021-05-10 NOTE — PROGRESS NOTES
Kiera Clark is a 63 y.o. female presenting for/with:    Abdominal Pain (States pain starts under (R) chest/breast area. followed by pain down her ABD around her back. (+) nausea at times. Denies vomiting. (+) consitipation. States has increased eating strawberries), Other [facility] ER visit followed by  [facility] ), and High Blood Sugar (Reports >300 daily glucose - today 341)      Visit Vitals  /72 (BP 1 Location: Left upper arm, BP Patient Position: Sitting, BP Cuff Size: Adult long)   Pulse 87   Temp 97.1 °F (36.2 °C) (Oral)   Resp 17   Ht 5' 8\" (1.727 m)   Wt 173 lb 12.8 oz (78.8 kg)   SpO2 100%   BMI 26.43 kg/m²     Pain Scale: 7/10  Pain Location: Abdomen    1. Have you been to the ER, urgent care clinic since your last visit? Hospitalized since your last visit? YES    2. Have you seen or consulted any other health care providers outside of the [address] [place] [address] since your last visit? Include any pap smears or colon screening.  NO    Symptom review:  NO  Fever   NO  Shaking chills  NO  Cough  NO  Body aches  NO  Coughing up blood  NO  Chest congestion  NO  Chest pain  NO  Shortness of breath  NO  Profound Loss of smell/taste  YES  Nausea/Vomiting   YES  Loose stool/Diarrhea  NO  any skin issues    Patient Risk Factors Reviewed as follows:  NO  have you been in Close contact with confirmed COVID19 patient   NO  History of recent travel to affected geographical areas within the past 14 days  NO  COPD  NO  Active Cancer/Leukemia/Lymphoma/Chemotherapy  NO  Oral steroid use  NO  Pregnant  YES  Diabetes Mellitus  YES  Heart disease  YES  Asthma  NO Health care worker at home  [address] care worker  NO Is there a Pregnant Woman in the home  NO Dialysis pt in the home   NO a large number of people living in the home    Learning Assessment 4/27/2021   PRIMARY LEARNER Patient   PRIMARY LANGUAGE ENGLISH   LEARNER PREFERENCE PRIMARY READING     -   ANSWERED BY patient   RELATIONSHIP SELF     Fall Risk Assessment, last 12 mths 5/10/2021   Able to walk? Yes   Fall in past 12 months? 0   Do you feel unsteady? 0   Are you worried about falling 0   Number of falls in past 12 months -   Fall with injury? -       3 most recent PHQ Screens 5/10/2021   Little interest or pleasure in doing things Nearly every day   Feeling down, depressed, irritable, or hopeless More than half the days   Total Score PHQ 2 5   Trouble falling or staying asleep, or sleeping too much Nearly every day   Feeling tired or having little energy Nearly every day   Poor appetite, weight loss, or overeating Nearly every day   Feeling bad about yourself - or that you are a failure or have let yourself or your family down Not at all   Trouble concentrating on things such as school, work, reading, or watching TV Not at all   Moving or speaking so slowly that other people could have noticed; or the opposite being so fidgety that others notice More than half the days   How difficult have these problems made it for you to do your work, take care of your home and get along with others Somewhat difficult     Abuse Screening Questionnaire 5/10/2021   Do you ever feel afraid of your partner? N   Are you in a relationship with someone who physically or mentally threatens you? N   Is it safe for you to go home?  Y       ADL Assessment 5/10/2021   Feeding yourself No Help Needed   Getting from bed to chair No Help Needed   Getting dressed No Help Needed   Bathing or showering No Help Needed   Walk across the room (includes cane/walker) No Help Needed   Using the telphone No Help Needed   Taking your medications No Help Needed   Preparing meals No Help Needed   Managing money (expenses/bills) No Help Needed   Moderately strenuous housework (laundry) No Help Needed   Shopping for personal items (toiletries/medicines) No Help Needed   Shopping for groceries No Help Needed   Driving No Help Needed   Climbing a flight of stairs No Help Needed   Getting to places beyond walking distances No Help Needed      Verified no advance directive on file.  Verified emergency contacts

## 2021-05-10 NOTE — PROGRESS NOTES
Ms. Yash Cavanaugh is a 63 y.o. female who is here for evaluation of   Chief Complaint   Patient presents with    Abdominal Pain     States pain starts under (R) chest/breast area. followed by pain down her ABD around her back. (+) nausea at times. Denies vomiting. (+) consitipation. States has increased eating strawberries    Other     [facility] ER visit followed by  [facility] Blood Sugar     Reports >300 daily glucose - today 341   . ASSESSMENT AND PLAN:  Image and adjust Lantus, RTC after GI eval (10 days). 1. Generalized abdominal pain  Etiology is unclear. Discontinue Iron and assess symptoms. CT imaging of the abdomen and pelvis (this has not been done for some time). DDx includes mesenteric ischemia, abscess, diverticulitis, pain due to iron ingestion, duodenal ulcer, and others. She has GI follow up in [place] in 10 days and may need repeat EGD and possibly pillcam.  - CBC WITH AUTOMATED DIFF; Future  - METABOLIC PANEL, COMPREHENSIVE; Future  - METABOLIC PANEL, COMPREHENSIVE  - CBC WITH AUTOMATED DIFF  - CT ABD PELV W WO CONT; Future    2. Primary insomnia  Trial of Amitriptyline. - amitriptyline (ELAVIL) 25 mg tablet; Take 1 Tab by mouth nightly. Dispense: 30 Tab; Refill: 4    3. T2DM:  Increase Lantus to 18 units a day      Orders Placed This Encounter    CT ABD PELV W WO CONT     [facility]     Standing Status:   Future     Standing Expiration Date:   6/10/2022     Scheduling Instructions:      [facility]     Order Specific Question:   STAT Creatinine as indicated     Answer:   Yes     Order Specific Question: This order utilizes IV contrast.  What additional contrast is needed?      Answer:   Per Radiologist Protocol    CBC WITH AUTOMATED DIFF     Standing Status:   Future     Number of Occurrences:   1     Standing Expiration Date:   2/68/4660    METABOLIC PANEL, COMPREHENSIVE     Standing Status:   Future     Number of Occurrences:   1     Standing Expiration Date:   5/24/2021   Talia Simms HYDROcodone-acetaminophen (NORCO) 5-325 mg per tablet     Sig: Take 1 Tab by mouth every six (6) hours as needed.  lidocaine (XYLOCAINE) 2 % solution     Sig: Take 10 mL by mouth as needed.  alum-mag hydroxide-simeth (MYLANTA) 200-200-20 mg/5 mL susp     Sig: Take 30 mL by mouth as needed.  amitriptyline (ELAVIL) 25 mg tablet     Sig: Take 1 Tab by mouth nightly. Dispense:  30 Tab     Refill:  4           HPI  62 yo with recent ER visits for chest and abdominal pain. Has not had CT of the Abdomen however. Pain in the RLQ and RUQ today. RUQ US negative last week. Currently taking iron for anemia--had black stools before taking iron. EGD ([facility]) was negative for source. Lantus:  15 units a day. FS this am > 300. No hypoglycemic sx. Appetite is poor. Not sleeping well. ROS:  Denies fever, chills, cough, chest pain, SOB,  nausea, vomiting, or diarrhea. Denies wt loss, wt gain, hemoptysis, hematochezia or melena. Physical Examination:    Visit Vitals  /72 (BP 1 Location: Left upper arm, BP Patient Position: Sitting, BP Cuff Size: Adult long)   Pulse 87   Temp 97.1 °F (36.2 °C) (Oral)   Resp 17   Ht 5' 8\" (1.727 m)   Wt 173 lb 12.8 oz (78.8 kg)   SpO2 100%   BMI 26.43 kg/m²      General:  Alert, cooperative, no distress. Head:  Normocephalic, without obvious abnormality, atraumatic. Eyes:  Conjunctivae/corneas clear. Pupils equal, round, reactive to light. Extraocular movements intact. Lungs:   Clear to auscultation bilaterally. Chest wall:  No tenderness or deformity. Cardiac:  RRR   Abdomen:   Soft, non-tender. Bowel sounds normal. No masses. No organomegaly. Extremities: Extremities normal, atraumatic, no cyanosis or edema. Pulses: 2+ and symmetric all extremities. Skin: Skin color, texture, turgor normal. No rashes or lesions. Lymph nodes: Cervical, supraclavicular, and axillary nodes normal.   Neurologic: CNII-XII intact.  Normal strength, sensation, and reflexes throughout. On this date 05/10/2021 I have spent 25 minutes reviewing previous notes, test results and face to face with the patient discussing the diagnosis and importance of compliance with the treatment plan as well as documenting on the day of the visit.     Sherry Mohan MD FACP    (signed electronically) on 5/10/2021 at 8:44 AM

## 2021-05-11 LAB
ALBUMIN SERPL-MCNC: 3.4 G/DL (ref 3.5–5)
ALBUMIN/GLOB SERPL: 0.9 {RATIO} (ref 1.1–2.2)
ALP SERPL-CCNC: 100 U/L (ref 45–117)
ALT SERPL-CCNC: 33 U/L (ref 12–78)
ANION GAP SERPL CALC-SCNC: 6 MMOL/L (ref 5–15)
AST SERPL-CCNC: 21 U/L (ref 15–37)
BASOPHILS # BLD: 0.1 K/UL (ref 0–0.1)
BASOPHILS NFR BLD: 1 % (ref 0–1)
BILIRUB SERPL-MCNC: 0.2 MG/DL (ref 0.2–1)
BUN SERPL-MCNC: 7 MG/DL (ref 6–20)
BUN/CREAT SERPL: 8 (ref 12–20)
CALCIUM SERPL-MCNC: 9.7 MG/DL (ref 8.5–10.1)
CHLORIDE SERPL-SCNC: 98 MMOL/L (ref 97–108)
CO2 SERPL-SCNC: 30 MMOL/L (ref 21–32)
CREAT SERPL-MCNC: 0.83 MG/DL (ref 0.55–1.02)
DIFFERENTIAL METHOD BLD: ABNORMAL
EOSINOPHIL # BLD: 0.4 K/UL (ref 0–0.4)
EOSINOPHIL NFR BLD: 4 % (ref 0–7)
ERYTHROCYTE [DISTWIDTH] IN BLOOD BY AUTOMATED COUNT: 19.3 % (ref 11.5–14.5)
GLOBULIN SER CALC-MCNC: 3.7 G/DL (ref 2–4)
GLUCOSE SERPL-MCNC: 328 MG/DL (ref 65–100)
HCT VFR BLD AUTO: 30.2 % (ref 35–47)
HGB BLD-MCNC: 8.9 G/DL (ref 11.5–16)
IMM GRANULOCYTES # BLD AUTO: 0.1 K/UL (ref 0–0.04)
IMM GRANULOCYTES NFR BLD AUTO: 1 % (ref 0–0.5)
LYMPHOCYTES # BLD: 2.3 K/UL (ref 0.8–3.5)
LYMPHOCYTES NFR BLD: 26 % (ref 12–49)
MCH RBC QN AUTO: 24.5 PG (ref 26–34)
MCHC RBC AUTO-ENTMCNC: 29.5 G/DL (ref 30–36.5)
MCV RBC AUTO: 83.2 FL (ref 80–99)
MONOCYTES # BLD: 0.5 K/UL (ref 0–1)
MONOCYTES NFR BLD: 6 % (ref 5–13)
NEUTS SEG # BLD: 5.3 K/UL (ref 1.8–8)
NEUTS SEG NFR BLD: 62 % (ref 32–75)
NRBC # BLD: 0 K/UL (ref 0–0.01)
NRBC BLD-RTO: 0 PER 100 WBC
PLATELET # BLD AUTO: 485 K/UL (ref 150–400)
PMV BLD AUTO: 11.8 FL (ref 8.9–12.9)
POTASSIUM SERPL-SCNC: 3.9 MMOL/L (ref 3.5–5.1)
PROT SERPL-MCNC: 7.1 G/DL (ref 6.4–8.2)
RBC # BLD AUTO: 3.63 M/UL (ref 3.8–5.2)
SODIUM SERPL-SCNC: 134 MMOL/L (ref 136–145)
WBC # BLD AUTO: 8.6 K/UL (ref 3.6–11)

## 2021-05-14 ENCOUNTER — HOSPITAL ENCOUNTER (OUTPATIENT)
Dept: CT IMAGING | Age: 66
Discharge: HOME | End: 2021-05-14
Attending: INTERNAL MEDICINE
Payer: MEDICARE

## 2021-05-14 DIAGNOSIS — R10.84 GENERALIZED ABDOMINAL PAIN: ICD-10-CM

## 2021-05-14 PROCEDURE — 74011000636 HC RX REV CODE- 636: Performed by: INTERNAL MEDICINE

## 2021-05-14 PROCEDURE — 74176 CT ABD & PELVIS W/O CONTRAST: CPT

## 2021-05-14 RX ADMIN — IOHEXOL 50 ML: 240 INJECTION, SOLUTION INTRATHECAL; INTRAVASCULAR; INTRAVENOUS; ORAL at 07:08

## 2021-05-18 NOTE — TELEPHONE ENCOUNTER
Received call from Nicole with [facility] which is a tele-health option checking in with patient after recent ER visits. Nicole states patient has been using her albuterol inhaler over 10 times daily. Once this nurse pulled up Ms Prudy Claude naren lynn patient reports not picking up her advair on a regular basis.  Per PTs request new refill request sent to PCP to be filled at [facility]

## 2021-05-19 RX ORDER — FLUTICASONE PROPIONATE AND SALMETEROL 250; 50 UG/1; UG/1
1 POWDER RESPIRATORY (INHALATION) EVERY 12 HOURS
Qty: 1 INHALER | Refills: 11 | Status: SHIPPED | OUTPATIENT
Start: 2021-05-19

## 2021-05-20 ENCOUNTER — PATIENT OUTREACH (OUTPATIENT)
Dept: CASE MANAGEMENT | Age: 66
End: 2021-05-20

## 2021-05-25 LAB
ATRIAL RATE: 65 BPM
ATRIAL RATE: 80 BPM
CALCULATED P AXIS, ECG09: 45 DEGREES
CALCULATED P AXIS, ECG09: 67 DEGREES
CALCULATED R AXIS, ECG10: -11 DEGREES
CALCULATED R AXIS, ECG10: -12 DEGREES
CALCULATED T AXIS, ECG11: -56 DEGREES
CALCULATED T AXIS, ECG11: -69 DEGREES
DIAGNOSIS, 93000: NORMAL
DIAGNOSIS, 93000: NORMAL
P-R INTERVAL, ECG05: 152 MS
P-R INTERVAL, ECG05: 154 MS
Q-T INTERVAL, ECG07: 386 MS
Q-T INTERVAL, ECG07: 414 MS
QRS DURATION, ECG06: 92 MS
QRS DURATION, ECG06: 94 MS
QTC CALCULATION (BEZET), ECG08: 430 MS
QTC CALCULATION (BEZET), ECG08: 445 MS
VENTRICULAR RATE, ECG03: 65 BPM
VENTRICULAR RATE, ECG03: 80 BPM

## 2021-05-25 NOTE — PROGRESS NOTES
5/25/2021  4:47 PM      Ambulatory Care Management Note      This patient was received as a referral from [facility]. Ambulatory Care Manager outreached to patient today to offer care management services. Introduction to self and role of care manager provided. Patient accepted care management services at this time. Follow up call scheduled at this time. Patient has Ambulatory Care Manager's contact number for for any questions or concerns.

## 2021-05-28 ENCOUNTER — OFFICE VISIT (OUTPATIENT)
Dept: FAMILY MEDICINE CLINIC | Age: 66
End: 2021-05-28
Payer: MEDICARE

## 2021-05-28 VITALS
OXYGEN SATURATION: 97 % | SYSTOLIC BLOOD PRESSURE: 138 MMHG | TEMPERATURE: 97.3 F | HEIGHT: 68 IN | RESPIRATION RATE: 18 BRPM | BODY MASS INDEX: 25.61 KG/M2 | DIASTOLIC BLOOD PRESSURE: 74 MMHG | WEIGHT: 169 LBS | HEART RATE: 109 BPM

## 2021-05-28 DIAGNOSIS — D64.9 ANEMIA, UNSPECIFIED TYPE: ICD-10-CM

## 2021-05-28 DIAGNOSIS — R10.84 GENERALIZED ABDOMINAL PAIN: Primary | ICD-10-CM

## 2021-05-28 LAB
ALBUMIN SERPL-MCNC: 3.5 G/DL (ref 3.5–5)
ALBUMIN/GLOB SERPL: 1 {RATIO} (ref 1.1–2.2)
ALP SERPL-CCNC: 122 U/L (ref 45–117)
ALT SERPL-CCNC: 20 U/L (ref 12–78)
ANION GAP SERPL CALC-SCNC: 7 MMOL/L (ref 5–15)
AST SERPL-CCNC: 14 U/L (ref 15–37)
BASOPHILS # BLD: 0.1 K/UL (ref 0–0.1)
BASOPHILS NFR BLD: 1 % (ref 0–1)
BILIRUB SERPL-MCNC: 0.2 MG/DL (ref 0.2–1)
BUN SERPL-MCNC: 11 MG/DL (ref 6–20)
BUN/CREAT SERPL: 14 (ref 12–20)
CALCIUM SERPL-MCNC: 9.6 MG/DL (ref 8.5–10.1)
CHLORIDE SERPL-SCNC: 97 MMOL/L (ref 97–108)
CO2 SERPL-SCNC: 30 MMOL/L (ref 21–32)
CREAT SERPL-MCNC: 0.81 MG/DL (ref 0.55–1.02)
DIFFERENTIAL METHOD BLD: ABNORMAL
EOSINOPHIL # BLD: 0.4 K/UL (ref 0–0.4)
EOSINOPHIL NFR BLD: 4 % (ref 0–7)
ERYTHROCYTE [DISTWIDTH] IN BLOOD BY AUTOMATED COUNT: 18.1 % (ref 11.5–14.5)
FERRITIN SERPL-MCNC: 13 NG/ML (ref 26–388)
GLOBULIN SER CALC-MCNC: 3.6 G/DL (ref 2–4)
GLUCOSE SERPL-MCNC: 294 MG/DL (ref 65–100)
HCT VFR BLD AUTO: 25.9 % (ref 35–47)
HGB BLD-MCNC: 7.3 G/DL (ref 11.5–16)
IMM GRANULOCYTES # BLD AUTO: 0 K/UL (ref 0–0.04)
IMM GRANULOCYTES NFR BLD AUTO: 0 % (ref 0–0.5)
LYMPHOCYTES # BLD: 2 K/UL (ref 0.8–3.5)
LYMPHOCYTES NFR BLD: 19 % (ref 12–49)
MCH RBC QN AUTO: 22 PG (ref 26–34)
MCHC RBC AUTO-ENTMCNC: 28.2 G/DL (ref 30–36.5)
MCV RBC AUTO: 78 FL (ref 80–99)
MONOCYTES # BLD: 0.6 K/UL (ref 0–1)
MONOCYTES NFR BLD: 6 % (ref 5–13)
NEUTS SEG # BLD: 7.3 K/UL (ref 1.8–8)
NEUTS SEG NFR BLD: 70 % (ref 32–75)
NRBC # BLD: 0 K/UL (ref 0–0.01)
NRBC BLD-RTO: 0 PER 100 WBC
PLATELET # BLD AUTO: 235 K/UL (ref 150–400)
PMV BLD AUTO: 11.6 FL (ref 8.9–12.9)
POTASSIUM SERPL-SCNC: 3.4 MMOL/L (ref 3.5–5.1)
PROT SERPL-MCNC: 7.1 G/DL (ref 6.4–8.2)
RBC # BLD AUTO: 3.32 M/UL (ref 3.8–5.2)
RBC MORPH BLD: ABNORMAL
SODIUM SERPL-SCNC: 134 MMOL/L (ref 136–145)
WBC # BLD AUTO: 10.4 K/UL (ref 3.6–11)

## 2021-05-28 PROCEDURE — G8427 DOCREV CUR MEDS BY ELIG CLIN: HCPCS | Performed by: INTERNAL MEDICINE

## 2021-05-28 PROCEDURE — G9899 SCRN MAM PERF RSLTS DOC: HCPCS | Performed by: INTERNAL MEDICINE

## 2021-05-28 PROCEDURE — G8752 SYS BP LESS 140: HCPCS | Performed by: INTERNAL MEDICINE

## 2021-05-28 PROCEDURE — G8432 DEP SCR NOT DOC, RNG: HCPCS | Performed by: INTERNAL MEDICINE

## 2021-05-28 PROCEDURE — 3017F COLORECTAL CA SCREEN DOC REV: CPT | Performed by: INTERNAL MEDICINE

## 2021-05-28 PROCEDURE — G8754 DIAS BP LESS 90: HCPCS | Performed by: INTERNAL MEDICINE

## 2021-05-28 PROCEDURE — 99214 OFFICE O/P EST MOD 30 MIN: CPT | Performed by: INTERNAL MEDICINE

## 2021-05-28 PROCEDURE — G8417 CALC BMI ABV UP PARAM F/U: HCPCS | Performed by: INTERNAL MEDICINE

## 2021-05-28 RX ORDER — DICYCLOMINE HYDROCHLORIDE 10 MG/1
10 CAPSULE ORAL
COMMUNITY
Start: 2021-05-20 | End: 2021-07-19

## 2021-05-28 NOTE — PROGRESS NOTES
Garret Voss is a 63 y.o. female presenting for/with:    Chief Complaint   Patient presents with    Irregular Heart Beat     During walking to room PT with SOB with ambulation. HR elevated to 167 O2 100% RR 29    Breathing Problem       Visit Vitals  /74 (BP 1 Location: Left upper arm, BP Patient Position: Sitting, BP Cuff Size: Adult long)   Pulse (!) 109   Temp 97.3 °F (36.3 °C) (Temporal)   Resp 18   Ht 5' 8\" (1.727 m)   Wt 169 lb (76.7 kg)   SpO2 97%   BMI 25.70 kg/m²     Pain Scale: 0 - No pain/10  Pain Location:     1. Have you been to the ER, urgent care clinic since your last visit? Hospitalized since your last visit? NO    2. Have you seen or consulted any other health care providers outside of the [address] [place] [address] since your last visit? Include any pap smears or colon screening. NO    Symptom review:  NO  Fever   NO  Shaking chills  NO  Cough  NO  Body aches  NO  Coughing up blood  NO  Chest congestion  NO  Chest pain  YES  Shortness of breath  NO  Profound Loss of smell/taste  NO  Nausea/Vomiting   NO  Loose stool/Diarrhea  NO  any skin issues    Patient Risk Factors Reviewed as follows:  NO  have you been in Close contact with confirmed COVID19 patient   NO  History of recent travel to affected geographical areas within the past 14 days  NO  COPD  NO  Active Cancer/Leukemia/Lymphoma/Chemotherapy  NO  Oral steroid use  NO  Pregnant  YES  Diabetes Mellitus  YES  Heart disease  NO  Asthma  NO Health care worker at home  [address] care worker  NO Is there a Pregnant Woman in the home  NO Dialysis pt in the home   NO a large number of people living in the home    Learning Assessment 4/27/2021   PRIMARY LEARNER Patient   PRIMARY LANGUAGE ENGLISH   LEARNER PREFERENCE PRIMARY READING     -   ANSWERED BY patient   RELATIONSHIP SELF     Fall Risk Assessment, last 12 mths 5/10/2021   Able to walk? Yes   Fall in past 12 months? 0   Do you feel unsteady?  0   Are you worried about falling 0 Number of falls in past 12 months -   Fall with injury? -       3 most recent PHQ Screens 5/10/2021   Little interest or pleasure in doing things Nearly every day   Feeling down, depressed, irritable, or hopeless More than half the days   Total Score PHQ 2 5   Trouble falling or staying asleep, or sleeping too much Nearly every day   Feeling tired or having little energy Nearly every day   Poor appetite, weight loss, or overeating Nearly every day   Feeling bad about yourself - or that you are a failure or have let yourself or your family down Not at all   Trouble concentrating on things such as school, work, reading, or watching TV Not at all   Moving or speaking so slowly that other people could have noticed; or the opposite being so fidgety that others notice More than half the days   How difficult have these problems made it for you to do your work, take care of your home and get along with others Somewhat difficult     Abuse Screening Questionnaire 5/10/2021   Do you ever feel afraid of your partner? N   Are you in a relationship with someone who physically or mentally threatens you? N   Is it safe for you to go home?  Y       ADL Assessment 5/10/2021   Feeding yourself No Help Needed   Getting from bed to chair No Help Needed   Getting dressed No Help Needed   Bathing or showering No Help Needed   Walk across the room (includes cane/walker) No Help Needed   Using the telphone No Help Needed   Taking your medications No Help Needed   Preparing meals No Help Needed   Managing money (expenses/bills) No Help Needed   Moderately strenuous housework (laundry) No Help Needed   Shopping for personal items (toiletries/medicines) No Help Needed   Shopping for groceries No Help Needed   Driving No Help Needed   Climbing a flight of stairs No Help Needed   Getting to places beyond walking distances No Help Needed

## 2021-05-28 NOTE — PROGRESS NOTES
Ms. Maryjane Pendleton is a 63 y.o. female who is here for evaluation of   Chief Complaint   Patient presents with    Irregular Heart Beat     During walking to room PT with SOB with ambulation. HR elevated to 167 O2 100% RR 29    Breathing Problem   . ASSESSMENT AND PLAN:    1. Generalized abdominal pain  Although she has had EGD recently, no source was found. May need repeat EGD and possibly pillcam.    - CBC WITH AUTOMATED DIFF; Future  - METABOLIC PANEL, COMPREHENSIVE; Future  - REFERRAL TO GASTROENTEROLOGY; Future  - METABOLIC PANEL, COMPREHENSIVE  - CBC WITH AUTOMATED DIFF    2. Anemia, unspecified type  - CBC WITH AUTOMATED DIFF; Future  - FERRITIN; Future  - REFERRAL TO GASTROENTEROLOGY; Future  - FERRITIN  - CBC WITH AUTOMATED DIFF      Orders Placed This Encounter    CBC WITH AUTOMATED DIFF     Standing Status:   Future     Number of Occurrences:   1     Standing Expiration Date:   6/23/2021    FERRITIN     Standing Status:   Future     Number of Occurrences:   1     Standing Expiration Date:   7/58/5978    METABOLIC PANEL, COMPREHENSIVE     Standing Status:   Future     Number of Occurrences:   1     Standing Expiration Date:   6/23/2021    REFERRAL TO GASTROENTEROLOGY     Standing Status:   Future     Standing Expiration Date:   6/28/2021     Referral Priority:   Routine     Referral Type:   Consultation     Referral Reason:   Specialty Services Required     Referred to Provider:   Carmel Umaña MD     Number of Visits Requested:   1    dicyclomine (BENTYL) 10 mg capsule     Sig: Take 10 mg by mouth three (3) times daily as needed. HPI  Still passing dark stools and is anemic.  BECKER. EGD with Dr Tash Lozada was negative for source. Recently had 2 units PRBCs. CT abdomen and US were unrevealing. She has lost 5 pounds. DM:  FS > 280 this am on 18 units.   Sleep:  AMitriptyline helps but she is drowsy in the am.     ROS:  Denies fever, chills, cough, chest pain,  nausea, vomiting, or diarrhea. Denies wt loss,  hemoptysis, hematochezia or melena. Physical Examination:    Visit Vitals  /74 (BP 1 Location: Left upper arm, BP Patient Position: Sitting, BP Cuff Size: Adult long)   Pulse (!) 109   Temp 97.3 °F (36.3 °C) (Temporal)   Resp 18   Ht 5' 8\" (1.727 m)   Wt 169 lb (76.7 kg)   SpO2 97%   BMI 25.70 kg/m²      General:  Alert, cooperative, no distress. Head:  Normocephalic, without obvious abnormality, atraumatic. Eyes:  Conjunctivae/corneas clear. Pupils equal, round, reactive to light. Extraocular movements intact. Lungs:   Clear to auscultation bilaterally. Chest wall:  No tenderness or deformity. Cardiac:  RRR   Abdomen:   Soft, non-tender. Bowel sounds normal. No masses. No organomegaly. Extremities: Extremities normal, atraumatic, no cyanosis or edema. Pulses: 2+ and symmetric all extremities. Skin: Skin color, texture, turgor normal. No rashes or lesions. Lymph nodes: Cervical, supraclavicular, and axillary nodes normal.   Neurologic: CNII-XII intact. Normal strength, sensation, and reflexes throughout. On this date 05/28/2021 I have spent 30 minutes reviewing previous notes, test results and face to face with the patient discussing the diagnosis and importance of compliance with the treatment plan as well as documenting on the day of the visit.     Tobin Mcleod MD FACP    (signed electronically) on 5/28/2021 at 9:08 AM

## 2021-05-30 ENCOUNTER — HOSPITAL ENCOUNTER (INPATIENT)
Age: 66
LOS: 3 days | Discharge: HOME | DRG: 378 | End: 2021-06-02
Attending: EMERGENCY MEDICINE | Admitting: INTERNAL MEDICINE
Payer: MEDICARE

## 2021-05-30 ENCOUNTER — APPOINTMENT (OUTPATIENT)
Dept: CT IMAGING | Age: 66
DRG: 378 | End: 2021-05-30
Attending: EMERGENCY MEDICINE
Payer: MEDICARE

## 2021-05-30 ENCOUNTER — APPOINTMENT (OUTPATIENT)
Dept: GENERAL RADIOLOGY | Age: 66
DRG: 378 | End: 2021-05-30
Attending: EMERGENCY MEDICINE
Payer: MEDICARE

## 2021-05-30 DIAGNOSIS — K92.2 GASTROINTESTINAL HEMORRHAGE, UNSPECIFIED GASTROINTESTINAL HEMORRHAGE TYPE: Primary | ICD-10-CM

## 2021-05-30 DIAGNOSIS — D64.9 ANEMIA, UNSPECIFIED TYPE: ICD-10-CM

## 2021-05-30 LAB
ALBUMIN SERPL-MCNC: 3.1 G/DL (ref 3.5–5)
ALBUMIN/GLOB SERPL: 0.7 {RATIO} (ref 1.1–2.2)
ALP SERPL-CCNC: 112 U/L (ref 45–117)
ALT SERPL-CCNC: 25 U/L (ref 12–78)
ANION GAP SERPL CALC-SCNC: 6 MMOL/L (ref 5–15)
AST SERPL-CCNC: 36 U/L (ref 15–37)
BASOPHILS # BLD: 0 K/UL (ref 0–0.1)
BASOPHILS NFR BLD: 0 % (ref 0–1)
BILIRUB SERPL-MCNC: 0.2 MG/DL (ref 0.2–1)
BUN SERPL-MCNC: 12 MG/DL (ref 6–20)
BUN/CREAT SERPL: 13 (ref 12–20)
CALCIUM SERPL-MCNC: 9.3 MG/DL (ref 8.5–10.1)
CHLORIDE SERPL-SCNC: 95 MMOL/L (ref 97–108)
CK SERPL-CCNC: 117 U/L (ref 26–192)
CO2 SERPL-SCNC: 30 MMOL/L (ref 21–32)
CREAT SERPL-MCNC: 0.96 MG/DL (ref 0.55–1.02)
DIFFERENTIAL METHOD BLD: ABNORMAL
EOSINOPHIL # BLD: 0.4 K/UL (ref 0–0.4)
EOSINOPHIL NFR BLD: 3 % (ref 0–7)
ERYTHROCYTE [DISTWIDTH] IN BLOOD BY AUTOMATED COUNT: 18.5 % (ref 11.5–14.5)
GLOBULIN SER CALC-MCNC: 4.5 G/DL (ref 2–4)
GLUCOSE BLD STRIP.AUTO-MCNC: 195 MG/DL (ref 65–117)
GLUCOSE SERPL-MCNC: 403 MG/DL (ref 65–100)
HCT VFR BLD AUTO: 23.4 % (ref 35–47)
HGB BLD-MCNC: 7 G/DL (ref 11.5–16)
HISTORY CHECKED?,CKHIST: NORMAL
IMM GRANULOCYTES # BLD AUTO: 0.1 K/UL (ref 0–0.04)
IMM GRANULOCYTES NFR BLD AUTO: 0 % (ref 0–0.5)
INR PPP: 0.9 (ref 0.9–1.1)
IRON SATN MFR SERPL: 6 % (ref 20–50)
IRON SERPL-MCNC: 20 UG/DL (ref 35–150)
LYMPHOCYTES # BLD: 2.3 K/UL (ref 0.8–3.5)
LYMPHOCYTES NFR BLD: 20 % (ref 12–49)
MCH RBC QN AUTO: 22.8 PG (ref 26–34)
MCHC RBC AUTO-ENTMCNC: 29.9 G/DL (ref 30–36.5)
MCV RBC AUTO: 76.2 FL (ref 80–99)
MONOCYTES # BLD: 0.8 K/UL (ref 0–1)
MONOCYTES NFR BLD: 7 % (ref 5–13)
NEUTS SEG # BLD: 8 K/UL (ref 1.8–8)
NEUTS SEG NFR BLD: 70 % (ref 32–75)
NRBC # BLD: 0.02 K/UL (ref 0–0.01)
NRBC BLD-RTO: 0.2 PER 100 WBC
PLATELET # BLD AUTO: 287 K/UL (ref 150–400)
PMV BLD AUTO: 11.5 FL (ref 8.9–12.9)
POTASSIUM SERPL-SCNC: 3 MMOL/L (ref 3.5–5.1)
POTASSIUM SERPL-SCNC: 3.5 MMOL/L (ref 3.5–5.1)
PROT SERPL-MCNC: 7.6 G/DL (ref 6.4–8.2)
PROTHROMBIN TIME: 9.8 SEC (ref 9–11.1)
RBC # BLD AUTO: 3.07 M/UL (ref 3.8–5.2)
SERVICE CMNT-IMP: ABNORMAL
SODIUM SERPL-SCNC: 131 MMOL/L (ref 136–145)
TIBC SERPL-MCNC: 346 UG/DL (ref 250–450)
WBC # BLD AUTO: 11.6 K/UL (ref 3.6–11)

## 2021-05-30 PROCEDURE — P9016 RBC LEUKOCYTES REDUCED: HCPCS

## 2021-05-30 PROCEDURE — 85025 COMPLETE CBC W/AUTO DIFF WBC: CPT

## 2021-05-30 PROCEDURE — 82550 ASSAY OF CK (CPK): CPT

## 2021-05-30 PROCEDURE — 65270000029 HC RM PRIVATE

## 2021-05-30 PROCEDURE — 82962 GLUCOSE BLOOD TEST: CPT

## 2021-05-30 PROCEDURE — 74011000636 HC RX REV CODE- 636: Performed by: EMERGENCY MEDICINE

## 2021-05-30 PROCEDURE — 83540 ASSAY OF IRON: CPT

## 2021-05-30 PROCEDURE — 86900 BLOOD TYPING SEROLOGIC ABO: CPT

## 2021-05-30 PROCEDURE — 74011250636 HC RX REV CODE- 250/636: Performed by: INTERNAL MEDICINE

## 2021-05-30 PROCEDURE — 74177 CT ABD & PELVIS W/CONTRAST: CPT

## 2021-05-30 PROCEDURE — 80053 COMPREHEN METABOLIC PANEL: CPT

## 2021-05-30 PROCEDURE — 99285 EMERGENCY DEPT VISIT HI MDM: CPT

## 2021-05-30 PROCEDURE — 93005 ELECTROCARDIOGRAM TRACING: CPT

## 2021-05-30 PROCEDURE — 77030029684 HC NEB SM VOL KT MONA -A

## 2021-05-30 PROCEDURE — 36430 TRANSFUSION BLD/BLD COMPNT: CPT

## 2021-05-30 PROCEDURE — 74011636637 HC RX REV CODE- 636/637: Performed by: INTERNAL MEDICINE

## 2021-05-30 PROCEDURE — 2709999900 HC NON-CHARGEABLE SUPPLY

## 2021-05-30 PROCEDURE — 85610 PROTHROMBIN TIME: CPT

## 2021-05-30 PROCEDURE — 74011000250 HC RX REV CODE- 250: Performed by: INTERNAL MEDICINE

## 2021-05-30 PROCEDURE — 94761 N-INVAS EAR/PLS OXIMETRY MLT: CPT

## 2021-05-30 PROCEDURE — C9113 INJ PANTOPRAZOLE SODIUM, VIA: HCPCS | Performed by: INTERNAL MEDICINE

## 2021-05-30 PROCEDURE — 74011250637 HC RX REV CODE- 250/637: Performed by: INTERNAL MEDICINE

## 2021-05-30 PROCEDURE — 84132 ASSAY OF SERUM POTASSIUM: CPT

## 2021-05-30 PROCEDURE — 36415 COLL VENOUS BLD VENIPUNCTURE: CPT

## 2021-05-30 PROCEDURE — 86923 COMPATIBILITY TEST ELECTRIC: CPT

## 2021-05-30 PROCEDURE — 94640 AIRWAY INHALATION TREATMENT: CPT

## 2021-05-30 PROCEDURE — 71045 X-RAY EXAM CHEST 1 VIEW: CPT

## 2021-05-30 RX ORDER — DICYCLOMINE HYDROCHLORIDE 10 MG/1
10 CAPSULE ORAL
Status: DISCONTINUED | OUTPATIENT
Start: 2021-05-30 | End: 2021-06-02 | Stop reason: HOSPADM

## 2021-05-30 RX ORDER — LABETALOL 100 MG/1
100 TABLET, FILM COATED ORAL 2 TIMES DAILY
Status: DISCONTINUED | OUTPATIENT
Start: 2021-05-30 | End: 2021-06-02 | Stop reason: HOSPADM

## 2021-05-30 RX ORDER — SODIUM CHLORIDE 9 MG/ML
250 INJECTION, SOLUTION INTRAVENOUS AS NEEDED
Status: DISCONTINUED | OUTPATIENT
Start: 2021-05-30 | End: 2021-06-02 | Stop reason: HOSPADM

## 2021-05-30 RX ORDER — POTASSIUM CHLORIDE 20 MEQ/1
20 TABLET, EXTENDED RELEASE ORAL
Status: COMPLETED | OUTPATIENT
Start: 2021-05-30 | End: 2021-05-30

## 2021-05-30 RX ORDER — ARFORMOTEROL TARTRATE 15 UG/2ML
15 SOLUTION RESPIRATORY (INHALATION)
Status: DISCONTINUED | OUTPATIENT
Start: 2021-05-30 | End: 2021-06-02 | Stop reason: HOSPADM

## 2021-05-30 RX ORDER — SODIUM CHLORIDE 0.9 % (FLUSH) 0.9 %
5-40 SYRINGE (ML) INJECTION AS NEEDED
Status: DISCONTINUED | OUTPATIENT
Start: 2021-05-30 | End: 2021-06-02 | Stop reason: HOSPADM

## 2021-05-30 RX ORDER — CETIRIZINE HCL 10 MG
10 TABLET ORAL DAILY
Status: DISCONTINUED | OUTPATIENT
Start: 2021-05-31 | End: 2021-06-02 | Stop reason: HOSPADM

## 2021-05-30 RX ORDER — MONTELUKAST SODIUM 10 MG/1
10 TABLET ORAL
Status: DISCONTINUED | OUTPATIENT
Start: 2021-05-30 | End: 2021-06-02 | Stop reason: HOSPADM

## 2021-05-30 RX ORDER — SODIUM CHLORIDE 9 MG/ML
75 INJECTION, SOLUTION INTRAVENOUS CONTINUOUS
Status: DISCONTINUED | OUTPATIENT
Start: 2021-05-30 | End: 2021-05-31

## 2021-05-30 RX ORDER — LANOLIN ALCOHOL/MO/W.PET/CERES
325 CREAM (GRAM) TOPICAL
Status: DISCONTINUED | OUTPATIENT
Start: 2021-05-31 | End: 2021-06-02 | Stop reason: HOSPADM

## 2021-05-30 RX ORDER — DIPHENHYDRAMINE HCL 25 MG
25 CAPSULE ORAL
Status: DISCONTINUED | OUTPATIENT
Start: 2021-05-30 | End: 2021-06-02 | Stop reason: HOSPADM

## 2021-05-30 RX ORDER — INSULIN GLARGINE 100 [IU]/ML
20 INJECTION, SOLUTION SUBCUTANEOUS DAILY
Status: DISCONTINUED | OUTPATIENT
Start: 2021-05-31 | End: 2021-06-02 | Stop reason: HOSPADM

## 2021-05-30 RX ORDER — HYDROCHLOROTHIAZIDE 25 MG/1
25 TABLET ORAL DAILY
Status: DISCONTINUED | OUTPATIENT
Start: 2021-05-31 | End: 2021-06-02 | Stop reason: HOSPADM

## 2021-05-30 RX ORDER — ONDANSETRON 2 MG/ML
4 INJECTION INTRAMUSCULAR; INTRAVENOUS
Status: DISCONTINUED | OUTPATIENT
Start: 2021-05-30 | End: 2021-06-02 | Stop reason: HOSPADM

## 2021-05-30 RX ORDER — BUDESONIDE 0.25 MG/2ML
250 INHALANT ORAL
Status: DISCONTINUED | OUTPATIENT
Start: 2021-05-30 | End: 2021-06-02 | Stop reason: HOSPADM

## 2021-05-30 RX ORDER — ATORVASTATIN CALCIUM 20 MG/1
20 TABLET, FILM COATED ORAL
Status: DISCONTINUED | OUTPATIENT
Start: 2021-05-30 | End: 2021-06-02 | Stop reason: HOSPADM

## 2021-05-30 RX ORDER — DEXTROSE 50 % IN WATER (D50W) INTRAVENOUS SYRINGE
12.5-25 AS NEEDED
Status: DISCONTINUED | OUTPATIENT
Start: 2021-05-30 | End: 2021-06-02 | Stop reason: HOSPADM

## 2021-05-30 RX ORDER — SODIUM CHLORIDE 0.9 % (FLUSH) 0.9 %
5-40 SYRINGE (ML) INJECTION EVERY 8 HOURS
Status: DISCONTINUED | OUTPATIENT
Start: 2021-05-30 | End: 2021-06-02 | Stop reason: HOSPADM

## 2021-05-30 RX ORDER — ACETAMINOPHEN 325 MG/1
650 TABLET ORAL
Status: DISCONTINUED | OUTPATIENT
Start: 2021-05-30 | End: 2021-06-02 | Stop reason: HOSPADM

## 2021-05-30 RX ORDER — INSULIN LISPRO 100 [IU]/ML
INJECTION, SOLUTION INTRAVENOUS; SUBCUTANEOUS
Status: DISCONTINUED | OUTPATIENT
Start: 2021-05-30 | End: 2021-06-02 | Stop reason: HOSPADM

## 2021-05-30 RX ORDER — AMITRIPTYLINE HYDROCHLORIDE 50 MG/1
25 TABLET, FILM COATED ORAL
Status: DISCONTINUED | OUTPATIENT
Start: 2021-05-30 | End: 2021-06-02 | Stop reason: HOSPADM

## 2021-05-30 RX ORDER — ALBUTEROL SULFATE 0.83 MG/ML
2.5 SOLUTION RESPIRATORY (INHALATION)
Status: DISCONTINUED | OUTPATIENT
Start: 2021-05-30 | End: 2021-06-02 | Stop reason: HOSPADM

## 2021-05-30 RX ORDER — MAGNESIUM SULFATE 100 %
4 CRYSTALS MISCELLANEOUS AS NEEDED
Status: DISCONTINUED | OUTPATIENT
Start: 2021-05-30 | End: 2021-06-02 | Stop reason: HOSPADM

## 2021-05-30 RX ORDER — AMLODIPINE BESYLATE 5 MG/1
10 TABLET ORAL DAILY
Status: DISCONTINUED | OUTPATIENT
Start: 2021-05-31 | End: 2021-06-02 | Stop reason: HOSPADM

## 2021-05-30 RX ORDER — HYDROCODONE BITARTRATE AND ACETAMINOPHEN 5; 325 MG/1; MG/1
1 TABLET ORAL
Status: DISCONTINUED | OUTPATIENT
Start: 2021-05-30 | End: 2021-06-02 | Stop reason: HOSPADM

## 2021-05-30 RX ADMIN — MONTELUKAST 10 MG: 10 TABLET, FILM COATED ORAL at 21:59

## 2021-05-30 RX ADMIN — SODIUM CHLORIDE 40 MG: 9 INJECTION, SOLUTION INTRAMUSCULAR; INTRAVENOUS; SUBCUTANEOUS at 17:20

## 2021-05-30 RX ADMIN — SODIUM CHLORIDE 40 MG: 9 INJECTION, SOLUTION INTRAMUSCULAR; INTRAVENOUS; SUBCUTANEOUS at 21:59

## 2021-05-30 RX ADMIN — SODIUM CHLORIDE 75 ML/HR: 9 INJECTION, SOLUTION INTRAVENOUS at 23:51

## 2021-05-30 RX ADMIN — POTASSIUM CHLORIDE 20 MEQ: 20 TABLET, EXTENDED RELEASE ORAL at 17:20

## 2021-05-30 RX ADMIN — ATORVASTATIN CALCIUM 20 MG: 20 TABLET, FILM COATED ORAL at 21:59

## 2021-05-30 RX ADMIN — AMITRIPTYLINE HYDROCHLORIDE 25 MG: 50 TABLET, FILM COATED ORAL at 21:59

## 2021-05-30 RX ADMIN — Medication 10 ML: at 22:00

## 2021-05-30 RX ADMIN — POTASSIUM CHLORIDE 20 MEQ: 20 TABLET, EXTENDED RELEASE ORAL at 18:47

## 2021-05-30 RX ADMIN — Medication 10 ML: at 18:48

## 2021-05-30 RX ADMIN — HUMAN INSULIN 10 UNITS: 100 INJECTION, SOLUTION SUBCUTANEOUS at 17:20

## 2021-05-30 RX ADMIN — ARFORMOTEROL TARTRATE 15 MCG: 15 SOLUTION RESPIRATORY (INHALATION) at 21:32

## 2021-05-30 RX ADMIN — IOPAMIDOL 100 ML: 755 INJECTION, SOLUTION INTRAVENOUS at 16:46

## 2021-05-30 RX ADMIN — INSULIN LISPRO 2 UNITS: 100 INJECTION, SOLUTION INTRAVENOUS; SUBCUTANEOUS at 18:47

## 2021-05-30 RX ADMIN — BUDESONIDE 250 MCG: 0.25 INHALANT RESPIRATORY (INHALATION) at 21:32

## 2021-05-30 NOTE — ED PROVIDER NOTES
EMERGENCY DEPARTMENT HISTORY AND PHYSICAL EXAM      Date: 5/30/2021  Patient Name: Konstantin Martino    Please note that this dictation was completed with [employer], the computer voice recognition software. Quite often unanticipated grammatical, syntax, homophones, and other interpretive errors are inadvertently transcribed by the computer software. Please disregard these errors. Please excuse any errors that have escaped final proofreading. History of Presenting Illness     Chief Complaint   Patient presents with    Melena     dark colored stools ongoing for a while now, pt is on plavix    Shortness of Breath     SOB and fatigue ongoing for the same amount of time as the dark colored stools       History Provided By: Patient     HPI: Konstantin Martino, 63 y.o. female, with a past medical history significant for chronic abdominal pain, anemia, peripheral artery disease on Plavix presenting the emergency department complaining of fatigue, shortness of breath, dark stool, dark stools been going on for over a week. Reports abdominal pain has been going on for over a month, nothing makes the pain better or worse. She has been seen by GI in [facility] and had a scope which showed no obvious source of bleeding. She is scheduled to follow-up with Dr. Naomy Welch, but has not seen him yet. PCP: Petr Ba MD    No current facility-administered medications on file prior to encounter. Current Outpatient Medications on File Prior to Encounter   Medication Sig Dispense Refill    dicyclomine (BENTYL) 10 mg capsule Take 10 mg by mouth three (3) times daily as needed.  fluticasone propion-salmeteroL (Advair Diskus) 250-50 mcg/dose diskus inhaler Take 1 Puff by inhalation every twelve (12) hours. 1 Inhaler 11    HYDROcodone-acetaminophen (NORCO) 5-325 mg per tablet Take 1 Tab by mouth every six (6) hours as needed.  alum-mag hydroxide-simeth (MYLANTA) 200-200-20 mg/5 mL susp Take 30 mL by mouth as needed.  amitriptyline (ELAVIL) 25 mg tablet Take 1 Tab by mouth nightly. 30 Tab 4    ferrous sulfate 324 mg (65 mg iron) tablet Take 1 Tab by mouth daily (with breakfast). (Patient not taking: Reported on 5/28/2021) 30 Tab 0    Omeprazole delayed release (PRILOSEC D/R) 20 mg tablet Take 1 Tab by mouth daily. 30 Tab 1    hydroCHLOROthiazide (HYDRODIURIL) 25 mg tablet Take 1 Tab by mouth daily. 30 Tab 1    montelukast (SINGULAIR) 10 mg tablet TAKE 1 TABLET BY MOUTH DAILY AT BEDTIME 30 Tab 3    clopidogreL (PLAVIX) 75 mg tab       levocetirizine (XYZAL) 5 mg tablet Take 1 Tab by mouth daily. Indications: itching of skin (Patient not taking: Reported on 5/28/2021) 90 Tab 4    labetaloL (NORMODYNE) 100 mg tablet Take 1 Tab by mouth two (2) times a day. Indications: Bood pressure 180 Tab 4    diphenhydrAMINE (BenadryL) 25 mg capsule Take 25 mg by mouth every six (6) hours as needed.  simvastatin (ZOCOR) 40 mg tablet TAKE 1 TABLET BY MOUTH EVERY NIGHT AT BEDTIME 90 Tab 4    BD Single Use Swabs Regular padm APPLY  EXTERNALLY EVERY  Pad 5    ascorbic acid, vitamin C, (Vitamin C) 500 mg tablet Take 1 Tab by mouth two (2) times a day. 24 Tab 0    zinc sulfate 220 mg tablet Take 1 Tab by mouth two (2) times a day. 24 Tab 0    albuterol (PROVENTIL HFA, VENTOLIN HFA, PROAIR HFA) 90 mcg/actuation inhaler Take 2 Puffs by inhalation every four (4) hours as needed for Wheezing. 1 Inhaler 0    multivitamin with iron tablet Take 1 Tab by mouth daily.  amLODIPine (NORVASC) 10 mg tablet TAKE 1 TABLET BY MOUTH EVERY DAY 90 Tab 2    lancets (TRUEplus Lancets) 28 gauge misc TEST BLOOD SUGAR EVERY  Lancet 5    glucose blood VI test strips (True Metrix Glucose Test Strip) strip TEST BLOOD SUGAR EVERY  Strip 5    insulin glargine (Lantus Solostar U-100 Insulin) 100 unit/mL (3 mL) inpn 20 Units by SubCUTAneous route daily.  30 mL 5    acetaminophen (Tylenol Extra Strength) 500 mg tablet Take  by mouth every six (6) hours as needed for Pain.  aspirin delayed-release 81 mg tablet Take 81 mg by mouth daily.  Insulin Needles, Disposable, (BD Ultra-Fine Short Pen Needle) 31 gauge x 5/16\" ndle by Does Not Apply route daily. E11.9. Use daily for insulin injection 100 Pen Needle 5    Nebulizer & Compressor machine 1 Each by Does Not Apply route three (3) times daily. Indications: J44.9 1 Each 0    Blood-Glucose Meter (TRUE METRIX AIR GLUCOSE METER) misc by Does Not Apply route.  ipratropium (ATROVENT) 42 mcg (0.06 %) nasal spray 2 Sprays by Nasal route daily as needed.  (Patient not taking: Reported on 2021)         Past History     Past Medical History:  Past Medical History:   Diagnosis Date    Arthritis     Asthma     Diabetes ([facility] [address]     Hypertension     Menopause     Pancreatitis        Past Surgical History:  Past Surgical History:   Procedure Laterality Date    COLONOSCOPY N/A 2020    COLONOSCOPY performed by Nikolai Gonzalez MD at [facility] [address] HX BUNIONECTOMY Bilateral     [address]      HX PARTIAL HYSTERECTOMY  1980    ME ABDOMEN SURGERY [address] [facility]  2019    Exploratory laparotomy, extended right hemicolectomy       Family History:  Family History   Problem Relation Age of Onset    Other Mother         ANEURYSM    Breast Cancer Mother     Diabetes Mother     Lung Disease Father         EMPHYSEMA    Crohn's Disease Sister     Heart Disease Sister     Diabetes Maternal Uncle     Heart Disease Maternal Uncle     Diabetes Paternal Uncle     Heart Disease Paternal Uncle     Breast Cancer Maternal Aunt     Anesth Problems Neg Hx        Social History:  Social History     Tobacco Use    Smoking status: Former Smoker     Packs/day: 0.25     Years: 20.00     Pack years: 5.00     Quit date: 2020     Years since quittin.9    Smokeless tobacco: Never Used   Vaping Use    Vaping Use: Never used   Substance Use Topics    Alcohol use: Not Currently     Comment: RARE Q 3 MONTHS    Drug use: No       Allergies: Allergies   Allergen Reactions    Lisinopril Other (comments)    Gabapentin Itching    Morphine Unknown (comments), Hives and Itching    Tramadol Other (comments) and Itching     \"funny feeling\"         Review of Systems   Review of Systems   Constitutional: Positive for fatigue. Negative for chills and fever. HENT: Negative for congestion and sore throat. Eyes: Negative for visual disturbance. Respiratory: Positive for shortness of breath. Negative for cough. Cardiovascular: Negative for chest pain and leg swelling. Gastrointestinal: Positive for abdominal pain and blood in stool. Negative for diarrhea, nausea and vomiting. Endocrine: Negative for polyuria. Genitourinary: Negative for dysuria, flank pain, vaginal bleeding and vaginal discharge. Musculoskeletal: Negative for myalgias. Skin: Negative for rash. Allergic/Immunologic: Negative for immunocompromised state. Neurological: Negative for weakness and headaches. Psychiatric/Behavioral: Negative for confusion. Physical Exam   Physical Exam  Vitals and nursing note reviewed. Constitutional:       Appearance: She is well-developed. HENT:      Head: Normocephalic and atraumatic. Eyes:      General:         Right eye: No discharge. Left eye: No discharge. Conjunctiva/sclera: Conjunctivae normal.      Pupils: Pupils are equal, round, and reactive to light. Neck:      Trachea: No tracheal deviation. Cardiovascular:      Rate and Rhythm: Normal rate and regular rhythm. Heart sounds: Normal heart sounds. No murmur heard. Pulmonary:      Effort: Pulmonary effort is normal. No respiratory distress. Breath sounds: Normal breath sounds. No wheezing or rales. Abdominal:      General: Bowel sounds are normal.      Palpations: Abdomen is soft. Tenderness: There is abdominal tenderness (Diffuse).  There is no guarding or rebound. Genitourinary:     Comments: No stool in the rectal vault, nurse Natalie bedside  Musculoskeletal:         General: No tenderness or deformity. Normal range of motion. Cervical back: Normal range of motion and neck supple. Skin:     General: Skin is warm and dry. Findings: No erythema or rash. Neurological:      Mental Status: She is alert and oriented to person, place, and time. Psychiatric:         Behavior: Behavior normal.         Diagnostic Study Results     Labs -     Recent Results (from the past 12 hour(s))   EKG, 12 LEAD, INITIAL    Collection Time: 05/30/21  1:15 PM   Result Value Ref Range    Ventricular Rate 83 BPM    Atrial Rate 83 BPM    P-R Interval 160 ms    QRS Duration 96 ms    Q-T Interval 378 ms    QTC Calculation (Bezet) 444 ms    Calculated P Axis 44 degrees    Calculated R Axis -13 degrees    Calculated T Axis -68 degrees    Diagnosis       Normal sinus rhythm  Voltage criteria for left ventricular hypertrophy  ST & T wave abnormality, consider inferior ischemia  ST & T wave abnormality, consider anterolateral ischemia  When compared with ECG of 05-MAY-2021 16:03,  No significant change was found     CBC WITH AUTOMATED DIFF    Collection Time: 05/30/21  1:24 PM   Result Value Ref Range    WBC 11.6 (H) 3.6 - 11.0 K/uL    RBC 3.07 (L) 3.80 - 5.20 M/uL    HGB 7.0 (L) 11.5 - 16.0 g/dL    HCT 23.4 (L) 35.0 - 47.0 %    MCV 76.2 (L) 80.0 - 99.0 FL    MCH 22.8 (L) 26.0 - 34.0 PG    MCHC 29.9 (L) 30.0 - 36.5 g/dL    RDW 18.5 (H) 11.5 - 14.5 %    PLATELET 981 661 - 385 K/uL    MPV 11.5 8.9 - 12.9 FL    NRBC 0.2 (H) 0  WBC    ABSOLUTE NRBC 0.02 (H) 0.00 - 0.01 K/uL    NEUTROPHILS 70 32 - 75 %    LYMPHOCYTES 20 12 - 49 %    MONOCYTES 7 5 - 13 %    EOSINOPHILS 3 0 - 7 %    BASOPHILS 0 0 - 1 %    IMMATURE GRANULOCYTES 0 0.0 - 0.5 %    ABS. NEUTROPHILS 8.0 1.8 - 8.0 K/UL    ABS. LYMPHOCYTES 2.3 0.8 - 3.5 K/UL    ABS. MONOCYTES 0.8 0.0 - 1.0 K/UL    ABS.  EOSINOPHILS 0.4 0.0 - 0.4 K/UL    ABS. BASOPHILS 0.0 0.0 - 0.1 K/UL    ABS. IMM. GRANS. 0.1 (H) 0.00 - 0.04 K/UL    DF AUTOMATED     METABOLIC PANEL, COMPREHENSIVE    Collection Time: 05/30/21  1:24 PM   Result Value Ref Range    Sodium 131 (L) 136 - 145 mmol/L    Potassium 3.5 3.5 - 5.1 mmol/L    Chloride 95 (L) 97 - 108 mmol/L    CO2 30 21 - 32 mmol/L    Anion gap 6 5 - 15 mmol/L    Glucose 403 (H) 65 - 100 mg/dL    BUN 12 6 - 20 MG/DL    Creatinine 0.96 0.55 - 1.02 MG/DL    BUN/Creatinine ratio 13 12 - 20      GFR est AA >60 >60 ml/min/1.73m2    GFR est non-AA 59 (L) >60 ml/min/1.73m2    Calcium 9.3 8.5 - 10.1 MG/DL    Bilirubin, total 0.2 0.2 - 1.0 MG/DL    ALT (SGPT) 25 12 - 78 U/L    AST (SGOT) 36 15 - 37 U/L    Alk. phosphatase 112 45 - 117 U/L    Protein, total 7.6 6.4 - 8.2 g/dL    Albumin 3.1 (L) 3.5 - 5.0 g/dL    Globulin 4.5 (H) 2.0 - 4.0 g/dL    A-G Ratio 0.7 (L) 1.1 - 2.2     CK W/ REFLX CKMB    Collection Time: 05/30/21  1:24 PM   Result Value Ref Range     26 - 192 U/L   RBC, ALLOCATE    Collection Time: 05/30/21  3:15 PM   Result Value Ref Range    HISTORY CHECKED?  Historical check performed    PROTHROMBIN TIME + INR    Collection Time: 05/30/21  4:18 PM   Result Value Ref Range    INR 0.9 0.9 - 1.1      Prothrombin time 9.8 9.0 - 11.1 sec   IRON PROFILE    Collection Time: 05/30/21  4:18 PM   Result Value Ref Range    Iron 20 (L) 35 - 150 ug/dL    TIBC 346 250 - 450 ug/dL    Iron % saturation 6 (L) 20 - 50 %   TYPE & SCREEN    Collection Time: 05/30/21  4:19 PM   Result Value Ref Range    Crossmatch Expiration 06/02/2021,2359     ABO/Rh(D) A POSITIVE     Antibody screen NEG     Unit number [id number]     Blood component type RC LR     Unit division 00     Status of unit ISSUED     Crossmatch result Compatible    POTASSIUM    Collection Time: 05/30/21  4:19 PM   Result Value Ref Range    Potassium 3.0 (L) 3.5 - 5.1 mmol/L   GLUCOSE, POC    Collection Time: 05/30/21  6:39 PM   Result Value Ref Range    Glucose (POC) 195 (H) 65 - 117 mg/dL    Performed by Alfreda Block        Radiologic Studies -   CT ABD PELV W CONT   Final Result   Postsurgical changes. Incidental hepatic steatosis      XR CHEST PORT   Final Result   No acute cardiopulmonary process        CT Results  (Last 48 hours)               05/30/21 1646  CT ABD PELV W CONT Final result    Impression:  Postsurgical changes. Incidental hepatic steatosis       Narrative:  EXAM: CT ABD PELV W CONT       INDICATION: abdominal pain, melena       COMPARISON: 5/14/2021        CONTRAST: 100 mL of Isovue-370. TECHNIQUE:    Following the uneventful intravenous administration of contrast, thin axial   images were obtained through the abdomen and pelvis. Coronal and sagittal   reconstructions were generated. Oral contrast was not administered. CT dose   reduction was achieved through use of a standardized protocol tailored for this   examination and automatic exposure control for dose modulation. FINDINGS:    LOWER THORAX: No significant abnormality in the incidentally imaged lower chest.   LIVER: No mass. Hypodensity of the liver. BILIARY TREE: Gallbladder is within normal limits. CBD is not dilated. SPLEEN: within normal limits. PANCREAS: No mass or ductal dilatation. ADRENALS: Unremarkable. KIDNEYS: No mass, calculus, or hydronephrosis. STOMACH: Unremarkable. SMALL BOWEL: No dilatation or wall thickening. COLON: No dilatation or wall thickening. Sigmoid anastomosis (series 2, image   48). Prior right hemicolectomy   APPENDIX: Surgically absent   PERITONEUM: No ascites or pneumoperitoneum. RETROPERITONEUM: No lymphadenopathy or aortic aneurysm. Aortobifemoral endograft in place. REPRODUCTIVE ORGANS: Prior hysterectomy   URINARY BLADDER: No mass or calculus. BONES: No destructive bone lesion. ABDOMINAL WALL: No mass or hernia.    ADDITIONAL COMMENTS: N/A               CXR Results  (Last 48 hours)               05/30/21 1520  XR CHEST PORT Final result    Impression:  No acute cardiopulmonary process       Narrative:  EXAM: XR CHEST PORT       INDICATION: Shortness of breath       COMPARISON: 5/5/2021       FINDINGS: A portable AP radiograph of the chest was obtained at 1507 hours. The   patient is on a cardiac monitor. The lungs are clear. The cardiac and   mediastinal contours and pulmonary vascularity are normal.  The bones and soft   tissues are grossly within normal limits. Fusion hardware in the cervical spine. Medical Decision Making   I am the first provider for this patient. I reviewed the vital signs, available nursing notes, past medical history, past surgical history, family history and social history. Vital Signs-Reviewed the patient's vital signs. Patient Vitals for the past 12 hrs:   Temp Pulse Resp BP SpO2   05/30/21 1928 98.2 °F (36.8 °C) 77 18 (!) 166/62 100 %   05/30/21 1901 98.7 °F (37.1 °C) 80 17 139/61 100 %   05/30/21 1844 98 °F (36.7 °C) 75 17 137/67 100 %   05/30/21 1828 98.2 °F (36.8 °C) 83 18 (!) 151/63 99 %   05/30/21 1804 98.1 °F (36.7 °C) 86 18 (!) 150/66 100 %   05/30/21 1716 -- -- -- -- 100 %   05/30/21 1715 -- -- -- (!) 139/58 --   05/30/21 1615 -- -- -- (!) 136/38 100 %   05/30/21 1600 -- -- -- (!) 154/46 100 %   05/30/21 1545 -- -- -- (!) 149/50 100 %   05/30/21 1530 -- -- -- (!) 144/46 100 %   05/30/21 1313 97.4 °F (36.3 °C) 98 14 (!) 147/52 100 %         Records Reviewed:   Nursing notes, Prior visits     Provider Notes (Medical Decision Making):   Patient here with melanic stool, and there was no stool in the rectal vault on digital rectal exam.  Given hemoglobin of 7 we will go ahead and transfuse, will obtain CT imaging although I have low suspicion for acute intra-abdominal process at this time. We will plan on hospitalizing for GI consultation. ED Course:   Initial assessment performed.  The patients presenting problems have been discussed, and they are in agreement with the care plan formulated and outlined with them. I have encouraged them to ask questions as they arise throughout their visit. Critical Care Time:   I have spent 35 minutes of critical care time in evaluating and treating this patient. This includes time spent at bedside, time with family and decision makers, documentation, review of labs and imaging, and/or consultation with specialists. It does not include time spent on separately billed procedures. This patient presents with a critical illness or injury that acutely impairs one or more vital organ systems such that there is a high probability of imminent or life threatening deterioration in the patient's condition. This case involved decision making of high complexity to assess, manipulate, and support vital organ system failure and/or to prevent further life threatening deterioration of the patient's condition. Failure to initiate these interventions on an urgent basis would likely result in sudden, clinically significant or life threatening deterioration in the patient's condition. Abnormal findings supporting critical care: Anemia, GI bleed  Interventions to support critical care: Blood transfusion, reassessment, CT imaging, lab interpretation  Failure to intervene may result in: Hypotension, organ failure, worsening bleeding, death    Disposition:  Patient is being admitted to the hospital by Dr. Lucien Zhu. The results of their tests and reasons for their admission have been discussed with them and/or available family. They convey agreement and understanding for the need to be admitted and for their admission diagnosis. Consultation has been made with the inpatient physician specialist for hospitalization. PLAN:  1. Current Discharge Medication List        2. Follow-up Information    None         Return to ED if worse     Diagnosis     Clinical Impression:   1.  Gastrointestinal hemorrhage, unspecified gastrointestinal hemorrhage type    2. Anemia, unspecified type        Attestations:   This note was completed by Maria Elena Mijares, DO

## 2021-05-30 NOTE — PROGRESS NOTES
Hgb now 7.3 and she is symptomatic. Spoke with ER at [facility]. Ms Enriqueta Brewer will go to the ER at [facility] for additional workup.   May need labs, LUCRECIA vilchis.    Golden

## 2021-05-30 NOTE — H&P
Hospitalist Admission Note    NAME: Agnes Trivedi   :  1955   MRN:  [id number]     Date/Time:  2021 5:24 PM    Patient PCP: Heather Mcghee MD  ______________________________________________________________________  Given the patient's current clinical presentation, I have a high level of concern for decompensation if discharged from the emergency department. Complex decision making was performed, which includes reviewing the patient's available past medical records, laboratory results, and x-ray films. My assessment of this patient's clinical condition and my plan of care is as follows. Assessment / Plan:  Upper GI bleed, symptomatic  Acute on chronic blood loss anemia  Iron deficiency anemia  Chronic abdominal pain  We will admit to telemetry, hemoglobin on admission is 7, a unit of PRBC ordered by the ED physician as patient is symptomatic. H&H every 8 hours , IV Protonix every 12. We will allow clear liquid diet, n.p.o. after midnight ,GI consult. Had a EGD done in 2021 which showed gastritis and no active GI bleed was supposed to follow-up with GI for outpatient capsule endoscopy  Colonoscopy done a year ago which was within normal limit  Patient had work-up done which showed iron deficiency anemia and was sent home on p.o. iron which was discontinued by her primary care physician.   We will repeat the iron studies, B12 and folate  Hold patient aspirin and Plavix, need to be restarted before discharge as she is taking it for PAD status post bypass    Hyperglycemia in setting of diabetes mellitus  Hypertension  We will hold HCTZ and labetalol as latest blood pressure was soft  Continue with Norvasc  Blood sugar on BMP was 403, status post insulin regular 10 units  We will continue with sliding scale insulin ,continue Lantus  Hypokalemia  K on admission was around 3, patient received p.o. potassium 20 meq, will give additional 20  COPD  Tobacco abuse  PAD status post bypass  Continue with home inhalers with pharmacy substitution  Nicotine patch ordered, patient counseled about tobacco cessation  Patient takes Plavix and aspirin, on hold for GI bleed  History of colon mass status post hemicolectomy  Code Status: Full code  Surrogate Decision Maker: Her niece    DVT Prophylaxis: SCDs  GI Prophylaxis: PPI  Baseline: Ambulatory      Subjective:   CHIEF COMPLAINT: Shortness of breath    HISTORY OF PRESENT ILLNESS:     Viv Key is a 63 y.o.  female past medical history of diabetes, hypertension, asthma, PAD that is post bypass on aspirin and Plavix, tobacco abuse who presents with shortness of breath associated with melanotic stool. Patient was a recently admitted at [facility] in April for abdominal pain, with was found to have acute blood loss anemia, received blood transfusion and had a EGD which showed gastritis  Patient reported that since her hemicolectomy in 2019 for colon mass, she has been feeling abdominal pain and has had dark stools. No reported loss of appetite and weight loss  45 pounds within 2 years  ahe presented today, because of her shortness of breath upon exertion, she described chronic abdominal pain, intermittent on the right lower quadrant and left lower quadrant which she describes as sharp. She reported that bending makes it worse, no relationship of the pain with food intake. She admits to nausea, denies vomiting, reported some constipation  Her vital signs are stable, labs revealed hemoglobin of 7.0 mildly elevated white blood cell count  CT abdomen and pelvis did not show any acute pathology  We were asked to admit for work up and evaluation of the above problems.      Past Medical History:   Diagnosis Date    Arthritis     Asthma     Diabetes ([facility] [address]     Hypertension     Menopause     Pancreatitis         Past Surgical History:   Procedure Laterality Date    COLONOSCOPY N/A 7/28/2020    COLONOSCOPY performed by Randa Conrad MD at \[facility]"" 1827 HX BUNIONECTOMY Bilateral 1977    HX HERNIA REPAIR      HX PARTIAL HYSTERECTOMY  1980    TN ABDOMEN SURGERY PROC UNLISTED  2019    Exploratory laparotomy, extended right hemicolectomy       Social History     Tobacco Use    Smoking status: Former Smoker     Packs/day: 0.25     Years: 20.00     Pack years: 5.00     Quit date: 2020     Years since quittin.9    Smokeless tobacco: Never Used   Substance Use Topics    Alcohol use: Not Currently     Comment: RARE Q 3 MONTHS        Family History   Problem Relation Age of Onset    Other Mother         ANEURYSM    Breast Cancer Mother     Diabetes Mother     Lung Disease Father         EMPHYSEMA    Crohn's Disease Sister     Heart Disease Sister     Diabetes Maternal Uncle     Heart Disease Maternal Uncle     Diabetes Paternal Uncle     Heart Disease Paternal Uncle     Breast Cancer Maternal Aunt     Anesth Problems Neg Hx      Allergies   Allergen Reactions    Lisinopril Other (comments)    Gabapentin Itching    Morphine Unknown (comments), Hives and Itching    Tramadol Other (comments) and Itching     \"funny feeling\"        Prior to Admission medications    Medication Sig Start Date End Date Taking? Authorizing Provider   dicyclomine (BENTYL) 10 mg capsule Take 10 mg by mouth three (3) times daily as needed. 21  Provider, Historical   fluticasone propion-salmeteroL (Advair Diskus) 250-50 mcg/dose diskus inhaler Take 1 Puff by inhalation every twelve (12) hours. 21   Usman Chow MD   HYDROcodone-acetaminophen (NORCO) 5-325 mg per tablet Take 1 Tab by mouth every six (6) hours as needed. 21   Provider, Historical   alum-mag hydroxide-simeth (MYLANTA) 200-200-20 mg/5 mL susp Take 30 mL by mouth as needed. 21   Provider, Historical   amitriptyline (ELAVIL) 25 mg tablet Take 1 Tab by mouth nightly.  5/10/21   Usman Chow MD   ferrous sulfate 324 mg (65 mg iron) tablet Take 1 Tab by mouth daily (with breakfast). Patient not taking: Reported on 5/28/2021 4/23/21   Yazan Katz MD   Omeprazole delayed release (PRILOSEC D/R) 20 mg tablet Take 1 Tab by mouth daily. 4/15/21   Xin Lion MD   hydroCHLOROthiazide (HYDRODIURIL) 25 mg tablet Take 1 Tab by mouth daily. 4/15/21   Xin Lion MD   montelukast (SINGULAIR) 10 mg tablet TAKE 1 TABLET BY MOUTH DAILY AT BEDTIME 4/1/21   Nessa Casillas NP   clopidogreL (PLAVIX) 75 mg tab  3/4/21   Provider, Historical   levocetirizine (XYZAL) 5 mg tablet Take 1 Tab by mouth daily. Indications: itching of skin  Patient not taking: Reported on 5/28/2021 3/19/21   Nessa Casillas NP   labetaloL (NORMODYNE) 100 mg tablet Take 1 Tab by mouth two (2) times a day. Indications: Bood pressure 3/4/21   CorYolanda cannon, NP   diphenhydrAMINE (BenadryL) 25 mg capsule Take 25 mg by mouth every six (6) hours as needed. Provider, Historical   simvastatin (ZOCOR) 40 mg tablet TAKE 1 TABLET BY MOUTH EVERY NIGHT AT BEDTIME 2/5/21   Teresa Mei MD   BD Single Use Swabs Regular padm APPLY  EXTERNALLY EVERY DAY 1/23/21   Wade Curry MD   ascorbic acid, vitamin C, (Vitamin C) 500 mg tablet Take 1 Tab by mouth two (2) times a day. 1/14/21   Stephanie Pickens MD   zinc sulfate 220 mg tablet Take 1 Tab by mouth two (2) times a day. 1/14/21   Stephanie Pickens MD   albuterol (PROVENTIL HFA, VENTOLIN HFA, PROAIR HFA) 90 mcg/actuation inhaler Take 2 Puffs by inhalation every four (4) hours as needed for Wheezing. 1/14/21   Stephanie Pickens MD   multivitamin with iron tablet Take 1 Tab by mouth daily.     Provider, Historical   amLODIPine (NORVASC) 10 mg tablet TAKE 1 TABLET BY MOUTH EVERY DAY 11/26/20   Teresa Mei MD   lancets (TRUEplus Lancets) 28 gauge misc TEST BLOOD SUGAR EVERY DAY 10/8/20   Wade Curry MD   glucose blood VI test strips (True Metrix Glucose Test Strip) strip TEST BLOOD SUGAR EVERY DAY 10/8/20   Antoinette Susanna Robins MD   insulin glargine (Lantus Solostar U-100 Insulin) 100 unit/mL (3 mL) inpn 20 Units by SubCUTAneous route daily. 9/24/20   Usman Chow MD   acetaminophen (Tylenol Extra Strength) 500 mg tablet Take  by mouth every six (6) hours as needed for Pain. Provider, Historical   aspirin delayed-release 81 mg tablet Take 81 mg by mouth daily. Provider, Historical   Insulin Needles, Disposable, (BD Ultra-Fine Short Pen Needle) 31 gauge x 5/16\" ndle by Does Not Apply route daily. E11.9. Use daily for insulin injection 5/12/20   Susanna Curry MD   Nebulizer & Compressor machine 1 Each by Does Not Apply route three (3) times daily. Indications: J44.9 5/12/20   Usman Chow MD   Blood-Glucose Meter (TRUE METRIX AIR GLUCOSE METER) misc by Does Not Apply route. Provider, Historical   ipratropium (ATROVENT) 42 mcg (0.06 %) nasal spray 2 Sprays by Nasal route daily as needed. Patient not taking: Reported on 5/28/2021 6/29/18   Provider, Historical       REVIEW OF SYSTEMS:     I am not able to complete the review of systems because:    The patient is intubated and sedated    The patient has altered mental status due to his acute medical problems    The patient has baseline aphasia from prior stroke(s)    The patient has baseline dementia and is not reliable historian    The patient is in acute medical distress and unable to provide information           Total of 12 systems reviewed as follows:       POSITIVE= underlined text  Negative = text not underlined  General:  fever, chills, sweats, generalized weakness, weight loss/gain,      loss of appetite   Eyes:    blurred vision, eye pain, loss of vision, double vision  ENT:    rhinorrhea, pharyngitis   Respiratory:   cough, sputum production, SOB, BECKER, wheezing, pleuritic pain   Cardiology:   chest pain, palpitations, orthopnea, PND, edema, syncope   Gastrointestinal:  abdominal pain , Nausea, diarrhea, dysphagia, constipation, bleeding Genitourinary:  frequency, urgency, dysuria, hematuria, incontinence   Muskuloskeletal :  arthralgia, myalgia, back pain  Hematology:  easy bruising, nose or gum bleeding, lymphadenopathy   Dermatological: rash, ulceration, pruritis, color change / jaundice  Endocrine:   hot flashes or polydipsia   Neurological:  headache, dizziness, confusion, focal weakness, paresthesia,     Speech difficulties, memory loss, gait difficulty  Psychological: Feelings of anxiety, depression, agitation    Objective:   VITALS:    Visit Vitals  BP (!) 139/58   Pulse 98   Temp 97.4 °F (36.3 °C)   Resp 14   Wt 78.7 kg (173 lb 8 oz)   SpO2 100%   BMI 26.38 kg/m²       PHYSICAL EXAM:    General:    Alert, cooperative, no distress, appears stated age. HEENT: Atraumatic, anicteric sclerae, pink conjunctivae     No oral ulcers, mucosa moist, throat clear, dentition fair  Neck:  Supple, symmetrical,  thyroid: non tender  Lungs:   Clear to auscultation bilaterally. No Wheezing or Rhonchi. No rales. Chest wall:  No tenderness  No Accessory muscle use. Heart:   Regular  rhythm,  No  murmur   No edema  Abdomen:   Soft, non-tender. Not distended. Bowel sounds normal  Extremities: No cyanosis. No clubbing,      Skin turgor normal, Capillary refill normal, Radial dial pulse 2+  Skin:     Not pale. Not Jaundiced  No rashes   Psych:  Good insight. Not depressed. Not anxious or agitated. Neurologic: EOMs intact. No facial asymmetry. No aphasia or slurred speech. Symmetrical strength, Sensation grossly intact.  Alert and oriented X 4.     _______________________________________________________________________  Care Plan discussed with:    Comments   Patient     Family      RN     Care Manager                    Consultant:      _______________________________________________________________________  Expected  Disposition:   Home with Family    HH/PT/OT/RN    SNF/LTC    [facility] ________________________________________________________________________  TOTAL TIME:  65 Minutes    Critical Care Provided     Minutes non procedure based      Comments    xx Reviewed previous records   >50% of visit spent in counseling and coordination of care x Discussion with patient and/or family and questions answered       ________________________________________________________________________  Signed: Erin Oro MD    Procedures: see electronic medical records for all procedures/Xrays and details which were not copied into this note but were reviewed prior to creation of Plan. LAB DATA REVIEWED:    Recent Results (from the past 24 hour(s))   EKG, 12 LEAD, INITIAL    Collection Time: 05/30/21  1:15 PM   Result Value Ref Range    Ventricular Rate 83 BPM    Atrial Rate 83 BPM    P-R Interval 160 ms    QRS Duration 96 ms    Q-T Interval 378 ms    QTC Calculation (Bezet) 444 ms    Calculated P Axis 44 degrees    Calculated R Axis -13 degrees    Calculated T Axis -68 degrees    Diagnosis       Normal sinus rhythm  Voltage criteria for left ventricular hypertrophy  ST & T wave abnormality, consider inferior ischemia  ST & T wave abnormality, consider anterolateral ischemia  When compared with ECG of 05-MAY-2021 16:03,  No significant change was found     CBC WITH AUTOMATED DIFF    Collection Time: 05/30/21  1:24 PM   Result Value Ref Range    WBC 11.6 (H) 3.6 - 11.0 K/uL    RBC 3.07 (L) 3.80 - 5.20 M/uL    HGB 7.0 (L) 11.5 - 16.0 g/dL    HCT 23.4 (L) 35.0 - 47.0 %    MCV 76.2 (L) 80.0 - 99.0 FL    MCH 22.8 (L) 26.0 - 34.0 PG    MCHC 29.9 (L) 30.0 - 36.5 g/dL    RDW 18.5 (H) 11.5 - 14.5 %    PLATELET 319 804 - 197 K/uL    MPV 11.5 8.9 - 12.9 FL    NRBC 0.2 (H) 0  WBC    ABSOLUTE NRBC 0.02 (H) 0.00 - 0.01 K/uL    NEUTROPHILS 70 32 - 75 %    LYMPHOCYTES 20 12 - 49 %    MONOCYTES 7 5 - 13 %    EOSINOPHILS 3 0 - 7 %    BASOPHILS 0 0 - 1 %    IMMATURE GRANULOCYTES 0 0.0 - 0.5 %    ABS.  NEUTROPHILS 8.0 1.8 - 8.0 K/UL    ABS. LYMPHOCYTES 2.3 0.8 - 3.5 K/UL    ABS. MONOCYTES 0.8 0.0 - 1.0 K/UL    ABS. EOSINOPHILS 0.4 0.0 - 0.4 K/UL    ABS. BASOPHILS 0.0 0.0 - 0.1 K/UL    ABS. IMM. GRANS. 0.1 (H) 0.00 - 0.04 K/UL    DF AUTOMATED     METABOLIC PANEL, COMPREHENSIVE    Collection Time: 05/30/21  1:24 PM   Result Value Ref Range    Sodium 131 (L) 136 - 145 mmol/L    Potassium 3.5 3.5 - 5.1 mmol/L    Chloride 95 (L) 97 - 108 mmol/L    CO2 30 21 - 32 mmol/L    Anion gap 6 5 - 15 mmol/L    Glucose 403 (H) 65 - 100 mg/dL    BUN 12 6 - 20 MG/DL    Creatinine 0.96 0.55 - 1.02 MG/DL    BUN/Creatinine ratio 13 12 - 20      GFR est AA >60 >60 ml/min/1.73m2    GFR est non-AA 59 (L) >60 ml/min/1.73m2    Calcium 9.3 8.5 - 10.1 MG/DL    Bilirubin, total 0.2 0.2 - 1.0 MG/DL    ALT (SGPT) 25 12 - 78 U/L    AST (SGOT) 36 15 - 37 U/L    Alk. phosphatase 112 45 - 117 U/L    Protein, total 7.6 6.4 - 8.2 g/dL    Albumin 3.1 (L) 3.5 - 5.0 g/dL    Globulin 4.5 (H) 2.0 - 4.0 g/dL    A-G Ratio 0.7 (L) 1.1 - 2.2     CK W/ REFLX CKMB    Collection Time: 05/30/21  1:24 PM   Result Value Ref Range     26 - 192 U/L   RBC, ALLOCATE    Collection Time: 05/30/21  3:15 PM   Result Value Ref Range    HISTORY CHECKED?  Historical check performed    PROTHROMBIN TIME + INR    Collection Time: 05/30/21  4:18 PM   Result Value Ref Range    INR 0.9 0.9 - 1.1      Prothrombin time 9.8 9.0 - 11.1 sec   TYPE & SCREEN    Collection Time: 05/30/21  4:19 PM   Result Value Ref Range    Crossmatch Expiration 06/02/2021,3693     ABO/Rh(D) A POSITIVE     Antibody screen NEG     Unit number [id number]     Blood component type  LR     Unit division 00     Status of unit ALLOCATED     Crossmatch result Compatible    POTASSIUM    Collection Time: 05/30/21  4:19 PM   Result Value Ref Range    Potassium 3.0 (L) 3.5 - 5.1 mmol/L

## 2021-05-30 NOTE — ED NOTES
[phone] requested from [address] assistance with obtaining additional labs. 1600 labs hemolyzed, redraw requested. Contacted Charge RN for assistance. 1615 Ferra at bedside to obtain Labs. 1630 Pt to CT via stretcher. 1728 TRANSFER - OUT REPORT:    Verbal report given to Corey (name) on [facility]  being transferred to Gen Surg (unit) for routine progression of care       Report consisted of patients Situation, Background, Assessment and   Recommendations(SBAR). Information from the following report(s) SBAR, Kardex and Procedure Summary was reviewed with the receiving nurse. Lines:   Peripheral IV 05/30/21 Left Antecubital (Active)   Phlebitis Assessment 0 05/30/21 1634   Infiltration Assessment 0 05/30/21 1634   Dressing Status Clean, dry, & intact 05/30/21 1634        Opportunity for questions and clarification was provided.       Patient transported with:   [address]

## 2021-05-31 LAB
ABO + RH BLD: NORMAL
ANION GAP SERPL CALC-SCNC: 3 MMOL/L (ref 5–15)
BLD PROD TYP BPU: NORMAL
BLOOD GROUP ANTIBODIES SERPL: NORMAL
BPU ID: NORMAL
BUN SERPL-MCNC: 8 MG/DL (ref 6–20)
BUN/CREAT SERPL: 13 (ref 12–20)
CALCIUM SERPL-MCNC: 8.9 MG/DL (ref 8.5–10.1)
CHLORIDE SERPL-SCNC: 103 MMOL/L (ref 97–108)
CO2 SERPL-SCNC: 30 MMOL/L (ref 21–32)
COVID-19 RAPID TEST, COVR: NOT DETECTED
CREAT SERPL-MCNC: 0.63 MG/DL (ref 0.55–1.02)
CROSSMATCH RESULT,%XM: NORMAL
ERYTHROCYTE [DISTWIDTH] IN BLOOD BY AUTOMATED COUNT: 18.1 % (ref 11.5–14.5)
FERRITIN SERPL-MCNC: 13 NG/ML (ref 8–252)
FOLATE SERPL-MCNC: 42.3 NG/ML (ref 5–21)
GLUCOSE BLD STRIP.AUTO-MCNC: 212 MG/DL (ref 65–117)
GLUCOSE BLD STRIP.AUTO-MCNC: 226 MG/DL (ref 65–117)
GLUCOSE BLD STRIP.AUTO-MCNC: 245 MG/DL (ref 65–117)
GLUCOSE BLD STRIP.AUTO-MCNC: 322 MG/DL (ref 65–117)
GLUCOSE SERPL-MCNC: 190 MG/DL (ref 65–100)
HCT VFR BLD AUTO: 24.9 % (ref 35–47)
HCT VFR BLD AUTO: 26.3 % (ref 35–47)
HCT VFR BLD AUTO: 26.4 % (ref 35–47)
HGB BLD-MCNC: 7.6 G/DL (ref 11.5–16)
HGB BLD-MCNC: 7.7 G/DL (ref 11.5–16)
HGB BLD-MCNC: 8.1 G/DL (ref 11.5–16)
IRON SATN MFR SERPL: 5 % (ref 20–50)
IRON SERPL-MCNC: 15 UG/DL (ref 35–150)
MAGNESIUM SERPL-MCNC: 2 MG/DL (ref 1.6–2.4)
MCH RBC QN AUTO: 23.1 PG (ref 26–34)
MCHC RBC AUTO-ENTMCNC: 29.3 G/DL (ref 30–36.5)
MCV RBC AUTO: 78.7 FL (ref 80–99)
NRBC # BLD: 0 K/UL (ref 0–0.01)
NRBC BLD-RTO: 0 PER 100 WBC
PLATELET # BLD AUTO: 246 K/UL (ref 150–400)
PMV BLD AUTO: 10.6 FL (ref 8.9–12.9)
POTASSIUM SERPL-SCNC: 3.6 MMOL/L (ref 3.5–5.1)
RBC # BLD AUTO: 3.34 M/UL (ref 3.8–5.2)
SERVICE CMNT-IMP: ABNORMAL
SODIUM SERPL-SCNC: 136 MMOL/L (ref 136–145)
SOURCE, COVRS: NORMAL
SPECIMEN EXP DATE BLD: NORMAL
STATUS OF UNIT,%ST: NORMAL
TIBC SERPL-MCNC: 318 UG/DL (ref 250–450)
UNIT DIVISION, %UDIV: 0
VIT B12 SERPL-MCNC: 516 PG/ML (ref 193–986)
WBC # BLD AUTO: 8.7 K/UL (ref 3.6–11)

## 2021-05-31 PROCEDURE — 74011250636 HC RX REV CODE- 250/636: Performed by: INTERNAL MEDICINE

## 2021-05-31 PROCEDURE — 83540 ASSAY OF IRON: CPT

## 2021-05-31 PROCEDURE — 82962 GLUCOSE BLOOD TEST: CPT

## 2021-05-31 PROCEDURE — 65270000029 HC RM PRIVATE

## 2021-05-31 PROCEDURE — 80048 BASIC METABOLIC PNL TOTAL CA: CPT

## 2021-05-31 PROCEDURE — C9113 INJ PANTOPRAZOLE SODIUM, VIA: HCPCS | Performed by: INTERNAL MEDICINE

## 2021-05-31 PROCEDURE — 94640 AIRWAY INHALATION TREATMENT: CPT

## 2021-05-31 PROCEDURE — 85027 COMPLETE CBC AUTOMATED: CPT

## 2021-05-31 PROCEDURE — 85018 HEMOGLOBIN: CPT

## 2021-05-31 PROCEDURE — 87635 SARS-COV-2 COVID-19 AMP PRB: CPT

## 2021-05-31 PROCEDURE — 74011250637 HC RX REV CODE- 250/637: Performed by: INTERNAL MEDICINE

## 2021-05-31 PROCEDURE — 74011000250 HC RX REV CODE- 250: Performed by: INTERNAL MEDICINE

## 2021-05-31 PROCEDURE — 36415 COLL VENOUS BLD VENIPUNCTURE: CPT

## 2021-05-31 PROCEDURE — 82728 ASSAY OF FERRITIN: CPT

## 2021-05-31 PROCEDURE — 83735 ASSAY OF MAGNESIUM: CPT

## 2021-05-31 PROCEDURE — 82746 ASSAY OF FOLIC ACID SERUM: CPT

## 2021-05-31 PROCEDURE — 94760 N-INVAS EAR/PLS OXIMETRY 1: CPT

## 2021-05-31 PROCEDURE — 74011636637 HC RX REV CODE- 636/637: Performed by: INTERNAL MEDICINE

## 2021-05-31 PROCEDURE — 82607 VITAMIN B-12: CPT

## 2021-05-31 RX ADMIN — SODIUM CHLORIDE 40 MG: 9 INJECTION, SOLUTION INTRAMUSCULAR; INTRAVENOUS; SUBCUTANEOUS at 08:06

## 2021-05-31 RX ADMIN — INSULIN LISPRO 3 UNITS: 100 INJECTION, SOLUTION INTRAVENOUS; SUBCUTANEOUS at 11:37

## 2021-05-31 RX ADMIN — INSULIN LISPRO 7 UNITS: 100 INJECTION, SOLUTION INTRAVENOUS; SUBCUTANEOUS at 16:22

## 2021-05-31 RX ADMIN — SODIUM CHLORIDE 40 MG: 9 INJECTION, SOLUTION INTRAMUSCULAR; INTRAVENOUS; SUBCUTANEOUS at 21:50

## 2021-05-31 RX ADMIN — BUDESONIDE 250 MCG: 0.25 INHALANT RESPIRATORY (INHALATION) at 08:45

## 2021-05-31 RX ADMIN — INSULIN GLARGINE 20 UNITS: 100 INJECTION, SOLUTION SUBCUTANEOUS at 08:07

## 2021-05-31 RX ADMIN — MONTELUKAST 10 MG: 10 TABLET, FILM COATED ORAL at 21:49

## 2021-05-31 RX ADMIN — ATORVASTATIN CALCIUM 20 MG: 20 TABLET, FILM COATED ORAL at 21:49

## 2021-05-31 RX ADMIN — INSULIN LISPRO 3 UNITS: 100 INJECTION, SOLUTION INTRAVENOUS; SUBCUTANEOUS at 08:07

## 2021-05-31 RX ADMIN — IRON SUCROSE 300 MG: 20 INJECTION, SOLUTION INTRAVENOUS at 12:02

## 2021-05-31 RX ADMIN — AMITRIPTYLINE HYDROCHLORIDE 25 MG: 50 TABLET, FILM COATED ORAL at 21:49

## 2021-05-31 RX ADMIN — BUDESONIDE 250 MCG: 0.25 INHALANT RESPIRATORY (INHALATION) at 20:58

## 2021-05-31 RX ADMIN — Medication 10 ML: at 21:52

## 2021-05-31 RX ADMIN — ARFORMOTEROL TARTRATE 15 MCG: 15 SOLUTION RESPIRATORY (INHALATION) at 20:58

## 2021-05-31 RX ADMIN — INSULIN LISPRO 2 UNITS: 100 INJECTION, SOLUTION INTRAVENOUS; SUBCUTANEOUS at 23:13

## 2021-05-31 RX ADMIN — Medication 10 ML: at 14:00

## 2021-05-31 RX ADMIN — FERROUS SULFATE TAB 325 MG (65 MG ELEMENTAL FE) 325 MG: 325 (65 FE) TAB at 08:07

## 2021-05-31 RX ADMIN — ARFORMOTEROL TARTRATE 15 MCG: 15 SOLUTION RESPIRATORY (INHALATION) at 08:45

## 2021-05-31 RX ADMIN — AMLODIPINE BESYLATE 10 MG: 5 TABLET ORAL at 08:06

## 2021-05-31 RX ADMIN — CETIRIZINE HYDROCHLORIDE 10 MG: 10 TABLET, FILM COATED ORAL at 08:06

## 2021-05-31 NOTE — CONSULTS
GI Consult--dictated    Impression:  Melena vs black stool from iron  Iron def anemia  Chronic abd pain  S/p right colectomy due to colonic stricture    Rec  Can feed today  NPO after mn  EGD Tuesday by Dr Ann Public  Needs rapid covid test today

## 2021-05-31 NOTE — CONSULTS
[address]    Name:  Jerica Doherty  MR#:  [id number]  :  1955  ACCOUNT #:  [de-identified]  DATE OF SERVICE:  2021    REQUESTING PHYSICIAN:  Yumiko Paniagua MD    REASON FOR CONSULTATION:  Anemia. HISTORY OF PRESENT ILLNESS:  The patient is a 62-year-old woman with a past medical history of diabetes, hypertension, asthma, peripheral artery disease status post bypass maintained on aspirin and Plavix, continued tobacco abuse, who presents with shortness of breath over the last month. She has also had black melanotic stools off and on for the last probably 2 months. The patient was admitted to [facility] in April for abdominal pain, had acute blood loss anemia. She was transfused, had upper endoscopy there which showed only gastritis. The patient had previously undergone hemicolectomy in  at [facility] for stricture. She had intermittent abdominal pain even since that time. She has lost about 45 pounds in the last 2 years. The chronic abdominal pain is intermittent in both the right and left lower quadrants and sharp. Bending makes it worse, does not seem to be related to food intake at all. She has had some nausea, but no vomiting. She has been constipated. She has had no bright red blood per rectum. She apparently saw Dr. Holley Vasquez last week in the office and hemoglobin was low as 7.3. She was contacted and told to come to [facility] for admission and further evaluation. The patient last underwent colonoscopy in 2020 for diarrhea and family history of colonic polyps. This showed a normal colonic anastomosis at 30 cm. Her prior colonic surgery was in 2019 by Dr. Ifrah Caldwell. She had a prior abdominal wall mesh removal and small bowel repair by Dr. Akin Saucedo in [facility] in 2019 when she had a worsening acute on chronic large bowel obstruction.   She was at [facility] and on 09/01/2019, Dr. Chi Nick performed exploratory laparotomy with extended right hemicolectomy into the mid transverse colon, resection with ileocolic anastomosis, extensive pelvic adhesiolysis of the small bowel, resection of the terminal ileum approximately 15-18 cm due to vascular compromise and removal of previous abdominal wall scar. On presentation to the emergency department at [facility] yesterday, hemoglobin was 7.0, hematocrit 23. Today, hemoglobin 7.7, hematocrit 26.3. Stool has not been retested for occult blood. CT scan of the abdomen and pelvis demonstrates postsurgical changes with prior right hemicolectomy with sigmoid anastomosis, but no acute findings. PAST MEDICAL HISTORY:  Includes arthritis, asthma, diabetes, hypertension, pancreatitis, tobacco abuse, chronic abdominal pain. MEDICATIONS PRIOR TO ADMISSION:  Dicyclomine 10 mg t.i.d., Advair Diskus 1 every 12 hours, hydrocodone/acetaminophen p.r.n., Mylanta p.r.n., amitriptyline 25 mg nightly, ferrous sulfate 324 mg every morning, omeprazole 20 mg daily, HydroDIURIL 25 mg daily, Singulair 10 mg daily, Plavix 75 mg daily, Xyzal 5 mg daily, labetalol 100 mg twice a day, Benadryl p.r.n., Zocor 40 mg daily, Norvasc 10 mg daily, Lantus SoloStar insulin 20 units daily, Atrovent nasal spray as needed. ALLERGIES:  LISINOPRIL, GABAPENTIN, MORPHINE, TRAMADOL. PAST SURGICAL HISTORY:  Hernia repair in 2002, partial hysterectomy in 1980, exploratory laparotomy in 2019 as described above. SOCIAL HISTORY:  She did smoke until last June, rarely drinks alcohol. FAMILY HISTORY:   Noncontributory. PHYSICAL EXAMINATION:  GENERAL:  Shows to be a well-developed, well-nourished woman, in no acute distress. VITAL SIGNS:  Stable. She is afebrile. SKIN:  No rash or jaundice. HEENT:  Sclerae anicteric. Oropharynx is clear. NECK:  Supple without JVD. LUNGS:  Clear to auscultation.   CARDIAC:  Regular rate and rhythm. ABDOMEN:  Soft. Benign. LABORATORY STUDIES:  WBC 8.7, hemoglobin 7.7, hematocrit 26.3, platelet count 538,[phone]. Serum chemistries are unremarkable. Iron saturation 6%, ferritin is 13. IMPRESSION:  1. Melena versus black stool from iron. 2.  Iron deficient anemia. 3.  Chronic abdominal pain. 4.  Status post extended right colectomy with sigmoid anastomosis for stricture and adhesions. PLAN:  1. Upper endoscopy will be performed tomorrow by Dr. Swapna Shabazz. 2.  May need Pillcam depending upon results of upper endoscopy.         Bryant Ferguson MD      [id number]  D:  05/31/2021 9:23  T:  05/31/2021 11:56  JOB #:  [id number]  CC:  Landon Scott MD

## 2021-05-31 NOTE — PROGRESS NOTES
Hospitalist Progress Note    NAME: Rachna Lawson   :  1955   MRN:  [id number]       Assessment / Plan:    Upper GI bleed, symptomatic  Acute on chronic blood loss anemia  Iron deficiency anemia  Chronic abdominal pain  Patient continues to report severe exertional dyspnea and fatigue  Was noticed to have hemoglobin of 7 on admission  Given 1 unit PRBC transfusion with hemoglobin improving to 7.7  No brisk clinical bleeding noticed, has chronic black stools and chronic abdominal pain  Anemia panel reveals severe iron deficiency  We will start IV iron replacement therapy  Appreciate GI consultation, plan for EGD tomorrow  Continue PPI  Hold patient aspirin and Plavix, need to be restarted before discharge as she is taking it for PAD status post bypass     Hyperglycemia in setting of diabetes mellitus  Hypertension  We will hold HCTZ and labetalol as latest blood pressure was soft  Continue with Norvasc  Blood sugar on BMP was 403, status post insulin regular 10 units on admission we will continue with sliding scale insulin ,continue Lantus 20 units  Hypokalemia  Replaced  COPD  Tobacco abuse  PAD status post bypass  Continue with home inhalers with pharmacy substitution  Nicotine patch ordered, patient counseled about tobacco cessation  Patient takes Plavix and aspirin, on hold for GI bleed  History of colon mass status post hemicolectomy  Code Status: Full code  Surrogate Decision Maker: Her niece     DVT Prophylaxis: SCDs  GI Prophylaxis: PPI  Baseline: Ambulatory       Subjective:     Chief Complaint / Reason for Physician Visit  Continues to report fatigue and exertional dyspnea. Denies any melena but reports chronic black stools and chronic upper abdominal pain\". Discussed with RN events overnight.      Review of Systems:  Symptom Y/N Comments  Symptom Y/N Comments   Fever/Chills n   Chest Pain n    Poor Appetite n   Edema n    Cough n   Abdominal Pain y  epigastric   Sputum n   Joint Pain     SOB/BECKER n   Pruritis/Rash     Nausea/vomit n   Tolerating PT/OT     Diarrhea n   Tolerating Diet     Constipation n   Other       Could NOT obtain due to:      Objective:     VITALS:   Last 24hrs VS reviewed since prior progress note. Most recent are:  Patient Vitals for the past 24 hrs:   Temp Pulse Resp BP SpO2   05/31/21 0845 -- -- -- -- 96 %   05/31/21 0753 98.6 °F (37 °C) 79 18 (!) 128/57 99 %   05/31/21 0417 98.2 °F (36.8 °C) 76 18 139/62 98 %   05/31/21 0400 -- 78 -- -- --   05/31/21 0002 98.2 °F (36.8 °C) 87 18 122/66 100 %   05/30/21 2358 -- 82 -- -- --   05/30/21 2211 98.1 °F (36.7 °C) 78 18 139/74 97 %   05/30/21 2133 -- -- -- -- 100 %   05/30/21 2000 -- 76 -- -- --   05/30/21 1928 98.2 °F (36.8 °C) 77 18 (!) 166/62 100 %   05/30/21 1901 98.7 °F (37.1 °C) 80 17 139/61 100 %   05/30/21 1844 98 °F (36.7 °C) 75 17 137/67 100 %   05/30/21 1828 98.2 °F (36.8 °C) 83 18 (!) 151/63 99 %   05/30/21 1804 98.1 °F (36.7 °C) 86 18 (!) 150/66 100 %   05/30/21 1716 -- -- -- -- 100 %   05/30/21 1715 -- -- -- (!) 139/58 --   05/30/21 1615 -- -- -- (!) 136/38 100 %   05/30/21 1600 -- -- -- (!) 154/46 100 %   05/30/21 1545 -- -- -- (!) 149/50 100 %   05/30/21 1530 -- -- -- (!) 144/46 100 %   05/30/21 1313 97.4 °F (36.3 °C) 98 14 (!) 147/52 100 %       Intake/Output Summary (Last 24 hours) at 5/31/2021 1053  Last data filed at 5/30/2021 2211  Gross per 24 hour   Intake 369.2 ml   Output --   Net 369.2 ml        PHYSICAL EXAM:  General: WD, WN. Alert, cooperative, no acute distress    EENT:  EOMI. Anicteric sclerae. MMM  Resp:  CTA bilaterally, no wheezing or rales. No accessory muscle use  CV:  Regular  rhythm,  No edema  GI:  Soft, Non distended, mild epigastric tenderness.  +Bowel sounds  Neurologic:  Alert and oriented X 3, normal speech,   Psych:   Good insight. Not anxious nor agitated  Skin:  No rashes.   No jaundice    Reviewed most current lab test results and cultures  YES  Reviewed most current radiology test results YES  Review and summation of old records today    NO  Reviewed patient's current orders and MAR    YES  PMH/SH reviewed - no change compared to H&P  ________________________________________________________________________  Care Plan discussed with:    Comments   Patient x    Family      RN x    Care Manager     Consultant                        Multidiciplinary team rounds were held today with , nursing, pharmacist and clinical coordinator. Patient's plan of care was discussed; medications were reviewed and discharge planning was addressed. ________________________________________________________________________  Total NON critical care TIME:  30   Minutes    Total CRITICAL CARE TIME Spent:   Minutes non procedure based      Comments   >50% of visit spent in counseling and coordination of care     ________________________________________________________________________  Gypsy Lazaro MD     Procedures: see electronic medical records for all procedures/Xrays and details which were not copied into this note but were reviewed prior to creation of Plan. LABS:  I reviewed today's most current labs and imaging studies.   Pertinent labs include:  Recent Labs     05/31/21 0514 05/30/21  1324   WBC 8.7 11.6*   HGB 7.7* 7.0*   HCT 26.3* 23.4*    287     Recent Labs     05/31/21  0514 05/30/21  1619 05/30/21  1618 05/30/21  1324     --   --  131*   K 3.6 3.0*  --  3.5     --   --  95*   CO2 30  --   --  30   *  --   --  403*   BUN 8  --   --  12   CREA 0.63  --   --  0.96   CA 8.9  --   --  9.3   MG 2.0  --   --   --    ALB  --   --   --  3.1*   TBILI  --   --   --  0.2   ALT  --   --   --  25   INR  --   --  0.9  --        Signed: Gypsy Lazaro MD

## 2021-05-31 NOTE — PROGRESS NOTES
ADULT PROTOCOL: JET AEROSOL ASSESSMENT    Patient  Matteo Baer     63 y.o.   female     5/31/2021  12:23 AM    Breath Sounds Pre Procedure: Right Breath Sounds: Diminished                               Left Breath Sounds: Diminished    Breath Sounds Post Procedure: Right Breath Sounds: Diminished                                 Left Breath Sounds: Diminished    Breathing pattern: Pre procedure Breathing Pattern: Regular          Post procedure Breathing Pattern: Regular    Heart Rate: Pre procedure Pulse: 77           Post procedure Pulse: 78    Resp Rate: Pre procedure Respirations: 16           Post procedure Respirations: 16        Cough: Pre procedure Cough: Non-productive               Post procedure Cough: Non-productive      Oxygen: O2 Device: None (Room air)   FiO2 (%) 21%   room air     Changed: NO    SpO2: Pre procedure SpO2: 100 %  Without oxygen              Post procedure SpO2: 100 %  without oxygen    Nebulizer Therapy: Current medications Aerosolized Medications: Brovana, Pulmicort      Changed: NO    Problem List:   Patient Active Problem List   Diagnosis Code    Abnormal ECG R94.31    HTN (hypertension) I10    Type II diabetes mellitus (HCC) E11.9    Asthma J45.909    Cervical stenosis of spinal canal M48.02    History of pancreatitis Z87.19    Drug-induced acute pancreatitis K85.30    Intractable epigastric abdominal pain R10.13    Intractable abdominal pain R10.9    Vomiting R11.10    Other partial intestinal obstruction (HCC) K56.690    Bowel obstruction (HCC) K56.609    Type 2 diabetes mellitus with peripheral vascular disease (HCC) E11.51    Diarrhea R19.7    Anemia due to chronic blood loss D50.0    Symptomatic anemia D64.9    PVD (peripheral vascular disease) (HCC) I73.9    Hyperlipidemia E78.5    GI bleed K92.2       Respiratory Therapist: Roshan Laurent RT

## 2021-05-31 NOTE — PROGRESS NOTES
Bedside and Verbal shift change report given to Abad Benitez RN (oncoming nurse) by Jhoana Stephenson RN (offgoing nurse). Report included the following information SBAR, Kardex, ED Summary, Intake/Output, MAR and Recent Results.

## 2021-06-01 ENCOUNTER — ANESTHESIA EVENT (OUTPATIENT)
Dept: ENDOSCOPY | Age: 66
DRG: 378 | End: 2021-06-01
Payer: MEDICARE

## 2021-06-01 ENCOUNTER — ANESTHESIA (OUTPATIENT)
Dept: ENDOSCOPY | Age: 66
DRG: 378 | End: 2021-06-01
Payer: MEDICARE

## 2021-06-01 LAB
ATRIAL RATE: 83 BPM
CALCULATED P AXIS, ECG09: 44 DEGREES
CALCULATED R AXIS, ECG10: -13 DEGREES
CALCULATED T AXIS, ECG11: -68 DEGREES
DIAGNOSIS, 93000: NORMAL
GLUCOSE BLD STRIP.AUTO-MCNC: 129 MG/DL (ref 65–117)
GLUCOSE BLD STRIP.AUTO-MCNC: 146 MG/DL (ref 65–117)
GLUCOSE BLD STRIP.AUTO-MCNC: 184 MG/DL (ref 65–117)
GLUCOSE BLD STRIP.AUTO-MCNC: 302 MG/DL (ref 65–117)
H PYLORI FROM TISSUE: NEGATIVE
HCT VFR BLD AUTO: 24.1 % (ref 35–47)
HCT VFR BLD AUTO: 27 % (ref 35–47)
HGB BLD-MCNC: 7.1 G/DL (ref 11.5–16)
HGB BLD-MCNC: 7.9 G/DL (ref 11.5–16)
KIT LOT NO., HCLOLOT: NORMAL
NEGATIVE CONTROL: NEGATIVE
P-R INTERVAL, ECG05: 160 MS
POSITIVE CONTROL: POSITIVE
Q-T INTERVAL, ECG07: 378 MS
QRS DURATION, ECG06: 96 MS
QTC CALCULATION (BEZET), ECG08: 444 MS
SERVICE CMNT-IMP: ABNORMAL
VENTRICULAR RATE, ECG03: 83 BPM

## 2021-06-01 PROCEDURE — 65270000029 HC RM PRIVATE

## 2021-06-01 PROCEDURE — 74011250636 HC RX REV CODE- 250/636: Performed by: NURSE ANESTHETIST, CERTIFIED REGISTERED

## 2021-06-01 PROCEDURE — 88312 SPECIAL STAINS GROUP 1: CPT

## 2021-06-01 PROCEDURE — 0DD68ZX EXTRACTION OF STOMACH, VIA NATURAL OR ARTIFICIAL OPENING ENDOSCOPIC, DIAGNOSTIC: ICD-10-PCS | Performed by: INTERNAL MEDICINE

## 2021-06-01 PROCEDURE — 94640 AIRWAY INHALATION TREATMENT: CPT

## 2021-06-01 PROCEDURE — 36415 COLL VENOUS BLD VENIPUNCTURE: CPT

## 2021-06-01 PROCEDURE — C9113 INJ PANTOPRAZOLE SODIUM, VIA: HCPCS | Performed by: INTERNAL MEDICINE

## 2021-06-01 PROCEDURE — 94760 N-INVAS EAR/PLS OXIMETRY 1: CPT

## 2021-06-01 PROCEDURE — 0DB58ZX EXCISION OF ESOPHAGUS, VIA NATURAL OR ARTIFICIAL OPENING ENDOSCOPIC, DIAGNOSTIC: ICD-10-PCS | Performed by: INTERNAL MEDICINE

## 2021-06-01 PROCEDURE — 74011250637 HC RX REV CODE- 250/637: Performed by: INTERNAL MEDICINE

## 2021-06-01 PROCEDURE — 76060000031 HC ANESTHESIA FIRST 0.5 HR: Performed by: INTERNAL MEDICINE

## 2021-06-01 PROCEDURE — 74011636637 HC RX REV CODE- 636/637: Performed by: INTERNAL MEDICINE

## 2021-06-01 PROCEDURE — 74011250636 HC RX REV CODE- 250/636: Performed by: INTERNAL MEDICINE

## 2021-06-01 PROCEDURE — 0DB68ZX EXCISION OF STOMACH, VIA NATURAL OR ARTIFICIAL OPENING ENDOSCOPIC, DIAGNOSTIC: ICD-10-PCS | Performed by: INTERNAL MEDICINE

## 2021-06-01 PROCEDURE — 88112 CYTOPATH CELL ENHANCE TECH: CPT

## 2021-06-01 PROCEDURE — 85018 HEMOGLOBIN: CPT

## 2021-06-01 PROCEDURE — 82962 GLUCOSE BLOOD TEST: CPT

## 2021-06-01 PROCEDURE — 74011000250 HC RX REV CODE- 250: Performed by: NURSE ANESTHETIST, CERTIFIED REGISTERED

## 2021-06-01 PROCEDURE — 87077 CULTURE AEROBIC IDENTIFY: CPT | Performed by: INTERNAL MEDICINE

## 2021-06-01 PROCEDURE — 74011000250 HC RX REV CODE- 250: Performed by: INTERNAL MEDICINE

## 2021-06-01 PROCEDURE — 2709999900 HC NON-CHARGEABLE SUPPLY: Performed by: INTERNAL MEDICINE

## 2021-06-01 PROCEDURE — 88305 TISSUE EXAM BY PATHOLOGIST: CPT

## 2021-06-01 PROCEDURE — 76040000019: Performed by: INTERNAL MEDICINE

## 2021-06-01 RX ORDER — FLUMAZENIL 0.1 MG/ML
0.2 INJECTION INTRAVENOUS
Status: DISCONTINUED | OUTPATIENT
Start: 2021-06-01 | End: 2021-06-01 | Stop reason: HOSPADM

## 2021-06-01 RX ORDER — LIDOCAINE HYDROCHLORIDE 20 MG/ML
INJECTION, SOLUTION EPIDURAL; INFILTRATION; INTRACAUDAL; PERINEURAL AS NEEDED
Status: DISCONTINUED | OUTPATIENT
Start: 2021-06-01 | End: 2021-06-01 | Stop reason: HOSPADM

## 2021-06-01 RX ORDER — DEXTROMETHORPHAN/PSEUDOEPHED 2.5-7.5/.8
1.2 DROPS ORAL
Status: DISCONTINUED | OUTPATIENT
Start: 2021-06-01 | End: 2021-06-01 | Stop reason: HOSPADM

## 2021-06-01 RX ORDER — FLUCONAZOLE 100 MG/1
100 TABLET ORAL DAILY
Status: DISCONTINUED | OUTPATIENT
Start: 2021-06-02 | End: 2021-06-02 | Stop reason: HOSPADM

## 2021-06-01 RX ORDER — EPINEPHRINE 0.1 MG/ML
1 INJECTION INTRACARDIAC; INTRAVENOUS
Status: DISCONTINUED | OUTPATIENT
Start: 2021-06-01 | End: 2021-06-01 | Stop reason: HOSPADM

## 2021-06-01 RX ORDER — PROPOFOL 10 MG/ML
INJECTION, EMULSION INTRAVENOUS AS NEEDED
Status: DISCONTINUED | OUTPATIENT
Start: 2021-06-01 | End: 2021-06-01 | Stop reason: HOSPADM

## 2021-06-01 RX ORDER — FLUCONAZOLE 100 MG/1
200 TABLET ORAL
Status: COMPLETED | OUTPATIENT
Start: 2021-06-01 | End: 2021-06-01

## 2021-06-01 RX ORDER — SODIUM CHLORIDE 0.9 % (FLUSH) 0.9 %
5-40 SYRINGE (ML) INJECTION EVERY 8 HOURS
Status: DISCONTINUED | OUTPATIENT
Start: 2021-06-01 | End: 2021-06-02 | Stop reason: HOSPADM

## 2021-06-01 RX ORDER — FENTANYL CITRATE 50 UG/ML
25 INJECTION, SOLUTION INTRAMUSCULAR; INTRAVENOUS
Status: DISCONTINUED | OUTPATIENT
Start: 2021-06-01 | End: 2021-06-01 | Stop reason: HOSPADM

## 2021-06-01 RX ORDER — ATROPINE SULFATE 0.1 MG/ML
0.5 INJECTION INTRAVENOUS
Status: DISCONTINUED | OUTPATIENT
Start: 2021-06-01 | End: 2021-06-01 | Stop reason: HOSPADM

## 2021-06-01 RX ORDER — SODIUM CHLORIDE 9 MG/ML
75 INJECTION, SOLUTION INTRAVENOUS CONTINUOUS
Status: DISPENSED | OUTPATIENT
Start: 2021-06-01 | End: 2021-06-01

## 2021-06-01 RX ORDER — MIDAZOLAM HYDROCHLORIDE 1 MG/ML
.25-5 INJECTION, SOLUTION INTRAMUSCULAR; INTRAVENOUS
Status: DISCONTINUED | OUTPATIENT
Start: 2021-06-01 | End: 2021-06-01 | Stop reason: HOSPADM

## 2021-06-01 RX ORDER — FLUCONAZOLE 200 MG/1
200 TABLET ORAL
Status: DISCONTINUED | OUTPATIENT
Start: 2021-06-01 | End: 2021-06-01

## 2021-06-01 RX ORDER — NALOXONE HYDROCHLORIDE 0.4 MG/ML
0.4 INJECTION, SOLUTION INTRAMUSCULAR; INTRAVENOUS; SUBCUTANEOUS
Status: DISCONTINUED | OUTPATIENT
Start: 2021-06-01 | End: 2021-06-01 | Stop reason: HOSPADM

## 2021-06-01 RX ORDER — SODIUM CHLORIDE 0.9 % (FLUSH) 0.9 %
5-40 SYRINGE (ML) INJECTION AS NEEDED
Status: DISCONTINUED | OUTPATIENT
Start: 2021-06-01 | End: 2021-06-02 | Stop reason: HOSPADM

## 2021-06-01 RX ADMIN — FERROUS SULFATE TAB 325 MG (65 MG ELEMENTAL FE) 325 MG: 325 (65 FE) TAB at 08:45

## 2021-06-01 RX ADMIN — Medication 10 ML: at 22:37

## 2021-06-01 RX ADMIN — SODIUM CHLORIDE 40 MG: 9 INJECTION, SOLUTION INTRAMUSCULAR; INTRAVENOUS; SUBCUTANEOUS at 22:36

## 2021-06-01 RX ADMIN — LIDOCAINE HYDROCHLORIDE 100 MG: 20 INJECTION, SOLUTION EPIDURAL; INFILTRATION; INTRACAUDAL; PERINEURAL at 09:34

## 2021-06-01 RX ADMIN — SODIUM CHLORIDE 40 MG: 9 INJECTION, SOLUTION INTRAMUSCULAR; INTRAVENOUS; SUBCUTANEOUS at 08:46

## 2021-06-01 RX ADMIN — IRON SUCROSE 300 MG: 20 INJECTION, SOLUTION INTRAVENOUS at 14:11

## 2021-06-01 RX ADMIN — Medication 10 ML: at 14:00

## 2021-06-01 RX ADMIN — AMITRIPTYLINE HYDROCHLORIDE 25 MG: 50 TABLET, FILM COATED ORAL at 22:33

## 2021-06-01 RX ADMIN — LABETALOL HYDROCHLORIDE 100 MG: 100 TABLET, FILM COATED ORAL at 17:17

## 2021-06-01 RX ADMIN — INSULIN GLARGINE 20 UNITS: 100 INJECTION, SOLUTION SUBCUTANEOUS at 08:45

## 2021-06-01 RX ADMIN — PROPOFOL 120 MG: 10 INJECTION, EMULSION INTRAVENOUS at 09:40

## 2021-06-01 RX ADMIN — Medication 10 ML: at 22:00

## 2021-06-01 RX ADMIN — AMLODIPINE BESYLATE 10 MG: 5 TABLET ORAL at 08:45

## 2021-06-01 RX ADMIN — INSULIN LISPRO 2 UNITS: 100 INJECTION, SOLUTION INTRAVENOUS; SUBCUTANEOUS at 12:01

## 2021-06-01 RX ADMIN — INSULIN LISPRO 2 UNITS: 100 INJECTION, SOLUTION INTRAVENOUS; SUBCUTANEOUS at 17:17

## 2021-06-01 RX ADMIN — ARFORMOTEROL TARTRATE 15 MCG: 15 SOLUTION RESPIRATORY (INHALATION) at 08:09

## 2021-06-01 RX ADMIN — MONTELUKAST 10 MG: 10 TABLET, FILM COATED ORAL at 22:35

## 2021-06-01 RX ADMIN — SODIUM CHLORIDE: 900 INJECTION, SOLUTION INTRAVENOUS at 09:41

## 2021-06-01 RX ADMIN — BUDESONIDE 250 MCG: 0.25 INHALANT RESPIRATORY (INHALATION) at 08:08

## 2021-06-01 RX ADMIN — INSULIN LISPRO 7 UNITS: 100 INJECTION, SOLUTION INTRAVENOUS; SUBCUTANEOUS at 08:45

## 2021-06-01 RX ADMIN — ATORVASTATIN CALCIUM 20 MG: 20 TABLET, FILM COATED ORAL at 22:34

## 2021-06-01 RX ADMIN — CETIRIZINE HYDROCHLORIDE 10 MG: 10 TABLET, FILM COATED ORAL at 08:45

## 2021-06-01 RX ADMIN — FLUCONAZOLE 200 MG: 100 TABLET ORAL at 12:50

## 2021-06-01 NOTE — ANESTHESIA POSTPROCEDURE EVALUATION
Procedure(s):  ESOPHAGOGASTRODUODENOSCOPY (EGD). total IV anesthesia    Anesthesia Post Evaluation        Patient location during evaluation: PACU  Note status: Adequate. Level of consciousness: responsive to verbal stimuli and sleepy but conscious  Pain management: satisfactory to patient  Airway patency: patent  Anesthetic complications: no  Cardiovascular status: acceptable  Respiratory status: acceptable  Hydration status: acceptable  Comments: +Post-Anesthesia Evaluation and Assessment    Patient: Elham Alan MRN: [id number]  SSN: [id number]   YOB: 1955  Age: 63 y.o. Sex: female      Cardiovascular Function/Vital Signs    BP (!) 147/59   Pulse 95   Temp 36.6 °C (97.8 °F)   Resp 14   Wt 78.7 kg (173 lb 8 oz)   SpO2 97%   Breastfeeding No   BMI 26.38 kg/m²     Patient is status post Procedure(s):  ESOPHAGOGASTRODUODENOSCOPY (EGD). Nausea/Vomiting: Controlled. Postoperative hydration reviewed and adequate. Pain:  Pain Scale 1: Numeric (0 - 10) (06/01/21 0946)  Pain Intensity 1: 0 (06/01/21 0946)   Managed. Neurological Status: At baseline. Mental Status and Level of Consciousness: Arousable. Pulmonary Status:   O2 Device: None (Room air) (06/01/21 0946)   Adequate oxygenation and airway patent. Complications related to anesthesia: None    Post-anesthesia assessment completed. No concerns.     Signed By: Laura Vargas MD    6/1/2021  Post anesthesia nausea and vomiting:  controlled      INITIAL Post-op Vital signs:   Vitals Value Taken Time   /59 06/01/21 0946   Temp     Pulse 95 06/01/21 0946   Resp 14 06/01/21 0946   SpO2 97 % 06/01/21 0946

## 2021-06-01 NOTE — PROGRESS NOTES
Hospitalist Progress Note    NAME: Yunior Muller   :  1955   MRN:  [id number]       Assessment / Plan:  Upper GI bleed, symptomatic  Acute on chronic blood loss anemia  Iron deficiency anemia  Chronic abdominal pain  S/p 1 unit prbc  EGD on ,  Findings suggestive of candidiasis. Biopsy with result pending. Cont' PPI, fluconazole started  Trend H/H, transfuse prn  Cont' IV iron  Asa/plavix held  GI following     Hyperglycemia in setting of diabetes mellitus  Continue lantus, SSI    Hypertension  B elevated, resume PTA meds    Hypokalemia, repleted. COPD  Tobacco abuse  PAD status post bypass  Continue with home inhalers with pharmacy substitution  Nicotine patch ordered, patient counseled about tobacco cessation  Patient takes Plavix and aspirin, on hold for GI bleed    History of colon mass status post hemicolectomy    Code Status: Full code  Surrogate Decision Maker: Her niece   DVT Prophylaxis: SCDs  GI Prophylaxis: PPI  Baseline: Ambulatory     Subjective:     Chief Complaint / Reason for Physician Visit  Pt awake, seen pos egd. No new complaint. Discussed with RN events overnight. Review of Systems:  Symptom Y/N Comments  Symptom Y/N Comments   Fever/Chills n   Chest Pain n    Poor Appetite    Edema     Cough    Abdominal Pain     Sputum    Joint Pain     SOB/BECKER n   Pruritis/Rash     Nausea/vomit    Tolerating PT/OT     Diarrhea    Tolerating Diet     Constipation    Other       Could NOT obtain due to:      Objective:     VITALS:   Last 24hrs VS reviewed since prior progress note.  Most recent are:  Patient Vitals for the past 24 hrs:   Temp Pulse Resp BP SpO2   21 1458 97.9 °F (36.6 °C) 89 16 (!) 138/53 95 %   21 1104 97.4 °F (36.3 °C) 71 12 (!) 148/56 100 %   21 1012 -- 79 15 (!) 159/56 98 %   21 1009 -- 83 18 (!) 153/52 99 %   21 1003 -- 80 14 (!) 157/79 98 %   21 1000 -- 80 13 136/63 99 %   21 0957 97.7 °F (36.5 °C) 80 18 109/88 97 % 06/01/21 0951 -- 87 16 (!) 152/75 98 %   06/01/21 0946 -- 95 14 (!) 147/59 97 %   06/01/21 0941 -- 95 14 (!) 126/106 100 %   06/01/21 0910 97.8 °F (36.6 °C) 79 18 (!) 157/59 100 %   06/01/21 0809 -- -- -- -- 100 %   06/01/21 0739 97.9 °F (36.6 °C) (!) 101 16 138/62 100 %   06/01/21 0400 98.5 °F (36.9 °C) 80 16 (!) 140/55 100 %   06/01/21 0052 98.1 °F (36.7 °C) 79 18 133/61 97 %   05/31/21 2057 -- -- -- -- 98 %   05/31/21 1930 98.3 °F (36.8 °C) 79 18 118/66 100 %     No intake or output data in the 24 hours ending 06/01/21 1529     I had a face to face encounter and independently examined this patient on 6/1/2021, as outlined below:  PHYSICAL EXAM:  General: WD, WN. Alert, cooperative, no acute distress    EENT:  EOMI. Anicteric sclerae. MMM  Resp:  CTA bilaterally, no wheezing or rales. No accessory muscle use  CV:  Regular  rhythm,  No edema  GI:  Soft, Non distended, Non tender. +Bowel sounds  Neurologic:  Alert and oriented X 3, normal speech  Psych:   Good insight. Not anxious nor agitated  Skin:  No rashes. No jaundice    Reviewed most current lab test results and cultures  YES  Reviewed most current radiology test results   YES  Review and summation of old records today    NO  Reviewed patient's current orders and MAR    YES  PMH/SH reviewed - no change compared to H&P  ________________________________________________________________________  Care Plan discussed with:    Comments   Patient x    Family      RN x    Care Manager     Consultant                        Multidiciplinary team rounds were held today with , nursing, pharmacist and clinical coordinator. Patient's plan of care was discussed; medications were reviewed and discharge planning was addressed.      ________________________________________________________________________  Total NON critical care TIME:  35 Minutes    Total CRITICAL CARE TIME Spent:   Minutes non procedure based      Comments   >50% of visit spent in counseling and coordination of care     ________________________________________________________________________  Andreia Cano MD     Procedures: see electronic medical records for all procedures/Xrays and details which were not copied into this note but were reviewed prior to creation of Plan. LABS:  I reviewed today's most current labs and imaging studies.   Pertinent labs include:  Recent Labs     06/01/21  0352 05/31/21  1848 05/31/21  1112 05/31/21  0514 05/30/21  1324   WBC  --   --   --  8.7 11.6*   HGB 7.9* 7.6* 8.1* 7.7* 7.0*   HCT 27.0* 24.9* 26.4* 26.3* 23.4*   PLT  --   --   --  246 287     Recent Labs     05/31/21  0514 05/30/21  1619 05/30/21  1618 05/30/21  1324     --   --  131*   K 3.6 3.0*  --  3.5     --   --  95*   CO2 30  --   --  30   *  --   --  403*   BUN 8  --   --  12   CREA 0.63  --   --  0.96   CA 8.9  --   --  9.3   MG 2.0  --   --   --    ALB  --   --   --  3.1*   TBILI  --   --   --  0.2   ALT  --   --   --  25   INR  --   --  0.9  --        Signed: Andreia Cano MD

## 2021-06-01 NOTE — PROGRESS NOTES
End of Shift Note    Bedside shift change report given to [employer], RN  (oncoming nurse) by Kelly Ashton RN (offgoing nurse). Report included the following information SBAR    Shift worked:  0700 to 1900     Shift summary and any significant changes:     continue to monitor HGB, EGD completed today awaiting results, pt up adlib, possible discharge tomorrow      Concerns for physician to address:  n/a     Zone phone for oncoming shift:   [phone]       Activity:  Activity Level: Bath Room Privileges, Chair  Number times ambulated in hallways past shift: 0  Number of times OOB to chair past shift: 3    Cardiac:   Cardiac Monitoring: Yes      Cardiac Rhythm: Sinus Rhythm    Access:   Current line(s): PICC     Genitourinary:   Urinary status: voiding    Respiratory:   O2 Device: None (Room air)  Chronic home O2 use?: NO  Incentive spirometer at bedside: NO     GI:  Last Bowel Movement Date: 05/31/21  Current diet:  DIET DIABETIC WITH OPTIONS Consistent Carb 1800kcal; Regular  Passing flatus: YES  Tolerating current diet: YES       Pain Management:   Patient states pain is manageable on current regimen: YES    Skin:  Miguel Ángel Score: 23  Interventions: increase time out of bed    Patient Safety:  Fall Score:  Total Score: 1  Interventions: gripper socks       Length of Stay:  Expected LOS: 3d 0h  Actual LOS: 2180 Brittnee Silva RN

## 2021-06-01 NOTE — PROGRESS NOTES
Problem: Falls - Risk of  Goal: *Absence of Falls  Description: Document Elisabeth Skill Fall Risk and appropriate interventions in the flowsheet.   Outcome: Progressing Towards Goal  Note: Fall Risk Interventions:  Mobility Interventions: Communicate number of staff needed for ambulation/transfer, Patient to call before getting OOB         Medication Interventions: Patient to call before getting OOB, Teach patient to arise slowly

## 2021-06-01 NOTE — PROGRESS NOTES
6073 Rapid H Pylori obtained. 0836  Endoscope was pre-cleaned at bedside immediately following procedure by Audrey Cardozo endo tech. 0522  Received report from anesthesia. Assumed pt care. VSS.    [id number] Dentures returned to patient.

## 2021-06-01 NOTE — ROUTINE PROCESS
[facility]  1955  [id number]    Situation:  Verbal report received from: Ravin Macdonald  Procedure: Procedure(s):  ESOPHAGOGASTRODUODENOSCOPY (EGD)    Background:    Preoperative diagnosis: Melena [K92.1]  Postoperative diagnosis: candida esophagitis, gastritis    :  Dr. Maddy Monroe  Assistant(s): Endoscopy Technician-1: Naheed Hemphill  Endoscopy RN-1: Noelle Renner RN    Specimens:   ID Type Source Tests Collected by Time Destination   1 : gastric bx Preservative Gastric  Ismael De La Torre MD 6/1/2021 0940 Pathology   2 : distal esophagus bx Preservative Esophagus, Hernry De La Torre MD 6/1/2021 6279 Pathology   1 : distal esophageal brushing Fresh Esophagus, Distal  Ismael De La Torre MD 6/1/2021 9884 Cytology     H. Pylori  yes    Assessment:  Intra-procedure medications     Intravenous fluids: NS@ KVO     Vital signs stable     Abdominal assessment: round and soft     Recommendation:  Discharge patient per MD order.   Return to floor  Family or Friend   Permission to share finding with family or friend yes

## 2021-06-01 NOTE — PROCEDURES
NAME:  Mamadou Alexander   :   1955   MRN:   [id number]     Date/Time:  2021 9:48 AM    Esophagogastroduodenoscopy (EGD) Procedure Note    Procedure: Esophagogastroduodenoscopy with biopsy, esophageal brushings    Indication:  Iron deficiency anemia, Occult blood in stool with possible UGI origin  Pre-operative Diagnosis: see indication above  Post-operative Diagnosis: see findings below  :  Kvng Hudson MD  Referring Provider:   -Flo Larose MD; -Irasema Mortensen MD    Exam:  Airway: clear, no airway problems anticipated  Heart: RRR, without gallops or rubs  Lungs: clear bilaterally without wheezes, crackles, or rhonchi  Abdomen: soft, nontender, nondistended, bowel sounds present  Mental Status: awake, alert and oriented to person, place and time     Anethesia/Sedation:  MAC anesthesia Propofol 120mg IV  Procedure Details   After informed consent was obtained for the procedure, with all risks and benefits of procedure explained the patient was taken to the endoscopy suite and placed in the left lateral decubitus position. Following sequential administration of sedation as per above, the [id number] gastroscope was inserted into the mouth and advanced under direct vision to second portion of the duodenum. A careful inspection was made as the gastroscope was withdrawn, including a retroflexed view of the proximal stomach; findings and interventions are described below. Findings:    -Whitish plaques in esophagus suggestive of candidiasis; brushed to exclude candidal esophagitis  -Normal gastroesophageal junction; biopsied to exclude inflammation  -Diffuse mild gastric erythema (redness); biopsied to exclude Helicobacter infection and inflammation  -Normal duodenum  -No hematin noted    Therapies:  biopsy of esophagus; biopsy of stomach;  brushing for fungus  Specimens: MEENU test; #1 gastric bx, #2 distal esoph bx; brushing of esoph  EBL:  None.          Complications:   None; patient tolerated the procedure well. Impression:    -Whitish plaques in esophagus suggestive of candidiasis; brushed to exclude candidal esophagitis  -Normal gastroesophageal junction; biopsied to exclude inflammation  -Diffuse mild gastric erythema (redness); biopsied to exclude Helicobacter infection and inflammation  -Normal duodenum  -No hematin noted    Recommendations:  -Acid suppression with a proton pump inhibitor. ,   -Await pathology. ,   -Await MEENU test result and treat for Helicobacter pylori if positive. ,   -Start treatment for esophageal candidiasis with fluconazole  -If upper endoscopy biopsies are unrevealing, consider for Outpatient M2A capsule endoscopy    Discharge disposition:  To room after recovery in Jennifer Hogan MD

## 2021-06-01 NOTE — ADT AUTH CERT NOTES
Comments  Comment     Last edited by  on  at    Patient Demographics    Patient Name   Lala Kimbrough   [id number] Legal Sex   Female    1955 [facility] [address] [address] Phone   [phone] (Home)   [phone] (Mobile) *Preferred*   Patient Demographics    Patient Name   Lala Kimbrough   [id number] Legal Sex   Female    1955 [facility]   [facility] [address] [address] Phone   [phone] (Home)   [phone] (Mobile) *Preferred*   CSN:   [id number]   Admit Date: Admit Time Room Bed   May 30, 2021  2:47 PM  [[id number]]  [[id number]]   Attending Providers    Provider Pager From Magno Cardona DO  21   Madyson Latham MD  21   Angelita Morton MD  21   Loan Gallo MD  21    Emergency Contact(s)    Name Relation Home Work [address] [employer] [place] Other Relative [phone]   Alex Silverman Other Relative [phone]   Utilization Reviews       Gastrointestinal Bleeding, Upper - Care Day 3 (2021) by Ford Ballard RN       Review Entered Review Status   2021 12:38 Completed      Criteria Review      Care Day: 3 Care Date: 2021 Level of Care: Telemetry    Guideline Day 2    Level Of Care    (X) Floor    Clinical Status    (X) * Hypotension absent    2021 12:38:03 EDT by Ford Ballard      absent    (X) * Bleeding absent or reduced    Activity    (X) Activity as tolerated    2021 12:38:03 EDT by Ford Ballard      UP AD RAHUL Ordered    Routes    (X) * Oral hydration tolerated    2021 12:38:03 EDT by Ford Ballard      tolerating    (X) * Oral diet tolerated    2021 12:38:03 EDT by Ford Ballard      Diabetic w/ Options Consist Carb 1800kcal; Reg    Interventions    (X) * NG tube absent    2021 12:38:03 EDT by [employer]      No NG Tube    (X) Hgb/Hct    2021 12:38:03 EDT by [employer]      7.9/27.0    Medications    (X) Proton pump inhibitor    6/1/2021 12:38:04 EDT by Alma Mejia      Protonix 40mg IV q12h    * Milestone   Additional Notes   Day 3: 6/1      *Pending IM PN*      GI-   Esophagogastroduodenoscopy (EGD) Procedure Note       Procedure: Esophagogastroduodenoscopy with biopsy, esophageal brushings       Indication:       Iron deficiency anemia, Occult blood in stool with possible UGI origin   Pre-operative Diagnosis: see indication above   Post-operative Diagnosis: see findings below   : Roselia Galvan MD   Referring Provider:   -Bianca Arnold MD; -Toño Amin MD       Exam:   Airway: clear, no airway problems anticipated   Heart: RRR, without gallops or rubs   Lungs: clear bilaterally without wheezes, crackles, or rhonchi   Abdomen: soft, nontender, nondistended, bowel sounds present   Mental Status: awake, alert and oriented to person, place and time        Anethesia/Sedation: [facility] anesthesia Propofol 120mg IV                                                                                                                                                                       Findings:     -Whitish plaques in esophagus suggestive of candidiasis; brushed to exclude candidal esophagitis   -Normal gastroesophageal junction; biopsied to exclude inflammation   -Diffuse mild gastric erythema (redness); biopsied to exclude Helicobacter infection and inflammation   -Normal duodenum   -No hematin noted       Therapies:  biopsy of esophagus; biopsy of stomach;  brushing for fungus   Specimens: MEENU test; #1 gastric bx, #2 distal esoph bx; brushing of esoph   EBL:  None.          Complications:   None; patient tolerated the procedure well.             Impression:     -Whitish plaques in esophagus suggestive of candidiasis; brushed to exclude candidal esophagitis   -Normal gastroesophageal junction; biopsied to exclude inflammation   -Diffuse mild gastric erythema (redness); biopsied to exclude Helicobacter infection and inflammation   -Normal duodenum   -No hematin noted       Recommendations:   -Acid suppression with a proton pump inhibitor. ,    -Await pathology. ,    -Await MEENU test result and treat for Helicobacter pylori if positive. ,    -Start treatment for esophageal candidiasis with fluconazole   -If upper endoscopy biopsies are unrevealing, consider for Outpatient M2A capsule endoscopy      Gastrointestinal Bleeding, Upper - Care Day 2 (5/31/2021) by Antoinette Molina RN       Review Entered Review Status   6/1/2021 11:35 Completed      Criteria Review      Care Day: 2 Care Date: 5/31/2021 Level of Care: Telemetry    Guideline Day 2    Level Of Care    (X) Floor    6/1/2021 11:35:01 EDT by Antoinette Molina      Tele Fl    Clinical Status    (X) * Hypotension absent    6/1/2021 11:35:01 EDT by Antoinette Molina      Absent    (X) * Bleeding absent or reduced    6/1/2021 11:35:01 EDT by Antoinette Molina      No brisk clinical bleeding noticed    Activity    (X) Activity as tolerated    6/1/2021 11:35:01 EDT by Antoinette Molina      UP AD RAHUL ordered    Routes    ( ) * Oral hydration tolerated    6/1/2021 11:35:02 EDT by Renee Nephew ORdered    ( ) * Oral diet tolerated    6/1/2021 11:35:02 EDT by Antoinette Molina      NPO Ordered    Interventions    (X) * NG tube absent    6/1/2021 11:35:02 EDT by Lawanda Boyer absent    (X) Hgb/Hct    6/1/2021 11:35:02 EDT by Antoinette Molina      7.7/26.3  8.1/26.4  7.6/24.9    Medications    (X) Proton pump inhibitor    6/1/2021 11:35:02 EDT by Antoinette Molina      Protonix 40mg IV q12h    ( ) Antibiotic prophylaxis in cirrhotic patient    6/1/2021 11:35:02 EDT by Antoinette Molina      NA    * Milestone   Additional Notes   Day 2: 5/31      IM-   Subjective:       Chief Complaint / Reason for Physician Visit   Continues to report fatigue and exertional dyspnea.  Denies any melena but reports chronic black stools and chronic upper abdominal pain\".        PHYSICAL EXAM: General:          WD, WN. Alert, cooperative, no acute distress     EENT:              EOMI. Anicteric sclerae. MMM   Resp:               CTA bilaterally, no wheezing or rales.  No accessory muscle use   CV:                  Regular  rhythm,  No edema   GI:                   Soft, Non distended, mild epigastric tenderness.  +Bowel sounds   Neurologic:      Alert and oriented X 3, normal speech,    Psych:             Good insight. Not anxious nor agitated   Skin:                No rashes.  No jaundice      Vitals:  98.1 °F (36.7 °C) 101 152/75  18 100 % Room air      Labs:  Anion gap 3 Glucose 190  Folate 42.3  Iron 15  Iron % saturation 5       Meds:   Norvasc 10mg PO qd, Brovana 15mcg NEB BID, Pulmicort 250mcg NEB BID, Lipitor 20mg PO HS, Zyrtec 10mg PO qd, ferrous sulfate 325mg PO qd, Lantus 20units SC qd, Humalog SSI SC QID (15 units given), Venofer 300mg IV q24h, Singulair 10mg PO HS      IM-   Assessment / Plan:       Upper GI bleed, symptomatic   Acute on chronic blood loss anemia   Iron deficiency anemia   Chronic abdominal pain   Patient continues to report severe exertional dyspnea and fatigue   Was noticed to have hemoglobin of 7 on admission   Given 1 unit PRBC transfusion with hemoglobin improving to 7.7   No brisk clinical bleeding noticed, has chronic black stools and chronic abdominal pain   Anemia panel reveals severe iron deficiency   We will start IV iron replacement therapy   Appreciate GI consultation, plan for EGD tomorrow   Continue PPI   Hold patient aspirin and Plavix, need to be restarted before discharge as she is taking it for PAD status post bypass       Hyperglycemia in setting of diabetes mellitus   Hypertension   We will hold HCTZ and labetalol as latest blood pressure was soft   Continue with Norvasc   Blood sugar on BMP was 403, status post insulin regular 10 units on admission we will continue with sliding scale insulin ,continue Lantus 20 units   Hypokalemia   Replaced   COPD Tobacco abuse   PAD status post bypass   Continue with home inhalers with pharmacy substitution   Nicotine patch ordered, patient counseled about tobacco cessation   Patient takes Plavix and aspirin, on hold for GI bleed   History of colon mass status post hemicolectomy      GI Consult-   REASON FOR CONSULTATION:  Anemia.       HISTORY OF PRESENT ILLNESS:  The patient is a 63-year-old woman with a past medical history of diabetes, hypertension, asthma, peripheral artery disease status post bypass maintained on aspirin and Plavix, continued tobacco abuse, who presents with shortness of breath over the last month.  She has also had black melanotic stools off and on for the last probably 2 months.  The patient was admitted to [address] [place] [address] in April for abdominal pain, had acute blood loss anemia.  She was transfused, had upper endoscopy there which showed only gastritis.  The patient had previously undergone hemicolectomy in 2019 at [address] [place] [address] for stricture.  She had intermittent abdominal pain even since that time. Lynn Monroe has lost about 45 pounds in the last 2 years.  The chronic abdominal pain is intermittent in both the right and left lower quadrants and sharp.  Bending makes it worse, does not seem to be related to food intake at all. Lynn Monroe has had some nausea, but no vomiting.  She has been constipated. Lynn Monroe has had no bright red blood per rectum.  She apparently saw Dr. Gladis Chahal last week in the office and hemoglobin was low as 7.3.  She was contacted and told to come to [facility] for admission and further evaluation.  The patient last underwent colonoscopy in 07/2020 for diarrhea and family history of colonic polyps.  This showed a normal colonic anastomosis at 30 cm. Jasmin Coley prior colonic surgery was in 09/2019 by Dr. Britta Garcia had a prior abdominal wall mesh removal and small bowel repair by Dr. Katalina Harp in [address] [place] [address] in 08/2019 when she had a worsening acute on chronic large bowel obstruction. [facility] was at [facility] and on 09/01/2019, Dr. Dewey Hill performed exploratory laparotomy with extended right hemicolectomy into the mid transverse colon, resection with ileocolic anastomosis, extensive pelvic adhesiolysis of the small bowel, resection of the terminal ileum approximately 15-18 cm due to vascular compromise and removal of previous abdominal wall scar.  On presentation to the emergency department at [facility] yesterday, hemoglobin was 7.0, hematocrit 23.  Today, hemoglobin 7.7, hematocrit 26.3.  Stool has not been retested for occult blood.  CT scan of the abdomen and pelvis demonstrates postsurgical changes with prior right hemicolectomy with sigmoid anastomosis, but no acute findings. PHYSICAL EXAMINATION:   GENERAL:  Shows to be a well-developed, well-nourished woman, in no acute distress. VITAL SIGNS: Brendon Sebastian is afebrile. SKIN:  No rash or jaundice. HEENT:  Sclerae anicteric.  Oropharynx is clear. NECK:  Supple without JVD. LUNGS:  Clear to auscultation. CARDIAC:  Regular rate and rhythm. ABDOMEN:  Soft.  Benign.       LABORATORY STUDIES:  WBC 8.7, hemoglobin 7.7, hematocrit 26.3, platelet count 822,976.  Serum chemistries are unremarkable.  Iron saturation 6%, ferritin is 13.       IMPRESSION:   1.  Melena versus black stool from iron. 2.  Iron deficient anemia. 3.  Chronic abdominal pain. 4.  Status post extended right colectomy with sigmoid anastomosis for stricture and adhesions.       PLAN:   1.  Upper endoscopy will be performed tomorrow by Dr. Rosamaria Christensen. 2.  May need Pillcam depending upon results of upper endoscopy.

## 2021-06-01 NOTE — ANESTHESIA PREPROCEDURE EVALUATION
Anesthetic History   No history of anesthetic complications            Review of Systems / Medical History  Patient summary reviewed, nursing notes reviewed and pertinent labs reviewed    Pulmonary          Smoker (tobacco)  Asthma     Comments: Current Every Day Smoker - 5 pack years   Neuro/Psych         Psychiatric history (schizophrenia)    Comments: Cervical stenosis of spinal canal  Hx Schizophrenia  Cardiovascular    Hypertension          PAD and hyperlipidemia    Exercise tolerance: >4 METS  Comments: AAA 3 to 5 cm   GI/Hepatic/Renal               Comments: Hx Large Bowel obstruction  Hx pancreatitis  Hx Intractable epigastric abdominal pain  Hx Intractable abdominal pain  Hx Vomiting  Hx Diarrhea  Hx GI bleed     Endo/Other    Diabetes    Obesity, arthritis and anemia     Other Findings   Comments: Hb 7.6           Physical Exam    Airway  Mallampati: III  TM Distance: 4 - 6 cm  Neck ROM: normal range of motion   Mouth opening: Normal     Cardiovascular  Regular rate and rhythm,  S1 and S2 normal,  no murmur, click, rub, or gallop             Dental      Comments: Multiple missing teeth.   Denies any loose teeth   Pulmonary  Breath sounds clear to auscultation               Abdominal  GI exam deferred       Other Findings            Anesthetic Plan    ASA: 3  Anesthesia type: total IV anesthesia          Induction: Intravenous  Anesthetic plan and risks discussed with: Patient

## 2021-06-01 NOTE — PROGRESS NOTES
0700: End of Shift Note    Bedside shift change report given to Aretha Spence RN (oncoming nurse) by Victorina Gallagher (offgoing nurse). Report included the following information SBAR    Shift worked:  Night     Shift summary and any significant changes:     Patient has been NPO since midnight     Concerns for physician to address:       Zone phone for oncoming shift:          Activity:  Activity Level: Up ad alyse  Number times ambulated in hallways past shift: 0  Number of times OOB to chair past shift: 2    Cardiac:   Cardiac Monitoring: Yes      Cardiac Rhythm: Sinus Rhythm    Access:   Current line(s): PIV     Genitourinary:   Urinary status: voiding    Respiratory:   O2 Device: None (Room air)  Chronic home O2 use?: NO  Incentive spirometer at bedside: YES     GI:  Last Bowel Movement Date: 05/29/21  Current diet:  DIET NPO  Passing flatus: YES  Tolerating current diet: YES       Pain Management:   Patient states pain is manageable on current regimen: YES    Skin:  Miguel Ángel Score: 22  Interventions: increase time out of bed    Patient Safety:  Fall Score:  Total Score: 1  Interventions: gripper socks and pt to call before getting OOB       Length of Stay:  Expected LOS: - - -  Actual LOS: 2      Romelia Aponte Eye

## 2021-06-01 NOTE — PROGRESS NOTES
Reason for Admission: GI Bleed                    RUR Score: 26%                 PCP: First and Last name:   Leticia Mohan MD     Name of Practice:    Are you a current patient: Yes/No: yes   Approximate date of last visit: May 28th, 2021   Can you participate in a virtual visit if needed:     Do you (patient/family) have any concerns for transition/discharge? Not at this time                Plan for utilizing home health:   Not anticipated    Current Advanced Directive/Advance Care Plan:  Full Code Advance Care Planning     General Advance Care Planning (ACP) Conversation      Date of Conversation: 6/1/21  Conducted with: Patient with Decision Making Capacity    Healthcare Decision Maker:     Primary Decision Maker: Alonzo Cage - Other Relative - [phone]    Secondary Decision Maker: Jonatanstephie Dia - Other Relative - [phone]  Click here to complete [address] including selection of the [address] Relationship (ie \"Primary\")      Content/Action Overview:   DECLINED ACP conversation - will revisit periodically     Length of Voluntary ACP Conversation in minutes:  16 minutes    [address]:   Click here to complete [address] including selection of the [address] Relationship (ie \"Primary\")            Primary Decision Maker: Polina James - Other Relative - [phone]    Secondary Decision Maker: Jonatanstephie Dia - Other Relative - [phone]    Transition of Care Plan: CM met with pt at bedside. Prior to admission, pt was independent w/ADL/IADL to include driving. Boyfriend assist pt as needed. Patient has had [facility] in the past, but unable to remember provider. No history of Rehab. DME use includes, walker & cane. Patient reports boyfriend as primary support, but has supportive cousin & niece as well. CM will continue to follow.     PCP-  MD Parker De La [address] 74 in [address] Management Interventions  PCP Verified by CM: Yes (Aisha Mi MD)  Last Visit to PCP: 05/28/21  Mode of Transport at Discharge: Other (see comment) (Lupe chavez)  Transition of Care Consult (CM Consult): Discharge Planning  Discharge Durable Medical Equipment: No (owns a walker & cane)  Physical Therapy Consult: No  Occupational Therapy Consult: No  Speech Therapy Consult: No  Current Support Network: Other (Lives w/boyfriend, in a one story home w/3 [address])  Confirm Follow Up Transport: Self  Discharge Location  Discharge Placement:  (Anticipate Home w/family)      Leah Ivan  Ext [id number]    Transition of Care Plan:    RUR:26%  Disposition:Home w/boyfriend  Follow up appointments:  DME needed:n/a  Transportation at [address], Aleena Lips or means to access home:        IM Medicare letter:2nd IM needed  [address], [phone]  Discharge Caregiver contacted prior to discharge?

## 2021-06-02 VITALS
HEART RATE: 82 BPM | OXYGEN SATURATION: 99 % | TEMPERATURE: 98.4 F | WEIGHT: 173.5 LBS | SYSTOLIC BLOOD PRESSURE: 123 MMHG | DIASTOLIC BLOOD PRESSURE: 58 MMHG | BODY MASS INDEX: 26.38 KG/M2 | RESPIRATION RATE: 17 BRPM

## 2021-06-02 LAB
ANION GAP SERPL CALC-SCNC: 4 MMOL/L (ref 5–15)
BASOPHILS # BLD: 0.1 K/UL (ref 0–0.1)
BASOPHILS NFR BLD: 1 % (ref 0–1)
BUN SERPL-MCNC: 6 MG/DL (ref 6–20)
BUN/CREAT SERPL: 9 (ref 12–20)
CALCIUM SERPL-MCNC: 8.3 MG/DL (ref 8.5–10.1)
CHLORIDE SERPL-SCNC: 110 MMOL/L (ref 97–108)
CO2 SERPL-SCNC: 25 MMOL/L (ref 21–32)
COMMENT, HOLDF: NORMAL
CREAT SERPL-MCNC: 0.69 MG/DL (ref 0.55–1.02)
DIFFERENTIAL METHOD BLD: ABNORMAL
EOSINOPHIL # BLD: 0.4 K/UL (ref 0–0.4)
EOSINOPHIL NFR BLD: 4 % (ref 0–7)
ERYTHROCYTE [DISTWIDTH] IN BLOOD BY AUTOMATED COUNT: 19.6 % (ref 11.5–14.5)
GLUCOSE BLD STRIP.AUTO-MCNC: 197 MG/DL (ref 65–117)
GLUCOSE SERPL-MCNC: 184 MG/DL (ref 65–100)
HCT VFR BLD AUTO: 25.1 % (ref 35–47)
HGB BLD-MCNC: 7.3 G/DL (ref 11.5–16)
IMM GRANULOCYTES # BLD AUTO: 0.1 K/UL (ref 0–0.04)
IMM GRANULOCYTES NFR BLD AUTO: 1 % (ref 0–0.5)
LYMPHOCYTES # BLD: 1.9 K/UL (ref 0.8–3.5)
LYMPHOCYTES NFR BLD: 19 % (ref 12–49)
MCH RBC QN AUTO: 23.5 PG (ref 26–34)
MCHC RBC AUTO-ENTMCNC: 29.1 G/DL (ref 30–36.5)
MCV RBC AUTO: 80.7 FL (ref 80–99)
MONOCYTES # BLD: 0.7 K/UL (ref 0–1)
MONOCYTES NFR BLD: 7 % (ref 5–13)
NEUTS SEG # BLD: 7.1 K/UL (ref 1.8–8)
NEUTS SEG NFR BLD: 68 % (ref 32–75)
NRBC # BLD: 0.04 K/UL (ref 0–0.01)
NRBC BLD-RTO: 0.4 PER 100 WBC
PLATELET # BLD AUTO: 263 K/UL (ref 150–400)
PMV BLD AUTO: 10.6 FL (ref 8.9–12.9)
POTASSIUM SERPL-SCNC: 3.6 MMOL/L (ref 3.5–5.1)
RBC # BLD AUTO: 3.11 M/UL (ref 3.8–5.2)
SAMPLES BEING HELD,HOLD: NORMAL
SERVICE CMNT-IMP: ABNORMAL
SODIUM SERPL-SCNC: 139 MMOL/L (ref 136–145)
WBC # BLD AUTO: 10.1 K/UL (ref 3.6–11)

## 2021-06-02 PROCEDURE — 74011250637 HC RX REV CODE- 250/637: Performed by: INTERNAL MEDICINE

## 2021-06-02 PROCEDURE — 82962 GLUCOSE BLOOD TEST: CPT

## 2021-06-02 PROCEDURE — C9113 INJ PANTOPRAZOLE SODIUM, VIA: HCPCS | Performed by: INTERNAL MEDICINE

## 2021-06-02 PROCEDURE — 36415 COLL VENOUS BLD VENIPUNCTURE: CPT

## 2021-06-02 PROCEDURE — 74011636637 HC RX REV CODE- 636/637: Performed by: INTERNAL MEDICINE

## 2021-06-02 PROCEDURE — 74011250636 HC RX REV CODE- 250/636: Performed by: INTERNAL MEDICINE

## 2021-06-02 PROCEDURE — 94640 AIRWAY INHALATION TREATMENT: CPT

## 2021-06-02 PROCEDURE — 74011000250 HC RX REV CODE- 250: Performed by: INTERNAL MEDICINE

## 2021-06-02 PROCEDURE — 80048 BASIC METABOLIC PNL TOTAL CA: CPT

## 2021-06-02 PROCEDURE — 85025 COMPLETE CBC W/AUTO DIFF WBC: CPT

## 2021-06-02 RX ORDER — FLUCONAZOLE 100 MG/1
100 TABLET ORAL DAILY
Qty: 18 TABLET | Refills: 0 | Status: SHIPPED | OUTPATIENT
Start: 2021-06-03 | End: 2021-06-21

## 2021-06-02 RX ORDER — PANTOPRAZOLE SODIUM 40 MG/1
40 TABLET, DELAYED RELEASE ORAL 2 TIMES DAILY
Qty: 60 TABLET | Refills: 0 | Status: SHIPPED | OUTPATIENT
Start: 2021-06-02 | End: 2021-06-09 | Stop reason: SDUPTHER

## 2021-06-02 RX ADMIN — CETIRIZINE HYDROCHLORIDE 10 MG: 10 TABLET, FILM COATED ORAL at 09:28

## 2021-06-02 RX ADMIN — INSULIN LISPRO 2 UNITS: 100 INJECTION, SOLUTION INTRAVENOUS; SUBCUTANEOUS at 07:30

## 2021-06-02 RX ADMIN — AMLODIPINE BESYLATE 10 MG: 5 TABLET ORAL at 09:27

## 2021-06-02 RX ADMIN — ARFORMOTEROL TARTRATE 15 MCG: 15 SOLUTION RESPIRATORY (INHALATION) at 09:32

## 2021-06-02 RX ADMIN — BUDESONIDE 250 MCG: 0.25 INHALANT RESPIRATORY (INHALATION) at 09:32

## 2021-06-02 RX ADMIN — SODIUM CHLORIDE 40 MG: 9 INJECTION, SOLUTION INTRAMUSCULAR; INTRAVENOUS; SUBCUTANEOUS at 09:28

## 2021-06-02 RX ADMIN — Medication 10 ML: at 06:00

## 2021-06-02 RX ADMIN — HYDROCHLOROTHIAZIDE 25 MG: 25 TABLET ORAL at 09:27

## 2021-06-02 RX ADMIN — FLUCONAZOLE 100 MG: 100 TABLET ORAL at 09:27

## 2021-06-02 RX ADMIN — FERROUS SULFATE TAB 325 MG (65 MG ELEMENTAL FE) 325 MG: 325 (65 FE) TAB at 09:27

## 2021-06-02 RX ADMIN — INSULIN GLARGINE 20 UNITS: 100 INJECTION, SOLUTION SUBCUTANEOUS at 09:24

## 2021-06-02 RX ADMIN — LABETALOL HYDROCHLORIDE 100 MG: 100 TABLET, FILM COATED ORAL at 09:27

## 2021-06-02 NOTE — PROGRESS NOTES
Problem: Falls - Risk of  Goal: *Absence of Falls  Description: Document David Martins Fall Risk and appropriate interventions in the flowsheet.   Outcome: Progressing Towards Goal  Note: Fall Risk Interventions:  Mobility Interventions: Communicate number of staff needed for ambulation/transfer, Patient to call before getting OOB         Medication Interventions: Patient to call before getting OOB, Teach patient to arise slowly                   Problem: Patient Education: Go to Patient Education Activity  Goal: Patient/Family Education  Outcome: Progressing Towards Goal     Problem: Breathing Pattern - Ineffective  Goal: *Absence of hypoxia  Outcome: Progressing Towards Goal

## 2021-06-02 NOTE — ADT AUTH CERT NOTES
Comments  Comment     Last edited by  on  at    Patient Demographics    Patient Name   Bello Sanabria   [id number] Legal Sex   Female    1955 Address   [address] [facility] [address] [address] Phone   [phone] (Home)   [phone] (Mobile) *Preferred*   Patient Demographics    Patient Name   Bello Sanabria   [id number] Legal Sex   Female    1955 Address   [address] [facility] [address] [address] Phone   [phone] (Home)   [phone] (Mobile) *Preferred*   CSN:   [id number]   Admit Date: Admit Time Room Bed   May 30, 2021  2:47 PM  [[id number]]  [[id number]]   Attending Providers    Provider Pager From To   Arianna Etienne DO  21   Kee Nguyen MD  21   Scarlett Opitz, MD  21   Shama Cheatham MD  21   Emergency Contact(s)    Name Relation Home Work [address] Other Relative [phone]   Carlos Stone Other Relative [phone]   Utilization Reviews       Additional Clinical Requested by Galina Millan RN       Review Entered Review Status   2021 12:04 In Primary      Criteria Review        GI-     Melena vs black stools from iron  BELINDA  Chronic abdominal pain  · EGD with Dr. Hardeep Joseph (2021) showed whitish plaques in esophagus suggestive of candidiasis; brushed to exclude candidal esophagitis. Normal gastroesophageal junction; biopsied to exclude inflammation. Diffuse mild gastric erythema (redness); biopsied to exclude Helicobacter infection and inflammation. Normal duodenum. No hematin noted  · Hgb 7.3      Plan:      · Continue PPI   · Follow pathology  · Continue fluconazole for esophageal candidiasis   · Needs outpatient follow-up for possible outpatient M2A capsule endoscopy; case management to schedule prior to discharge  · Discharge planning per primary team   · Symptomatic care per primary team          IM-     Discharge Summary              Date of Admission: 5/30/2021  Date of Discharge: 6/2/2021  Attending Physician: Rosario Baer MD     Discharge Diagnosis:   Esophageal candidiasis   Acute on chronic blood loss anemia due to upper GI bleed  Iron deficiency anemia  Chronic abdominal pain  Hyperglycemia in setting of diabetes mellitus  HTN  COPD  Tobacco abuse  PAD status post bypass     Consultations:  IP CONSULT TO GASTROENTEROLOGY     Brief Admission History/Reason for Admission Per Jeremias Reid MD:    63 y.o.   female past medical history of diabetes, hypertension, asthma, PAD that is post bypass on aspirin and Plavix, tobacco abuse who presents with shortness of breath associated with melanotic stool.  Patient was a recently admitted at [facility] in April for abdominal pain, with was found to have acute blood loss anemia, received blood transfusion and had a EGD which showed gastritis  Patient reported that since her hemicolectomy in 2019 for colon mass, she has been feeling abdominal pain and has had dark stools.  No reported loss of appetite and weight loss  45 pounds within 2 years  ahe presented today, because of her shortness of breath upon exertion, she described chronic abdominal pain, intermittent on the right lower quadrant and left lower quadrant which she describes as sharp.  She reported that bending makes it worse, no relationship of the pain with food intake.   She admits to nausea, denies vomiting, reported some constipation  Her vital signs are stable, labs revealed hemoglobin of 7.0 mildly elevated white blood cell count  CT abdomen and pelvis did not show any acute pathology  We were asked to admit for work up and evaluation of the above problems.     Brief Hospital Course by Main Problems:   Upper GI bleed, symptomatic  Acute on chronic blood loss anemia  Iron deficiency anemia  Chronic abdominal pain  S/p 1 unit prbc.  Pt underwent EGD on 6/1,  Findings suggestive of candidiasis.  Biopsy with result pending.  Hgb is low but is stable.  Will cont' cont' PPI, fluconazole as prescribed.  Resume home meds.  Cont' PO iron suppl.  F/u with PCP in 3-5 days for repeat CBC.  F/u with GI for pill cam study outpt.        Hyperglycemia in setting of diabetes mellitus   Continue lantus, SSI     Hypertension  B elevated, resume PTA meds     Hypokalemia, repleted.     COPD  Tobacco abuse  PAD status post bypass  Continue with home inhalers with pharmacy substitution  Nicotine patch ordered, patient counseled about tobacco cessation  Cont Plavix and aspirin.  Had recent bypass in Dec 2020.        Discharge Exam:  Patient seen and examined by me on discharge day. Pertinent Findings:  Visit Vitals  BP (!) 123/58   Pulse 82   Temp 98.4 °F (36.9 °C)   Resp 17   Wt 78.7 kg (173 lb 8 oz)   SpO2 99%   Breastfeeding No   BMI 26.38 kg/m²      Gen:    Not in distress  Chest:  Clear lungs  CVS:    Regular rhythm.  No edema  Abd:     Soft, not distended, not tender     Discharge/Recent Laboratory Results:          Recent Labs     06/02/21  0406 05/31/21  0514    136   K 3.6 3.6   * 103   CO2 25 30   BUN 6 8   * 190*   CA 8.3* 8.9   MG  --  2.0            Recent Labs     06/02/21  0406   HGB 7.3*   HCT 25.1*   WBC 10.1             IM Progress Notes 6/1 by Arvind Caban RN       Review Entered Review Status   6/2/2021 12:02 In Primary      Criteria Review   Assessment / Plan:  Upper GI bleed, symptomatic  Acute on chronic blood loss anemia  Iron deficiency anemia  Chronic abdominal pain  S/p 1 unit prbc  EGD on 6/1,  Findings suggestive of candidiasis.  Biopsy with result pending.   Cont' PPI, fluconazole started  Trend H/H, transfuse prn  Cont' IV iron  Asa/plavix held  GI following     Hyperglycemia in setting of diabetes mellitus  Continue lantus, SSI     Hypertension  B elevated, resume PTA meds     Hypokalemia, repleted.     COPD  Tobacco abuse  PAD status post bypass  Continue with home inhalers with pharmacy substitution  Nicotine patch ordered, patient counseled about tobacco cessation  Patient takes Plavix and aspirin, on hold for GI bleed     History of colon mass status post hemicolectomy      PHYSICAL EXAM:  General:          WD, WN. Alert, cooperative, no acute distress    EENT:              EOMI. Anicteric sclerae.  MMM  Resp:               CTA bilaterally, no wheezing or rales.  No accessory muscle use  CV:                  Regular  rhythm,  No edema  GI:                   Soft, Non distended, Non tender.  +Bowel sounds  Neurologic:       Alert and oriented X 3, normal speech  Psych:   Good insight. Not anxious nor agitated  Skin:                No rashes.  No jaundice

## 2021-06-02 NOTE — PROGRESS NOTES
IV has been removed. Pt discharged per MD order. Pt has all personal belongings, prescriptions, and discharge instructions. Discharge instructions gone over with pt. Pt does not have any further questions regarding discharge. Pt aware of follow up appointments. Tele box returned.

## 2021-06-02 NOTE — PROGRESS NOTES
Transition of Care Plan:     RUR:30%  Disposition:Home w/boyfriend  Follow up appointments:on AVS  DME needed:n/a  Transportation at [address], [place] Pat[place] or means to access home:   yes     IM Medicare letter:2nd IM provided 6/2  Marjangia Katz [phone]  Discharge Caregiver contacted prior to discharge? Patient is discharging home today without any needs or concerns. CM provided pt w/private caregiver list.  CM will complete [facility] today. Patient is ready for d/c from CM standpoint. Care Management Interventions  PCP Verified by CM: Yes (Sundar Julian MD)  Last Visit to PCP: 05/28/21  Mode of Transport at Discharge: Other (see comment) (niece, Mirza Graft)  Transition of Care Consult (CM Consult): Discharge Planning  Discharge Durable Medical Equipment: No (owns a walker & cane)  Physical Therapy Consult: No  Occupational Therapy Consult: No  Speech Therapy Consult: No  Current Support Network:  Other (Lives w/boyfriend, in a one story home w/3 STE)  Confirm Follow Up Transport: Self  Discharge Location  Discharge Placement: Home with family assistance      Leanord Font  Ext [phone]

## 2021-06-02 NOTE — DISCHARGE SUMMARY
Discharge Summary      Name: Maxx Jauregui  [id number]  YOB: 1955 (Age: 63 y.o.)   Date of Admission: 5/30/2021  Date of Discharge: 6/2/2021  Attending Physician: Jake Parikh MD    Discharge Diagnosis:   Esophageal candidiasis   Acute on chronic blood loss anemia due to upper GI bleed  Iron deficiency anemia  Chronic abdominal pain  Hyperglycemia in setting of diabetes mellitus  HTN  COPD  Tobacco abuse  PAD status post bypass    Consultations:  IP CONSULT TO GASTROENTEROLOGY    Brief Admission History/Reason for Admission Per Halima Stevens MD:   Pili Garcia is a 63 y.o.  female past medical history of diabetes, hypertension, asthma, PAD that is post bypass on aspirin and Plavix, tobacco abuse who presents with shortness of breath associated with melanotic stool. Patient was a recently admitted at [facility] in April for abdominal pain, with was found to have acute blood loss anemia, received blood transfusion and had a EGD which showed gastritis  Patient reported that since her hemicolectomy in 2019 for colon mass, she has been feeling abdominal pain and has had dark stools. No reported loss of appetite and weight loss  45 pounds within 2 years  ahe presented today, because of her shortness of breath upon exertion, she described chronic abdominal pain, intermittent on the right lower quadrant and left lower quadrant which she describes as sharp. She reported that bending makes it worse, no relationship of the pain with food intake. She admits to nausea, denies vomiting, reported some constipation  Her vital signs are stable, labs revealed hemoglobin of 7.0 mildly elevated white blood cell count  CT abdomen and pelvis did not show any acute pathology  We were asked to admit for work up and evaluation of the above problems.     Brief Hospital Course by Main Problems:   Upper GI bleed, symptomatic  Acute on chronic blood loss anemia  Iron deficiency anemia  Chronic abdominal pain  S/p 1 unit prbc. Pt underwent EGD on 6/1,  Findings suggestive of candidiasis. Biopsy with result pending. Hgb is low but is stable. Will cont' cont' PPI, fluconazole as prescribed. Resume home meds. Cont' PO iron suppl. F/u with PCP in 3-5 days for repeat CBC. F/u with GI for pill cam study outpt.        Hyperglycemia in setting of diabetes mellitus   Continue lantus, SSI     Hypertension  B elevated, resume PTA meds     Hypokalemia, repleted.     COPD  Tobacco abuse  PAD status post bypass  Continue with home inhalers with pharmacy substitution  Nicotine patch ordered, patient counseled about tobacco cessation  Cont Plavix and aspirin. Had recent bypass in Dec 2020.       Discharge Exam:  Patient seen and examined by me on discharge day. Pertinent Findings:  Visit Vitals  BP (!) 123/58   Pulse 82   Temp 98.4 °F (36.9 °C)   Resp 17   Wt 78.7 kg (173 lb 8 oz)   SpO2 99%   Breastfeeding No   BMI 26.38 kg/m²     Gen:    Not in distress  Chest: Clear lungs  CVS:   Regular rhythm. No edema  Abd:  Soft, not distended, not tender    Discharge/Recent Laboratory Results:  Recent Labs     06/02/21  0406 05/31/21  0514    136   K 3.6 3.6   * 103   CO2 25 30   BUN 6 8   * 190*   CA 8.3* 8.9   MG  --  2.0     Recent Labs     06/02/21  0406   HGB 7.3*   HCT 25.1*   WBC 10.1          Discharge Medications:  Current Discharge Medication List      START taking these medications    Details   fluconazole (DIFLUCAN) 100 mg tablet Take 1 Tablet by mouth daily for 18 days. FDA advises cautious prescribing of oral fluconazole in pregnancy. Indications: a fungal infection in the esophagus due to Candida  Qty: 18 Tablet, Refills: 0  Start date: 6/3/2021, End date: 6/21/2021      pantoprazole (PROTONIX) 40 mg tablet Take 1 Tablet by mouth two (2) times a day.   Qty: 60 Tablet, Refills: 0  Start date: 6/2/2021         CONTINUE these medications which have NOT CHANGED Details   dicyclomine (BENTYL) 10 mg capsule Take 10 mg by mouth three (3) times daily as needed. fluticasone propion-salmeteroL (Advair Diskus) 250-50 mcg/dose diskus inhaler Take 1 Puff by inhalation every twelve (12) hours. Qty: 1 Inhaler, Refills: 11      HYDROcodone-acetaminophen (NORCO) 5-325 mg per tablet Take 1 Tab by mouth every six (6) hours as needed. alum-mag hydroxide-simeth (MYLANTA) 200-200-20 mg/5 mL susp Take 30 mL by mouth as needed. amitriptyline (ELAVIL) 25 mg tablet Take 1 Tab by mouth nightly. Qty: 30 Tab, Refills: 4    Associated Diagnoses: Primary insomnia      ferrous sulfate 324 mg (65 mg iron) tablet Take 1 Tab by mouth daily (with breakfast). Qty: 30 Tab, Refills: 0      Omeprazole delayed release (PRILOSEC D/R) 20 mg tablet Take 1 Tab by mouth daily. Qty: 30 Tab, Refills: 1    Associated Diagnoses: Chest pain at rest      hydroCHLOROthiazide (HYDRODIURIL) 25 mg tablet Take 1 Tab by mouth daily. Qty: 30 Tab, Refills: 1    Associated Diagnoses: Essential hypertension      montelukast (SINGULAIR) 10 mg tablet TAKE 1 TABLET BY MOUTH DAILY AT BEDTIME  Qty: 30 Tab, Refills: 3      clopidogreL (PLAVIX) 75 mg tab       levocetirizine (XYZAL) 5 mg tablet Take 1 Tab by mouth daily. Indications: itching of skin  Qty: 90 Tab, Refills: 4    Associated Diagnoses: Itchy skin      labetaloL (NORMODYNE) 100 mg tablet Take 1 Tab by mouth two (2) times a day. Indications: Bood pressure  Qty: 180 Tab, Refills: 4    Associated Diagnoses: Essential hypertension      diphenhydrAMINE (BenadryL) 25 mg capsule Take 25 mg by mouth every six (6) hours as needed. simvastatin (ZOCOR) 40 mg tablet TAKE 1 TABLET BY MOUTH EVERY NIGHT AT BEDTIME  Qty: 90 Tab, Refills: 4      BD Single Use Swabs Regular padm APPLY  EXTERNALLY EVERY DAY  Qty: 100 Pad, Refills: 5      ascorbic acid, vitamin C, (Vitamin C) 500 mg tablet Take 1 Tab by mouth two (2) times a day.   Qty: 24 Tab, Refills: 0      zinc sulfate 220 mg tablet Take 1 Tab by mouth two (2) times a day. Qty: 24 Tab, Refills: 0      albuterol (PROVENTIL HFA, VENTOLIN HFA, PROAIR HFA) 90 mcg/actuation inhaler Take 2 Puffs by inhalation every four (4) hours as needed for Wheezing. Qty: 1 Inhaler, Refills: 0      multivitamin with iron tablet Take 1 Tab by mouth daily. amLODIPine (NORVASC) 10 mg tablet TAKE 1 TABLET BY MOUTH EVERY DAY  Qty: 90 Tab, Refills: 2    Associated Diagnoses: Essential hypertension      lancets (TRUEplus Lancets) 28 gauge misc TEST BLOOD SUGAR EVERY DAY  Qty: 100 Lancet, Refills: 5      glucose blood VI test strips (True Metrix Glucose Test Strip) strip TEST BLOOD SUGAR EVERY DAY  Qty: 100 Strip, Refills: 5      insulin glargine (Lantus Solostar U-100 Insulin) 100 unit/mL (3 mL) inpn 20 Units by SubCUTAneous route daily. Qty: 30 mL, Refills: 5    Associated Diagnoses: Type 2 diabetes mellitus with peripheral vascular disease (HCC)      acetaminophen (Tylenol Extra Strength) 500 mg tablet Take  by mouth every six (6) hours as needed for Pain. aspirin delayed-release 81 mg tablet Take 81 mg by mouth daily. Insulin Needles, Disposable, (BD Ultra-Fine Short Pen Needle) 31 gauge x 5/16\" ndle by Does Not Apply route daily. E11.9. Use daily for insulin injection  Qty: 100 Pen Needle, Refills: 5    Associated Diagnoses: Type 2 diabetes mellitus with peripheral vascular disease (HCC)      Nebulizer & Compressor machine 1 Each by Does Not Apply route three (3) times daily. Indications: J44.9  Qty: 1 Each, Refills: 0    Associated Diagnoses: Moderate persistent asthma without complication      Blood-Glucose Meter (TRUE METRIX AIR GLUCOSE METER) misc by Does Not Apply route. ipratropium (ATROVENT) 42 mcg (0.06 %) nasal spray 2 Sprays by Nasal route daily as needed. Patient Follow Up Instructions:    Activity: Activity as tolerated  Diet: Diabetic Diet  Wound Care: None needed  Code: Full    DISPOSITION: Home with Family: x   Home with HH/PT/OT/RN:    SNF/LTC:    KRISTAL:    OTHER:          Follow up with:   PCP : Aisha Mi MD  Follow-up Information     Follow up With Specialties Details Why Contact Info    Geronimo Yoon NP Nurse Practitioner Go on 6/7/2021 For hospital follow up appointment at 11:20AM  [address] [place] [address]  [phone]         cbc, mesfin Cherry NP Nurse Practitioner, Emergency Medicine On 6/7/2021 3;30p  GI follow-up [address] [address]  [phone]      Aisha Mi MD Internal Medicine   [address]  [phone]              Total time in minutes spent coordinating this discharge (includes going over instructions, follow-up, prescriptions, and preparing report for sign off to her PCP) :  35 minutes

## 2021-06-02 NOTE — PROGRESS NOTES
GI PROGRESS NOTE  Meme Rhoades NP  [phone] NP in-hospital cell phone M-F until 4:30  After 5pm or on weekends, please call  for physician on call  Nkechi Sosa :1955 NUA:[id number]   ATTG: Dr. Cottrell Call PCP: Charissa Benjamin MD  GI: Dr. Flora Mendez; Dr. Helga Carter covering   Date/Time:  2021 10:02 AM   Reason for following: anemia   Assessment:     Melena vs black stools from iron  BELINDA  Chronic abdominal pain  · EGD with Dr. Helga Carter (2021) showed whitish plaques in esophagus suggestive of candidiasis; brushed to exclude candidal esophagitis. Normal gastroesophageal junction; biopsied to exclude inflammation. Diffuse mild gastric erythema (redness); biopsied to exclude Helicobacter infection and inflammation. Normal duodenum. No hematin noted  · Hgb 7.3     Plan:     · Continue PPI   · Follow pathology  · Continue fluconazole for esophageal candidiasis   · Needs outpatient follow-up for possible outpatient M2A capsule endoscopy; case management to schedule prior to discharge  · Discharge planning per primary team   · Symptomatic care per primary team   Plan of care discussed with Dr. Helga Carter    This patient was seen and examined by me in a face-to-face visit today. I reviewed the medical record including lab work, imaging and other provider notes. I confirmed the interval history as described above. I spoke to the patient, discussing our findings and plans. I discussed this case in detail with ABIMBOLA Briscoe. I formulated an updated  assessment of this patient and guided our treatment plan. I agree with the above progress note. I agree with the history, exam and assessment and plan as outlined in the note. I would like to add the followinyo F with chronic abdominal pain, stable chronic anemia, and poorly controlled BG. H.pylori excluded on MEENU test.Treatment for candidal esophagitis initiated. Plan:1 ) Continue fluconazole, 2) Check pending bx of stomach, and esophagus.  3) Will need OP f/u to arrange for M2A capsule enteroscopy- CM can schedule. Will sign off. Wander Bustamante MD    Subjective:   Discussed with RN events overnight. Patient reports feeling well. States she is ready for discharge. Understands that she needs to follow-up OP for further evaluation of anemia     Review of Systems:  Symptom Y/N Comments  Symptom Y/N Comments   Fever/Chills n   Chest Pain n    Cough n   Headaches n    Sputum n   Joint Pain n    SOB/BECKER n   Pruritis/Rash n    Tolerating Diet y   Other       Could NOT obtain due to:      Objective:   VITALS:   Last 24hrs VS reviewed since prior progress note. Most recent are:  Visit Vitals  BP (!) 123/58   Pulse 82   Temp 98.4 °F (36.9 °C)   Resp 17   Wt 78.7 kg (173 lb 8 oz)   SpO2 99%   Breastfeeding No   BMI 26.38 kg/m²       Intake/Output Summary (Last 24 hours) at 6/2/2021 1002  Last data filed at 6/2/2021 0354  Gross per 24 hour   Intake 150 ml   Output --   Net 150 ml     PHYSICAL EXAM:  General: Pleasant AA female. NAD    HEENT: NC, Atraumatic. Anicteric sclerae. Lungs:  CTA Bilaterally. No Wheezing/Rhonchi/Rales. Heart:  Regular rate rhythm,  No murmur, No Rubs, No Gallops  Abdomen: Soft, Non distended, Non tender. +Bowel sounds, no HSM  Extremities: No c/c/e  Neurologic:  Alert and oriented X 3. No acute neurological distress   Psych:   Good insight. Not anxious nor agitated.     Lab and Radiology Data Reviewed: (see below)    Medications Reviewed: (see below)  PMH/SH reviewed - no change compared to H&P  ________________________________________________________________________  Total time spent with patient: 25 minutes ________________________________________________________________________  Care Plan discussed with:  Patient x   Family     RN               Consultant:  Hospitalist      Brenden Vasquez NP     Procedures: see electronic medical records for all procedures/Xrays and details which were not copied into this note but were reviewed prior to creation of Plan. LABS:  Recent Labs     06/02/21  0406 06/01/21  2249 05/31/21  0514   WBC 10.1  --  8.7   HGB 7.3* 7.1* 7.7*   HCT 25.1* 24.1* 26.3*     --  246     Recent Labs     06/02/21  0406 05/31/21  0514 05/30/21  1619 05/30/21  1324    136  --  131*   K 3.6 3.6 3.0* 3.5   * 103  --  95*   CO2 25 30  --  30   BUN 6 8  --  12   CREA 0.69 0.63  --  0.96   * 190*  --  403*   CA 8.3* 8.9  --  9.3   MG  --  2.0  --   --      Recent Labs     05/30/21  1324      TP 7.6   ALB 3.1*   GLOB 4.5*     Recent Labs     05/30/21  1618   INR 0.9   PTP 9.8      Recent Labs     05/31/21  0514 05/30/21  1618   TIBC 318 346   PSAT 5* 6*   FERR 13  --       Lab Results   Component Value Date/Time    Folate 42.3 (H) 05/31/2021 05:14 AM     No results for input(s): PH, PCO2, PO2 in the last 72 hours. No results for input(s): CPK, CKMB in the last 72 hours.     No lab exists for component: TROPONINI  Lab Results   Component Value Date/Time    Color YELLOW/STRAW 05/05/2021 03:02 PM    Appearance CLEAR 05/05/2021 03:02 PM    Specific gravity 1.015 05/05/2021 03:02 PM    Specific gravity >1.030 (H) 07/08/2019 03:50 PM    pH (UA) 6.5 05/05/2021 03:02 PM    Protein Negative 05/05/2021 03:02 PM    Glucose 250 (A) 05/05/2021 03:02 PM    Ketone TRACE (A) 05/05/2021 03:02 PM    Bilirubin Negative 05/05/2021 03:02 PM    Urobilinogen 0.2 05/05/2021 03:02 PM    Nitrites Negative 05/05/2021 03:02 PM    Leukocyte Esterase Negative 05/05/2021 03:02 PM    Epithelial cells MODERATE (A) 08/30/2019 05:43 PM    Bacteria NEGATIVE  08/30/2019 05:43 PM    WBC 0-4 08/30/2019 05:43 PM    RBC 0-5 08/30/2019 05:43 PM       MEDICATIONS:  Current Facility-Administered Medications   Medication Dose Route Frequency    sodium chloride (NS) flush 5-40 mL  5-40 mL IntraVENous Q8H    sodium chloride (NS) flush 5-40 mL  5-40 mL IntraVENous PRN    fluconazole (DIFLUCAN) tablet 100 mg  100 mg Oral DAILY    iron sucrose (VENOFER) 300 mg in 0.9% sodium chloride 250 mL IVPB  300 mg IntraVENous Q24H    0.9% sodium chloride infusion 250 mL  250 mL IntraVENous PRN    acetaminophen (TYLENOL) tablet 650 mg  650 mg Oral Q6H PRN    albuterol (PROVENTIL VENTOLIN) nebulizer solution 2.5 mg  2.5 mg Nebulization Q4H PRN    amitriptyline (ELAVIL) tablet 25 mg  25 mg Oral QHS    amLODIPine (NORVASC) tablet 10 mg  10 mg Oral DAILY    dicyclomine (BENTYL) capsule 10 mg  10 mg Oral TID PRN    diphenhydrAMINE (BENADRYL) capsule 25 mg  25 mg Oral Q6H PRN    ferrous sulfate tablet 325 mg  325 mg Oral DAILY WITH BREAKFAST    arformoteroL (BROVANA) neb solution 15 mcg  15 mcg Nebulization BID RT    And    budesonide (PULMICORT) 250 mcg/2ml nebulizer susp  250 mcg Nebulization BID RT    hydroCHLOROthiazide (HYDRODIURIL) tablet 25 mg  25 mg Oral DAILY    HYDROcodone-acetaminophen (NORCO) 5-325 mg per tablet 1 Tablet  1 Tablet Oral Q6H PRN    insulin glargine (LANTUS) injection 20 Units  20 Units SubCUTAneous DAILY    labetaloL (NORMODYNE) tablet 100 mg  100 mg Oral BID    montelukast (SINGULAIR) tablet 10 mg  10 mg Oral QHS    cetirizine (ZYRTEC) tablet 10 mg  10 mg Oral DAILY    pantoprazole (PROTONIX) 40 mg in 0.9% sodium chloride 10 mL injection  40 mg IntraVENous Q12H    atorvastatin (LIPITOR) tablet 20 mg  20 mg Oral QHS    sodium chloride (NS) flush 5-40 mL  5-40 mL IntraVENous Q8H    sodium chloride (NS) flush 5-40 mL  5-40 mL IntraVENous PRN    ondansetron (ZOFRAN) injection 4 mg  4 mg IntraVENous Q6H PRN    insulin lispro (HUMALOG) injection   SubCUTAneous AC&HS    glucose chewable tablet 16 g  4 Tablet Oral PRN    dextrose (D50W) injection syrg 12.5-25 g  12.5-25 g IntraVENous PRN    glucagon (GLUCAGEN) injection 1 mg  1 mg IntraMUSCular PRN

## 2021-06-03 ENCOUNTER — PATIENT OUTREACH (OUTPATIENT)
Dept: CASE MANAGEMENT | Age: 66
End: 2021-06-03

## 2021-06-03 ENCOUNTER — TELEPHONE (OUTPATIENT)
Dept: FAMILY MEDICINE CLINIC | Age: 66
End: 2021-06-03

## 2021-06-03 NOTE — TELEPHONE ENCOUNTER
[facility] has been contacted regarding recent hospital admission. Current medications were reviewed with the patient/caregiver by telephone. Date of Admission:  5/30/21     Date of Discharge: 6/2/2021     Facility patient was discharged from: [facility]     Reason for Admission: Esophageal candidiasis, anemia, upper GI bleed   Any medication changes? Yes - Protonix and diflucan     How is the patient feeling? Feel fine. Better than previous     Does the patient have any questions or problems? No questions   Does the patient need transportation? No   Follow-up Appointment date: 6/7/2021 at 80. Has appt also with GI in [place]       I advised the patient to bring her medications in the original bottles and to contact office, or go to either urgent care or emergency care if symptoms return before scheduled appointment.     Fredrick Mcdaniels 6/3/2021 4:50 PM

## 2021-06-04 RX ORDER — FERROUS SULFATE 324(65)MG
324 TABLET, DELAYED RELEASE (ENTERIC COATED) ORAL
Qty: 30 TABLET | Refills: 0 | Status: SHIPPED | OUTPATIENT
Start: 2021-06-04 | End: 2021-11-08

## 2021-06-07 ENCOUNTER — OFFICE VISIT (OUTPATIENT)
Dept: FAMILY MEDICINE CLINIC | Age: 66
End: 2021-06-07
Payer: MEDICARE

## 2021-06-07 VITALS
SYSTOLIC BLOOD PRESSURE: 112 MMHG | HEIGHT: 68 IN | OXYGEN SATURATION: 100 % | WEIGHT: 173.2 LBS | RESPIRATION RATE: 18 BRPM | HEART RATE: 82 BPM | BODY MASS INDEX: 26.25 KG/M2 | DIASTOLIC BLOOD PRESSURE: 80 MMHG | TEMPERATURE: 96.7 F

## 2021-06-07 DIAGNOSIS — I10 ESSENTIAL HYPERTENSION: Chronic | ICD-10-CM

## 2021-06-07 DIAGNOSIS — D64.9 ANEMIA, UNSPECIFIED TYPE: Primary | ICD-10-CM

## 2021-06-07 DIAGNOSIS — R10.9 INTRACTABLE ABDOMINAL PAIN: ICD-10-CM

## 2021-06-07 PROCEDURE — G8427 DOCREV CUR MEDS BY ELIG CLIN: HCPCS | Performed by: INTERNAL MEDICINE

## 2021-06-07 PROCEDURE — 99496 TRANSJ CARE MGMT HIGH F2F 7D: CPT | Performed by: INTERNAL MEDICINE

## 2021-06-07 RX ORDER — LABETALOL 100 MG/1
100 TABLET, FILM COATED ORAL 2 TIMES DAILY
Qty: 180 TABLET | Refills: 4 | Status: SHIPPED | OUTPATIENT
Start: 2021-06-07 | End: 2022-03-10 | Stop reason: SDUPTHER

## 2021-06-07 NOTE — PROGRESS NOTES
Estela Dia is a 63 y.o. female presenting for/with:    Chief Complaint   Patient presents with   Erik Browne     from [facility] - anemia - Feels much better today    Referral Follow Up     Has GI appointment with Rivas Huertas today       Visit Vitals  /80 (BP 1 Location: Left upper arm, BP Patient Position: Sitting, BP Cuff Size: Adult long)   Pulse 82   Temp (!) 96.7 °F (35.9 °C) (Temporal)   Resp 18   Ht 5' 8\" (1.727 m)   Wt 173 lb 3.2 oz (78.6 kg)   SpO2 100%   BMI 26.33 kg/m²     Pain Scale: 0 - No pain/10  Pain Location:     1. Have you been to the ER, urgent care clinic since your last visit? Hospitalized since your last visit? NO    2. Have you seen or consulted any other health care providers outside of the [address] [place] [address] since your last visit? Include any pap smears or colon screening. NO    Symptom review:  NO  Fever   NO  Shaking chills  NO  Cough  NO  Body aches  NO  Coughing up blood  NO  Chest congestion  NO  Chest pain  NO  Shortness of breath  NO  Profound Loss of smell/taste  NO  Nausea/Vomiting   NO  Loose stool/Diarrhea  NO  any skin issues    Patient Risk Factors Reviewed as follows:  NO  have you been in Close contact with confirmed COVID19 patient   NO  History of recent travel to affected geographical areas within the past 14 days  NO  COPD  NO  Active Cancer/Leukemia/Lymphoma/Chemotherapy  NO  Oral steroid use  NO  Pregnant  YES  Diabetes Mellitus  YES  Heart disease  NO  Asthma  NO Health care worker at home  [address] care worker  NO Is there a Pregnant Woman in the home  NO Dialysis pt in the home   NO a large number of people living in the home    Learning Assessment 4/27/2021   PRIMARY LEARNER Patient   PRIMARY LANGUAGE ENGLISH   LEARNER PREFERENCE PRIMARY READING     -   ANSWERED BY patient   RELATIONSHIP SELF     Fall Risk Assessment, last 12 mths 5/10/2021   Able to walk? Yes   Fall in past 12 months? 0   Do you feel unsteady?  0   Are you worried about falling 0 Number of falls in past 12 months -   Fall with injury? -       3 most recent PHQ Screens 5/10/2021   Little interest or pleasure in doing things Nearly every day   Feeling down, depressed, irritable, or hopeless More than half the days   Total Score PHQ 2 5   Trouble falling or staying asleep, or sleeping too much Nearly every day   Feeling tired or having little energy Nearly every day   Poor appetite, weight loss, or overeating Nearly every day   Feeling bad about yourself - or that you are a failure or have let yourself or your family down Not at all   Trouble concentrating on things such as school, work, reading, or watching TV Not at all   Moving or speaking so slowly that other people could have noticed; or the opposite being so fidgety that others notice More than half the days   How difficult have these problems made it for you to do your work, take care of your home and get along with others Somewhat difficult     Abuse Screening Questionnaire 5/10/2021   Do you ever feel afraid of your partner? N   Are you in a relationship with someone who physically or mentally threatens you? N   Is it safe for you to go home?  Y       ADL Assessment 5/10/2021   Feeding yourself No Help Needed   Getting from bed to chair No Help Needed   Getting dressed No Help Needed   Bathing or showering No Help Needed   Walk across the room (includes cane/walker) No Help Needed   Using the telphone No Help Needed   Taking your medications No Help Needed   Preparing meals No Help Needed   Managing money (expenses/bills) No Help Needed   Moderately strenuous housework (laundry) No Help Needed   Shopping for personal items (toiletries/medicines) No Help Needed   Shopping for groceries No Help Needed   Driving No Help Needed   Climbing a flight of stairs No Help Needed   Getting to places beyond walking distances No Help Needed      No notes on file

## 2021-06-07 NOTE — PROGRESS NOTES
Ms. Denzel Kat is a 63 y.o. female who is here for evaluation of   Chief Complaint   Patient presents with   Erik Browne     from [facility] - anemia - Feels much better today    Referral Follow Up     Has GI appointment with Keena Sandoval today   . Date of admission-May 27, 2021  Date of discharge-June 2, 2021  Discharge diagnosis-anemia, GI bleeding, gastropathy  [facility]-Dr. Keena Sandoval    ASSESSMENT AND PLAN: Today she looks better than she has looked in several months. Continue with PPI. 1. Essential hypertension  Well-controlled  - labetaloL (NORMODYNE) 100 mg tablet; Take 1 Tablet by mouth two (2) times a day. Indications: Bood pressure  Dispense: 180 Tablet; Refill: 4  - CBC WITH AUTOMATED DIFF; Future  - METABOLIC PANEL, COMPREHENSIVE; Future    2. Anemia, unspecified type  We will check CBC later this week  - CBC WITH AUTOMATED DIFF; Future    3. Intractable abdominal pain  Follow-up with GI today. Overall doing much better. - METABOLIC PANEL, COMPREHENSIVE; Future      Orders Placed This Encounter    CBC WITH AUTOMATED DIFF     Standing Status:   Future     Standing Expiration Date:   4/30/8577    METABOLIC PANEL, COMPREHENSIVE     Standing Status:   Future     Standing Expiration Date:   7/21/2021    labetaloL (NORMODYNE) 100 mg tablet     Sig: Take 1 Tablet by mouth two (2) times a day. Indications: Bood pressure     Dispense:  180 Tablet     Refill:  4       Follow-up and Dispositions     Routing History          HPI 30-year-old woman with a history of chronic abdominal pain, recent GI bleeding with black stools requiring transfusion of PRBCs. Upper endoscopy with Dr. Keena Sandoval June 1 and she was discharged home on Diflucan and twice daily Protonix and feels much better. She has follow-up today scheduled with GI. Diabetes-currently hovering between 120 and 220. Denies hypoglycemia. Hypertension-doing well with medications. Denies side effects.     ROS:  Denies  fever, chills, cough, chest pain, SOB,  nausea, vomiting, or diarrhea. Denies wt loss, wt gain, hemoptysis, hematochezia or melena. Physical Examination:    Visit Vitals  /80 (BP 1 Location: Left upper arm, BP Patient Position: Sitting, BP Cuff Size: Adult long)   Pulse 82   Temp (!) 96.7 °F (35.9 °C) (Temporal)   Resp 18   Ht 5' 8\" (1.727 m)   Wt 173 lb 3.2 oz (78.6 kg)   SpO2 100%   BMI 26.33 kg/m²      General:  Alert, cooperative, no distress. Head:  Normocephalic, without obvious abnormality, atraumatic. Eyes:  Conjunctivae/corneas clear. Pupils equal, round, reactive to light. Extraocular movements intact. Lungs:   Clear to auscultation bilaterally. Chest wall:  No tenderness or deformity. Cardiac:  RRR   Abdomen:   Soft, non-tender. Bowel sounds normal. No masses. No organomegaly. Extremities: Extremities normal, atraumatic, no cyanosis or edema. Pulses: 2+ and symmetric all extremities. Skin: Skin color, texture, turgor normal. No rashes or lesions. Lymph nodes: Cervical, supraclavicular, and axillary nodes normal.   Neurologic: CNII-XII intact. Normal strength, sensation, and reflexes throughout. On this date 06/07/2021 I have spent 30 minutes reviewing previous notes, test results and face to face with the patient discussing the diagnosis and importance of compliance with the treatment plan as well as documenting on the day of the visit.     Estela Apodaca MD FACP    (signed electronically) on 6/7/2021 at 9:59 AM

## 2021-06-08 NOTE — PROGRESS NOTES
Care Transitions Outreach Attempt    Call within 2 business days of discharge: Yes   Attempted to reach patient for transitions of care follow up. Unable to reach patient. Patient: Sharon Joseph Patient : 1955 MRN: [id number]    Last Discharge [address]       Complaint Diagnosis Description Type Department Provider    21 Melena; Shortness of Breath Gastrointestinal hemorrhage, unspecified gastrointestinal hemorrhage type . .. ED to Hosp-Admission (Discharged) (ADMIT) Ryne Van MD; Sharyn Bond DO; . .. Was this an external facility discharge? No       Noted following upcoming appointments from discharge chart review:   [address] follow up appointment(s):   Future Appointments   Date Time Provider Chance Cantor   2021  8:00 AM Katherene Apgar, NP [facility] MAIN BS AMB   2021  7:30 AM Lore Curry MD [facility] MAIN BS AMB     Non-[facility] follow up appointment(s): n/a    2021  Unable to reach patient for [facility] call. Patient did attend PCP appt yesterday. CTN to attempt to reach patient in 3 business days.  AR

## 2021-06-09 RX ORDER — PANTOPRAZOLE SODIUM 40 MG/1
40 TABLET, DELAYED RELEASE ORAL 2 TIMES DAILY
Qty: 60 TABLET | Refills: 5 | Status: SHIPPED | OUTPATIENT
Start: 2021-06-09 | End: 2022-05-29 | Stop reason: ALTCHOICE

## 2021-06-11 ENCOUNTER — PATIENT OUTREACH (OUTPATIENT)
Dept: CASE MANAGEMENT | Age: 66
End: 2021-06-11

## 2021-06-11 NOTE — PROGRESS NOTES
06/11/21  Unable to reach patient for [facility] call. Patient did attend PCP follow up. Episode resolved at this time.  AR

## 2021-06-17 DIAGNOSIS — I10 ESSENTIAL HYPERTENSION: Chronic | ICD-10-CM

## 2021-06-18 RX ORDER — HYDROCHLOROTHIAZIDE 25 MG/1
TABLET ORAL
Qty: 30 TABLET | Refills: 1 | Status: SHIPPED | OUTPATIENT
Start: 2021-06-18 | End: 2021-08-09 | Stop reason: SDUPTHER

## 2021-06-24 ENCOUNTER — PATIENT OUTREACH (OUTPATIENT)
Dept: CASE MANAGEMENT | Age: 66
End: 2021-06-24

## 2021-06-24 NOTE — PROGRESS NOTES
6/24/2021  3:50 PM    Patient outreach attempt by this ACM today to perform CCM follow-up; unable to reach patient.

## 2021-07-19 ENCOUNTER — HOSPITAL ENCOUNTER (OUTPATIENT)
Age: 66
Setting detail: OUTPATIENT SURGERY
Discharge: HOME | End: 2021-07-19
Attending: INTERNAL MEDICINE | Admitting: INTERNAL MEDICINE
Payer: MEDICARE

## 2021-07-19 ENCOUNTER — ANESTHESIA EVENT (OUTPATIENT)
Dept: ENDOSCOPY | Age: 66
End: 2021-07-19
Payer: MEDICARE

## 2021-07-19 ENCOUNTER — ANESTHESIA (OUTPATIENT)
Dept: ENDOSCOPY | Age: 66
End: 2021-07-19
Payer: MEDICARE

## 2021-07-19 VITALS
RESPIRATION RATE: 14 BRPM | HEIGHT: 65 IN | HEART RATE: 75 BPM | TEMPERATURE: 97.6 F | OXYGEN SATURATION: 98 % | WEIGHT: 174.3 LBS | BODY MASS INDEX: 29.04 KG/M2 | SYSTOLIC BLOOD PRESSURE: 146 MMHG | DIASTOLIC BLOOD PRESSURE: 74 MMHG

## 2021-07-19 LAB
GLUCOSE BLD STRIP.AUTO-MCNC: 204 MG/DL (ref 65–117)
SERVICE CMNT-IMP: ABNORMAL

## 2021-07-19 PROCEDURE — 76040000007: Performed by: INTERNAL MEDICINE

## 2021-07-19 PROCEDURE — 77030039825 HC MSK NSL PAP SUPERNO2VA VYRM -B: Performed by: ANESTHESIOLOGY

## 2021-07-19 PROCEDURE — 74011250636 HC RX REV CODE- 250/636: Performed by: NURSE ANESTHETIST, CERTIFIED REGISTERED

## 2021-07-19 PROCEDURE — 77030008565 HC TBNG SUC IRR ERBE -B: Performed by: INTERNAL MEDICINE

## 2021-07-19 PROCEDURE — 74011250637 HC RX REV CODE- 250/637: Performed by: INTERNAL MEDICINE

## 2021-07-19 PROCEDURE — 77030010936 HC CLP LIG BSC -C: Performed by: INTERNAL MEDICINE

## 2021-07-19 PROCEDURE — 77030019988 HC FCPS ENDOSC DISP BSC -B: Performed by: INTERNAL MEDICINE

## 2021-07-19 PROCEDURE — 74011000250 HC RX REV CODE- 250: Performed by: NURSE ANESTHETIST, CERTIFIED REGISTERED

## 2021-07-19 PROCEDURE — 76060000032 HC ANESTHESIA 0.5 TO 1 HR: Performed by: INTERNAL MEDICINE

## 2021-07-19 PROCEDURE — 74011250636 HC RX REV CODE- 250/636: Performed by: INTERNAL MEDICINE

## 2021-07-19 PROCEDURE — 2709999900 HC NON-CHARGEABLE SUPPLY: Performed by: INTERNAL MEDICINE

## 2021-07-19 PROCEDURE — 82962 GLUCOSE BLOOD TEST: CPT

## 2021-07-19 DEVICE — WORKING LENGTH 235CM, WORKING CHANNEL 2.8MM
Type: IMPLANTABLE DEVICE | Site: DUODENUM | Status: FUNCTIONAL
Brand: RESOLUTION 360 CLIP

## 2021-07-19 RX ORDER — FLUMAZENIL 0.1 MG/ML
0.2 INJECTION INTRAVENOUS
Status: DISCONTINUED | OUTPATIENT
Start: 2021-07-19 | End: 2021-07-19 | Stop reason: HOSPADM

## 2021-07-19 RX ORDER — FENTANYL CITRATE 50 UG/ML
25 INJECTION, SOLUTION INTRAMUSCULAR; INTRAVENOUS
Status: DISCONTINUED | OUTPATIENT
Start: 2021-07-19 | End: 2021-07-19 | Stop reason: HOSPADM

## 2021-07-19 RX ORDER — ATROPINE SULFATE 0.1 MG/ML
0.5 INJECTION INTRAVENOUS
Status: DISCONTINUED | OUTPATIENT
Start: 2021-07-19 | End: 2021-07-19 | Stop reason: HOSPADM

## 2021-07-19 RX ORDER — GLYCOPYRROLATE 0.2 MG/ML
INJECTION INTRAMUSCULAR; INTRAVENOUS AS NEEDED
Status: DISCONTINUED | OUTPATIENT
Start: 2021-07-19 | End: 2021-07-19 | Stop reason: HOSPADM

## 2021-07-19 RX ORDER — DEXTROMETHORPHAN/PSEUDOEPHED 2.5-7.5/.8
20 DROPS ORAL
Status: DISCONTINUED | OUTPATIENT
Start: 2021-07-19 | End: 2021-07-19 | Stop reason: HOSPADM

## 2021-07-19 RX ORDER — DEXTROMETHORPHAN/PSEUDOEPHED 2.5-7.5/.8
1.2 DROPS ORAL
Status: DISCONTINUED | OUTPATIENT
Start: 2021-07-19 | End: 2021-07-19 | Stop reason: HOSPADM

## 2021-07-19 RX ORDER — EPINEPHRINE 0.1 MG/ML
1 INJECTION INTRACARDIAC; INTRAVENOUS
Status: DISCONTINUED | OUTPATIENT
Start: 2021-07-19 | End: 2021-07-19 | Stop reason: HOSPADM

## 2021-07-19 RX ORDER — SODIUM CHLORIDE 0.9 % (FLUSH) 0.9 %
5-40 SYRINGE (ML) INJECTION AS NEEDED
Status: DISCONTINUED | OUTPATIENT
Start: 2021-07-19 | End: 2021-07-19 | Stop reason: HOSPADM

## 2021-07-19 RX ORDER — LIDOCAINE HYDROCHLORIDE 20 MG/ML
INJECTION, SOLUTION EPIDURAL; INFILTRATION; INTRACAUDAL; PERINEURAL AS NEEDED
Status: DISCONTINUED | OUTPATIENT
Start: 2021-07-19 | End: 2021-07-19 | Stop reason: HOSPADM

## 2021-07-19 RX ORDER — SODIUM CHLORIDE 0.9 % (FLUSH) 0.9 %
5-40 SYRINGE (ML) INJECTION EVERY 8 HOURS
Status: DISCONTINUED | OUTPATIENT
Start: 2021-07-19 | End: 2021-07-19 | Stop reason: HOSPADM

## 2021-07-19 RX ORDER — PROPOFOL 10 MG/ML
INJECTION, EMULSION INTRAVENOUS
Status: DISCONTINUED | OUTPATIENT
Start: 2021-07-19 | End: 2021-07-19 | Stop reason: HOSPADM

## 2021-07-19 RX ORDER — PROPOFOL 10 MG/ML
INJECTION, EMULSION INTRAVENOUS AS NEEDED
Status: DISCONTINUED | OUTPATIENT
Start: 2021-07-19 | End: 2021-07-19 | Stop reason: HOSPADM

## 2021-07-19 RX ORDER — NALOXONE HYDROCHLORIDE 0.4 MG/ML
0.4 INJECTION, SOLUTION INTRAMUSCULAR; INTRAVENOUS; SUBCUTANEOUS
Status: DISCONTINUED | OUTPATIENT
Start: 2021-07-19 | End: 2021-07-19 | Stop reason: HOSPADM

## 2021-07-19 RX ORDER — MIDAZOLAM HYDROCHLORIDE 1 MG/ML
.25-5 INJECTION, SOLUTION INTRAMUSCULAR; INTRAVENOUS
Status: DISCONTINUED | OUTPATIENT
Start: 2021-07-19 | End: 2021-07-19 | Stop reason: HOSPADM

## 2021-07-19 RX ORDER — SODIUM CHLORIDE 9 MG/ML
75 INJECTION, SOLUTION INTRAVENOUS CONTINUOUS
Status: DISCONTINUED | OUTPATIENT
Start: 2021-07-19 | End: 2021-07-19 | Stop reason: HOSPADM

## 2021-07-19 RX ADMIN — GLYCOPYRROLATE 0.2 MG: 0.2 INJECTION, SOLUTION INTRAMUSCULAR; INTRAVENOUS at 11:54

## 2021-07-19 RX ADMIN — PROPOFOL 100 MCG/KG/MIN: 10 INJECTION, EMULSION INTRAVENOUS at 11:43

## 2021-07-19 RX ADMIN — SIMETHICONE 80 MG: 20 SUSPENSION/ DROPS ORAL at 12:14

## 2021-07-19 RX ADMIN — PROPOFOL 20 MG: 10 INJECTION, EMULSION INTRAVENOUS at 11:54

## 2021-07-19 RX ADMIN — PROPOFOL 30 MG: 10 INJECTION, EMULSION INTRAVENOUS at 12:04

## 2021-07-19 RX ADMIN — PROPOFOL 50 MG: 10 INJECTION, EMULSION INTRAVENOUS at 11:45

## 2021-07-19 RX ADMIN — LIDOCAINE HYDROCHLORIDE 100 MG: 20 INJECTION, SOLUTION EPIDURAL; INFILTRATION; INTRACAUDAL; PERINEURAL at 11:43

## 2021-07-19 RX ADMIN — SODIUM CHLORIDE 75 ML/HR: 9 INJECTION, SOLUTION INTRAVENOUS at 11:11

## 2021-07-19 RX ADMIN — PROPOFOL 100 MG: 10 INJECTION, EMULSION INTRAVENOUS at 11:43

## 2021-07-19 NOTE — ANESTHESIA PREPROCEDURE EVALUATION
Anesthetic History   No history of anesthetic complications     Pertinent negatives: No PONV       Review of Systems / Medical History  Patient summary reviewed, nursing notes reviewed and pertinent labs reviewed    Pulmonary          Smoker (tobacco)  Asthma     Comments: Current Every Day Smoker - 5 pack years   Neuro/Psych         Psychiatric history (schizophrenia)    Comments: Cervical stenosis of spinal canal  Hx Schizophrenia  Cardiovascular    Hypertension          PAD and hyperlipidemia    Exercise tolerance: >4 METS  Comments: AAA 3 to 5 cm   GI/Hepatic/Renal               Comments: Hx Large Bowel obstruction  Hx pancreatitis  Hx Intractable epigastric abdominal pain  Hx Intractable abdominal pain  Hx Vomiting  Hx Diarrhea  Hx GI bleed     Endo/Other    Diabetes    Obesity, arthritis and anemia     Other Findings   Comments: Hb 7.3           Physical Exam    Airway  Mallampati: III  TM Distance: 4 - 6 cm  Neck ROM: normal range of motion   Mouth opening: Normal     Cardiovascular  Regular rate and rhythm,  S1 and S2 normal,  no murmur, click, rub, or gallop             Dental    Dentition: Upper partial plate  Comments: Multiple missing teeth.   Denies any loose teeth   Pulmonary  Breath sounds clear to auscultation               Abdominal  GI exam deferred       Other Findings            Anesthetic Plan    ASA: 3  Anesthesia type: total IV anesthesia and general          Induction: Intravenous  Anesthetic plan and risks discussed with: Patient      Propofol MAC/Supernova

## 2021-07-19 NOTE — PROCEDURES
NAME:  Nick Shahid   :   1955   MRN:   [id number]     Date/Time:  2021 12:21 PM    Enteroscopy Procedure Note    Procedure: Enteroscopy with control of bleeding, argon plasma coagulation, Hemoclip placement    Indication: Fe def anemia with bleeding small bowel AVMs  Pre-operative Diagnosis: see indication above  Post-operative Diagnosis: see findings below  :  Milburn Kussmaul, MD  Referring Provider:   Coby Shin MD    Exam:  Airway: clear, no airway problems anticipated  Heart: RRR, without gallops or rubs  Lungs: clear bilaterally without wheezes, crackles, or rhonchi  Abdomen: soft, nontender, nondistended, bowel sounds present  Mental Status: awake, alert and oriented to person, place and time     Anethesia/Sedation:  MAC anesthesia Propofol 400mg IV  Procedure Details   After informed consent was obtained for the procedure, with all risks and benefits of procedure explained the patient was taken to the endoscopy suite and placed in the left lateral decubitus position. Following sequential administration of sedation as per above, the [id number] pediatric colonoscope was inserted into the mouth and advanced under direct vision to fourth portion of the duodenum. A careful inspection was made as the gastroscope was withdrawn, including a retroflexed view of the proximal stomach; findings and interventions are described below. 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 The Enteroscope was advanced to 130cm. Therapies:  Control of bleeding with APC and hemoclip placement  Specimens: None. EBL:  None. Complications:   None; patient tolerated the procedure well.            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 The Enteroscope was advanced to 130cm. Recommendations:  -Continue acid suppression. , -Resume Plavix tomorrow    Discharge disposition:  Home in the company of  when able to ambulate    Patricio Eastman MD

## 2021-07-19 NOTE — DISCHARGE INSTRUCTIONS
Miller Rankin  [id number]  1955    ENTEROSCOPY DISCHARGE INSTRUCTIONS  Discomfort:  Redness at IV site- apply warm compress to area; if redness or soreness persist- contact your physician  There may be a slight amount of blood passed from the rectum  Gaseous discomfort- walking, belching will help relieve any discomfort  You may not operate a vehicle for 12 hours  You may not engage in an occupation involving machinery or appliances for rest of today  You may not drink alcoholic beverages for at least 12 hours  Avoid making any critical decisions for at least 24 hour  DIET:   High fiber diet. - however -  remember your colon is empty and a heavy meal will produce gas. Avoid these foods:  vegetables, fried / greasy foods, carbonated drinks for today  MEDICATION:         ACTIVITY:  You may not resume your normal daily activities until tomorrow AM; it is recommended that you spend the remainder of the day resting -  avoid any strenuous activity. CALL M.D. 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 The Enteroscope was advanced to 130cm.      Follow-up Instructions:   Call Dr. Tiara Mcnamara if questions arise regarding your procedure  Telephone # [phone]  Resume Plavix tomorrow  Repeat colonoscopy in 5 years    Sunny Tidwell MD

## 2021-07-19 NOTE — ROUTINE PROCESS
Akinrobert Howard  1955  [id number]    Situation:  Verbal report received from: Jose Luis Olivares  Procedure: Procedure(s):  PUSH ENTEROSCOPY - NEED APC  ENDOSCOPIC ARGON PLASMA COAGULATION  RESOLUTION CLIP x2    Background:    Preoperative diagnosis: Iron deficiency anemia due to chronic blood loss [D50.0]  AVM (arteriovenous malformation) [Q27.30]  Postoperative diagnosis: Duodenal AVM's    :  Dr. Chema Hernandez  Assistant(s): Endoscopy Technician-1: Eugene Ceja  Endoscopy RN-1: Jamia Hall RN  Endoscopy RN-2: Rozanna Bamberger, RN    Specimens: * No specimens in log *  H. Pylori  no    Assessment:  Intra-procedure medications     Anesthesia gave intra-procedure sedation and medications, see anesthesia flow sheet yes    Intravenous fluids: NS@ KVO     Vital signs stable     Abdominal assessment: round and soft     Recommendation:  Discharge patient per MD order. Family or Friend   Permission to share finding with family or friend yes    Endoscopy discharge instructions have been reviewed and given to patient. The patient verbalized understanding and acceptance of instructions. Dr. Chema Hernandez discussed with toño procedure findings and next steps.

## 2021-07-19 NOTE — H&P
Gastroenterology Outpatient History and Physical    Patient: Quinten Jones    Physician: Patricio Eastman MD    Chief Complaint: Fe def anemia form chronic blood loss with AVMs seen on capsule enteroscopy  History of Present Illness: 63yo F with Fe def anemia form chronic blood loss with AVMs seen on capsule enteroscopy  History:  Past Medical History:   Diagnosis Date    Arthritis     Asthma     Diabetes ([address]     Hypertension     Menopause     Pancreatitis       Past Surgical History:   Procedure Laterality Date    COLONOSCOPY N/A 2020    COLONOSCOPY performed by Alejandra Maria MD at [facility] [address]27 HX BUNIONECTOMY Bilateral     [address]      HX PARTIAL HYSTERECTOMY      LA ABDOMEN SURGERY [address] [facility] [place]  2019    Exploratory laparotomy, extended right hemicolectomy    LA ESOPHAGOGASTRODUODENOSCOPY TRANSORAL DIAGNOSTIC  2021         UPPER GI ENDOSCOPY,BIOPSY  2021           Social History     Socioeconomic History    Marital status: SINGLE     Spouse name: Not on file    Number of children: Not on file    Years of education: Not on file    Highest education level: Not on file   Tobacco Use    Smoking status: Former Smoker     Packs/day: 0.25     Years: 20.00     Pack years: 5.00     Quit date: 2020     Years since quittin.0    Smokeless tobacco: Never Used   Vaping Use    Vaping Use: Never used   Substance and Sexual Activity    Alcohol use: Not Currently     Comment: RARE Q 3 MONTHS    Drug use: No    Sexual activity: Yes     Social Determinants of Health     Financial Resource Strain:     Difficulty of Paying Living Expenses:    Food Insecurity:     Worried About Running Out of Food in the Last Year:     Ran Out of Food in the Last Year:    Transportation Needs:     Lack of Transportation (Medical):      Lack of Transportation (Non-Medical):    Physical Activity:     Days of Exercise per Week:     Minutes of Exercise per Session:    Stress:  Feeling of Stress :    Social Connections:     Frequency of Communication with Friends and Family:     Frequency of Social Gatherings with Friends and Family:     Attends Christianity Services:     Active Member of Clubs or Organizations:     Attends Club or Organization Meetings:     Marital Status:       Family History   Problem Relation Age of Onset    Other Mother         ANEURYSM    Breast Cancer Mother     Diabetes Mother     Lung Disease Father         EMPHYSEMA    Crohn's Disease Sister     Heart Disease Sister     Diabetes Maternal Uncle     Heart Disease Maternal Uncle     Diabetes Paternal Uncle     Heart Disease Paternal Uncle     Breast Cancer Maternal Aunt     Anesth Problems Neg Hx       Patient Active Problem List   Diagnosis Code    Abnormal ECG R94.31    HTN (hypertension) I10    Type II diabetes mellitus ([facility] E11.9    Asthma J45.909    Cervical stenosis of spinal canal M48.02    History of pancreatitis Z87.19    Drug-induced acute pancreatitis K85.30    Intractable epigastric abdominal pain R10.13    Intractable abdominal pain R10.9    Vomiting R11.10    Other partial intestinal obstruction ([facility]) K56.690    Bowel obstruction ([facility] K56.609    Type 2 diabetes mellitus with peripheral vascular disease ([facility] E11.51    Diarrhea R19.7    Anemia due to chronic blood loss D50.0    Symptomatic anemia D64.9    PVD (peripheral vascular disease) ([facility]) I73.9    Hyperlipidemia E78.5    GI bleed K92.2       Allergies: Allergies   Allergen Reactions    Lisinopril Other (comments)    Gabapentin Itching    Morphine Unknown (comments), Hives and Itching    Tramadol Other (comments) and Itching     \"funny feeling\"     Medications:   Prior to Admission medications    Medication Sig Start Date End Date Taking?  Authorizing Provider   hydroCHLOROthiazide (HYDRODIURIL) 25 mg tablet TAKE 1 TABLET BY MOUTH ONCE DAILY 6/18/21  Yes Elsa Knight MD   pantoprazole (PROTONIX) 40 mg tablet Take 1 Tablet by mouth two (2) times a day. 6/9/21  Yes Elsa Knight MD   amitriptyline (ELAVIL) 25 mg tablet Take 1 Tab by mouth nightly. 5/10/21  Yes Elsa Knight MD   Omeprazole delayed release (PRILOSEC D/R) 20 mg tablet Take 1 Tab by mouth daily. 4/15/21  Yes Kelsi Hull MD   simvastatin (ZOCOR) 40 mg tablet TAKE 1 TABLET BY MOUTH EVERY NIGHT AT BEDTIME 2/5/21  Yes Elsa Knight MD   ascorbic acid, vitamin C, (Vitamin C) 500 mg tablet Take 1 Tab by mouth two (2) times a day. 1/14/21  Yes Anali Pickens MD   zinc sulfate 220 mg tablet Take 1 Tab by mouth two (2) times a day. 1/14/21  Yes Anali Pickens MD   amLODIPine (NORVASC) 10 mg tablet TAKE 1 TABLET BY MOUTH EVERY DAY 11/26/20  Yes Bessler, Kathee Libman, MD   insulin glargine (Lantus Solostar U-100 Insulin) 100 unit/mL (3 mL) inpn 20 Units by SubCUTAneous route daily. 9/24/20  Yes Elsa Knight MD   aspirin delayed-release 81 mg tablet Take 81 mg by mouth daily. Yes Provider, Historical   labetaloL (NORMODYNE) 100 mg tablet Take 1 Tablet by mouth two (2) times a day. Indications: Bood pressure 6/7/21   Bessler, Kathee Libman, MD   ferrous sulfate 324 mg (65 mg iron) tablet Take 1 Tablet by mouth daily (with breakfast). Patient not taking: Reported on 7/19/2021 6/4/21   Elsa Knight MD   dicyclomine (BENTYL) 10 mg capsule Take 10 mg by mouth three (3) times daily as needed. Patient not taking: Reported on 6/7/2021 5/20/21 7/19/21  Provider, Historical   fluticasone propion-salmeteroL (Advair Diskus) 250-50 mcg/dose diskus inhaler Take 1 Puff by inhalation every twelve (12) hours. 5/19/21   Elsa Knight MD   HYDROcodone-acetaminophen (NORCO) 5-325 mg per tablet Take 1 Tab by mouth every six (6) hours as needed.   Patient not taking: Reported on 6/7/2021 5/6/21   Provider, Historical   montelukast (SINGULAIR) 10 mg tablet TAKE 1 TABLET BY MOUTH DAILY AT BEDTIME 4/1/21   Ella Stevenson NP   clopidogreL (PLAVIX) 75 mg tab  3/4/21   Provider, Historical   levocetirizine (XYZAL) 5 mg tablet Take 1 Tab by mouth daily. Indications: itching of skin  Patient not taking: Reported on 6/7/2021 3/19/21   Fredy Kumar NP   diphenhydrAMINE (BenadryL) 25 mg capsule Take 25 mg by mouth every six (6) hours as needed. Patient not taking: Reported on 6/7/2021    Provider, Historical   BD Single Use Swabs Regular padm APPLY  EXTERNALLY EVERY DAY 1/23/21   Kavitha Curry MD   albuterol (PROVENTIL HFA, VENTOLIN HFA, PROAIR HFA) 90 mcg/actuation inhaler Take 2 Puffs by inhalation every four (4) hours as needed for Wheezing. Patient not taking: Reported on 6/7/2021 1/14/21   Khang Pickens MD   multivitamin with iron tablet Take 1 Tab by mouth daily. Patient not taking: Reported on 7/19/2021    Provider, Historical   lancets (TRUEplus Lancets) 28 gauge misc TEST BLOOD SUGAR EVERY DAY 10/8/20   Veronica Thacker MD   glucose blood VI test strips (True Metrix Glucose Test Strip) strip TEST BLOOD SUGAR EVERY DAY 10/8/20   Veronica Thacker MD   acetaminophen (Tylenol Extra Strength) 500 mg tablet Take  by mouth every six (6) hours as needed for Pain. Provider, Historical   Insulin Needles, Disposable, (BD Ultra-Fine Short Pen Needle) 31 gauge x 5/16\" ndle by Does Not Apply route daily. E11.9. Use daily for insulin injection 5/12/20   Kavitha Curry MD   Nebulizer & Compressor machine 1 Each by Does Not Apply route three (3) times daily. Indications: J44.9 5/12/20   Veronica Thacker MD   Blood-Glucose Meter (TRUE METRIX AIR GLUCOSE METER) misc by Does Not Apply route. Provider, Historical   ipratropium (ATROVENT) 42 mcg (0.06 %) nasal spray 2 Sprays by Nasal route daily as needed. Patient not taking: Reported on 6/7/2021 6/29/18   Provider, Historical     Physical Exam:   Vital Signs: Blood pressure (!) 178/56, pulse 74, temperature 98.1 °F (36.7 °C), resp.  rate 19, height 5' 5\" (1.651 m), weight 79.1 kg (174 lb 4.8 oz), SpO2 100 %.   General: well developed, well nourished   HEENT: unremarkable   Heart: regular rhythm no mumur    Lungs: clear   Abdominal:  benign   Neurological: unremarkable   Extremities: no edema     Findings/Diagnosis: Fe def anemia form chronic blood loss with AVMs seen on capsule enteroscopy  Plan of Care/Planned Procedure: Enteroscopy with conscious/deep sedation    Signed:  Jasson Jacobs MD 7/19/2021

## 2021-07-19 NOTE — ANESTHESIA POSTPROCEDURE EVALUATION
Procedure(s):  PUSH ENTEROSCOPY - NEED APC  ENDOSCOPIC ARGON PLASMA COAGULATION  RESOLUTION CLIP x2. total IV anesthesia, general    Anesthesia Post Evaluation        Patient location during evaluation: PACU  Note status: Adequate. Level of consciousness: responsive to verbal stimuli and sleepy but conscious  Pain management: satisfactory to patient  Airway patency: patent  Anesthetic complications: no  Cardiovascular status: acceptable  Respiratory status: acceptable  Hydration status: acceptable  Comments: +Post-Anesthesia Evaluation and Assessment    Patient: Fannie Vo MRN: [id number]  SSN: [id number]   YOB: 1955  Age: 63 y.o. Sex: female      Cardiovascular Function/Vital Signs    BP (!) 146/74   Pulse 75   Temp 36.4 °C (97.6 °F)   Resp 14   Ht 5' 5\" (1.651 m)   Wt 79.1 kg (174 lb 4.8 oz)   SpO2 98%   BMI 29.01 kg/m²     Patient is status post Procedure(s):  PUSH ENTEROSCOPY - NEED APC  ENDOSCOPIC ARGON PLASMA COAGULATION  RESOLUTION CLIP x2. Nausea/Vomiting: Controlled. Postoperative hydration reviewed and adequate. Pain:  Pain Scale 1: Numeric (0 - 10) (07/19/21 1239)  Pain Intensity 1: 0 (07/19/21 1239)   Managed. Neurological Status: At baseline. Mental Status and Level of Consciousness: Arousable. Pulmonary Status:   O2 Device: None (Room air) (07/19/21 1239)   Adequate oxygenation and airway patent. Complications related to anesthesia: None    Post-anesthesia assessment completed. No concerns. Signed By: Stephany Stone MD    7/19/2021  Post anesthesia nausea and vomiting:  controlled      INITIAL Post-op Vital signs:   Vitals Value Taken Time   /88 07/19/21 1242   Temp 36.4 °C (97.6 °F) 07/19/21 1221   Pulse 76 07/19/21 1244   Resp 15 07/19/21 1244   SpO2 97 % 07/19/21 1244   Vitals shown include unvalidated device data.

## 2021-07-29 ENCOUNTER — PATIENT OUTREACH (OUTPATIENT)
Dept: CASE MANAGEMENT | Age: 66
End: 2021-07-29

## 2021-07-29 NOTE — PROGRESS NOTES
7/29/2021  2:26 PM    Patient outreach by this ACM today to perform CCM follow-up; two patient identifiers verified. Patient is unable to follow-up with ACM at this time and requests ACM outreach on 7/30.

## 2021-07-30 NOTE — ACP (ADVANCE CARE PLANNING)
7/30/2021  3:59 PM  Does patient have an Advance Directive:  not on file; pt reports that she does have a completed and up-to-date Advance Medical Directive. Patient is encouraged to provide PCP office with a copy of this document for her EMR. Health Care Decision makers reviewed with patient today; pt reports up-to-date.     Primary Decision Maker: Polina James - Other Relative - [phone]    Secondary Decision Maker: Ether Siemens - Other Relative - [phone]

## 2021-07-30 NOTE — PROGRESS NOTES
7/30/2021  3:39 PM    Ambulatory Care Management Note    This Ambulatory Care Manager (ACM) reviewed and updated the following screenings during this call; depression screening, general assessment, disease specific assessment, ACP assessment and note, medication reconciliation. Medication Management:  good adherence and good understanding  Med Rec during this call  Current Outpatient Medications   Medication Sig    montelukast (SINGULAIR) 10 mg tablet TAKE 1 TABLET BY MOUTH DAILY AT BEDTIME    hydroCHLOROthiazide (HYDRODIURIL) 25 mg tablet TAKE 1 TABLET BY MOUTH ONCE DAILY    pantoprazole (PROTONIX) 40 mg tablet Take 1 Tablet by mouth two (2) times a day.  labetaloL (NORMODYNE) 100 mg tablet Take 1 Tablet by mouth two (2) times a day. Indications: Bood pressure    ferrous sulfate 324 mg (65 mg iron) tablet Take 1 Tablet by mouth daily (with breakfast).  fluticasone propion-salmeteroL (Advair Diskus) 250-50 mcg/dose diskus inhaler Take 1 Puff by inhalation every twelve (12) hours.  amitriptyline (ELAVIL) 25 mg tablet Take 1 Tab by mouth nightly.  Omeprazole delayed release (PRILOSEC D/R) 20 mg tablet Take 1 Tab by mouth daily.  clopidogreL (PLAVIX) 75 mg tab Take 75 mg by mouth daily.  simvastatin (ZOCOR) 40 mg tablet TAKE 1 TABLET BY MOUTH EVERY NIGHT AT BEDTIME    BD Single Use Swabs Regular padm APPLY  EXTERNALLY EVERY DAY    ascorbic acid, vitamin C, (Vitamin C) 500 mg tablet Take 1 Tab by mouth two (2) times a day.  zinc sulfate 220 mg tablet Take 1 Tab by mouth two (2) times a day.  multivitamin with iron tablet Take 1 Tablet by mouth daily.     amLODIPine (NORVASC) 10 mg tablet TAKE 1 TABLET BY MOUTH EVERY DAY    lancets (TRUEplus Lancets) 28 gauge misc TEST BLOOD SUGAR EVERY DAY    glucose blood VI test strips (True Metrix Glucose Test Strip) strip TEST BLOOD SUGAR EVERY DAY    insulin glargine (Lantus Solostar U-100 Insulin) 100 unit/mL (3 mL) inpn 20 Units by SubCUTAneous route daily.  acetaminophen (Tylenol Extra Strength) 500 mg tablet Take  by mouth every six (6) hours as needed for Pain.  aspirin delayed-release 81 mg tablet Take 81 mg by mouth daily.  Insulin Needles, Disposable, (BD Ultra-Fine Short Pen Needle) 31 gauge x 5/16\" ndle by Does Not Apply route daily. E11.9. Use daily for insulin injection    Nebulizer & Compressor machine 1 Each by Does Not Apply route three (3) times daily. Indications: J44.9    Blood-Glucose Meter (TRUE METRIX AIR GLUCOSE METER) misc by Does Not Apply route.  ipratropium (ATROVENT) 42 mcg (0.06 %) nasal spray 2 Sprays by Nasal route daily as needed.  HYDROcodone-acetaminophen (NORCO) 5-325 mg per tablet Take 1 Tab by mouth every six (6) hours as needed. (Patient not taking: Reported on 7/30/2021)    levocetirizine (XYZAL) 5 mg tablet Take 1 Tab by mouth daily. Indications: itching of skin (Patient not taking: Reported on 7/30/2021)    diphenhydrAMINE (BenadryL) 25 mg capsule Take 25 mg by mouth every six (6) hours as needed. (Patient not taking: Reported on 6/7/2021)    albuterol (PROVENTIL HFA, VENTOLIN HFA, PROAIR HFA) 90 mcg/actuation inhaler Take 2 Puffs by inhalation every four (4) hours as needed for Wheezing. (Patient not taking: Reported on 6/7/2021)     No current facility-administered medications for this visit. There are no discontinued medications. Advance Care Planning:   Does patient have an Advance Directive:  not on file; pt reports that she does have a completed and up-to-date Advance Medical Directive. Patient is encouraged to provide PCP office with a copy of this document for her EMR. Health Care Decision makers reviewed with patient today; pt reports up-to-date.     Primary Decision Maker: Polina James - Other Relative - [phone]    Secondary Decision Maker: Wale Chow - Other Relative - [phone]        Health Maintenance Due   Topic Date Due    Foot Exam Q1  Never done    PAP AKA CERVICAL CYTOLOGY  Never done    Shingrix Vaccine Age 50> (1 of 2) Never done    Medicare Yearly Exam  08/12/2021     Health Maintenance reviewed -   Foot Exam - has appt scheduled with PCP on 8/9; chart routed to PCP today for notification of health maintenance due. Pap - pt reports most recent Pap Smear was two years ago by her gynecologist; reports advised by gynecologist to repeat every 2-3 years. Pt states that she does not currently have a future appt scheduled. Medicare Yearly Exam - has appt scheduled with PCP on 8/9; chart routed to PCP today for notification of health maintenance due. Patient was asked to consider health care goals that they would like to focus on with this ACM. ACM will follow up with patient to discuss goals and establish care plan in the next 7-14 days.        PCP/Specialist follow up:   Future Appointments   Date Time Provider Chance Cantor   8/9/2021  8:30 AM Willie Curry MD [facility] MAIN BS AMB

## 2021-08-07 PROBLEM — K92.2 GI BLEED: Status: RESOLVED | Noted: 2021-05-30 | Resolved: 2021-08-07

## 2021-08-07 PROBLEM — K56.690 OTHER PARTIAL INTESTINAL OBSTRUCTION (HCC): Status: RESOLVED | Noted: 2019-08-30 | Resolved: 2021-08-07

## 2021-08-07 PROBLEM — R10.9 INTRACTABLE ABDOMINAL PAIN: Status: RESOLVED | Noted: 2018-11-10 | Resolved: 2021-08-07

## 2021-08-07 PROBLEM — I73.9 PVD (PERIPHERAL VASCULAR DISEASE) (HCC): Chronic | Status: RESOLVED | Noted: 2021-04-21 | Resolved: 2021-08-07

## 2021-08-07 PROBLEM — K85.30 DRUG-INDUCED ACUTE PANCREATITIS: Status: RESOLVED | Noted: 2018-10-30 | Resolved: 2021-08-07

## 2021-08-07 NOTE — PROGRESS NOTES
Ms. Gilbert Duncan is a 63 y.o. female who is here for evaluation of   Chief Complaint   Patient presents with    Annual Wellness Visit    Diabetes     Reports glucose 215 this AM. Reports average 180/240    Hypertension   . ASSESSMENT AND PLAN:    1. Medicare annual wellness visit, subsequent  2. Essential hypertension  She is at goal  3. Type 2 diabetes mellitus with hyperglycemia, with long-term current use of insulin ([facility])  Having some difficulty with glycemic control. Will increase Lantus to 25 units and refer for education. 4. Type 2 diabetes mellitus with peripheral vascular disease ([facility] [address]  Foot exam today is normal      Orders Placed This Encounter    REFERRAL TO DIABETIC EDUCATION     Referral Priority:   Routine     Referral Type:   Consultation     Referral Reason:   Specialty Services Required     Number of Visits Requested:   1     DIABETES FOOT EXAM    hydroCHLOROthiazide (HYDRODIURIL) 25 mg tablet     Sig: Take 1 Tablet by mouth daily. Dispense:  90 Tablet     Refill:  4     This prescription was filled on 5/28/2021. Any refills authorized will be placed on file. HPI  64 yo woman with PAD and T2DM (Lantus):  Having some trouble with glycemic control. Willing to seek assist with Diabetic instructor. Lab Results   Component Value Date/Time    Hemoglobin A1c 7.6 (H) 03/04/2021 10:13 AM       ROS:  Denies fever, chills, cough, chest pain, SOB,  nausea, vomiting, or diarrhea. Denies wt loss, wt gain, hemoptysis, hematochezia or melena. Physical Examination:    Visit Vitals  /88   Pulse 83   Temp 97.2 °F (36.2 °C) (Temporal)   Resp 17   Ht 5' 5\" (1.651 m)   Wt 176 lb 12.8 oz (80.2 kg)   SpO2 98%   BMI 29.42 kg/m²      General:  Alert, cooperative, no distress. Head:  Normocephalic, without obvious abnormality, atraumatic. Eyes:  Conjunctivae/corneas clear. Pupils equal, round, reactive to light. Extraocular movements intact.    Lungs:   Clear to auscultation bilaterally. Chest wall:  No tenderness or deformity. Cardiac:  RRR   Abdomen:   Soft, non-tender. Bowel sounds normal. No masses. No organomegaly. Extremities: Extremities normal, atraumatic, no cyanosis or edema. Pulses: 2+ and symmetric all extremities. Skin: Skin color, texture, turgor normal. No rashes or lesions. Lymph nodes: Cervical, supraclavicular, and axillary nodes normal.   Neurologic: CNII-XII intact. Normal strength, sensation, and reflexes throughout. Diabetic Foot Exam:  Both feet are examined and demonstrate intact DP pulses. There is good capillary refill and sensation is intact. Skin is intact and there are no ulcers or sores. On this date 08/09/2021 I have spent 30 minutes reviewing previous notes, test results and face to face with the patient discussing the diagnosis and importance of compliance with the treatment plan as well as documenting on the day of the visit.     Jatinder De MD FACP    (signed electronically) on 8/9/2021 at 8:19 AM

## 2021-08-09 ENCOUNTER — OFFICE VISIT (OUTPATIENT)
Dept: FAMILY MEDICINE CLINIC | Age: 66
End: 2021-08-09
Payer: MEDICARE

## 2021-08-09 VITALS
OXYGEN SATURATION: 98 % | RESPIRATION RATE: 17 BRPM | SYSTOLIC BLOOD PRESSURE: 130 MMHG | TEMPERATURE: 97.2 F | HEART RATE: 83 BPM | BODY MASS INDEX: 29.46 KG/M2 | HEIGHT: 65 IN | WEIGHT: 176.8 LBS | DIASTOLIC BLOOD PRESSURE: 88 MMHG

## 2021-08-09 DIAGNOSIS — Z00.00 MEDICARE ANNUAL WELLNESS VISIT, SUBSEQUENT: Primary | ICD-10-CM

## 2021-08-09 DIAGNOSIS — Z79.4 TYPE 2 DIABETES MELLITUS WITH HYPERGLYCEMIA, WITH LONG-TERM CURRENT USE OF INSULIN (HCC): ICD-10-CM

## 2021-08-09 DIAGNOSIS — E11.51 TYPE 2 DIABETES MELLITUS WITH PERIPHERAL VASCULAR DISEASE (HCC): ICD-10-CM

## 2021-08-09 DIAGNOSIS — I10 ESSENTIAL HYPERTENSION: ICD-10-CM

## 2021-08-09 DIAGNOSIS — E11.65 TYPE 2 DIABETES MELLITUS WITH HYPERGLYCEMIA, WITH LONG-TERM CURRENT USE OF INSULIN (HCC): ICD-10-CM

## 2021-08-09 PROCEDURE — G8427 DOCREV CUR MEDS BY ELIG CLIN: HCPCS | Performed by: INTERNAL MEDICINE

## 2021-08-09 PROCEDURE — G8417 CALC BMI ABV UP PARAM F/U: HCPCS | Performed by: INTERNAL MEDICINE

## 2021-08-09 PROCEDURE — 2022F DILAT RTA XM EVC RTNOPTHY: CPT | Performed by: INTERNAL MEDICINE

## 2021-08-09 PROCEDURE — 3017F COLORECTAL CA SCREEN DOC REV: CPT | Performed by: INTERNAL MEDICINE

## 2021-08-09 PROCEDURE — G8510 SCR DEP NEG, NO PLAN REQD: HCPCS | Performed by: INTERNAL MEDICINE

## 2021-08-09 PROCEDURE — G9899 SCRN MAM PERF RSLTS DOC: HCPCS | Performed by: INTERNAL MEDICINE

## 2021-08-09 PROCEDURE — G0439 PPPS, SUBSEQ VISIT: HCPCS | Performed by: INTERNAL MEDICINE

## 2021-08-09 PROCEDURE — 3051F HG A1C>EQUAL 7.0%<8.0%: CPT | Performed by: INTERNAL MEDICINE

## 2021-08-09 PROCEDURE — G8754 DIAS BP LESS 90: HCPCS | Performed by: INTERNAL MEDICINE

## 2021-08-09 PROCEDURE — 99214 OFFICE O/P EST MOD 30 MIN: CPT | Performed by: INTERNAL MEDICINE

## 2021-08-09 PROCEDURE — G8752 SYS BP LESS 140: HCPCS | Performed by: INTERNAL MEDICINE

## 2021-08-09 RX ORDER — INSULIN GLARGINE 100 [IU]/ML
25 INJECTION, SOLUTION SUBCUTANEOUS DAILY
Qty: 30 ML | Refills: 5
Start: 2021-08-09 | End: 2022-03-10 | Stop reason: SDUPTHER

## 2021-08-09 RX ORDER — HYDROCHLOROTHIAZIDE 25 MG/1
25 TABLET ORAL DAILY
Qty: 90 TABLET | Refills: 4 | Status: SHIPPED | OUTPATIENT
Start: 2021-08-09 | End: 2022-08-12

## 2021-08-09 NOTE — PATIENT INSTRUCTIONS

## 2021-08-09 NOTE — PROGRESS NOTES
Fannie Vo is a 63 y.o. female presenting for/with:    Chief Complaint   Patient presents with    Annual Wellness Visit    Diabetes     Reports glucose 215 this AM. Reports average 180/240    Hypertension       Visit Vitals  BP (!) 146/82 (BP 1 Location: Left upper arm, BP Patient Position: Sitting, BP Cuff Size: Adult long)   Pulse 83   Temp 97.2 °F (36.2 °C) (Temporal)   Resp 17   Ht 5' 5\" (1.651 m)   Wt 176 lb 12.8 oz (80.2 kg)   SpO2 98%   BMI 29.42 kg/m²     Pain Scale: 0 - No pain/10  Pain Location:     1. Have you been to the ER, urgent care clinic since your last visit? Hospitalized since your last visit? NO    2. Have you seen or consulted any other health care providers outside of the [address] [place] [address] since your last visit? Include any pap smears or colon screening. NO    Symptom review:  NO  Fever   NO  Shaking chills  NO  Cough  NO  Body aches  NO  Coughing up blood  NO  Chest congestion  NO  Chest pain  NO  Shortness of breath  NO  Profound Loss of smell/taste  NO  Nausea/Vomiting   NO  Loose stool/Diarrhea  NO  any skin issues    Patient Risk Factors Reviewed as follows:  NO  have you been in Close contact with confirmed COVID19 patient   NO  History of recent travel to affected geographical areas within the past 14 days  NO  COPD  NO  Active Cancer/Leukemia/Lymphoma/Chemotherapy  NO  Oral steroid use  NO  Pregnant  YES  Diabetes Mellitus  YES  Heart disease  YES  Asthma  NO Health care worker at home  [address] care worker  NO Is there a Pregnant Woman in the home  NO Dialysis pt in the home   NO a large number of people living in the home    Learning Assessment 8/9/2021   PRIMARY LEARNER Patient   PRIMARY LANGUAGE ENGLISH   LEARNER PREFERENCE PRIMARY LISTENING     -   ANSWERED BY patient   RELATIONSHIP SELF     Fall Risk Assessment, last 12 mths 8/9/2021   Able to walk? Yes   Fall in past 12 months? 0   Do you feel unsteady?  0   Are you worried about falling 0   Number of falls in past 12 months -   Fall with injury? -       3 most recent PHQ Screens 8/9/2021   Little interest or pleasure in doing things Not at all   Feeling down, depressed, irritable, or hopeless Not at all   Total Score PHQ 2 0   Trouble falling or staying asleep, or sleeping too much -   Feeling tired or having little energy -   Poor appetite, weight loss, or overeating -   Feeling bad about yourself - or that you are a failure or have let yourself or your family down -   Trouble concentrating on things such as school, work, reading, or watching TV -   Moving or speaking so slowly that other people could have noticed; or the opposite being so fidgety that others notice -   How difficult have these problems made it for you to do your work, take care of your home and get along with others -     Abuse Screening Questionnaire 8/9/2021   Do you ever feel afraid of your partner? N   Are you in a relationship with someone who physically or mentally threatens you? N   Is it safe for you to go home? Y       ADL Assessment 8/9/2021   Feeding yourself No Help Needed   Getting from bed to chair No Help Needed   Getting dressed No Help Needed   Bathing or showering No Help Needed   Walk across the room (includes cane/walker) No Help Needed   Using the telphone No Help Needed   Taking your medications No Help Needed   Preparing meals No Help Needed   Managing money (expenses/bills) No Help Needed   Moderately strenuous housework (laundry) No Help Needed   Shopping for personal items (toiletries/medicines) No Help Needed   Shopping for groceries No Help Needed   Driving No Help Needed   Climbing a flight of stairs No Help Needed   Getting to places beyond walking distances No Help Needed      No advance directive on file.  Template provided to PT      This is the Subsequent Medicare Annual Wellness Exam, performed 12 months or more after the Initial AWV or the last Subsequent AWV    I have reviewed the patient's medical history in detail and updated the computerized patient record. Assessment/Plan   Education and counseling provided:  Are appropriate based on today's review and evaluation    1. Medicare annual wellness visit, subsequent  2. Essential hypertension  3. Type 2 diabetes mellitus with hyperglycemia, with long-term current use of insulin ([facility] [address]  4. Type 2 diabetes mellitus with peripheral vascular disease (HCC)       Depression Risk Factor Screening     3 most recent PHQ Screens 8/9/2021   Little interest or pleasure in doing things Not at all   Feeling down, depressed, irritable, or hopeless Not at all   Total Score PHQ 2 0   Trouble falling or staying asleep, or sleeping too much -   Feeling tired or having little energy -   Poor appetite, weight loss, or overeating -   Feeling bad about yourself - or that you are a failure or have let yourself or your family down -   Trouble concentrating on things such as school, work, reading, or watching TV -   Moving or speaking so slowly that other people could have noticed; or the opposite being so fidgety that others notice -   How difficult have these problems made it for you to do your work, take care of your home and get along with others -       Alcohol Risk Screen    Do you average more than 1 drink per night or more than 7 drinks a week:  No    On any one occasion in the past three months have you have had more than 3 drinks containing alcohol:  No        Functional Ability and Level of Safety    Hearing: Hearing is good. Activities of Daily Living: The home contains: no safety equipment. Patient does total self care      Ambulation: with no difficulty     Fall Risk:  Fall Risk Assessment, last 12 mths 8/9/2021   Able to walk? Yes   Fall in past 12 months? 0   Do you feel unsteady? 0   Are you worried about falling 0   Number of falls in past 12 months -   Fall with injury?  -      Abuse Screen:  Patient is not abused       Cognitive Screening    Has your family/caregiver stated any concerns about your memory: no       Health Maintenance Due     Health Maintenance Due   Topic Date Due    Foot Exam Q1  Never done    PAP AKA CERVICAL CYTOLOGY  Never done    Shingrix Vaccine Age 50> (1 of 2) Never done       Patient Care Team   Patient Care Team:  Carrie Bai MD as PCP - General (Internal Medicine)  Carrie Bai MD as PCP - [facility]  Janie Glynn NP as Nurse Practitioner (Surgery)  Isaac Grayson MD as Surgeon (General Surgery)  Zonia Nunez MD as Surgeon (General Surgery)  Tatiana Packer RN as Ambulatory Care Manager    History     Patient Active Problem List   Diagnosis Code    Abnormal ECG R94.31    HTN (hypertension) I10    Type II diabetes mellitus ([address] E11.9    Asthma J45.909    Cervical stenosis of spinal canal M48.02    History of pancreatitis Z87.19    Intractable epigastric abdominal pain R10.13    Vomiting R11.10    Bowel obstruction ([address] K56.609    Type 2 diabetes mellitus with peripheral vascular disease ([address] E11.51    Diarrhea R19.7    Anemia due to chronic blood loss D50.0    Symptomatic anemia D64.9    Hyperlipidemia E78.5     Past Medical History:   Diagnosis Date    Arthritis     Asthma     Diabetes ([address]     Hypertension     Intractable abdominal pain 11/10/2018    Menopause     Pancreatitis     PVD (peripheral vascular disease) ([address] 4/21/2021      Past Surgical History:   Procedure Laterality Date    COLONOSCOPY N/A 7/28/2020    COLONOSCOPY performed by Royce Mobley MD at [facility] [address] HX BUNIONECTOMY Bilateral 1977    [address]  2002    HX PARTIAL HYSTERECTOMY  1980    NC ABDOMEN SURGERY [facility]  09/01/2019    Exploratory laparotomy, extended right hemicolectomy    NC ESOPHAGOGASTRODUODENOSCOPY TRANSORAL DIAGNOSTIC  6/1/2021         NC SB ENDOSCOPY,W/CONTROL,BLEEDING  7/19/2021         UPPER GI ENDOSCOPY,BIOPSY  6/1/2021          Current Outpatient Medications   Medication Sig Dispense Refill    montelukast (SINGULAIR) 10 mg tablet TAKE 1 TABLET BY MOUTH DAILY AT BEDTIME 30 Tablet 3    hydroCHLOROthiazide (HYDRODIURIL) 25 mg tablet TAKE 1 TABLET BY MOUTH ONCE DAILY 30 Tablet 1    labetaloL (NORMODYNE) 100 mg tablet Take 1 Tablet by mouth two (2) times a day. Indications: Bood pressure 180 Tablet 4    fluticasone propion-salmeteroL (Advair Diskus) 250-50 mcg/dose diskus inhaler Take 1 Puff by inhalation every twelve (12) hours. 1 Inhaler 11    amitriptyline (ELAVIL) 25 mg tablet Take 1 Tab by mouth nightly. 30 Tab 4    clopidogreL (PLAVIX) 75 mg tab Take 75 mg by mouth daily.  simvastatin (ZOCOR) 40 mg tablet TAKE 1 TABLET BY MOUTH EVERY NIGHT AT BEDTIME 90 Tab 4    BD Single Use Swabs Regular padm APPLY  EXTERNALLY EVERY  Pad 5    ascorbic acid, vitamin C, (Vitamin C) 500 mg tablet Take 1 Tab by mouth two (2) times a day. 24 Tab 0    albuterol (PROVENTIL HFA, VENTOLIN HFA, PROAIR HFA) 90 mcg/actuation inhaler Take 2 Puffs by inhalation every four (4) hours as needed for Wheezing. 1 Inhaler 0    multivitamin with iron tablet Take 1 Tablet by mouth daily.  amLODIPine (NORVASC) 10 mg tablet TAKE 1 TABLET BY MOUTH EVERY DAY 90 Tab 2    lancets (TRUEplus Lancets) 28 gauge misc TEST BLOOD SUGAR EVERY  Lancet 5    glucose blood VI test strips (True Metrix Glucose Test Strip) strip TEST BLOOD SUGAR EVERY  Strip 5    insulin glargine (Lantus Solostar U-100 Insulin) 100 unit/mL (3 mL) inpn 20 Units by SubCUTAneous route daily. 30 mL 5    acetaminophen (Tylenol Extra Strength) 500 mg tablet Take  by mouth every six (6) hours as needed for Pain.  aspirin delayed-release 81 mg tablet Take 81 mg by mouth daily.  Insulin Needles, Disposable, (BD Ultra-Fine Short Pen Needle) 31 gauge x 5/16\" ndle by Does Not Apply route daily. E11.9.   Use daily for insulin injection 100 Pen Needle 5    Nebulizer & Compressor machine 1 Each by Does Not Apply route three (3) times daily. Indications: J44.9 1 Each 0    Blood-Glucose Meter (TRUE METRIX AIR GLUCOSE METER) misc by Does Not Apply route.  pantoprazole (PROTONIX) 40 mg tablet Take 1 Tablet by mouth two (2) times a day. (Patient not taking: Reported on 8/9/2021) 60 Tablet 5    ferrous sulfate 324 mg (65 mg iron) tablet Take 1 Tablet by mouth daily (with breakfast). (Patient not taking: Reported on 8/9/2021) 30 Tablet 0    HYDROcodone-acetaminophen (NORCO) 5-325 mg per tablet Take 1 Tab by mouth every six (6) hours as needed. (Patient not taking: Reported on 7/30/2021)      Omeprazole delayed release (PRILOSEC D/R) 20 mg tablet Take 1 Tab by mouth daily. (Patient not taking: Reported on 8/9/2021) 30 Tab 1    levocetirizine (XYZAL) 5 mg tablet Take 1 Tab by mouth daily. Indications: itching of skin (Patient not taking: Reported on 7/30/2021) 90 Tab 4    diphenhydrAMINE (BenadryL) 25 mg capsule Take 25 mg by mouth every six (6) hours as needed. (Patient not taking: Reported on 6/7/2021)      zinc sulfate 220 mg tablet Take 1 Tab by mouth two (2) times a day. (Patient not taking: Reported on 8/9/2021) 24 Tab 0    ipratropium (ATROVENT) 42 mcg (0.06 %) nasal spray 2 Sprays by Nasal route daily as needed.  (Patient not taking: Reported on 8/9/2021)       Allergies   Allergen Reactions    Lisinopril Other (comments)    Gabapentin Itching    Morphine Unknown (comments), Hives and Itching    Tramadol Other (comments) and Itching     \"funny feeling\"       Family History   Problem Relation Age of Onset    Other Mother         ANEURYSM    Breast Cancer Mother     Diabetes Mother     Lung Disease Father         EMPHYSEMA    Crohn's Disease Sister     Heart Disease Sister     Diabetes Maternal Uncle     Heart Disease Maternal Uncle     Diabetes Paternal Uncle     Heart Disease Paternal Uncle     Breast Cancer Maternal Aunt     Anesth Problems Neg Hx      Social History     Tobacco Use    Smoking status: Former Smoker     Packs/day: 0.25     Years: 20.00     Pack years: 5.00     Quit date: 2020     Years since quittin.1    Smokeless tobacco: Never Used   Substance Use Topics    Alcohol use: Not Currently     Comment: RUBENS Q Renita Penny

## 2021-08-16 RX ORDER — INSULIN PUMP SYRINGE, 3 ML
EACH MISCELLANEOUS
Qty: 1 KIT | Refills: 0 | Status: SHIPPED | OUTPATIENT
Start: 2021-08-16

## 2021-08-16 RX ORDER — LANCETS
EACH MISCELLANEOUS
Qty: 200 EACH | Refills: 11 | Status: SHIPPED | OUTPATIENT
Start: 2021-08-16 | End: 2022-02-03 | Stop reason: SDUPTHER

## 2021-08-16 NOTE — TELEPHONE ENCOUNTER
----- Message from [employer]  sent at 2021 10:54 AM EDT -----  Regarding: Dr. Kenny Chong: [phone]  General Message/Vendor Calls    Caller's first and last name:Pt      Reason for call:Pt would like a call back to get a prescription sent in for a blood sugar monitor. Callback required yes/no and why:Yes, discuss.        Best contact number(s):[phone]      Details to clarify the request:n/a      [employer]

## 2021-08-17 RX ORDER — CALCIUM CITRATE/VITAMIN D3 200MG-6.25
TABLET ORAL
Qty: 100 STRIP | Refills: 5 | Status: SHIPPED | OUTPATIENT
Start: 2021-08-17 | End: 2022-02-03 | Stop reason: SDUPTHER

## 2021-08-17 NOTE — TELEPHONE ENCOUNTER
----- Message from Emmanuel Callaway sent at 8/17/2021 11:26 AM EDT -----  Regarding: Dr. Artur Meyers: [phone]  Medication Refill    Caller (if not patient):Pt      Relationship of caller (if not patient):n/a      Best contact number(s):[phone]        Name of medication and dosage if known:test strips      Is patient out of this medication (yes/no):      Pharmacy name:[facility] listed in chart? (yes/no):yes  Pharmacy phone [phone]  Fax:  [phone]       Details to clarify the request:n/a      Emmanuel Callaway

## 2021-08-23 ENCOUNTER — CLINICAL SUPPORT (OUTPATIENT)
Dept: DIABETES SERVICES | Age: 66
End: 2021-08-23
Payer: COMMERCIAL

## 2021-08-23 DIAGNOSIS — E11.51 TYPE 2 DIABETES MELLITUS WITH PERIPHERAL VASCULAR DISEASE (HCC): Primary | ICD-10-CM

## 2021-08-23 PROCEDURE — G0108 DIAB MANAGE TRN  PER INDIV: HCPCS | Performed by: DIETITIAN, REGISTERED

## 2021-08-23 NOTE — PROGRESS NOTES
[facility]  Diabetes Self-Management Education & Support Program  Pre-program Assessment    Reason for Referral: new to diabetes education  Referral Source: Quirino Alaniz MD  Services requested: DSMES    ASSESSMENT    From my perspective, the participant would benefit from [facility] specifically related to Reducing risks, Healthy eating, Monitoring, Physical activity, Taking medications, Healthy coping and Problem solving. Will adapt DSMES program to build on participant's skills score and confidence score as noted in the Diabetes Skills, Confidence, and Preparedness Index. During the program, we will focus on providing DSMES that specifically addresses participant's interest in Reducing risks, Healthy eating, Monitoring, Physical activity, Taking medications, Healthy coping and Problem solving, as shown by their reported readiness to change. The participant would be best served by attending weekly individual sessions. Diabetes Self-Management Education Follow-up Visit: 8/30/2021       Clinical Presentation  Fannie Vo is a 63 y.o.  female referred for diabetes self-management education. Participant has Type 2 DM on insulin for 11-20 years. Family history positive for diabetes. Patient reports not receiving DSMES services in the past. Most recent A1c value:       Lab Results   Component Value Date/Time    Hemoglobin A1c 7.6 (H) 03/04/2021 10:13 AM   patient reports A1c at the beginning of August was 8%    Diabetes-related medical history:  none    Diabetes-related medications:  Current dosing:   Key Antihyperglycemic Medications             insulin glargine (Lantus Solostar U-100 Insulin) 100 unit/mL (3 mL) inpn 25 Units by SubCUTAneous route daily. Blood Pressure Management  Key ACE/ARB Medications     Patient is on no ACE or ARB meds.           Lipid Management  Key Antihyperlipidemia Meds             simvastatin (ZOCOR) 40 mg tablet TAKE 1 TABLET BY MOUTH EVERY NIGHT AT BEDTIME          Clot Prevention  Key Anti-Platelet Anticoagulant Meds             clopidogreL (PLAVIX) 75 mg tab Take 75 mg by mouth daily. aspirin delayed-release 81 mg tablet Take 81 mg by mouth daily. Learning Assessment  Learning objectives Educator assessment (8/23/2021)   Diabetes Disease Process  The participant can   A) describe diabetes in basic terms;   B) state the type of diabetes they have; &   C) state accepted blood glucose targets. Healthy Eating  The participant can   A) identify carbohydrate foods; &   B) accurately read food labels. Being Active  The participant can  A) state the benefits of physical activity;  B) report their current PA practices;  C) identify PA they would consider incorporating in their lives; &  D) develop an implementation plan. Monitoring  The participant can  A) operate their blood glucose meter; &  B) describe how they log their blood glucoses to share with their provider. Taking Medications  The participant can  A) name their diabetes medications;  B) state the purpose and dose;  C) note side effects; &  D) describe proper storage, disposal & transport (if appropriate). Healthy Coping  The participant can    A) describe their response to diabetes diagnosis; B) describe their specific coping mechanisms;  C) identify supportive people and/or other resources that positively support their diabetes self-care and health. Reducing Risks  The participant can describe the preventive measures used by providers to promote health and prevent diabetes complications. Problem Solving  The participant can   A) identify signs, symptoms & treatment of hypoglycemia;   B) identify signs, symptoms & treatment of hyperglycemia;  C) describe their sick day plan; &  D) identify BG patterns to discuss with their provider. No  Yes  Yes        Yes  No        Yes  Yes  Yes  Yes        Yes  Yes        Yes  Yes  Yes  No        Yes  Yes  Yes        Yes          No  Yes  No  Yes     Characteristics to Learning   Barriers to Learning   [] Cognitive loss  [] Mental retardation   [] Intellectual delay/cognitive impairment  [] Psychiatric disorder  [] Visually impaired  [] Hearing loss                 [] Low literacy (difficulty with written text)  [] Low numeracy (difficulty with mathematical information  [] Low health literacy (difficulty with understanding health information & services  [] Language  [] Functional limitation  [] Pain   [] Financial  [] Transportation  [x] None   Favorite Ways to Learn   [] Lecture  [] Slides  [x] Reading [] Video-Internet  [] Cassettes/CDs/MP3's  [] Interactive Small Groups [] Other       Behavioral Assessment  Current self-care practices  Educator assessment (8/23/2021)   Healthy Eating  Current practices  24-hour Dietary Recall:  Breakfast: 2 cups of cheerios, 8 ounce glass of apple juice, 2% milk in cereal  Lunch: skip  Dinner: 1 cup macaroni and cheese, 1 cup turnip greens, ham, ice cream (1/2 cup)  Snacks: none  Beverages: water, apple juice  Alcohol: none     Would benefit from DSMES related to Healthy Eating: Yes      Eats a carbohydrate controlled diet: No      Stage of change: Preparation   Being Active  Current practices  How many days during the past week have you performed physical activity where your heart beats faster and your breathing is harder than normal for 30 minutes or more?  7 days    How many days in a typical week do you perform activity such as this?  7 days    Walks on treadmill daily     Would benefit from [facility] related to Being Active: Yes      Exercises 150 minutes/week: Yes      Stage of change: Maintenance     Monitoring  Current practices  Do you monitor your blood sugar? Yes    How often do you monitor?  2x/day    What are the range of readings? [id number] mg/dL  Before Dinner: 197 mg/dL    Do you know your last A1c measurement? Yes    Do you know the meaning of the A1c? Yes     Would benefit from [facility] related to Monitoring: Yes      Uses BG readings to establish trends and understand BG patterns: Yes      Stage of change: Action   Taking Medication  Current practices  Do you understand what your diabetes medications do? No    How often do you miss doses of your diabetes medications? Never    Can you afford your diabetes medications? Yes   Would benefit from [facility] related to Taking Medication: Yes      Takes medications consistently to receive full benefit: Yes      Stage of change: Action       Healthy Coping   Current state  Diabetes Skills, Confidence and Preparedness Index: Total score: 6.5  Skills: 6.2  Confidence: 6.3  Preparedness: 7.0   Would benefit from DSMES related to Healthy Coping: Yes      Identifies specific people, organizations,etc, that actively support their diabetes self-care efforts: Yes      Stage of change: Action     Reducing Risks  Current state  Vaccines:  Influenza:   Immunization History   Administered Date(s) Administered    Influenza Vaccine 10/22/2014    Influenza Vaccine Photo Rankr) PF (>6 Mo Flulaval, Fluarix, and >3 Yrs Afluria, Fluzone 36669) 09/10/2018, 09/16/2019, 10/08/2020       Pneumococcal:   Immunization History   Administered Date(s) Administered    Pneumococcal Polysaccharide (PPSV-23) 01/22/2015        Hepatitis: There is no immunization history for the selected administration types on file for this patient.     Examinations:  Diabetic Foot and Eye Exam HM Status   Topic Date Due    Diabetic Foot Care  08/09/2022    Eye Exam  12/04/2022        Dental exam: Last appointment was: 8/21      Heart Protection:  BP Readings from Last 2 Encounters:   08/09/21 130/88   07/19/21 (!) 146/74        Lab Results   Component Value Date/Time    LDL, calculated 98.4 03/04/2021 10:13 AM        Kidney Protection:  Lab Results   Component Value Date/Time Microalbumin/Creat ratio (mg/g creat) 6 03/04/2021 10:13 AM    Microalbumin,urine random 1.61 03/04/2021 10:13 AM        Would benefit from [facility] related to Reducing Risks: Yes      Actively participates in decision-making with provider regarding secondary prevention:  Yes      Stage of change: Action   Problem Solving  Current state  Hypoglycemia Management:  What are signs and symptoms of hypoglycemia that you experience? Pt reported being unaware of s/s of hypoglycemia    How do you prevent hypoglycemia? Consistent meals/snack times    How do you treat hypoglycemia? Rule of 15    Hyperglycemia Management:  What are signs and symptoms of hyperglycemia that you experience? Extreme thirst, Fatigue, Blurred vision    How can you prevent hyperglycemia? Focus on carbohydrate counting/meal planning    Sick Day Management:  What do you do differently on sick days? Pt reported being unaware of self-management on sick days    Pattern Management:  Do you notice blood glucose patterns when you look at the readings in your meter or logbook? No    How do you use the blood glucose readings from your meter or logbook? Adjust meals based on results     Would benefit from [facility] related to Problem Solving: Yes      Articulates appropriate strategies to address hypoglycemia, hyperglycemia, sick day care and BG pattern: No      Stage of change: Preparation     Note: Content derived from the American Association of Diabetes Educators' Diabetes Education Curriculum: A Guide to Successful Self-Management (3rd edition)      Yuniel Bower RD on 8/23/2021 at 10:16 AM    Encounter date: 8/23/2021  Time in appointment: 37 minutes    [facility] Logo  Diabetes Skills, Confidence & Preparedness Index (SCPI) ©  Thank you for completing the Skills, Confidence & Preparedness Index! Below are your scores. All scales and questions are out of 7.   If you would like these results emailed, please enter your email address along with some identifying patient information. Email:    Patient Identifier:        Overall SCPI score: 6.5 Skills Score: 6.2  Low: Healthy Eating(Q1),Reducing Risks(Q5),Healthy Coping(Q7) Confidence Score: 6.3  Low: Healthy UFJKCU(D5) Preparedness Score: 7.0  Low: Healthy Eating(Q1),Being Active(Q2),Healthy Coping(Q3),Reducing Risks(Q4),Taking Medication(Q5),Blood Sugar Monitoring(Q6),Problem Solving(Q7)  Healthy Eating Score: 6.0  Low: Skills(Q1),Confidence(Q4) Taking Medication Score: 7.0  Low: Skills(Q2),Preparedness(Q5) Blood Sugar Monitoring Score: 6.8  Low: Confidence(Q6) Reducing Risks Score: 6.3  Low: Skills(Q5)  Problem Solving Score: 6.7  Low: Confidence(Q7) Healthy Coping Score: 6.3  Low: FMJFMO(N0) Being Active Score: 6.5  Low: Confidence(Q5)    Skills/Knowledge Questions  1. I know how to plan meals that have the best balance between carbohydrates, proteins and vegetables. 5  2. I know how my diabetes medications (pills, injectables and/or insulin) work in my body. 7  3. I know when to check my blood sugar if I want to see how my body responded to a meal. 7  4. I know when to check my blood sugars to determine if my medication or insulin doses are correct. 7  5. I know what to do to prevent a low blood sugar when I exercise (either before, during, or after). 5  6. When I am sick, I know what to do differently with my diabetes management. 7  7. I know how stress can affect my diabetes management. 5  8. When I look at my blood sugars over a given week, I can explain what my blood sugar pattern is. 7  9. I know what my target levels are for A1c, blood pressure and cholesterol. 6  Confidence Questions  1. I am confident that I can plan balanced meals and snacks. 7  2. I am confident that I can manage my stress. 7  3. I am confident that I can prevent a low blood sugar during or after exercise. 7  4. I am confident that the next time I eat out, I will be able to choose foods that best keep my blood sugars in target. 5  5.  I am confident I can include exercise into my schedule. 6  6. I am confident that I can use my daily blood sugars to adjust my diet, my activity, and/or my insulin. 6  7. When something out of my normal routine happens, I am confident that I can problem-solve and keep my diabetes on track. 6  Preparedness Questions  1. Within the next month, I will begin to eat more balanced meals and snacks. 7  2. Within the next month, I will choose an exercise activity and I will start fitting it into my schedule. 7  3. Within the next month, I will make a list of stress management options that work for me. 7  4. Within the next month, I will consistently plan ahead to prevent low blood sugars. 7  5. Within the next month, I will start adjusting my insulin doses on my own. 7  6. Within the next month, I will begin making changes to my diabetes management based on my daily blood sugars (eg - eating, activity and/or insulin). 7  7. Within the next month, I will begin making changes to my diabetes management to meet my overall goals (eg - eating, activity and/or insulin).  7

## 2021-09-23 ENCOUNTER — PATIENT OUTREACH (OUTPATIENT)
Dept: CASE MANAGEMENT | Age: 66
End: 2021-09-23

## 2021-09-23 NOTE — PROGRESS NOTES
9/23/2021  1:44 PM    Patient outreach attempt by this ACM today to perform CCM follow-up; lvm requesting a return phone call to this ACM.

## 2021-10-28 ENCOUNTER — PATIENT OUTREACH (OUTPATIENT)
Dept: CASE MANAGEMENT | Age: 66
End: 2021-10-28

## 2021-10-29 NOTE — PROGRESS NOTES
10/29/2021  9:48 AM    Patient outreach by this ACM today to perform CCM follow-up. Two patient identifiers verified. Patient requests ACM outreach on Monday morning, 11/1/21.

## 2021-11-06 PROBLEM — K56.609 BOWEL OBSTRUCTION (HCC): Status: RESOLVED | Noted: 2019-08-30 | Resolved: 2021-11-06

## 2021-11-06 PROBLEM — R19.7 DIARRHEA: Status: RESOLVED | Noted: 2020-06-03 | Resolved: 2021-11-06

## 2021-11-06 PROBLEM — E11.51 TYPE 2 DIABETES MELLITUS WITH PERIPHERAL VASCULAR DISEASE (HCC): Status: RESOLVED | Noted: 2019-10-14 | Resolved: 2021-11-06

## 2021-11-06 PROBLEM — R11.10 VOMITING: Status: RESOLVED | Noted: 2019-08-18 | Resolved: 2021-11-06

## 2021-11-06 NOTE — PROGRESS NOTES
Ms. Matthew Barragan is a 63 y.o. female who is here for evaluation of   Chief Complaint   Patient presents with    Diabetes     3 month F/U - glucose this AM    Hypertension     3 month F/U    Immunization/Injection     Requests flu vaccine had COVID vaccine 5 days ago   . ASSESSMENT AND PLAN: She has boosted and would like a flu shot today. 1. Type 2 diabetes mellitus with hyperglycemia, with long-term current use of insulin ([address]  She is due for A1c today. 2. PAD (peripheral artery disease) (HCC)  No recent vascular symptoms. 3. Essential hypertension  She is at goal.      No orders of the defined types were placed in this encounter. HPI 26-year-old woman with diabetes, peripheral arterial disease, hypertension and well-controlled schizophrenia arrives today after brief assessment via telephone with complex case management last week. Overall she feels great. She is working as a private duty sitter at [employer] She would like a flu vaccine today. Her diabetes has been well controlled with an A1c of 7.6 earlier this year. She is followed by vascular surgery for her peripheral arterial disease and her hypertension has been reasonably well controlled this year. ROS:  Denies  fever, chills, cough, chest pain, SOB,  nausea, vomiting, or diarrhea. Denies wt loss, wt gain, hemoptysis, hematochezia or melena. Physical Examination:    Visit Vitals  /66 (BP 1 Location: Left upper arm, BP Patient Position: Sitting, BP Cuff Size: Adult long)   Pulse 89   Temp 98.6 °F (37 °C) (Temporal)   Resp 18   Ht 5' 5\" (1.651 m)   Wt 179 lb (81.2 kg)   SpO2 97%   BMI 29.79 kg/m²      General:  Alert, cooperative, no distress. Head:  Normocephalic, without obvious abnormality, atraumatic. Eyes:  Conjunctivae/corneas clear. Pupils equal, round, reactive to light. Extraocular movements intact. Lungs:   Clear to auscultation bilaterally. Chest wall:  No tenderness or deformity.    Cardiac:  RRR Abdomen:   Soft, non-tender. Bowel sounds normal. No masses. No organomegaly. Extremities: Extremities normal, atraumatic, no cyanosis or edema. Pulses: 2+ and symmetric all extremities. Skin: Skin color, texture, turgor normal. No rashes or lesions. Lymph nodes: Cervical, supraclavicular, and axillary nodes normal.   Neurologic: CNII-XII intact. Normal strength, sensation, and reflexes throughout. On this date 11/08/2021 I have spent 30 minutes reviewing previous notes, test results and face to face with the patient discussing the diagnosis and importance of compliance with the treatment plan as well as documenting on the day of the visit.     Michael Martinez MD FACP    (signed electronically) on 11/8/2021 at 9:49 AM

## 2021-11-08 ENCOUNTER — OFFICE VISIT (OUTPATIENT)
Dept: FAMILY MEDICINE CLINIC | Age: 66
End: 2021-11-08
Payer: COMMERCIAL

## 2021-11-08 VITALS
HEART RATE: 89 BPM | SYSTOLIC BLOOD PRESSURE: 136 MMHG | OXYGEN SATURATION: 97 % | TEMPERATURE: 98.6 F | WEIGHT: 179 LBS | RESPIRATION RATE: 18 BRPM | HEIGHT: 65 IN | DIASTOLIC BLOOD PRESSURE: 66 MMHG | BODY MASS INDEX: 29.82 KG/M2

## 2021-11-08 DIAGNOSIS — Z79.4 TYPE 2 DIABETES MELLITUS WITH HYPERGLYCEMIA, WITH LONG-TERM CURRENT USE OF INSULIN (HCC): Primary | ICD-10-CM

## 2021-11-08 DIAGNOSIS — I10 ESSENTIAL HYPERTENSION: ICD-10-CM

## 2021-11-08 DIAGNOSIS — E11.65 TYPE 2 DIABETES MELLITUS WITH HYPERGLYCEMIA, WITH LONG-TERM CURRENT USE OF INSULIN (HCC): Primary | ICD-10-CM

## 2021-11-08 DIAGNOSIS — I73.9 PAD (PERIPHERAL ARTERY DISEASE) (HCC): ICD-10-CM

## 2021-11-08 LAB
ALBUMIN SERPL-MCNC: 3.5 G/DL (ref 3.5–5)
ALBUMIN/GLOB SERPL: 0.9 {RATIO} (ref 1.1–2.2)
ALP SERPL-CCNC: 97 U/L (ref 45–117)
ALT SERPL-CCNC: 27 U/L (ref 12–78)
ANION GAP SERPL CALC-SCNC: 4 MMOL/L (ref 5–15)
AST SERPL-CCNC: 18 U/L (ref 15–37)
BASOPHILS # BLD: 0.1 K/UL (ref 0–0.1)
BASOPHILS NFR BLD: 1 % (ref 0–1)
BILIRUB SERPL-MCNC: 0.3 MG/DL (ref 0.2–1)
BUN SERPL-MCNC: 12 MG/DL (ref 6–20)
BUN/CREAT SERPL: 12 (ref 12–20)
CALCIUM SERPL-MCNC: 10.4 MG/DL (ref 8.5–10.1)
CHLORIDE SERPL-SCNC: 102 MMOL/L (ref 97–108)
CHOLEST SERPL-MCNC: 174 MG/DL
CO2 SERPL-SCNC: 32 MMOL/L (ref 21–32)
CREAT SERPL-MCNC: 0.97 MG/DL (ref 0.55–1.02)
DIFFERENTIAL METHOD BLD: ABNORMAL
EOSINOPHIL # BLD: 0.2 K/UL (ref 0–0.4)
EOSINOPHIL NFR BLD: 3 % (ref 0–7)
ERYTHROCYTE [DISTWIDTH] IN BLOOD BY AUTOMATED COUNT: 17.2 % (ref 11.5–14.5)
EST. AVERAGE GLUCOSE BLD GHB EST-MCNC: 169 MG/DL
GLOBULIN SER CALC-MCNC: 3.9 G/DL (ref 2–4)
GLUCOSE SERPL-MCNC: 194 MG/DL (ref 65–100)
HBA1C MFR BLD: 7.5 % (ref 4–5.6)
HCT VFR BLD AUTO: 37.6 % (ref 35–47)
HDLC SERPL-MCNC: 62 MG/DL
HDLC SERPL: 2.8 {RATIO} (ref 0–5)
HGB BLD-MCNC: 11.6 G/DL (ref 11.5–16)
IMM GRANULOCYTES # BLD AUTO: 0 K/UL (ref 0–0.04)
IMM GRANULOCYTES NFR BLD AUTO: 0 % (ref 0–0.5)
LDLC SERPL CALC-MCNC: 88.6 MG/DL (ref 0–100)
LYMPHOCYTES # BLD: 2.4 K/UL (ref 0.8–3.5)
LYMPHOCYTES NFR BLD: 32 % (ref 12–49)
MCH RBC QN AUTO: 25.4 PG (ref 26–34)
MCHC RBC AUTO-ENTMCNC: 30.9 G/DL (ref 30–36.5)
MCV RBC AUTO: 82.3 FL (ref 80–99)
MONOCYTES # BLD: 0.7 K/UL (ref 0–1)
MONOCYTES NFR BLD: 9 % (ref 5–13)
NEUTS SEG # BLD: 4.1 K/UL (ref 1.8–8)
NEUTS SEG NFR BLD: 55 % (ref 32–75)
NRBC # BLD: 0 K/UL (ref 0–0.01)
NRBC BLD-RTO: 0 PER 100 WBC
PLATELET # BLD AUTO: 238 K/UL (ref 150–400)
PMV BLD AUTO: 12.1 FL (ref 8.9–12.9)
POTASSIUM SERPL-SCNC: 3.4 MMOL/L (ref 3.5–5.1)
PROT SERPL-MCNC: 7.4 G/DL (ref 6.4–8.2)
RBC # BLD AUTO: 4.57 M/UL (ref 3.8–5.2)
SODIUM SERPL-SCNC: 138 MMOL/L (ref 136–145)
TRIGL SERPL-MCNC: 117 MG/DL (ref ?–150)
TSH SERPL DL<=0.05 MIU/L-ACNC: 0.68 UIU/ML (ref 0.36–3.74)
VLDLC SERPL CALC-MCNC: 23.4 MG/DL
WBC # BLD AUTO: 7.5 K/UL (ref 3.6–11)

## 2021-11-08 PROCEDURE — 3051F HG A1C>EQUAL 7.0%<8.0%: CPT | Performed by: INTERNAL MEDICINE

## 2021-11-08 PROCEDURE — 99214 OFFICE O/P EST MOD 30 MIN: CPT | Performed by: INTERNAL MEDICINE

## 2021-11-08 NOTE — PROGRESS NOTES
Carole Felton is a 63 y.o. female presenting for/with:    Chief Complaint   Patient presents with    Diabetes     3 month F/U - glucose this AM    Hypertension     3 month F/U    Immunization/Injection     Requests flu vaccine had COVID vaccine 5 days ago       Visit Vitals  /66 (BP 1 Location: Left upper arm, BP Patient Position: Sitting, BP Cuff Size: Adult long)   Pulse 89   Temp 98.6 °F (37 °C) (Temporal)   Resp 18   Ht 5' 5\" (1.651 m)   Wt 179 lb (81.2 kg)   SpO2 97%   BMI 29.79 kg/m²     Pain Scale: 0 - No pain/10  Pain Location:     1. Have you been to the ER, urgent care clinic since your last visit? Hospitalized since your last visit? NO    2. Have you seen or consulted any other health care providers outside of the [address] [place] [address] since your last visit? Include any pap smears or colon screening. NO    Symptom review:  NO  Fever   NO  Shaking chills  NO  Cough  NO  Body aches  NO  Coughing up blood  NO  Chest congestion  NO  Chest pain  NO  Shortness of breath  NO  Profound Loss of smell/taste  NO  Nausea/Vomiting   NO  Loose stool/Diarrhea  NO  any skin issues    Patient Risk Factors Reviewed as follows:  NO  have you been in Close contact with confirmed COVID19 patient   NO  History of recent travel to affected geographical areas within the past 14 days  NO  COPD  NO  Active Cancer/Leukemia/Lymphoma/Chemotherapy  NO  Oral steroid use  NO  Pregnant  YES  Diabetes Mellitus  YES  Heart disease  NO  Asthma  NO Health care worker at home  [address] care worker  NO Is there a Pregnant Woman in the home  NO Dialysis pt in the home   NO a large number of people living in the home    Learning Assessment 8/9/2021   PRIMARY LEARNER Patient   PRIMARY LANGUAGE ENGLISH   LEARNER PREFERENCE PRIMARY LISTENING     -   ANSWERED BY patient   RELATIONSHIP SELF     Fall Risk Assessment, last 12 mths 11/8/2021   Able to walk? Yes   Fall in past 12 months? 0   Do you feel unsteady?  0   Are you worried about falling 0   Number of falls in past 12 months -   Fall with injury? -       3 most recent PHQ Screens 11/8/2021   Little interest or pleasure in doing things Not at all   Feeling down, depressed, irritable, or hopeless Not at all   Total Score PHQ 2 0   Trouble falling or staying asleep, or sleeping too much -   Feeling tired or having little energy -   Poor appetite, weight loss, or overeating -   Feeling bad about yourself - or that you are a failure or have let yourself or your family down -   Trouble concentrating on things such as school, work, reading, or watching TV -   Moving or speaking so slowly that other people could have noticed; or the opposite being so fidgety that others notice -   How difficult have these problems made it for you to do your work, take care of your home and get along with others -     Abuse Screening Questionnaire 11/8/2021   Do you ever feel afraid of your partner? N   Are you in a relationship with someone who physically or mentally threatens you? N   Is it safe for you to go home?  Y       ADL Assessment 11/8/2021   Feeding yourself No Help Needed   Getting from bed to chair No Help Needed   Getting dressed No Help Needed   Bathing or showering No Help Needed   Walk across the room (includes cane/walker) No Help Needed   Using the telphone No Help Needed   Taking your medications No Help Needed   Preparing meals No Help Needed   Managing money (expenses/bills) No Help Needed   Moderately strenuous housework (laundry) No Help Needed   Shopping for personal items (toiletries/medicines) No Help Needed   Shopping for groceries No Help Needed   Driving No Help Needed   Climbing a flight of stairs No Help Needed   Getting to places beyond walking distances No Help Needed      Advance Care Planning 8/9/2021   Patient's Healthcare Decision Maker is: Legal Next of Kin   Primary Decision Maker Name -   Primary Decision Maker Relationship to Patient -   Confirm Advance Directive None Patient Would Like to Complete Advance Directive No   Does the patient have other document types -

## 2021-11-09 NOTE — PROGRESS NOTES
Called patient no answer. Left message with below information: \"Drew Varela-your lab work looks great. You are doing a good job with your diabetes. The potassium was just a tiny bit low it would help if you would eat some potassium rich foods like a banana once a day or a can of V8 juice which is full of potassium.   Everything else looks fine and we will catch up with you soon\"

## 2021-11-22 ENCOUNTER — PATIENT OUTREACH (OUTPATIENT)
Dept: CASE MANAGEMENT | Age: 66
End: 2021-11-22

## 2021-11-23 NOTE — PROGRESS NOTES
11/23/2021  3:33 PM    Top Challenges reviewed with the provider   - most recent PCP office visit 11/8/21; next recommended follow-up in 4 months. Ambulatory  contacted patient for discussion and case management of    Summary of patients top problems:   1. Increase Physical Activity     \"I lost a lot of weight by being sick and I just want to tone my body up. \" She underwent a right colectomy and appendectomy 9/1/2019; she reports a weight loss of over 50 lb in the following year post-op - pt reported weight of 216 lb pre-op and her weight decreased to 164 lb. She reports weight gain of 11 lb within the past couple of months. Patient reports goal weight of 150 lb; her current weight is 179 lb. 2. Health Maintenance  3. Type 2 DM     Most recent Hgb A1c 11/8/21 was 7.5%; result on 3/4/21 was 7.6%. Per most recent PCP OV note, diabetes is well-controlled. PCP/Specialist follow up:   Future Appointments   Date Time Provider Chance Cantor   12/16/2021  9:30 AM [address]          Goals        Chronic Disease      Increase Physical Activity/ Weight loss (pt-stated)       11/23/2021  She has [employer] benefits. Her local bodyfit gym participates in the [address] program and is only located 5 minutes driving distance from her home. Patient is scared to go to this bodyfit location because the owner has a dog in the office at the gym; she spoke with the owner about the dog and the owner put the dog up, however she remains unsure if she is comfortable using this gym facility with the dog present. Patient is interested in going to her local [employer], however they do not participate with [employer]; she states that she may be more comfortable going to the [facility] and paying for a membership there. Patient reports goal weight of 150 lb; her current weight is 179 lb.    Over the next two weeks patient will decide whether she is comfortable with trying her local bodyfit gym or whether she is more comfortable with going to the [facility]. Plan for next ACM outreach in approximately two weeks. Patient verbalized understanding of all information discussed. Patient has this Ambulatory Care Manager's contact information for any further questions, concerns, or needs.

## 2021-12-02 ENCOUNTER — TELEPHONE (OUTPATIENT)
Dept: DIABETES SERVICES | Age: 66
End: 2021-12-02

## 2021-12-02 NOTE — TELEPHONE ENCOUNTER
PSR called and spoke with patient- states she is not interested in diabetes education classes at this time.      Claudio Alexandra RD, [facility]

## 2021-12-16 ENCOUNTER — HOSPITAL ENCOUNTER (OUTPATIENT)
Dept: MAMMOGRAPHY | Age: 66
Discharge: HOME | End: 2021-12-16
Attending: INTERNAL MEDICINE
Payer: MEDICARE

## 2021-12-16 DIAGNOSIS — Z12.31 VISIT FOR SCREENING MAMMOGRAM: ICD-10-CM

## 2021-12-16 PROCEDURE — 77063 BREAST TOMOSYNTHESIS BI: CPT

## 2021-12-17 ENCOUNTER — TELEPHONE (OUTPATIENT)
Dept: FAMILY MEDICINE CLINIC | Age: 66
End: 2021-12-17

## 2021-12-17 NOTE — TELEPHONE ENCOUNTER
[phone] contact # per Joseline Campbell, pls have Nurse Robinata Hard returning missed call    Thanks,

## 2022-01-25 ENCOUNTER — PATIENT OUTREACH (OUTPATIENT)
Dept: CASE MANAGEMENT | Age: 67
End: 2022-01-25

## 2022-01-25 NOTE — PROGRESS NOTES
1/25/2022  1:33 PM    Goals Addressed                    This Visit's Progress       Chronic Disease      Increase Physical Activity/ Weight loss (pt-stated)   On track      1/25/2022  She is still interested in [employer]. She has not began an exercise regimen or program yet due to presence of worsened arthritis pain over the past two weeks and because she is also unsure of [facility] membership cost and has therefore not yet decided whether to go to the [facility] or to the [facility]. ACM reviewed [employer] monthly membership cost with patient ($54/month for adults ages 27-64); monthly [employer] membership cost presents a barrier to patient. Patient is comfortable returning to the [facility] gym where she is able to use her [employer] benefits. Over the next two weeks patient will consider setting a start date for [employer] program.  Patient also reports that she received CANDIDA Lazaro; she receives $50.month. Patient will schedule acute follow-up with her PCP if arthritis pain worsens or fails to improve. Plan for next AC outreach in approximately two weeks. 11/23/2021  She has [employer] benefits. Her local [facility] gym participates in the [address] [place] [address] Dr program and is only located 5 minutes driving distance from her home. Patient is scared to go to this bodyfit location because the owner has a dog in the office at the gym; she spoke with the owner about the dog and the owner put the dog up, however she remains unsure if she is comfortable using this gym facility with the dog present. Patient is interested in going to her local [facility], however they do not participate with [employer]; she states that she may be more comfortable going to the [facility] and paying for a membership there. Patient reports goal weight of 150 lb; her current weight is 179 lb.    Over the next two weeks patient will decide whether she is comfortable with trying her local bodyfit gym or whether she is more comfortable with going to the [facility]. Plan for next ACM outreach in approximately two weeks. Future Appointments:  No future appointments.      Last Appointment With Me:  Visit date not found     Last Appointment My Department:  Visit date not found

## 2022-01-27 ENCOUNTER — OFFICE VISIT (OUTPATIENT)
Dept: FAMILY MEDICINE CLINIC | Age: 67
End: 2022-01-27
Payer: MEDICARE

## 2022-01-27 ENCOUNTER — HOSPITAL ENCOUNTER (OUTPATIENT)
Dept: GENERAL RADIOLOGY | Age: 67
Discharge: HOME | End: 2022-01-27
Payer: MEDICARE

## 2022-01-27 VITALS
TEMPERATURE: 97.5 F | HEIGHT: 66 IN | OXYGEN SATURATION: 98 % | RESPIRATION RATE: 16 BRPM | BODY MASS INDEX: 29.32 KG/M2 | DIASTOLIC BLOOD PRESSURE: 78 MMHG | SYSTOLIC BLOOD PRESSURE: 118 MMHG | HEART RATE: 93 BPM | WEIGHT: 182.4 LBS

## 2022-01-27 DIAGNOSIS — M79.601 RIGHT ARM PAIN: ICD-10-CM

## 2022-01-27 DIAGNOSIS — M79.601 RIGHT ARM PAIN: Primary | ICD-10-CM

## 2022-01-27 PROCEDURE — G8510 SCR DEP NEG, NO PLAN REQD: HCPCS | Performed by: INTERNAL MEDICINE

## 2022-01-27 PROCEDURE — G8427 DOCREV CUR MEDS BY ELIG CLIN: HCPCS | Performed by: INTERNAL MEDICINE

## 2022-01-27 PROCEDURE — G8752 SYS BP LESS 140: HCPCS | Performed by: INTERNAL MEDICINE

## 2022-01-27 PROCEDURE — 99214 OFFICE O/P EST MOD 30 MIN: CPT | Performed by: INTERNAL MEDICINE

## 2022-01-27 PROCEDURE — 73030 X-RAY EXAM OF SHOULDER: CPT

## 2022-01-27 PROCEDURE — G9899 SCRN MAM PERF RSLTS DOC: HCPCS | Performed by: INTERNAL MEDICINE

## 2022-01-27 PROCEDURE — G8754 DIAS BP LESS 90: HCPCS | Performed by: INTERNAL MEDICINE

## 2022-01-27 PROCEDURE — 3017F COLORECTAL CA SCREEN DOC REV: CPT | Performed by: INTERNAL MEDICINE

## 2022-01-27 PROCEDURE — G8419 CALC BMI OUT NRM PARAM NOF/U: HCPCS | Performed by: INTERNAL MEDICINE

## 2022-01-27 PROCEDURE — 71046 X-RAY EXAM CHEST 2 VIEWS: CPT

## 2022-01-27 RX ORDER — CYCLOBENZAPRINE HCL 10 MG
10 TABLET ORAL
Qty: 30 TABLET | Refills: 1 | Status: SHIPPED | OUTPATIENT
Start: 2022-01-27 | End: 2022-05-29 | Stop reason: ALTCHOICE

## 2022-01-27 RX ORDER — METHYLPREDNISOLONE 4 MG/1
TABLET ORAL
Qty: 1 DOSE PACK | Refills: 0 | OUTPATIENT
Start: 2022-01-27 | End: 2022-02-25

## 2022-01-27 NOTE — PROGRESS NOTES
Ms. Thiago Scott is a 61 y.o. female who is here for evaluation of   Chief Complaint   Patient presents with    Arm Pain     yashira arm pain for the past 2 weeks, progressively getting worse. Has taken OTC tylenol with no relief. Stated pain radiateds from elbow to shoulders   . ASSESSMENT AND PLAN:    1. Right arm pain  Etiology not clear but it is consistent with a C5 radiculopathy. Will check plain films of the neck and the shoulder, initiate therapy with Flexeril, heating pad and a Medrol Dosepak and will recommend physical therapy. Follow-up next week. No orders of the defined types were placed in this encounter. HPI 62-year-old who arrives with a 2-week history of right arm pain. Denies trauma. Denies fever, chills or cough. Denies lifting anything heavy. The pain is intense and radiates from the neck down to the right elbow but no further. ROS:  Denies fever, chills, cough, chest pain, SOB,  nausea, vomiting, or diarrhea. Denies wt loss, wt gain, hemoptysis, hematochezia or melena. Physical Examination:    Visit Vitals  /78 (BP 1 Location: Left arm, BP Patient Position: Sitting, BP Cuff Size: Adult long)   Pulse 93   Temp 97.5 °F (36.4 °C) (Temporal)   Resp 16   Ht 5' 5.5\" (1.664 m)   Wt 182 lb 6.4 oz (82.7 kg)   SpO2 98%   BMI 29.89 kg/m²      General:  Alert, cooperative, no distress. Head is slightly rotated towards the right   Head:  Normocephalic, without obvious abnormality, atraumatic. Eyes:  Conjunctivae/corneas clear. Pupils equal, round, reactive to light. Extraocular movements intact. Lungs:   Clear to auscultation bilaterally. Chest wall:  No tenderness or deformity. Cardiac:  RRR   Abdomen:   Soft, non-tender. Bowel sounds normal. No masses. No organomegaly. Extremities:  Tenderness is noted between the right trapezius down to the right elbow but not below or above. There is no rash to suggest shingles.   There is no obvious abscess or palpable mass in the axilla. The radial pulses 2+ in the right upper extremity. Pulses: 2+ and symmetric all extremities. Skin: Skin color, texture, turgor normal. No rashes or lesions. Lymph nodes: Cervical, supraclavicular, and axillary nodes normal.   Neurologic: CNII-XII intact. Normal strength, sensation, and reflexes throughout. On this date 01/27/2022 I have spent 30 minutes reviewing previous notes, test results and face to face with the patient discussing the diagnosis and importance of compliance with the treatment plan as well as documenting on the day of the visit.     Bandar Lundberg MD FACP    (signed electronically) on 1/27/2022 at 9:01 AM

## 2022-01-27 NOTE — PROGRESS NOTES
Chief Complaint   Patient presents with    Arm Pain     yashira arm pain for the past 2 weeks, progressively getting worse. Has taken OTC tylenol with no relief. Stated pain radiateds from elbow to shoulders     3 most recent PHQ Screens 1/27/2022   Little interest or pleasure in doing things Not at all   Feeling down, depressed, irritable, or hopeless Not at all   Total Score PHQ 2 0   Trouble falling or staying asleep, or sleeping too much -   Feeling tired or having little energy -   Poor appetite, weight loss, or overeating -   Feeling bad about yourself - or that you are a failure or have let yourself or your family down -   Trouble concentrating on things such as school, work, reading, or watching TV -   Moving or speaking so slowly that other people could have noticed; or the opposite being so fidgety that others notice -   How difficult have these problems made it for you to do your work, take care of your home and get along with others -     Learning Assessment 1/27/2022   PRIMARY LEARNER Patient   PRIMARY LANGUAGE ENGLISH   LEARNER PREFERENCE PRIMARY READING     -   ANSWERED BY patient   RELATIONSHIP SELF     Fall Risk Assessment, last 12 mths 1/27/2022   Able to walk? Yes   Fall in past 12 months? 0   Do you feel unsteady? 0   Are you worried about falling 0   Number of falls in past 12 months -   Fall with injury? -     Abuse Screening Questionnaire 1/27/2022   Do you ever feel afraid of your partner? N   Are you in a relationship with someone who physically or mentally threatens you? N   Is it safe for you to go home?  Y     ADL Assessment 1/27/2022   Feeding yourself No Help Needed   Getting from bed to chair No Help Needed   Getting dressed No Help Needed   Bathing or showering No Help Needed   Walk across the room (includes cane/walker) No Help Needed   Using the telphone No Help Needed   Taking your medications No Help Needed   Preparing meals No Help Needed   Managing money (expenses/bills) No Help Needed   Moderately strenuous housework (laundry) No Help Needed   Shopping for personal items (toiletries/medicines) No Help Needed   Shopping for groceries No Help Needed   Driving No Help Needed   Climbing a flight of stairs No Help Needed   Getting to places beyond walking distances No Help Needed     1. Have you been to the ER, urgent care clinic since your last visit? Hospitalized since your last visit? No    2. Have you seen or consulted any other health care providers outside of the [address] [place] [address] [place] since your last visit? Include any pap smears or colon screening. No      Chief Complaint   Patient presents with    Arm Pain     yashira arm pain for the past 2 weeks, progressively getting worse. Has taken OTC tylenol with no relief. Stated pain radiateds from elbow to shoulders         Visit Vitals  /78 (BP 1 Location: Left arm, BP Patient Position: Sitting, BP Cuff Size: Adult long)   Pulse 93   Temp 97.5 °F (36.4 °C) (Temporal)   Resp 16   Ht 5' 5.5\" (1.664 m)   Wt 182 lb 6.4 oz (82.7 kg)   SpO2 98%   BMI 29.89 kg/m²       Pain Scale: 8/10  Pain Location: Arm (bilateral arm pain)    Valeriy Izquierdo is a 61 y.o. female presenting for/with:    Arm Pain (yashira arm pain for the past 2 weeks, progressively getting worse. Has taken OTC tylenol with no relief.  Stated pain radiateds from elbow to shoulders)      Symptom review:    NO  Fever   NO  Shaking chills  NO  Cough  NO  Body aches  NO  Coughing up blood  NO  Chest congestion  NO  Chest pain  NO  Shortness of breath  NO  Profound Loss of smell/taste  NO  Nausea/Vomiting   NO  Loose stool/Diarrhea  NO  any skin issues    Patient Risk Factors Reviewed as follows:  NO  have you been in Close contact with confirmed COVID19 patient   NO  History of recent travel to affected geographical areas within the past 14 days  NO  COPD  NO  Active Cancer/Leukemia/Lymphoma/Chemotherapy  NO  Oral steroid use  NO  Pregnant  NO  Diabetes Mellitus  NO  Heart disease  NO Asthma  NO Health care worker at home  NO Health care worker  NO Is there a Pregnant Woman in the home  NO Dialysis pt in the home   NO a large number of people living in the home  Recent Travel Screening and Travel History documentation          Recent Travel Screening and Travel History documentation     Travel Screening     Question   Response    In the last month, have you been in contact with someone who was confirmed or suspected to have Coronavirus / COVID-19? No / Unsure    Have you had a COVID-19 viral test in the last 14 days? No    Do you have any of the following new or worsening symptoms? None of these    Have you traveled internationally or domestically in the last month?   No      Travel History   Travel since 12/27/21    No documented travel since 12/27/21

## 2022-01-29 PROBLEM — H25.13 AGE-RELATED NUCLEAR CATARACT OF BOTH EYES: Status: ACTIVE | Noted: 2018-09-12

## 2022-01-30 PROBLEM — R44.0 VERBAL AUDITORY HALLUCINATIONS: Status: ACTIVE | Noted: 2018-12-06

## 2022-01-30 PROBLEM — R44.1 VISUAL HALLUCINATIONS: Status: ACTIVE | Noted: 2018-12-06

## 2022-01-30 PROBLEM — G54.0 THORACIC OUTLET SYNDROME: Status: ACTIVE | Noted: 2018-11-09
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
## 2022-01-30 PROBLEM — T74.91XD: Status: ACTIVE | Noted: 2018-12-03

## 2022-01-30 PROBLEM — H04.123 DRY EYE SYNDROME OF BOTH EYES: Status: ACTIVE | Noted: 2021-12-03

## 2022-01-30 PROBLEM — E66.9 OBESITY (BMI 30.0-34.9): Status: ACTIVE | Noted: 2019-03-04

## 2022-01-30 PROBLEM — R19.7 DIARRHEA OF PRESUMED INFECTIOUS ORIGIN: Status: ACTIVE | Noted: 2021-05-20

## 2022-01-30 PROBLEM — K62.5 RECTAL BLEEDING: Status: ACTIVE | Noted: 2021-05-30

## 2022-01-30 PROBLEM — G62.9 NEUROPATHY: Status: ACTIVE | Noted: 2019-06-14

## 2022-01-30 PROBLEM — I71.9 PENETRATING ULCER OF AORTA (HCC): Status: ACTIVE | Noted: 2018-11-05

## 2022-01-30 PROBLEM — R06.83 SNORING: Status: ACTIVE | Noted: 2018-12-13

## 2022-01-30 PROBLEM — E11.40 PAINFUL DIABETIC NEUROPATHY (HCC): Status: ACTIVE | Noted: 2019-08-02

## 2022-01-30 PROBLEM — M54.32 SCIATICA OF LEFT SIDE: Status: ACTIVE | Noted: 2019-08-02

## 2022-01-30 PROBLEM — H10.89: Status: ACTIVE | Noted: 2018-12-03

## 2022-01-30 PROBLEM — H20.9 IRITIS: Status: ACTIVE | Noted: 2018-09-12

## 2022-01-30 NOTE — PROGRESS NOTES
Ms. Maite Bernal is a 61 y.o. female who is here for evaluation of   Chief Complaint   Patient presents with    Shoulder Pain     Reports pain slightly better x1 week. Completed steriod pack. Taking flexeril without improvement    Medication Refill     Needs refill of test strips and lancet pen   . ASSESSMENT AND PLAN:    1. Right arm pain  Prednisone and cyclobenzaprine have helped bring her pain down. She is interested in physical therapy. We will arrange for this through [facility] and if she is not better in 4 weeks, we will refer her to orthopedics      Orders Placed This Encounter    REFERRAL TO PHYSICAL THERAPY     Referral Priority:   Routine     Referral Type:   PT/OT/ST     Referral Reason:   Specialty Services Required     Referred to Provider:   Celia Smith     Number of Visits Requested:   1    lancets misc     Sig: Use twice a day     Dispense:  200 Each     Refill:  11    glucose blood VI test strips (True Metrix Glucose Test Strip) strip     Si Each by Does Not Apply route two (2) times a day. E11.9; use BID. Uses insulin     Dispense:  200 Strip     Refill:  5           HPI 65-year-old woman with peripheral arterial disease who presented to clinic last week with bilateral arm pain in a C5 distribution. She underwent x-rays of the chest and shoulder to exclude other diagnoses and the studies were basically normal.  She was placed on a Medrol Dosepak and Flexeril and she returns today for interval assessment. Somewhat improved. Blood sugar has increased on prednisone  ROS:  Denies   fever, chills, cough, chest pain, SOB,  nausea, vomiting, or diarrhea. Denies wt loss, wt gain, hemoptysis, hematochezia or melena.     Physical Examination:    Visit Vitals  /72 (BP 1 Location: Left upper arm, BP Patient Position: Sitting, BP Cuff Size: Adult long)   Pulse 85   Temp 97.5 °F (36.4 °C) (Temporal)   Resp 17   Ht 5' 5\" (1.651 m)   Wt 181 lb 6.4 oz (82.3 kg)   SpO2 98%   BMI 30.19 kg/m²      General:  Alert, cooperative, no distress. Head:  Normocephalic, without obvious abnormality, atraumatic. Eyes:  Conjunctivae/corneas clear. Pupils equal, round, reactive to light. Extraocular movements intact. Lungs:   Clear to auscultation bilaterally. Chest wall:  No tenderness or deformity. Cardiac:  RRR   Abdomen:   Soft, non-tender. Bowel sounds normal. No masses. No organomegaly. Extremities: Extremities normal, atraumatic, no cyanosis or edema. Pulses: 2+ and symmetric all extremities. Skin: Skin color, texture, turgor normal. No rashes or lesions. Lymph nodes: Cervical, supraclavicular, and axillary nodes normal.   Neurologic: CNII-XII intact. Normal strength, sensation, and reflexes throughout. On this date 02/03/2022 I have spent 20 minutes reviewing previous notes, test results and face to face with the patient discussing the diagnosis and importance of compliance with the treatment plan as well as documenting on the day of the visit.     Luann Olivas MD FACP    (signed electronically) on 2/3/2022 at 10:16 AM

## 2022-02-03 ENCOUNTER — OFFICE VISIT (OUTPATIENT)
Dept: FAMILY MEDICINE CLINIC | Age: 67
End: 2022-02-03
Payer: MEDICARE

## 2022-02-03 VITALS
DIASTOLIC BLOOD PRESSURE: 72 MMHG | HEIGHT: 65 IN | TEMPERATURE: 97.5 F | RESPIRATION RATE: 17 BRPM | OXYGEN SATURATION: 98 % | HEART RATE: 85 BPM | SYSTOLIC BLOOD PRESSURE: 128 MMHG | BODY MASS INDEX: 30.22 KG/M2 | WEIGHT: 181.4 LBS

## 2022-02-03 DIAGNOSIS — M79.601 RIGHT ARM PAIN: Primary | ICD-10-CM

## 2022-02-03 PROCEDURE — G9899 SCRN MAM PERF RSLTS DOC: HCPCS | Performed by: INTERNAL MEDICINE

## 2022-02-03 PROCEDURE — G8427 DOCREV CUR MEDS BY ELIG CLIN: HCPCS | Performed by: INTERNAL MEDICINE

## 2022-02-03 PROCEDURE — G8754 DIAS BP LESS 90: HCPCS | Performed by: INTERNAL MEDICINE

## 2022-02-03 PROCEDURE — G8752 SYS BP LESS 140: HCPCS | Performed by: INTERNAL MEDICINE

## 2022-02-03 PROCEDURE — G8417 CALC BMI ABV UP PARAM F/U: HCPCS | Performed by: INTERNAL MEDICINE

## 2022-02-03 PROCEDURE — 3017F COLORECTAL CA SCREEN DOC REV: CPT | Performed by: INTERNAL MEDICINE

## 2022-02-03 PROCEDURE — G8510 SCR DEP NEG, NO PLAN REQD: HCPCS | Performed by: INTERNAL MEDICINE

## 2022-02-03 PROCEDURE — 99213 OFFICE O/P EST LOW 20 MIN: CPT | Performed by: INTERNAL MEDICINE

## 2022-02-03 RX ORDER — CALCIUM CITRATE/VITAMIN D3 200MG-6.25
1 TABLET ORAL 2 TIMES DAILY
Qty: 200 STRIP | Refills: 5 | Status: SHIPPED | OUTPATIENT
Start: 2022-02-03

## 2022-02-03 RX ORDER — LANCETS
EACH MISCELLANEOUS
Qty: 200 EACH | Refills: 11 | Status: SHIPPED | OUTPATIENT
Start: 2022-02-03

## 2022-02-03 NOTE — PROGRESS NOTES
Cherie Paul is a 61 y.o. female presenting for/with:    Chief Complaint   Patient presents with    Shoulder Pain     Reports pain slightly better x1 week. Completed steriod pack. Taking flexeril without improvement    Medication Refill     Needs refill of test strips and lancet pen       Visit Vitals  /72 (BP 1 Location: Left upper arm, BP Patient Position: Sitting, BP Cuff Size: Adult long)   Pulse 85   Temp 97.5 °F (36.4 °C) (Temporal)   Resp 17   Ht 5' 5\" (1.651 m)   Wt 181 lb 6.4 oz (82.3 kg)   SpO2 98%   BMI 30.19 kg/m²     Pain Scale: 7/10  Pain Location: Shoulder ((R) shoulder)    1. Have you been to the ER, urgent care clinic since your last visit? Hospitalized since your last visit? NO    2. Have you seen or consulted any other health care providers outside of the [address] [place] [address] since your last visit? Include any pap smears or colon screening. NO    Symptom review:  NO  Fever   NO  Shaking chills  NO  Cough  NO  Body aches  NO  Coughing up blood  NO  Chest congestion  NO  Chest pain  NO  Shortness of breath  NO  Profound Loss of smell/taste  NO  Nausea/Vomiting   NO  Loose stool/Diarrhea  NO  any skin issues    Patient Risk Factors Reviewed as follows:  NO  have you been in Close contact with confirmed COVID19 patient   NO  History of recent travel to affected geographical areas within the past 14 days  NO  COPD  NO  Active Cancer/Leukemia/Lymphoma/Chemotherapy  NO  Oral steroid use  NO  Pregnant  YES  Diabetes Mellitus  YES  Heart disease  YES  Asthma  NO Health care worker at home  [address] care worker  NO Is there a Pregnant Woman in the home  NO Dialysis pt in the home   NO a large number of people living in the home    Learning Assessment 1/27/2022   PRIMARY LEARNER Patient   PRIMARY LANGUAGE ENGLISH   LEARNER PREFERENCE PRIMARY READING     -   ANSWERED BY patient   RELATIONSHIP SELF     Fall Risk Assessment, last 12 mths 2/3/2022   Able to walk?  Yes   Fall in past 12 months? 0 Do you feel unsteady? 0   Are you worried about falling 0   Number of falls in past 12 months -   Fall with injury? -       3 most recent PHQ Screens 2/3/2022   Little interest or pleasure in doing things Not at all   Feeling down, depressed, irritable, or hopeless Not at all   Total Score PHQ 2 0   Trouble falling or staying asleep, or sleeping too much -   Feeling tired or having little energy -   Poor appetite, weight loss, or overeating -   Feeling bad about yourself - or that you are a failure or have let yourself or your family down -   Trouble concentrating on things such as school, work, reading, or watching TV -   Moving or speaking so slowly that other people could have noticed; or the opposite being so fidgety that others notice -   How difficult have these problems made it for you to do your work, take care of your home and get along with others -     Abuse Screening Questionnaire 2/3/2022   Do you ever feel afraid of your partner? N   Are you in a relationship with someone who physically or mentally threatens you? N   Is it safe for you to go home?  Y       ADL Assessment 2/3/2022   Feeding yourself No Help Needed   Getting from bed to chair No Help Needed   Getting dressed No Help Needed   Bathing or showering No Help Needed   Walk across the room (includes cane/walker) No Help Needed   Using the telphone No Help Needed   Taking your medications No Help Needed   Preparing meals No Help Needed   Managing money (expenses/bills) No Help Needed   Moderately strenuous housework (laundry) No Help Needed   Shopping for personal items (toiletries/medicines) No Help Needed   Shopping for groceries No Help Needed   Driving No Help Needed   Climbing a flight of stairs No Help Needed   Getting to places beyond walking distances No Help Needed      Advance Care Planning 1/27/2022   Patient's Healthcare Decision Maker is: Legal Next of Kin   Primary Decision Maker Name -   Primary Decision Maker Relationship to Patient -   Confirm Advance Directive None   Patient Would Like to Complete Advance Directive No   Does the patient have other document types -
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
## 2022-02-25 ENCOUNTER — HOSPITAL ENCOUNTER (EMERGENCY)
Age: 67
Discharge: HOME | End: 2022-02-25
Attending: EMERGENCY MEDICINE
Payer: MEDICARE

## 2022-02-25 ENCOUNTER — APPOINTMENT (OUTPATIENT)
Dept: ULTRASOUND IMAGING | Age: 67
End: 2022-02-25
Attending: EMERGENCY MEDICINE
Payer: MEDICARE

## 2022-02-25 ENCOUNTER — TELEPHONE (OUTPATIENT)
Dept: FAMILY MEDICINE CLINIC | Age: 67
End: 2022-02-25

## 2022-02-25 ENCOUNTER — APPOINTMENT (OUTPATIENT)
Dept: GENERAL RADIOLOGY | Age: 67
End: 2022-02-25
Attending: EMERGENCY MEDICINE
Payer: MEDICARE

## 2022-02-25 VITALS
HEIGHT: 65 IN | SYSTOLIC BLOOD PRESSURE: 148 MMHG | OXYGEN SATURATION: 100 % | WEIGHT: 180 LBS | BODY MASS INDEX: 29.99 KG/M2 | RESPIRATION RATE: 16 BRPM | DIASTOLIC BLOOD PRESSURE: 79 MMHG | HEART RATE: 97 BPM | TEMPERATURE: 98 F

## 2022-02-25 DIAGNOSIS — M54.32 SCIATICA, LEFT SIDE: Primary | ICD-10-CM

## 2022-02-25 PROCEDURE — 72100 X-RAY EXAM L-S SPINE 2/3 VWS: CPT

## 2022-02-25 PROCEDURE — 96372 THER/PROPH/DIAG INJ SC/IM: CPT

## 2022-02-25 PROCEDURE — 74011250636 HC RX REV CODE- 250/636: Performed by: EMERGENCY MEDICINE

## 2022-02-25 PROCEDURE — 99284 EMERGENCY DEPT VISIT MOD MDM: CPT

## 2022-02-25 PROCEDURE — 74011250637 HC RX REV CODE- 250/637: Performed by: EMERGENCY MEDICINE

## 2022-02-25 PROCEDURE — 93971 EXTREMITY STUDY: CPT

## 2022-02-25 RX ORDER — DEXAMETHASONE SODIUM PHOSPHATE 100 MG/10ML
10 INJECTION INTRAMUSCULAR; INTRAVENOUS
Status: DISCONTINUED | OUTPATIENT
Start: 2022-02-25 | End: 2022-02-25

## 2022-02-25 RX ORDER — HYDROCODONE BITARTRATE AND ACETAMINOPHEN 5; 325 MG/1; MG/1
2 TABLET ORAL
Status: COMPLETED | OUTPATIENT
Start: 2022-02-25 | End: 2022-02-25

## 2022-02-25 RX ORDER — DEXAMETHASONE SODIUM PHOSPHATE 10 MG/ML
10 INJECTION INTRAMUSCULAR; INTRAVENOUS
Status: COMPLETED | OUTPATIENT
Start: 2022-02-25 | End: 2022-02-25

## 2022-02-25 RX ORDER — METHYLPREDNISOLONE 4 MG/1
TABLET ORAL
Qty: 1 DOSE PACK | Refills: 0 | Status: SHIPPED | OUTPATIENT
Start: 2022-02-25 | End: 2022-03-10 | Stop reason: ALTCHOICE

## 2022-02-25 RX ORDER — HYDROCODONE BITARTRATE AND ACETAMINOPHEN 5; 325 MG/1; MG/1
1 TABLET ORAL
Qty: 12 TABLET | Refills: 0 | Status: SHIPPED | OUTPATIENT
Start: 2022-02-25 | End: 2022-02-28

## 2022-02-25 RX ADMIN — HYDROCODONE BITARTRATE AND ACETAMINOPHEN 2 TABLET: 5; 325 TABLET ORAL at 11:57

## 2022-02-25 RX ADMIN — DEXAMETHASONE SODIUM PHOSPHATE 10 MG: 10 INJECTION, SOLUTION INTRAMUSCULAR; INTRAVENOUS at 11:57

## 2022-02-25 NOTE — TELEPHONE ENCOUNTER
Attempted to call patient for F/U. Patient currently at [facility] ED for leg pain.  Will monitor chart

## 2022-02-25 NOTE — TELEPHONE ENCOUNTER
----- Message from Isac Travis sent at 2/25/2022 10:15 AM EST -----  Subject: Appointment Request    Reason for Call: Routine (Patient Request) No Script    QUESTIONS  Type of Appointment? Established Patient  Reason for appointment request? No appointments available during search  Additional Information for Provider? Pt of Dr. Olegario Wright needs an appt pain   in left leg   ---------------------------------------------------------------------------  --------------  CALL BACK INFO  What is the best way for the office to contact you? OK to leave message on   voicemail  Preferred Call Back Phone Number? [phone]  ---------------------------------------------------------------------------  --------------  SCRIPT ANSWERS  Relationship to Patient? Self  (Is the patient requesting to see the provider for a procedure?)? No  (Is the patient requesting to see the provider urgently  today or   tomorrow. )? No  Have you been diagnosed with, awaiting test results for, or told that you   are suspected of having COVID-19 (Coronavirus)? (If patient has tested   negative or was tested as a requirement for work, school, or travel and   not based on symptoms, answer no)? No  Within the past 10 days have you developed any of the following symptoms   (answer no if symptoms have been present longer than 10 days or began   more than 10 days ago)? Fever or Chills, Cough, Shortness of breath or   difficulty breathing, Loss of taste or smell, Sore throat, Nasal   congestion, Sneezing or runny nose, Fatigue or generalized body aches   (answer no if pain is specific to a body part e.g. back pain), Diarrhea,   Headache? No  Have you had close contact with someone with COVID-19 in the last 7 days? No  (Service Expert  click yes below to proceed with [employer] As Usual   Scheduling)?  Yes

## 2022-02-25 NOTE — ED NOTES
Left buttock pain that radiatets down leg to knee, starting yesterday , No injury, no relief with tylenol arthritis.  Good PMS

## 2022-02-25 NOTE — ED PROVIDER NOTES
EMERGENCY DEPARTMENT HISTORY AND PHYSICAL EXAM      Date: 2/25/2022  Patient Name: Curt Goldstein    History of Presenting Illness     Chief Complaint   Patient presents with    Leg Pain       History Provided By: Patient    HPI: Curt Goldstein, 61 y.o. female with PMHx significant for DM, hypertension, presents ambulatory to the ED with cc of left buttock pain radiating down her leg. Patient denies any back pain. Symptoms began 2 days ago. She denies any known cause or trigger. Pain is a constant ache with an occasional sharp component. Pain originates in her superior left buttock and radiates down her leg into her popliteal area. She denies any weakness numbness or tingling. She has been able to ambulate although painful. She denies any lower extremity swelling. She denies any calf pain. She denies abdominal pain, nausea or vomiting. PMHx: Significant for DM 2, hypertension, PAD. AAA, 3 cm monitoring only. Sherryle Moder PSHx: Significant for hysterectomy, hernia repair. Femoropopliteal bypass. Social Hx: Former smoker. Quit in June 2020. Quarter pack a day for 20 years. Rare alcohol use. There are no other complaints, changes, or physical findings at this time. PCP: Brandin Aponte MD    No current facility-administered medications on file prior to encounter. Current Outpatient Medications on File Prior to Encounter   Medication Sig Dispense Refill    simvastatin (ZOCOR) 40 mg tablet TAKE 1 TABLET BY MOUTH EVERY NIGHT AT BEDTIME 90 Tablet 4    lancets misc Use twice a day 200 Each 11    glucose blood VI test strips (True Metrix Glucose Test Strip) strip 1 Each by Does Not Apply route two (2) times a day. E11.9; use BID. Uses insulin 200 Strip 5    cyclobenzaprine (FLEXERIL) 10 mg tablet Take 1 Tablet by mouth nightly.  Indications: muscle spasm 30 Tablet 1    [DISCONTINUED] methylPREDNISolone (MEDROL DOSEPACK) 4 mg tablet Take as directed 1 Dose Pack 0    montelukast (SINGULAIR) 10 mg tablet TAKE 1 TABLET BY MOUTH DAILY AT BEDTIME 30 Tablet 3    amLODIPine (NORVASC) 10 mg tablet TAKE 1 TABLET BY MOUTH EVERY DAY 90 Tablet 2    Blood-Glucose Meter monitoring kit Use daily 1 Kit 0    hydroCHLOROthiazide (HYDRODIURIL) 25 mg tablet Take 1 Tablet by mouth daily. 90 Tablet 4    insulin glargine (Lantus Solostar U-100 Insulin) 100 unit/mL (3 mL) inpn 25 Units by SubCUTAneous route daily. 30 mL 5    pantoprazole (PROTONIX) 40 mg tablet Take 1 Tablet by mouth two (2) times a day. 60 Tablet 5    labetaloL (NORMODYNE) 100 mg tablet Take 1 Tablet by mouth two (2) times a day. Indications: Bood pressure 180 Tablet 4    fluticasone propion-salmeteroL (Advair Diskus) 250-50 mcg/dose diskus inhaler Take 1 Puff by inhalation every twelve (12) hours. 1 Inhaler 11    amitriptyline (ELAVIL) 25 mg tablet Take 1 Tab by mouth nightly. 30 Tab 4    Omeprazole delayed release (PRILOSEC D/R) 20 mg tablet Take 1 Tab by mouth daily. 30 Tab 1    clopidogreL (PLAVIX) 75 mg tab Take 75 mg by mouth daily.  levocetirizine (XYZAL) 5 mg tablet Take 1 Tab by mouth daily. Indications: itching of skin 90 Tab 4    BD Single Use Swabs Regular padm APPLY  EXTERNALLY EVERY  Pad 5    ascorbic acid, vitamin C, (Vitamin C) 500 mg tablet Take 1 Tab by mouth two (2) times a day. 24 Tab 0    albuterol (PROVENTIL HFA, VENTOLIN HFA, PROAIR HFA) 90 mcg/actuation inhaler Take 2 Puffs by inhalation every four (4) hours as needed for Wheezing. 1 Inhaler 0    multivitamin with iron tablet Take 1 Tablet by mouth daily.  acetaminophen (Tylenol Extra Strength) 500 mg tablet Take  by mouth every six (6) hours as needed for Pain.  aspirin delayed-release 81 mg tablet Take 81 mg by mouth daily.  Insulin Needles, Disposable, (BD Ultra-Fine Short Pen Needle) 31 gauge x 5/16\" ndle by Does Not Apply route daily. E11.9.   Use daily for insulin injection 100 Pen Needle 5    Nebulizer & Compressor machine 1 Each by Does Not Apply route three (3) times daily. Indications: J44.9 1 Each 0    ipratropium (ATROVENT) 42 mcg (0.06 %) nasal spray 2 Sprays by Nasal route daily as needed. Past History     Past Medical History:  Past Medical History:   Diagnosis Date    Arthritis     Asthma     Diabetes ([facility]     Hypertension     Intractable abdominal pain 11/10/2018    Menopause     Pancreatitis     PVD (peripheral vascular disease) ([facility] 2021    Type 2 diabetes mellitus with peripheral vascular disease ([facility] 10/14/2019       Past Surgical History:  Past Surgical History:   Procedure Laterality Date    COLONOSCOPY N/A 2020    COLONOSCOPY performed by Jose Melendez MD at [facility] [address]27 HX BUNIONECTOMY Bilateral     [address]      HX PARTIAL HYSTERECTOMY      DE ABDOMEN SURGERY [address]  2019    Exploratory laparotomy, extended right hemicolectomy    DE ESOPHAGOGASTRODUODENOSCOPY TRANSORAL DIAGNOSTIC  2021         DE SB ENDOSCOPY,W/CONTROL,BLEEDING  2021         UPPER GI ENDOSCOPY,BIOPSY  2021            Family History:  Family History   Problem Relation Age of Onset    Other Mother         ANEURYSM    Breast Cancer Mother         age at dx 52's    Diabetes Mother     Lung Disease Father         EMPHYSEMA    Crohn's Disease Sister     Heart Disease Sister     Diabetes Maternal Uncle     Heart Disease Maternal Uncle     Diabetes Paternal Uncle     Heart Disease Paternal Uncle     Breast Cancer Maternal Aunt     Anesth Problems Neg Hx        Social History:  Social History     Tobacco Use    Smoking status: Former Smoker     Packs/day: 0.25     Years: 20.00     Pack years: 5.00     Quit date: 2020     Years since quittin.6    Smokeless tobacco: Never Used   Vaping Use    Vaping Use: Never used   Substance Use Topics    Alcohol use: Not Currently     Comment: RARE Q 3 MONTHS    Drug use: No       Allergies:   Allergies   Allergen Reactions    Lisinopril Other (comments)    Gabapentin Itching    Morphine Unknown (comments), Hives and Itching    Tramadol Other (comments) and Itching     \"funny feeling\"         Review of Systems   Review of Systems   Constitutional: Negative for activity change, chills and fever. HENT: Negative for congestion and sore throat. Eyes: Negative for pain and redness. Respiratory: Negative for cough, chest tightness and shortness of breath. Cardiovascular: Negative for chest pain and palpitations. Gastrointestinal: Negative for abdominal pain, diarrhea, nausea and vomiting. Genitourinary: Negative for dysuria, frequency and urgency. Musculoskeletal: Negative for back pain and neck pain. Skin: Negative for rash. Neurological: Negative for syncope, weakness, light-headedness and headaches. Psychiatric/Behavioral: Negative for confusion. All other systems reviewed and are negative. Physical Exam   Physical Exam  Constitutional:       General: She is not in acute distress. Appearance: Normal appearance. She is well-developed. She is not diaphoretic. HENT:      Head: Normocephalic and atraumatic. Eyes:      General: No scleral icterus. Conjunctiva/sclera: Conjunctivae normal.      Pupils: Pupils are equal, round, and reactive to light. Pulmonary:      Effort: Pulmonary effort is normal.   Abdominal:      Palpations: Abdomen is soft. Tenderness: There is no abdominal tenderness. Musculoskeletal:         General: Normal range of motion. Comments: Back: No midline vertebral point tenderness. There is tenderness of the left superior buttock with reproduction exacerbation of pain. Positive straight leg raise on the left at 30 degrees negative on the right. Toes up/down. 5 out of 5 strength in bilateral lower extremities. No calf tenderness. There is mild tenderness in the popliteal fossa. Skin:     General: Skin is warm and dry. Findings: No rash.    Neurological:      Mental Status: She is alert and oriented to person, place, and time. Psychiatric:         Behavior: Behavior normal.             Diagnostic Study Results     Labs -   No results found for this or any previous visit (from the past 12 hour(s)). Radiologic Studies -   DUPLEX LOWER EXT VENOUS LEFT   Final Result      XR SPINE LUMB 2 OR 3 V   Final Result   Minimal L4 anterolisthesis on a degenerative basis. No fracture. Lower lumbar   facet arthropathy. CT Results  (Last 48 hours)    None        CXR Results  (Last 48 hours)    None            Medical Decision Making   I am the first provider for this patient. I reviewed the vital signs, available nursing notes, past medical history, past surgical history, family history and social history. Vital Signs-Reviewed the patient's vital signs. Patient Vitals for the past 12 hrs:   Temp Pulse Resp BP SpO2   02/25/22 1042 98 °F (36.7 °C) 97 16 (!) 148/79 100 %           Records Reviewed: Nursing notes reviewed    Provider Notes (Medical Decision Making):   DDX: Sciatica, lumbar DJD with radiculopathy, DVT. ED Course:   Initial assessment performed. The patients presenting problems have been discussed, and they are in agreement with the care plan formulated and outlined with them. I have encouraged them to ask questions as they arise throughout their visit. PROGRESS NOTE    Pt reevaluated. Duplex negative for DVT. Lumbar films with DJD. Discharge home with Medrol Dosepak and short course Norco.  No neuro deficits. Written by Morenita Chanel MD     Progress note:    Pt noted to be feeling better and ready for discharge. Updated pt and/or family on all final lab and/or  imaging findings. Will follow up as instructed. All questions have been answered, pt voiced understanding and agreement with plan. Narcotics were prescribed, pt was advised not to drive or operate heavy machinery.          Specific return precautions provided as well as instructions to return to the ED should sx worsen at any time. Vital signs stable for discharge. I have also put together some discharge instructions for them that include: 1) educational information regarding their diagnosis, 2) how to care for their diagnosis at home, as well a 3) list of reasons why they would want to return to the ED prior to their follow-up appointment, should their condition change. Written by Candice Isabel MD        Critical Care Time:   0    Disposition:  Discharge    PLAN:  1. Current Discharge Medication List      START taking these medications    Details   HYDROcodone-acetaminophen (Norco) 5-325 mg per tablet Take 1 Tablet by mouth every four (4) hours as needed for Pain for up to 3 days. Max Daily Amount: 6 Tablets. Qty: 12 Tablet, Refills: 0  Start date: 2/25/2022, End date: 2/28/2022    Associated Diagnoses: Sciatica, left side         CONTINUE these medications which have CHANGED    Details   methylPREDNISolone (Medrol, [place],) 4 mg tablet Take as directed  Qty: 1 Dose Pack, Refills: 0  Start date: 2/25/2022           2. Follow-up Information     Follow up With Specialties Details Why Contact Info    Steffi Mercado MD Internal Medicine Schedule an appointment as soon as possible for a visit in 1 week  [address]      [address] [place] [address] Emergency Medicine Go in 1 day If symptoms worsen [address] [facility] [address] [phone]        Return to ED if worse     Diagnosis     Clinical Impression:   1. Sciatica, left side              Please note that this dictation was completed with [employer], the computer voice recognition software. Quite often unanticipated grammatical, syntax, homophones, and other interpretive errors are inadvertently transcribed by the computer software. Please disregard these errors. Please excuse any errors that have escaped final proofreading.

## 2022-03-02 ENCOUNTER — PATIENT OUTREACH (OUTPATIENT)
Dept: CASE MANAGEMENT | Age: 67
End: 2022-03-02

## 2022-03-02 NOTE — PROGRESS NOTES
3/2/2022  10:48 AM    [facility] ED visit 2/25/22 is noted with patient c/o left leg pain; diagnosis of left side sciatica. Patient was discharged to home with prescriptions for methylprednisolone and norco; advised to follow-up with PCP (Dr. Ryan Reddy) as outpatient. Per chart review, patient has not followed up with PCP post-discharge.     Future Appointments:  Future Appointments   Date Time Provider Chance Cantor   3/10/2022  7:50 AM Shree Curry MD [facility] MAIN BS AMB        Last Appointment With Me:  Visit date not found     Last Appointment My Department:  Visit date not found

## 2022-03-02 NOTE — PROGRESS NOTES
3/2/2022  11:36 AM    Patient outreach attempt by this ACM today to perform CCM follow-up; unable to reach patient.

## 2022-03-10 ENCOUNTER — OFFICE VISIT (OUTPATIENT)
Dept: FAMILY MEDICINE CLINIC | Age: 67
End: 2022-03-10
Payer: MEDICARE

## 2022-03-10 VITALS
BODY MASS INDEX: 29.99 KG/M2 | HEART RATE: 79 BPM | HEIGHT: 65 IN | RESPIRATION RATE: 16 BRPM | TEMPERATURE: 98.1 F | DIASTOLIC BLOOD PRESSURE: 78 MMHG | WEIGHT: 180 LBS | SYSTOLIC BLOOD PRESSURE: 136 MMHG | OXYGEN SATURATION: 98 %

## 2022-03-10 DIAGNOSIS — M79.601 RIGHT ARM PAIN: Primary | ICD-10-CM

## 2022-03-10 DIAGNOSIS — I10 ESSENTIAL HYPERTENSION: ICD-10-CM

## 2022-03-10 DIAGNOSIS — Z79.4 TYPE 2 DIABETES MELLITUS WITH HYPERGLYCEMIA, WITH LONG-TERM CURRENT USE OF INSULIN (HCC): ICD-10-CM

## 2022-03-10 DIAGNOSIS — Z71.1 CONCERN ABOUT SKIN DISEASE WITHOUT DIAGNOSIS: ICD-10-CM

## 2022-03-10 DIAGNOSIS — E11.65 TYPE 2 DIABETES MELLITUS WITH HYPERGLYCEMIA, WITH LONG-TERM CURRENT USE OF INSULIN (HCC): ICD-10-CM

## 2022-03-10 DIAGNOSIS — M54.32 LEFT SIDED SCIATICA: ICD-10-CM

## 2022-03-10 DIAGNOSIS — E11.51 TYPE 2 DIABETES MELLITUS WITH PERIPHERAL VASCULAR DISEASE (HCC): ICD-10-CM

## 2022-03-10 PROBLEM — I73.9 PAD (PERIPHERAL ARTERY DISEASE) (HCC): Status: ACTIVE | Noted: 2018-11-09

## 2022-03-10 PROCEDURE — G8752 SYS BP LESS 140: HCPCS | Performed by: INTERNAL MEDICINE

## 2022-03-10 PROCEDURE — 99214 OFFICE O/P EST MOD 30 MIN: CPT | Performed by: INTERNAL MEDICINE

## 2022-03-10 PROCEDURE — G8427 DOCREV CUR MEDS BY ELIG CLIN: HCPCS | Performed by: INTERNAL MEDICINE

## 2022-03-10 PROCEDURE — 3017F COLORECTAL CA SCREEN DOC REV: CPT | Performed by: INTERNAL MEDICINE

## 2022-03-10 PROCEDURE — 2022F DILAT RTA XM EVC RTNOPTHY: CPT | Performed by: INTERNAL MEDICINE

## 2022-03-10 PROCEDURE — 3046F HEMOGLOBIN A1C LEVEL >9.0%: CPT | Performed by: INTERNAL MEDICINE

## 2022-03-10 PROCEDURE — G8417 CALC BMI ABV UP PARAM F/U: HCPCS | Performed by: INTERNAL MEDICINE

## 2022-03-10 PROCEDURE — G8510 SCR DEP NEG, NO PLAN REQD: HCPCS | Performed by: INTERNAL MEDICINE

## 2022-03-10 PROCEDURE — G9899 SCRN MAM PERF RSLTS DOC: HCPCS | Performed by: INTERNAL MEDICINE

## 2022-03-10 PROCEDURE — G8754 DIAS BP LESS 90: HCPCS | Performed by: INTERNAL MEDICINE

## 2022-03-10 RX ORDER — INSULIN GLARGINE 100 [IU]/ML
25 INJECTION, SOLUTION SUBCUTANEOUS DAILY
Qty: 5 PEN | Refills: 5 | Status: SHIPPED | OUTPATIENT
Start: 2022-03-10

## 2022-03-10 RX ORDER — LABETALOL 100 MG/1
100 TABLET, FILM COATED ORAL 2 TIMES DAILY
Qty: 180 TABLET | Refills: 4 | Status: SHIPPED | OUTPATIENT
Start: 2022-03-10

## 2022-03-10 RX ORDER — AMLODIPINE BESYLATE 10 MG/1
10 TABLET ORAL DAILY
Qty: 90 TABLET | Refills: 5 | Status: SHIPPED | OUTPATIENT
Start: 2022-03-10

## 2022-03-10 NOTE — PROGRESS NOTES
Evelyn Murguia is a 61 y.o. female presenting for/with:    Chief Complaint   Patient presents with    Hypertension     routine F/U    Back Pain     Treated at [facility] for back pain. Pain resolved. No C?O at this time    Skin Problem     C/O \"dark circles around eyes\" \"feels like I have 2 black eyes\". Requestes dermatology referral       Visit Vitals  /78 (BP 1 Location: Left upper arm, BP Patient Position: Sitting, BP Cuff Size: Adult long)   Pulse 79   Temp 98.1 °F (36.7 °C) (Temporal)   Resp 16   Ht 5' 5\" (1.651 m)   Wt 180 lb (81.6 kg)   SpO2 98%   BMI 29.95 kg/m²     Pain Scale: 0 - No pain/10  Pain Location:     1. \"Have you been to the ER, urgent care clinic since your last visit? Hospitalized since your last visit? \" Yes When: 2wks ago Where: [facility] Reason for visit: back pain    2. \"Have you seen or consulted any other health care providers outside of the [address] [place] [address] since your last visit? \" No     3. For patients aged 39-70: Has the patient had a colonoscopy / FIT/ Cologuard? Yes - no Care Gap present      If the patient is female:    4. For patients aged 41-77: Has the patient had a mammogram within the past 2 years? Yes - no Care Gap present      5. For patients aged 21-65: Has the patient had a pap smear?  Yes - no Care Gap present      Symptom review:  NO  Fever   NO  Shaking chills  NO  Cough  NO  Body aches  NO  Coughing up blood  NO  Chest congestion  NO  Chest pain  NO  Shortness of breath  NO  Profound Loss of smell/taste  NO  Nausea/Vomiting   NO  Loose stool/Diarrhea  NO  any skin issues    Patient Risk Factors Reviewed as follows:  NO  have you been in Close contact with confirmed COVID19 patient   NO  History of recent travel to affected geographical areas within the past 14 days  NO  COPD  NO  Active Cancer/Leukemia/Lymphoma/Chemotherapy  NO  Oral steroid use  NO  Pregnant  YES  Diabetes Mellitus  YES  Heart disease  NO  Asthma  NO Health care worker at home  [address] care worker  NO Is there a Pregnant Woman in the home  NO Dialysis pt in the home   NO a large number of people living in the home    Learning Assessment 1/27/2022   PRIMARY LEARNER Patient   PRIMARY LANGUAGE ENGLISH   LEARNER PREFERENCE PRIMARY READING     -   ANSWERED BY patient   RELATIONSHIP SELF     Fall Risk Assessment, last 12 mths 3/10/2022   Able to walk? Yes   Fall in past 12 months? 0   Do you feel unsteady? 0   Are you worried about falling 0   Number of falls in past 12 months -   Fall with injury? -       3 most recent PHQ Screens 3/10/2022   Little interest or pleasure in doing things Not at all   Feeling down, depressed, irritable, or hopeless Not at all   Total Score PHQ 2 0   Trouble falling or staying asleep, or sleeping too much -   Feeling tired or having little energy -   Poor appetite, weight loss, or overeating -   Feeling bad about yourself - or that you are a failure or have let yourself or your family down -   Trouble concentrating on things such as school, work, reading, or watching TV -   Moving or speaking so slowly that other people could have noticed; or the opposite being so fidgety that others notice -   How difficult have these problems made it for you to do your work, take care of your home and get along with others -     Abuse Screening Questionnaire 3/10/2022   Do you ever feel afraid of your partner? N   Are you in a relationship with someone who physically or mentally threatens you? N   Is it safe for you to go home?  Y       ADL Assessment 3/10/2022   Feeding yourself No Help Needed   Getting from bed to chair No Help Needed   Getting dressed No Help Needed   Bathing or showering No Help Needed   Walk across the room (includes cane/walker) No Help Needed   Using the telphone No Help Needed   Taking your medications No Help Needed   Preparing meals No Help Needed   Managing money (expenses/bills) No Help Needed   Moderately strenuous housework (laundry) No Help Needed   Shopping for personal items (toiletries/medicines) No Help Needed   Shopping for groceries No Help Needed   Driving No Help Needed   Climbing a flight of stairs No Help Needed   Getting to places beyond walking distances No Help Needed      Advance Care Planning 1/27/2022   Patient's Healthcare Decision Maker is: Legal Next of Kin   Primary Decision Maker Name -   Primary Decision Maker Relationship to Patient -   Confirm Advance Directive None   Patient Would Like to Complete Advance Directive No   Does the patient have other document types -

## 2022-03-18 PROBLEM — G54.0 THORACIC OUTLET SYNDROME: Status: ACTIVE | Noted: 2018-11-09

## 2022-03-18 PROBLEM — J45.909 ASTHMA: Status: ACTIVE | Noted: 2017-08-07

## 2022-03-18 PROBLEM — K62.5 RECTAL BLEEDING: Status: ACTIVE | Noted: 2021-05-30

## 2022-03-18 PROBLEM — E78.5 HYPERLIPIDEMIA: Status: ACTIVE | Noted: 2021-04-21

## 2022-03-18 PROBLEM — R06.83 SNORING: Status: ACTIVE | Noted: 2018-12-13

## 2022-03-18 PROBLEM — R10.13 INTRACTABLE EPIGASTRIC ABDOMINAL PAIN: Status: ACTIVE | Noted: 2018-11-10

## 2022-03-18 PROBLEM — G62.9 NEUROPATHY: Status: ACTIVE | Noted: 2019-06-14

## 2022-03-18 PROBLEM — R94.31 ABNORMAL ECG: Status: ACTIVE | Noted: 2017-08-07

## 2022-03-19 PROBLEM — R19.7 DIARRHEA OF PRESUMED INFECTIOUS ORIGIN: Status: ACTIVE | Noted: 2021-05-20

## 2022-03-19 PROBLEM — Z87.19 HISTORY OF PANCREATITIS: Status: ACTIVE | Noted: 2018-10-30

## 2022-03-19 PROBLEM — R44.0 VERBAL AUDITORY HALLUCINATIONS: Status: ACTIVE | Noted: 2018-12-06

## 2022-03-19 PROBLEM — H04.123 DRY EYE SYNDROME OF BOTH EYES: Status: ACTIVE | Noted: 2021-12-03

## 2022-03-19 PROBLEM — E11.9 TYPE II DIABETES MELLITUS (HCC): Status: ACTIVE | Noted: 2017-08-07
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
## 2022-03-19 PROBLEM — I71.9 PENETRATING ULCER OF AORTA (HCC): Status: ACTIVE | Noted: 2018-11-05

## 2022-03-19 PROBLEM — E11.40 PAINFUL DIABETIC NEUROPATHY (HCC): Status: ACTIVE | Noted: 2019-08-02

## 2022-03-19 PROBLEM — D64.9 SYMPTOMATIC ANEMIA: Status: ACTIVE | Noted: 2021-04-21

## 2022-03-19 PROBLEM — E66.9 OBESITY (BMI 30.0-34.9): Status: ACTIVE | Noted: 2019-03-04

## 2022-03-19 PROBLEM — I73.9 PAD (PERIPHERAL ARTERY DISEASE) (HCC): Status: ACTIVE | Noted: 2018-11-09

## 2022-03-19 PROBLEM — H10.89: Status: ACTIVE | Noted: 2018-12-03

## 2022-03-19 PROBLEM — H25.13 AGE-RELATED NUCLEAR CATARACT OF BOTH EYES: Status: ACTIVE | Noted: 2018-09-12

## 2022-03-19 PROBLEM — R44.1 VISUAL HALLUCINATIONS: Status: ACTIVE | Noted: 2018-12-06

## 2022-03-19 PROBLEM — M48.02 CERVICAL STENOSIS OF SPINAL CANAL: Status: ACTIVE | Noted: 2017-08-16

## 2022-03-19 PROBLEM — M54.32 SCIATICA OF LEFT SIDE: Status: ACTIVE | Noted: 2019-08-02

## 2022-03-19 PROBLEM — D50.0 ANEMIA DUE TO CHRONIC BLOOD LOSS: Status: ACTIVE | Noted: 2021-04-21

## 2022-03-19 PROBLEM — E66.811 OBESITY (BMI 30.0-34.9): Status: ACTIVE | Noted: 2019-03-04

## 2022-03-20 PROBLEM — T74.91XD: Status: ACTIVE | Noted: 2018-12-03

## 2022-03-20 PROBLEM — I10 HTN (HYPERTENSION): Status: ACTIVE | Noted: 2017-08-07

## 2022-03-20 PROBLEM — H20.9 IRITIS: Status: ACTIVE | Noted: 2018-09-12

## 2022-03-24 ENCOUNTER — PATIENT OUTREACH (OUTPATIENT)
Dept: CASE MANAGEMENT | Age: 67
End: 2022-03-24

## 2022-03-24 NOTE — PROGRESS NOTES
3/24/2022  4:53 PM    Patient outreach by this ACM today to perform CCM follow-up (evaluate goal progress). Patient is unable to follow-up with ACM at this time; she is agreeable to receiving ACM outreach next week.

## 2022-04-06 ENCOUNTER — PATIENT OUTREACH (OUTPATIENT)
Dept: CASE MANAGEMENT | Age: 67
End: 2022-04-06

## 2022-04-06 NOTE — PROGRESS NOTES
4/6/2022  4:49 PM    Patient outreach attempt by this ACM today to perform CCM follow-up. ACM was unable to reach the patient by telephone today.

## 2022-04-13 ENCOUNTER — PATIENT OUTREACH (OUTPATIENT)
Dept: CASE MANAGEMENT | Age: 67
End: 2022-04-13

## 2022-04-13 NOTE — PROGRESS NOTES
4/13/2022  2:00 PM    Patient has graduated from the Complex Case Management  program on 4/13/2022. Patient/family has the ability to self-manage at this time. Care management goals have been completed. No further Ambulatory Care Manager follow up scheduled. Goals Addressed                    This Visit's Progress       Chronic Disease      COMPLETED: Increase Physical Activity/ Weight loss (pt-stated)         4/13/2022  Patient has not yet returned to [address] [place] [address] program at [facility]; pt reported barrier is that she has been under the weather lately and recently had a stomach virus. Patient reports plan and continued goal of increasing her physical activity and returning to the [employer] gym where she is able to use her [employer] benefits.  Goal not completed at this time. Patient has been provided with the necessary resources/support to successfully meet this goal and patient will continue to independently progress towards goal. Patient denies any additional questions/concerns and/or barriers to care at this time. Patient does have future follow-up scheduled with PCP on 6/2/22. Goal completed and CCM episode resolved at this time. 1/25/2022  She is still interested in [employer]. She has not began an exercise regimen or program yet due to presence of worsened arthritis pain over the past two weeks and because she is also unsure of [employer] membership cost and has therefore not yet decided whether to go to the [facility] or to the [facility]. ACM reviewed [employer] monthly membership cost with patient ($54/month for adults ages 27-64); monthly [employer] membership cost presents a barrier to patient. Patient is comfortable returning to the [employer] gym where she is able to use her [employer] benefits. Over the next two weeks patient will consider setting a start date for [employer] program.  Patient also reports that she received [employer]. JACKIE Tejas; she receives $50.month.   Patient will schedule acute follow-up with her PCP if arthritis pain worsens or fails to improve. Plan for next ACM outreach in approximately two weeks. 11/23/2021  She has [employer] benefits. Her local [facility] gym participates in the [address] program and is only located 5 minutes driving distance from her home. Patient is scared to go to this bodyfit location because the owner has a dog in the office at the gym; she spoke with the owner about the dog and the owner put the dog up, however she remains unsure if she is comfortable using this gym facility with the dog present. Patient is interested in going to her local [employer], however they do not participate with [employer]; she states that she may be more comfortable going to the [facility] and paying for a membership there. Patient reports goal weight of 150 lb; her current weight is 179 lb.  Over the next two weeks patient will decide whether she is comfortable with trying her local bodyfit gym or whether she is more comfortable with going to the [facility]. Plan for next ACM outreach in approximately two weeks. Patient has Ambulatory Care Manager's contact information for any further questions, concerns, or needs.   Patients upcoming visits:    Future Appointments   Date Time Provider Chance Cantor   6/2/2022  7:10 AM Noel Curry MD [facility] MAIN BS AMB

## 2022-05-29 RX ORDER — TACROLIMUS 1 MG/G
OINTMENT TOPICAL
COMMUNITY
Start: 2022-03-30 | End: 2022-07-09 | Stop reason: ALTCHOICE

## 2022-05-29 NOTE — PROGRESS NOTES
Ms. Renita Mathew is a 61 y.o. female who is here for evaluation of   Chief Complaint   Patient presents with    Hypertension    Diabetes     Reports cheating last night, AM glucose 175. Averages around 100-140    Eye Problem     Due to have \"Eye muscle sugery\" with [facility] in July. Form to be faxed to [phone] - Outside of the 30day range will reshedule, next appt with [facility] on 7/5/22    Shoulder Pain     Denies new trauma. Reports pain with lifting against gravity    Immunization/Injection     prevnar 20 provided to PT   .       ASSESSMENT AND PLAN:    1. Type 2 diabetes mellitus with hyperglycemia, with long-term current use of insulin (HCC)  Due for A1c and needs insulin needles  - HEMOGLOBIN A1C WITH EAG; Future  - HEMOGLOBIN A1C WITH EAG    2. Type 2 diabetes mellitus with peripheral vascular disease (HCC)  Clinically stable  - HEMOGLOBIN A1C WITH EAG; Future  - HEMOGLOBIN A1C WITH EAG    3. Essential hypertension  Monitor blood pressure  - METABOLIC PANEL, COMPREHENSIVE; Future  - METABOLIC PANEL, COMPREHENSIVE    4. Encounter for immunization  P 20 today  - ADMIN INFLUENZA VIRUS VAC  - PNEUMOCOCCAL CONJUGATE PCV20, PF (PREVNAR 20)    5.  Chronic right shoulder pain  Time for an MRI and orthopedic referral.  She has failed physical therapy and her condition appears to be worsening.  - MRI SHOULDER RT WO CONT; Future  - REFERRAL TO ORTHOPEDICS      Orders Placed This Encounter    MRI SHOULDER RT WO CONT     Standing Status:   Future     Standing Expiration Date:   7/2/2022     Scheduling Instructions:      Hospitals in [place]     Order Specific Question:   Arthrogram study     Answer:   No    PNEUMOCOCCAL CONJUGATE PCV20, PF (PREVNAR 20)    HEMOGLOBIN A1C WITH EAG     Standing Status:   Future     Number of Occurrences:   1     Standing Expiration Date:   1/4/7536    METABOLIC PANEL, COMPREHENSIVE     Standing Status:   Future     Number of Occurrences:   1     Standing Expiration Date:   7/8/2022    REFERRAL TO ORTHOPEDICS     Referral Priority:   Routine     Referral Type:   Consultation     Referral Reason:   Specialty Services Required     Referred to Provider:   Chivo Red MD    Administration fee () for Medicare insured patients    tacrolimus (PROTOPIC) 0.1 % ointment     Sig: APPLY TO THE AFFECTED AREA(S) TWICE DAILY    dorzolamide-timoloL (COSOPT) 22.3-6.8 mg/mL ophthalmic solution     Sig: ADMINISTER 1 DROP INTO Peg Moles           HPI  Mrs. Ezequiel Valle is a 82-year-old woman with insulin-dependent diabetes mellitus. She has a history of peripheral arterial disease, pancreatitis, schizophrenia and hypertension. She is due for interval assessment of these and other medical problems. Most recent A1c was over 6 months ago at 7.5. Despite physical therapy, she has worsening right shoulder pain. She has had plain films but not an MRI or orthopedic referral.    ROS:  Denies  fever, chills, cough, chest pain, SOB,  nausea, vomiting, or diarrhea. Denies wt loss, wt gain, hemoptysis, hematochezia or melena. Physical Examination:    Visit Vitals  BP (!) 156/82 (BP 1 Location: Left upper arm, BP Patient Position: Sitting, BP Cuff Size: Adult long)   Pulse 85   Temp 97.9 °F (36.6 °C) (Temporal)   Resp 17   Ht 5' 5\" (1.651 m)   Wt 178 lb 3.2 oz (80.8 kg)   SpO2 98%   BMI 29.65 kg/m²      General:  Alert, cooperative, no distress. Head:  Normocephalic, without obvious abnormality, atraumatic. Eyes:  Conjunctivae/corneas clear. Pupils equal, round, reactive to light. Extraocular movements intact. Lungs:   Clear to auscultation bilaterally. Chest wall:  No tenderness or deformity. Cardiac:  RRR   Abdomen:   Soft, non-tender. Bowel sounds normal. No masses. No organomegaly. Extremities: Extremities normal, atraumatic, no cyanosis or edema. Pulses: 2+ and symmetric all extremities. Skin: Skin color, texture, turgor normal. No rashes or lesions.    Lymph nodes: Cervical, supraclavicular, and axillary nodes normal.   Neurologic: CNII-XII intact. Normal strength, sensation, and reflexes throughout. On this date 06/02/2022 I have spent 30 minutes reviewing previous notes, test results and face to face with the patient discussing the diagnosis and importance of compliance with the treatment plan as well as documenting on the day of the visit.     Juanpablo Gallardo MD FACP    (signed electronically) on 6/2/2022 at 7:44 AM

## 2022-06-02 ENCOUNTER — OFFICE VISIT (OUTPATIENT)
Dept: FAMILY MEDICINE CLINIC | Age: 67
End: 2022-06-02
Payer: MEDICARE

## 2022-06-02 VITALS
RESPIRATION RATE: 17 BRPM | HEIGHT: 65 IN | DIASTOLIC BLOOD PRESSURE: 82 MMHG | OXYGEN SATURATION: 98 % | TEMPERATURE: 97.9 F | WEIGHT: 178.2 LBS | HEART RATE: 85 BPM | BODY MASS INDEX: 29.69 KG/M2 | SYSTOLIC BLOOD PRESSURE: 156 MMHG

## 2022-06-02 DIAGNOSIS — G89.29 CHRONIC RIGHT SHOULDER PAIN: ICD-10-CM

## 2022-06-02 DIAGNOSIS — E11.51 TYPE 2 DIABETES MELLITUS WITH PERIPHERAL VASCULAR DISEASE (HCC): ICD-10-CM

## 2022-06-02 DIAGNOSIS — M25.511 CHRONIC RIGHT SHOULDER PAIN: ICD-10-CM

## 2022-06-02 DIAGNOSIS — E11.65 TYPE 2 DIABETES MELLITUS WITH HYPERGLYCEMIA, WITH LONG-TERM CURRENT USE OF INSULIN (HCC): Primary | ICD-10-CM

## 2022-06-02 DIAGNOSIS — Z79.4 TYPE 2 DIABETES MELLITUS WITH HYPERGLYCEMIA, WITH LONG-TERM CURRENT USE OF INSULIN (HCC): Primary | ICD-10-CM

## 2022-06-02 DIAGNOSIS — I10 ESSENTIAL HYPERTENSION: ICD-10-CM

## 2022-06-02 DIAGNOSIS — Z23 ENCOUNTER FOR IMMUNIZATION: ICD-10-CM

## 2022-06-02 PROBLEM — H52.203 MYOPIA OF BOTH EYES WITH ASTIGMATISM AND PRESBYOPIA: Status: ACTIVE | Noted: 2022-05-10

## 2022-06-02 PROBLEM — H52.4 MYOPIA OF BOTH EYES WITH ASTIGMATISM AND PRESBYOPIA: Status: ACTIVE | Noted: 2022-05-10

## 2022-06-02 PROBLEM — H52.13 MYOPIA OF BOTH EYES WITH ASTIGMATISM AND PRESBYOPIA: Status: ACTIVE | Noted: 2022-05-10

## 2022-06-02 PROBLEM — H40.1131 PRIMARY OPEN ANGLE GLAUCOMA (POAG) OF BOTH EYES, MILD STAGE: Status: ACTIVE | Noted: 2022-05-10

## 2022-06-02 PROBLEM — H50.15 ALTERNATING EXOTROPIA: Status: ACTIVE | Noted: 2022-04-26

## 2022-06-02 PROCEDURE — 99214 OFFICE O/P EST MOD 30 MIN: CPT | Performed by: INTERNAL MEDICINE

## 2022-06-02 PROCEDURE — 3017F COLORECTAL CA SCREEN DOC REV: CPT | Performed by: INTERNAL MEDICINE

## 2022-06-02 PROCEDURE — G8427 DOCREV CUR MEDS BY ELIG CLIN: HCPCS | Performed by: INTERNAL MEDICINE

## 2022-06-02 PROCEDURE — G8510 SCR DEP NEG, NO PLAN REQD: HCPCS | Performed by: INTERNAL MEDICINE

## 2022-06-02 PROCEDURE — G0008 ADMIN INFLUENZA VIRUS VAC: HCPCS | Performed by: INTERNAL MEDICINE

## 2022-06-02 PROCEDURE — G8417 CALC BMI ABV UP PARAM F/U: HCPCS | Performed by: INTERNAL MEDICINE

## 2022-06-02 PROCEDURE — G8753 SYS BP > OR = 140: HCPCS | Performed by: INTERNAL MEDICINE

## 2022-06-02 PROCEDURE — 90677 PCV20 VACCINE IM: CPT | Performed by: INTERNAL MEDICINE

## 2022-06-02 PROCEDURE — G8754 DIAS BP LESS 90: HCPCS | Performed by: INTERNAL MEDICINE

## 2022-06-02 PROCEDURE — 3046F HEMOGLOBIN A1C LEVEL >9.0%: CPT | Performed by: INTERNAL MEDICINE

## 2022-06-02 PROCEDURE — G9899 SCRN MAM PERF RSLTS DOC: HCPCS | Performed by: INTERNAL MEDICINE

## 2022-06-02 PROCEDURE — 2022F DILAT RTA XM EVC RTNOPTHY: CPT | Performed by: INTERNAL MEDICINE

## 2022-06-02 RX ORDER — PEN NEEDLE, DIABETIC 30 GX3/16"
NEEDLE, DISPOSABLE MISCELLANEOUS DAILY
Qty: 100 PEN NEEDLE | Refills: 5 | Status: SHIPPED | OUTPATIENT
Start: 2022-06-02

## 2022-06-02 RX ORDER — DORZOLAMIDE HYDROCHLORIDE AND TIMOLOL MALEATE 20; 5 MG/ML; MG/ML
SOLUTION/ DROPS OPHTHALMIC
COMMUNITY
Start: 2022-05-10

## 2022-06-02 NOTE — PROGRESS NOTES
Miller Rankin is a 61 y.o. female presenting for/with:    Chief Complaint   Patient presents with    Hypertension    Diabetes     Reports cheating last night, AM glucose 175. Averages around 100-140    Eye Problem     Due to have \"Eye muscle sugery\" with [facility] in July. Form to be faxed to [phone] - Outside of the 30day range will reshedule, next appt with [facility] on 7/5/22    Shoulder Pain     Denies new trauma. Reports pain with lifting against gravity    Immunization/Injection     prevnar 20 provided to PT       Visit Vitals  BP (!) 156/82 (BP 1 Location: Left upper arm, BP Patient Position: Sitting, BP Cuff Size: Adult long)   Pulse 85   Temp 97.9 °F (36.6 °C) (Temporal)   Resp 17   Ht 5' 5\" (1.651 m)   Wt 178 lb 3.2 oz (80.8 kg)   SpO2 98%   BMI 29.65 kg/m²     Pain Scale: 0 - No pain/10  Pain Location:     1. \"Have you been to the ER, urgent care clinic since your last visit? Hospitalized since your last visit? \" No    2. \"Have you seen or consulted any other health care providers outside of the [address] [place] [address] since your last visit? \" Yes Where: [facility]     3. For patients aged 39-70: Has the patient had a colonoscopy / FIT/ Cologuard? Yes - no Care Gap present      If the patient is female:    4. For patients aged 41-77: Has the patient had a mammogram within the past 2 years? Yes - no Care Gap present      5. For patients aged 21-65: Has the patient had a pap smear?  Yes - no Care Gap present      Symptom review:  NO  Fever   NO  Shaking chills  NO  Cough  NO  Body aches  NO  Coughing up blood  NO  Chest congestion  NO  Chest pain  NO  Shortness of breath  NO  Profound Loss of smell/taste  NO  Nausea/Vomiting   NO  Loose stool/Diarrhea  NO  any skin issues    Patient Risk Factors Reviewed as follows:  NO  have you been in Close contact with confirmed COVID19 patient   NO  History of recent travel to affected geographical areas within the past 14 days  NO  COPD  NO  Active Cancer/Leukemia/Lymphoma/Chemotherapy  NO  Oral steroid use  NO  Pregnant  YES  Diabetes Mellitus  YES  Heart disease  NO  Asthma  NO Health care worker at home  [address] care worker  NO Is there a Pregnant Woman in the home  NO Dialysis pt in the home   NO a large number of people living in the home    Learning Assessment 1/27/2022   PRIMARY LEARNER Patient   PRIMARY LANGUAGE ENGLISH   LEARNER PREFERENCE PRIMARY READING     -   ANSWERED BY patient   RELATIONSHIP SELF     Fall Risk Assessment, last 12 mths 6/2/2022   Able to walk? Yes   Fall in past 12 months? 0   Do you feel unsteady? 0   Are you worried about falling 0   Number of falls in past 12 months -   Fall with injury? -       3 most recent PHQ Screens 6/2/2022   Little interest or pleasure in doing things Not at all   Feeling down, depressed, irritable, or hopeless Not at all   Total Score PHQ 2 0   Trouble falling or staying asleep, or sleeping too much -   Feeling tired or having little energy -   Poor appetite, weight loss, or overeating -   Feeling bad about yourself - or that you are a failure or have let yourself or your family down -   Trouble concentrating on things such as school, work, reading, or watching TV -   Moving or speaking so slowly that other people could have noticed; or the opposite being so fidgety that others notice -   How difficult have these problems made it for you to do your work, take care of your home and get along with others -     Abuse Screening Questionnaire 6/2/2022   Do you ever feel afraid of your partner? N   Are you in a relationship with someone who physically or mentally threatens you? N   Is it safe for you to go home?  Y       ADL Assessment 6/2/2022   Feeding yourself No Help Needed   Getting from bed to chair No Help Needed   Getting dressed No Help Needed   Bathing or showering No Help Needed   Walk across the room (includes cane/walker) No Help Needed   Using the telphone No Help Needed   Taking your medications No Help Needed   Preparing meals No Help Needed   Managing money (expenses/bills) No Help Needed   Moderately strenuous housework (laundry) No Help Needed   Shopping for personal items (toiletries/medicines) No Help Needed   Shopping for groceries No Help Needed   Driving Help Needed   Climbing a flight of stairs No Help Needed   Getting to places beyond walking distances No Help Needed      Advance Care Planning 1/27/2022   Patient's Healthcare Decision Maker is: Legal Next of Kin   Primary Decision Maker Name -   Primary Decision Maker Relationship to Patient -   Confirm Advance Directive None   Patient Would Like to Complete Advance Directive No   Does the patient have other document types -      After obtaining consent, and per orders of Dr. Chadwick Ambriz, injection of Prevnar 20 given by Tammy Caceres. Patient instructed to remain in clinic for 20 minutes afterwards, and to report any adverse reaction to me immediately.

## 2022-06-03 LAB
ALBUMIN SERPL-MCNC: 4.1 G/DL (ref 3.8–4.8)
ALBUMIN/GLOB SERPL: 1.5 {RATIO} (ref 1.2–2.2)
ALP SERPL-CCNC: 96 IU/L (ref 44–121)
ALT SERPL-CCNC: 20 IU/L (ref 0–32)
AST SERPL-CCNC: 16 IU/L (ref 0–40)
BILIRUB SERPL-MCNC: 0.2 MG/DL (ref 0–1.2)
BUN SERPL-MCNC: 11 MG/DL (ref 8–27)
BUN/CREAT SERPL: 13 (ref 12–28)
CALCIUM SERPL-MCNC: 9.9 MG/DL (ref 8.7–10.3)
CHLORIDE SERPL-SCNC: 100 MMOL/L (ref 96–106)
CO2 SERPL-SCNC: 27 MMOL/L (ref 20–29)
CREAT SERPL-MCNC: 0.88 MG/DL (ref 0.57–1)
EGFR: 73 ML/MIN/1.73
EST. AVERAGE GLUCOSE BLD GHB EST-MCNC: 192 MG/DL
GLOBULIN SER CALC-MCNC: 2.7 G/DL (ref 1.5–4.5)
GLUCOSE SERPL-MCNC: 181 MG/DL (ref 65–99)
HBA1C MFR BLD: 8.3 % (ref 4.8–5.6)
POTASSIUM SERPL-SCNC: 3.6 MMOL/L (ref 3.5–5.2)
PROT SERPL-MCNC: 6.8 G/DL (ref 6–8.5)
SODIUM SERPL-SCNC: 141 MMOL/L (ref 134–144)

## 2022-06-07 ENCOUNTER — HOSPITAL ENCOUNTER (OUTPATIENT)
Dept: MRI IMAGING | Age: 67
Discharge: HOME | End: 2022-06-07
Attending: INTERNAL MEDICINE
Payer: MEDICARE

## 2022-06-07 DIAGNOSIS — M25.511 CHRONIC RIGHT SHOULDER PAIN: ICD-10-CM

## 2022-06-07 DIAGNOSIS — G89.29 CHRONIC RIGHT SHOULDER PAIN: ICD-10-CM

## 2022-06-07 DIAGNOSIS — M75.101 TEAR OF RIGHT SUPRASPINATUS TENDON: Primary | ICD-10-CM

## 2022-06-07 PROCEDURE — 73221 MRI JOINT UPR EXTREM W/O DYE: CPT

## 2022-06-07 NOTE — PROGRESS NOTES
Attempted to contact patient. No answer. Mail box full. Patient had a previous appt scheduled with Dr Paulino Avitia for 6/17. Will keep that appt. Kateryna Hernandez has faxed over report of MRI.

## 2022-06-07 NOTE — PROGRESS NOTES
Full-thickness tear of the distal, posterior supraspinatus tendon. 4 cm maximum retraction. Remainder of the supraspinatus tendon is thin with bursal surface fraying. Will need ortho consult. I will put in a referral to Dr. Tanesha Finch.

## 2022-07-11 ENCOUNTER — OFFICE VISIT (OUTPATIENT)
Dept: FAMILY MEDICINE CLINIC | Age: 67
End: 2022-07-11
Payer: MEDICARE

## 2022-07-11 VITALS
BODY MASS INDEX: 29.74 KG/M2 | OXYGEN SATURATION: 98 % | SYSTOLIC BLOOD PRESSURE: 126 MMHG | WEIGHT: 178.5 LBS | TEMPERATURE: 97.3 F | DIASTOLIC BLOOD PRESSURE: 76 MMHG | HEIGHT: 65 IN | HEART RATE: 92 BPM | RESPIRATION RATE: 18 BRPM

## 2022-07-11 DIAGNOSIS — H54.7 DIMINISHED VISION: Primary | ICD-10-CM

## 2022-07-11 DIAGNOSIS — Z01.810 PREOP CARDIOVASCULAR EXAM: ICD-10-CM

## 2022-07-11 DIAGNOSIS — E11.65 TYPE 2 DIABETES MELLITUS WITH HYPERGLYCEMIA, WITH LONG-TERM CURRENT USE OF INSULIN (HCC): ICD-10-CM

## 2022-07-11 DIAGNOSIS — I10 ESSENTIAL HYPERTENSION: ICD-10-CM

## 2022-07-11 DIAGNOSIS — G89.29 CHRONIC RIGHT SHOULDER PAIN: ICD-10-CM

## 2022-07-11 DIAGNOSIS — Z79.4 TYPE 2 DIABETES MELLITUS WITH HYPERGLYCEMIA, WITH LONG-TERM CURRENT USE OF INSULIN (HCC): ICD-10-CM

## 2022-07-11 DIAGNOSIS — M25.511 CHRONIC RIGHT SHOULDER PAIN: ICD-10-CM

## 2022-07-11 PROCEDURE — G9899 SCRN MAM PERF RSLTS DOC: HCPCS | Performed by: INTERNAL MEDICINE

## 2022-07-11 PROCEDURE — 3052F HG A1C>EQUAL 8.0%<EQUAL 9.0%: CPT | Performed by: INTERNAL MEDICINE

## 2022-07-11 PROCEDURE — 3017F COLORECTAL CA SCREEN DOC REV: CPT | Performed by: INTERNAL MEDICINE

## 2022-07-11 PROCEDURE — G8752 SYS BP LESS 140: HCPCS | Performed by: INTERNAL MEDICINE

## 2022-07-11 PROCEDURE — 93000 ELECTROCARDIOGRAM COMPLETE: CPT | Performed by: INTERNAL MEDICINE

## 2022-07-11 PROCEDURE — 99214 OFFICE O/P EST MOD 30 MIN: CPT | Performed by: INTERNAL MEDICINE

## 2022-07-11 PROCEDURE — G8510 SCR DEP NEG, NO PLAN REQD: HCPCS | Performed by: INTERNAL MEDICINE

## 2022-07-11 PROCEDURE — 93005 ELECTROCARDIOGRAM TRACING: CPT | Performed by: INTERNAL MEDICINE

## 2022-07-11 PROCEDURE — G8754 DIAS BP LESS 90: HCPCS | Performed by: INTERNAL MEDICINE

## 2022-07-11 PROCEDURE — G8417 CALC BMI ABV UP PARAM F/U: HCPCS | Performed by: INTERNAL MEDICINE

## 2022-07-11 PROCEDURE — 2022F DILAT RTA XM EVC RTNOPTHY: CPT | Performed by: INTERNAL MEDICINE

## 2022-07-11 PROCEDURE — G8427 DOCREV CUR MEDS BY ELIG CLIN: HCPCS | Performed by: INTERNAL MEDICINE

## 2022-07-11 NOTE — PROGRESS NOTES
Visit Vitals  /76   Pulse 92   Temp 97.3 °F (36.3 °C) (Temporal)   Resp 18   Ht 5' 5\" (1.651 m)   Wt 178 lb 8 oz (81 kg)   SpO2 98%   BMI 29.70 kg/m²     Chief Complaint   Patient presents with    Pre-op Exam     Scheduled to have \"eye muscle surgery\" at [address] [place] [address] . Office fax [phone]     1. Have you been to the ER, urgent care clinic since your last visit? Hospitalized since your last visit? No    2. Have you seen or consulted any other health care providers outside of the [address] [place] [address] since your last visit? Include any pap smears or colon screening.  No

## 2022-07-12 LAB
ALBUMIN SERPL-MCNC: 4.1 G/DL (ref 3.8–4.8)
ALBUMIN/GLOB SERPL: 1.4 {RATIO} (ref 1.2–2.2)
ALP SERPL-CCNC: 95 IU/L (ref 44–121)
ALT SERPL-CCNC: 16 IU/L (ref 0–32)
AST SERPL-CCNC: 12 IU/L (ref 0–40)
BASOPHILS # BLD AUTO: 0.1 X10E3/UL (ref 0–0.2)
BASOPHILS NFR BLD AUTO: 1 %
BILIRUB SERPL-MCNC: 0.2 MG/DL (ref 0–1.2)
BUN SERPL-MCNC: 11 MG/DL (ref 8–27)
BUN/CREAT SERPL: 14 (ref 12–28)
CALCIUM SERPL-MCNC: 10.2 MG/DL (ref 8.7–10.3)
CHLORIDE SERPL-SCNC: 99 MMOL/L (ref 96–106)
CO2 SERPL-SCNC: 26 MMOL/L (ref 20–29)
CREAT SERPL-MCNC: 0.78 MG/DL (ref 0.57–1)
EGFR: 85 ML/MIN/1.73
EOSINOPHIL # BLD AUTO: 0.2 X10E3/UL (ref 0–0.4)
EOSINOPHIL NFR BLD AUTO: 3 %
ERYTHROCYTE [DISTWIDTH] IN BLOOD BY AUTOMATED COUNT: 15.5 % (ref 11.7–15.4)
EST. AVERAGE GLUCOSE BLD GHB EST-MCNC: 174 MG/DL
GLOBULIN SER CALC-MCNC: 3 G/DL (ref 1.5–4.5)
GLUCOSE SERPL-MCNC: 163 MG/DL (ref 65–99)
HBA1C MFR BLD: 7.7 % (ref 4.8–5.6)
HCT VFR BLD AUTO: 39.8 % (ref 34–46.6)
HGB BLD-MCNC: 12.2 G/DL (ref 11.1–15.9)
IMM GRANULOCYTES # BLD AUTO: 0 X10E3/UL (ref 0–0.1)
IMM GRANULOCYTES NFR BLD AUTO: 0 %
LYMPHOCYTES # BLD AUTO: 2.1 X10E3/UL (ref 0.7–3.1)
LYMPHOCYTES NFR BLD AUTO: 26 %
MCH RBC QN AUTO: 24.9 PG (ref 26.6–33)
MCHC RBC AUTO-ENTMCNC: 30.7 G/DL (ref 31.5–35.7)
MCV RBC AUTO: 81 FL (ref 79–97)
MONOCYTES # BLD AUTO: 0.5 X10E3/UL (ref 0.1–0.9)
MONOCYTES NFR BLD AUTO: 6 %
NEUTROPHILS # BLD AUTO: 5.1 X10E3/UL (ref 1.4–7)
NEUTROPHILS NFR BLD AUTO: 64 %
PLATELET # BLD AUTO: 194 X10E3/UL (ref 150–450)
POTASSIUM SERPL-SCNC: 3.8 MMOL/L (ref 3.5–5.2)
PROT SERPL-MCNC: 7.1 G/DL (ref 6–8.5)
RBC # BLD AUTO: 4.9 X10E6/UL (ref 3.77–5.28)
SODIUM SERPL-SCNC: 141 MMOL/L (ref 134–144)
WBC # BLD AUTO: 7.9 X10E3/UL (ref 3.4–10.8)

## 2022-07-12 NOTE — PROGRESS NOTES
Patient identified x2 factors and notified of lab results. All questions answered at this time. \"Diabetes is excellent with an A1c of 7.7. The kidneys look great. Keep up the good work and good luck with your eye and shoulder surgery. \"

## 2022-07-28 ENCOUNTER — CLINICAL SUPPORT (OUTPATIENT)
Dept: FAMILY MEDICINE CLINIC | Age: 67
End: 2022-07-28
Payer: MEDICARE

## 2022-07-28 DIAGNOSIS — Z48.02 VISIT FOR SUTURE REMOVAL: Primary | ICD-10-CM

## 2022-07-28 PROCEDURE — 99211 OFF/OP EST MAY X REQ PHY/QHP: CPT | Performed by: INTERNAL MEDICINE

## 2022-07-28 NOTE — PROGRESS NOTES
4 suture (s) were removed by  Nurse without difficulty. Wound edges were well approximated. Steri-strips were not used. There was not evidence of infection. Patient did tolerate well.

## 2022-08-12 DIAGNOSIS — I10 ESSENTIAL HYPERTENSION: ICD-10-CM

## 2022-08-12 RX ORDER — HYDROCHLOROTHIAZIDE 25 MG/1
TABLET ORAL
Qty: 90 TABLET | Refills: 4 | Status: SHIPPED | OUTPATIENT
Start: 2022-08-12

## 2022-10-03 NOTE — PROGRESS NOTES
Ms. Khoa Hernandez is a 61 y.o. female who is here for evaluation of   Chief Complaint   Patient presents with    Annual Wellness Visit    Hypertension     Routine F/U    Diabetes     Had [facility] come to her house and completed assessment. A1C at that time was 7.8    Shoulder Pain     C/O (L) shoulder pain. Requests Voltaren gel    Pap Smear Reminder     NO recent pap compelted   . ASSESSMENT AND PLAN:    1. Medicare annual wellness visit, subsequent  2. Essential hypertension  She is at goal  - LIPID PANEL; Future  - METABOLIC PANEL, COMPREHENSIVE; Future  - CBC WITH AUTOMATED DIFF; Future  - TSH 3RD GENERATION; Future  - LIPID PANEL  - METABOLIC PANEL, COMPREHENSIVE  - CBC WITH AUTOMATED DIFF  - TSH 3RD GENERATION    3. Type 2 diabetes mellitus with hyperglycemia, with long-term current use of insulin (HCC)  - HEMOGLOBIN A1C WITH EAG; Future  - HEMOGLOBIN A1C WITH EAG    4. PAD (peripheral artery disease) ([facility] [address]  5. Needs flu shot  - INFLUENZA, FLUARIX, FLULAVAL, FLUZONE (AGE 6 MO+), AFLURIA(AGE 3Y+) IM, PF, 0.5 ML    6. Epigastric pain  Given her weight loss, anorexia and abdominal pain and history of pancreatitis we will proceed to a CT scan of her abdomen and pelvis and additional laboratory tests. - CT ABD PELV W WO CONT; Future    7. History of pancreatitis  - CT ABD PELV W WO CONT; Future      Orders Placed This Encounter    CT ABD PELV W WO CONT     Standing Status:   Future     Standing Expiration Date:   11/3/2023     Scheduling Instructions:      hospitals     Order Specific Question:   STAT Creatinine as indicated     Answer:   Yes     Order Specific Question: This order utilizes IV contrast.  What additional contrast is needed?      Answer:   Per Radiologist Protocol     Order Specific Question:   Reason for Exam     Answer:   Epigastric abdominal pain, weight loss and history of pancreatitis    Influenza, FLUARIX, FLULAVAL, Fluzone (age 10 mo+), AFLURIA (age 3y+) IM, PF, 0.5 mL    HEMOGLOBIN A1C WITH EAG     Standing Status:   Future     Number of Occurrences:   1     Standing Expiration Date:   10/31/2022    LIPID PANEL     Standing Status:   Future     Number of Occurrences:   1     Standing Expiration Date:   11/81/3535    METABOLIC PANEL, COMPREHENSIVE     Standing Status:   Future     Number of Occurrences:   1     Standing Expiration Date:   10/31/2022    CBC WITH AUTOMATED DIFF     Standing Status:   Future     Number of Occurrences:   1     Standing Expiration Date:   10/31/2022    TSH 3RD GENERATION     Standing Status:   Future     Number of Occurrences:   1     Standing Expiration Date:   10/31/2022    AMB EXT HGBA1C     This external order was created through the Results Console. promethazine (PHENERGAN) 25 mg tablet     Sig: TAKE 1 TABLET BY MOUTH EVERY 6 HOURS AS NEEDED FOR NAUSEA OR VOMITING           HPI 51-year-old woman with a history of peripheral arterial disease, diabetes, hypertension and schizophrenia (well-controlled on medication) arrives today for subsequent annual wellness visit. In addition she notes that she has had an insatiable craving for fruit and is eating 3 watermelons per week in addition to blueberries and strawberries. She has lost weight. She has abdominal pain which is primarily centered in the epigastric area. There is a history of pancreatitis. She is also had vascular surgery as well. Remotely she had an abscess in the periumbilical area which was a complication of mesh from a remote hernia repair many years ago. She is not vomiting but notes frequent stools. ROS:  Denies  fever, chills, cough, chest pain, SOB,  nausea, vomiting, or diarrhea. Denies wt loss, wt gain, hemoptysis, hematochezia or melena.     Physical Examination:    Visit Vitals  /68 (BP 1 Location: Left upper arm, BP Patient Position: Standing, BP Cuff Size: Adult long)   Pulse 84   Temp 98.7 °F (37.1 °C) (Temporal)   Resp 18   Ht 5' 5\" (1.651 m)   Wt 175 lb 9.6 oz (79.7 kg)   SpO2 99%   BMI 29.22 kg/m²      General:  Alert, cooperative, no distress. Head:  Normocephalic, without obvious abnormality, atraumatic. Eyes:  Conjunctivae/corneas clear. Pupils equal, round, reactive to light. Extraocular movements intact. Lungs:   Clear to auscultation bilaterally. Chest wall:  No tenderness or deformity. Cardiac:  RRR   Abdomen: Bowel sounds are present. The abdomen is nondistended but she is tender with palpation particularly in the epigastric area. No palpable masses. Extremities: Extremities normal, atraumatic, no cyanosis or edema. Pulses: 2+ and symmetric all extremities. Skin: Skin color, texture, turgor normal. No rashes or lesions. Lymph nodes: Cervical, supraclavicular, and axillary nodes normal.   Neurologic: CNII-XII intact. Normal strength, sensation, and reflexes throughout. Diabetic Foot Exam:  Both feet are examined and demonstrate intact DP pulses. There is good capillary refill and sensation is intact. Skin is intact and there are no ulcers or sores.            Minal Garnica MD FACP    (signed electronically) on 10/17/2022 at 8:00 AM

## 2022-10-12 ENCOUNTER — TRANSCRIBE ORDER (OUTPATIENT)
Dept: SCHEDULING | Age: 67
End: 2022-10-12

## 2022-10-12 DIAGNOSIS — Z12.31 SCREENING MAMMOGRAM FOR HIGH-RISK PATIENT: Primary | ICD-10-CM

## 2022-10-17 ENCOUNTER — OFFICE VISIT (OUTPATIENT)
Dept: FAMILY MEDICINE CLINIC | Age: 67
End: 2022-10-17
Payer: MEDICARE

## 2022-10-17 ENCOUNTER — TELEPHONE (OUTPATIENT)
Dept: FAMILY MEDICINE CLINIC | Age: 67
End: 2022-10-17

## 2022-10-17 VITALS
OXYGEN SATURATION: 99 % | RESPIRATION RATE: 18 BRPM | SYSTOLIC BLOOD PRESSURE: 138 MMHG | WEIGHT: 175.6 LBS | HEART RATE: 84 BPM | HEIGHT: 65 IN | BODY MASS INDEX: 29.26 KG/M2 | TEMPERATURE: 98.7 F | DIASTOLIC BLOOD PRESSURE: 70 MMHG

## 2022-10-17 DIAGNOSIS — L29.9 ITCHY SKIN: Primary | ICD-10-CM

## 2022-10-17 DIAGNOSIS — Z00.00 MEDICARE ANNUAL WELLNESS VISIT, SUBSEQUENT: Primary | ICD-10-CM

## 2022-10-17 DIAGNOSIS — R10.13 EPIGASTRIC PAIN: ICD-10-CM

## 2022-10-17 DIAGNOSIS — I73.9 PAD (PERIPHERAL ARTERY DISEASE) (HCC): ICD-10-CM

## 2022-10-17 DIAGNOSIS — Z87.19 HISTORY OF PANCREATITIS: ICD-10-CM

## 2022-10-17 DIAGNOSIS — Z23 NEEDS FLU SHOT: ICD-10-CM

## 2022-10-17 DIAGNOSIS — I10 ESSENTIAL HYPERTENSION: ICD-10-CM

## 2022-10-17 DIAGNOSIS — Z79.4 TYPE 2 DIABETES MELLITUS WITH HYPERGLYCEMIA, WITH LONG-TERM CURRENT USE OF INSULIN (HCC): ICD-10-CM

## 2022-10-17 DIAGNOSIS — E11.65 TYPE 2 DIABETES MELLITUS WITH HYPERGLYCEMIA, WITH LONG-TERM CURRENT USE OF INSULIN (HCC): ICD-10-CM

## 2022-10-17 PROBLEM — H40.033 ANATOMICAL NARROW ANGLE OF BOTH EYES: Status: ACTIVE | Noted: 2022-05-10

## 2022-10-17 LAB — HBA1C MFR BLD HPLC: 7.8 %

## 2022-10-17 PROCEDURE — G8510 SCR DEP NEG, NO PLAN REQD: HCPCS | Performed by: INTERNAL MEDICINE

## 2022-10-17 PROCEDURE — G8752 SYS BP LESS 140: HCPCS | Performed by: INTERNAL MEDICINE

## 2022-10-17 PROCEDURE — 3017F COLORECTAL CA SCREEN DOC REV: CPT | Performed by: INTERNAL MEDICINE

## 2022-10-17 PROCEDURE — 2022F DILAT RTA XM EVC RTNOPTHY: CPT | Performed by: INTERNAL MEDICINE

## 2022-10-17 PROCEDURE — G8427 DOCREV CUR MEDS BY ELIG CLIN: HCPCS | Performed by: INTERNAL MEDICINE

## 2022-10-17 PROCEDURE — 99214 OFFICE O/P EST MOD 30 MIN: CPT | Performed by: INTERNAL MEDICINE

## 2022-10-17 PROCEDURE — G0439 PPPS, SUBSEQ VISIT: HCPCS | Performed by: INTERNAL MEDICINE

## 2022-10-17 PROCEDURE — G8754 DIAS BP LESS 90: HCPCS | Performed by: INTERNAL MEDICINE

## 2022-10-17 PROCEDURE — G8417 CALC BMI ABV UP PARAM F/U: HCPCS | Performed by: INTERNAL MEDICINE

## 2022-10-17 PROCEDURE — G0008 ADMIN INFLUENZA VIRUS VAC: HCPCS | Performed by: INTERNAL MEDICINE

## 2022-10-17 PROCEDURE — 90686 IIV4 VACC NO PRSV 0.5 ML IM: CPT | Performed by: INTERNAL MEDICINE

## 2022-10-17 PROCEDURE — 3051F HG A1C>EQUAL 7.0%<8.0%: CPT | Performed by: INTERNAL MEDICINE

## 2022-10-17 PROCEDURE — G9899 SCRN MAM PERF RSLTS DOC: HCPCS | Performed by: INTERNAL MEDICINE

## 2022-10-17 RX ORDER — LEVOCETIRIZINE DIHYDROCHLORIDE 5 MG/1
5 TABLET, FILM COATED ORAL DAILY
Qty: 90 TABLET | Refills: 4 | Status: SHIPPED | OUTPATIENT
Start: 2022-10-17

## 2022-10-17 RX ORDER — DICLOFENAC SODIUM 10 MG/G
4 GEL TOPICAL 4 TIMES DAILY
Qty: 100 G | Refills: 11 | Status: SHIPPED | OUTPATIENT
Start: 2022-10-17

## 2022-10-17 RX ORDER — PROMETHAZINE HYDROCHLORIDE 25 MG/1
TABLET ORAL
COMMUNITY
Start: 2022-07-19

## 2022-10-17 NOTE — PATIENT INSTRUCTIONS
Medicare Wellness Visit, Female     The best way to live healthy is to have a lifestyle where you eat a well-balanced diet, exercise regularly, limit alcohol use, and quit all forms of tobacco/nicotine, if applicable. Regular preventive services are another way to keep healthy. Preventive services (vaccines, screening tests, monitoring & exams) can help personalize your care plan, which helps you manage your own care. Screening tests can find health problems at the earliest stages, when they are easiest to treat. [facility] follows the current, evidence-based guidelines published by the [facility] ([facility]) when recommending preventive services for our patients. Because we follow these guidelines, sometimes recommendations change over time as research supports it. (For example, mammograms used to be recommended annually. Even though Medicare will still pay for an annual mammogram, the newer guidelines recommend a mammogram every two years for women of average risk). Of course, you and your doctor may decide to screen more often for some diseases, based on your risk and your co-morbidities (chronic disease you are already diagnosed with). Preventive services for you include:  - Medicare offers their members a free annual wellness visit, which is time for you and your primary care provider to discuss and plan for your preventive service needs. Take advantage of this benefit every year!  -All adults over the age of 72 should receive the recommended pneumonia vaccines. Current USPSTF guidelines recommend a series of two vaccines for the best pneumonia protection.   -All adults should have a flu vaccine yearly and a tetanus vaccine every 10 years.   -All adults age 48 and older should receive the shingles vaccines (series of two vaccines).       -All adults age 38-68 who are overweight should have a diabetes screening test once every three years.   -All adults born between [birth year] and 1965 should be screened once for Hepatitis C.  -Other screening tests and preventive services for persons with diabetes include: an eye exam to screen for diabetic retinopathy, a kidney function test, a foot exam, and stricter control over your cholesterol.   -Cardiovascular screening for adults with routine risk involves an electrocardiogram (ECG) at intervals determined by your doctor.   -Colorectal cancer screenings should be done for adults age 54-65 with no increased risk factors for colorectal cancer. There are a number of acceptable methods of screening for this type of cancer. Each test has its own benefits and drawbacks. Discuss with your doctor what is most appropriate for you during your annual wellness visit. The different tests include: colonoscopy (considered the best screening method), a fecal occult blood test, a fecal DNA test, and sigmoidoscopy.    -A bone mass density test is recommended when a woman turns 65 to screen for osteoporosis. This test is only recommended one time, as a screening. Some providers will use this same test as a disease monitoring tool if you already have osteoporosis. -Breast cancer screenings are recommended every other year for women of normal risk, age 54-69.  -Cervical cancer screenings for women over age 72 are only recommended with certain risk factors. Here is a list of your current Health Maintenance items (your personalized list of preventive services) with a due date:  Health Maintenance Due   Topic Date Due    Cervical cancer screen  Never done    COVID-19 Vaccine (4 - Booster for Moderna series) 03/03/2022    Albumin Urine Test  03/04/2022    Yearly Flu Vaccine (1) 08/01/2022    Diabetic Foot Care  08/09/2022    Bone Mineral Density   12/13/2022         Vaccine Information Statement    Influenza (Flu) Vaccine (Inactivated or Recombinant):  What You Need to Know    Many vaccine information statements are available in Congolese and other languages. See www.immunize.org/vis. Hojas de información sobre vacunas están disponibles en español y en muchos otros idiomas. Visite www.immunize.org/vis. 1. Why get vaccinated? Influenza vaccine can prevent influenza (flu). Flu is a contagious disease that spreads around the [place] every year, usually between October and May. Anyone can get the flu, but it is more dangerous for some people. Infants and young children, people 72 years and older, pregnant people, and people with certain health conditions or a weakened immune system are at greatest risk of flu complications. Pneumonia, bronchitis, sinus infections, and ear infections are examples of flu-related complications. If you have a medical condition, such as heart disease, cancer, or diabetes, flu can make it worse. Flu can cause fever and chills, sore throat, muscle aches, fatigue, cough, headache, and runny or stuffy nose. Some people may have vomiting and diarrhea, though this is more common in children than adults. In an average year, thousands of people in the [facility] die from flu, and many more are hospitalized. Flu vaccine prevents millions of illnesses and flu-related visits to the doctor each year. 2. Influenza vaccines     CDC recommends everyone 6 months and older get vaccinated every flu season. Children 6 months through 6years of age may need 2 doses during a single flu season. Everyone else needs only 1 dose each flu season. It takes about 2 weeks for protection to develop after vaccination. There are many flu viruses, and they are always changing. Each year a new flu vaccine is made to protect against the influenza viruses believed to be likely to cause disease in the upcoming flu season. Even when the vaccine doesnt exactly match these viruses, it may still provide some protection. Influenza vaccine does not cause flu.     Influenza vaccine may be given at the same time as other vaccines. 3. Talk with your health care provider    Tell your vaccination provider if the person getting the vaccine:  Has had an allergic reaction after a previous dose of influenza vaccine, or has any severe, life-threatening allergies   Has ever had Guillain-Barré Syndrome (also called GBS)    In some cases, your health care provider may decide to postpone influenza vaccination until a future visit. Influenza vaccine can be administered at any time during pregnancy. People who are or will be pregnant during influenza season should receive inactivated influenza vaccine. People with minor illnesses, such as a cold, may be vaccinated. People who are moderately or severely ill should usually wait until they recover before getting influenza vaccine. Your health care provider can give you more information. 4. Risks of a vaccine reaction    Soreness, redness, and swelling where the shot is given, fever, muscle aches, and headache can happen after influenza vaccination. There may be a very small increased risk of Guillain-Barré Syndrome (GBS) after inactivated influenza vaccine (the flu shot). [facility] children who get the flu shot along with pneumococcal vaccine (PCV13) and/or DTaP vaccine at the same time might be slightly more likely to have a seizure caused by fever. Tell your health care provider if a child who is getting flu vaccine has ever had a seizure. People sometimes faint after medical procedures, including vaccination. Tell your provider if you feel dizzy or have vision changes or ringing in the ears. As with any medicine, there is a very remote chance of a vaccine causing a severe allergic reaction, other serious injury, or death. 5. What if there is a serious problem? An allergic reaction could occur after the vaccinated person leaves the clinic.  If you see signs of a severe allergic reaction (hives, swelling of the face and throat, difficulty breathing, a fast heartbeat, dizziness, or weakness), call 9-1-1 and get the person to the nearest hospital.    For other signs that concern you, call your health care provider. Adverse reactions should be reported to the Vaccine Adverse Event Reporting System (VAERS). Your health care provider will usually file this report, or you can do it yourself. Visit the VAERS website at www.vaers. [facility].gov or call 9-514.981.6957. VAERS is only for reporting reactions, and VAERS staff members do not give medical advice. 6. The National Vaccine Injury Compensation Program    The [facility] Vaccine Injury Compensation Program (VICP) is a federal program that was created to compensate people who may have been injured by certain vaccines. Claims regarding alleged injury or death due to vaccination have a time limit for filing, which may be as short as two years. Visit the VICP website at www.[facility].gov/vaccinecompensation or call 4-659.500.2550 to learn about the program and about filing a claim. 7. How can I learn more? Ask your health care provider. Call your local or state health department. Visit the website of the Food and Drug Administration (FDA) for vaccine package inserts and additional information at www.fda.gov/vaccines-blood-biologics/vaccines. Contact the Centers for Disease Control and Prevention (CDC): Call 9-884.554.7104 (1-800-CDC-INFO) or  Visit CDCs influenza website at www.cdc.gov/flu. Vaccine Information Statement   Inactivated Influenza Vaccine   8/6/2021  42 HELDER Gutierrez [id number]-31   Department of Health and Human Services  Centers for Disease Control and Prevention    Office Use Only
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
## 2022-10-18 LAB
ALBUMIN SERPL-MCNC: 4.1 G/DL (ref 3.8–4.8)
ALBUMIN/GLOB SERPL: 1.5 {RATIO} (ref 1.2–2.2)
ALP SERPL-CCNC: 96 IU/L (ref 44–121)
ALT SERPL-CCNC: 15 IU/L (ref 0–32)
AMYLASE SERPL-CCNC: 84 U/L (ref 25–115)
AST SERPL-CCNC: 18 IU/L (ref 0–40)
BASOPHILS # BLD AUTO: 0.1 X10E3/UL (ref 0–0.2)
BASOPHILS NFR BLD AUTO: 1 %
BILIRUB SERPL-MCNC: <0.2 MG/DL (ref 0–1.2)
BUN SERPL-MCNC: 9 MG/DL (ref 8–27)
BUN/CREAT SERPL: 12 (ref 12–28)
CALCIUM SERPL-MCNC: 10.1 MG/DL (ref 8.7–10.3)
CHLORIDE SERPL-SCNC: 101 MMOL/L (ref 96–106)
CHOLEST SERPL-MCNC: 185 MG/DL (ref 100–199)
CO2 SERPL-SCNC: 23 MMOL/L (ref 20–29)
CREAT SERPL-MCNC: 0.78 MG/DL (ref 0.57–1)
EGFR: 85 ML/MIN/1.73
EOSINOPHIL # BLD AUTO: 0.3 X10E3/UL (ref 0–0.4)
EOSINOPHIL NFR BLD AUTO: 3 %
ERYTHROCYTE [DISTWIDTH] IN BLOOD BY AUTOMATED COUNT: 15.7 % (ref 11.7–15.4)
EST. AVERAGE GLUCOSE BLD GHB EST-MCNC: 171 MG/DL
GLOBULIN SER CALC-MCNC: 2.8 G/DL (ref 1.5–4.5)
GLUCOSE SERPL-MCNC: 240 MG/DL (ref 70–99)
HBA1C MFR BLD: 7.6 % (ref 4.8–5.6)
HCT VFR BLD AUTO: 32.9 % (ref 34–46.6)
HDLC SERPL-MCNC: 63 MG/DL
HGB BLD-MCNC: 9.6 G/DL (ref 11.1–15.9)
IMM GRANULOCYTES # BLD AUTO: 0 X10E3/UL (ref 0–0.1)
IMM GRANULOCYTES NFR BLD AUTO: 0 %
LDLC SERPL CALC-MCNC: 98 MG/DL (ref 0–99)
LIPASE SERPL-CCNC: 114 U/L (ref 73–393)
LYMPHOCYTES # BLD AUTO: 1.9 X10E3/UL (ref 0.7–3.1)
LYMPHOCYTES NFR BLD AUTO: 24 %
MCH RBC QN AUTO: 22.1 PG (ref 26.6–33)
MCHC RBC AUTO-ENTMCNC: 29.2 G/DL (ref 31.5–35.7)
MCV RBC AUTO: 76 FL (ref 79–97)
MONOCYTES # BLD AUTO: 0.5 X10E3/UL (ref 0.1–0.9)
MONOCYTES NFR BLD AUTO: 7 %
NEUTROPHILS # BLD AUTO: 5.1 X10E3/UL (ref 1.4–7)
NEUTROPHILS NFR BLD AUTO: 65 %
PLATELET # BLD AUTO: 282 X10E3/UL (ref 150–450)
POTASSIUM SERPL-SCNC: 3.9 MMOL/L (ref 3.5–5.2)
PROT SERPL-MCNC: 6.9 G/DL (ref 6–8.5)
RBC # BLD AUTO: 4.35 X10E6/UL (ref 3.77–5.28)
SODIUM SERPL-SCNC: 142 MMOL/L (ref 134–144)
TRIGL SERPL-MCNC: 135 MG/DL (ref 0–149)
TSH SERPL DL<=0.005 MIU/L-ACNC: 0.84 UIU/ML (ref 0.45–4.5)
VLDLC SERPL CALC-MCNC: 24 MG/DL (ref 5–40)
WBC # BLD AUTO: 7.9 X10E3/UL (ref 3.4–10.8)

## 2022-10-21 NOTE — PROGRESS NOTES
-Please call and let Mrs. Bethany Hidalgo know that her labs are back. She is mildly anemic but everything else looks fine. We will call her when we get the results from her CAT scan     Attempted call. VM full, unable to leave message.

## 2022-10-31 ENCOUNTER — HOSPITAL ENCOUNTER (OUTPATIENT)
Dept: CT IMAGING | Age: 67
Discharge: HOME | End: 2022-10-31
Attending: INTERNAL MEDICINE
Payer: MEDICARE

## 2022-10-31 DIAGNOSIS — R10.13 EPIGASTRIC PAIN: ICD-10-CM

## 2022-10-31 DIAGNOSIS — Z87.19 HISTORY OF PANCREATITIS: ICD-10-CM

## 2022-10-31 PROCEDURE — 74177 CT ABD & PELVIS W/CONTRAST: CPT

## 2022-10-31 PROCEDURE — 74011000636 HC RX REV CODE- 636: Performed by: INTERNAL MEDICINE

## 2022-10-31 RX ADMIN — IOPAMIDOL 100 ML: 612 INJECTION, SOLUTION INTRAVENOUS at 08:47

## 2022-11-06 ENCOUNTER — HOSPITAL ENCOUNTER (EMERGENCY)
Age: 67
Discharge: HOME | End: 2022-11-06
Attending: EMERGENCY MEDICINE
Payer: MEDICARE

## 2022-11-06 ENCOUNTER — APPOINTMENT (OUTPATIENT)
Dept: GENERAL RADIOLOGY | Age: 67
End: 2022-11-06
Attending: EMERGENCY MEDICINE
Payer: MEDICARE

## 2022-11-06 VITALS
DIASTOLIC BLOOD PRESSURE: 51 MMHG | WEIGHT: 175 LBS | BODY MASS INDEX: 29.12 KG/M2 | RESPIRATION RATE: 18 BRPM | OXYGEN SATURATION: 98 % | TEMPERATURE: 99.5 F | SYSTOLIC BLOOD PRESSURE: 131 MMHG | HEART RATE: 81 BPM

## 2022-11-06 DIAGNOSIS — R07.9 CHEST PAIN, UNSPECIFIED TYPE: Primary | ICD-10-CM

## 2022-11-06 DIAGNOSIS — D50.0 IRON DEFICIENCY ANEMIA DUE TO CHRONIC BLOOD LOSS: ICD-10-CM

## 2022-11-06 LAB
ANION GAP SERPL CALC-SCNC: 10 MMOL/L (ref 5–15)
APPEARANCE UR: CLEAR
BACTERIA URNS QL MICRO: ABNORMAL /HPF
BASOPHILS # BLD: 0.1 K/UL (ref 0–0.1)
BASOPHILS NFR BLD: 1 % (ref 0–1)
BILIRUB UR QL: NEGATIVE
BNP SERPL-MCNC: 257 PG/ML (ref 0–125)
BUN SERPL-MCNC: 16 MG/DL (ref 6–20)
BUN/CREAT SERPL: 16 (ref 12–20)
CALCIUM SERPL-MCNC: 9.2 MG/DL (ref 8.5–10.1)
CHLORIDE SERPL-SCNC: 101 MMOL/L (ref 97–108)
CO2 SERPL-SCNC: 30 MMOL/L (ref 21–32)
COLOR UR: ABNORMAL
CREAT SERPL-MCNC: 1.02 MG/DL (ref 0.55–1.02)
DIFFERENTIAL METHOD BLD: ABNORMAL
EOSINOPHIL # BLD: 0.3 K/UL (ref 0–0.4)
EOSINOPHIL NFR BLD: 3 % (ref 0–7)
EPITH CASTS URNS QL MICRO: ABNORMAL /LPF
ERYTHROCYTE [DISTWIDTH] IN BLOOD BY AUTOMATED COUNT: 17.2 % (ref 11.5–14.5)
GLUCOSE SERPL-MCNC: 170 MG/DL (ref 65–100)
GLUCOSE UR STRIP.AUTO-MCNC: NEGATIVE MG/DL
HCT VFR BLD AUTO: 28.6 % (ref 35–47)
HEMOCCULT STL QL: NEGATIVE
HGB BLD-MCNC: 8.8 G/DL (ref 11.5–16)
HGB UR QL STRIP: NEGATIVE
IMM GRANULOCYTES # BLD AUTO: 0 K/UL (ref 0–0.04)
IMM GRANULOCYTES NFR BLD AUTO: 0 % (ref 0–0.5)
KETONES UR QL STRIP.AUTO: ABNORMAL MG/DL
LEUKOCYTE ESTERASE UR QL STRIP.AUTO: NEGATIVE
LYMPHOCYTES # BLD: 2.5 K/UL (ref 0.8–3.5)
LYMPHOCYTES NFR BLD: 25 % (ref 12–49)
MCH RBC QN AUTO: 21.7 PG (ref 26–34)
MCHC RBC AUTO-ENTMCNC: 30.8 G/DL (ref 30–36.5)
MCV RBC AUTO: 70.6 FL (ref 80–99)
MONOCYTES # BLD: 0.8 K/UL (ref 0–1)
MONOCYTES NFR BLD: 8 % (ref 5–13)
NEUTS SEG # BLD: 6.3 K/UL (ref 1.8–8)
NEUTS SEG NFR BLD: 63 % (ref 32–75)
NITRITE UR QL STRIP.AUTO: NEGATIVE
NRBC # BLD: 0 K/UL (ref 0–0.01)
NRBC BLD-RTO: 0 PER 100 WBC
PH UR STRIP: 6 [PH] (ref 5–8)
PLATELET # BLD AUTO: 293 K/UL (ref 150–400)
PMV BLD AUTO: 10.8 FL (ref 8.9–12.9)
POTASSIUM SERPL-SCNC: 3.6 MMOL/L (ref 3.5–5.1)
PROT UR STRIP-MCNC: NEGATIVE MG/DL
RBC # BLD AUTO: 4.05 M/UL (ref 3.8–5.2)
RBC #/AREA URNS HPF: ABNORMAL /HPF (ref 0–5)
SODIUM SERPL-SCNC: 141 MMOL/L (ref 136–145)
SP GR UR REFRACTOMETRY: 1.02 (ref 1–1.03)
TROPONIN-HIGH SENSITIVITY: 31 NG/L (ref 0–51)
TROPONIN-HIGH SENSITIVITY: 34 NG/L (ref 0–51)
UA: UC IF INDICATED,UAUC: ABNORMAL
UROBILINOGEN UR QL STRIP.AUTO: 0.2 EU/DL (ref 0.2–1)
WBC # BLD AUTO: 9.8 K/UL (ref 3.6–11)
WBC URNS QL MICRO: ABNORMAL /HPF (ref 0–4)

## 2022-11-06 PROCEDURE — 99285 EMERGENCY DEPT VISIT HI MDM: CPT

## 2022-11-06 PROCEDURE — 81001 URINALYSIS AUTO W/SCOPE: CPT

## 2022-11-06 PROCEDURE — 71045 X-RAY EXAM CHEST 1 VIEW: CPT

## 2022-11-06 PROCEDURE — 82272 OCCULT BLD FECES 1-3 TESTS: CPT

## 2022-11-06 PROCEDURE — 80048 BASIC METABOLIC PNL TOTAL CA: CPT

## 2022-11-06 PROCEDURE — 83880 ASSAY OF NATRIURETIC PEPTIDE: CPT

## 2022-11-06 PROCEDURE — 93005 ELECTROCARDIOGRAM TRACING: CPT

## 2022-11-06 PROCEDURE — 85025 COMPLETE CBC W/AUTO DIFF WBC: CPT

## 2022-11-06 PROCEDURE — 74011000250 HC RX REV CODE- 250: Performed by: EMERGENCY MEDICINE

## 2022-11-06 PROCEDURE — 74011250637 HC RX REV CODE- 250/637: Performed by: EMERGENCY MEDICINE

## 2022-11-06 PROCEDURE — 36415 COLL VENOUS BLD VENIPUNCTURE: CPT

## 2022-11-06 PROCEDURE — 84484 ASSAY OF TROPONIN QUANT: CPT

## 2022-11-06 RX ADMIN — ALUMINUM HYDROXIDE, MAGNESIUM HYDROXIDE, AND SIMETHICONE 40 ML: 200; 200; 20 SUSPENSION ORAL at 19:36

## 2022-11-06 NOTE — ED PROVIDER NOTES
EMERGENCY DEPARTMENT HISTORY AND PHYSICAL EXAM      Date: 11/6/2022  Patient Name: Ozzy Velazquez    History of Presenting Illness     Chief Complaint   Patient presents with    Chest Pain       History Provided By: Patient and EMS    HPI: Ozzy Velazquez, 61 y.o. female with PMHx significant for DM, hypertension, presents via EMS to the ED with cc of chest pain. Patient reports she has had intermittent midline substernal chest pain for the past 2 days. She reports the pain is a aching. It typically last several hours. She has been taking a lot of Tums with minimal relief. She thought it was indigestion. She denies any known history of CAD. No pain radiation into her jaw or shoulder. No diaphoresis. No relation to exertion. She does state her pain gets better if she sits still. PMHx: Significant for DM, hypertension, asthma  PSHx: Significant for hysterectomy, hernia repair, bunionectomy  Social Hx: Former smoker. Quarter pack a day for 20 years. In 2020. There are no other complaints, changes, or physical findings at this time. PCP: Kt Hou MD    No current facility-administered medications on file prior to encounter. Current Outpatient Medications on File Prior to Encounter   Medication Sig Dispense Refill    promethazine (PHENERGAN) 25 mg tablet TAKE 1 TABLET BY MOUTH EVERY 6 HOURS AS NEEDED FOR NAUSEA OR VOMITING      levocetirizine (XYZAL) 5 mg tablet Take 1 Tablet by mouth daily. Indications: itching of skin 90 Tablet 4    diclofenac (VOLTAREN) 1 % gel Apply 4 g to affected area four (4) times daily.  100 g 11    hydroCHLOROthiazide (HYDRODIURIL) 25 mg tablet TAKE 1 TABLET BY MOUTH ONCE DAILY 90 Tablet 4    montelukast (SINGULAIR) 10 mg tablet TAKE 1 TABLET BY MOUTH DAILY AT BEDTIME 30 Tablet 3    dorzolamide-timoloL (COSOPT) 22.3-6.8 mg/mL ophthalmic solution ADMINISTER 1 DROP INTO BOTH EYES TWICE DAILY      Insulin Needles, Disposable, (BD Ultra-Fine Short Pen Needle) 31 gauge x 5/16\" ndle by Does Not Apply route daily. E11.9. Use daily for insulin injection 100 Pen Needle 5    calcium carbonate/vitamin D3 (CALCIUM 500 + D, D3, PO) Take  by mouth. amLODIPine (NORVASC) 10 mg tablet Take 1 Tablet by mouth daily. 90 Tablet 5    labetaloL (NORMODYNE) 100 mg tablet Take 1 Tablet by mouth two (2) times a day. Indications: Bood pressure 180 Tablet 4    insulin glargine (Lantus Solostar U-100 Insulin) 100 unit/mL (3 mL) inpn 25 Units by SubCUTAneous route daily. 5 Pen 5    simvastatin (ZOCOR) 40 mg tablet TAKE 1 TABLET BY MOUTH EVERY NIGHT AT BEDTIME 90 Tablet 4    lancets misc Use twice a day 200 Each 11    glucose blood VI test strips (True Metrix Glucose Test Strip) strip 1 Each by Does Not Apply route two (2) times a day. E11.9; use BID. Uses insulin 200 Strip 5    Blood-Glucose Meter monitoring kit Use daily 1 Kit 0    fluticasone propion-salmeteroL (Advair Diskus) 250-50 mcg/dose diskus inhaler Take 1 Puff by inhalation every twelve (12) hours. 1 Inhaler 11    clopidogreL (PLAVIX) 75 mg tab Take 75 mg by mouth daily. BD Single Use Swabs Regular padm APPLY  EXTERNALLY EVERY  Pad 5    ascorbic acid, vitamin C, (Vitamin C) 500 mg tablet Take 1 Tab by mouth two (2) times a day. 24 Tab 0    albuterol (PROVENTIL HFA, VENTOLIN HFA, PROAIR HFA) 90 mcg/actuation inhaler Take 2 Puffs by inhalation every four (4) hours as needed for Wheezing. 1 Inhaler 0    multivitamin with iron tablet Take 1 Tablet by mouth daily. acetaminophen (Tylenol Extra Strength) 500 mg tablet Take  by mouth every six (6) hours as needed for Pain. aspirin delayed-release 81 mg tablet Take 81 mg by mouth daily. Nebulizer & Compressor machine 1 Each by Does Not Apply route three (3) times daily. Indications: J44.9 1 Each 0    ipratropium (ATROVENT) 42 mcg (0.06 %) nasal spray 2 Sprays by Nasal route daily as needed.          Past History     Past Medical History:  Past Medical History: Diagnosis Date    Arthritis     Asthma     Diabetes ([facility]     Hypertension     Intractable abdominal pain 11/10/2018    Menopause     Pancreatitis     PVD (peripheral vascular disease) ([facility] 2021    Type 2 diabetes mellitus with peripheral vascular disease ([facility] 10/14/2019       Past Surgical History:  Past Surgical History:   Procedure Laterality Date    COLONOSCOPY N/A 2020    COLONOSCOPY performed by Michelle Rodriguez MD at [place]    HX BUNIONECTOMY Bilateral [address] HERNIA REPAIR      HX PARTIAL HYSTERECTOMY      [facility]    Exploratory laparotomy, extended right hemicolectomy    WA ESOPHAGOGASTRODUODENOSCOPY TRANSORAL DIAGNOSTIC  2021         [place] SB ENDOSCOPY,W/CONTROL,BLEEDING  2021         UPPER GI ENDOSCOPY,BIOPSY  2021            Family History:  Family History   Problem Relation Age of Onset    Other Mother         ANEURYSM    Breast Cancer Mother         age at dx 52's    Diabetes Mother     Lung Disease Father         EMPHYSEMA    Crohn's Disease Sister     Heart Disease Sister     Diabetes Maternal Uncle     Heart Disease Maternal Uncle     Diabetes Paternal Uncle     Heart Disease Paternal Uncle     Breast Cancer Maternal Aunt     Anesth Problems Neg Hx        Social History:  Social History     Tobacco Use    Smoking status: Former     Packs/day: 0.25     Years: 20.00     Pack years: 5.00     Types: Cigarettes     Quit date: 2020     Years since quittin.3    Smokeless tobacco: Never   Vaping Use    Vaping Use: Never used   Substance Use Topics    Alcohol use: Not Currently     Comment: RARE Q 3 MONTHS    Drug use: No       Allergies:   Allergies   Allergen Reactions    Lisinopril Other (comments)    Gabapentin Itching    Morphine Unknown (comments), Hives and Itching    Tramadol Other (comments) and Itching     \"funny feeling\"         Review of Systems   Review of Systems   Constitutional:  Negative for activity change, chills and fever. HENT:  Negative for congestion and sore throat. Eyes:  Negative for pain and redness. Respiratory:  Positive for chest tightness. Negative for cough and shortness of breath. Cardiovascular:  Positive for chest pain. Negative for palpitations. Gastrointestinal:  Negative for abdominal pain, diarrhea, nausea and vomiting. Genitourinary:  Negative for dysuria, frequency and urgency. Musculoskeletal:  Negative for back pain and neck pain. Skin:  Negative for rash. Neurological:  Negative for syncope, light-headedness and headaches. Psychiatric/Behavioral:  Negative for confusion. All other systems reviewed and are negative. Physical Exam   Physical Exam  Vitals and nursing note reviewed. Constitutional:       General: She is not in acute distress. Appearance: She is well-developed. She is not diaphoretic. HENT:      Head: Normocephalic. Nose: Nose normal.      Mouth/Throat:      Pharynx: No oropharyngeal exudate. Eyes:      General: No scleral icterus. Conjunctiva/sclera: Conjunctivae normal.      Pupils: Pupils are equal, round, and reactive to light. Neck:      Thyroid: No thyromegaly. Vascular: No JVD. Trachea: No tracheal deviation. Cardiovascular:      Rate and Rhythm: Normal rate and regular rhythm. Heart sounds: No murmur heard. No friction rub. No gallop. Pulmonary:      Effort: Pulmonary effort is normal. No respiratory distress. Breath sounds: Normal breath sounds. No stridor. No wheezing or rales. Abdominal:      General: Bowel sounds are normal. There is no distension. Palpations: Abdomen is soft. Tenderness: There is no abdominal tenderness. There is no guarding or rebound. Musculoskeletal:         General: Normal range of motion. Cervical back: Normal range of motion and neck supple. Lymphadenopathy:      Cervical: No cervical adenopathy. Skin:     General: Skin is warm and dry. Findings: No erythema or rash. Neurological:      Mental Status: She is alert and oriented to person, place, and time. Cranial Nerves: No cranial nerve deficit. Motor: No abnormal muscle tone. Coordination: Coordination normal.   Psychiatric:         Behavior: Behavior normal.           Diagnostic Study Results     Labs -     No results found for this or any previous visit (from the past 12 hour(s)). Radiologic Studies -   XR CHEST SNGL V   Final Result   1. No acute disease           CT Results  (Last 48 hours)      None          CXR Results  (Last 48 hours)      None              Medical Decision Making   I am the first provider for this patient. I reviewed the vital signs, available nursing notes, past medical history, past surgical history, family history and social history. Vital Signs-Reviewed the patient's vital signs. No data found. Pulse Oximetry Analysis - 98% on RA    Cardiac Monitor:   Rate: 85 bpm  Rhythm: Normal Sinus Rhythm        ED EKG interpretation:18:15  Rhythm: normal sinus rhythm; and regular . Rate (approx.): 83; Axis: normal; PA Interval: normal; QRS interval: normal ; ST/T wave: non-specific changes; This EKG was interpreted by Greg Guevara MD,ED Provider. Records Reviewed: Nursing Notes, Old Medical Records, and Ambulance Run Sheet    Provider Notes (Medical Decision Making):   DDX: gerd, acs, chf.    ED Course:   Initial assessment performed. The patients presenting problems have been discussed, and they are in agreement with the care plan formulated and outlined with them. I have encouraged them to ask questions as they arise throughout their visit. ED Course as of 11/09/22 0803   Graham Ramirez Nov 06, 2022   1904 Care signed out to Dr. Claudette Munoz the oncoming ED physician. Labs pending. [CH]   1945 Pt signed out to me. Chest pain all day. First troponin 34, so will repeat it after an hour. Also her hemoglobin has gone from 12.2 to 8.8 in 3 months. Will send hemoccult. [VG]      ED Course User Index  [CH] Deborrah Favre, MD  [VG] Marylu Melton MD             Progress note:    Pt noted to be feeling better , ready for discharge. Updated pt and/or family on all final lab and imaging findings. Will follow up as instructed. All questions have been answered, pt voiced understanding and agreement with plan. Specific return precautions provided as well as instructions to return to the ED should sx worsen at any time. Vital signs stable for discharge. I have also put together some discharge instructions for them that include: 1) educational information regarding their diagnosis, 2) how to care for their diagnosis at home, as well a 3) list of reasons why they would want to return to the ED prior to their follow-up appointment, should their condition change. Written by Brayan Watts MD        Critical Care Time:   0    Disposition:  Discharge    PLAN:  1. Discharge Medication List as of 11/6/2022  9:45 PM        2. Follow-up Information       Follow up With Specialties Details Why Contact Info    Dewaine Litten, MD Internal Medicine Physician In 3 days  [address] [place] [address]  [phone]            Return to ED if worse     Diagnosis     Clinical Impression:   1. Chest pain, unspecified type    2. Iron deficiency anemia due to chronic blood loss              Please note that this dictation was completed with Merkle, the computer voice recognition software. Quite often unanticipated grammatical, syntax, homophones, and other interpretive errors are inadvertently transcribed by the computer software. Please disregard these errors. Please excuse any errors that have escaped final proofreading.

## 2022-11-06 NOTE — ED TRIAGE NOTES
Pt arrives via EMS with c/o CP that had sudden onset. She describes the pain as an 8/10 and is tightness.  Given ASA en route 324mg

## 2022-11-06 NOTE — ED NOTES
Received assignment, assuming care. ED Provider at bedside. Pt states she has had intermittent chest pain for a few days, lasts for hours. Pt indicates center of chest when describing pain location. Pt mentions mild SOB. Pain currently 8/10. Pt brought by EMS after concerns by ayah at home with her continued symptoms. Pt hx of asthma, vascular surgery in legs.

## 2022-11-07 LAB
ATRIAL RATE: 83 BPM
CALCULATED P AXIS, ECG09: 36 DEGREES
CALCULATED R AXIS, ECG10: -15 DEGREES
CALCULATED T AXIS, ECG11: -56 DEGREES
DIAGNOSIS, 93000: NORMAL
P-R INTERVAL, ECG05: 160 MS
Q-T INTERVAL, ECG07: 384 MS
QRS DURATION, ECG06: 94 MS
QTC CALCULATION (BEZET), ECG08: 451 MS
VENTRICULAR RATE, ECG03: 83 BPM

## 2022-11-08 ENCOUNTER — TELEPHONE (OUTPATIENT)
Dept: FAMILY MEDICINE CLINIC | Age: 67
End: 2022-11-08

## 2022-11-08 NOTE — TELEPHONE ENCOUNTER
Spoke to patient. Appointment scheduled with Dr Joseph Cox on 11/17. Patient also provided Dr Sherrell River contact information to contact office to schedule follow up appt.

## 2022-11-08 NOTE — TELEPHONE ENCOUNTER
----- Message from Pato Woo sent at 11/8/2022  7:21 AM EST -----  Subject: Message to Provider    QUESTIONS  Information for Provider? Patient is calling because she stated she spoke   with Dr. India Caal nurse yesterday regarding getting a name for a   Gastrologist (GI). Patient would like to speak with the nurse. please   advise  ---------------------------------------------------------------------------  --------------  Sil Jauregui INFO  [phone]; OK to leave message on voicemail  ---------------------------------------------------------------------------  --------------  SCRIPT ANSWERS  Relationship to Patient?  Self

## 2022-11-13 NOTE — PROGRESS NOTES
Ms. Pallavi Ayala is a 61 y.o. female who is here for evaluation of   Chief Complaint   Patient presents with    Anemia     Reports feeling lightheaded all the time. States feels midline chest pain without radiation. (+) SOB when pain is present. Reports dark stools. Taking OTC iron supplement daily. .       ASSESSMENT AND PLAN:    1. Iron deficiency anemia, unspecified iron deficiency anemia type  She has an appointment with Dr. Lucie Jacques next week. - CBC WITH AUTOMATED DIFF; Future  - FERRITIN; Future  - FERRITIN  - CBC WITH AUTOMATED DIFF  - REFERRAL TO GASTROENTEROLOGY    2. History of pancreatitis  3. Essential hypertension  Currently at goal      Orders Placed This Encounter    CBC WITH AUTOMATED DIFF     Standing Status:   Future     Number of Occurrences:   1     Standing Expiration Date:   12/1/2022    FERRITIN     Standing Status:   Future     Number of Occurrences:   1     Standing Expiration Date:   12/1/2022    REFERRAL TO GASTROENTEROLOGY     Referral Priority:   Routine     Referral Type:   Consultation     Referral Reason:   Specialty Services Required     Referral Location:   [facility]     Referred to Provider:   Reyna Ace MD     Number of Visits Requested:   1           HPI  53-year-old woman who presented with weight loss and abdominal discomfort 2 weeks ago returns after initial work-up which included labs and a CT scan of the abdomen pelvis. She was found to have a low ferritin and a hemoglobin of 8. Most recent colonoscopy was 2020. She is status post right hemicolectomy for mass (2019) and has had admissions for GI bleeding previously with upper endoscopy revealing gastritis in June 2021. Seen in [facility] ER last week with Hgb 8. She has significant peripheral arterial disease with aortic Endo stents and iliac stents.     Lab Results   Component Value Date/Time    Ferritin 13 05/31/2021 05:14 AM     Lab Results   Component Value Date/Time    WBC 9.8 11/06/2022 06:25 PM Hemoglobin (POC) 9.4 04/27/2021 09:51 AM    HGB 8.8 (L) 11/06/2022 06:25 PM    HCT 28.6 (L) 11/06/2022 06:25 PM    PLATELET 842 65/23/4908 06:25 PM    MCV 70.6 (L) 11/06/2022 06:25 PM        ROS:  Denies  fever, chills, cough, chest pain, SOB,  nausea, vomiting, or diarrhea. Denies wt loss, wt gain, hemoptysis, hematochezia or melena. Physical Examination:    Visit Vitals  /64 (BP 1 Location: Left upper arm, BP Patient Position: Sitting, BP Cuff Size: Adult long)   Pulse 82   Resp 18   Ht 5' 5\" (1.651 m)   Wt 175 lb 12.8 oz (79.7 kg)   SpO2 98%   BMI 29.25 kg/m²      General:  Alert, cooperative, no distress. Head:  Normocephalic, without obvious abnormality, atraumatic. Eyes:  Conjunctivae/corneas clear. Pupils equal, round, reactive to light. Extraocular movements intact. Lungs:   Clear to auscultation bilaterally. Chest wall:  No tenderness or deformity. Cardiac:  RRR   Abdomen:   Soft, non-tender. Bowel sounds normal. No masses. No organomegaly. Extremities: Extremities normal, atraumatic, no cyanosis or edema. Pulses: 2+ and symmetric all extremities. Skin: Skin color, texture, turgor normal. No rashes or lesions. Lymph nodes: Cervical, supraclavicular, and axillary nodes normal.   Neurologic: CNII-XII intact. Normal strength, sensation, and reflexes throughout. On this date 11/17/2022 I have spent 30 minutes reviewing previous notes, test results and face to face with the patient discussing the diagnosis and importance of compliance with the treatment plan as well as documenting on the day of the visit.     Leisa Thrasher MD FACP    (signed electronically) on 11/17/2022 at 1:52 PM

## 2022-11-17 ENCOUNTER — OFFICE VISIT (OUTPATIENT)
Dept: FAMILY MEDICINE CLINIC | Age: 67
End: 2022-11-17
Payer: MEDICARE

## 2022-11-17 VITALS
HEART RATE: 82 BPM | RESPIRATION RATE: 18 BRPM | WEIGHT: 175.8 LBS | SYSTOLIC BLOOD PRESSURE: 138 MMHG | BODY MASS INDEX: 29.29 KG/M2 | OXYGEN SATURATION: 98 % | DIASTOLIC BLOOD PRESSURE: 64 MMHG | HEIGHT: 65 IN

## 2022-11-17 DIAGNOSIS — I10 ESSENTIAL HYPERTENSION: ICD-10-CM

## 2022-11-17 DIAGNOSIS — Z87.19 HISTORY OF PANCREATITIS: ICD-10-CM

## 2022-11-17 DIAGNOSIS — D50.9 IRON DEFICIENCY ANEMIA, UNSPECIFIED IRON DEFICIENCY ANEMIA TYPE: Primary | ICD-10-CM

## 2022-11-17 PROCEDURE — G8427 DOCREV CUR MEDS BY ELIG CLIN: HCPCS | Performed by: INTERNAL MEDICINE

## 2022-11-17 PROCEDURE — 3078F DIAST BP <80 MM HG: CPT | Performed by: INTERNAL MEDICINE

## 2022-11-17 PROCEDURE — G8754 DIAS BP LESS 90: HCPCS | Performed by: INTERNAL MEDICINE

## 2022-11-17 PROCEDURE — G8510 SCR DEP NEG, NO PLAN REQD: HCPCS | Performed by: INTERNAL MEDICINE

## 2022-11-17 PROCEDURE — G8417 CALC BMI ABV UP PARAM F/U: HCPCS | Performed by: INTERNAL MEDICINE

## 2022-11-17 PROCEDURE — G9899 SCRN MAM PERF RSLTS DOC: HCPCS | Performed by: INTERNAL MEDICINE

## 2022-11-17 PROCEDURE — 99214 OFFICE O/P EST MOD 30 MIN: CPT | Performed by: INTERNAL MEDICINE

## 2022-11-17 PROCEDURE — 3074F SYST BP LT 130 MM HG: CPT | Performed by: INTERNAL MEDICINE

## 2022-11-17 PROCEDURE — G8752 SYS BP LESS 140: HCPCS | Performed by: INTERNAL MEDICINE

## 2022-11-17 PROCEDURE — 3017F COLORECTAL CA SCREEN DOC REV: CPT | Performed by: INTERNAL MEDICINE

## 2022-11-17 NOTE — PROGRESS NOTES
Lizz Jennings is a 61 y.o. female presenting for/with:    Chief Complaint   Patient presents with    Anemia     Reports feeling lightheaded all the time. States feels midline chest pain without radiation. (+) SOB when pain is present. Reports dark stools. Taking OTC iron supplement daily. Visit Vitals  /64 (BP 1 Location: Left upper arm, BP Patient Position: Sitting, BP Cuff Size: Adult long)   Pulse 82   Resp 18   Ht 5' 5\" (1.651 m)   Wt 175 lb 12.8 oz (79.7 kg)   SpO2 98%   BMI 29.25 kg/m²     Pain Scale: 0 - No pain/10  Pain Location:     1. \"Have you been to the ER, urgent care clinic since your last visit? Hospitalized since your last visit? \" Yes- [facility]    2. \"Have you seen or consulted any other health care providers outside of the [address] [place] [address] since your last visit? \" No     3. For patients aged 39-70: Has the patient had a colonoscopy / FIT/ Cologuard? Yes - no Care Gap present      If the patient is female:    4. For patients aged 41-77: Has the patient had a mammogram within the past 2 years? Yes - no Care Gap present      5. For patients aged 21-65: Has the patient had a pap smear? Yes - Care Gap present. Most recent result on file      Learning Assessment 10/17/2022   PRIMARY LEARNER Patient   PRIMARY LANGUAGE ENGLISH   LEARNER PREFERENCE PRIMARY LISTENING     -   ANSWERED BY self   RELATIONSHIP SELF     Fall Risk Assessment, last 12 mths 11/17/2022   Able to walk? Yes   Fall in past 12 months? 0   Do you feel unsteady? 0   Are you worried about falling 0   Number of falls in past 12 months -   Fall with injury?  -       3 most recent PHQ Screens 11/17/2022   Little interest or pleasure in doing things Not at all   Feeling down, depressed, irritable, or hopeless Not at all   Total Score PHQ 2 0   Trouble falling or staying asleep, or sleeping too much -   Feeling tired or having little energy -   Poor appetite, weight loss, or overeating -   Feeling bad about yourself - or that you are a failure or have let yourself or your family down -   Trouble concentrating on things such as school, work, reading, or watching TV -   Moving or speaking so slowly that other people could have noticed; or the opposite being so fidgety that others notice -   How difficult have these problems made it for you to do your work, take care of your home and get along with others -     Abuse Screening Questionnaire 11/17/2022   Do you ever feel afraid of your partner? N   Are you in a relationship with someone who physically or mentally threatens you? N   Is it safe for you to go home?  Y       ADL Assessment 11/17/2022   Feeding yourself No Help Needed   Getting from bed to chair No Help Needed   Getting dressed No Help Needed   Bathing or showering No Help Needed   Walk across the room (includes cane/walker) No Help Needed   Using the telphone No Help Needed   Taking your medications No Help Needed   Preparing meals No Help Needed   Managing money (expenses/bills) No Help Needed   Moderately strenuous housework (laundry) No Help Needed   Shopping for personal items (toiletries/medicines) No Help Needed   Shopping for groceries No Help Needed   Driving No Help Needed   Climbing a flight of stairs No Help Needed   Getting to places beyond walking distances No Help Needed      Advance Care Planning 10/17/2022   Patient's Healthcare Decision Maker is: Legal Next of [address] 71   Primary Decision Maker Name -   Primary Decision Maker Relationship to Patient -   Confirm Advance Directive None   Patient Would Like to Complete Advance Directive -   Does the patient have other document types -

## 2022-11-18 LAB
BASOPHILS # BLD: 0.1 K/UL (ref 0–0.1)
BASOPHILS NFR BLD: 1 % (ref 0–1)
DIFFERENTIAL METHOD BLD: ABNORMAL
EOSINOPHIL # BLD: 0.2 K/UL (ref 0–0.4)
EOSINOPHIL NFR BLD: 3 % (ref 0–7)
ERYTHROCYTE [DISTWIDTH] IN BLOOD BY AUTOMATED COUNT: 20.3 % (ref 11.5–14.5)
FERRITIN SERPL-MCNC: 42 NG/ML (ref 8–252)
HCT VFR BLD AUTO: 31.3 % (ref 35–47)
HGB BLD-MCNC: 9.4 G/DL (ref 11.5–16)
IMM GRANULOCYTES # BLD AUTO: 0 K/UL (ref 0–0.04)
IMM GRANULOCYTES NFR BLD AUTO: 0 % (ref 0–0.5)
LYMPHOCYTES # BLD: 1.8 K/UL (ref 0.8–3.5)
LYMPHOCYTES NFR BLD: 27 % (ref 12–49)
MCH RBC QN AUTO: 22.6 PG (ref 26–34)
MCHC RBC AUTO-ENTMCNC: 30 G/DL (ref 30–36.5)
MCV RBC AUTO: 75.2 FL (ref 80–99)
MONOCYTES # BLD: 0.5 K/UL (ref 0–1)
MONOCYTES NFR BLD: 8 % (ref 5–13)
NEUTS SEG # BLD: 4.1 K/UL (ref 1.8–8)
NEUTS SEG NFR BLD: 61 % (ref 32–75)
NRBC # BLD: 0 K/UL (ref 0–0.01)
NRBC BLD-RTO: 0 PER 100 WBC
PLATELET # BLD AUTO: 254 K/UL (ref 150–400)
PLATELET COMMENTS,PCOM: ABNORMAL
RBC # BLD AUTO: 4.16 M/UL (ref 3.8–5.2)
RBC MORPH BLD: ABNORMAL
WBC # BLD AUTO: 6.7 K/UL (ref 3.6–11)

## 2022-11-21 NOTE — ED NOTES
Gastrointestinal Colonoscopy/Flexible Sigmoidoscopy - Lower Exam Discharge Instructions  Call Dr. Best Smith for any problems or questions. Contact the doctors office for follow up appointment as directed  Medication may cause drowsiness for several hours, therefore, do not drive or operate machinery for remainder of the day. No alcohol today. Do not make any important decisions such signing legal paperwork. Ordinarily, you may resume regular diet and activity after exam unless otherwise specified by your physician. Because of air put into your colon during exam, you may experience some abdominal distension, relieved by the passage of gas, for several hours. Contact your physician if you have any of the following:  Excessive amount of bleeding - large amount when having a bowel movement. Occasional specks of blood with bowel movement would not be unusual.  Severe abdominal pain  Fever or Chills  Polyp Removal - follow these additional instructions  Clear liquid diet for the next meal (jell-o, broth, clear drinks)  Soft diet for 24 hours, then resume regular diet   Take Metamucil - 1 tablespoon in juice every morning for 3 days, if needed. No Aspirin, Advil, Aleve, Nuprin, Ibuprofen, or medications that contain these drugs for 2 weeks. Any additional instructions:   Call Dr Best Smith office in 1 week for polyp results. Repeat colonoscopy in 1 year due to number of polyps and poor prep. Instructions given to Damaso Qamar and other family members. TRANSFER - OUT REPORT:    Verbal report given to [place] (name) on Renee Ng  being transferred to Gen [facility] (unit) for routine progression of care       Report consisted of patients Situation, Background, Assessment and   Recommendations(SBAR). Information from the following report(s) SBAR, ED Summary, [facility] and Recent Results was reviewed with the receiving nurse. Lines:   Peripheral IV 08/30/19 Left Hand (Active)   Site Assessment Clean, dry, & intact 8/30/2019  4:06 PM   Phlebitis Assessment 0 8/30/2019  4:06 PM   Infiltration Assessment 0 8/30/2019  4:06 PM   Dressing Status Clean, dry, & intact 8/30/2019  4:06 PM   Dressing Type Transparent;Tape 8/30/2019  4:06 PM   Hub Color/Line Status Pink;Flushed 8/30/2019  4:06 PM        Opportunity for questions and clarification was provided.

## 2022-12-19 ENCOUNTER — HOSPITAL ENCOUNTER (OUTPATIENT)
Dept: MAMMOGRAPHY | Age: 67
Discharge: HOME | End: 2022-12-19
Attending: INTERNAL MEDICINE
Payer: MEDICARE

## 2022-12-19 DIAGNOSIS — Z12.31 SCREENING MAMMOGRAM FOR HIGH-RISK PATIENT: ICD-10-CM

## 2022-12-19 PROCEDURE — 77063 BREAST TOMOSYNTHESIS BI: CPT
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
## 2023-01-03 ENCOUNTER — OFFICE VISIT (OUTPATIENT)
Dept: CARDIOLOGY CLINIC | Age: 68
End: 2023-01-03
Payer: MEDICARE

## 2023-01-03 VITALS
SYSTOLIC BLOOD PRESSURE: 140 MMHG | TEMPERATURE: 97.6 F | HEART RATE: 79 BPM | OXYGEN SATURATION: 98 % | WEIGHT: 178 LBS | RESPIRATION RATE: 16 BRPM | DIASTOLIC BLOOD PRESSURE: 70 MMHG | BODY MASS INDEX: 29.66 KG/M2 | HEIGHT: 65 IN

## 2023-01-03 DIAGNOSIS — I71.9 PENETRATING ULCER OF AORTA (HCC): ICD-10-CM

## 2023-01-03 DIAGNOSIS — I10 BENIGN ESSENTIAL HYPERTENSION: ICD-10-CM

## 2023-01-03 DIAGNOSIS — Z79.4 CONTROLLED TYPE 2 DIABETES MELLITUS WITH COMPLICATION, WITH LONG-TERM CURRENT USE OF INSULIN (HCC): ICD-10-CM

## 2023-01-03 DIAGNOSIS — Z87.891 FORMER SMOKER: ICD-10-CM

## 2023-01-03 DIAGNOSIS — R07.2 PRECORDIAL PAIN: Primary | ICD-10-CM

## 2023-01-03 DIAGNOSIS — I73.9 PAD (PERIPHERAL ARTERY DISEASE) (HCC): ICD-10-CM

## 2023-01-03 DIAGNOSIS — Z82.49 FAMILY HISTORY OF ISCHEMIC HEART DISEASE: ICD-10-CM

## 2023-01-03 DIAGNOSIS — E78.2 MIXED HYPERLIPIDEMIA: ICD-10-CM

## 2023-01-03 DIAGNOSIS — R94.31 ABNORMAL ECG: ICD-10-CM

## 2023-01-03 DIAGNOSIS — E11.8 CONTROLLED TYPE 2 DIABETES MELLITUS WITH COMPLICATION, WITH LONG-TERM CURRENT USE OF INSULIN (HCC): ICD-10-CM

## 2023-01-03 NOTE — PROGRESS NOTES
Identified pt with two pt identifiers(name and ). Reviewed record in preparation for visit and have obtained necessary documentation. Chief Complaint   Patient presents with    New Patient     Establishing care. Abnormal EKG    Hypertension    Cholesterol Problem    Chest Pain     More with rest w/ huccups. Belching does not help the pain. Seeing GI Dr. Ethan Wolfe. Upper GI scope scheduled for . Vitals:    23 1307   BP: (!) 140/70   Pulse: 79   Resp: 16   Temp: 97.6 °F (36.4 °C)   TempSrc: Temporal   SpO2: 98%   Weight: 178 lb (80.7 kg)   Height: 5' 5\" (1.651 m)   PainSc:   0 - No pain       Medications reviewed/approved by provider. Health Maintenance Review: Patient reminded of \"due or due soon\" health maintenance. I have asked the patient to contact his/her primary care provider (PCP) for follow-up on his/her health maintenance. Coordination of Care Questionnaire:  :   1) Have you been to an emergency room, urgent care, or hospitalized since your last visit? If yes, where when, and reason for visit? no       2. Have seen or consulted any other health care provider since your last visit? If yes, where when, and reason for visit? NO      Patient is accompanied by self I have received verbal consent from Jyoti Drummond to discuss any/all medical information while they are present in the room.

## 2023-01-06 ENCOUNTER — APPOINTMENT (OUTPATIENT)
Dept: GENERAL RADIOLOGY | Age: 68
End: 2023-01-06
Attending: EMERGENCY MEDICINE
Payer: MEDICARE

## 2023-01-06 ENCOUNTER — HOSPITAL ENCOUNTER (EMERGENCY)
Age: 68
Discharge: HOME | End: 2023-01-06
Attending: EMERGENCY MEDICINE
Payer: MEDICARE

## 2023-01-06 ENCOUNTER — TELEPHONE (OUTPATIENT)
Dept: FAMILY MEDICINE CLINIC | Age: 68
End: 2023-01-06

## 2023-01-06 ENCOUNTER — HOSPITAL ENCOUNTER (EMERGENCY)
Age: 68
Discharge: HOME | End: 2023-01-06
Attending: EMERGENCY MEDICINE | Admitting: EMERGENCY MEDICINE
Payer: MEDICARE

## 2023-01-06 ENCOUNTER — NURSE TRIAGE (OUTPATIENT)
Dept: OTHER | Facility: CLINIC | Age: 68
End: 2023-01-06

## 2023-01-06 VITALS
DIASTOLIC BLOOD PRESSURE: 86 MMHG | WEIGHT: 173.94 LBS | OXYGEN SATURATION: 100 % | BODY MASS INDEX: 28.98 KG/M2 | RESPIRATION RATE: 17 BRPM | TEMPERATURE: 98.6 F | HEART RATE: 80 BPM | HEIGHT: 65 IN | SYSTOLIC BLOOD PRESSURE: 139 MMHG

## 2023-01-06 VITALS
RESPIRATION RATE: 18 BRPM | HEART RATE: 73 BPM | BODY MASS INDEX: 29.62 KG/M2 | TEMPERATURE: 99 F | OXYGEN SATURATION: 99 % | DIASTOLIC BLOOD PRESSURE: 55 MMHG | SYSTOLIC BLOOD PRESSURE: 159 MMHG | WEIGHT: 178 LBS

## 2023-01-06 DIAGNOSIS — E87.6 HYPOKALEMIA: ICD-10-CM

## 2023-01-06 DIAGNOSIS — E11.65 TYPE 2 DIABETES MELLITUS WITH HYPERGLYCEMIA, UNSPECIFIED WHETHER LONG TERM INSULIN USE (HCC): ICD-10-CM

## 2023-01-06 DIAGNOSIS — K29.00 ACUTE GASTRITIS WITHOUT HEMORRHAGE, UNSPECIFIED GASTRITIS TYPE: ICD-10-CM

## 2023-01-06 DIAGNOSIS — R07.89 ATYPICAL CHEST PAIN: Primary | ICD-10-CM

## 2023-01-06 LAB
ALBUMIN SERPL-MCNC: 3.4 G/DL (ref 3.5–5)
ALBUMIN/GLOB SERPL: 0.8 (ref 1.1–2.2)
ALP SERPL-CCNC: 87 U/L (ref 45–117)
ALT SERPL-CCNC: 25 U/L (ref 12–78)
ANION GAP SERPL CALC-SCNC: 6 MMOL/L (ref 5–15)
ANION GAP SERPL CALC-SCNC: 9 MMOL/L (ref 5–15)
AST SERPL-CCNC: 19 U/L (ref 15–37)
ATRIAL RATE: 83 BPM
BASOPHILS # BLD: 0.1 K/UL (ref 0–0.1)
BASOPHILS # BLD: 0.1 K/UL (ref 0–0.1)
BASOPHILS NFR BLD: 1 % (ref 0–1)
BASOPHILS NFR BLD: 1 % (ref 0–1)
BILIRUB SERPL-MCNC: 0.2 MG/DL (ref 0.2–1)
BNP SERPL-MCNC: 1217 PG/ML
BUN SERPL-MCNC: 10 MG/DL (ref 6–20)
BUN SERPL-MCNC: 12 MG/DL (ref 6–20)
BUN/CREAT SERPL: 11 (ref 12–20)
BUN/CREAT SERPL: 12 (ref 12–20)
CALCIUM SERPL-MCNC: 9.5 MG/DL (ref 8.5–10.1)
CALCIUM SERPL-MCNC: 9.6 MG/DL (ref 8.5–10.1)
CALCULATED P AXIS, ECG09: 52 DEGREES
CALCULATED R AXIS, ECG10: 2 DEGREES
CALCULATED T AXIS, ECG11: -114 DEGREES
CHLORIDE SERPL-SCNC: 102 MMOL/L (ref 97–108)
CHLORIDE SERPL-SCNC: 105 MMOL/L (ref 97–108)
CO2 SERPL-SCNC: 27 MMOL/L (ref 21–32)
CO2 SERPL-SCNC: 28 MMOL/L (ref 21–32)
CREAT SERPL-MCNC: 0.94 MG/DL (ref 0.55–1.02)
CREAT SERPL-MCNC: 1.04 MG/DL (ref 0.55–1.02)
DIAGNOSIS, 93000: NORMAL
DIFFERENTIAL METHOD BLD: ABNORMAL
DIFFERENTIAL METHOD BLD: ABNORMAL
EOSINOPHIL # BLD: 0.2 K/UL (ref 0–0.4)
EOSINOPHIL # BLD: 0.2 K/UL (ref 0–0.4)
EOSINOPHIL NFR BLD: 2 % (ref 0–7)
EOSINOPHIL NFR BLD: 2 % (ref 0–7)
ERYTHROCYTE [DISTWIDTH] IN BLOOD BY AUTOMATED COUNT: 22.2 % (ref 11.5–14.5)
ERYTHROCYTE [DISTWIDTH] IN BLOOD BY AUTOMATED COUNT: 22.7 % (ref 11.5–14.5)
FLUAV RNA SPEC QL NAA+PROBE: NOT DETECTED
FLUBV RNA SPEC QL NAA+PROBE: NOT DETECTED
GLOBULIN SER CALC-MCNC: 4.3 G/DL (ref 2–4)
GLUCOSE SERPL-MCNC: 259 MG/DL (ref 65–100)
GLUCOSE SERPL-MCNC: 276 MG/DL (ref 65–100)
HCT VFR BLD AUTO: 34.7 % (ref 35–47)
HCT VFR BLD AUTO: 37.9 % (ref 35–47)
HGB BLD-MCNC: 11.1 G/DL (ref 11.5–16)
HGB BLD-MCNC: 11.8 G/DL (ref 11.5–16)
IMM GRANULOCYTES # BLD AUTO: 0 K/UL (ref 0–0.04)
IMM GRANULOCYTES # BLD AUTO: 0 K/UL (ref 0–0.04)
IMM GRANULOCYTES NFR BLD AUTO: 0 % (ref 0–0.5)
IMM GRANULOCYTES NFR BLD AUTO: 0 % (ref 0–0.5)
LIPASE SERPL-CCNC: 71 U/L (ref 73–393)
LYMPHOCYTES # BLD: 2.2 K/UL (ref 0.8–3.5)
LYMPHOCYTES # BLD: 2.3 K/UL (ref 0.8–3.5)
LYMPHOCYTES NFR BLD: 25 % (ref 12–49)
LYMPHOCYTES NFR BLD: 26 % (ref 12–49)
MCH RBC QN AUTO: 24 PG (ref 26–34)
MCH RBC QN AUTO: 24.5 PG (ref 26–34)
MCHC RBC AUTO-ENTMCNC: 31.1 G/DL (ref 30–36.5)
MCHC RBC AUTO-ENTMCNC: 32 G/DL (ref 30–36.5)
MCV RBC AUTO: 75.1 FL (ref 80–99)
MCV RBC AUTO: 78.6 FL (ref 80–99)
MONOCYTES # BLD: 0.5 K/UL (ref 0–1)
MONOCYTES # BLD: 0.6 K/UL (ref 0–1)
MONOCYTES NFR BLD: 6 % (ref 5–13)
MONOCYTES NFR BLD: 7 % (ref 5–13)
NEUTS SEG # BLD: 5.6 K/UL (ref 1.8–8)
NEUTS SEG # BLD: 5.8 K/UL (ref 1.8–8)
NEUTS SEG NFR BLD: 64 % (ref 32–75)
NEUTS SEG NFR BLD: 66 % (ref 32–75)
NRBC # BLD: 0 K/UL (ref 0–0.01)
NRBC # BLD: 0 K/UL (ref 0–0.01)
NRBC BLD-RTO: 0 PER 100 WBC
NRBC BLD-RTO: 0 PER 100 WBC
P-R INTERVAL, ECG05: 142 MS
PLATELET # BLD AUTO: 253 K/UL (ref 150–400)
PLATELET # BLD AUTO: 282 K/UL (ref 150–400)
PMV BLD AUTO: 10.5 FL (ref 8.9–12.9)
PMV BLD AUTO: 10.6 FL (ref 8.9–12.9)
POTASSIUM SERPL-SCNC: 3.1 MMOL/L (ref 3.5–5.1)
POTASSIUM SERPL-SCNC: 3.5 MMOL/L (ref 3.5–5.1)
PROT SERPL-MCNC: 7.7 G/DL (ref 6.4–8.2)
Q-T INTERVAL, ECG07: 398 MS
QRS DURATION, ECG06: 96 MS
QTC CALCULATION (BEZET), ECG08: 467 MS
RBC # BLD AUTO: 4.62 M/UL (ref 3.8–5.2)
RBC # BLD AUTO: 4.82 M/UL (ref 3.8–5.2)
RBC MORPH BLD: ABNORMAL
SARS-COV-2, COV2: NOT DETECTED
SODIUM SERPL-SCNC: 138 MMOL/L (ref 136–145)
SODIUM SERPL-SCNC: 139 MMOL/L (ref 136–145)
TROPONIN-HIGH SENSITIVITY: 44 NG/L (ref 0–51)
TROPONIN-HIGH SENSITIVITY: 46 NG/L (ref 0–51)
VENTRICULAR RATE, ECG03: 83 BPM
WBC # BLD AUTO: 8.6 K/UL (ref 3.6–11)
WBC # BLD AUTO: 9 K/UL (ref 3.6–11)

## 2023-01-06 PROCEDURE — 80053 COMPREHEN METABOLIC PANEL: CPT

## 2023-01-06 PROCEDURE — 85025 COMPLETE CBC W/AUTO DIFF WBC: CPT

## 2023-01-06 PROCEDURE — 74011000250 HC RX REV CODE- 250: Performed by: EMERGENCY MEDICINE

## 2023-01-06 PROCEDURE — 80048 BASIC METABOLIC PNL TOTAL CA: CPT

## 2023-01-06 PROCEDURE — 84484 ASSAY OF TROPONIN QUANT: CPT

## 2023-01-06 PROCEDURE — 93005 ELECTROCARDIOGRAM TRACING: CPT

## 2023-01-06 PROCEDURE — 74011250637 HC RX REV CODE- 250/637: Performed by: EMERGENCY MEDICINE

## 2023-01-06 PROCEDURE — 99285 EMERGENCY DEPT VISIT HI MDM: CPT

## 2023-01-06 PROCEDURE — 36415 COLL VENOUS BLD VENIPUNCTURE: CPT

## 2023-01-06 PROCEDURE — 71046 X-RAY EXAM CHEST 2 VIEWS: CPT

## 2023-01-06 PROCEDURE — 71045 X-RAY EXAM CHEST 1 VIEW: CPT

## 2023-01-06 PROCEDURE — 87636 SARSCOV2 & INF A&B AMP PRB: CPT

## 2023-01-06 PROCEDURE — 99285 EMERGENCY DEPT VISIT HI MDM: CPT | Performed by: EMERGENCY MEDICINE

## 2023-01-06 PROCEDURE — 83690 ASSAY OF LIPASE: CPT

## 2023-01-06 PROCEDURE — 83880 ASSAY OF NATRIURETIC PEPTIDE: CPT

## 2023-01-06 RX ORDER — POTASSIUM CHLORIDE 750 MG/1
40 TABLET, FILM COATED, EXTENDED RELEASE ORAL
Status: COMPLETED | OUTPATIENT
Start: 2023-01-06 | End: 2023-01-06

## 2023-01-06 RX ORDER — SUCRALFATE 1 G/10ML
1 SUSPENSION ORAL 4 TIMES DAILY
Qty: 560 ML | Refills: 0 | Status: SHIPPED | OUTPATIENT
Start: 2023-01-06 | End: 2023-01-13 | Stop reason: SDUPTHER

## 2023-01-06 RX ORDER — FAMOTIDINE 20 MG/1
20 TABLET, FILM COATED ORAL
Status: COMPLETED | OUTPATIENT
Start: 2023-01-06 | End: 2023-01-06

## 2023-01-06 RX ORDER — PANTOPRAZOLE SODIUM 40 MG/1
40 TABLET, DELAYED RELEASE ORAL DAILY
Qty: 20 TABLET | Refills: 0 | Status: SHIPPED | OUTPATIENT
Start: 2023-01-06 | End: 2023-01-13 | Stop reason: DRUGHIGH

## 2023-01-06 RX ADMIN — POTASSIUM CHLORIDE 40 MEQ: 750 TABLET, FILM COATED, EXTENDED RELEASE ORAL at 10:00

## 2023-01-06 RX ADMIN — FAMOTIDINE 20 MG: 20 TABLET, FILM COATED ORAL at 16:24

## 2023-01-06 RX ADMIN — ALUMINUM HYDROXIDE, MAGNESIUM HYDROXIDE, AND SIMETHICONE 40 ML: 200; 200; 20 SUSPENSION ORAL at 09:54

## 2023-01-06 RX ADMIN — ALUMINUM HYDROXIDE AND MAGNESIUM HYDROXIDE 40 ML: 200; 200 SUSPENSION ORAL at 16:40

## 2023-01-06 NOTE — ED PROVIDER NOTES
EMERGENCY DEPARTMENT HISTORY AND PHYSICAL EXAM      Date: 1/6/2023  Patient Name: Ambrosio Mcburney    History of Presenting Illness     Chief Complaint   Patient presents with    Chest Pain       History Provided By: Patient and EMS    HPI: Ambrosio Mcburney, 74 y.o. female with PMHx significant for DM, hypertension, asthma, presents via EMS to the ED with cc of chest pain. Patient reports midsternal chest pain for approximately a week to week and a half. Pain is worse at night and with movement. Nausea vomiting or diarrhea. No cough. No fevers or chills. Actually reports has had intermittent chest pain for the over the past several months. I actually saw her in the ER in November for similar symptoms. Patient was seen by cardiology on January 3 for the same symptoms. On chart review this was a new patient consultation. She was seen by nurse practitioner Suma Siddiqui in the cardiology clinic here. Outpatient echo and Romelia stress test were scheduled. She is currently on daily aspirin and Plavix for some prior PAD. PMHx: Significant for. DM 2, hypertension, asthma  PSHx: Significant for  partial hysterectomy, hernia repair, bunionectomy, EGD  Social Hx:   Quarter pack a day for 20 years. Rare alcohol use. Says she attemptedquit in June but still smokes a few cigarettes a day. There are no other complaints, changes, or physical findings at this time. PCP: Mariama Gutiérrez MD    No current facility-administered medications on file prior to encounter. Current Outpatient Medications on File Prior to Encounter   Medication Sig Dispense Refill    montelukast (SINGULAIR) 10 mg tablet TAKE 1 TABLET BY MOUTH DAILY AT BEDTIME 30 Tablet 3    promethazine (PHENERGAN) 25 mg tablet       levocetirizine (XYZAL) 5 mg tablet Take 1 Tablet by mouth daily. Indications: itching of skin 90 Tablet 4    diclofenac (VOLTAREN) 1 % gel Apply 4 g to affected area four (4) times daily.  100 g 11    hydroCHLOROthiazide (HYDRODIURIL) 25 mg tablet TAKE 1 TABLET BY MOUTH ONCE DAILY 90 Tablet 4    dorzolamide-timoloL (COSOPT) 22.3-6.8 mg/mL ophthalmic solution ADMINISTER 1 DROP INTO BOTH EYES TWICE DAILY      Insulin Needles, Disposable, (BD Ultra-Fine Short Pen Needle) 31 gauge x 5/16\" ndle by Does Not Apply route daily. E11.9. Use daily for insulin injection 100 Pen Needle 5    calcium carbonate/vitamin D3 (CALCIUM 500 + D, D3, PO) Take  by mouth. amLODIPine (NORVASC) 10 mg tablet Take 1 Tablet by mouth daily. 90 Tablet 5    labetaloL (NORMODYNE) 100 mg tablet Take 1 Tablet by mouth two (2) times a day. Indications: Bood pressure 180 Tablet 4    insulin glargine (Lantus Solostar U-100 Insulin) 100 unit/mL (3 mL) inpn 25 Units by SubCUTAneous route daily. 5 Pen 5    simvastatin (ZOCOR) 40 mg tablet TAKE 1 TABLET BY MOUTH EVERY NIGHT AT BEDTIME 90 Tablet 4    lancets misc Use twice a day 200 Each 11    glucose blood VI test strips (True Metrix Glucose Test Strip) strip 1 Each by Does Not Apply route two (2) times a day. E11.9; use BID. Uses insulin 200 Strip 5    Blood-Glucose Meter monitoring kit Use daily 1 Kit 0    fluticasone propion-salmeteroL (Advair Diskus) 250-50 mcg/dose diskus inhaler Take 1 Puff by inhalation every twelve (12) hours. 1 Inhaler 11    clopidogreL (PLAVIX) 75 mg tab Take 75 mg by mouth daily. BD Single Use Swabs Regular padm APPLY  EXTERNALLY EVERY  Pad 5    ascorbic acid, vitamin C, (Vitamin C) 500 mg tablet Take 1 Tab by mouth two (2) times a day. 24 Tab 0    albuterol (PROVENTIL HFA, VENTOLIN HFA, PROAIR HFA) 90 mcg/actuation inhaler Take 2 Puffs by inhalation every four (4) hours as needed for Wheezing. 1 Inhaler 0    multivitamin with iron tablet Take 1 Tablet by mouth daily. acetaminophen (TYLENOL) 500 mg tablet Take  by mouth every six (6) hours as needed for Pain. aspirin delayed-release 81 mg tablet Take 81 mg by mouth daily.       Nebulizer & Compressor machine 1 Each by Does Not Apply route three (3) times daily. Indications: J44.9 1 Each 0    ipratropium (ATROVENT) 42 mcg (0.06 %) nasal spray 2 Sprays by Nasal route daily as needed. Past History     Past Medical History:  Past Medical History:   Diagnosis Date    Arthritis     Asthma     Diabetes ([facility]     Hypertension     Intractable abdominal pain 11/10/2018    Menopause     Pancreatitis     PVD (peripheral vascular disease) ([facility] 2021    Type 2 diabetes mellitus with peripheral vascular disease ([facility] 10/14/2019       Past Surgical History:  Past Surgical History:   Procedure Laterality Date    COLONOSCOPY N/A 2020    COLONOSCOPY performed by Crys Cain MD at [address] Bilateral 1975    [address]      HX PARTIAL HYSTERECTOMY      MA ESOPHAGOGASTRODUODENOSCOPY TRANSORAL DIAGNOSTIC  2021         MA SB ENDOSCOPY,W/CONTROL,BLEEDING  2021         MA UNLISTED PROCEDURE ABDOMEN PERITONEUM & OMENTUM  2019    Exploratory laparotomy, extended right hemicolectomy    UPPER GI ENDOSCOPY,BIOPSY  2021            Family History:  Family History   Problem Relation Age of Onset    Other Mother         ANEURYSM    Breast Cancer Mother         age at dx 52's    Diabetes Mother     Lung Disease Father         EMPHYSEMA    Crohn's Disease Sister     Heart Disease Sister     Diabetes Maternal Uncle     Heart Disease Maternal Uncle     Diabetes Paternal Uncle     Heart Disease Paternal Uncle     Breast Cancer Maternal Aunt     Anesth Problems Neg Hx        Social History:  Social History     Tobacco Use    Smoking status: Former     Packs/day: 0.25     Years: 20.00     Pack years: 5.00     Types: Cigarettes     Quit date: 2020     Years since quittin.5    Smokeless tobacco: Never   Vaping Use    Vaping Use: Never used   Substance Use Topics    Alcohol use: Not Currently     Comment: RARE Q 3 MONTHS    Drug use: No       Allergies:   Allergies   Allergen Reactions Lisinopril Other (comments)    Gabapentin Itching    Morphine Unknown (comments), Hives and Itching    Tramadol Other (comments) and Itching     \"funny feeling\"         Review of Systems   Review of Systems   Constitutional:  Negative for activity change, chills and fever. HENT:  Negative for congestion and sore throat. Eyes:  Negative for pain and redness. Respiratory:  Positive for chest tightness. Negative for cough and shortness of breath. Cardiovascular:  Positive for chest pain. Negative for palpitations. Gastrointestinal:  Negative for abdominal pain, diarrhea, nausea and vomiting. Genitourinary:  Negative for dysuria, frequency and urgency. Musculoskeletal:  Negative for back pain and neck pain. Skin:  Negative for rash. Neurological:  Negative for syncope, light-headedness and headaches. Psychiatric/Behavioral:  Negative for confusion. All other systems reviewed and are negative. Physical Exam   Physical Exam  Vitals and nursing note reviewed. Constitutional:       General: She is not in acute distress. Appearance: She is well-developed. She is not diaphoretic. HENT:      Head: Normocephalic. Nose: Nose normal.      Mouth/Throat:      Pharynx: No oropharyngeal exudate. Eyes:      General: No scleral icterus. Conjunctiva/sclera: Conjunctivae normal.      Pupils: Pupils are equal, round, and reactive to light. Neck:      Thyroid: No thyromegaly. Vascular: No JVD. Trachea: No tracheal deviation. Cardiovascular:      Rate and Rhythm: Normal rate and regular rhythm. Heart sounds: No murmur heard. No friction rub. No gallop. Pulmonary:      Effort: Pulmonary effort is normal. No respiratory distress. Breath sounds: Normal breath sounds. No stridor. No wheezing or rales. Abdominal:      General: Bowel sounds are normal. There is no distension. Palpations: Abdomen is soft. Tenderness: There is no abdominal tenderness.  There is no guarding or rebound. Musculoskeletal:         General: Normal range of motion. Cervical back: Normal range of motion and neck supple. Lymphadenopathy:      Cervical: No cervical adenopathy. Skin:     General: Skin is warm and dry. Findings: No erythema or rash. Neurological:      Mental Status: She is alert and oriented to person, place, and time. Cranial Nerves: No cranial nerve deficit. Motor: No abnormal muscle tone.       Coordination: Coordination normal.   Psychiatric:         Behavior: Behavior normal.           Diagnostic Study Results     Labs -     Recent Results (from the past 12 hour(s))   EKG, 12 LEAD, INITIAL    Collection Time: 01/06/23  9:15 AM   Result Value Ref Range    Ventricular Rate 68 BPM    Atrial Rate 68 BPM    P-R Interval 154 ms    QRS Duration 98 ms    Q-T Interval 458 ms    QTC Calculation (Bezet) 487 ms    Calculated P Axis 15 degrees    Calculated R Axis -11 degrees    Calculated T Axis -108 degrees    Diagnosis       Sinus rhythm with occasional premature ventricular complexes  Moderate voltage criteria for LVH, may be normal variant  ST & Marked T wave abnormality, consider anterolateral ischemia  Prolonged QT  Abnormal ECG  When compared with ECG of 06-NOV-2022 18:15,  premature ventricular complexes are now present  T wave inversion more evident in Inferior leads  T wave inversion now evident in Anterior leads     COVID-19 WITH INFLUENZA A/B    Collection Time: 01/06/23  9:19 AM   Result Value Ref Range    SARS-CoV-2 by PCR Not detected NOTD      Influenza A by PCR Not detected NOTD      Influenza B by PCR Not detected NOTD     CBC WITH AUTOMATED DIFF    Collection Time: 01/06/23  9:25 AM   Result Value Ref Range    WBC 8.6 3.6 - 11.0 K/uL    RBC 4.62 3.80 - 5.20 M/uL    HGB 11.1 (L) 11.5 - 16.0 g/dL    HCT 34.7 (L) 35.0 - 47.0 %    MCV 75.1 (L) 80.0 - 99.0 FL    MCH 24.0 (L) 26.0 - 34.0 PG    MCHC 32.0 30.0 - 36.5 g/dL    RDW 22.2 (H) 11.5 - 14.5 % PLATELET 470 762 - 518 K/uL    MPV 10.6 8.9 - 12.9 FL    NRBC 0.0 0  WBC    ABSOLUTE NRBC 0.00 0.00 - 0.01 K/uL    NEUTROPHILS 66 32 - 75 %    LYMPHOCYTES 25 12 - 49 %    MONOCYTES 6 5 - 13 %    EOSINOPHILS 2 0 - 7 %    BASOPHILS 1 0 - 1 %    IMMATURE GRANULOCYTES 0 0.0 - 0.5 %    ABS. NEUTROPHILS 5.6 1.8 - 8.0 K/UL    ABS. LYMPHOCYTES 2.2 0.8 - 3.5 K/UL    ABS. MONOCYTES 0.5 0.0 - 1.0 K/UL    ABS. EOSINOPHILS 0.2 0.0 - 0.4 K/UL    ABS. BASOPHILS 0.1 0.0 - 0.1 K/UL    ABS. IMM. GRANS. 0.0 0.00 - 0.04 K/UL    DF AUTOMATED      RBC COMMENTS ANISOCYTOSIS  2+        RBC COMMENTS MICROCYTOSIS  1+        RBC COMMENTS HYPOCHROMIA  1+       METABOLIC PANEL, BASIC    Collection Time: 01/06/23  9:25 AM   Result Value Ref Range    Sodium 138 136 - 145 mmol/L    Potassium 3.1 (L) 3.5 - 5.1 mmol/L    Chloride 102 97 - 108 mmol/L    CO2 27 21 - 32 mmol/L    Anion gap 9 5 - 15 mmol/L    Glucose 276 (H) 65 - 100 mg/dL    BUN 10 6 - 20 MG/DL    Creatinine 0.94 0.55 - 1.02 MG/DL    BUN/Creatinine ratio 11 (L) 12 - 20      eGFR >60 >60 ml/min/1.73m2    Calcium 9.6 8.5 - 10.1 MG/DL   TROPONIN-HIGH SENSITIVITY    Collection Time: 01/06/23  9:25 AM   Result Value Ref Range    Troponin-High Sensitivity 44 0 - 51 ng/L   LIPASE    Collection Time: 01/06/23  9:25 AM   Result Value Ref Range    Lipase 71 (L) 73 - 393 U/L       Radiologic Studies -   XR CHEST SNGL V   Final Result   No acute cardiopulmonary disease. CT Results  (Last 48 hours)      None          CXR Results  (Last 48 hours)                 01/06/23 0930  XR CHEST SNGL V Final result    Impression:  No acute cardiopulmonary disease. Narrative:  INDICATION: Chest pain. Portable AP view of the chest.       Direct comparison made to prior chest x-ray dated November 2022. Cardiomediastinal silhouette is stable. Lungs are clear bilaterally. Pleural   spaces are normal and there is no pneumothorax. Osseous structures are intact. Medical Decision Making   I am the first provider for this patient. I reviewed the vital signs, available nursing notes, past medical history, past surgical history, family history and social history. Vital Signs-Reviewed the patient's vital signs. Patient Vitals for the past 12 hrs:   Temp Pulse Resp BP SpO2   01/06/23 0957 -- 88 18 (!) 183/72 --   01/06/23 0919 99 °F (37.2 °C) 70 16 (!) 183/76 99 %       Pulse Oximetry Analysis - 99% on RA    Cardiac Monitor:   Rate: 88 bpm  Rhythm: Normal Sinus Rhythm        ED EKG interpretation: 9:15 AM  Rhythm: normal sinus rhythm; and regular . Rate (approx.): 68; Axis: normal; MO Interval: normal; QRS interval: normal ; ST/T wave: non-specific changes; EKG unchanged from prior EKG on January 3,2023, and November 6, 2022. This EKG was interpreted by Keith Parker MD,ED Provider. Records Reviewed: Nursing Notes, Old Medical Records, and Ambulance Run Sheet    Provider Notes (Medical Decision Making):   29-year-old female with history of GERD, DM 2, hypertension, PAD with intermittent chest pain over the past several months. Previous ER work-up here in November with negative troponin and no acute EKG changes. Seen recently by cardiology earlier this week with plans for outpatient echo and Lexiscan stress test.  No significant change in symptoms from prior. She does report some increased belching. She is follows up with GI in March for an endoscopy with Dr. Laura Salinas. She is already on Prilosec daily. EKG obtained and unchanged from prior. Will check basic blood work including CBC, CMP and troponin. Suspect gas Tritus/esophagitis/GERD as cause of symptoms. No change in pain from prior. No suspicion of dissection or PE.    ED Course:   Initial assessment performed. The patients presenting problems have been discussed, and they are in agreement with the care plan formulated and outlined with them.   I have encouraged them to ask questions as they arise throughout their visit. PROGRESS NOTE    Pt reevaluated. Negative high-sensitivity troponin after prolonged symptoms. EKG unchanged from prior. CBC and CMP unremarkable other than mild hyperglycemia and hypokalemia. Repleted potassium orally. Symptoms improved with GI cocktail. Recommended Maalox/Mylanta. Patient already on Prilosec recommended as needed Pepcid. Recommended follow-up with cardiology as scheduled with echo and Lexiscan stress test and follow-up with GI for endoscopy as scheduled. Written by Nyasia Ruiz MD     Progress note:    Pt noted to be feeling better , ready for discharge. Updated pt and/or family on all final lab and imaging findings. Will follow up as instructed. All questions have been answered, pt voiced understanding and agreement with plan. Specific return precautions provided as well as instructions to return to the ED should sx worsen at any time. Vital signs stable for discharge. I have also put together some discharge instructions for them that include: 1) educational information regarding their diagnosis, 2) how to care for their diagnosis at home, as well a 3) list of reasons why they would want to return to the ED prior to their follow-up appointment, should their condition change. Written by Nyasia Ruiz MD        Critical Care Time:   0    Disposition:  Discharge    PLAN:  1. Current Discharge Medication List        2.    Follow-up Information       Follow up With Specialties Details Why Contact Info    Otoniel Harvey MD Internal Medicine Physician Schedule an appointment as soon as possible for a visit in 1 week  [address]      Bertram Davis MD Cardiovascular Disease Physician Schedule an appointment as soon as possible for a visit in 1 week  [address] [facility]  [address] [facility] [address]  [address]      Ashley Woodward MD Gastroenterology Schedule an appointment as soon as possible for a visit in 1 week  [address]  [place]  [phone]            Return to ED if worse     Diagnosis     Clinical Impression:   1. Atypical chest pain    2. Hypokalemia    3. Type 2 diabetes mellitus with hyperglycemia, unspecified whether long term insulin use ([facility]              Please note that this dictation was completed with [employer], the computer voice recognition software. Quite often unanticipated grammatical, syntax, homophones, and other interpretive errors are inadvertently transcribed by the computer software. Please disregard these errors. Please excuse any errors that have escaped final proofreading.

## 2023-01-06 NOTE — TELEPHONE ENCOUNTER
----- Message from Crissy Gayeshagufta sent at 1/6/2023  2:15 PM EST -----  Subject: Message to Provider    QUESTIONS  Information for Provider? PT wants to inform office she went to [facility] in [place] to be checked for chest pains  ---------------------------------------------------------------------------  --------------  Anke INFO  [phone]; OK to leave message on voicemail  ---------------------------------------------------------------------------  --------------  SCRIPT ANSWERS  Relationship to Patient?  Self

## 2023-01-06 NOTE — ED PROVIDER NOTES
\[facility]\"" EMERGENCY DEPT  EMERGENCY DEPARTMENT ENCOUNTER       Pt Name: Qasim Montes  MRN: [id number]  Carolinagfslava 1955  Date of evaluation: 1/6/2023  Provider: Areli Cobb MD   PCP: Taylor Noyola MD  Note Started: 4:23 PM 1/6/23    CHIEF COMPLAINT       Chief Complaint   Patient presents with    Chest Pain     Pt ambulatory into triage with a cc of chest pain x 1 week; pt was told to come to ED today for evaluation; pt was discharged from [facility] today and still having pain so she came here to be seen      HISTORY OF PRESENT ILLNESS: 1 or more elements      History From: Patient, History limited by: None     Qasim Montes is a 74 y.o. female who presents with chest pain. Reports two weeks of persistent, sharp substernal chest pain. Increased belching which increases her pain. She also reports nausea, one episode vomiting last ngiht and recent loose stools, no blood in stool or vmit. Mild cough. Multiple negative COVID tests. Seen this AM at outside ED, presents for ongoing pain. Nursing Notes were all reviewed and agreed with or any disagreements were addressed in the HPI. REVIEW OF SYSTEMS      Positives and Pertinent negatives as per HPI.     PAST HISTORY   Past Medical History:  Past Medical History:   Diagnosis Date    Arthritis     Asthma     Diabetes ([facility]     Hypertension     Intractable abdominal pain 11/10/2018    Menopause     Pancreatitis     PVD (peripheral vascular disease) ([facility] [address] 4/21/2021    Type 2 diabetes mellitus with peripheral vascular disease ([facility] [address] 10/14/2019     Past Surgical History:  Past Surgical History:   Procedure Laterality Date    COLONOSCOPY N/A 7/28/2020    COLONOSCOPY performed by Briana Brooks MD at [address] Bilateral 1975    [address]  2002    HX PARTIAL HYSTERECTOMY  1980    ND ESOPHAGOGASTRODUODENOSCOPY TRANSORAL DIAGNOSTIC  6/1/2021         ND SB ENDOSCOPY,W/CONTROL,BLEEDING  7/19/2021         ND UNLISTED PROCEDURE ABDOMEN PERITONEUM & OMENTUM  2019    Exploratory laparotomy, extended right hemicolectomy    UPPER GI ENDOSCOPY,BIOPSY  2021          Family History:  Family History   Problem Relation Age of Onset    Other Mother         ANEURYSM    Breast Cancer Mother         age at dx 52's    Diabetes Mother     Lung Disease Father         EMPHYSEMA    Crohn's Disease Sister     Heart Disease Sister     Diabetes Maternal Uncle     Heart Disease Maternal Uncle     Diabetes Paternal Uncle     Heart Disease Paternal Uncle     Breast Cancer Maternal Aunt     Anesth Problems Neg Hx      Social History:  Social History     Tobacco Use    Smoking status: Former     Packs/day: 0.25     Years: 20.00     Pack years: 5.00     Types: Cigarettes     Quit date: 2020     Years since quittin.5    Smokeless tobacco: Never   Vaping Use    Vaping Use: Never used   Substance Use Topics    Alcohol use: Not Currently     Comment: RARE Q 3 MONTHS    Drug use: No     Allergies: Allergies   Allergen Reactions    Lisinopril Other (comments)    Gabapentin Itching    Morphine Unknown (comments), Hives and Itching    Tramadol Other (comments) and Itching     \"funny feeling\"       CURRENT MEDICATIONS      Previous Medications    ACETAMINOPHEN (TYLENOL) 500 MG TABLET    Take  by mouth every six (6) hours as needed for Pain. ALBUTEROL (PROVENTIL HFA, VENTOLIN HFA, PROAIR HFA) 90 MCG/ACTUATION INHALER    Take 2 Puffs by inhalation every four (4) hours as needed for Wheezing. AMLODIPINE (NORVASC) 10 MG TABLET    Take 1 Tablet by mouth daily. ASCORBIC ACID, VITAMIN C, (VITAMIN C) 500 MG TABLET    Take 1 Tab by mouth two (2) times a day. ASPIRIN DELAYED-RELEASE 81 MG TABLET    Take 81 mg by mouth daily. BD SINGLE USE SWABS REGULAR PADM    APPLY  EXTERNALLY EVERY DAY    BLOOD-GLUCOSE METER MONITORING KIT    Use daily    CALCIUM CARBONATE/VITAMIN D3 (CALCIUM 500 + D, D3, PO)    Take  by mouth.     CLOPIDOGREL (PLAVIX) 75 MG TAB    Take 75 mg by mouth daily.    DICLOFENAC (VOLTAREN) 1 % GEL    Apply 4 g to affected area four (4) times daily. DORZOLAMIDE-TIMOLOL (COSOPT) 22.3-6.8 MG/ML OPHTHALMIC SOLUTION    ADMINISTER 1 DROP INTO BOTH EYES TWICE DAILY    FLUTICASONE PROPION-SALMETEROL (ADVAIR DISKUS) 250-50 MCG/DOSE DISKUS INHALER    Take 1 Puff by inhalation every twelve (12) hours. GLUCOSE BLOOD VI TEST STRIPS (TRUE METRIX GLUCOSE TEST STRIP) STRIP    1 Each by Does Not Apply route two (2) times a day. E11.9; use BID. Uses insulin    HYDROCHLOROTHIAZIDE (HYDRODIURIL) 25 MG TABLET    TAKE 1 TABLET BY MOUTH ONCE DAILY    INSULIN GLARGINE (LANTUS SOLOSTAR U-100 INSULIN) 100 UNIT/ML (3 ML) INPN    25 Units by SubCUTAneous route daily. INSULIN NEEDLES, DISPOSABLE, (BD ULTRA-FINE SHORT PEN NEEDLE) 31 GAUGE X 5/16\" NDLE    by Does Not Apply route daily. E11.9. Use daily for insulin injection    IPRATROPIUM (ATROVENT) 42 MCG (0.06 %) NASAL SPRAY    2 Sprays by Nasal route daily as needed. LABETALOL (NORMODYNE) 100 MG TABLET    Take 1 Tablet by mouth two (2) times a day. Indications: Bood pressure    LANCETS MISC    Use twice a day    LEVOCETIRIZINE (XYZAL) 5 MG TABLET    Take 1 Tablet by mouth daily. Indications: itching of skin    MONTELUKAST (SINGULAIR) 10 MG TABLET    TAKE 1 TABLET BY MOUTH DAILY AT BEDTIME    MULTIVITAMIN WITH IRON TABLET    Take 1 Tablet by mouth daily. NEBULIZER & COMPRESSOR MACHINE    1 Each by Does Not Apply route three (3) times daily.  Indications: J44.9    PROMETHAZINE (PHENERGAN) 25 MG TABLET        SIMVASTATIN (ZOCOR) 40 MG TABLET    TAKE 1 TABLET BY MOUTH EVERY NIGHT AT BEDTIME       SCREENINGS               No data recorded         PHYSICAL EXAM      ED Triage Vitals [01/06/23 1429]   ED Encounter Vitals Group      /86      Pulse (Heart Rate) (!) 108      Resp Rate 20      Temp 98.6 °F (37 °C)      Temp src       O2 Sat (%) 100 %      Weight 173 lb 15.1 oz      Height 5' 5\"      Physical Exam  Vitals and nursing note reviewed. Constitutional:       Appearance: She is well-developed. HENT:      Head: Normocephalic. Cardiovascular:      Rate and Rhythm: Normal rate and regular rhythm. Pulmonary:      Effort: Pulmonary effort is normal.      Breath sounds: Normal breath sounds. Abdominal:      Tenderness: There is abdominal tenderness. Musculoskeletal:      Right lower leg: No edema. Left lower leg: No edema. Neurological:      Mental Status: She is alert.        DIAGNOSTIC RESULTS   LABS:     Recent Results (from the past 12 hour(s))   EKG, 12 LEAD, INITIAL    Collection Time: 01/06/23  9:15 AM   Result Value Ref Range    Ventricular Rate 68 BPM    Atrial Rate 68 BPM    P-R Interval 154 ms    QRS Duration 98 ms    Q-T Interval 458 ms    QTC Calculation (Bezet) 487 ms    Calculated P Axis 15 degrees    Calculated R Axis -11 degrees    Calculated T Axis -108 degrees    Diagnosis       Sinus rhythm with occasional premature ventricular complexes  Moderate voltage criteria for LVH, may be normal variant  ST & Marked T wave abnormality, consider anterolateral ischemia  Prolonged QT  Abnormal ECG  When compared with ECG of 06-NOV-2022 18:15,  premature ventricular complexes are now present  T wave inversion more evident in Inferior leads  T wave inversion now evident in Anterior leads     COVID-19 WITH INFLUENZA A/B    Collection Time: 01/06/23  9:19 AM   Result Value Ref Range    SARS-CoV-2 by PCR Not detected NOTD      Influenza A by PCR Not detected NOTD      Influenza B by PCR Not detected NOTD     CBC WITH AUTOMATED DIFF    Collection Time: 01/06/23  9:25 AM   Result Value Ref Range    WBC 8.6 3.6 - 11.0 K/uL    RBC 4.62 3.80 - 5.20 M/uL    HGB 11.1 (L) 11.5 - 16.0 g/dL    HCT 34.7 (L) 35.0 - 47.0 %    MCV 75.1 (L) 80.0 - 99.0 FL    MCH 24.0 (L) 26.0 - 34.0 PG    MCHC 32.0 30.0 - 36.5 g/dL    RDW 22.2 (H) 11.5 - 14.5 %    PLATELET 422 071 - 938 K/uL    MPV 10.6 8.9 - 12.9 FL    NRBC 0.0 0  WBC ABSOLUTE NRBC 0.00 0.00 - 0.01 K/uL    NEUTROPHILS 66 32 - 75 %    LYMPHOCYTES 25 12 - 49 %    MONOCYTES 6 5 - 13 %    EOSINOPHILS 2 0 - 7 %    BASOPHILS 1 0 - 1 %    IMMATURE GRANULOCYTES 0 0.0 - 0.5 %    ABS. NEUTROPHILS 5.6 1.8 - 8.0 K/UL    ABS. LYMPHOCYTES 2.2 0.8 - 3.5 K/UL    ABS. MONOCYTES 0.5 0.0 - 1.0 K/UL    ABS. EOSINOPHILS 0.2 0.0 - 0.4 K/UL    ABS. BASOPHILS 0.1 0.0 - 0.1 K/UL    ABS. IMM.  GRANS. 0.0 0.00 - 0.04 K/UL    DF AUTOMATED      RBC COMMENTS ANISOCYTOSIS  2+        RBC COMMENTS MICROCYTOSIS  1+        RBC COMMENTS HYPOCHROMIA  1+       METABOLIC PANEL, BASIC    Collection Time: 01/06/23  9:25 AM   Result Value Ref Range    Sodium 138 136 - 145 mmol/L    Potassium 3.1 (L) 3.5 - 5.1 mmol/L    Chloride 102 97 - 108 mmol/L    CO2 27 21 - 32 mmol/L    Anion gap 9 5 - 15 mmol/L    Glucose 276 (H) 65 - 100 mg/dL    BUN 10 6 - 20 MG/DL    Creatinine 0.94 0.55 - 1.02 MG/DL    BUN/Creatinine ratio 11 (L) 12 - 20      eGFR >60 >60 ml/min/1.73m2    Calcium 9.6 8.5 - 10.1 MG/DL   TROPONIN-HIGH SENSITIVITY    Collection Time: 01/06/23  9:25 AM   Result Value Ref Range    Troponin-High Sensitivity 44 0 - 51 ng/L   LIPASE    Collection Time: 01/06/23  9:25 AM   Result Value Ref Range    Lipase 71 (L) 73 - 393 U/L   EKG, 12 LEAD, INITIAL    Collection Time: 01/06/23  2:36 PM   Result Value Ref Range    Ventricular Rate 83 BPM    Atrial Rate 83 BPM    P-R Interval 142 ms    QRS Duration 96 ms    Q-T Interval 398 ms    QTC Calculation (Bezet) 467 ms    Calculated P Axis 52 degrees    Calculated R Axis 2 degrees    Calculated T Axis -114 degrees    Diagnosis       Normal sinus rhythm  Possible Left atrial enlargement  Left ventricular hypertrophy  ST & T wave abnormality, consider inferior ischemia  ST & T wave abnormality, consider anterolateral ischemia  When compared with ECG of 06-JAN-2023 09:15,  MANUAL COMPARISON REQUIRED, DATA IS UNCONFIRMED     CBC WITH AUTOMATED DIFF    Collection Time: 01/06/23  2:40 PM Result Value Ref Range    WBC 9.0 3.6 - 11.0 K/uL    RBC 4.82 3.80 - 5.20 M/uL    HGB 11.8 11.5 - 16.0 g/dL    HCT 37.9 35.0 - 47.0 %    MCV 78.6 (L) 80.0 - 99.0 FL    MCH 24.5 (L) 26.0 - 34.0 PG    MCHC 31.1 30.0 - 36.5 g/dL    RDW 22.7 (H) 11.5 - 14.5 %    PLATELET 101 275 - 247 K/uL    MPV 10.5 8.9 - 12.9 FL    NRBC 0.0 0  WBC    ABSOLUTE NRBC 0.00 0.00 - 0.01 K/uL    NEUTROPHILS 64 32 - 75 %    LYMPHOCYTES 26 12 - 49 %    MONOCYTES 7 5 - 13 %    EOSINOPHILS 2 0 - 7 %    BASOPHILS 1 0 - 1 %    IMMATURE GRANULOCYTES 0 0.0 - 0.5 %    ABS. NEUTROPHILS 5.8 1.8 - 8.0 K/UL    ABS. LYMPHOCYTES 2.3 0.8 - 3.5 K/UL    ABS. MONOCYTES 0.6 0.0 - 1.0 K/UL    ABS. EOSINOPHILS 0.2 0.0 - 0.4 K/UL    ABS. BASOPHILS 0.1 0.0 - 0.1 K/UL    ABS. IMM. GRANS. 0.0 0.00 - 0.04 K/UL    DF SMEAR SCANNED      RBC COMMENTS ANISOCYTOSIS  2+        RBC COMMENTS MICROCYTOSIS  1+       METABOLIC PANEL, COMPREHENSIVE    Collection Time: 01/06/23  2:40 PM   Result Value Ref Range    Sodium 139 136 - 145 mmol/L    Potassium 3.5 3.5 - 5.1 mmol/L    Chloride 105 97 - 108 mmol/L    CO2 28 21 - 32 mmol/L    Anion gap 6 5 - 15 mmol/L    Glucose 259 (H) 65 - 100 mg/dL    BUN 12 6 - 20 MG/DL    Creatinine 1.04 (H) 0.55 - 1.02 MG/DL    BUN/Creatinine ratio 12 12 - 20      eGFR 60 (L) >60 ml/min/1.73m2    Calcium 9.5 8.5 - 10.1 MG/DL    Bilirubin, total 0.2 0.2 - 1.0 MG/DL    ALT (SGPT) 25 12 - 78 U/L    AST (SGOT) 19 15 - 37 U/L    Alk. phosphatase 87 45 - 117 U/L    Protein, total 7.7 6.4 - 8.2 g/dL    Albumin 3.4 (L) 3.5 - 5.0 g/dL    Globulin 4.3 (H) 2.0 - 4.0 g/dL    A-G Ratio 0.8 (L) 1.1 - 2.2     NT-PRO BNP    Collection Time: 01/06/23  2:40 PM   Result Value Ref Range    NT pro-BNP 1,217 (H) <125 PG/ML   TROPONIN-HIGH SENSITIVITY    Collection Time: 01/06/23  2:40 PM   Result Value Ref Range    Troponin-High Sensitivity 46 0 - 51 ng/L        EKG:  If performed, independent interpretation documented below in the MDM section RADIOLOGY:  Non-plain film images such as CT, Ultrasound and MRI are read by the radiologist. Plain radiographic images are visualized and preliminarily interpreted by the ED Provider with the findings documented in the MDM section. Interpretation per the Radiologist below, if available at the time of this note:     XR CHEST SNGL V    Result Date: 1/6/2023  INDICATION: Chest pain. Portable AP view of the chest. Direct comparison made to prior chest x-ray dated November 2022. Cardiomediastinal silhouette is stable. Lungs are clear bilaterally. Pleural spaces are normal and there is no pneumothorax. Osseous structures are intact. No acute cardiopulmonary disease. XR CHEST PA LAT    Result Date: 1/6/2023  EXAM: XR CHEST PA LAT INDICATION: Chest pain COMPARISON: 1/6/2023 TECHNIQUE: PA and lateral chest views FINDINGS: The cardiac size is within normal limits. The pulmonary vasculature is within normal limits. The lungs and pleural spaces are clear. Spondylitic changes are moderate. No acute cardiopulmonary process seen        PROCEDURES   Unless otherwise noted below, none  Procedures     CRITICAL CARE TIME       EMERGENCY DEPARTMENT COURSE and DIFFERENTIAL DIAGNOSIS/MDM   Vitals:    Vitals:    01/06/23 1429 01/06/23 1620   BP: 139/86    Pulse: (!) 108 80   Resp: 20 17   Temp: 98.6 °F (37 °C)    SpO2: 100% 100%   Weight: 78.9 kg (173 lb 15.1 oz)    Height: 5' 5\" (1.651 m)         Patient was given the following medications:  Medications   maalox/viscous lidocaine (COV GI COCKTAIL) (40 mL Oral Given 1/6/23 1640)   famotidine (PEPCID) tablet 20 mg (20 mg Oral Given 1/6/23 1624)     Medical Decision Making  DDX esophagitis, gastritis, ACS    Will obtain EKG, repeat troponin test, BNP. CBC, CMP, CXR. Will treat presumptive gastritis, esophagitis with GI cocktail, pepcid, escalate home therapy, pending EGD with Rashad. Amount and/or Complexity of Data Reviewed  External Data Reviewed: notes.   Labs: ordered. Decision-making details documented in ED Course. Radiology: ordered and independent interpretation performed. Decision-making details documented in ED Course. ECG/medicine tests: ordered and independent interpretation performed. Decision-making details documented in ED Course. Risk  Prescription drug management. ED Course as of 01/06/23 1713   Fri Jan 06, 2023   1601 Troponin is 55, this is from 44 6 hours ago [WB]   1601 Lab work reviewed BMP shows normal electrolytes apart from hyperglycemia of 260 normal creatinine normal LFTs CBC shows normal counts and differential BNP is elevated at 1200 [WB]   1602 She is tachycardic to 108 not tachypneic normoxic blood pressure 140/86 afebrile [WB]   1602 This is increased from ED visit 5 hours ago at 68 [WB]   1602 X-ray shows no acute cardiopulmonary pathology x-ray reviewed by myself does not demonstrate any acute cardiopulmonary disease [WB]   1607 Review patient was seen at outside ED earlier today, it appears she has been established with a new cardiologist with outpatient echo and stress test scheduled, on daily aspirin and Plavix per prior PAD [WB]   1608 EKG shows normal sinus rhythm rate of 83 normal axis, normal intervals. There are diffuse T wave inversions in 1-3 aVF V4 V5 V6, borderline depressions in inferior leads; no STEMI [WB]   1610 These changes are progressed since November of last year [WB]   1702 Patient was medicated 20 minutes ago will reevaluate [WB]   1707 Patient has obtained some relief, tachycardia is resolved will discharge with prescription for both Protonix and sucralfate, GI follow-up as well as cardiology follow-up [WB]      ED Course User Index  [WB] Real Carrillo MD       FINAL IMPRESSION     1. Atypical chest pain    2. Acute gastritis without hemorrhage, unspecified gastritis type          DISPOSITION/PLAN   Archana Arnold's  results have been reviewed with her.   She has been counseled regarding her diagnosis, treatment, and plan. She verbally conveys understanding and agreement of the signs, symptoms, diagnosis, treatment and prognosis and additionally agrees to follow up as discussed. She also agrees with the care-plan and conveys that all of her questions have been answered. I have also provided discharge instructions for her that include: educational information regarding their diagnosis and treatment, and list of reasons why they would want to return to the ED prior to their follow-up appointment, should her condition change. CLINICAL IMPRESSION    Discharge Note: The patient is stable for discharge home. The signs, symptoms, diagnosis, and discharge instructions have been discussed, understanding conveyed, and agreed upon. The patient is to follow up as recommended or return to ER should their symptoms worsen. PATIENT REFERRED TO:  Follow-up Information       Follow up With Specialties Details Why Contact Info    Eileen Ricketts MD Cardiovascular Disease Physician   [address] [facility] [address]  [facility]  [phone]      [facility] EMERGENCY DEPT Emergency Medicine  As needed, If symptoms worsen [address] [address]    Irene Mendez MD Gastroenterology   [address] [facility] [address] [facility] [address]  [facility]  [phone]             DISCHARGE MEDICATIONS:  Current Discharge Medication List        START taking these medications    Details   sucralfate (CARAFATE) 100 mg/mL suspension Take 10 mL by mouth four (4) times daily for 14 days. Qty: 560 mL, Refills: 0  Start date: 1/6/2023, End date: 1/20/2023      pantoprazole (Protonix) 40 mg tablet Take 1 Tablet by mouth daily for 20 days. Qty: 20 Tablet, Refills: 0  Start date: 1/6/2023, End date: 1/26/2023             DISCONTINUED MEDICATIONS:  Current Discharge Medication List        I am the Primary Clinician of Record.    Areli Cobb MD (electronically signed)    (Please note that parts of this dictation were completed with voice recognition software. Quite often unanticipated grammatical, syntax, homophones, and other interpretive errors are inadvertently transcribed by the computer software. Please disregards these errors.  Please excuse any errors that have escaped final proofreading.)

## 2023-01-06 NOTE — ED NOTES
Patient moaning and guarding URQ at this time. Patient also requesting food. Advised of NPO status at this time due to symptoms. Patient still wants food and drink. MD notified.

## 2023-01-06 NOTE — ED TRIAGE NOTES
Per pt  Midsternal chest pain for greater than a week, worse at night and with movement, no n/v/d or SOB, skin warm and dry,seen by cardiology

## 2023-01-08 LAB
ATRIAL RATE: 68 BPM
CALCULATED P AXIS, ECG09: 15 DEGREES
CALCULATED R AXIS, ECG10: -11 DEGREES
CALCULATED T AXIS, ECG11: -108 DEGREES
DIAGNOSIS, 93000: NORMAL
P-R INTERVAL, ECG05: 154 MS
Q-T INTERVAL, ECG07: 458 MS
QRS DURATION, ECG06: 98 MS
QTC CALCULATION (BEZET), ECG08: 487 MS
VENTRICULAR RATE, ECG03: 68 BPM

## 2023-01-08 NOTE — PROGRESS NOTES
Ms. Melisa Atkinson is a 74 y.o. female who is here for evaluation of   Chief Complaint   Patient presents with    Indigestion     C/o chest pain and hiccups. States the more she hiccups the more she has localized chest pain. Reports unable to eat or sleep R/T pain. Diabetes     States AM sugar was 231 this morning. Hypertension     States feels lightheaded today    Other     C/O hot flashes and night sweats that cause her to change her sheets some nights. .       ASSESSMENT AND PLAN:    1. Epigastric pain  Reports improvement since starting pantoprazole and Carafate.  - pantoprazole (PROTONIX) 40 mg tablet; Take 1 Tablet by mouth daily. Dispense: 90 Tablet; Refill: 4  - sucralfate (CARAFATE) 100 mg/mL suspension; Take 10 mL by mouth four (4) times daily for 14 days. Dispense: 414 mL; Refill: 3    2. Essential hypertension  Slightly above goal.  Echocardiogram results reviewed with patient. EF between 35 and 40%. Moderate mitral regurgitation. Follow-up 3 months. She has follow up with NP Camilla Sandoval. Scheduled for nuclear stress in March    3. Diabetes-will be needing labs in 3 months. Orders Placed This Encounter    baclofen 5 mg tab     Sig: TAKE 1 TABLET BY MOUTH THREE TIMES A DAY FOR 3 DAYS    DISCONTD: omeprazole (PRILOSEC) 20 mg capsule     Sig: TAKE 1 CAPSULE BY MOUTH EVERY MORNING    pantoprazole (PROTONIX) 40 mg tablet     Sig: Take 1 Tablet by mouth daily. Dispense:  90 Tablet     Refill:  4    sucralfate (CARAFATE) 100 mg/mL suspension     Sig: Take 10 mL by mouth four (4) times daily for 14 days. Dispense:  414 mL     Refill:  3           HPI  62-year-old woman with a history of vascular disease was seen twice in the emergency room on January 6-initially at [facility] and after her discharge there she drove to [place] where she was seen again at [facility] and diagnosed with acute gastritis.   She was discharged on pantoprazole and Carafate and is due for upper endoscopy in early March. ROS:  Denies  fever, chills, cough, chest pain, SOB,  nausea, vomiting, or diarrhea. Denies wt loss, wt gain, hemoptysis, hematochezia or melena. Physical Examination:    Visit Vitals  BP (!) 158/86 (BP 1 Location: Left upper arm, BP Patient Position: Sitting, BP Cuff Size: Small adult)   Pulse 93   Resp 18   Ht 5' 5\" (1.651 m)   Wt 173 lb (78.5 kg)   SpO2 98%   BMI 28.79 kg/m²      General:  Alert, cooperative, no distress. Head:  Normocephalic, without obvious abnormality, atraumatic. Eyes:  Conjunctivae/corneas clear. Pupils equal, round, reactive to light. Extraocular movements intact. Lungs:   Clear to auscultation bilaterally. Chest wall:  No tenderness or deformity. Cardiac:  RRR   Abdomen:   Soft, non-tender. Bowel sounds normal. No masses. No organomegaly. Extremities: Extremities normal, atraumatic, no cyanosis or edema. Pulses: 2+ and symmetric all extremities. Skin: Skin color, texture, turgor normal. No rashes or lesions. Lymph nodes: Cervical, supraclavicular, and axillary nodes normal.   Neurologic: CNII-XII intact. Normal strength, sensation, and reflexes throughout. On this date 01/13/2023 I have spent 30 minutes reviewing previous notes, test results and face to face with the patient discussing the diagnosis and importance of compliance with the treatment plan as well as documenting on the day of the visit.     Bandar Lundberg MD FACP    (signed electronically) on 1/13/2023 at 8:43 AM

## 2023-01-10 LAB
ATRIAL RATE: 83 BPM
CALCULATED P AXIS, ECG09: 52 DEGREES
CALCULATED R AXIS, ECG10: 2 DEGREES
CALCULATED T AXIS, ECG11: -114 DEGREES
DIAGNOSIS, 93000: NORMAL
P-R INTERVAL, ECG05: 142 MS
Q-T INTERVAL, ECG07: 398 MS
QRS DURATION, ECG06: 96 MS
QTC CALCULATION (BEZET), ECG08: 467 MS
VENTRICULAR RATE, ECG03: 83 BPM

## 2023-01-11 ENCOUNTER — HOSPITAL ENCOUNTER (OUTPATIENT)
Dept: ULTRASOUND IMAGING | Age: 68
Discharge: HOME | End: 2023-01-11
Attending: NURSE PRACTITIONER
Payer: MEDICARE

## 2023-01-11 DIAGNOSIS — E11.8 CONTROLLED TYPE 2 DIABETES MELLITUS WITH COMPLICATION, WITH LONG-TERM CURRENT USE OF INSULIN (HCC): ICD-10-CM

## 2023-01-11 DIAGNOSIS — Z82.49 FAMILY HISTORY OF ISCHEMIC HEART DISEASE: ICD-10-CM

## 2023-01-11 DIAGNOSIS — I10 BENIGN ESSENTIAL HYPERTENSION: ICD-10-CM

## 2023-01-11 DIAGNOSIS — Z87.891 FORMER SMOKER: ICD-10-CM

## 2023-01-11 DIAGNOSIS — R94.31 ABNORMAL ECG: ICD-10-CM

## 2023-01-11 DIAGNOSIS — Z79.4 CONTROLLED TYPE 2 DIABETES MELLITUS WITH COMPLICATION, WITH LONG-TERM CURRENT USE OF INSULIN (HCC): ICD-10-CM

## 2023-01-11 DIAGNOSIS — E78.2 MIXED HYPERLIPIDEMIA: ICD-10-CM

## 2023-01-11 DIAGNOSIS — I71.9 PENETRATING ULCER OF AORTA (HCC): ICD-10-CM

## 2023-01-11 DIAGNOSIS — I73.9 PAD (PERIPHERAL ARTERY DISEASE) (HCC): ICD-10-CM

## 2023-01-11 DIAGNOSIS — R07.2 PRECORDIAL PAIN: ICD-10-CM

## 2023-01-11 PROCEDURE — 93306 TTE W/DOPPLER COMPLETE: CPT

## 2023-01-12 LAB
ECHO AO ROOT DIAM: 2.6 CM
ECHO AV PEAK GRADIENT: 5 MMHG
ECHO AV PEAK VELOCITY: 1.2 M/S
ECHO EST RA PRESSURE: 10 MMHG
ECHO LA DIAMETER: 4.1 CM
ECHO LA TO AORTIC ROOT RATIO: 1.58
ECHO LA VOL 2C: 34 ML (ref 22–52)
ECHO LA VOL 4C: 77 ML (ref 22–52)
ECHO LA VOLUME AREA LENGTH: 59 ML
ECHO LV E' SEPTAL VELOCITY: 4 CM/S
ECHO LV EDV A2C: 96 ML
ECHO LV EDV A4C: 91 ML
ECHO LV EDV BP: 94 ML (ref 56–104)
ECHO LV EJECTION FRACTION A2C: 31 %
ECHO LV EJECTION FRACTION A4C: 42 %
ECHO LV EJECTION FRACTION BIPLANE: 37 % (ref 55–100)
ECHO LV ESV A2C: 66 ML
ECHO LV ESV A4C: 52 ML
ECHO LV ESV BP: 59 ML (ref 19–49)
ECHO LV FRACTIONAL SHORTENING: 23 % (ref 28–44)
ECHO LV INTERNAL DIMENSION DIASTOLIC: 5.7 CM (ref 3.9–5.3)
ECHO LV INTERNAL DIMENSION SYSTOLIC: 4.4 CM
ECHO LV IVSD: 1 CM (ref 0.6–0.9)
ECHO LV MASS 2D: 226.4 G (ref 67–162)
ECHO LV POSTERIOR WALL DIASTOLIC: 1 CM (ref 0.6–0.9)
ECHO LV RELATIVE WALL THICKNESS RATIO: 0.35
ECHO LVOT AREA: 3.1 CM2
ECHO LVOT DIAM: 2 CM
ECHO MV A VELOCITY: 0.89 M/S
ECHO MV E DECELERATION TIME (DT): 229.7 MS
ECHO MV E VELOCITY: 0.6 M/S
ECHO MV E/A RATIO: 0.67
ECHO MV E/E' SEPTAL: 15
ECHO RIGHT VENTRICULAR SYSTOLIC PRESSURE (RVSP): 29 MMHG
ECHO TV REGURGITANT MAX VELOCITY: 2.16 M/S
ECHO TV REGURGITANT PEAK GRADIENT: 19 MMHG

## 2023-01-12 PROCEDURE — 93306 TTE W/DOPPLER COMPLETE: CPT | Performed by: INTERNAL MEDICINE

## 2023-01-13 ENCOUNTER — DOCUMENTATION ONLY (OUTPATIENT)
Dept: CARDIOLOGY CLINIC | Age: 68
End: 2023-01-13

## 2023-01-13 ENCOUNTER — OFFICE VISIT (OUTPATIENT)
Dept: FAMILY MEDICINE CLINIC | Age: 68
End: 2023-01-13
Payer: MEDICARE

## 2023-01-13 VITALS
HEIGHT: 65 IN | DIASTOLIC BLOOD PRESSURE: 86 MMHG | RESPIRATION RATE: 18 BRPM | HEART RATE: 93 BPM | BODY MASS INDEX: 28.82 KG/M2 | SYSTOLIC BLOOD PRESSURE: 158 MMHG | WEIGHT: 173 LBS | OXYGEN SATURATION: 98 %

## 2023-01-13 DIAGNOSIS — I10 ESSENTIAL HYPERTENSION: ICD-10-CM

## 2023-01-13 DIAGNOSIS — R10.13 EPIGASTRIC PAIN: Primary | ICD-10-CM

## 2023-01-13 PROCEDURE — 3077F SYST BP >= 140 MM HG: CPT | Performed by: INTERNAL MEDICINE

## 2023-01-13 PROCEDURE — 1101F PT FALLS ASSESS-DOCD LE1/YR: CPT | Performed by: INTERNAL MEDICINE

## 2023-01-13 PROCEDURE — G9899 SCRN MAM PERF RSLTS DOC: HCPCS | Performed by: INTERNAL MEDICINE

## 2023-01-13 PROCEDURE — 3079F DIAST BP 80-89 MM HG: CPT | Performed by: INTERNAL MEDICINE

## 2023-01-13 PROCEDURE — 99214 OFFICE O/P EST MOD 30 MIN: CPT | Performed by: INTERNAL MEDICINE

## 2023-01-13 PROCEDURE — G8536 NO DOC ELDER MAL SCRN: HCPCS | Performed by: INTERNAL MEDICINE

## 2023-01-13 PROCEDURE — 1123F ACP DISCUSS/DSCN MKR DOCD: CPT | Performed by: INTERNAL MEDICINE

## 2023-01-13 PROCEDURE — G8510 SCR DEP NEG, NO PLAN REQD: HCPCS | Performed by: INTERNAL MEDICINE

## 2023-01-13 PROCEDURE — 1090F PRES/ABSN URINE INCON ASSESS: CPT | Performed by: INTERNAL MEDICINE

## 2023-01-13 PROCEDURE — G8427 DOCREV CUR MEDS BY ELIG CLIN: HCPCS | Performed by: INTERNAL MEDICINE

## 2023-01-13 PROCEDURE — 3017F COLORECTAL CA SCREEN DOC REV: CPT | Performed by: INTERNAL MEDICINE

## 2023-01-13 PROCEDURE — G8400 PT W/DXA NO RESULTS DOC: HCPCS | Performed by: INTERNAL MEDICINE

## 2023-01-13 PROCEDURE — G8417 CALC BMI ABV UP PARAM F/U: HCPCS | Performed by: INTERNAL MEDICINE

## 2023-01-13 RX ORDER — ISOSORBIDE MONONITRATE 30 MG/1
30 TABLET, EXTENDED RELEASE ORAL
Qty: 30 TABLET | Refills: 3 | Status: SHIPPED | OUTPATIENT
Start: 2023-01-13

## 2023-01-13 RX ORDER — OMEPRAZOLE 20 MG/1
CAPSULE, DELAYED RELEASE ORAL
COMMUNITY
Start: 2023-01-03 | End: 2023-01-13 | Stop reason: ALTCHOICE

## 2023-01-13 RX ORDER — SUCRALFATE 1 G/10ML
1 SUSPENSION ORAL 4 TIMES DAILY
Qty: 414 ML | Refills: 3 | Status: SHIPPED | OUTPATIENT
Start: 2023-01-13 | End: 2023-01-27

## 2023-01-13 RX ORDER — BACLOFEN 5 MG/1
TABLET ORAL
COMMUNITY
Start: 2023-01-03

## 2023-01-13 RX ORDER — NITROGLYCERIN 0.4 MG/1
0.4 TABLET SUBLINGUAL
Qty: 20 TABLET | Refills: 1 | Status: SHIPPED | OUTPATIENT
Start: 2023-01-13

## 2023-01-13 RX ORDER — PANTOPRAZOLE SODIUM 40 MG/1
40 TABLET, DELAYED RELEASE ORAL DAILY
Qty: 90 TABLET | Refills: 4 | Status: SHIPPED | OUTPATIENT
Start: 2023-01-13

## 2023-01-13 NOTE — PROGRESS NOTES
Lexy Pastrana is a 74 y.o. female presenting for/with:    Chief Complaint   Patient presents with    Indigestion     C/o chest pain and hiccups. States the more she hiccups the more she has localized chest pain. Reports unable to eat or sleep R/T pain. Diabetes     States AM sugar was 231 this morning. Hypertension     States feels lightheaded today    Other     C/O hot flashes and night sweats that cause her to change her sheets some nights. Visit Vitals  BP (!) 158/86 (BP 1 Location: Left upper arm, BP Patient Position: Sitting, BP Cuff Size: Small adult)   Pulse 93   Resp 18   Ht 5' 5\" (1.651 m)   Wt 173 lb (78.5 kg)   SpO2 98%   BMI 28.79 kg/m²     Pain Scale: 7/10  Pain Location: Chest    1. \"Have you been to the ER, urgent care clinic since your last visit? Hospitalized since your last visit? \" Yes [address] [place] [address]    2. \"Have you seen or consulted any other health care providers outside of the [address] [place] [address] since your last visit? \" No     3. For patients aged 39-70: Has the patient had a colonoscopy / FIT/ Cologuard? Yes - no Care Gap present      If the patient is female:    4. For patients aged 41-77: Has the patient had a mammogram within the past 2 years? NA - based on age or sex      11. For patients aged 21-65: Has the patient had a pap smear? Yes - Care Gap present. Most recent result on file      Learning Assessment 1/3/2023   PRIMARY LEARNER Patient   CO-LEARNER CAREGIVER No   PRIMARY LANGUAGE ENGLISH   LEARNER PREFERENCE PRIMARY DEMONSTRATION     -   ANSWERED BY pt   RELATIONSHIP SELF     Fall Risk Assessment, last 12 mths 1/13/2023   Able to walk? Yes   Fall in past 12 months? 0   Do you feel unsteady? 0   Are you worried about falling 0   Number of falls in past 12 months -   Fall with injury?  -       3 most recent PHQ Screens 1/13/2023   Little interest or pleasure in doing things Not at all   Feeling down, depressed, irritable, or hopeless Not at all   Total Score PHQ 2 0 Trouble falling or staying asleep, or sleeping too much -   Feeling tired or having little energy -   Poor appetite, weight loss, or overeating -   Feeling bad about yourself - or that you are a failure or have let yourself or your family down -   Trouble concentrating on things such as school, work, reading, or watching TV -   Moving or speaking so slowly that other people could have noticed; or the opposite being so fidgety that others notice -   How difficult have these problems made it for you to do your work, take care of your home and get along with others -     Abuse Screening Questionnaire 1/13/2023   Do you ever feel afraid of your partner? N   Are you in a relationship with someone who physically or mentally threatens you? N   Is it safe for you to go home?  Y       ADL Assessment 1/13/2023   Feeding yourself No Help Needed   Getting from bed to chair No Help Needed   Getting dressed No Help Needed   Bathing or showering No Help Needed   Walk across the room (includes cane/walker) No Help Needed   Using the telphone No Help Needed   Taking your medications No Help Needed   Preparing meals No Help Needed   Managing money (expenses/bills) No Help Needed   Moderately strenuous housework (laundry) No Help Needed   Shopping for personal items (toiletries/medicines) No Help Needed   Shopping for groceries No Help Needed   Driving No Help Needed   Climbing a flight of stairs No Help Needed   Getting to places beyond walking distances No Help Needed      Advance Care Planning 10/17/2022   Patient's Healthcare Decision Maker is: Legal Next of Raulito 71   Primary Decision Maker Name -   Primary Decision Maker Relationship to Patient -   Confirm Advance Directive None   Patient Would Like to Complete Advance Directive -   Does the patient have other document types -

## 2023-01-13 NOTE — Clinical Note
[address] [facility] you and your family are great. Just a heads up-Nichole was in the ER at [facility] and [facility] recently for CP. Dx there was gastritis. Noticed that her EF is a little lower.   She is scheduled for Nuclear stress Thanks  Daysi Vaughan

## 2023-01-13 NOTE — PROGRESS NOTES
Echo result discussed with patient. In light of new onset cardiomyopathy + clinical symptoms of precordial pain, sob;  Multiple risk factors for CAD progression, discussed risk and benefits of coronary angiography PTCA stenting right radial approach left heart cath and is willing to proceed. I will schedule with Dr Tammi Robledo outpatient---January 20, 2023 at 1045 am slot. She is to continue current antianginal therapy--Labetalol and Amlodipine  Start Imdur 30 mg daily as third anti anginal.    Already on ASA, Plavix and statin; continue      Had recent labwork in the ED Jan 6, 2023. No need to repeat. She has been advised not to engage in excessive physical activities and to quit smoking (has not smoked for 2 days due to chest discomfort)      Will give sublingual nitroglycerin as needed. The patient knows to go to the ED if any progression of symptoms or symptoms not relieved immediately by rest/ NTG.         Catheterization CPT codes 03714 for LHC and 39141 possible single vessel PCI  Indication :  Angina class III, myocardial ischemia. ; New onset cardiomyopathy

## 2023-01-16 ENCOUNTER — TELEPHONE (OUTPATIENT)
Dept: CARDIOLOGY CLINIC | Age: 68
End: 2023-01-16

## 2023-01-16 NOTE — TELEPHONE ENCOUNTER
Information given to , Sara Chaudhry (all information has been discussed/approved with MD and NP):      CPT codes: 98704, 73628 (LHC, Possible stent) right radial approach  ICD-10 codes: I20.9, I25.9, I42. 9, R07. 9, R06. 02  DOCTOR: Magui   Urgency (ASAP or routine): THIS FRIDAY  Length of procedure (slot): per MD   Time frame: per MD   Special instructions (medications, overnight stay): TAKE HALF of nightly Lantus day before cath. Hold Vitamins, Supplements day of cath. Hold diuretic morning of cath (HCTZ)    Awaiting reply from Sara Chaudhry (regarding procedure date/arrival time for [facility]. Instructions given to the patient. Patient verbalized understanding. Patient verbalized understanding.

## 2023-01-16 NOTE — TELEPHONE ENCOUNTER
From [facility] NP/Dr. Alva Gutierrez:  \"Echo result discussed with patient. In light of new onset cardiomyopathy + clinical symptoms of precordial pain, sob;   Multiple risk factors for CAD progression, discussed risk and benefits of coronary angiography PTCA stenting right radial approach left heart cath and is willing to proceed. I will schedule with Dr Alva Gutierrez outpatient---January 20, 2023 at 1045 am slot. She is to continue current antianginal therapy--Labetalol and Amlodipine   Start Imdur 30 mg daily as third anti anginal.     Already on ASA, Plavix and statin; continue       Had recent labwork in the ED Jan 6, 2023. No need to repeat. She has been advised not to engage in excessive physical activities and to quit smoking (has not smoked for 2 days due to chest discomfort)       Will give sublingual nitroglycerin as needed. The patient knows to go to the ED if any progression of symptoms or symptoms not relieved immediately by rest/ NTG. Catheterization CPT codes 16454 for LHC and 09828 possible single vessel PCI   Indication :  Angina class III, myocardial ischemia. ; New onset cardiomyopathy. \"

## 2023-01-17 ENCOUNTER — TELEPHONE (OUTPATIENT)
Dept: CARDIOLOGY CLINIC | Age: 68
End: 2023-01-17

## 2023-01-17 DIAGNOSIS — Z82.49 FAMILY HISTORY OF ISCHEMIC HEART DISEASE: ICD-10-CM

## 2023-01-17 DIAGNOSIS — I10 BENIGN ESSENTIAL HYPERTENSION: Primary | ICD-10-CM

## 2023-01-17 PROBLEM — I20.9 CARDIAC ANGINA (HCC): Status: ACTIVE | Noted: 2023-01-17

## 2023-01-17 PROBLEM — R06.02 SHORTNESS OF BREATH: Status: ACTIVE | Noted: 2023-01-17

## 2023-01-17 PROBLEM — R07.9 CHEST PAIN, UNSPECIFIED: Status: ACTIVE | Noted: 2023-01-17

## 2023-01-17 PROBLEM — I42.9 PRIMARY CARDIOMYOPATHY (HCC): Status: ACTIVE | Noted: 2023-01-17

## 2023-01-17 PROBLEM — I25.9 CHRONIC ISCHEMIC HEART DISEASE: Status: ACTIVE | Noted: 2023-01-17

## 2023-01-17 NOTE — TELEPHONE ENCOUNTER
Verified patient using two identifiers. Called to schedule left heart catheterization with Dr. Lottie Braswell. Provided pre-prcoedure instructions. Patient verbalized understanding and encouraged to call the office if she has any further questions.     Patient Instructions     Catheterization   Pre-procedure instructions  3-4 days prior to procedure  Lab work: Date: completed per MD  Which will be in [facility] sites records, or a printed copy to go to [address] [place] [address])  Night of the procedure nothing to eat or drink after midnight, you may take approved medications the morning of the procedure with a few sips of water  Stop blood thinners 2 days prior to procedure EXCEPT: Brilinta, Plavix or Aspirin  Medications restrictions: Hold 1/2 lantus PM prior to cath, no morning supplements or vitamins, hold AM HCTZ     Procedure day  Have a  that will bring you and take you home(6-8 hrs)  Have a responsible adult that can stay with you for 24hrs  Bring ID and insurance card  Wear comfortable clothing  Leave valuables at home, bring: dentures, hearing aids, or glasses  Bring overnight bag (just in case of an overnight stay)  Where to report  [facility]: go through main entrance and to the left is outpatient registration  [facility]: Go through the main entrance go to the second floor and go to outpatient registration  [facility] patients report to first floor outpatient registration         Date of procedure: 1/20/2023  Arrival Time: 09:30am        Post procedure instructions  No driving for 24 hrs  No heavy lifting (over 10lbs) or strenuous activity for 48hrs  No baths, swimming, hot tubs, or spas for a week  The band aid over the cath site may be removed the day after procedure and washed gently with soap and water  The site may be bruised or discolored for a couple of weeks, a small knot may also be present  You may have tenderness/soreness in groin or wrist area, you may take Tylenol for relief  When to call the office  You notice any tingling, numbness, coldness, or loss of feeling, or you have a fever for 2-3 days after the procedure, if there is visible blood at the site, hold pressure for 20 minutes and then call      [address]                                                           [facility], [address]                                                        Cath lab: [facility] office: [phone]----MARSHAL

## 2023-01-19 RX ORDER — SODIUM CHLORIDE 9 MG/ML
100 INJECTION, SOLUTION INTRAVENOUS CONTINUOUS
Status: CANCELLED | OUTPATIENT
Start: 2023-01-20 | End: 2023-01-20

## 2023-01-19 RX ORDER — SODIUM CHLORIDE 0.9 % (FLUSH) 0.9 %
5-40 SYRINGE (ML) INJECTION EVERY 8 HOURS
Status: CANCELLED | OUTPATIENT
Start: 2023-01-20

## 2023-01-19 RX ORDER — SODIUM CHLORIDE 0.9 % (FLUSH) 0.9 %
5-40 SYRINGE (ML) INJECTION AS NEEDED
Status: CANCELLED | OUTPATIENT
Start: 2023-01-20

## 2023-01-19 NOTE — PROGRESS NOTES
Orders placed for cardiac catheterization that is scheduled for 1/20/23. Verbal orders per provider. Orders repeated and verified twice.

## 2023-01-20 ENCOUNTER — TRANSCRIBE ORDER (OUTPATIENT)
Dept: CARDIAC REHAB | Age: 68
End: 2023-01-20

## 2023-01-20 ENCOUNTER — HOSPITAL ENCOUNTER (INPATIENT)
Age: 68
LOS: 1 days | Discharge: HOME | DRG: 246 | End: 2023-01-21
Attending: INTERNAL MEDICINE | Admitting: INTERNAL MEDICINE
Payer: MEDICARE

## 2023-01-20 DIAGNOSIS — I25.10 CORONARY ARTERY DISEASE DUE TO LIPID RICH PLAQUE: Primary | ICD-10-CM

## 2023-01-20 DIAGNOSIS — R06.02 SHORTNESS OF BREATH: ICD-10-CM

## 2023-01-20 DIAGNOSIS — I20.9 CARDIAC ANGINA (HCC): ICD-10-CM

## 2023-01-20 DIAGNOSIS — R07.9 CHEST PAIN, UNSPECIFIED: ICD-10-CM

## 2023-01-20 DIAGNOSIS — I20.9 ANGINA, CLASS III (HCC): ICD-10-CM

## 2023-01-20 DIAGNOSIS — I25.83 CORONARY ARTERY DISEASE DUE TO LIPID RICH PLAQUE: Primary | ICD-10-CM

## 2023-01-20 DIAGNOSIS — I42.9 PRIMARY CARDIOMYOPATHY (HCC): ICD-10-CM

## 2023-01-20 DIAGNOSIS — Z95.5 STENTED CORONARY ARTERY: Primary | ICD-10-CM

## 2023-01-20 DIAGNOSIS — I25.9 CHRONIC ISCHEMIC HEART DISEASE: ICD-10-CM

## 2023-01-20 LAB
ACT BLD: 257 SECS (ref 79–138)
ACT BLD: 257 SECS (ref 79–138)
ACT BLD: 299 SECS (ref 79–138)
ACT BLD: 329 SECS (ref 79–138)
ATRIAL RATE: 75 BPM
CALCULATED P AXIS, ECG09: 50 DEGREES
CALCULATED R AXIS, ECG10: -13 DEGREES
CALCULATED T AXIS, ECG11: -135 DEGREES
DIAGNOSIS, 93000: NORMAL
GLUCOSE BLD STRIP.AUTO-MCNC: 112 MG/DL (ref 65–117)
GLUCOSE BLD STRIP.AUTO-MCNC: 143 MG/DL (ref 65–117)
GLUCOSE BLD STRIP.AUTO-MCNC: 269 MG/DL (ref 65–117)
P-R INTERVAL, ECG05: 140 MS
Q-T INTERVAL, ECG07: 420 MS
QRS DURATION, ECG06: 100 MS
QTC CALCULATION (BEZET), ECG08: 469 MS
SERVICE CMNT-IMP: ABNORMAL
SERVICE CMNT-IMP: ABNORMAL
SERVICE CMNT-IMP: NORMAL
VENTRICULAR RATE, ECG03: 75 BPM

## 2023-01-20 PROCEDURE — 74011636637 HC RX REV CODE- 636/637: Performed by: NURSE PRACTITIONER

## 2023-01-20 PROCEDURE — 74011250637 HC RX REV CODE- 250/637: Performed by: INTERNAL MEDICINE

## 2023-01-20 PROCEDURE — C1751 CATH, INF, PER/CENT/MIDLINE: HCPCS | Performed by: INTERNAL MEDICINE

## 2023-01-20 PROCEDURE — 74011250636 HC RX REV CODE- 250/636: Performed by: INTERNAL MEDICINE

## 2023-01-20 PROCEDURE — 74011250637 HC RX REV CODE- 250/637: Performed by: NURSE PRACTITIONER

## 2023-01-20 PROCEDURE — 85347 COAGULATION TIME ACTIVATED: CPT

## 2023-01-20 PROCEDURE — 77030004532 HC CATH ANGI DX IMP BSC -A: Performed by: INTERNAL MEDICINE

## 2023-01-20 PROCEDURE — 93005 ELECTROCARDIOGRAM TRACING: CPT

## 2023-01-20 PROCEDURE — C1725 CATH, TRANSLUMIN NON-LASER: HCPCS | Performed by: INTERNAL MEDICINE

## 2023-01-20 PROCEDURE — 92928 PRQ TCAT PLMT NTRAC ST 1 LES: CPT | Performed by: INTERNAL MEDICINE

## 2023-01-20 PROCEDURE — 93460 R&L HRT ART/VENTRICLE ANGIO: CPT | Performed by: INTERNAL MEDICINE

## 2023-01-20 PROCEDURE — G0378 HOSPITAL OBSERVATION PER HR: HCPCS

## 2023-01-20 PROCEDURE — 2709999900 HC NON-CHARGEABLE SUPPLY: Performed by: INTERNAL MEDICINE

## 2023-01-20 PROCEDURE — C1887 CATHETER, GUIDING: HCPCS | Performed by: INTERNAL MEDICINE

## 2023-01-20 PROCEDURE — 99152 MOD SED SAME PHYS/QHP 5/>YRS: CPT | Performed by: INTERNAL MEDICINE

## 2023-01-20 PROCEDURE — C1769 GUIDE WIRE: HCPCS | Performed by: INTERNAL MEDICINE

## 2023-01-20 PROCEDURE — C1874 STENT, COATED/COV W/DEL SYS: HCPCS | Performed by: INTERNAL MEDICINE

## 2023-01-20 PROCEDURE — 02C03ZZ EXTIRPATION OF MATTER FROM CORONARY ARTERY, ONE ARTERY, PERCUTANEOUS APPROACH: ICD-10-PCS | Performed by: INTERNAL MEDICINE

## 2023-01-20 PROCEDURE — 92973 PRQ TRLUML C MCHN ASP THRMBC: CPT | Performed by: INTERNAL MEDICINE

## 2023-01-20 PROCEDURE — 77030018729 HC ELECTRD DEFIB PAD CARD -B: Performed by: INTERNAL MEDICINE

## 2023-01-20 PROCEDURE — 77030019569 HC BND COMPR RAD TERU -B: Performed by: INTERNAL MEDICINE

## 2023-01-20 PROCEDURE — 77030013744: Performed by: INTERNAL MEDICINE

## 2023-01-20 PROCEDURE — 76937 US GUIDE VASCULAR ACCESS: CPT | Performed by: INTERNAL MEDICINE

## 2023-01-20 PROCEDURE — 4A023N8 MEASUREMENT OF CARDIAC SAMPLING AND PRESSURE, BILATERAL, PERCUTANEOUS APPROACH: ICD-10-PCS | Performed by: INTERNAL MEDICINE

## 2023-01-20 PROCEDURE — 82962 GLUCOSE BLOOD TEST: CPT

## 2023-01-20 PROCEDURE — 74011000250 HC RX REV CODE- 250: Performed by: INTERNAL MEDICINE

## 2023-01-20 PROCEDURE — 77030040934 HC CATH DIAG DXTERITY MEDT -A: Performed by: INTERNAL MEDICINE

## 2023-01-20 PROCEDURE — 77030013715 HC INFL SYS MRTM -B: Performed by: INTERNAL MEDICINE

## 2023-01-20 PROCEDURE — C1894 INTRO/SHEATH, NON-LASER: HCPCS | Performed by: INTERNAL MEDICINE

## 2023-01-20 PROCEDURE — 77030012468 HC VLV BLEEDBK CNTRL ABBT -B: Performed by: INTERNAL MEDICINE

## 2023-01-20 PROCEDURE — B2111ZZ FLUOROSCOPY OF MULTIPLE CORONARY ARTERIES USING LOW OSMOLAR CONTRAST: ICD-10-PCS | Performed by: INTERNAL MEDICINE

## 2023-01-20 PROCEDURE — C1757 CATH, THROMBECTOMY/EMBOLECT: HCPCS | Performed by: INTERNAL MEDICINE

## 2023-01-20 PROCEDURE — 99153 MOD SED SAME PHYS/QHP EA: CPT | Performed by: INTERNAL MEDICINE

## 2023-01-20 PROCEDURE — 65270000046 HC RM TELEMETRY

## 2023-01-20 PROCEDURE — 027037Z DILATION OF CORONARY ARTERY, ONE ARTERY WITH FOUR OR MORE DRUG-ELUTING INTRALUMINAL DEVICES, PERCUTANEOUS APPROACH: ICD-10-PCS | Performed by: INTERNAL MEDICINE

## 2023-01-20 PROCEDURE — 74011000250 HC RX REV CODE- 250: Performed by: NURSE PRACTITIONER

## 2023-01-20 PROCEDURE — 93458 L HRT ARTERY/VENTRICLE ANGIO: CPT | Performed by: INTERNAL MEDICINE

## 2023-01-20 DEVICE — STENT RONYX25026UX RESOLUTE ONYX 2.50X26
Type: IMPLANTABLE DEVICE | Status: FUNCTIONAL
Brand: RESOLUTE ONYX™

## 2023-01-20 DEVICE — STENT RONYX22515UX RESOLUTE ONYX 2.25X15
Type: IMPLANTABLE DEVICE | Status: FUNCTIONAL
Brand: RESOLUTE ONYX™

## 2023-01-20 DEVICE — STENT RONYX22518UX RESOLUTE ONYX 2.25X18
Type: IMPLANTABLE DEVICE | Status: FUNCTIONAL
Brand: RESOLUTE ONYX™

## 2023-01-20 DEVICE — STENT RONYX22538UX RESOLUTE ONYX 2.25X38
Type: IMPLANTABLE DEVICE | Status: FUNCTIONAL
Brand: RESOLUTE ONYX™

## 2023-01-20 RX ORDER — ATORVASTATIN CALCIUM 40 MG/1
40 TABLET, FILM COATED ORAL DAILY
Qty: 90 TABLET | Refills: 3 | Status: SHIPPED | OUTPATIENT
Start: 2023-01-20

## 2023-01-20 RX ORDER — ISOSORBIDE MONONITRATE 30 MG/1
30 TABLET, EXTENDED RELEASE ORAL
Status: DISCONTINUED | OUTPATIENT
Start: 2023-01-20 | End: 2023-01-21 | Stop reason: HOSPADM

## 2023-01-20 RX ORDER — HYDROCHLOROTHIAZIDE 25 MG/1
25 TABLET ORAL DAILY
Status: DISCONTINUED | OUTPATIENT
Start: 2023-01-21 | End: 2023-01-21 | Stop reason: HOSPADM

## 2023-01-20 RX ORDER — AMLODIPINE BESYLATE 5 MG/1
10 TABLET ORAL DAILY
Status: DISCONTINUED | OUTPATIENT
Start: 2023-01-20 | End: 2023-01-21 | Stop reason: HOSPADM

## 2023-01-20 RX ORDER — CLOPIDOGREL BISULFATE 75 MG/1
75 TABLET ORAL DAILY
Status: DISCONTINUED | OUTPATIENT
Start: 2023-01-21 | End: 2023-01-21 | Stop reason: HOSPADM

## 2023-01-20 RX ORDER — INSULIN LISPRO 100 [IU]/ML
INJECTION, SOLUTION INTRAVENOUS; SUBCUTANEOUS
Status: DISCONTINUED | OUTPATIENT
Start: 2023-01-20 | End: 2023-01-21 | Stop reason: HOSPADM

## 2023-01-20 RX ORDER — SODIUM CHLORIDE 0.9 % (FLUSH) 0.9 %
5-40 SYRINGE (ML) INJECTION EVERY 8 HOURS
Status: DISCONTINUED | OUTPATIENT
Start: 2023-01-20 | End: 2023-01-21 | Stop reason: HOSPADM

## 2023-01-20 RX ORDER — TIMOLOL MALEATE 5 MG/ML
1 SOLUTION/ DROPS OPHTHALMIC 2 TIMES DAILY
Status: DISCONTINUED | OUTPATIENT
Start: 2023-01-20 | End: 2023-01-21 | Stop reason: HOSPADM

## 2023-01-20 RX ORDER — SODIUM CHLORIDE 9 MG/ML
100 INJECTION, SOLUTION INTRAVENOUS CONTINUOUS
Status: DISPENSED | OUTPATIENT
Start: 2023-01-20 | End: 2023-01-21

## 2023-01-20 RX ORDER — HEPARIN SODIUM 1000 [USP'U]/ML
INJECTION, SOLUTION INTRAVENOUS; SUBCUTANEOUS AS NEEDED
Status: DISCONTINUED | OUTPATIENT
Start: 2023-01-20 | End: 2023-01-20 | Stop reason: HOSPADM

## 2023-01-20 RX ORDER — IBUPROFEN 200 MG
4 TABLET ORAL AS NEEDED
Status: DISCONTINUED | OUTPATIENT
Start: 2023-01-20 | End: 2023-01-21 | Stop reason: HOSPADM

## 2023-01-20 RX ORDER — HYDRALAZINE HYDROCHLORIDE 20 MG/ML
INJECTION INTRAMUSCULAR; INTRAVENOUS AS NEEDED
Status: DISCONTINUED | OUTPATIENT
Start: 2023-01-20 | End: 2023-01-20 | Stop reason: HOSPADM

## 2023-01-20 RX ORDER — ATORVASTATIN CALCIUM 20 MG/1
20 TABLET, FILM COATED ORAL
Status: DISCONTINUED | OUTPATIENT
Start: 2023-01-20 | End: 2023-01-21 | Stop reason: HOSPADM

## 2023-01-20 RX ORDER — ACETAMINOPHEN 325 MG/1
650 TABLET ORAL
Status: DISCONTINUED | OUTPATIENT
Start: 2023-01-20 | End: 2023-01-21 | Stop reason: HOSPADM

## 2023-01-20 RX ORDER — FENTANYL CITRATE 50 UG/ML
INJECTION, SOLUTION INTRAMUSCULAR; INTRAVENOUS AS NEEDED
Status: DISCONTINUED | OUTPATIENT
Start: 2023-01-20 | End: 2023-01-20 | Stop reason: HOSPADM

## 2023-01-20 RX ORDER — SODIUM CHLORIDE 0.9 % (FLUSH) 0.9 %
5-40 SYRINGE (ML) INJECTION AS NEEDED
Status: DISCONTINUED | OUTPATIENT
Start: 2023-01-20 | End: 2023-01-21 | Stop reason: HOSPADM

## 2023-01-20 RX ORDER — VERAPAMIL HYDROCHLORIDE 2.5 MG/ML
INJECTION, SOLUTION INTRAVENOUS AS NEEDED
Status: DISCONTINUED | OUTPATIENT
Start: 2023-01-20 | End: 2023-01-20 | Stop reason: HOSPADM

## 2023-01-20 RX ORDER — LIDOCAINE HYDROCHLORIDE 10 MG/ML
INJECTION INFILTRATION; PERINEURAL AS NEEDED
Status: DISCONTINUED | OUTPATIENT
Start: 2023-01-20 | End: 2023-01-20 | Stop reason: HOSPADM

## 2023-01-20 RX ORDER — ASPIRIN 81 MG/1
81 TABLET ORAL DAILY
Status: DISCONTINUED | OUTPATIENT
Start: 2023-01-20 | End: 2023-01-21 | Stop reason: HOSPADM

## 2023-01-20 RX ORDER — BACLOFEN 10 MG/1
5 TABLET ORAL
Status: DISCONTINUED | OUTPATIENT
Start: 2023-01-20 | End: 2023-01-21 | Stop reason: HOSPADM

## 2023-01-20 RX ORDER — CLOPIDOGREL 300 MG/1
TABLET, FILM COATED ORAL AS NEEDED
Status: DISCONTINUED | OUTPATIENT
Start: 2023-01-20 | End: 2023-01-20 | Stop reason: HOSPADM

## 2023-01-20 RX ORDER — LABETALOL 100 MG/1
100 TABLET, FILM COATED ORAL 2 TIMES DAILY
Status: DISCONTINUED | OUTPATIENT
Start: 2023-01-20 | End: 2023-01-21 | Stop reason: HOSPADM

## 2023-01-20 RX ORDER — MIDAZOLAM HYDROCHLORIDE 1 MG/ML
INJECTION, SOLUTION INTRAMUSCULAR; INTRAVENOUS AS NEEDED
Status: DISCONTINUED | OUTPATIENT
Start: 2023-01-20 | End: 2023-01-20 | Stop reason: HOSPADM

## 2023-01-20 RX ORDER — DORZOLAMIDE HCL 20 MG/ML
1 SOLUTION/ DROPS OPHTHALMIC 2 TIMES DAILY
Status: DISCONTINUED | OUTPATIENT
Start: 2023-01-20 | End: 2023-01-21 | Stop reason: HOSPADM

## 2023-01-20 RX ORDER — NITROGLYCERIN 0.4 MG/1
0.4 TABLET SUBLINGUAL
Status: DISCONTINUED | OUTPATIENT
Start: 2023-01-20 | End: 2023-01-21 | Stop reason: HOSPADM

## 2023-01-20 RX ORDER — PANTOPRAZOLE SODIUM 40 MG/1
40 TABLET, DELAYED RELEASE ORAL
Status: DISCONTINUED | OUTPATIENT
Start: 2023-01-21 | End: 2023-01-21 | Stop reason: HOSPADM

## 2023-01-20 RX ORDER — INSULIN GLARGINE 100 [IU]/ML
25 INJECTION, SOLUTION SUBCUTANEOUS DAILY
Status: DISCONTINUED | OUTPATIENT
Start: 2023-01-21 | End: 2023-01-21 | Stop reason: HOSPADM

## 2023-01-20 RX ORDER — CETIRIZINE HYDROCHLORIDE 10 MG/1
10 TABLET ORAL DAILY
Status: DISCONTINUED | OUTPATIENT
Start: 2023-01-21 | End: 2023-01-21 | Stop reason: HOSPADM

## 2023-01-20 RX ORDER — MONTELUKAST SODIUM 10 MG/1
10 TABLET ORAL
Status: DISCONTINUED | OUTPATIENT
Start: 2023-01-20 | End: 2023-01-21 | Stop reason: HOSPADM

## 2023-01-20 RX ADMIN — ATORVASTATIN CALCIUM 20 MG: 20 TABLET, FILM COATED ORAL at 21:40

## 2023-01-20 RX ADMIN — Medication 3 UNITS: at 22:00

## 2023-01-20 RX ADMIN — MONTELUKAST 10 MG: 10 TABLET, FILM COATED ORAL at 21:40

## 2023-01-20 RX ADMIN — NITROGLYCERIN 0.4 MG: 0.4 TABLET, ORALLY DISINTEGRATING SUBLINGUAL at 21:40

## 2023-01-20 RX ADMIN — AMLODIPINE BESYLATE 10 MG: 5 TABLET ORAL at 19:02

## 2023-01-20 RX ADMIN — TIMOLOL MALEATE 1 DROP: 5 SOLUTION OPHTHALMIC at 21:47

## 2023-01-20 RX ADMIN — ISOSORBIDE MONONITRATE 30 MG: 30 TABLET, EXTENDED RELEASE ORAL at 19:02

## 2023-01-20 RX ADMIN — DORZOLAMIDE HYDROCHLORIDE 1 DROP: 20 SOLUTION/ DROPS OPHTHALMIC at 21:47

## 2023-01-20 NOTE — DISCHARGE INSTRUCTIONS
Radial Cardiac Catheterization/Angiography Discharge Instructions    It is normal to feel tired the first couple days. Take it easy and follow the physicians instructions. CHECK THE CATHETER INSERTION SITE DAILY:    Remove the wrist dressing 24 hours after the procedure. You may shower 24 hours after the procedure. Wash with soap and water and pat dry. Gentle cleaning of the site with soap and water is sufficient, cover with a dry clean dressing or bandage. Do not apply creams or powders to the area. No soaking the wrist for 3 days. Leave the puncture site open to air after 24 hours post-procedure. CALL THE PHYSICIANS:     If the site becomes red, swollen or feels warm to the touch  If there is bleeding or drainage or if there is unusual pain at the radial site. If there is any minor oozing, you may apply a band-aid and remove after 12 hours. If the bleeding continues, hold pressure with the middle finger against the puncture site and the thumb against the back of the wrist,call 911 to be transported to the hospital.  DO NOT DRIVE YOURSELF, [place] [address]. ACTIVITY:   For the first 24 hours do not manipulate the wrist.  No lifting, pushing or pulling over 3-5 pounds with the affected wrist for 7 daysand no straining the insertion site. Do not life grocery bags or the garbage can, do not run the vacuum  or  for 7 days. Start with short walks as in the hospital and gradually increase as tolerated each day. It is recommended to walk 30 minutes 5-7 days per week. Follow your physicians instructions on activity. Avoid walking outside in extremes of heat or cold. Walk inside when it is cold and windy or hot and humid. Things to keep in mind:  No driving for at least 24 hours, or as designated by your physician. Limit the number of times you go up and down the stairs  Take rests and pace yourself with activity.   Be careful and do not strain with bowel movements. MEDICATIONS:    Take all medications as prescribed  Call your physician if you have any questions  Keep an updated list of your medications with you at all times and give a list to your physician and pharmacist    SIGNS AND SYMPTOMS:   Be cautious of symptoms of angina or recurrent symptoms such as chest discomfort, unusual shortness of breath or fatigue. These could be symptoms of restenosis, a new blockage or a heart attack. If your symptoms are relieved with rest it is still recommended that you notify your physician of recurrent chest pain or discomfort. For CHEST PAIN or symptoms of angina not relieved with rest:  If the discomfort is not relieved with rest, and you have been prescribed Nitroglycerin, take as directed (taken under the tongue, one at a time 5 minutes apart for a total of 3 doses). If the discomfort is not relieved after the 3rd nitroglycerin, call 911. If you have not been prescribed Nitroglycerin  and your chest discomfort is not relieved with rest, call 911. AFTER CARE:   Follow up with your physician as instructed. Follow a heart healthy diet with proper portion control, daily stress management, daily exercise, blood pressure and cholesterol control , and smoking cessation.

## 2023-01-20 NOTE — PROGRESS NOTES
1930  Verbal bedside report given to PATSY Millan RN oncoming nurse by Julian Mann. Suzy Dillard, MINGO off-going nurse. Report included current pt status and condition, recent results, hx of present illness, heart rate and rhythm (NSR), and respiratory status. 1900  Removed 2mL air from TR band, VSS, no bleeding, no hematoma. 1743  Verbal report received by KUNAL Avila from [place], [address] [place] [address]  on pt incoming from Cath lab for progression of care. Report consisted of SBAR, Kardex, ED Summary and Cardiac Rhythm. Patient arrived on [facility] via stretcher. Patient oriented to room and call bell, VSS.

## 2023-01-20 NOTE — PROGRESS NOTES
TRANSFER - OUT REPORT:    Verbal report given to [address] [place] [address] Avenue  on Mabel Cotter  being transferred to [facility]  for routine progression of care       Report consisted of patients Situation, Background, Assessment and   Recommendations(SBAR). Information from the following report(s) Procedure Summary was reviewed with the receiving nurse. Lines:   Peripheral IV 01/20/23 Anterior;Left;Proximal Forearm (Active)   Site Assessment Clean, dry, & intact 01/20/23 1102   Phlebitis Assessment 0 01/20/23 1102   Dressing Status Clean, dry, & intact 01/20/23 1102   Dressing Type Transparent 01/20/23 1102   Hub Color/Line Status Blue 01/20/23 1102   Alcohol Cap Used Yes 01/20/23 1102        Opportunity for questions and clarification was provided.       Patient transported with:   Monitor  Registered Nurse

## 2023-01-20 NOTE — ROUTINE PROCESS
Cardiac Cath Lab Recovery Arrival Note:      Pierre Yuen arrived to Cardiac Cath Lab, Recovery Area. Staff introduced to patient. Patient identifiers verified with NAME and DATE OF BIRTH. Procedure verified with patient. Consent forms reviewed and signed by patient or authorized representative and verified. Allergies verified. Patient and family oriented to department. Patient and family informed of procedure and plan of care. Questions answered with review. Patient prepped for procedure, per orders from physician, prior to arrival.    Patient on cardiac monitor, non-invasive blood pressure, SPO2 monitor. On RA. Patient is A&Ox 4. Patient reports no pain. Patient in stretcher, in low position, with side rails up, call bell within reach, patient instructed to call if assistance as needed. Patient prep in: [address], [place]. Patient family has pager #   Family in: . Prep by: TOYA Billy RN

## 2023-01-20 NOTE — PROGRESS NOTES
Cardiac Cath Lab Procedure Area Arrival Note:    Kwesi Albright arrived to Cardiac Cath Lab, Procedure Area. Patient identifiers verified with NAME and DATE OF BIRTH. Procedure verified with patient. Consent forms verified. Allergies verified. Patient informed of procedure and plan of care. Questions answered with review. Patient voiced understanding of procedure and plan of care. Patient on cardiac monitor, non-invasive blood pressure, SPO2 monitor. On oxygen or O2 @ 2 lpm via nc. IV of ns on pump at 10 ml/hr. Patient status doing well without problems. Patient is A&Ox 4. Patient reports no complaints. Patient medicated during procedure with orders obtained and verified by Dr. Garry Tucker. Refer to patients Cardiac Cath Lab PROCEDURE REPORT for vital signs, assessment, status, and response during procedure, printed at end of case. Printed report on chart or scanned into chart.

## 2023-01-20 NOTE — PROGRESS NOTES
CATH LAB to RECOVERY ROOM REPORT    Procedure: [facility]     MD: Leonel Hancock    The procedure was diagnostic only    Verbal Report given to Recovery Nurse on patient being transferred to Cardiac Cath Lab RR for routine post-op. Patient stable upon transfer to . Vitals, mental status, MAR, procedural summary discussed with recovery RN. Medication given during procedure:    Contrast:***mL                          Heparin:***units     Versed:***mg                                                               Fentanyl:***mcg                                                           Plavix:*** mg         Brilinta:***mg    Effient:***mg     Aspirin:***mg    Angiomax running ***ml/hr. Stop drip ***    Other Meds/Drips given:          Sheaths:    {SMHRight/left:24801} radial sheath pulled at *** {SMHam/pm:35346}, band at ***mL of air      Right {SMHRHCvein:64894} sheath pulled at *** {SMHam/pm:54185}, secured with band-aid.

## 2023-01-20 NOTE — PROGRESS NOTES
TRANSFER - IN REPORT:    Verbal report received from Josse Morelos on Fermin Shelley  being received from PCI for routine progression of care. Report consisted of patients Situation, Background, Assessment and Recommendations(SBAR). Information from the following report(s) Procedure Summary was reviewed with the receiving clinician. Opportunity for questions and clarification was provided. Assessment completed upon patients arrival to [address] [place] [address] and care assumed. Cardiac Cath Lab Recovery Arrival Note:    Fermin Shelley arrived to [facility] recovery area. Patient procedure= PCI. Patient on cardiac monitor, non-invasive blood pressure, SPO2 monitor. On room air. IV  of Normal saline on pump at 50 ml/hr. Patient status doing well without problems. Patient is A&Ox 4. Patient reports no pain. PROCEDURE SITE CHECK:    Procedure site:without any bleeding and no hematoma, no pain/discomfort reported at procedure site. No change in patient status. Continue to monitor patient and status. Right brachial bandaid site dry and intact. Right radial artery site with TR band and armboard in place. Purewick attached to wall suction and urine output noted. 1530 Patient tolerating po fluids without nausea. No complaints at the present time.

## 2023-01-21 VITALS
DIASTOLIC BLOOD PRESSURE: 77 MMHG | WEIGHT: 172 LBS | SYSTOLIC BLOOD PRESSURE: 165 MMHG | OXYGEN SATURATION: 100 % | BODY MASS INDEX: 28.66 KG/M2 | HEIGHT: 65 IN | RESPIRATION RATE: 16 BRPM | HEART RATE: 88 BPM | TEMPERATURE: 98.2 F

## 2023-01-21 LAB
GLUCOSE BLD STRIP.AUTO-MCNC: 195 MG/DL (ref 65–117)
SERVICE CMNT-IMP: ABNORMAL

## 2023-01-21 PROCEDURE — G0378 HOSPITAL OBSERVATION PER HR: HCPCS

## 2023-01-21 PROCEDURE — 82962 GLUCOSE BLOOD TEST: CPT

## 2023-01-21 PROCEDURE — 74011636637 HC RX REV CODE- 636/637: Performed by: NURSE PRACTITIONER

## 2023-01-21 PROCEDURE — 74011250637 HC RX REV CODE- 250/637: Performed by: NURSE PRACTITIONER

## 2023-01-21 RX ADMIN — Medication 2 UNITS: at 07:35

## 2023-01-21 RX ADMIN — ISOSORBIDE MONONITRATE 30 MG: 30 TABLET, EXTENDED RELEASE ORAL at 07:30

## 2023-01-21 RX ADMIN — CETIRIZINE HYDROCHLORIDE 10 MG: 10 TABLET, FILM COATED ORAL at 08:29

## 2023-01-21 RX ADMIN — ASPIRIN 81 MG: 81 TABLET, COATED ORAL at 08:29

## 2023-01-21 RX ADMIN — INSULIN GLARGINE 25 UNITS: 100 INJECTION, SOLUTION SUBCUTANEOUS at 08:29

## 2023-01-21 RX ADMIN — AMLODIPINE BESYLATE 10 MG: 5 TABLET ORAL at 08:29

## 2023-01-21 RX ADMIN — PANTOPRAZOLE SODIUM 40 MG: 40 TABLET, DELAYED RELEASE ORAL at 07:31

## 2023-01-21 RX ADMIN — TIMOLOL MALEATE 1 DROP: 5 SOLUTION OPHTHALMIC at 08:31

## 2023-01-21 RX ADMIN — HYDROCHLOROTHIAZIDE 25 MG: 25 TABLET ORAL at 08:29

## 2023-01-21 RX ADMIN — DORZOLAMIDE HYDROCHLORIDE 1 DROP: 20 SOLUTION/ DROPS OPHTHALMIC at 08:32

## 2023-01-21 RX ADMIN — LABETALOL HYDROCHLORIDE 100 MG: 100 TABLET, FILM COATED ORAL at 07:30

## 2023-01-21 RX ADMIN — CLOPIDOGREL BISULFATE 75 MG: 75 TABLET ORAL at 08:29

## 2023-01-21 NOTE — PROGRESS NOTES
1955: Receive report from [place], [facility] [place]. Patient resting in bed at this time  2135: Patient notified nurse of chest pain at this time and rates 6/10.  2140: Nitro tablet given  22:00 Patient with some improvement of chest pain after nitro tablet  2217: Paged on-call regarding patient's chest pain. 2224: Spoke with Dr. Aurelio Tucker on-call at this time regarding patient's chest pain at this time. If patient has another episode of chest pain can start nitro drip since patient has morphine allergy. [phone]: Patient resting comfortable in pain at this time without complaints of chest pain at this time and will continue to monitor. 4405: Patient reports pain is better and without any more chest pain over night. 0730: Bedside and Verbal shift change report given to [address]  (oncoming nurse) by Shelley Ceballos  (offgoing nurse). Report included the following information SBAR, Kardex, Intake/Output, MAR, Recent Results, Med Rec Status, and Cardiac Rhythm NSR .

## 2023-01-21 NOTE — PROGRESS NOTES
0730: Bedside shift change report given to Jayant Don RN (oncoming nurse) by MINGO Stephen (offgoing nurse). Report included the following information SBAR, MAR, and Recent Results. 1100: I have reviewed discharge instructions with the patient. The patient verbalized understanding. AVS was reviewed with the patient with specific information and education provided regarding new medication Lipitor. Follow-up appointments reviewed as indicated in the AVS. Patient verbalized understanding, and all questions were answered. Discharge Medication List as of 1/21/2023 10:23 AM        START taking these medications    Details   atorvastatin (LIPITOR) 40 mg tablet Take 1 Tablet by mouth daily. Replaces simvastatin for high cholesterol on 1/21/2023, Normal, Disp-90 Tablet, R-3           CONTINUE these medications which have NOT CHANGED    Details   True Metrix Glucose Test Strip strip TEST BLOOD SUGAR EVERY DAY, Normal, Disp-100 Strip, R-5      baclofen 5 mg tab TAKE 1 TABLET BY MOUTH THREE TIMES A DAY FOR 3 DAYS, Historical Med      pantoprazole (PROTONIX) 40 mg tablet Take 1 Tablet by mouth daily. , Normal, Disp-90 Tablet, R-4      sucralfate (CARAFATE) 100 mg/mL suspension Take 10 mL by mouth four (4) times daily for 14 days. , Normal, Disp-414 mL, R-3      isosorbide mononitrate ER (IMDUR) 30 mg tablet Take 1 Tablet by mouth every morning., Normal, Disp-30 Tablet, R-3      nitroglycerin (NITROSTAT) 0.4 mg SL tablet 1 Tablet by SubLINGual route every five (5) minutes as needed for Chest Pain. Up to 3 doses. , Normal, Disp-20 Tablet, R-1      montelukast (SINGULAIR) 10 mg tablet TAKE 1 TABLET BY MOUTH DAILY AT BEDTIME, Normal, Disp-30 Tablet, R-3This prescription was filled on 11/8/2022. Any refills authorized will be placed on file. promethazine (PHENERGAN) 25 mg tablet Historical Med      levocetirizine (XYZAL) 5 mg tablet Take 1 Tablet by mouth daily.  Indications: itching of skin, Normal, Disp-90 Tablet, R-4 diclofenac (VOLTAREN) 1 % gel Apply 4 g to affected area four (4) times daily. , Normal, Disp-100 g, R-11      hydroCHLOROthiazide (HYDRODIURIL) 25 mg tablet TAKE 1 TABLET BY MOUTH ONCE DAILY, Normal, Disp-90 Tablet, R-4      dorzolamide-timoloL (COSOPT) 22.3-6.8 mg/mL ophthalmic solution ADMINISTER 1 DROP INTO BOTH EYES TWICE DAILY, Historical Med      Insulin Needles, Disposable, (BD Ultra-Fine Short Pen Needle) 31 gauge x 5/16\" ndle by Does Not Apply route daily. E11.9. Use daily for insulin injection, Normal, Disp-100 Pen Needle, R-5      calcium carbonate/vitamin D3 (CALCIUM 500 + D, D3, PO) Take  by mouth., Historical Med      amLODIPine (NORVASC) 10 mg tablet Take 1 Tablet by mouth daily. , Normal, Disp-90 Tablet, R-5This prescription was filled on 5/28/2021. Any refills authorized will be placed on file. labetaloL (NORMODYNE) 100 mg tablet Take 1 Tablet by mouth two (2) times a day. Indications: Bood pressure, Normal, Disp-180 Tablet, R-4      insulin glargine (Lantus Solostar U-100 Insulin) 100 unit/mL (3 mL) inpn 25 Units by SubCUTAneous route daily. , Normal, Disp-5 Pen, R-5      lancets misc Use twice a day, Normal, Disp-200 Each, R-11      Blood-Glucose Meter monitoring kit Use daily, Normal, Disp-1 Kit, R-0      fluticasone propion-salmeteroL (Advair Diskus) 250-50 mcg/dose diskus inhaler Take 1 Puff by inhalation every twelve (12) hours. , Normal, Disp-1 Inhaler, R-11      clopidogreL (PLAVIX) 75 mg tab Take 75 mg by mouth daily. , Historical Med      BD Single Use Swabs Regular padm APPLY  EXTERNALLY EVERY DAY, Normal, Disp-100 Pad, R-5      ascorbic acid, vitamin C, (Vitamin C) 500 mg tablet Take 1 Tab by mouth two (2) times a day., Normal, Disp-24 Tab, R-0      albuterol (PROVENTIL HFA, VENTOLIN HFA, PROAIR HFA) 90 mcg/actuation inhaler Take 2 Puffs by inhalation every four (4) hours as needed for Wheezing., Normal, Disp-1 Inhaler, R-0      multivitamin with iron tablet Take 1 Tablet by mouth daily., Historical Med      acetaminophen (TYLENOL) 500 mg tablet Take  by mouth every six (6) hours as needed for Pain., Historical Med      aspirin delayed-release 81 mg tablet Take 81 mg by mouth daily. , Historical Med      Nebulizer & Compressor machine 1 Each by Does Not Apply route three (3) times daily.  Indications: J44.9, Normal, Disp-1 Each, R-0      ipratropium (ATROVENT) 42 mcg (0.06 %) nasal spray 2 Sprays by Nasal route daily as needed., Historical Med           STOP taking these medications       simvastatin (ZOCOR) 40 mg tablet Comments:   Reason for Stopping:

## 2023-01-21 NOTE — PROGRESS NOTES
TRANSITION OF CARE  RUR--11%  Disposition--Home with family assist.  Transport--Family    Patient's primary family contact:cousin Chaparrita Farooq    1st  Medicare IM Letter: 1/21/23  2nd Medicare IM Letter:      Care Management Interventions  PCP Verified by CM: Yes  Transition of Care Consult (CM Consult): Other  Physical Therapy Consult: No  Occupational Therapy Consult: No  Speech Therapy Consult: No  Support Systems: Other Family Member(s)  Confirm Follow Up Transport: Family  The Plan for Transition of Care is Related to the Following Treatment Goals : Home with family assistance  Discharge Location  Patient Expects to be Discharged to[de-identified] Home with family assistance    Reason for Admission:  S/P 1/20/23 cardiac cath                   RUR Score:             11%           Plan for utilizing home health:      N/A    PCP: First and Last name:  Coleen Cagle MD   Name of Practice:    Are you a current patient: Yes    Approximate date of last visit: about one month ago   Can you participate in a virtual visit with your PCP:                     Current Advanced Directive/Advance Care Plan: Full Code    Healthcare Decision Maker:   Click here to complete [address] including selection of the Healthcare Decision Maker Relationship (ie \"Primary\")             Primary Decision Maker: Charlee Gaytan - Other Relative - [phone]    Secondary Decision Maker: Polina James - Other Relative - [phone]                  Transition of Care Plan:      CM met with patient and her fiance with whom she lives. Patient lives in a  one story house. Local family support includes extended family. Patient was ambulatory with a walker prior to admission. Patient confirmed PCP, health insurance, and prescription coverage.    Previous home health : yes (long ago/provider name not remembered)  Previous IPR/ SNF rehab : None  DME :cane walker

## 2023-01-22 LAB
ATRIAL RATE: 81 BPM
CALCULATED P AXIS, ECG09: 45 DEGREES
CALCULATED R AXIS, ECG10: -13 DEGREES
CALCULATED T AXIS, ECG11: -129 DEGREES
DIAGNOSIS, 93000: NORMAL
P-R INTERVAL, ECG05: 140 MS
Q-T INTERVAL, ECG07: 420 MS
QRS DURATION, ECG06: 102 MS
QTC CALCULATION (BEZET), ECG08: 487 MS
VENTRICULAR RATE, ECG03: 81 BPM

## 2023-01-23 LAB
ATRIAL RATE: 75 BPM
CALCULATED P AXIS, ECG09: 50 DEGREES
CALCULATED R AXIS, ECG10: -13 DEGREES
CALCULATED T AXIS, ECG11: -135 DEGREES
DIAGNOSIS, 93000: NORMAL
P-R INTERVAL, ECG05: 140 MS
Q-T INTERVAL, ECG07: 420 MS
QRS DURATION, ECG06: 100 MS
QTC CALCULATION (BEZET), ECG08: 469 MS
VENTRICULAR RATE, ECG03: 75 BPM
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
## 2023-01-27 ENCOUNTER — OFFICE VISIT (OUTPATIENT)
Dept: FAMILY MEDICINE CLINIC | Age: 68
End: 2023-01-27
Payer: MEDICARE

## 2023-01-27 VITALS
OXYGEN SATURATION: 99 % | BODY MASS INDEX: 28.92 KG/M2 | RESPIRATION RATE: 18 BRPM | HEIGHT: 65 IN | HEART RATE: 88 BPM | DIASTOLIC BLOOD PRESSURE: 60 MMHG | WEIGHT: 173.6 LBS | TEMPERATURE: 97.3 F | SYSTOLIC BLOOD PRESSURE: 124 MMHG

## 2023-01-27 DIAGNOSIS — I25.111 CORONARY ARTERY DISEASE INVOLVING NATIVE CORONARY ARTERY OF NATIVE HEART WITH ANGINA PECTORIS WITH DOCUMENTED SPASM (HCC): Primary | ICD-10-CM

## 2023-01-27 PROCEDURE — G8400 PT W/DXA NO RESULTS DOC: HCPCS | Performed by: INTERNAL MEDICINE

## 2023-01-27 PROCEDURE — G8427 DOCREV CUR MEDS BY ELIG CLIN: HCPCS | Performed by: INTERNAL MEDICINE

## 2023-01-27 PROCEDURE — 99214 OFFICE O/P EST MOD 30 MIN: CPT | Performed by: INTERNAL MEDICINE

## 2023-01-27 PROCEDURE — G8510 SCR DEP NEG, NO PLAN REQD: HCPCS | Performed by: INTERNAL MEDICINE

## 2023-01-27 PROCEDURE — 3017F COLORECTAL CA SCREEN DOC REV: CPT | Performed by: INTERNAL MEDICINE

## 2023-01-27 PROCEDURE — 1101F PT FALLS ASSESS-DOCD LE1/YR: CPT | Performed by: INTERNAL MEDICINE

## 2023-01-27 PROCEDURE — 1123F ACP DISCUSS/DSCN MKR DOCD: CPT | Performed by: INTERNAL MEDICINE

## 2023-01-27 PROCEDURE — 1090F PRES/ABSN URINE INCON ASSESS: CPT | Performed by: INTERNAL MEDICINE

## 2023-01-27 PROCEDURE — G9899 SCRN MAM PERF RSLTS DOC: HCPCS | Performed by: INTERNAL MEDICINE

## 2023-01-27 PROCEDURE — G8417 CALC BMI ABV UP PARAM F/U: HCPCS | Performed by: INTERNAL MEDICINE

## 2023-01-27 PROCEDURE — 3078F DIAST BP <80 MM HG: CPT | Performed by: INTERNAL MEDICINE

## 2023-01-27 PROCEDURE — G8536 NO DOC ELDER MAL SCRN: HCPCS | Performed by: INTERNAL MEDICINE

## 2023-01-27 PROCEDURE — 3074F SYST BP LT 130 MM HG: CPT | Performed by: INTERNAL MEDICINE

## 2023-01-27 NOTE — Clinical Note
Drew Laneal and [place] in advance for taking care of Mrs. Cezar Seals. In clinic today she looks great.   Her right wrist looks great and she has no angina, no chest pain or shortness of breath Hope you guys have a great weekend Maya Epp

## 2023-01-27 NOTE — PROGRESS NOTES
Robyn Day is a 74 y.o. female presenting for/with:    Chief Complaint   Patient presents with    Hospital Follow Up     [facility] Stent placement, discharged 1/21/23       Visit Vitals  /60   Pulse 88   Temp 97.3 °F (36.3 °C) (Temporal)   Resp 18   Ht 5' 5\" (1.651 m)   Wt 173 lb 9.6 oz (78.7 kg)   SpO2 99%   BMI 28.89 kg/m²     Pain Scale: 0 - No pain/10  Pain Location:     1. \"Have you been to the ER, urgent care clinic since your last visit? Hospitalized since your last visit? \" YES [facility]    2. \"Have you seen or consulted any other health care providers outside of the [address] [place] [address] since your last visit? \" No     3. For patients aged 39-70: Has the patient had a colonoscopy / FIT/ Cologuard? Yes - no Care Gap present      If the patient is female:    4. For patients aged 41-77: Has the patient had a mammogram within the past 2 years? Yes - no Care Gap present      5. For patients aged 21-65: Has the patient had a pap smear? NA - based on age or sex      Symptom review:  Learning Assessment 1/3/2023   PRIMARY LEARNER Patient   CO-LEARNER CAREGIVER No   PRIMARY LANGUAGE ENGLISH   LEARNER PREFERENCE PRIMARY DEMONSTRATION     -   ANSWERED BY pt   RELATIONSHIP SELF     Fall Risk Assessment, last 12 mths 1/27/2023   Able to walk? Yes   Fall in past 12 months? 0   Do you feel unsteady? 0   Are you worried about falling 0   Number of falls in past 12 months -   Fall with injury?  -       3 most recent PHQ Screens 1/27/2023   Little interest or pleasure in doing things Not at all   Feeling down, depressed, irritable, or hopeless Not at all   Total Score PHQ 2 0   Trouble falling or staying asleep, or sleeping too much -   Feeling tired or having little energy -   Poor appetite, weight loss, or overeating -   Feeling bad about yourself - or that you are a failure or have let yourself or your family down -   Trouble concentrating on things such as school, work, reading, or watching TV -   Moving or speaking so slowly that other people could have noticed; or the opposite being so fidgety that others notice -   How difficult have these problems made it for you to do your work, take care of your home and get along with others -     Abuse Screening Questionnaire 1/27/2023   Do you ever feel afraid of your partner? N   Are you in a relationship with someone who physically or mentally threatens you? N   Is it safe for you to go home?  Y       ADL Assessment 1/27/2023   Feeding yourself No Help Needed   Getting from bed to chair No Help Needed   Getting dressed No Help Needed   Bathing or showering No Help Needed   Walk across the room (includes cane/walker) No Help Needed   Using the telphone No Help Needed   Taking your medications No Help Needed   Preparing meals No Help Needed   Managing money (expenses/bills) No Help Needed   Moderately strenuous housework (laundry) No Help Needed   Shopping for personal items (toiletries/medicines) No Help Needed   Shopping for groceries No Help Needed   Driving No Help Needed   Climbing a flight of stairs No Help Needed   Getting to places beyond walking distances No Help Needed      Advance Care Planning 1/27/2023   Patient's Healthcare Decision Maker is: Legal Next of [address] 71   Primary Decision Maker Name -   Primary Decision Maker Relationship to Patient -   Confirm Advance Directive None   Patient Would Like to Complete Advance Directive No   Does the patient have other document types -

## 2023-01-27 NOTE — PROGRESS NOTES
Ms. Juan Dodson is a 74 y.o. female who is here for evaluation of   Chief Complaint   Patient presents with    Hospital Follow Up     [facility] Stent placement, discharged 1/21/23   . ASSESSMENT AND PLAN:    1. Coronary artery disease involving native coronary artery of native heart with angina pectoris with documented spasm ([address]  Clinically stable at this time. Right wrist cath insertion site is well-healed. She has planned follow-up with Marce Cheney NP in 3 days with further follow-up with Dr. Barbara Park. Remains on dual antiplatelet therapy and she was educated that she should remain on this therapy unless dictated by Dr. Barbara Park or ABIMBOLA Burt. Her angina appears to have been resolved and she has had no symptoms of CHF or arrhythmia. No orders of the defined types were placed in this encounter. HPI 28-year-old woman who was seen in the emergency room twice on January 6 for chest discomfort and ultimately referred to cath by ABIMBOLA Burt. On January 20 she underwent cardiac catheterization with Dr. Barbara Park and had 4 sequential stents placed with suboptimal flow. She spent the night in the hospital and was discharged home and has remained in good condition. She denies any angina or chest pain since her intervention. She remains on dual antiplatelet therapy and has scheduled follow-up with cardiology next week. ROS:  Denies fever, chills, cough, chest pain, SOB,  nausea, vomiting, or diarrhea. Denies wt loss, wt gain, hemoptysis, hematochezia or melena. Physical Examination:    Visit Vitals  /60   Pulse 88   Temp 97.3 °F (36.3 °C) (Temporal)   Resp 18   Ht 5' 5\" (1.651 m)   Wt 173 lb 9.6 oz (78.7 kg)   SpO2 99%   BMI 28.89 kg/m²      General:  Alert, cooperative, no distress. Head:  Normocephalic, without obvious abnormality, atraumatic. Eyes:  Conjunctivae/corneas clear. Pupils equal, round, reactive to light. Extraocular movements intact.    Lungs:   Clear to auscultation bilaterally. Chest wall:  No tenderness or deformity. Cardiac:  RRR   Abdomen:   Soft, non-tender. Bowel sounds normal. No masses. No organomegaly. Extremities: Extremities normal, atraumatic, no cyanosis or edema. Pulses: 2+ and symmetric all extremities. Skin: Skin color, texture, turgor normal. No rashes or lesions. Lymph nodes: Cervical, supraclavicular, and axillary nodes normal.   Neurologic: CNII-XII intact. Normal strength, sensation, and reflexes throughout. On this date 01/27/2023 I have spent 30 minutes reviewing previous notes, test results and face to face with the patient discussing the diagnosis and importance of compliance with the treatment plan as well as documenting on the day of the visit.     Yessica Magdaleno MD FACP    (signed electronically) on 1/27/2023 at 11:09 AM

## 2023-01-30 ENCOUNTER — OFFICE VISIT (OUTPATIENT)
Dept: CARDIOLOGY CLINIC | Age: 68
End: 2023-01-30
Payer: MEDICARE

## 2023-01-30 VITALS
SYSTOLIC BLOOD PRESSURE: 140 MMHG | OXYGEN SATURATION: 99 % | HEIGHT: 65 IN | BODY MASS INDEX: 28.82 KG/M2 | TEMPERATURE: 97.3 F | WEIGHT: 173 LBS | DIASTOLIC BLOOD PRESSURE: 70 MMHG | RESPIRATION RATE: 18 BRPM | HEART RATE: 86 BPM

## 2023-01-30 DIAGNOSIS — Z98.890 S/P CARDIAC CATHETERIZATION: Primary | ICD-10-CM

## 2023-01-30 DIAGNOSIS — I10 BENIGN ESSENTIAL HYPERTENSION: ICD-10-CM

## 2023-01-30 DIAGNOSIS — I25.5 ISCHEMIC CARDIOMYOPATHY: ICD-10-CM

## 2023-01-30 DIAGNOSIS — Z95.5 S/P DRUG ELUTING CORONARY STENT PLACEMENT: ICD-10-CM

## 2023-01-30 DIAGNOSIS — Z82.49 FAMILY HISTORY OF ISCHEMIC HEART DISEASE: ICD-10-CM

## 2023-01-30 DIAGNOSIS — I25.110 CORONARY ARTERY DISEASE INVOLVING NATIVE CORONARY ARTERY OF NATIVE HEART WITH UNSTABLE ANGINA PECTORIS (HCC): ICD-10-CM

## 2023-01-30 DIAGNOSIS — R07.2 PRECORDIAL PAIN: ICD-10-CM

## 2023-01-30 DIAGNOSIS — E78.2 MIXED HYPERLIPIDEMIA: ICD-10-CM

## 2023-01-30 DIAGNOSIS — I73.9 PAD (PERIPHERAL ARTERY DISEASE) (HCC): ICD-10-CM

## 2023-01-30 DIAGNOSIS — I71.9 PENETRATING ULCER OF AORTA (HCC): ICD-10-CM

## 2023-01-30 PROCEDURE — G8536 NO DOC ELDER MAL SCRN: HCPCS | Performed by: NURSE PRACTITIONER

## 2023-01-30 PROCEDURE — 3074F SYST BP LT 130 MM HG: CPT | Performed by: NURSE PRACTITIONER

## 2023-01-30 PROCEDURE — G8432 DEP SCR NOT DOC, RNG: HCPCS | Performed by: NURSE PRACTITIONER

## 2023-01-30 PROCEDURE — 1123F ACP DISCUSS/DSCN MKR DOCD: CPT | Performed by: NURSE PRACTITIONER

## 2023-01-30 PROCEDURE — 1090F PRES/ABSN URINE INCON ASSESS: CPT | Performed by: NURSE PRACTITIONER

## 2023-01-30 PROCEDURE — G9899 SCRN MAM PERF RSLTS DOC: HCPCS | Performed by: NURSE PRACTITIONER

## 2023-01-30 PROCEDURE — G8417 CALC BMI ABV UP PARAM F/U: HCPCS | Performed by: NURSE PRACTITIONER

## 2023-01-30 PROCEDURE — 3078F DIAST BP <80 MM HG: CPT | Performed by: NURSE PRACTITIONER

## 2023-01-30 PROCEDURE — G8400 PT W/DXA NO RESULTS DOC: HCPCS | Performed by: NURSE PRACTITIONER

## 2023-01-30 PROCEDURE — 3017F COLORECTAL CA SCREEN DOC REV: CPT | Performed by: NURSE PRACTITIONER

## 2023-01-30 PROCEDURE — G8427 DOCREV CUR MEDS BY ELIG CLIN: HCPCS | Performed by: NURSE PRACTITIONER

## 2023-01-30 PROCEDURE — 1101F PT FALLS ASSESS-DOCD LE1/YR: CPT | Performed by: NURSE PRACTITIONER

## 2023-01-30 PROCEDURE — 93000 ELECTROCARDIOGRAM COMPLETE: CPT | Performed by: NURSE PRACTITIONER

## 2023-01-30 PROCEDURE — 99214 OFFICE O/P EST MOD 30 MIN: CPT | Performed by: NURSE PRACTITIONER

## 2023-01-30 NOTE — PROGRESS NOTES
Identified pt with two pt identifiers(name and ). Reviewed record in preparation for visit and have obtained necessary documentation. Chief Complaint   Patient presents with    Hospital Follow Up     Follow up post cardiac catheterization    Coronary Artery Disease     SAT PM POST EATING SNACK - developed chest pain and n/v. Took sublingual ntg and chest pain resolved with 1 tablet. Vitals:    23 0950   BP: (!) 140/70   Pulse: 86   Resp: 18   Temp: 97.3 °F (36.3 °C)   TempSrc: Temporal   SpO2: 99%   Weight: 173 lb (78.5 kg)   Height: 5' 5\" (1.651 m)   PainSc:   0 - No pain       Medications reviewed/approved by provider. Health Maintenance Review: Patient reminded of \"due or due soon\" health maintenance. I have asked the patient to contact his/her primary care provider (PCP) for follow-up on his/her health maintenance. Coordination of Care Questionnaire:  :   1) Have you been to an emergency room, urgent care, or hospitalized since your last visit? If yes, where when, and reason for visit? Yes [place] for cath, pci.      2. Have seen or consulted any other health care provider since your last visit? If yes, where when, and reason for visit? NO      Patient is accompanied by self I have received verbal consent from Molinda Goldmann to discuss any/all medical information while they are present in the room.

## 2023-01-31 ENCOUNTER — TELEPHONE (OUTPATIENT)
Dept: CARDIOLOGY CLINIC | Age: 68
End: 2023-01-31

## 2023-01-31 NOTE — TELEPHONE ENCOUNTER
Lashaun RamirezrGeeta, NP  Rocco Vazquez LPN  For cath on 9/3/1603 at 1045 --- [place], myocardial ischemia     I ordered labs and spoke with patient---just inform debbie or renae etc.      Information given to , Caroline Crews (all information has been discussed/approved with MD and NP):      CPT codes: 69670, 24156 (LHC, stent) right radial approach   ICD-10 codes: I20.9, I25.10, I25.9  DOCTOR: Magui   Urgency (ASAP or routine): 2/8/2023 at 1045   Length of procedure (slot): per MD   Time frame: per MD   Special instructions (medications, overnight stay): REDUCE LANTUS DOSE BY HALF    Awaiting reply from Caroline Crews regarding procedure date/arrival time for [facility]. Instructions given to the patient. Patient verbalized understanding. Note: Patient was given a lab slip to have pre-procedure labs completed 3-14 days prior to procedure. Advised patient to wait to have labs drawn once patient knows the date of procedure (will be called by Caroline Crews). Patient verbalized understanding.

## 2023-01-31 NOTE — TELEPHONE ENCOUNTER
Information given to , Cesilia Motley (all information has been discussed/approved with MD and NP):       CPT codes: 28835, 81210 (LHC, stent) right radial approach   ICD-10 codes: I20.9, I25.10, I25.9  DOCTOR: Magui   Urgency (ASAP or routine): 2/8/2023 at 1045   Length of procedure (slot): per MD   Time frame: per MD   Special instructions (medications, overnight stay): REDUCE LANTUS DOSE BY HALF day prior to procedure. Awaiting reply from Cesilia Motley regarding procedure date/arrival time for [facility]. Instructions given to the patient. Patient verbalized understanding. Note: Patient was given a lab slip to have pre-procedure labs completed 3-14 days prior to procedure. Advised patient to wait to have labs drawn once patient knows the date of procedure (will be called by Cesilia Motley).      Patient verbalized understanding

## 2023-01-31 NOTE — TELEPHONE ENCOUNTER
Spoke to patient and scheduled her for LHC/PCI with   Dr. Marcie Rojas 2/8/23 @ 10:45 am with an arrival time of 8:45 am, patient will have labs done, advised to half her dose of insulin, patient had no questions at the time of call. Patient identification verified x2.       Patient Instructions    Catheterization   Pre-procedure instructions  3-4 days prior to procedure  Lab work: 1/31/23-2/6/23  Which will be in [facility] sites records, or a printed copy to go to [facility]   Night of the procedure nothing to eat or drink after midnight, you may take approved medications the morning of the procedure with a few sips of water  Stop blood thinners 2 days prior to procedure EXCEPT: Brilinta, Plavix or Aspirin  Medications restrictions: Reduce Lantus Dose By Half    Procedure day  Have a  that will bring you and take you home (6-8 hrs)  Have a responsible adult that can stay with you for 24hrs  Bring ID and insurance card  Wear comfortable clothing  Leave valuables at home, bring: dentures, hearing aids, or glasses  Bring overnight bag (just in case of an overnight stay)  Where to report  [facility]: go through main entrance and to the left is outpatient registration  [facility]: Go through the main entrance go to the second floor and go to outpatient registration  [facility] patients report to first floor outpatient registration       Date of procedure: 2/8/23   Arrival Time: 8:45      Post procedure instructions  No driving for 24 hrs  No heavy lifting (over 10lbs) or strenuous activity for 48hrs  No baths, swimming, hot tubs, or spas for a week  The band aid over the cath site may be removed the day after procedure and washed gently with soap and water  The site may be bruised or discolored for a couple of weeks, a small knot may also be present  You may have tenderness/soreness in groin or wrist area, you may take Tylenol for relief  When to call the office  You notice any tingling, numbness, coldness, or loss of feeling, or you have a fever for 2-3 days after the procedure, if there is visible blood at the site, hold pressure for 20 minutes and then call     [address]                                                           [facility], [address]                                                        Cath lab: [id number] office: [phone]----[id number]

## 2023-02-02 ENCOUNTER — TELEPHONE (OUTPATIENT)
Dept: CARDIOLOGY CLINIC | Age: 68
End: 2023-02-02

## 2023-02-02 NOTE — TELEPHONE ENCOUNTER
Patient is calling to find out where she is suppose to go and have her Labs done for her upcoming surgery on 2/8/23 at [facility].     [phone]

## 2023-02-02 NOTE — TELEPHONE ENCOUNTER
Verified patient with two patient identifiers. Pt has been informed to have pre cath labs drawn at [facility]. Walk in service times provided. Pt verbalized understanding.

## 2023-02-03 ENCOUNTER — HOSPITAL ENCOUNTER (OUTPATIENT)
Dept: LAB | Age: 68
Discharge: HOME | End: 2023-02-03

## 2023-02-03 LAB
ANION GAP SERPL CALC-SCNC: 5 MMOL/L (ref 5–15)
BUN SERPL-MCNC: 10 MG/DL (ref 6–20)
BUN/CREAT SERPL: 9 (ref 12–20)
CALCIUM SERPL-MCNC: 9.7 MG/DL (ref 8.5–10.1)
CHLORIDE SERPL-SCNC: 104 MMOL/L (ref 97–108)
CO2 SERPL-SCNC: 29 MMOL/L (ref 21–32)
CREAT SERPL-MCNC: 1.1 MG/DL (ref 0.55–1.02)
ERYTHROCYTE [DISTWIDTH] IN BLOOD BY AUTOMATED COUNT: 19 % (ref 11.5–14.5)
GLUCOSE SERPL-MCNC: 294 MG/DL (ref 65–100)
HCT VFR BLD AUTO: 33.2 % (ref 35–47)
HGB BLD-MCNC: 9.5 G/DL (ref 11.5–16)
INR PPP: 0.9 (ref 0.9–1.1)
MCH RBC QN AUTO: 24.1 PG (ref 26–34)
MCHC RBC AUTO-ENTMCNC: 28.6 G/DL (ref 30–36.5)
MCV RBC AUTO: 84.3 FL (ref 80–99)
NRBC # BLD: 0 K/UL (ref 0–0.01)
NRBC BLD-RTO: 0 PER 100 WBC
PLATELET # BLD AUTO: 305 K/UL (ref 150–400)
PMV BLD AUTO: 12.3 FL (ref 8.9–12.9)
POTASSIUM SERPL-SCNC: 3.4 MMOL/L (ref 3.5–5.1)
PROTHROMBIN TIME: 9.8 SEC (ref 9–11.1)
RBC # BLD AUTO: 3.94 M/UL (ref 3.8–5.2)
SODIUM SERPL-SCNC: 138 MMOL/L (ref 136–145)
WBC # BLD AUTO: 9.3 K/UL (ref 3.6–11)

## 2023-02-05 NOTE — PROGRESS NOTES
Precath labwork--cbc,bmp,pt/inr. Hgb down from previous. Let us know if any bleeding etc.  She is also followed by  GI and noted workup next mos. Kidney fxn ok/lytes ok but potassium low end of normal. Taking hctz which can lower  potassium. Eat a banana a day or avocado / kiwi, food rich in potassium.

## 2023-02-06 ENCOUNTER — TELEPHONE (OUTPATIENT)
Dept: CARDIOLOGY CLINIC | Age: 68
End: 2023-02-06

## 2023-02-06 NOTE — TELEPHONE ENCOUNTER
----- Message from Jaqueline Albright NP sent at 2/5/2023  4:04 PM EST -----  Precath labwork--cbc,bmp,pt/inr. Hgb down from previous. Let us know if any bleeding etc.  She is also followed by  GI and noted workup next mos. Kidney fxn ok/lytes ok but potassium low end of normal. Taking hctz which can lower  potassium. Eat a banana a day or avocado / kiwi, food rich in potassium.

## 2023-02-07 ENCOUNTER — TELEPHONE (OUTPATIENT)
Dept: CARDIOLOGY CLINIC | Age: 68
End: 2023-02-07

## 2023-02-07 RX ORDER — SODIUM CHLORIDE 0.9 % (FLUSH) 0.9 %
5-40 SYRINGE (ML) INJECTION AS NEEDED
Status: CANCELLED | OUTPATIENT
Start: 2023-02-07

## 2023-02-07 RX ORDER — SODIUM CHLORIDE 0.9 % (FLUSH) 0.9 %
5-40 SYRINGE (ML) INJECTION EVERY 8 HOURS
Status: CANCELLED | OUTPATIENT
Start: 2023-02-07

## 2023-02-07 RX ORDER — SODIUM CHLORIDE 9 MG/ML
100 INJECTION, SOLUTION INTRAVENOUS CONTINUOUS
Status: CANCELLED | OUTPATIENT
Start: 2023-02-08 | End: 2023-02-08

## 2023-02-07 NOTE — TELEPHONE ENCOUNTER
Pending [facility] for submitting procedure with same coding, [facility] specialist stated. Peer to Peer set for today Feb 7 at 12:30 pm, Dr. Jhon Coulter will call dr. Herlinda Menjivar.

## 2023-02-07 NOTE — PROGRESS NOTES
Orders placed for PCI that is scheduled fo  2/8/23. Verbal orders per provider. Orders repeated and verified twice.

## 2023-02-08 ENCOUNTER — TRANSCRIBE ORDER (OUTPATIENT)
Dept: CARDIAC REHAB | Age: 68
End: 2023-02-08

## 2023-02-08 ENCOUNTER — HOSPITAL ENCOUNTER (OUTPATIENT)
Age: 68
Setting detail: OUTPATIENT SURGERY
Discharge: HOME | End: 2023-02-08
Attending: INTERNAL MEDICINE | Admitting: INTERNAL MEDICINE
Payer: MEDICARE

## 2023-02-08 VITALS
OXYGEN SATURATION: 97 % | WEIGHT: 172.8 LBS | SYSTOLIC BLOOD PRESSURE: 150 MMHG | HEIGHT: 65 IN | DIASTOLIC BLOOD PRESSURE: 62 MMHG | HEART RATE: 78 BPM | TEMPERATURE: 98.1 F | BODY MASS INDEX: 28.79 KG/M2 | RESPIRATION RATE: 20 BRPM

## 2023-02-08 DIAGNOSIS — I10 PRIMARY HYPERTENSION: ICD-10-CM

## 2023-02-08 DIAGNOSIS — E78.2 MIXED HYPERLIPIDEMIA: Primary | ICD-10-CM

## 2023-02-08 DIAGNOSIS — Z95.5 STENTED CORONARY ARTERY: Primary | ICD-10-CM

## 2023-02-08 DIAGNOSIS — I25.9 CHRONIC ISCHEMIC HEART DISEASE: ICD-10-CM

## 2023-02-08 DIAGNOSIS — I25.10 CORONARY ARTERY DISEASE DUE TO LIPID RICH PLAQUE: ICD-10-CM

## 2023-02-08 DIAGNOSIS — I10 BENIGN ESSENTIAL HYPERTENSION: ICD-10-CM

## 2023-02-08 DIAGNOSIS — I25.83 CORONARY ARTERY DISEASE DUE TO LIPID RICH PLAQUE: ICD-10-CM

## 2023-02-08 DIAGNOSIS — Z98.61 S/P PTCA (PERCUTANEOUS TRANSLUMINAL CORONARY ANGIOPLASTY): ICD-10-CM

## 2023-02-08 DIAGNOSIS — I25.110 CORONARY ARTERY DISEASE INVOLVING NATIVE CORONARY ARTERY OF NATIVE HEART WITH UNSTABLE ANGINA PECTORIS (HCC): ICD-10-CM

## 2023-02-08 LAB
ACT BLD: 281 SECS (ref 79–138)
ATRIAL RATE: 73 BPM
CALCULATED P AXIS, ECG09: 41 DEGREES
CALCULATED R AXIS, ECG10: -17 DEGREES
CALCULATED T AXIS, ECG11: -90 DEGREES
DIAGNOSIS, 93000: NORMAL
GLUCOSE BLD STRIP.AUTO-MCNC: 182 MG/DL (ref 65–117)
GLUCOSE BLD STRIP.AUTO-MCNC: 251 MG/DL (ref 65–117)
P-R INTERVAL, ECG05: 152 MS
Q-T INTERVAL, ECG07: 444 MS
QRS DURATION, ECG06: 98 MS
QTC CALCULATION (BEZET), ECG08: 489 MS
SERVICE CMNT-IMP: ABNORMAL
SERVICE CMNT-IMP: ABNORMAL
VENTRICULAR RATE, ECG03: 73 BPM

## 2023-02-08 PROCEDURE — 77030019569 HC BND COMPR RAD TERU -B: Performed by: INTERNAL MEDICINE

## 2023-02-08 PROCEDURE — 93458 L HRT ARTERY/VENTRICLE ANGIO: CPT | Performed by: INTERNAL MEDICINE

## 2023-02-08 PROCEDURE — 74011000636 HC RX REV CODE- 636: Performed by: INTERNAL MEDICINE

## 2023-02-08 PROCEDURE — 77030013715 HC INFL SYS MRTM -B: Performed by: INTERNAL MEDICINE

## 2023-02-08 PROCEDURE — C1769 GUIDE WIRE: HCPCS | Performed by: INTERNAL MEDICINE

## 2023-02-08 PROCEDURE — 85347 COAGULATION TIME ACTIVATED: CPT

## 2023-02-08 PROCEDURE — 76937 US GUIDE VASCULAR ACCESS: CPT | Performed by: INTERNAL MEDICINE

## 2023-02-08 PROCEDURE — 74011250636 HC RX REV CODE- 250/636: Performed by: INTERNAL MEDICINE

## 2023-02-08 PROCEDURE — C1725 CATH, TRANSLUMIN NON-LASER: HCPCS | Performed by: INTERNAL MEDICINE

## 2023-02-08 PROCEDURE — 92928 PRQ TCAT PLMT NTRAC ST 1 LES: CPT | Performed by: INTERNAL MEDICINE

## 2023-02-08 PROCEDURE — 77030018729 HC ELECTRD DEFIB PAD CARD -B: Performed by: INTERNAL MEDICINE

## 2023-02-08 PROCEDURE — 2709999900 HC NON-CHARGEABLE SUPPLY: Performed by: INTERNAL MEDICINE

## 2023-02-08 PROCEDURE — 99152 MOD SED SAME PHYS/QHP 5/>YRS: CPT | Performed by: INTERNAL MEDICINE

## 2023-02-08 PROCEDURE — C1894 INTRO/SHEATH, NON-LASER: HCPCS | Performed by: INTERNAL MEDICINE

## 2023-02-08 PROCEDURE — 74011250637 HC RX REV CODE- 250/637: Performed by: INTERNAL MEDICINE

## 2023-02-08 PROCEDURE — 99153 MOD SED SAME PHYS/QHP EA: CPT | Performed by: INTERNAL MEDICINE

## 2023-02-08 PROCEDURE — 93005 ELECTROCARDIOGRAM TRACING: CPT

## 2023-02-08 PROCEDURE — C1876 STENT, NON-COA/NON-COV W/DEL: HCPCS | Performed by: INTERNAL MEDICINE

## 2023-02-08 PROCEDURE — 93571 IV DOP VEL&/PRESS C FLO 1ST: CPT | Performed by: INTERNAL MEDICINE

## 2023-02-08 PROCEDURE — 77030013744: Performed by: INTERNAL MEDICINE

## 2023-02-08 PROCEDURE — 82962 GLUCOSE BLOOD TEST: CPT

## 2023-02-08 PROCEDURE — 77030040934 HC CATH DIAG DXTERITY MEDT -A: Performed by: INTERNAL MEDICINE

## 2023-02-08 PROCEDURE — C1887 CATHETER, GUIDING: HCPCS | Performed by: INTERNAL MEDICINE

## 2023-02-08 PROCEDURE — 74011000250 HC RX REV CODE- 250: Performed by: INTERNAL MEDICINE

## 2023-02-08 PROCEDURE — 77030012468 HC VLV BLEEDBK CNTRL ABBT -B: Performed by: INTERNAL MEDICINE

## 2023-02-08 DEVICE — STENT ONYXNG25018UX ONYX 2.50X18RX
Type: IMPLANTABLE DEVICE | Status: FUNCTIONAL
Brand: ONYX FRONTIER™

## 2023-02-08 RX ORDER — SODIUM CHLORIDE 0.9 % (FLUSH) 0.9 %
5-40 SYRINGE (ML) INJECTION EVERY 8 HOURS
Status: DISCONTINUED | OUTPATIENT
Start: 2023-02-08 | End: 2023-02-08 | Stop reason: HOSPADM

## 2023-02-08 RX ORDER — HEPARIN SODIUM 1000 [USP'U]/ML
INJECTION, SOLUTION INTRAVENOUS; SUBCUTANEOUS AS NEEDED
Status: DISCONTINUED | OUTPATIENT
Start: 2023-02-08 | End: 2023-02-08 | Stop reason: HOSPADM

## 2023-02-08 RX ORDER — FENTANYL CITRATE 50 UG/ML
INJECTION, SOLUTION INTRAMUSCULAR; INTRAVENOUS AS NEEDED
Status: DISCONTINUED | OUTPATIENT
Start: 2023-02-08 | End: 2023-02-08 | Stop reason: HOSPADM

## 2023-02-08 RX ORDER — LIDOCAINE HYDROCHLORIDE 10 MG/ML
INJECTION INFILTRATION; PERINEURAL AS NEEDED
Status: DISCONTINUED | OUTPATIENT
Start: 2023-02-08 | End: 2023-02-08 | Stop reason: HOSPADM

## 2023-02-08 RX ORDER — SODIUM CHLORIDE 9 MG/ML
100 INJECTION, SOLUTION INTRAVENOUS CONTINUOUS
Status: DISCONTINUED | OUTPATIENT
Start: 2023-02-08 | End: 2023-02-08 | Stop reason: HOSPADM

## 2023-02-08 RX ORDER — SODIUM CHLORIDE 9 MG/ML
100 INJECTION, SOLUTION INTRAVENOUS CONTINUOUS
Status: DISPENSED | OUTPATIENT
Start: 2023-02-08 | End: 2023-02-08

## 2023-02-08 RX ORDER — MIDAZOLAM HYDROCHLORIDE 1 MG/ML
INJECTION, SOLUTION INTRAMUSCULAR; INTRAVENOUS AS NEEDED
Status: DISCONTINUED | OUTPATIENT
Start: 2023-02-08 | End: 2023-02-08 | Stop reason: HOSPADM

## 2023-02-08 RX ORDER — SODIUM CHLORIDE 0.9 % (FLUSH) 0.9 %
5-40 SYRINGE (ML) INJECTION AS NEEDED
Status: DISCONTINUED | OUTPATIENT
Start: 2023-02-08 | End: 2023-02-08 | Stop reason: HOSPADM

## 2023-02-08 RX ORDER — ACETAMINOPHEN 325 MG/1
650 TABLET ORAL
Status: DISCONTINUED | OUTPATIENT
Start: 2023-02-08 | End: 2023-02-08 | Stop reason: HOSPADM

## 2023-02-08 RX ORDER — CLOPIDOGREL 300 MG/1
TABLET, FILM COATED ORAL AS NEEDED
Status: DISCONTINUED | OUTPATIENT
Start: 2023-02-08 | End: 2023-02-08 | Stop reason: HOSPADM

## 2023-02-08 RX ORDER — VERAPAMIL HYDROCHLORIDE 2.5 MG/ML
INJECTION, SOLUTION INTRAVENOUS AS NEEDED
Status: DISCONTINUED | OUTPATIENT
Start: 2023-02-08 | End: 2023-02-08 | Stop reason: HOSPADM

## 2023-02-08 NOTE — PROGRESS NOTES
1405  Pt arrived to recovery    1426  EKG obtained    Sheath was removed at 1353 so removal of air from TR Band can begin at 1545.    1430  Pt eating and tolerating PO diet well. Kyara 31 called family to give an update. To do for  TR BANDS:    1545  Began removal of air from TR BAND. No bleeding and no hematoma present at sites. [address] [facility] reviewed discharge instructions with pt. Pt had an opportunity to ask questions. 1630  Removal of air from TR Band complete. No bleeding and no hematoma. 1636  Pt walked to the restroom and voided successfully. 1645  TR Band removed. Tegaderm and gauze dressing applied. Arm immobilizer placed on affected wrist.  Pt getting dressed  RN called pt ride home. RN removed pt IV.    1700  Pt discharged to ride at main entrance of hospital via wheel chair by RN.   Pt discharged with: Discharge Paperwork, Stent Card, Info Folder for CAD, Cell Phone, and Clothing

## 2023-02-08 NOTE — DISCHARGE INSTRUCTIONS
Radial Cardiac Catheterization Discharge Instructions    It is normal to feel tired the first couple days. Take it easy and follow the physicians instructions. CHECK THE CATHETER INSERTION SITE DAILY:    Remove the wrist dressing 24 hours after the procedure. You may shower 24 hours after the procedure. Wash with soap and water and pat dry. Gentle cleaning of the site with soap and water is sufficient, cover with a dry clean dressing or bandage. Do not apply creams or powders to the area. No soaking the wrist for 3 days. Leave the puncture site open to air after 24 hours post-procedure. CALL THE PHYSICIANS:     If the site becomes red, swollen or feels warm to the touch  If there is bleeding or drainage or if there is unusual pain at the radial site. If there is any minor oozing, you may apply a band-aid and remove after 12 hours. If the bleeding continues, hold pressure with the middle finger against the puncture site and the thumb against the back of the wrist,call 911 to be transported to the hospital.  DO NOT DRIVE YOURSELF, [facility]. ACTIVITY:   For the first 24 hours do not manipulate the wrist.  No lifting, pushing or pulling over 3-5 pounds with the affected wrist for 7 daysand no straining the insertion site. Do not life grocery bags or the garbage can, do not run the vacuum  or  for 7 days. Start with short walks as in the hospital and gradually increase as tolerated each day. It is recommended to walk 30 minutes 5-7 days per week. Follow your physicians instructions on activity. Avoid walking outside in extremes of heat or cold. Walk inside when it is cold and windy or hot and humid. Things to keep in mind:  No driving for at least 24 hours, or as designated by your physician. Limit the number of times you go up and down the stairs  Take rests and pace yourself with activity.   Be careful and do not strain with bowel movements. MEDICATIONS:    Take all medications as prescribed  Call your physician if you have any questions  Keep an updated list of your medications with you at all times and give a list to your physician and pharmacist    SIGNS AND SYMPTOMS:   Be cautious of symptoms of angina or recurrent symptoms such as chest discomfort, unusual shortness of breath or fatigue. These could be symptoms of restenosis, a new blockage or a heart attack. If your symptoms are relieved with rest it is still recommended that you notify your physician of recurrent chest pain or discomfort. For CHEST PAIN or symptoms of angina not relieved with rest:  If the discomfort is not relieved with rest, and you have been prescribed Nitroglycerin, take as directed (taken under the tongue, one at a time 5 minutes apart for a total of 3 doses). If the discomfort is not relieved after the 3rd nitroglycerin, call 911. If you have not been prescribed Nitroglycerin  and your chest discomfort is not relieved with rest, call 911. AFTER CARE:   Follow up with your physician as instructed. Follow a heart healthy diet with proper portion control, daily stress management, daily exercise, blood pressure and cholesterol control , and smoking cessation.

## 2023-02-08 NOTE — PROGRESS NOTES
1405  TRANSFER - IN REPORT:    Verbal report received from Jasen on Eryn Leyva  being received from [address] [place] [address] for routine post - op. Report consisted of patients Situation, Background, Assessment and Recommendations(SBAR). Information from the following report(s) SBAR, Procedure Summary, Intake/Output, MAR, Recent Results, and Cardiac Rhythm NSR  was reviewed with the receiving clinician. Opportunity for questions and clarification was provided. Assessment completed upon patients arrival to [address] [place] [address]. Cardiac Cath Lab Recovery Arrival Note:    Eryn Leyva arrived to [facility] recovery area. Patient procedure= PCI. Patient on cardiac monitor, non-invasive blood pressure, SPO2 monitor. On O2 @ 2 lpm via NC.  IV  of NS on pump at 100 ml/hr. Patient status doing well without problems. Patient is A&Ox 4. Patient reports no complaints. PROCEDURE SITE CHECK:    Procedure site:without any bleeding and no hematoma, no pain/discomfort reported at procedure site. No change in patient status. Continue to monitor patient and status.

## 2023-02-08 NOTE — ROUTINE PROCESS
Cardiac Cath Lab Recovery Arrival Note:      Esme Guy arrived to Cardiac Cath Lab, Recovery Area. Staff introduced to patient. Patient identifiers verified with NAME and DATE OF BIRTH. Procedure verified with patient. Consent forms reviewed and signed by patient or authorized representative and verified. Allergies verified. Patient informed of procedure and plan of care. Questions answered with review. Patient prepped for procedure, per orders from physician, prior to arrival.    Patient on cardiac monitor, non-invasive blood pressure, SPO2 monitor. Patient is A&Ox 4. Patient reports No complaints. Patient in stretcher, in low position, with side rails up, call bell within reach, patient instructed to call of assistance as needed. Patient prep in: [facility] Recovery Area, Bed# RR 4. Family in: Waiting room.    Prep by: Venessa Servin RN

## 2023-02-08 NOTE — PROGRESS NOTES
CATH LAB to RECOVERY ROOM REPORT    Procedure: [facility]     MD: Leonel Hancock    The procedure was an Intervention    Verbal Report given to Recovery Nurse on patient being transferred to Cardiac Cath Lab  for routine post-op. Patient stable upon transfer to . Vitals, mental status, MAR, procedural summary discussed with recovery RN.     Medication given during procedure:    Contrast:140mL                          Heparin:9,500units     Versed:8mg                                                               Fentanyl:125mcg                                                           Plavix:225 mg           Sheaths:    Right radial sheath pulled at 1375 pm, band at 12mL of air

## 2023-02-09 NOTE — TELEPHONE ENCOUNTER
I spoke with peer to peer and also with .  Bartolo Oconnor to proceed with PCI, did not really intend to get approval for new diagnostic catheterization. S/p PCI thanks.

## 2023-02-20 ENCOUNTER — HOSPITAL ENCOUNTER (EMERGENCY)
Age: 68
Discharge: HOME | End: 2023-02-20
Attending: EMERGENCY MEDICINE
Payer: MEDICARE

## 2023-02-20 ENCOUNTER — APPOINTMENT (OUTPATIENT)
Dept: GENERAL RADIOLOGY | Age: 68
End: 2023-02-20
Attending: EMERGENCY MEDICINE
Payer: MEDICARE

## 2023-02-20 VITALS
HEART RATE: 73 BPM | TEMPERATURE: 98.1 F | OXYGEN SATURATION: 99 % | SYSTOLIC BLOOD PRESSURE: 188 MMHG | BODY MASS INDEX: 28.21 KG/M2 | DIASTOLIC BLOOD PRESSURE: 79 MMHG | RESPIRATION RATE: 17 BRPM | WEIGHT: 169.31 LBS | HEIGHT: 65 IN

## 2023-02-20 DIAGNOSIS — J45.41 MODERATE PERSISTENT ASTHMA WITH ACUTE EXACERBATION: Primary | ICD-10-CM

## 2023-02-20 DIAGNOSIS — R07.89 NON-CARDIAC CHEST PAIN: ICD-10-CM

## 2023-02-20 LAB
ALBUMIN SERPL-MCNC: 3.6 G/DL (ref 3.5–5)
ALBUMIN/GLOB SERPL: 0.9 (ref 1.1–2.2)
ALP SERPL-CCNC: 107 U/L (ref 45–117)
ALT SERPL-CCNC: 21 U/L (ref 12–78)
ANION GAP SERPL CALC-SCNC: 7 MMOL/L (ref 5–15)
AST SERPL-CCNC: 35 U/L (ref 15–37)
ATRIAL RATE: 70 BPM
ATRIAL RATE: 93 BPM
BASOPHILS # BLD: 0.1 K/UL (ref 0–0.1)
BASOPHILS NFR BLD: 1 % (ref 0–1)
BILIRUB SERPL-MCNC: 0.4 MG/DL (ref 0.2–1)
BNP SERPL-MCNC: 573 PG/ML
BUN SERPL-MCNC: 12 MG/DL (ref 6–20)
BUN/CREAT SERPL: 12 (ref 12–20)
CALCIUM SERPL-MCNC: 10.2 MG/DL (ref 8.5–10.1)
CALCULATED P AXIS, ECG09: 38 DEGREES
CALCULATED P AXIS, ECG09: 45 DEGREES
CALCULATED R AXIS, ECG10: -11 DEGREES
CALCULATED R AXIS, ECG10: -15 DEGREES
CALCULATED T AXIS, ECG11: -69 DEGREES
CALCULATED T AXIS, ECG11: -93 DEGREES
CHLORIDE SERPL-SCNC: 100 MMOL/L (ref 97–108)
CO2 SERPL-SCNC: 29 MMOL/L (ref 21–32)
CREAT SERPL-MCNC: 0.98 MG/DL (ref 0.55–1.02)
DIAGNOSIS, 93000: NORMAL
DIAGNOSIS, 93000: NORMAL
DIFFERENTIAL METHOD BLD: ABNORMAL
EOSINOPHIL # BLD: 0.3 K/UL (ref 0–0.4)
EOSINOPHIL NFR BLD: 3 % (ref 0–7)
ERYTHROCYTE [DISTWIDTH] IN BLOOD BY AUTOMATED COUNT: 16.7 % (ref 11.5–14.5)
GLOBULIN SER CALC-MCNC: 4.1 G/DL (ref 2–4)
GLUCOSE SERPL-MCNC: 257 MG/DL (ref 65–100)
HCT VFR BLD AUTO: 34.8 % (ref 35–47)
HGB BLD-MCNC: 10.5 G/DL (ref 11.5–16)
IMM GRANULOCYTES # BLD AUTO: 0 K/UL (ref 0–0.04)
IMM GRANULOCYTES NFR BLD AUTO: 0 % (ref 0–0.5)
LYMPHOCYTES # BLD: 2 K/UL (ref 0.8–3.5)
LYMPHOCYTES NFR BLD: 25 % (ref 12–49)
MCH RBC QN AUTO: 25 PG (ref 26–34)
MCHC RBC AUTO-ENTMCNC: 30.2 G/DL (ref 30–36.5)
MCV RBC AUTO: 82.9 FL (ref 80–99)
MONOCYTES # BLD: 0.6 K/UL (ref 0–1)
MONOCYTES NFR BLD: 8 % (ref 5–13)
NEUTS SEG # BLD: 4.9 K/UL (ref 1.8–8)
NEUTS SEG NFR BLD: 63 % (ref 32–75)
NRBC # BLD: 0 K/UL (ref 0–0.01)
NRBC BLD-RTO: 0 PER 100 WBC
P-R INTERVAL, ECG05: 134 MS
P-R INTERVAL, ECG05: 144 MS
PLATELET # BLD AUTO: 359 K/UL (ref 150–400)
PMV BLD AUTO: 10.6 FL (ref 8.9–12.9)
POTASSIUM SERPL-SCNC: 4.3 MMOL/L (ref 3.5–5.1)
PROT SERPL-MCNC: 7.7 G/DL (ref 6.4–8.2)
Q-T INTERVAL, ECG07: 356 MS
Q-T INTERVAL, ECG07: 422 MS
QRS DURATION, ECG06: 96 MS
QRS DURATION, ECG06: 98 MS
QTC CALCULATION (BEZET), ECG08: 442 MS
QTC CALCULATION (BEZET), ECG08: 455 MS
RBC # BLD AUTO: 4.2 M/UL (ref 3.8–5.2)
SODIUM SERPL-SCNC: 136 MMOL/L (ref 136–145)
TROPONIN I SERPL HS-MCNC: 27 NG/L (ref 0–51)
TROPONIN I SERPL HS-MCNC: 27 NG/L (ref 0–51)
VENTRICULAR RATE, ECG03: 70 BPM
VENTRICULAR RATE, ECG03: 93 BPM
WBC # BLD AUTO: 7.8 K/UL (ref 3.6–11)

## 2023-02-20 PROCEDURE — 93005 ELECTROCARDIOGRAM TRACING: CPT

## 2023-02-20 PROCEDURE — 36415 COLL VENOUS BLD VENIPUNCTURE: CPT

## 2023-02-20 PROCEDURE — 99285 EMERGENCY DEPT VISIT HI MDM: CPT

## 2023-02-20 PROCEDURE — 80053 COMPREHEN METABOLIC PANEL: CPT

## 2023-02-20 PROCEDURE — 71046 X-RAY EXAM CHEST 2 VIEWS: CPT

## 2023-02-20 PROCEDURE — 85025 COMPLETE CBC W/AUTO DIFF WBC: CPT

## 2023-02-20 PROCEDURE — 83880 ASSAY OF NATRIURETIC PEPTIDE: CPT

## 2023-02-20 PROCEDURE — 84484 ASSAY OF TROPONIN QUANT: CPT

## 2023-02-20 RX ORDER — LABETALOL HYDROCHLORIDE 5 MG/ML
10 INJECTION, SOLUTION INTRAVENOUS
Status: DISCONTINUED | OUTPATIENT
Start: 2023-02-20 | End: 2023-02-20

## 2023-02-20 RX ORDER — SODIUM CHLORIDE 0.9 % (FLUSH) 0.9 %
10 SYRINGE (ML) INJECTION ONCE
Status: DISCONTINUED | OUTPATIENT
Start: 2023-02-20 | End: 2023-02-20

## 2023-02-20 NOTE — ED PROVIDER NOTES
EMERGENCY DEPARTMENT HISTORY AND PHYSICAL EXAM    Date: 2023  Patient Name: Jarrell Howard  Patient Age and Sex: 74 y.o. female  MRN:  [id number]  CSN:  [id number]    History of Present Illness     Chief Complaint   Patient presents with    Shortness of Breath     Pt had Cardiac Stents put in on Wednesday. She feels bad she is winded and short of breath \"I'm with someone at the surgical center\". Pt had her stents put in at [facility]. History Provided By: Patient    Ability to gather history was limited by:  HPI Limitations : None    HPI: Jarrell Howard, 74 y.o. female with history of coronary artery disease, PCI and stents 2 weeks ago, asthma, tobacco abuse, current smoker, complains of shortness of breath since last night. She feels winded with chest tightness and mild wheezing, no coughing or fevers or sputum. She does not endorse any chest pain. Tobacco Use      Smoking status: Former        Packs/day: 0.25        Years: 20.00        Pack years: 5        Types: Cigarettes        Quit date: 2020        Years since quittin.6      Smokeless tobacco: Never     Past History   The patient's medical, surgical, and social history were reviewed by me today. Current Medications:  No current facility-administered medications on file prior to encounter. Current Outpatient Medications on File Prior to Encounter   Medication Sig Dispense Refill    atorvastatin (LIPITOR) 40 mg tablet Take 1 Tablet by mouth daily. Replaces simvastatin for high cholesterol on 2023 90 Tablet 3    pantoprazole (PROTONIX) 40 mg tablet Take 1 Tablet by mouth daily. 90 Tablet 4    isosorbide mononitrate ER (IMDUR) 30 mg tablet Take 1 Tablet by mouth every morning. 30 Tablet 3    nitroglycerin (NITROSTAT) 0.4 mg SL tablet 1 Tablet by SubLINGual route every five (5) minutes as needed for Chest Pain. Up to 3 doses.  20 Tablet 1    montelukast (SINGULAIR) 10 mg tablet TAKE 1 TABLET BY MOUTH DAILY AT BEDTIME 30 Tablet 3 promethazine (PHENERGAN) 25 mg tablet       levocetirizine (XYZAL) 5 mg tablet Take 1 Tablet by mouth daily. Indications: itching of skin (Patient taking differently: Take 5 mg by mouth daily as needed for Itching. Indications: itching of skin) 90 Tablet 4    diclofenac (VOLTAREN) 1 % gel Apply 4 g to affected area four (4) times daily. (Patient taking differently: Apply 4 g to affected area as needed.) 100 g 11    hydroCHLOROthiazide (HYDRODIURIL) 25 mg tablet TAKE 1 TABLET BY MOUTH ONCE DAILY 90 Tablet 4    dorzolamide-timoloL (COSOPT) 22.3-6.8 mg/mL ophthalmic solution ADMINISTER 1 DROP INTO BOTH EYES TWICE DAILY      Insulin Needles, Disposable, (BD Ultra-Fine Short Pen Needle) 31 gauge x 5/16\" ndle by Does Not Apply route daily. E11.9. Use daily for insulin injection 100 Pen Needle 5    calcium carbonate/vitamin D3 (CALCIUM 500 + D, D3, PO) Take  by mouth. amLODIPine (NORVASC) 10 mg tablet Take 1 Tablet by mouth daily. 90 Tablet 5    labetaloL (NORMODYNE) 100 mg tablet Take 1 Tablet by mouth two (2) times a day. Indications: Bood pressure 180 Tablet 4    insulin glargine (Lantus Solostar U-100 Insulin) 100 unit/mL (3 mL) inpn 25 Units by SubCUTAneous route daily. 5 Pen 5    lancets misc Use twice a day 200 Each 11    Blood-Glucose Meter monitoring kit Use daily 1 Kit 0    fluticasone propion-salmeteroL (Advair Diskus) 250-50 mcg/dose diskus inhaler Take 1 Puff by inhalation every twelve (12) hours. (Patient taking differently: Take 1 Puff by inhalation as needed.) 1 Inhaler 11    clopidogreL (PLAVIX) 75 mg tab Take 75 mg by mouth daily. BD Single Use Swabs Regular padm APPLY  EXTERNALLY EVERY  Pad 5    ascorbic acid, vitamin C, (Vitamin C) 500 mg tablet Take 1 Tab by mouth two (2) times a day. 24 Tab 0    albuterol (PROVENTIL HFA, VENTOLIN HFA, PROAIR HFA) 90 mcg/actuation inhaler Take 2 Puffs by inhalation every four (4) hours as needed for Wheezing.  1 Inhaler 0    multivitamin with iron tablet Take 1 Tablet by mouth daily. acetaminophen (TYLENOL) 500 mg tablet Take  by mouth every six (6) hours as needed for Pain. aspirin delayed-release 81 mg tablet Take 81 mg by mouth daily. Nebulizer & Compressor machine 1 Each by Does Not Apply route three (3) times daily. Indications: J44.9 1 Each 0    ipratropium (ATROVENT) 42 mcg (0.06 %) nasal spray 2 Sprays by Nasal route daily as needed. Past Medical History:   Diagnosis Date    Arthritis     Asthma     Diabetes ([facility]     Hypertension     Intractable abdominal pain 11/10/2018    Menopause     Pancreatitis     PVD (peripheral vascular disease) ([facility] 2021    Type 2 diabetes mellitus with peripheral vascular disease ([facility] 10/14/2019       Past Surgical History:   Procedure Laterality Date    COLONOSCOPY N/A 2020    COLONOSCOPY performed by Rajinder Poole MD at [address]    [address]      HX PARTIAL HYSTERECTOMY      CA ESOPHAGOGASTRODUODENOSCOPY TRANSORAL DIAGNOSTIC  2021         CA SB ENDOSCOPY,W/CONTROL,BLEEDING  2021         CA UNLISTED PROCEDURE ABDOMEN PERITONEUM & OMENTUM  2019    Exploratory laparotomy, extended right hemicolectomy    UPPER GI ENDOSCOPY,BIOPSY  2021            Social History     Tobacco Use    Smoking status: Former     Packs/day: 0.25     Years: 20.00     Pack years: 5.00     Types: Cigarettes     Quit date: 2020     Years since quittin.6    Smokeless tobacco: Never   Vaping Use    Vaping Use: Never used   Substance Use Topics    Alcohol use: Not Currently     Comment: RARE Q 3 MONTHS    Drug use: No       Allergies: Allergies   Allergen Reactions    Lisinopril Swelling    Gabapentin Itching    Morphine Unknown (comments), Hives and Itching    Tramadol Other (comments) and Itching     \"funny feeling\"     Review of Systems   A Review of Systems was reviewed by me today during this encounter.   Pertinent positive and negative elements are noted in the HPI and MDM sections. Review of Systems   Constitutional:  Negative for fatigue and fever. Respiratory:  Positive for chest tightness, shortness of breath and wheezing. Negative for cough. Cardiovascular:  Negative for chest pain, palpitations and leg swelling. Gastrointestinal:  Negative for abdominal pain. All other systems reviewed and are negative. Physical Exam   Vital Signs  Patient Vitals for the past 8 hrs:   Pulse Resp BP SpO2   02/20/23 1130 73 17 (!) 188/79 99 %   02/20/23 0830 89 19 (!) 140/80 100 %          Physical Exam  Vitals and nursing note reviewed. Constitutional:       General: She is not in acute distress. Appearance: Normal appearance. She is well-developed. She is not ill-appearing. HENT:      Head: Normocephalic and atraumatic. Mouth/Throat:      Mouth: Mucous membranes are moist.   Eyes:      General:         Right eye: No discharge. Left eye: No discharge. Conjunctiva/sclera: Conjunctivae normal.   Cardiovascular:      Rate and Rhythm: Normal rate and regular rhythm. Heart sounds: Normal heart sounds. No murmur heard. Pulmonary:      Effort: Pulmonary effort is normal. No respiratory distress. Breath sounds: Normal breath sounds. No wheezing, rhonchi or rales. Comments: Lung sounds are clear, no wheezing or rales by exam  Abdominal:      General: There is no distension. Palpations: Abdomen is soft. Tenderness: There is no abdominal tenderness. Musculoskeletal:         General: No deformity. Normal range of motion. Cervical back: Normal range of motion and neck supple. Skin:     General: Skin is warm and dry. Findings: No rash. Neurological:      General: No focal deficit present. Mental Status: She is alert and oriented to person, place, and time.    Psychiatric:         Speech: Speech normal.         Behavior: Behavior normal.         Cognition and Memory: Cognition normal.       Diagnostic Study Results   Labs  Recent Results (from the past 24 hour(s))   EKG, 12 LEAD, INITIAL    Collection Time: 02/20/23  8:09 AM   Result Value Ref Range    Ventricular Rate 93 BPM    Atrial Rate 93 BPM    P-R Interval 134 ms    QRS Duration 98 ms    Q-T Interval 356 ms    QTC Calculation (Bezet) 442 ms    Calculated P Axis 45 degrees    Calculated R Axis -11 degrees    Calculated T Axis -93 degrees    Diagnosis       Normal sinus rhythm  Voltage criteria for left ventricular hypertrophy  ST & T wave abnormality, consider inferolateral ischemia  When compared with ECG of 08-FEB-2023 14:26,  Nonspecific T wave abnormality has replaced inverted T waves in Anterior   leads     CBC WITH AUTOMATED DIFF    Collection Time: 02/20/23  8:16 AM   Result Value Ref Range    WBC 7.8 3.6 - 11.0 K/uL    RBC 4.20 3.80 - 5.20 M/uL    HGB 10.5 (L) 11.5 - 16.0 g/dL    HCT 34.8 (L) 35.0 - 47.0 %    MCV 82.9 80.0 - 99.0 FL    MCH 25.0 (L) 26.0 - 34.0 PG    MCHC 30.2 30.0 - 36.5 g/dL    RDW 16.7 (H) 11.5 - 14.5 %    PLATELET 373 593 - 064 K/uL    MPV 10.6 8.9 - 12.9 FL    NRBC 0.0 0  WBC    ABSOLUTE NRBC 0.00 0.00 - 0.01 K/uL    NEUTROPHILS 63 32 - 75 %    LYMPHOCYTES 25 12 - 49 %    MONOCYTES 8 5 - 13 %    EOSINOPHILS 3 0 - 7 %    BASOPHILS 1 0 - 1 %    IMMATURE GRANULOCYTES 0 0.0 - 0.5 %    ABS. NEUTROPHILS 4.9 1.8 - 8.0 K/UL    ABS. LYMPHOCYTES 2.0 0.8 - 3.5 K/UL    ABS. MONOCYTES 0.6 0.0 - 1.0 K/UL    ABS. EOSINOPHILS 0.3 0.0 - 0.4 K/UL    ABS. BASOPHILS 0.1 0.0 - 0.1 K/UL    ABS. IMM.  GRANS. 0.0 0.00 - 0.04 K/UL    DF AUTOMATED     METABOLIC PANEL, COMPREHENSIVE    Collection Time: 02/20/23  8:16 AM   Result Value Ref Range    Sodium 136 136 - 145 mmol/L    Potassium 4.3 3.5 - 5.1 mmol/L    Chloride 100 97 - 108 mmol/L    CO2 29 21 - 32 mmol/L    Anion gap 7 5 - 15 mmol/L    Glucose 257 (H) 65 - 100 mg/dL    BUN 12 6 - 20 MG/DL    Creatinine 0.98 0.55 - 1.02 MG/DL    BUN/Creatinine ratio 12 12 - 20 eGFR >60 >60 ml/min/1.73m2    Calcium 10.2 (H) 8.5 - 10.1 MG/DL    Bilirubin, total 0.4 0.2 - 1.0 MG/DL    ALT (SGPT) 21 12 - 78 U/L    AST (SGOT) 35 15 - 37 U/L    Alk. phosphatase 107 45 - 117 U/L    Protein, total 7.7 6.4 - 8.2 g/dL    Albumin 3.6 3.5 - 5.0 g/dL    Globulin 4.1 (H) 2.0 - 4.0 g/dL    A-G Ratio 0.9 (L) 1.1 - 2.2     TROPONIN-HIGH SENSITIVITY    Collection Time: 02/20/23  8:16 AM   Result Value Ref Range    Troponin-High Sensitivity 27 0 - 51 ng/L   NT-PRO BNP    Collection Time: 02/20/23  8:16 AM   Result Value Ref Range    NT pro- (H) <125 PG/ML   EKG, 12 LEAD, SUBSEQUENT    Collection Time: 02/20/23  9:50 AM   Result Value Ref Range    Ventricular Rate 70 BPM    Atrial Rate 70 BPM    P-R Interval 144 ms    QRS Duration 96 ms    Q-T Interval 422 ms    QTC Calculation (Bezet) 455 ms    Calculated P Axis 38 degrees    Calculated R Axis -15 degrees    Calculated T Axis -69 degrees    Diagnosis       Normal sinus rhythm  Voltage criteria for left ventricular hypertrophy  T wave abnormality, consider inferior ischemia  T wave abnormality, consider anterolateral ischemia  When compared with ECG of 20-FEB-2023 08:09,  MANUAL COMPARISON REQUIRED, DATA IS UNCONFIRMED     TROPONIN-HIGH SENSITIVITY    Collection Time: 02/20/23 10:50 AM   Result Value Ref Range    Troponin-High Sensitivity 27 0 - 51 ng/L       ==============================================================    Radiologic Studies  CT Results  (Last 48 hours)      None          CXR Results  (Last 48 hours)                 02/20/23 0843  XR CHEST PA LAT Final result    Impression:      Normal PA and lateral chest views. Narrative:  EXAM: XR CHEST PA LAT       INDICATION: Shortness of breath       COMPARISON: None       TECHNIQUE: PA and lateral chest views       FINDINGS: Cervical spine hardware. Abdominal aortic stent. Coronary artery   stent. The cardiac size is within normal limits.  The pulmonary vasculature is   within normal limits. The lungs and pleural spaces are clear. The visualized bones and upper abdomen   are age-appropriate. Critical Care and Billable Procedures   EKG reviewed by ED Physician Eliza Armijo in the absence of a cardiologist: Yes  EKG below was independently interpreted by ritchie Dyer MD)    EKG    Date/Time: 2/20/2023 8:44 AM  Performed by: Gaurang Huffman MD  Authorized by: Gaurang Huffman MD     ECG reviewed by ED Physician in the absence of a cardiologist: yes    Interpretation:     Interpretation: non-specific    Rate:     ECG rate assessment: normal    Rhythm:     Rhythm: sinus rhythm    Ectopy:     Ectopy: none    QRS:     QRS axis:  Normal  ST segments:     ST segments:  Normal  T waves:     T waves: inverted      Medical Decision Making   I reviewed the patient's most recent Emergency Dept notes and diagnostic tests in formulating my MDM on today's visit. Medications administered during ED course:  Medications - No data to display  Medical Decision Making // ED Course // Reassessment:  Pamela Ramirez, 74 y.o. female with history of coronary artery disease, PCI and stents 2 weeks ago, asthma, tobacco abuse, current smoker, complains of shortness of breath since last night. She feels winded with chest tightness and mild wheezing, no coughing or fevers or sputum. She does not endorse any chest pain. On examination she has normal vital signs, normal oxygenation, no tachycardia. Lung sounds are clear, no wheezing or rhonchi is noted. No signs of edema, no rales, no leg swelling. BNP is minimally elevated. Serial troponins are unremarkable, negative. Chest x-ray is clear, no significant edema or infiltrate. Stable for discharge home, seems to be likely mild asthma exacerbation which improved while she was in the ED.       Radiology results independently interpreted by me: Chest x-ray, clear no infiltrate or edema    External Records reviewed: Prior medical records and Previous Laboratory studies    Consults:  None    Tests considered but not performed: CTA to rule out PE, D-dimer    Differential Diagnoses ruled out: Pulmonary embolism, pneumonia, hypoxic respiratory failure, pulmonary edema, ACS, MI    Final Diagnosis:   1. Moderate persistent asthma with acute exacerbation    2. Non-cardiac chest pain        Additional documentation if relevant for this encounter     Critical Care time exclusive of other billable procedures:    HEART score if relevant:  5    Tobacco use and counseling:  Current tobacco user, cigarette smoker    Smoking Cessation Discussion? TOBACCO COUNSELING:  Upon evaluation, the patient expressed that they are a current tobacco user. For 4 minutes, I counseled the patient on the hazards of smoking. The patient was encouraged to quit as soon as possible in order to decrease further risks to their health. We discussed nicotine replacement therapies and medical treatment options to decrease cravings and improve chances of success quitting. The patient has conveyed their understanding of the risks involved should they continue to use tobacco products. Chano Welch MD      Shared decision making:     Diagnosis and Disposition     Disposition:  Discharged    Final Diagnosis:   1. Moderate persistent asthma with acute exacerbation    2. Non-cardiac chest pain        Discharge Medication List as of 2/20/2023 11:25 AM           Follow up: Follow-up Information       Follow up With Specialties Details Why Contact Info    Matt Ceballos MD Internal Medicine Physician   [phone]  [phone]      [facility] EMERGENCY DEPT Emergency Medicine   [address] [facility]   [place] [id number] [phone]          Disclaimers   I was the first provider for this patient on this visit. To the best of my ability I reviewed relevant prior medical records, electrocardiograms, laboratories, and radiologic studies. The patient's presenting problems were discussed, and the patient was in agreement with the care plan formulated and outlined with them. Please note that this dictation was completed with Dragon voice recognition software. Quite often unanticipated grammatical, syntax, homophones, and other interpretive errors are inadvertently transcribed by the computer software. Please disregard these errors and excuse any errors that have escaped final proofreading. Milly Park MD    I personally performed the services described in this documentation on this date 2/20/2023 for patient Wayne Harris.       Milly Park MD  8:43 AM

## 2023-02-20 NOTE — DISCHARGE INSTRUCTIONS
I would recommend you increase your prednisone to 20 mg a day for the next 3 to 4 days then resume your normal 10 mg daily dosing. You can continue to use your inhaler or nebulizer machine as needed. Please be reassured that your testing today is reassuring . It was a pleasure taking care of you at [facility] Emergency Department today. We know that when you come to [facility], you are entrusting us with your health, comfort, and safety. Our physicians and nurses honor that trust, and we truly appreciate the opportunity to care for you and your loved ones. We also value your feedback. If you receive a survey about your Emergency Department experience today, please fill it out. We care about our patients' feedback, and we listen to what you have to say. Thank you!

## 2023-02-21 NOTE — PROGRESS NOTES
Ms. Mariano Padilla is a 74 y.o. female who is here for evaluation of   Chief Complaint   Patient presents with    Fatigue     C/O increased fatigue over the past 3 days.  went to [facility] for SOB on Monday.  had stents placed on 2/8/2023. States frequently can only walk short distances before having to sit down and rest    Diabetes     Has new glucometer - set up for her today. Using her machine and supplies todays reading it - 237   . ASSESSMENT AND PLAN:    1. BECKER (dyspnea on exertion)  Her lung exam is clear today and her pulse is regular. We will repeat her BNP and check her hemoglobin as well as her TSH. She is not hypoxic nor tachycardic.  - NT-PRO BNP; Future  - NT-PRO BNP    2. Essential hypertension  Currently at goal  - CBC WITH AUTOMATED DIFF; Future  - METABOLIC PANEL, COMPREHENSIVE; Future  - TSH 3RD GENERATION; Future  - TSH 3RD GENERATION  - METABOLIC PANEL, COMPREHENSIVE  - CBC WITH AUTOMATED DIFF    3. Iron deficiency anemia, unspecified iron deficiency anemia type  Checking CBC    4. Type 2 diabetes mellitus with hyperglycemia, with long-term current use of insulin ([facility])  5. Moderate persistent asthma without complication  - Nebulizer & Compressor machine; 1 Each by Does Not Apply route three (3) times daily.  Indications: J44.9  Dispense: 1 Each; Refill: 0      Orders Placed This Encounter    NT-PRO BNP     Standing Status:   Future     Number of Occurrences:   1     Standing Expiration Date:   3/10/2023    CBC WITH AUTOMATED DIFF     Standing Status:   Future     Number of Occurrences:   1     Standing Expiration Date:   5/04/0444    METABOLIC PANEL, COMPREHENSIVE     Standing Status:   Future     Number of Occurrences:   1     Standing Expiration Date:   3/10/2023    TSH 3RD GENERATION     Standing Status:   Future     Number of Occurrences:   1     Standing Expiration Date:   3/10/2023    albuterol (PROVENTIL HFA, VENTOLIN HFA, PROAIR HFA) 90 mcg/actuation inhaler     Sig: Take 2 Puffs by inhalation every four (4) hours as needed for Wheezing. Dispense:  1 Each     Refill:  0    Nebulizer & Compressor machine     Si Each by Does Not Apply route three (3) times daily. Indications: J44.9     Dispense:  1 Each     Refill:  0           HPI  44-year-old woman who was seen in the emergency room on  at [facility] with a diagnosis of asthma with exacerbation arrives to clinic today for evaluation. Her chest x-ray was unremarkable. Her BNP was slightly elevated but her troponins were negative. Primary complaint today is fatigue. On  she underwent cardiac catheterization and had 4 sequential stents placed with suboptimal flow. Nevertheless when seen in clinic on  she reported feeling much better with no further chest pain. She was placed on dual antiplatelet therapy and is followed by Dr. Sepideh Teague and NP Kalia Joshua. ROS:  Denies  fever, chills, cough, chest pain, SOB,  nausea, vomiting, or diarrhea. Denies wt loss, wt gain, hemoptysis, hematochezia or melena. Physical Examination:    Visit Vitals  /72 (BP 1 Location: Left upper arm, BP Patient Position: Sitting, BP Cuff Size: Adult long)   Pulse 65   Resp 18   Ht 5' 5\" (1.651 m)   Wt 169 lb (76.7 kg)   SpO2 98%   BMI 28.12 kg/m²      General:  Alert, cooperative, no distress. Head:  Normocephalic, without obvious abnormality, atraumatic. Eyes:  Conjunctivae/corneas clear. Pupils equal, round, reactive to light. Extraocular movements intact. Lungs:   Clear to auscultation bilaterally. Chest wall:  No tenderness or deformity. Cardiac:  RRR   Abdomen:   Soft, non-tender. Bowel sounds normal. No masses. No organomegaly. Extremities: Extremities normal, atraumatic, no cyanosis or edema. Pulses: 2+ and symmetric all extremities. Skin: Skin color, texture, turgor normal. No rashes or lesions.    Lymph nodes: Cervical, supraclavicular, and axillary nodes normal. Neurologic: CNII-XII intact. Normal strength, sensation, and reflexes throughout. On this date 02/24/2023 I have spent 30 minutes reviewing previous notes, test results and face to face with the patient discussing the diagnosis and importance of compliance with the treatment plan as well as documenting on the day of the visit.     Mervyn Burkitt MD FACP    (signed electronically) on 2/24/2023 at 8:06 AM

## 2023-02-24 ENCOUNTER — OFFICE VISIT (OUTPATIENT)
Dept: FAMILY MEDICINE CLINIC | Age: 68
End: 2023-02-24
Payer: MEDICARE

## 2023-02-24 VITALS
OXYGEN SATURATION: 98 % | WEIGHT: 169 LBS | HEIGHT: 65 IN | RESPIRATION RATE: 18 BRPM | BODY MASS INDEX: 28.16 KG/M2 | SYSTOLIC BLOOD PRESSURE: 132 MMHG | DIASTOLIC BLOOD PRESSURE: 72 MMHG | HEART RATE: 65 BPM

## 2023-02-24 DIAGNOSIS — R06.09 DOE (DYSPNEA ON EXERTION): Primary | ICD-10-CM

## 2023-02-24 DIAGNOSIS — D50.9 IRON DEFICIENCY ANEMIA, UNSPECIFIED IRON DEFICIENCY ANEMIA TYPE: ICD-10-CM

## 2023-02-24 DIAGNOSIS — Z79.4 TYPE 2 DIABETES MELLITUS WITH HYPERGLYCEMIA, WITH LONG-TERM CURRENT USE OF INSULIN (HCC): ICD-10-CM

## 2023-02-24 DIAGNOSIS — E11.65 TYPE 2 DIABETES MELLITUS WITH HYPERGLYCEMIA, WITH LONG-TERM CURRENT USE OF INSULIN (HCC): ICD-10-CM

## 2023-02-24 DIAGNOSIS — J45.40 MODERATE PERSISTENT ASTHMA WITHOUT COMPLICATION: ICD-10-CM

## 2023-02-24 DIAGNOSIS — I10 ESSENTIAL HYPERTENSION: ICD-10-CM

## 2023-02-24 PROCEDURE — 1123F ACP DISCUSS/DSCN MKR DOCD: CPT | Performed by: INTERNAL MEDICINE

## 2023-02-24 PROCEDURE — G8427 DOCREV CUR MEDS BY ELIG CLIN: HCPCS | Performed by: INTERNAL MEDICINE

## 2023-02-24 PROCEDURE — G9899 SCRN MAM PERF RSLTS DOC: HCPCS | Performed by: INTERNAL MEDICINE

## 2023-02-24 PROCEDURE — 3046F HEMOGLOBIN A1C LEVEL >9.0%: CPT | Performed by: INTERNAL MEDICINE

## 2023-02-24 PROCEDURE — G8432 DEP SCR NOT DOC, RNG: HCPCS | Performed by: INTERNAL MEDICINE

## 2023-02-24 PROCEDURE — G8536 NO DOC ELDER MAL SCRN: HCPCS | Performed by: INTERNAL MEDICINE

## 2023-02-24 PROCEDURE — 3078F DIAST BP <80 MM HG: CPT | Performed by: INTERNAL MEDICINE

## 2023-02-24 PROCEDURE — G8417 CALC BMI ABV UP PARAM F/U: HCPCS | Performed by: INTERNAL MEDICINE

## 2023-02-24 PROCEDURE — 2022F DILAT RTA XM EVC RTNOPTHY: CPT | Performed by: INTERNAL MEDICINE

## 2023-02-24 PROCEDURE — 1090F PRES/ABSN URINE INCON ASSESS: CPT | Performed by: INTERNAL MEDICINE

## 2023-02-24 PROCEDURE — 3017F COLORECTAL CA SCREEN DOC REV: CPT | Performed by: INTERNAL MEDICINE

## 2023-02-24 PROCEDURE — G8400 PT W/DXA NO RESULTS DOC: HCPCS | Performed by: INTERNAL MEDICINE

## 2023-02-24 PROCEDURE — 1101F PT FALLS ASSESS-DOCD LE1/YR: CPT | Performed by: INTERNAL MEDICINE

## 2023-02-24 PROCEDURE — 3075F SYST BP GE 130 - 139MM HG: CPT | Performed by: INTERNAL MEDICINE

## 2023-02-24 PROCEDURE — 99214 OFFICE O/P EST MOD 30 MIN: CPT | Performed by: INTERNAL MEDICINE

## 2023-02-24 RX ORDER — ALBUTEROL SULFATE 90 UG/1
2 AEROSOL, METERED RESPIRATORY (INHALATION)
Qty: 1 EACH | Refills: 0 | Status: SHIPPED | OUTPATIENT
Start: 2023-02-24

## 2023-02-24 RX ORDER — NEBULIZER AND COMPRESSOR
1 EACH MISCELLANEOUS 3 TIMES DAILY
Qty: 1 EACH | Refills: 0 | Status: SHIPPED | OUTPATIENT
Start: 2023-02-24

## 2023-02-24 NOTE — PROGRESS NOTES
Cee Chavez is a 74 y.o. female presenting for/with:    Chief Complaint   Patient presents with    Fatigue     C/O increased fatigue over the past 3 days.  went to [facility] for SOB on Monday.  had stents placed on 2/8/2023.  frequently can only walk short distances before having to sit down and rest    Diabetes     Has new glucometer - set up for her today. Using her machine and supplies todays reading it - 237       Visit Vitals  /72 (BP 1 Location: Left upper arm, BP Patient Position: Sitting, BP Cuff Size: Adult long)   Pulse 65   Resp 18   Ht 5' 5\" (1.651 m)   Wt 169 lb (76.7 kg)   SpO2 98%   BMI 28.12 kg/m²     Pain Scale: 0 - No pain/10  Pain Location:     1. \"Have you been to the ER, urgent care clinic since your last visit? Hospitalized since your last visit? \" No    2. \"Have you seen or consulted any other health care providers outside of the [address] [place] [address] since your last visit? \" No     3. For patients aged 39-70: Has the patient had a colonoscopy / FIT/ Cologuard? Yes - no Care Gap present      If the patient is female:    4. For patients aged 41-77: Has the patient had a mammogram within the past 2 years? Yes - no Care Gap present      5. For patients aged 21-65: Has the patient had a pap smear? Yes - Care Gap present. Most recent result on file      Learning Assessment 1/30/2023   PRIMARY LEARNER Patient   CO-LEARNER CAREGIVER No   PRIMARY LANGUAGE ENGLISH   LEARNER PREFERENCE PRIMARY DEMONSTRATION     -   ANSWERED BY pt   RELATIONSHIP SELF     Fall Risk Assessment, last 12 mths 1/27/2023   Able to walk? Yes   Fall in past 12 months? 0   Do you feel unsteady? 0   Are you worried about falling 0   Number of falls in past 12 months -   Fall with injury?  -       3 most recent PHQ Screens 1/27/2023   Little interest or pleasure in doing things Not at all   Feeling down, depressed, irritable, or hopeless Not at all   Total Score PHQ 2 0   Trouble falling or staying asleep, or sleeping too much -   Feeling tired or having little energy -   Poor appetite, weight loss, or overeating -   Feeling bad about yourself - or that you are a failure or have let yourself or your family down -   Trouble concentrating on things such as school, work, reading, or watching TV -   Moving or speaking so slowly that other people could have noticed; or the opposite being so fidgety that others notice -   How difficult have these problems made it for you to do your work, take care of your home and get along with others -     Abuse Screening Questionnaire 1/30/2023   Do you ever feel afraid of your partner? N   Are you in a relationship with someone who physically or mentally threatens you? N   Is it safe for you to go home?  Y       ADL Assessment 1/27/2023   Feeding yourself No Help Needed   Getting from bed to chair No Help Needed   Getting dressed No Help Needed   Bathing or showering No Help Needed   Walk across the room (includes cane/walker) No Help Needed   Using the telphone No Help Needed   Taking your medications No Help Needed   Preparing meals No Help Needed   Managing money (expenses/bills) No Help Needed   Moderately strenuous housework (laundry) No Help Needed   Shopping for personal items (toiletries/medicines) No Help Needed   Shopping for groceries No Help Needed   Driving No Help Needed   Climbing a flight of stairs No Help Needed   Getting to places beyond walking distances No Help Needed      Advance Care Planning 1/27/2023   Patient's Healthcare Decision Maker is: Legal Next of Raulito 71   Primary Decision Maker Name -   Primary Decision Maker Relationship to Patient -   Confirm Advance Directive None   Patient Would Like to Complete Advance Directive No   Does the patient have other document types -

## 2023-02-25 LAB
ALBUMIN SERPL-MCNC: 3.4 G/DL (ref 3.5–5)
ALBUMIN/GLOB SERPL: 0.9 (ref 1.1–2.2)
ALP SERPL-CCNC: 94 U/L (ref 45–117)
ALT SERPL-CCNC: 21 U/L (ref 12–78)
ANION GAP SERPL CALC-SCNC: 7 MMOL/L (ref 5–15)
AST SERPL-CCNC: 16 U/L (ref 15–37)
BASOPHILS # BLD: 0.1 K/UL (ref 0–0.1)
BASOPHILS NFR BLD: 1 % (ref 0–1)
BILIRUB SERPL-MCNC: 0.1 MG/DL (ref 0.2–1)
BNP SERPL-MCNC: 377 PG/ML
BUN SERPL-MCNC: 15 MG/DL (ref 6–20)
BUN/CREAT SERPL: 15 (ref 12–20)
CALCIUM SERPL-MCNC: 10.1 MG/DL (ref 8.5–10.1)
CHLORIDE SERPL-SCNC: 99 MMOL/L (ref 97–108)
CO2 SERPL-SCNC: 31 MMOL/L (ref 21–32)
CREAT SERPL-MCNC: 1 MG/DL (ref 0.55–1.02)
DIFFERENTIAL METHOD BLD: ABNORMAL
EOSINOPHIL # BLD: 0.2 K/UL (ref 0–0.4)
EOSINOPHIL NFR BLD: 3 % (ref 0–7)
ERYTHROCYTE [DISTWIDTH] IN BLOOD BY AUTOMATED COUNT: 15.6 % (ref 11.5–14.5)
GLOBULIN SER CALC-MCNC: 3.6 G/DL (ref 2–4)
GLUCOSE SERPL-MCNC: 242 MG/DL (ref 65–100)
HCT VFR BLD AUTO: 30.9 % (ref 35–47)
HGB BLD-MCNC: 9.3 G/DL (ref 11.5–16)
IMM GRANULOCYTES # BLD AUTO: 0 K/UL (ref 0–0.04)
IMM GRANULOCYTES NFR BLD AUTO: 0 % (ref 0–0.5)
LYMPHOCYTES # BLD: 2 K/UL (ref 0.8–3.5)
LYMPHOCYTES NFR BLD: 31 % (ref 12–49)
MCH RBC QN AUTO: 24.8 PG (ref 26–34)
MCHC RBC AUTO-ENTMCNC: 30.1 G/DL (ref 30–36.5)
MCV RBC AUTO: 82.4 FL (ref 80–99)
MONOCYTES # BLD: 0.5 K/UL (ref 0–1)
MONOCYTES NFR BLD: 7 % (ref 5–13)
NEUTS SEG # BLD: 3.6 K/UL (ref 1.8–8)
NEUTS SEG NFR BLD: 58 % (ref 32–75)
NRBC # BLD: 0 K/UL (ref 0–0.01)
NRBC BLD-RTO: 0 PER 100 WBC
PLATELET # BLD AUTO: 315 K/UL (ref 150–400)
PMV BLD AUTO: 11.6 FL (ref 8.9–12.9)
POTASSIUM SERPL-SCNC: 3.4 MMOL/L (ref 3.5–5.1)
PROT SERPL-MCNC: 7 G/DL (ref 6.4–8.2)
RBC # BLD AUTO: 3.75 M/UL (ref 3.8–5.2)
SODIUM SERPL-SCNC: 137 MMOL/L (ref 136–145)
TSH SERPL DL<=0.05 MIU/L-ACNC: 0.24 UIU/ML (ref 0.36–3.74)
WBC # BLD AUTO: 6.4 K/UL (ref 3.6–11)

## 2023-02-27 DIAGNOSIS — I10 ESSENTIAL HYPERTENSION: Primary | ICD-10-CM

## 2023-02-27 RX ORDER — POTASSIUM CHLORIDE 750 MG/1
10 TABLET, EXTENDED RELEASE ORAL DAILY
Qty: 90 TABLET | Refills: 4 | Status: SHIPPED | OUTPATIENT
Start: 2023-02-27

## 2023-03-06 NOTE — PROGRESS NOTES
Cardiac Cath Lab Procedure Area Arrival Note:    Hang Da Silva arrived to Cardiac Cath Lab, Procedure Area. Patient identifiers verified with NAME and DATE OF BIRTH. Procedure verified with patient. Consent forms verified. Allergies verified. Patient informed of procedure and plan of care. Questions answered with review. Patient voiced understanding of procedure and plan of care. Patient on cardiac monitor, non-invasive blood pressure, SPO2 monitor. On oxygen or O2 @ 2 lpm via nc. IV of ns on pump at 50 ml/hr. Patient status doing well without problems. Patient is A&Ox 4. Patient reports no complaints. Patient medicated during procedure with orders obtained and verified by Dr. Jaci Sharif. Refer to patients Cardiac Cath Lab PROCEDURE REPORT for vital signs, assessment, status, and response during procedure, printed at end of case. Printed report on chart or scanned into chart. Patient Education        Endometrial Biopsy: About This Test  What is it? An endometrial biopsy is a way for your doctor to take a small sample of the lining of the uterus (endometrium). The sample is looked at under a microscope for abnormal cells. An endometrial biopsy helps your doctor find problems in the endometrium. Why is this test done? An endometrial biopsy is done to check for abnormal cells in the lining of the uterus (endometrium). It checks for precancerous and cancerous cells. It may be done if you are at higher risk for uterine cancer or have symptoms of uterine cancer, such as abnormal bleeding from your uterus. How do you prepare for the test?  If you take aspirin or some other blood thinner, ask your doctor if you should stop taking it before your test. Make sure that you understand exactly what your doctor wants you to do. These medicines increase the risk of bleeding. Ask your doctor if you should take a pain reliever, such as ibuprofen (Advil or Motrin), 30 to 60 minutes before the test. This can help reduce any cramping pain that the test can cause. How is the test done? You will lie on an exam table. Your feet will be in footrests. The doctor will use a tool called a speculum to see the cervix. The doctor may use a spray or injection to numb your cervix. The cervix is the opening to the uterus. Then the doctor will pass a thin tube through the cervix to take a sample of the uterus lining. The sample is sent to a lab. How long does the test take? The test will take about 5 to 15 minutes. What happens after the test?  You will probably be able to go home right away. You likely will have mild vaginal bleeding and may have cramps for a few days after the test. The cramps may feel like menstrual cramps. How can you care for yourself at home?   Ask your doctor if you can take an over-the-counter pain medicine, such as acetaminophen (Tylenol), ibuprofen (Advil, Motrin), or naproxen (Aleve). Be safe with medicines. Read and follow all instructions on the label. Use pads for vaginal bleeding or discharge. You may return to all your usual activities (including sex) when you feel like it. Follow-up care is a key part of your treatment and safety. Be sure to make and go to all appointments, and call your doctor if you are having problems. It's also a good idea to keep a list of the medicines you take. Ask your doctor when you can expect to have your test results. Where can you learn more? Go to http://www.woods.com/ and enter V957 to learn more about \"Endometrial Biopsy: About This Test.\"  Current as of: August 2, 2022               Content Version: 13.5  © 2006-2022 [employer]. Care instructions adapted under license by [facility] ([facility]). If you have questions about a medical condition or this instruction, always ask your healthcare professional. [place] [address] any warranty or liability for your use of this information.

## 2023-03-11 DIAGNOSIS — I10 ESSENTIAL HYPERTENSION: ICD-10-CM

## 2023-03-11 RX ORDER — LABETALOL 100 MG/1
TABLET, FILM COATED ORAL
Qty: 180 TABLET | Refills: 4 | Status: SHIPPED | OUTPATIENT
Start: 2023-03-11

## 2023-03-11 RX ORDER — AMLODIPINE BESYLATE 10 MG/1
TABLET ORAL
Qty: 90 TABLET | Refills: 5 | Status: SHIPPED | OUTPATIENT
Start: 2023-03-11
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
## 2023-03-15 ENCOUNTER — OFFICE VISIT (OUTPATIENT)
Dept: CARDIOLOGY CLINIC | Age: 68
End: 2023-03-15
Payer: MEDICARE

## 2023-03-15 VITALS
HEART RATE: 71 BPM | WEIGHT: 172 LBS | BODY MASS INDEX: 28.66 KG/M2 | OXYGEN SATURATION: 97 % | TEMPERATURE: 97.4 F | RESPIRATION RATE: 20 BRPM | DIASTOLIC BLOOD PRESSURE: 82 MMHG | HEIGHT: 65 IN | SYSTOLIC BLOOD PRESSURE: 138 MMHG

## 2023-03-15 DIAGNOSIS — Z95.5 S/P DRUG ELUTING CORONARY STENT PLACEMENT: ICD-10-CM

## 2023-03-15 DIAGNOSIS — I25.9 CHRONIC ISCHEMIC HEART DISEASE: ICD-10-CM

## 2023-03-15 DIAGNOSIS — Z98.61 S/P PTCA (PERCUTANEOUS TRANSLUMINAL CORONARY ANGIOPLASTY): ICD-10-CM

## 2023-03-15 DIAGNOSIS — E78.2 MIXED HYPERLIPIDEMIA: ICD-10-CM

## 2023-03-15 DIAGNOSIS — I25.10 CORONARY ARTERY DISEASE INVOLVING NATIVE CORONARY ARTERY OF NATIVE HEART WITHOUT ANGINA PECTORIS: ICD-10-CM

## 2023-03-15 DIAGNOSIS — Z79.4 TYPE 2 DIABETES MELLITUS WITHOUT COMPLICATION, WITH LONG-TERM CURRENT USE OF INSULIN (HCC): ICD-10-CM

## 2023-03-15 DIAGNOSIS — Z98.890 S/P CARDIAC CATHETERIZATION: Primary | ICD-10-CM

## 2023-03-15 DIAGNOSIS — I10 PRIMARY HYPERTENSION: ICD-10-CM

## 2023-03-15 DIAGNOSIS — E11.9 TYPE 2 DIABETES MELLITUS WITHOUT COMPLICATION, WITH LONG-TERM CURRENT USE OF INSULIN (HCC): ICD-10-CM

## 2023-03-15 PROCEDURE — G8427 DOCREV CUR MEDS BY ELIG CLIN: HCPCS | Performed by: NURSE PRACTITIONER

## 2023-03-15 PROCEDURE — 99214 OFFICE O/P EST MOD 30 MIN: CPT | Performed by: NURSE PRACTITIONER

## 2023-03-15 PROCEDURE — G9899 SCRN MAM PERF RSLTS DOC: HCPCS | Performed by: NURSE PRACTITIONER

## 2023-03-15 PROCEDURE — 1123F ACP DISCUSS/DSCN MKR DOCD: CPT | Performed by: NURSE PRACTITIONER

## 2023-03-15 PROCEDURE — G8417 CALC BMI ABV UP PARAM F/U: HCPCS | Performed by: NURSE PRACTITIONER

## 2023-03-15 PROCEDURE — G8536 NO DOC ELDER MAL SCRN: HCPCS | Performed by: NURSE PRACTITIONER

## 2023-03-15 PROCEDURE — 1101F PT FALLS ASSESS-DOCD LE1/YR: CPT | Performed by: NURSE PRACTITIONER

## 2023-03-15 PROCEDURE — 3075F SYST BP GE 130 - 139MM HG: CPT | Performed by: NURSE PRACTITIONER

## 2023-03-15 PROCEDURE — 2022F DILAT RTA XM EVC RTNOPTHY: CPT | Performed by: NURSE PRACTITIONER

## 2023-03-15 PROCEDURE — 1090F PRES/ABSN URINE INCON ASSESS: CPT | Performed by: NURSE PRACTITIONER

## 2023-03-15 PROCEDURE — 3017F COLORECTAL CA SCREEN DOC REV: CPT | Performed by: NURSE PRACTITIONER

## 2023-03-15 PROCEDURE — 3079F DIAST BP 80-89 MM HG: CPT | Performed by: NURSE PRACTITIONER

## 2023-03-15 PROCEDURE — G8432 DEP SCR NOT DOC, RNG: HCPCS | Performed by: NURSE PRACTITIONER

## 2023-03-15 PROCEDURE — G8400 PT W/DXA NO RESULTS DOC: HCPCS | Performed by: NURSE PRACTITIONER

## 2023-03-15 PROCEDURE — 3046F HEMOGLOBIN A1C LEVEL >9.0%: CPT | Performed by: NURSE PRACTITIONER

## 2023-03-15 NOTE — PROGRESS NOTES
Identified pt with two pt identifiers(name and ). Reviewed record in preparation for visit and have obtained necessary documentation. Chief Complaint   Patient presents with    Coronary Artery Disease     1 month follow up    Cardiomyopathy    CHF    Hypertension    Cholesterol Problem      Vitals:    03/15/23 1432   BP: 138/82   Pulse: 71   Resp: 20   Temp: 97.4 °F (36.3 °C)   TempSrc: Temporal   SpO2: 97%   Weight: 172 lb (78 kg)   Height: 5' 5\" (1.651 m)   PainSc:   0 - No pain       Medications reviewed/approved by provider. Health Maintenance Review: Patient reminded of \"due or due soon\" health maintenance. I have asked the patient to contact his/her primary care provider (PCP) for follow-up on his/her health maintenance. Coordination of Care Questionnaire:  :   1) Have you been to an emergency room, urgent care, or hospitalized since your last visit? If yes, where when, and reason for visit? Yes  2023 [facility]        2. Have seen or consulted any other health care provider since your last visit? If yes, where when, and reason for visit? NO      Patient is accompanied by self I have received verbal consent from Jan Dee to discuss any/all medical information while they are present in the room.

## 2023-03-17 ENCOUNTER — ANESTHESIA (OUTPATIENT)
Dept: ENDOSCOPY | Age: 68
End: 2023-03-17
Payer: MEDICARE

## 2023-03-17 ENCOUNTER — HOSPITAL ENCOUNTER (OUTPATIENT)
Age: 68
Setting detail: OUTPATIENT SURGERY
Discharge: HOME | End: 2023-03-17
Attending: INTERNAL MEDICINE | Admitting: INTERNAL MEDICINE
Payer: MEDICARE

## 2023-03-17 ENCOUNTER — ANESTHESIA EVENT (OUTPATIENT)
Dept: ENDOSCOPY | Age: 68
End: 2023-03-17
Payer: MEDICARE

## 2023-03-17 VITALS
HEIGHT: 65 IN | TEMPERATURE: 97.2 F | OXYGEN SATURATION: 100 % | HEART RATE: 66 BPM | SYSTOLIC BLOOD PRESSURE: 152 MMHG | DIASTOLIC BLOOD PRESSURE: 49 MMHG | BODY MASS INDEX: 28.82 KG/M2 | WEIGHT: 173 LBS | RESPIRATION RATE: 20 BRPM

## 2023-03-17 LAB
GLUCOSE BLD STRIP.AUTO-MCNC: 171 MG/DL (ref 65–117)
GLUCOSE BLD STRIP.AUTO-MCNC: 181 MG/DL (ref 65–117)
SERVICE CMNT-IMP: ABNORMAL
SERVICE CMNT-IMP: ABNORMAL

## 2023-03-17 PROCEDURE — 88342 IMHCHEM/IMCYTCHM 1ST ANTB: CPT

## 2023-03-17 PROCEDURE — 2709999900 HC NON-CHARGEABLE SUPPLY: Performed by: INTERNAL MEDICINE

## 2023-03-17 PROCEDURE — 76060000032 HC ANESTHESIA 0.5 TO 1 HR: Performed by: INTERNAL MEDICINE

## 2023-03-17 PROCEDURE — 88305 TISSUE EXAM BY PATHOLOGIST: CPT

## 2023-03-17 PROCEDURE — 74011000250 HC RX REV CODE- 250: Performed by: NURSE ANESTHETIST, CERTIFIED REGISTERED

## 2023-03-17 PROCEDURE — 82962 GLUCOSE BLOOD TEST: CPT

## 2023-03-17 PROCEDURE — 74011250636 HC RX REV CODE- 250/636: Performed by: INTERNAL MEDICINE

## 2023-03-17 PROCEDURE — 77030003657 HC NDL SCLER BSC -B: Performed by: INTERNAL MEDICINE

## 2023-03-17 PROCEDURE — 76040000007: Performed by: INTERNAL MEDICINE

## 2023-03-17 PROCEDURE — 74011250636 HC RX REV CODE- 250/636: Performed by: NURSE ANESTHETIST, CERTIFIED REGISTERED

## 2023-03-17 PROCEDURE — 77030019988 HC FCPS ENDOSC DISP BSC -B: Performed by: INTERNAL MEDICINE

## 2023-03-17 RX ORDER — MIDAZOLAM HYDROCHLORIDE 1 MG/ML
.25-5 INJECTION, SOLUTION INTRAMUSCULAR; INTRAVENOUS
Status: DISCONTINUED | OUTPATIENT
Start: 2023-03-17 | End: 2023-03-17 | Stop reason: HOSPADM

## 2023-03-17 RX ORDER — DEXTROMETHORPHAN/PSEUDOEPHED 2.5-7.5/.8
1.2 DROPS ORAL
Status: DISCONTINUED | OUTPATIENT
Start: 2023-03-17 | End: 2023-03-17 | Stop reason: HOSPADM

## 2023-03-17 RX ORDER — NALOXONE HYDROCHLORIDE 0.4 MG/ML
0.4 INJECTION, SOLUTION INTRAMUSCULAR; INTRAVENOUS; SUBCUTANEOUS
Status: DISCONTINUED | OUTPATIENT
Start: 2023-03-17 | End: 2023-03-17 | Stop reason: HOSPADM

## 2023-03-17 RX ORDER — FENTANYL CITRATE 50 UG/ML
25 INJECTION, SOLUTION INTRAMUSCULAR; INTRAVENOUS
Status: DISCONTINUED | OUTPATIENT
Start: 2023-03-17 | End: 2023-03-17 | Stop reason: HOSPADM

## 2023-03-17 RX ORDER — SODIUM CHLORIDE 0.9 % (FLUSH) 0.9 %
5-40 SYRINGE (ML) INJECTION EVERY 8 HOURS
Status: DISCONTINUED | OUTPATIENT
Start: 2023-03-17 | End: 2023-03-17 | Stop reason: HOSPADM

## 2023-03-17 RX ORDER — EPINEPHRINE 0.1 MG/ML
1 INJECTION INTRACARDIAC; INTRAVENOUS
Status: DISCONTINUED | OUTPATIENT
Start: 2023-03-17 | End: 2023-03-17 | Stop reason: HOSPADM

## 2023-03-17 RX ORDER — SODIUM CHLORIDE 0.9 % (FLUSH) 0.9 %
5-40 SYRINGE (ML) INJECTION AS NEEDED
Status: DISCONTINUED | OUTPATIENT
Start: 2023-03-17 | End: 2023-03-17 | Stop reason: HOSPADM

## 2023-03-17 RX ORDER — FLUMAZENIL 0.1 MG/ML
0.2 INJECTION INTRAVENOUS
Status: DISCONTINUED | OUTPATIENT
Start: 2023-03-17 | End: 2023-03-17 | Stop reason: HOSPADM

## 2023-03-17 RX ORDER — LIDOCAINE HYDROCHLORIDE 20 MG/ML
INJECTION, SOLUTION EPIDURAL; INFILTRATION; INTRACAUDAL; PERINEURAL AS NEEDED
Status: DISCONTINUED | OUTPATIENT
Start: 2023-03-17 | End: 2023-03-17 | Stop reason: HOSPADM

## 2023-03-17 RX ORDER — ATROPINE SULFATE 0.1 MG/ML
0.5 INJECTION INTRAVENOUS
Status: DISCONTINUED | OUTPATIENT
Start: 2023-03-17 | End: 2023-03-17 | Stop reason: HOSPADM

## 2023-03-17 RX ORDER — SODIUM CHLORIDE 9 MG/ML
75 INJECTION, SOLUTION INTRAVENOUS CONTINUOUS
Status: DISCONTINUED | OUTPATIENT
Start: 2023-03-17 | End: 2023-03-17 | Stop reason: HOSPADM

## 2023-03-17 RX ORDER — PROPOFOL 10 MG/ML
INJECTION, EMULSION INTRAVENOUS AS NEEDED
Status: DISCONTINUED | OUTPATIENT
Start: 2023-03-17 | End: 2023-03-17 | Stop reason: HOSPADM

## 2023-03-17 RX ADMIN — PROPOFOL 50 MG: 10 INJECTION, EMULSION INTRAVENOUS at 10:36

## 2023-03-17 RX ADMIN — PROPOFOL 50 MG: 10 INJECTION, EMULSION INTRAVENOUS at 10:46

## 2023-03-17 RX ADMIN — SODIUM CHLORIDE 75 ML/HR: 9 INJECTION, SOLUTION INTRAVENOUS at 10:08

## 2023-03-17 RX ADMIN — PROPOFOL 50 MG: 10 INJECTION, EMULSION INTRAVENOUS at 10:34

## 2023-03-17 RX ADMIN — PROPOFOL 50 MG: 10 INJECTION, EMULSION INTRAVENOUS at 10:42

## 2023-03-17 RX ADMIN — LIDOCAINE HYDROCHLORIDE 100 MG: 20 INJECTION, SOLUTION INTRAVENOUS at 10:34

## 2023-03-17 RX ADMIN — PROPOFOL 50 MG: 10 INJECTION, EMULSION INTRAVENOUS at 10:39

## 2023-03-17 RX ADMIN — PROPOFOL 50 MG: 10 INJECTION, EMULSION INTRAVENOUS at 10:50

## 2023-03-17 NOTE — PERIOP NOTES
Endoscopy Case End Note:    1055:  Procedure scope was pre-cleaned, per protocol, at bedside by Wendie Seals. 1055:  Report received from anesthesia Sherita Ramos CRNA. See anesthesia flowsheet for intra-procedure vital signs and events.

## 2023-03-17 NOTE — DISCHARGE INSTRUCTIONS
[facility]  [id number]  1955    ENTEROSCOPY DISCHARGE INSTRUCTIONS  Discomfort:  Sore throat- throat lozenges or warm salt water gargle  redness at IV site- apply warm compress to area; if redness or soreness persist- contact your physician  Gaseous discomfort- walking, belching will help relieve any discomfort  You may not operate a vehicle for 12 hours  You may not engage in an occupation involving machinery or appliances for rest of today  You may not drink alcoholic beverages for at least 12 hours  Avoid making any critical decisions for at least 24 hour  DIET  You may have minimal sips at this time-- do not eat or drink for two hours. You may eat and drink after 115am today  You may resume your regular diet - however -  remember your colon is empty and a heavy meal will produce gas. Avoid these foods:  vegetables, fried / greasy foods, carbonated drinks    MEDICATIONS:        ACTIVITY  You may resume your normal daily activities until tomorrow AM;  Spend the remainder of the day resting -  avoid any strenuous activity. CALL M.D. ANY SIGN OF   Increasing pain, nausea, vomiting  Abdominal distension (swelling)  New increased bleeding (oral or rectal)  Fever (chills)  Pain in chest area  Bloody discharge from nose or mouth  Shortness of breath    IMPRESSION:  -Normal esophageal mucosa  -Minimal gastric erythema to suggest gastritis or gastropathy; biopsied  -Normal duodenal mucosa noted with scope advanced to 140cm into the fourth portion duodenum. Three separate 1cc aliquots of [place] Ink injected to kassy site of most distal advancement. No telangectasia or AVMs noted.   -No hematin noted    Follow-up Instructions:   Call Dr. Emiliano Lima for the results of procedure / biopsy in 7-10 days   Telephone # [phone]  Resume Plavix tomorrow    Amber Guerrero MD      Patient Education on Sedation / Analgesia Administered for Procedure      For 24 hours after general anesthesia or intravenous analgesia / sedation:  Have someone responsible help you with your care  Limit your activities  Do not drive and operate hazardous machinery  Do not make important personal, legal or business decisions  Do not drink alcoholic beverages  If you have not urinated within 8 hours after discharge, please contact your physician  Resume your medications unless otherwise instructed    For 24 hours after general anesthesia or intravenous analgesia / sedation  you may experience:  Drowsiness, dizziness, sleepiness, or confusion  Difficulty remembering or delayed reaction times  Difficulty with your balance, especially while walking, move slowly and carefully, do not make sudden position changes  Difficulty focusing or blurred vision    You may not be aware of slight changes in your behavior and/or your reaction time because of the medication used during and after your procedure.     Report the following to your physician:  Excessive pain, swelling, redness or odor of or around the surgical area  Temperature over 100.5  Nausea and vomiting lasting longer than 4 hours or if unable to take medications  Any signs of decreased circulation or nerve impairment to extremity: change in color, persistent numbness, tingling, coldness or increase pain  Any questions or concerns    IF YOU REPORT TO AN EMERGENCY ROOM, DOCTOR'S OFFICE OR HOSPITAL WITHIN 24 HOURS AFTER YOUR PROCEDURE, BRING THIS SHEET AND YOUR AFTER VISIT SUMMARY WITH YOU AND GIVE IT TO THE PHYSICIAN OR NURSE ATTENDING YOU.

## 2023-03-17 NOTE — ANESTHESIA PREPROCEDURE EVALUATION
Anesthetic History   No history of anesthetic complications     Pertinent negatives: No PONV       Review of Systems / Medical History  Patient summary reviewed, nursing notes reviewed and pertinent labs reviewed    Pulmonary          Smoker (tobacco)  Asthma     Comments: Current Every Day Smoker - 5 pack years   Neuro/Psych         Psychiatric history (schizophrenia)    Comments: Cervical stenosis of spinal canal  Hx Schizophrenia  Cardiovascular    Hypertension          CAD, PAD, cardiac stents and hyperlipidemia    Exercise tolerance: >4 METS  Comments: AAA 3 to 5 cm    Recent stents placed, no symptoms. Plavix last taken on Saturday. GI/Hepatic/Renal               Comments: Hx Large Bowel obstruction  Hx pancreatitis  Hx Intractable epigastric abdominal pain  Hx Intractable abdominal pain  Hx Vomiting  Hx Diarrhea  Hx GI bleed     Endo/Other    Diabetes    Obesity, arthritis and anemia     Other Findings   Comments: Hb 7.3         Physical Exam    Airway  Mallampati: III  TM Distance: 4 - 6 cm  Neck ROM: normal range of motion   Mouth opening: Normal     Cardiovascular  Regular rate and rhythm,  S1 and S2 normal,  no murmur, click, rub, or gallop             Dental    Dentition: Upper partial plate  Comments: Multiple missing teeth. Denies any loose teeth   Pulmonary  Breath sounds clear to auscultation               Abdominal  GI exam deferred       Other Findings            Anesthetic Plan    ASA: 3  Anesthesia type: MAC          Induction: Intravenous  Anesthetic plan and risks discussed with: Patient      Discussed about increased risk of MACE given recent stents. Pt agrees to proceed.
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
## 2023-03-17 NOTE — PROCEDURES
NAME:  Rohan Hodge   :   1955   MRN:   [id number]     Date/Time:  3/17/2023 11:03 AM    Esophagogastroduodenoscopy (EGD) Procedure Note    Procedure: Enteroscopy with biopsy, [address] tattoo injection    Indication: Chronic blood loss anemia  Pre-operative Diagnosis: see indication above  Post-operative Diagnosis: see findings below  :  Sol Bumpers, MD  Referring Provider:   Blake Cruz MD    Exam:  Airway: clear, no airway problems anticipated  Heart: RRR, without gallops or rubs  Lungs: clear bilaterally without wheezes, crackles, or rhonchi  Abdomen: soft, nontender, nondistended, bowel sounds present  Mental Status: awake, alert and oriented to person, place and time     Anethesia/Sedation:  MAC anesthesia Propofol 300mg IV  Procedure Details   After informed consent was obtained for the procedure, with all risks and benefits of procedure explained the patient was taken to the endoscopy suite and placed in the left lateral decubitus position. Following sequential administration of sedation as per above, the PCF-190 enteroscope was inserted into the mouth and advanced under direct vision to fourth portion of the duodenum. A careful inspection was made as the gastroscope was withdrawn, including a retroflexed view of the proximal stomach; findings and interventions are described below. Findings:    -Normal esophageal mucosa  -Minimal gastric erythema to suggest gastritis or gastropathy; biopsied  -Normal duodenal mucosa noted with scope advanced to 140cm into the fourth portion duodenum. Three separate 1cc aliquots of [place] Ink injected to kassy site of most distal advancement. No telangectasia or AVMs noted. -No hematin noted    Therapies:  biopsy of stomach;  injection of 3 cc of [place] Ink into distal duodenum  Specimens: #1 gastric  EBL:  None. Complications:   None; patient tolerated the procedure well.            Impression:    -Normal esophageal mucosa  -Minimal gastric erythema to suggest gastritis or gastropathy; biopsied  -Normal duodenal mucosa noted with scope advanced to 140cm into the fourth portion duodenum. Three separate 1cc aliquots of [place] Ink injected to kassy site of most distal advancement. No telangectasia or AVMs noted. -No hematin noted    Recommendations:  -Await pathology. , -Follow symptoms.     Discharge disposition:  Home in the company of  when able to ambulate    Kristopher Coates MD

## 2023-03-17 NOTE — ROUTINE PROCESS
Jarrell Howard  1955  [id number]    Situation:  Verbal report received from: Zeina Bolaños RN  Procedure: Procedure(s):  PUSH ENTEROSCOPY WITH BIOPSY  INJECTION-joseph ink  ESOPHAGOGASTRODUODENAL (EGD) BIOPSY    Background:    Preoperative diagnosis: LONG TERM CURRENT USE OF INSULIN, ARTERIOVENOUS MALFORMATION, DIARRHEA, ANTIPLATELET OR ANTITHROMBOTIC LONG TERM USE  Postoperative diagnosis: EGD- Chronic blood loss anemia    :  Dr. Giselle Blum  Assistant(s): Endoscopy Technician-1: Amos Christianson  Endoscopy RN-1: Hayden Chapman RN    Specimens:   ID Type Source Tests Collected by Time Destination   1 : gastric body biopsy Preservative   Cristofer Holguin MD 3/17/2023 [address] [facility]     H. Pylori  no    Assessment:  Intra-procedure medications     Anesthesia gave intra-procedure sedation and medications, see anesthesia flow sheet yes    Intravenous fluids: NS@ KVO     Vital signs stable    yes    Abdominal assessment: round and soft   yes    Recommendation:  Discharge patient per MD order  yes.     Family or to[place]  Permission to share finding with family or friend yes

## 2023-03-17 NOTE — PERIOP NOTES
The risks and benefits of the bite block have been explained to patient. Patient verbalizes understanding. Pt is edentulous today.

## 2023-03-17 NOTE — ANESTHESIA POSTPROCEDURE EVALUATION
Procedure(s):  PUSH ENTEROSCOPY WITH BIOPSY  INJECTION-joseph ink  ESOPHAGOGASTRODUODENAL (EGD) BIOPSY. MAC    Anesthesia Post Evaluation      Multimodal analgesia: multimodal analgesia used between 6 hours prior to anesthesia start to PACU discharge  Patient location during evaluation: bedside  Patient participation: complete - patient participated  Level of consciousness: awake  Pain management: adequate  Airway patency: patent  Anesthetic complications: no  Cardiovascular status: acceptable  Respiratory status: acceptable  Hydration status: acceptable  Post anesthesia nausea and vomiting:  controlled  Final Post Anesthesia Temperature Assessment:  Normothermia (36.0-37.5 degrees C)      INITIAL Post-op Vital signs:   Vitals Value Taken Time   /51 03/17/23 1127   Temp 36.2 °C (97.2 °F) 03/17/23 1109   Pulse 66 03/17/23 1128   Resp 19 03/17/23 1128   SpO2 98 % 03/17/23 1128   Vitals shown include unvalidated device data.

## 2023-04-03 PROBLEM — E11.51 TYPE 2 DIABETES MELLITUS WITH PERIPHERAL VASCULAR DISEASE (HCC): Status: RESOLVED | Noted: 2019-10-14 | Resolved: 2021-11-06

## 2023-04-28 RX ORDER — ISOSORBIDE MONONITRATE 30 MG/1
30 TABLET, EXTENDED RELEASE ORAL
Qty: 30 TABLET | Refills: 3 | Status: SHIPPED | OUTPATIENT
Start: 2023-04-28

## 2023-04-29 DIAGNOSIS — Z87.891 PERSONAL HISTORY OF NICOTINE DEPENDENCE: ICD-10-CM

## 2023-04-29 DIAGNOSIS — I25.5 ISCHEMIC CARDIOMYOPATHY: ICD-10-CM

## 2023-04-29 DIAGNOSIS — E11.8 TYPE 2 DIABETES MELLITUS WITH UNSPECIFIED COMPLICATIONS (HCC): ICD-10-CM

## 2023-04-29 DIAGNOSIS — Z79.4 LONG TERM (CURRENT) USE OF INSULIN (HCC): ICD-10-CM

## 2023-04-29 DIAGNOSIS — I25.110 ATHEROSCLEROTIC HEART DISEASE OF NATIVE CORONARY ARTERY WITH UNSTABLE ANGINA PECTORIS (HCC): ICD-10-CM

## 2023-04-29 DIAGNOSIS — Z98.61 CORONARY ANGIOPLASTY STATUS: ICD-10-CM

## 2023-04-29 DIAGNOSIS — Z95.5 PRESENCE OF CORONARY ANGIOPLASTY IMPLANT AND GRAFT: ICD-10-CM

## 2023-04-29 DIAGNOSIS — I73.9 PERIPHERAL VASCULAR DISEASE, UNSPECIFIED (HCC): ICD-10-CM

## 2023-04-29 DIAGNOSIS — R94.31 ABNORMAL ELECTROCARDIOGRAM (ECG) (EKG): ICD-10-CM

## 2023-04-29 DIAGNOSIS — I25.10 ATHEROSCLEROTIC HEART DISEASE OF NATIVE CORONARY ARTERY WITHOUT ANGINA PECTORIS: ICD-10-CM

## 2023-04-29 DIAGNOSIS — R07.2 PRECORDIAL PAIN: ICD-10-CM

## 2023-04-29 DIAGNOSIS — I25.9 CHRONIC ISCHEMIC HEART DISEASE, UNSPECIFIED: ICD-10-CM

## 2023-04-29 DIAGNOSIS — I25.83 CORONARY ATHEROSCLEROSIS DUE TO LIPID RICH PLAQUE (CODE): ICD-10-CM

## 2023-04-29 DIAGNOSIS — E78.2 MIXED HYPERLIPIDEMIA: ICD-10-CM

## 2023-04-29 DIAGNOSIS — Z98.890 OTHER SPECIFIED POSTPROCEDURAL STATES: Primary | ICD-10-CM

## 2023-04-29 DIAGNOSIS — I10 ESSENTIAL (PRIMARY) HYPERTENSION: ICD-10-CM

## 2023-04-29 DIAGNOSIS — E11.9 TYPE 2 DIABETES MELLITUS WITHOUT COMPLICATIONS (HCC): ICD-10-CM

## 2023-04-29 DIAGNOSIS — Z82.49 FAMILY HISTORY OF ISCHEMIC HEART DISEASE AND OTHER DISEASES OF THE CIRCULATORY SYSTEM: ICD-10-CM

## 2023-05-09 ENCOUNTER — HOSPITAL ENCOUNTER (OUTPATIENT)
Facility: HOSPITAL | Age: 68
Discharge: HOME | End: 2023-05-12
Payer: MEDICARE

## 2023-05-09 DIAGNOSIS — I25.10 CORONARY ARTERY DISEASE INVOLVING NATIVE CORONARY ARTERY OF NATIVE HEART WITHOUT ANGINA PECTORIS: ICD-10-CM

## 2023-05-09 PROCEDURE — 93306 TTE W/DOPPLER COMPLETE: CPT

## 2023-05-10 LAB
ECHO AO ROOT DIAM: 2.6 CM
ECHO AV PEAK GRADIENT: 6 MMHG
ECHO AV PEAK VELOCITY: 1.2 M/S
ECHO EST RA PRESSURE: 10 MMHG
ECHO LA DIAMETER: 4 CM
ECHO LA TO AORTIC ROOT RATIO: 1.54
ECHO LA VOL 2C: 54 ML (ref 22–52)
ECHO LA VOL 2C: 57 ML (ref 22–52)
ECHO LA VOL 4C: 62 ML (ref 22–52)
ECHO LA VOL 4C: 65 ML (ref 22–52)
ECHO LA VOLUME AREA LENGTH: 67 ML
ECHO LV E' SEPTAL VELOCITY: 6 CM/S
ECHO LV EDV A2C: 113 ML
ECHO LV EDV A4C: 119 ML
ECHO LV EDV BP: 121 ML (ref 56–104)
ECHO LV EJECTION FRACTION A2C: 48 %
ECHO LV EJECTION FRACTION A4C: 36 %
ECHO LV EJECTION FRACTION BIPLANE: 44 % (ref 55–100)
ECHO LV ESV A2C: 59 ML
ECHO LV ESV A4C: 77 ML
ECHO LV ESV BP: 68 ML (ref 19–49)
ECHO LV FRACTIONAL SHORTENING: 15 % (ref 28–44)
ECHO LV INTERNAL DIMENSION DIASTOLIC: 5.2 CM (ref 3.9–5.3)
ECHO LV INTERNAL DIMENSION SYSTOLIC: 4.4 CM
ECHO LV IVSD: 1.2 CM (ref 0.6–0.9)
ECHO LV MASS 2D: 248.8 G (ref 67–162)
ECHO LV POSTERIOR WALL DIASTOLIC: 1.2 CM (ref 0.6–0.9)
ECHO LV RELATIVE WALL THICKNESS RATIO: 0.46
ECHO LVOT AREA: 3.1 CM2
ECHO LVOT DIAM: 2 CM
ECHO MV A VELOCITY: 0.96 M/S
ECHO MV E DECELERATION TIME (DT): 165.4 MS
ECHO MV E VELOCITY: 0.77 M/S
ECHO MV E/A RATIO: 0.8
ECHO MV E/E' SEPTAL: 12.83
ECHO PULMONARY ARTERY END DIASTOLIC PRESSURE: 4 MMHG
ECHO PV REGURGITANT MAX VELOCITY: 1 M/S
ECHO RIGHT VENTRICULAR SYSTOLIC PRESSURE (RVSP): 31 MMHG
ECHO TV REGURGITANT MAX VELOCITY: 2.31 M/S
ECHO TV REGURGITANT PEAK GRADIENT: 21 MMHG

## 2023-05-12 RX ORDER — INSULIN GLARGINE 100 [IU]/ML
25 INJECTION, SOLUTION SUBCUTANEOUS DAILY
Qty: 5 ADJUSTABLE DOSE PRE-FILLED PEN SYRINGE | Refills: 5 | Status: SHIPPED | OUTPATIENT
Start: 2023-05-12

## 2023-05-12 RX ORDER — INSULIN GLARGINE 100 [IU]/ML
25 INJECTION, SOLUTION SUBCUTANEOUS DAILY
COMMUNITY
Start: 2019-08-02 | End: 2023-05-12 | Stop reason: SDUPTHER

## 2023-05-16 ENCOUNTER — TELEPHONE (OUTPATIENT)
Age: 68
End: 2023-05-16

## 2023-05-16 NOTE — TELEPHONE ENCOUNTER
----- Message from CHARLIE Hernandez NP sent at 5/11/2023  9:07 AM EDT -----  EF or heart pumping strength unchanged at 35-40%; will adjust meds at follow up visit next mos.

## 2023-05-20 RX ORDER — LABETALOL 100 MG/1
TABLET, FILM COATED ORAL
COMMUNITY
Start: 2023-03-11

## 2023-05-20 RX ORDER — CLOPIDOGREL BISULFATE 75 MG/1
75 TABLET ORAL DAILY
COMMUNITY
Start: 2021-03-04

## 2023-05-20 RX ORDER — ASCORBIC ACID 500 MG
500 TABLET ORAL 2 TIMES DAILY
COMMUNITY
Start: 2021-01-14

## 2023-05-20 RX ORDER — HYDROCHLOROTHIAZIDE 25 MG/1
1 TABLET ORAL DAILY
COMMUNITY
Start: 2022-08-12

## 2023-05-20 RX ORDER — FLUTICASONE PROPIONATE AND SALMETEROL 250; 50 UG/1; UG/1
1 POWDER RESPIRATORY (INHALATION) EVERY 12 HOURS
COMMUNITY
Start: 2021-05-19

## 2023-05-20 RX ORDER — ISOSORBIDE MONONITRATE 30 MG/1
1 TABLET, EXTENDED RELEASE ORAL EVERY MORNING
COMMUNITY
Start: 2023-04-28

## 2023-05-20 RX ORDER — ATORVASTATIN CALCIUM 40 MG/1
40 TABLET, FILM COATED ORAL DAILY
COMMUNITY
Start: 2023-01-20

## 2023-05-20 RX ORDER — IPRATROPIUM BROMIDE 42 UG/1
2 SPRAY, METERED NASAL DAILY PRN
COMMUNITY
Start: 2018-06-29

## 2023-05-20 RX ORDER — MONTELUKAST SODIUM 10 MG/1
1 TABLET ORAL NIGHTLY
COMMUNITY
Start: 2023-03-31

## 2023-05-20 RX ORDER — AMLODIPINE BESYLATE 10 MG/1
1 TABLET ORAL DAILY
COMMUNITY
Start: 2023-03-11

## 2023-05-20 RX ORDER — LANCETS 30 GAUGE
EACH MISCELLANEOUS
COMMUNITY
Start: 2022-02-03

## 2023-05-20 RX ORDER — ALBUTEROL SULFATE 90 UG/1
2 AEROSOL, METERED RESPIRATORY (INHALATION) EVERY 4 HOURS PRN
COMMUNITY
Start: 2023-02-24

## 2023-05-20 RX ORDER — ASPIRIN 81 MG/1
81 TABLET ORAL DAILY
COMMUNITY

## 2023-05-20 RX ORDER — DORZOLAMIDE HYDROCHLORIDE AND TIMOLOL MALEATE 20; 5 MG/ML; MG/ML
SOLUTION/ DROPS OPHTHALMIC
COMMUNITY
Start: 2022-05-10

## 2023-05-20 RX ORDER — LEVOCETIRIZINE DIHYDROCHLORIDE 5 MG/1
5 TABLET, FILM COATED ORAL DAILY
COMMUNITY
Start: 2022-10-17

## 2023-06-29 ENCOUNTER — OFFICE VISIT (OUTPATIENT)
Age: 68
End: 2023-06-29
Payer: MEDICARE

## 2023-06-29 VITALS
DIASTOLIC BLOOD PRESSURE: 78 MMHG | SYSTOLIC BLOOD PRESSURE: 138 MMHG | OXYGEN SATURATION: 98 % | TEMPERATURE: 97.3 F | WEIGHT: 169.2 LBS | HEIGHT: 65 IN | BODY MASS INDEX: 28.19 KG/M2 | RESPIRATION RATE: 18 BRPM | HEART RATE: 76 BPM

## 2023-06-29 DIAGNOSIS — R30.0 DYSURIA: ICD-10-CM

## 2023-06-29 DIAGNOSIS — R35.0 FREQUENCY OF URINATION: Primary | ICD-10-CM

## 2023-06-29 LAB
BILIRUBIN, URINE, POC: NEGATIVE
BLOOD URINE, POC: ABNORMAL
GLUCOSE URINE, POC: 1000
KETONES, URINE, POC: NEGATIVE
LEUKOCYTE ESTERASE, URINE, POC: NEGATIVE
NITRITE, URINE, POC: NEGATIVE
PH, URINE, POC: 5 (ref 4.6–8)
PROTEIN,URINE, POC: NEGATIVE
SPECIFIC GRAVITY, URINE, POC: 1.01 (ref 1–1.03)
URINALYSIS CLARITY, POC: CLEAR
URINALYSIS COLOR, POC: YELLOW
UROBILINOGEN, POC: ABNORMAL

## 2023-06-29 PROCEDURE — 3075F SYST BP GE 130 - 139MM HG: CPT | Performed by: INTERNAL MEDICINE

## 2023-06-29 PROCEDURE — 81001 URINALYSIS AUTO W/SCOPE: CPT | Performed by: INTERNAL MEDICINE

## 2023-06-29 PROCEDURE — 1123F ACP DISCUSS/DSCN MKR DOCD: CPT | Performed by: INTERNAL MEDICINE

## 2023-06-29 PROCEDURE — 99213 OFFICE O/P EST LOW 20 MIN: CPT | Performed by: INTERNAL MEDICINE

## 2023-06-29 PROCEDURE — 3078F DIAST BP <80 MM HG: CPT | Performed by: INTERNAL MEDICINE

## 2023-06-29 RX ORDER — NITROFURANTOIN 25; 75 MG/1; MG/1
100 CAPSULE ORAL 2 TIMES DAILY
Qty: 10 CAPSULE | Refills: 0 | Status: SHIPPED | OUTPATIENT
Start: 2023-06-29 | End: 2023-07-04

## 2023-06-29 SDOH — ECONOMIC STABILITY: HOUSING INSECURITY
IN THE LAST 12 MONTHS, WAS THERE A TIME WHEN YOU DID NOT HAVE A STEADY PLACE TO SLEEP OR SLEPT IN A SHELTER (INCLUDING NOW)?: NO

## 2023-06-29 SDOH — ECONOMIC STABILITY: INCOME INSECURITY: HOW HARD IS IT FOR YOU TO PAY FOR THE VERY BASICS LIKE FOOD, HOUSING, MEDICAL CARE, AND HEATING?: NOT VERY HARD

## 2023-06-29 SDOH — ECONOMIC STABILITY: FOOD INSECURITY: WITHIN THE PAST 12 MONTHS, YOU WORRIED THAT YOUR FOOD WOULD RUN OUT BEFORE YOU GOT MONEY TO BUY MORE.: NEVER TRUE

## 2023-06-29 SDOH — ECONOMIC STABILITY: FOOD INSECURITY: WITHIN THE PAST 12 MONTHS, THE FOOD YOU BOUGHT JUST DIDN'T LAST AND YOU DIDN'T HAVE MONEY TO GET MORE.: NEVER TRUE

## 2023-06-29 ASSESSMENT — PATIENT HEALTH QUESTIONNAIRE - PHQ9
2. FEELING DOWN, DEPRESSED OR HOPELESS: 0
SUM OF ALL RESPONSES TO PHQ QUESTIONS 1-9: 0
1. LITTLE INTEREST OR PLEASURE IN DOING THINGS: 0
SUM OF ALL RESPONSES TO PHQ9 QUESTIONS 1 & 2: 0
SUM OF ALL RESPONSES TO PHQ QUESTIONS 1-9: 0

## 2023-07-14 ENCOUNTER — OFFICE VISIT (OUTPATIENT)
Age: 68
End: 2023-07-14

## 2023-07-14 ENCOUNTER — HOSPITAL ENCOUNTER (OUTPATIENT)
Facility: HOSPITAL | Age: 68
Discharge: HOME | End: 2023-07-17

## 2023-07-14 VITALS
HEART RATE: 74 BPM | SYSTOLIC BLOOD PRESSURE: 122 MMHG | WEIGHT: 168.4 LBS | DIASTOLIC BLOOD PRESSURE: 60 MMHG | RESPIRATION RATE: 18 BRPM | OXYGEN SATURATION: 97 % | HEIGHT: 65 IN | BODY MASS INDEX: 28.06 KG/M2

## 2023-07-14 DIAGNOSIS — I10 BENIGN ESSENTIAL HYPERTENSION: ICD-10-CM

## 2023-07-14 DIAGNOSIS — I73.9 PAD (PERIPHERAL ARTERY DISEASE) (HCC): ICD-10-CM

## 2023-07-14 DIAGNOSIS — E11.65 TYPE 2 DIABETES MELLITUS WITH HYPERGLYCEMIA, WITHOUT LONG-TERM CURRENT USE OF INSULIN (HCC): Primary | ICD-10-CM

## 2023-07-14 DIAGNOSIS — I25.9 CHRONIC ISCHEMIC HEART DISEASE, UNSPECIFIED: ICD-10-CM

## 2023-07-14 DIAGNOSIS — E78.2 MIXED HYPERLIPIDEMIA: ICD-10-CM

## 2023-07-14 DIAGNOSIS — L72.0 EPIDERMAL INCLUSION CYST: ICD-10-CM

## 2023-07-14 DIAGNOSIS — E87.6 HYPOKALEMIA: ICD-10-CM

## 2023-07-14 DIAGNOSIS — Z95.5 PRESENCE OF CORONARY ANGIOPLASTY IMPLANT AND GRAFT: ICD-10-CM

## 2023-07-14 DIAGNOSIS — Z98.61 CORONARY ANGIOPLASTY STATUS: ICD-10-CM

## 2023-07-14 DIAGNOSIS — Z11.4 SCREENING FOR HIV (HUMAN IMMUNODEFICIENCY VIRUS): ICD-10-CM

## 2023-07-14 DIAGNOSIS — I10 ESSENTIAL (PRIMARY) HYPERTENSION: ICD-10-CM

## 2023-07-14 DIAGNOSIS — I25.10 ATHEROSCLEROSIS OF NATIVE CORONARY ARTERY OF NATIVE HEART WITHOUT ANGINA PECTORIS: ICD-10-CM

## 2023-07-14 LAB
ALBUMIN SERPL-MCNC: 3.3 G/DL (ref 3.5–5)
ALBUMIN/GLOB SERPL: 0.9 (ref 1.1–2.2)
ALP SERPL-CCNC: 96 U/L (ref 45–117)
ALT SERPL-CCNC: 21 U/L (ref 12–78)
ANION GAP SERPL CALC-SCNC: 5 MMOL/L (ref 5–15)
AST SERPL-CCNC: 12 U/L (ref 15–37)
BILIRUB SERPL-MCNC: 0.2 MG/DL (ref 0.2–1)
BUN SERPL-MCNC: 13 MG/DL (ref 6–20)
BUN/CREAT SERPL: 15 (ref 12–20)
CALCIUM SERPL-MCNC: 9.5 MG/DL (ref 8.5–10.1)
CHLORIDE SERPL-SCNC: 103 MMOL/L (ref 97–108)
CHOLEST SERPL-MCNC: 155 MG/DL
CO2 SERPL-SCNC: 32 MMOL/L (ref 21–32)
CREAT SERPL-MCNC: 0.86 MG/DL (ref 0.55–1.02)
GLOBULIN SER CALC-MCNC: 3.5 G/DL (ref 2–4)
GLUCOSE SERPL-MCNC: 132 MG/DL (ref 65–100)
HDLC SERPL-MCNC: 57 MG/DL
HDLC SERPL: 2.7 (ref 0–5)
LDLC SERPL CALC-MCNC: 76 MG/DL (ref 0–100)
POTASSIUM SERPL-SCNC: 3.4 MMOL/L (ref 3.5–5.1)
PROT SERPL-MCNC: 6.8 G/DL (ref 6.4–8.2)
SODIUM SERPL-SCNC: 140 MMOL/L (ref 136–145)
TRIGL SERPL-MCNC: 110 MG/DL
VLDLC SERPL CALC-MCNC: 22 MG/DL

## 2023-07-14 RX ORDER — PANTOPRAZOLE SODIUM 40 MG/1
TABLET, DELAYED RELEASE ORAL
COMMUNITY
Start: 2023-07-13

## 2023-07-14 NOTE — PROGRESS NOTES
Do you ever feel afraid of your partner? N   Are you in a relationship with someone who physically or mentally threatens you? N   Is it safe for you to go home?  [address]     The patient has appointed the following active healthcare agents:    Primary Decision Maker: Chioma Jarrett - Other - [phone]    Secondary Decision Maker: Meryl Martino - Other - [phone]
Alert, cooperative, no distress. Head:  Normocephalic, without obvious abnormality, atraumatic. Eyes:  Conjunctivae/corneas clear. Pupils equal, round, reactive to light. Chest: No wheezes, rales. Rubs or ronchi   Cardiac: RRR.  No peripheral edema     Skin:

## 2023-07-15 LAB
ALBUMIN SERPL-MCNC: 3.5 G/DL (ref 3.5–5)
ALBUMIN/GLOB SERPL: 1 (ref 1.1–2.2)
ALP SERPL-CCNC: 96 U/L (ref 45–117)
ALT SERPL-CCNC: 23 U/L (ref 12–78)
ANION GAP SERPL CALC-SCNC: 5 MMOL/L (ref 5–15)
AST SERPL-CCNC: 17 U/L (ref 15–37)
BASOPHILS # BLD: 0.1 K/UL (ref 0–0.1)
BASOPHILS NFR BLD: 1 % (ref 0–1)
BILIRUB SERPL-MCNC: 0.2 MG/DL (ref 0.2–1)
BUN SERPL-MCNC: 14 MG/DL (ref 6–20)
BUN/CREAT SERPL: 15 (ref 12–20)
CALCIUM SERPL-MCNC: 9.5 MG/DL (ref 8.5–10.1)
CHLORIDE SERPL-SCNC: 105 MMOL/L (ref 97–108)
CO2 SERPL-SCNC: 31 MMOL/L (ref 21–32)
CREAT SERPL-MCNC: 0.92 MG/DL (ref 0.55–1.02)
CREAT UR-MCNC: 81.1 MG/DL
DIFFERENTIAL METHOD BLD: ABNORMAL
EOSINOPHIL # BLD: 0.1 K/UL (ref 0–0.4)
EOSINOPHIL NFR BLD: 2 % (ref 0–7)
ERYTHROCYTE [DISTWIDTH] IN BLOOD BY AUTOMATED COUNT: 17.8 % (ref 11.5–14.5)
EST. AVERAGE GLUCOSE BLD GHB EST-MCNC: 166 MG/DL
GLOBULIN SER CALC-MCNC: 3.5 G/DL (ref 2–4)
GLUCOSE SERPL-MCNC: 134 MG/DL (ref 65–100)
HBA1C MFR BLD: 7.4 % (ref 4–5.6)
HCT VFR BLD AUTO: 37.4 % (ref 35–47)
HGB BLD-MCNC: 10.6 G/DL (ref 11.5–16)
IMM GRANULOCYTES # BLD AUTO: 0 K/UL (ref 0–0.04)
IMM GRANULOCYTES NFR BLD AUTO: 0 % (ref 0–0.5)
LYMPHOCYTES # BLD: 1.9 K/UL (ref 0.8–3.5)
LYMPHOCYTES NFR BLD: 27 % (ref 12–49)
MCH RBC QN AUTO: 23.6 PG (ref 26–34)
MCHC RBC AUTO-ENTMCNC: 28.3 G/DL (ref 30–36.5)
MCV RBC AUTO: 83.3 FL (ref 80–99)
MICROALBUMIN UR-MCNC: 0.62 MG/DL
MICROALBUMIN/CREAT UR-RTO: 8 MG/G (ref 0–30)
MONOCYTES # BLD: 0.6 K/UL (ref 0–1)
MONOCYTES NFR BLD: 8 % (ref 5–13)
NEUTS SEG # BLD: 4.3 K/UL (ref 1.8–8)
NEUTS SEG NFR BLD: 62 % (ref 32–75)
NRBC # BLD: 0 K/UL (ref 0–0.01)
NRBC BLD-RTO: 0 PER 100 WBC
PLATELET # BLD AUTO: 256 K/UL (ref 150–400)
PMV BLD AUTO: 11.9 FL (ref 8.9–12.9)
POTASSIUM SERPL-SCNC: 3.3 MMOL/L (ref 3.5–5.1)
PROT SERPL-MCNC: 7 G/DL (ref 6.4–8.2)
RBC # BLD AUTO: 4.49 M/UL (ref 3.8–5.2)
RBC MORPH BLD: ABNORMAL
SODIUM SERPL-SCNC: 141 MMOL/L (ref 136–145)
WBC # BLD AUTO: 7 K/UL (ref 3.6–11)

## 2023-07-15 RX ORDER — POTASSIUM CHLORIDE 20 MEQ/1
20 TABLET, EXTENDED RELEASE ORAL 2 TIMES DAILY
Qty: 180 TABLET | Refills: 5 | Status: SHIPPED | OUTPATIENT
Start: 2023-07-15

## 2023-07-16 NOTE — RESULT ENCOUNTER NOTE
Total Cholesterol is the total number of cholesterol particles in your blood. Goal is less than 200. Triglycerides are the short term fats in your blood. Goal is less than 150. HDL is the good cholesterol in your blood. Goal is more than 50 if you are a woman and 40 if you are a man. LDL is the bad cholesterol in your blood. Goal is less than 100 unless you have heart disease and then goal is under 70. LDL almost at goal of below 70---noted switched to atorva from simvastatin, continue    Continue to follow a low cholesterol diet. Try to limit the amount of fried foods, fatty foods, butter, gravy, red meat, ice cream, cheese, and eggs in your diet, which are all high in cholesterol. OTHER labs lytes/kidney fxn/liver fxn ok. Noted K supplement per PCP.   Pls have pt follow up with dr Arthur Hameed in September, [place] office as planned

## 2023-07-17 ENCOUNTER — TELEPHONE (OUTPATIENT)
Age: 68
End: 2023-07-17

## 2023-07-20 ENCOUNTER — TELEPHONE (OUTPATIENT)
Age: 68
End: 2023-07-20

## 2023-07-20 NOTE — TELEPHONE ENCOUNTER
----- Message from CHARLIE Argueta NP sent at 7/16/2023  4:39 PM EDT -----  Total Cholesterol is the total number of cholesterol particles in your blood. Goal is less than 200. Triglycerides are the short term fats in your blood. Goal is less than 150. HDL is the good cholesterol in your blood. Goal is more than 50 if you are a woman and 40 if you are a man. LDL is the bad cholesterol in your blood. Goal is less than 100 unless you have heart disease and then goal is under 70. LDL almost at goal of below 70---noted switched to atorva from simvastatin, continue    Continue to follow a low cholesterol diet. Try to limit the amount of fried foods, fatty foods, butter, gravy, red meat, ice cream, cheese, and eggs in your diet, which are all high in cholesterol. OTHER labs lytes/kidney fxn/liver fxn ok. Noted K supplement per PCP. Pls have pt follow up with dr Caleb Interiano in September, [address] [place] [address] office as planned      Two patient identifiers verified. Above lab results explained to patient. Patient given time for questions and states understanding. Patient follow up appointment for September verified.

## 2023-07-25 ENCOUNTER — NURSE ONLY (OUTPATIENT)
Age: 68
End: 2023-07-25
Payer: MEDICARE

## 2023-07-25 DIAGNOSIS — Z79.4 TYPE 2 DIABETES MELLITUS WITH OTHER SPECIFIED COMPLICATION, WITH LONG-TERM CURRENT USE OF INSULIN (HCC): Primary | ICD-10-CM

## 2023-07-25 DIAGNOSIS — E11.69 TYPE 2 DIABETES MELLITUS WITH OTHER SPECIFIED COMPLICATION, WITH LONG-TERM CURRENT USE OF INSULIN (HCC): Primary | ICD-10-CM

## 2023-07-25 PROCEDURE — 95249 CONT GLUC MNTR PT PROV EQP: CPT | Performed by: INTERNAL MEDICINE

## 2023-07-25 NOTE — PROGRESS NOTES
Patient provided education on freestyle [place] including applying and monitoring. Patient able to apply meter herself to (L) upper arm. All questions answered. Patient to call for any concerns.

## 2023-08-10 RX ORDER — MONTELUKAST SODIUM 10 MG/1
TABLET ORAL
Qty: 30 TABLET | Refills: 3 | Status: SHIPPED | OUTPATIENT
Start: 2023-08-10

## 2023-08-21 ENCOUNTER — OFFICE VISIT (OUTPATIENT)
Age: 68
End: 2023-08-21
Payer: MEDICARE

## 2023-08-21 VITALS
TEMPERATURE: 98.5 F | BODY MASS INDEX: 28.16 KG/M2 | HEIGHT: 65 IN | HEART RATE: 69 BPM | OXYGEN SATURATION: 97 % | DIASTOLIC BLOOD PRESSURE: 69 MMHG | WEIGHT: 169 LBS | RESPIRATION RATE: 16 BRPM | SYSTOLIC BLOOD PRESSURE: 122 MMHG

## 2023-08-21 DIAGNOSIS — L72.0 EPIDERMOID CYST OF SKIN OF POSTAURICULAR REGION: Primary | ICD-10-CM

## 2023-08-21 PROCEDURE — 3078F DIAST BP <80 MM HG: CPT | Performed by: SURGERY

## 2023-08-21 PROCEDURE — 3074F SYST BP LT 130 MM HG: CPT | Performed by: SURGERY

## 2023-08-21 PROCEDURE — 99214 OFFICE O/P EST MOD 30 MIN: CPT | Performed by: SURGERY

## 2023-08-21 PROCEDURE — 1123F ACP DISCUSS/DSCN MKR DOCD: CPT | Performed by: SURGERY

## 2023-08-21 ASSESSMENT — PATIENT HEALTH QUESTIONNAIRE - PHQ9
SUM OF ALL RESPONSES TO PHQ QUESTIONS 1-9: 0
2. FEELING DOWN, DEPRESSED OR HOPELESS: 0
SUM OF ALL RESPONSES TO PHQ QUESTIONS 1-9: 0
SUM OF ALL RESPONSES TO PHQ QUESTIONS 1-9: 0
1. LITTLE INTEREST OR PLEASURE IN DOING THINGS: 0
SUM OF ALL RESPONSES TO PHQ QUESTIONS 1-9: 0
SUM OF ALL RESPONSES TO PHQ9 QUESTIONS 1 & 2: 0

## 2023-08-21 NOTE — PROGRESS NOTES
Identified pt with two pt identifiers (name and ). Reviewed chart in preparation for visit and have obtained necessary documentation. Lyssa Gtz is a 74 y.o. female Cyst (Behind Right ear)  . There were no vitals filed for this visit. 1. Have you been to the ER, urgent care clinic since your last visit? Hospitalized since your last visit?  no     2. Have you seen or consulted any other health care providers outside of the [address] [place] [address] since your last visit? Include any pap smears or colon screening.   no

## 2023-08-21 NOTE — PROGRESS NOTES
Grecia Hernandez is a 74 y.o. female who presents today with the following:  Chief Complaint   Patient presents with    Cyst     Behind Right ear       Cyst      62-year-old female who presents for evaluation of an epidermal inclusion cyst in the right postauricular region that has been present for years but has increased in size. She has never had infection or drainage from the site. It sometimes impacts her grooming. Does not cause any significant pain. She has had no prior interventions to this lesion. She has a history of coronary artery disease and has had 8 stents in the past.  She also has a history of peripheral vascular disease and hypertension. She denies ever having an MI. She is an insulin-dependent diabetic. She is on Plavix. She still smokes approximately a quarter of a pack a day.   Past Medical History:   Diagnosis Date    Arthritis     Asthma     CAD (coronary artery disease)     Diabetes ([address]     Hypertension     Intractable abdominal pain 11/10/2018    Menopause     Pancreatitis     PVD (peripheral vascular disease) ([facility]) 04/21/2021    Type 2 diabetes mellitus with peripheral vascular disease ([facility]) 10/14/2019       Past Surgical History:   Procedure Laterality Date    BUNIONECTOMY Bilateral 1977    COLONOSCOPY N/A 07/28/2020    COLONOSCOPY performed by José Antonio Villegas MD at [email or web address] TRANSORAL DIAGNOSTIC  06/01/2021         HERNIA REPAIR  2002    PARTIAL HYSTERECTOMY (CERVIX NOT REMOVED)  1980    [place] UNLISTED PROCEDURE ABDOMEN PERITONEUM & OMENTUM  09/01/2019    Exploratory laparotomy, extended right hemicolectomy    [place] UNLISTED PROCEDURE CARDIAC SURGERY      stents x5    UPPER GI ENDOSCOPY,BIOPSY  06/01/2021            Social History     Socioeconomic History    Marital status: Single     Spouse name: Not on file    Number of children: Not on file    Years of education: Not on file    Highest education level: Not on file   Occupational History    Not on

## 2023-08-29 RX ORDER — ISOSORBIDE MONONITRATE 30 MG/1
TABLET, EXTENDED RELEASE ORAL EVERY MORNING
Qty: 90 TABLET | Refills: 1 | Status: SHIPPED | OUTPATIENT
Start: 2023-08-29

## 2023-09-12 ENCOUNTER — ANESTHESIA EVENT (OUTPATIENT)
Facility: HOSPITAL | Age: 68
End: 2023-09-12
Payer: MEDICARE

## 2023-09-12 ASSESSMENT — ENCOUNTER SYMPTOMS: SHORTNESS OF BREATH: 1

## 2023-09-18 NOTE — PROGRESS NOTES
[facility] Cardiology     [address] [facility], [facility], [facility] Jesiaden Lopez  [address] [place] [address], [facility] [address] [place]   [address]     Subjective:          Chalino Barrett is a 74 y.o. female is here for routine f/u. Pmhx CAD sp PCI, HTN, HLD, PVD, DM, iron deficiency anemia. Last seen by me 6/2023 and was scheduled to follow up with Dr Mary Joseph in 9/2023 but the service was down and her appointment got canceled. Today    Recall starting Jacqlyn Pae in June,  developed UTI and was treated with Macrobid. She continues farxiga and UTI recurred so she stopped this 9/18/2023  Continues to smoke but down to 5 cigs a day, no sob. She is able to perform physical activity, walking / lifting with no exertional symptoms     The patient denies chest pain/ shortness of breath, orthopnea, PND, LE edema, palpitations, syncope, presyncope or fatigue.     Patient Active Problem List    Diagnosis Date Noted    CAD (coronary artery disease) 01/20/2023    Cardiac angina ([address]) 01/17/2023    Chronic ischemic heart disease 01/17/2023    Primary cardiomyopathy ([address]) 01/17/2023    Chest pain, unspecified 01/17/2023    Shortness of breath 01/17/2023    Myopia of both eyes with astigmatism and presbyopia 05/10/2022    Primary open angle glaucoma (POAG) of both eyes, mild stage 05/10/2022    Anatomical narrow angle of both eyes 05/10/2022    Alternating exotropia 04/26/2022    Dry eye syndrome of both eyes 12/03/2021    Rectal bleeding 05/30/2021    Diarrhea of presumed infectious origin 05/20/2021    Hyperlipidemia 04/21/2021    Symptomatic anemia 04/21/2021    Anemia due to chronic blood loss 04/21/2021    Fall (on) (from) other stairs and steps, initial encounter 02/10/2020    Painful diabetic neuropathy ([address]) 08/02/2019    Sciatica of left side 08/02/2019    Neuropathy 06/14/2019    Obesity (BMI 30.0-34.9) 03/04/2019    Snoring 12/13/2018    Visual hallucinations 12/06/2018    Verbal

## 2023-09-21 ENCOUNTER — OFFICE VISIT (OUTPATIENT)
Age: 68
End: 2023-09-21

## 2023-09-21 VITALS
OXYGEN SATURATION: 100 % | RESPIRATION RATE: 18 BRPM | HEIGHT: 65 IN | WEIGHT: 169 LBS | DIASTOLIC BLOOD PRESSURE: 78 MMHG | SYSTOLIC BLOOD PRESSURE: 108 MMHG | TEMPERATURE: 98.3 F | HEART RATE: 86 BPM | BODY MASS INDEX: 28.16 KG/M2

## 2023-09-21 DIAGNOSIS — I73.9 PAD (PERIPHERAL ARTERY DISEASE) (HCC): ICD-10-CM

## 2023-09-21 DIAGNOSIS — Z98.61 CORONARY ANGIOPLASTY STATUS: ICD-10-CM

## 2023-09-21 DIAGNOSIS — I25.10 ATHEROSCLEROSIS OF NATIVE CORONARY ARTERY OF NATIVE HEART WITHOUT ANGINA PECTORIS: Primary | ICD-10-CM

## 2023-09-21 DIAGNOSIS — Z95.5 PRESENCE OF CORONARY ANGIOPLASTY IMPLANT AND GRAFT: ICD-10-CM

## 2023-09-21 DIAGNOSIS — I25.9 CHRONIC ISCHEMIC HEART DISEASE, UNSPECIFIED: ICD-10-CM

## 2023-09-21 DIAGNOSIS — I10 ESSENTIAL (PRIMARY) HYPERTENSION: ICD-10-CM

## 2023-09-21 DIAGNOSIS — E78.2 MIXED HYPERLIPIDEMIA: ICD-10-CM

## 2023-09-21 ASSESSMENT — PATIENT HEALTH QUESTIONNAIRE - PHQ9
SUM OF ALL RESPONSES TO PHQ QUESTIONS 1-9: 0
1. LITTLE INTEREST OR PLEASURE IN DOING THINGS: 0
SUM OF ALL RESPONSES TO PHQ QUESTIONS 1-9: 0
2. FEELING DOWN, DEPRESSED OR HOPELESS: 0
SUM OF ALL RESPONSES TO PHQ QUESTIONS 1-9: 0
SUM OF ALL RESPONSES TO PHQ9 QUESTIONS 1 & 2: 0
SUM OF ALL RESPONSES TO PHQ QUESTIONS 1-9: 0

## 2023-10-03 ENCOUNTER — NURSE ONLY (OUTPATIENT)
Age: 68
End: 2023-10-03
Payer: MEDICARE

## 2023-10-03 ENCOUNTER — OFFICE VISIT (OUTPATIENT)
Age: 68
End: 2023-10-03
Payer: MEDICARE

## 2023-10-03 ENCOUNTER — ANESTHESIA (OUTPATIENT)
Facility: HOSPITAL | Age: 68
End: 2023-10-03
Payer: MEDICARE

## 2023-10-03 ENCOUNTER — PREP FOR PROCEDURE (OUTPATIENT)
Age: 68
End: 2023-10-03

## 2023-10-03 VITALS
TEMPERATURE: 97.5 F | SYSTOLIC BLOOD PRESSURE: 110 MMHG | RESPIRATION RATE: 18 BRPM | HEIGHT: 65 IN | HEART RATE: 65 BPM | BODY MASS INDEX: 28.59 KG/M2 | DIASTOLIC BLOOD PRESSURE: 70 MMHG | WEIGHT: 171.6 LBS | OXYGEN SATURATION: 99 %

## 2023-10-03 DIAGNOSIS — B37.31 VAGINA, CANDIDIASIS: Primary | ICD-10-CM

## 2023-10-03 DIAGNOSIS — Z23 NEEDS FLU SHOT: Primary | ICD-10-CM

## 2023-10-03 PROCEDURE — G0008 ADMIN INFLUENZA VIRUS VAC: HCPCS | Performed by: INTERNAL MEDICINE

## 2023-10-03 PROCEDURE — 3078F DIAST BP <80 MM HG: CPT | Performed by: NURSE PRACTITIONER

## 2023-10-03 PROCEDURE — 1123F ACP DISCUSS/DSCN MKR DOCD: CPT | Performed by: NURSE PRACTITIONER

## 2023-10-03 PROCEDURE — 3074F SYST BP LT 130 MM HG: CPT | Performed by: NURSE PRACTITIONER

## 2023-10-03 PROCEDURE — 99213 OFFICE O/P EST LOW 20 MIN: CPT | Performed by: NURSE PRACTITIONER

## 2023-10-03 PROCEDURE — 90694 VACC AIIV4 NO PRSRV 0.5ML IM: CPT | Performed by: INTERNAL MEDICINE

## 2023-10-03 RX ORDER — BUPIVACAINE HYDROCHLORIDE 5 MG/ML
30 INJECTION, SOLUTION EPIDURAL; INTRACAUDAL ONCE
Status: CANCELLED | OUTPATIENT
Start: 2023-10-03 | End: 2023-10-03

## 2023-10-03 RX ORDER — SODIUM CHLORIDE, SODIUM LACTATE, POTASSIUM CHLORIDE, CALCIUM CHLORIDE 600; 310; 30; 20 MG/100ML; MG/100ML; MG/100ML; MG/100ML
INJECTION, SOLUTION INTRAVENOUS CONTINUOUS
Status: CANCELLED | OUTPATIENT
Start: 2023-10-03

## 2023-10-03 RX ORDER — SODIUM CHLORIDE 9 MG/ML
INJECTION, SOLUTION INTRAVENOUS PRN
Status: CANCELLED | OUTPATIENT
Start: 2023-10-03

## 2023-10-03 RX ORDER — FLUCONAZOLE 150 MG/1
150 TABLET ORAL
Qty: 2 TABLET | Refills: 1 | Status: SHIPPED | OUTPATIENT
Start: 2023-10-03 | End: 2023-10-09

## 2023-10-03 RX ORDER — SODIUM CHLORIDE 0.9 % (FLUSH) 0.9 %
5-40 SYRINGE (ML) INJECTION EVERY 12 HOURS SCHEDULED
Status: CANCELLED | OUTPATIENT
Start: 2023-10-03

## 2023-10-03 RX ORDER — LIDOCAINE HYDROCHLORIDE AND EPINEPHRINE 10; 10 MG/ML; UG/ML
20 INJECTION, SOLUTION INFILTRATION; PERINEURAL ONCE
Status: CANCELLED | OUTPATIENT
Start: 2023-10-03 | End: 2023-10-03

## 2023-10-03 RX ORDER — SODIUM CHLORIDE 0.9 % (FLUSH) 0.9 %
5-40 SYRINGE (ML) INJECTION PRN
Status: CANCELLED | OUTPATIENT
Start: 2023-10-03

## 2023-10-03 ASSESSMENT — PATIENT HEALTH QUESTIONNAIRE - PHQ9
SUM OF ALL RESPONSES TO PHQ QUESTIONS 1-9: 0
2. FEELING DOWN, DEPRESSED OR HOPELESS: 0
1. LITTLE INTEREST OR PLEASURE IN DOING THINGS: 0
SUM OF ALL RESPONSES TO PHQ9 QUESTIONS 1 & 2: 0
SUM OF ALL RESPONSES TO PHQ QUESTIONS 1-9: 0

## 2023-10-03 NOTE — PROGRESS NOTES
After obtaining consent, and per orders of Dr. Brian Becerra, injection of Fluad given in Left deltoid by Stefan Larson LPN. Patient instructed to remain in clinic for 20 minutes afterwards, and to report any adverse reaction to me immediately.

## 2023-10-03 NOTE — PROGRESS NOTES
Chief Complaint   Patient presents with    Vaginal Itching     C/o some vaginal irritation x 1 week along with slight discharge . Pam Salgado She was on Farxiga and has stopped this . .. She has not taken anything otc          HPI:       is a 74 y.o. female. Dr. Ramin Mesa is her PCP. She  has a past medical history of Arthritis, Asthma, CAD, Hypertension, Menopause, Pancreatitis and Type 2 diabetes mellitus with peripheral vascular disease. Hemoglobin A1C   Date Value Ref Range Status   07/14/2023 7.4 (H) 4.0 - 5.6 % Final     Comment:     NEW METHOD  PLEASE NOTE NEW REFERENCE RANGE  (NOTE)  HbA1C Interpretive Ranges  <5.7              Normal  5.7 - 6.4         Consider Prediabetes  >6.5              Consider Diabetes        New Issues:  She was seen by cardiology in August and started on [address] [place]. She reports that over the last week she has developed vaginal itching and discharge. Feels like previous yeast infections. Allergies   Allergen Reactions    Lisinopril Swelling, Headaches and Other (See Comments)     Other reaction(s): Other (see comments)  Other reaction(s): Headache  Other reaction(s): Headache      Morphine Hives, Itching, Rash and Other (See Comments)     Other reaction(s): Unknown (comments)  Other reaction(s): rash/itching, Unknown, Unknown (comments)  Other reaction(s): rash/itching, Unknown, Unknown (comments)      Tramadol Itching and Other (See Comments)     \"funny feeling\"  \"funny feeling\"    Other reaction(s):  Other (see comments)  \"funny feeling\"  \"funny feeling\"  \"funny feeling\"  \"funny feeling\"      Farxiga [Dapagliflozin] Other (See Comments)     Yeast infection     Gabapentin Itching and Other (See Comments)     Blurry vision    Blurry vision  Blurry vision  Blurry vision  Blurry vision  Blurry vision         Current Outpatient Medications   Medication Sig Dispense Refill    isosorbide mononitrate (IMDUR) 30 MG extended release tablet TAKE 1 TABLET BY MOUTH EVERY MORNING 90 tablet 1

## 2023-10-03 NOTE — H&P (VIEW-ONLY)
19-year-old female who presents for evaluation of an epidermal inclusion cyst in the right postauricular region that has been present for years but has increased in size. She has never had infection or drainage from the site. It sometimes impacts her grooming. Does not cause any significant pain. She has had no prior interventions to this lesion. She has a history of coronary artery disease and has had 8 stents in the past.  She also has a history of peripheral vascular disease and hypertension. She denies ever having an MI. She is an insulin-dependent diabetic. She is on Plavix. She still smokes approximately a quarter of a pack a day.   Past Medical History:   Diagnosis Date    Arthritis     Asthma     CAD (coronary artery disease)     Diabetes ([address] )     Hypertension     Intractable abdominal pain 11/10/2018    Menopause     Pancreatitis     PVD (peripheral vascular disease) ([address] [facility]) 04/21/2021    Type 2 diabetes mellitus with peripheral vascular disease ([address] [facility]) 10/14/2019       Past Surgical History:   Procedure Laterality Date    BUNIONECTOMY Bilateral 1977    COLONOSCOPY N/A 07/28/2020    COLONOSCOPY performed by Mahendra Hernandez MD at [address] [facility] TRANSORAL DIAGNOSTIC  06/01/2021         HERNIA REPAIR  2002    PARTIAL HYSTERECTOMY (CERVIX NOT REMOVED)  1980    VT UNLISTED PROCEDURE ABDOMEN PERITONEUM & OMENTUM  09/01/2019    Exploratory laparotomy, extended right hemicolectomy    VT UNLISTED PROCEDURE CARDIAC SURGERY      stents x5    UPPER GI ENDOSCOPY,BIOPSY  06/01/2021            Social History     Socioeconomic History    Marital status: Single     Spouse name: Not on file    Number of children: Not on file    Years of education: Not on file    Highest education level: Not on file   Occupational History    Not on file   Tobacco Use    Smoking status: Every Day     Packs/day: 0.25     Years: 35.00     Additional pack years: 0.00     Total pack years: 8.75     Types: hydroCHLOROthiazide (HYDRODIURIL) 25 MG tablet Take 1 tablet by mouth daily      ipratropium (ATROVENT) 0.06 % nasal spray 2 sprays by Nasal route daily as needed      labetalol (NORMODYNE) 100 MG tablet TAKE 1 TABLET BY MOUTH TWICE DAILY FOR FOR BLOOD PRESSURE      levocetirizine (XYZAL) 5 MG tablet Take 1 tablet by mouth daily      insulin glargine (LANTUS SOLOSTAR) 100 UNIT/ML injection pen Inject 25 Units into the skin daily 5 Adjustable Dose Pre-filled Pen Syringe 5     No current facility-administered medications for this visit. The above histories, medications and allergies have been reviewed. Review of Systems      LMP  (LMP Unknown)   Physical Exam  Constitutional:       Appearance: Normal appearance. Cardiovascular:      Rate and Rhythm: Normal rate and regular rhythm. Pulmonary:      Effort: No respiratory distress. Breath sounds: Normal breath sounds. No stridor. No wheezing. Neurological:      Mental Status: She is alert. 1. Epidermoid cyst of skin of postauricular region  Increasing in size and now impacting her grooming. Offered excision. Risks of the procedure were shared in detail including the risk of bleeding, infection, recurrence, poor wound healing or poor cosmetic results. She understands and wishes to proceed. She knows that she will need to discontinue the Plavix for 5 days prior to the procedure. The patient was counseled at length about the risks of rodolfo Covid-19 during their perioperative period and any recovery window from their procedure. The patient was made aware that rodolfo Covid-19  may worsen their prognosis for recovering from their procedure  and lend to a higher morbidity and/or mortality risk. All material risks, benefits, and reasonable alternatives including postponing the procedure were discussed. The patient does wish to proceed with the procedure at this time. No follow-ups on file.      Taj Grace MD

## 2023-10-03 NOTE — H&P
51-year-old female who presents for evaluation of an epidermal inclusion cyst in the right postauricular region that has been present for years but has increased in size. She has never had infection or drainage from the site. It sometimes impacts her grooming. Does not cause any significant pain. She has had no prior interventions to this lesion. She has a history of coronary artery disease and has had 8 stents in the past.  She also has a history of peripheral vascular disease and hypertension. She denies ever having an MI. She is an insulin-dependent diabetic. She is on Plavix. She still smokes approximately a quarter of a pack a day.   Past Medical History:   Diagnosis Date    Arthritis     Asthma     CAD (coronary artery disease)     Diabetes ([address])     Hypertension     Intractable abdominal pain 11/10/2018    Menopause     Pancreatitis     PVD (peripheral vascular disease) ([facility]) 04/21/2021    Type 2 diabetes mellitus with peripheral vascular disease ([address]) 10/14/2019       Past Surgical History:   Procedure Laterality Date    BUNIONECTOMY Bilateral 1977    COLONOSCOPY N/A 07/28/2020    COLONOSCOPY performed by Nydia Oliver MD at [address] [facility] TRANSORAL DIAGNOSTIC  06/01/2021         HERNIA REPAIR  2002    PARTIAL HYSTERECTOMY (CERVIX NOT REMOVED)  1980    CT UNLISTED PROCEDURE ABDOMEN PERITONEUM & OMENTUM  09/01/2019    Exploratory laparotomy, extended right hemicolectomy    CT UNLISTED PROCEDURE CARDIAC SURGERY      stents x5    UPPER GI ENDOSCOPY,BIOPSY  06/01/2021            Social History     Socioeconomic History    Marital status: Single     Spouse name: Not on file    Number of children: Not on file    Years of education: Not on file    Highest education level: Not on file   Occupational History    Not on file   Tobacco Use    Smoking status: Every Day     Packs/day: 0.25     Years: 35.00     Additional pack years: 0.00     Total pack years: 8.75     Types:

## 2023-10-04 ENCOUNTER — HOSPITAL ENCOUNTER (OUTPATIENT)
Facility: HOSPITAL | Age: 68
Setting detail: OUTPATIENT SURGERY
Discharge: HOME | End: 2023-10-04
Attending: SURGERY | Admitting: SURGERY
Payer: MEDICARE

## 2023-10-04 VITALS
TEMPERATURE: 97.8 F | SYSTOLIC BLOOD PRESSURE: 147 MMHG | BODY MASS INDEX: 28.49 KG/M2 | RESPIRATION RATE: 20 BRPM | HEART RATE: 79 BPM | WEIGHT: 171 LBS | DIASTOLIC BLOOD PRESSURE: 72 MMHG | HEIGHT: 65 IN | OXYGEN SATURATION: 97 %

## 2023-10-04 PROBLEM — L72.3 SEBACEOUS CYST: Status: ACTIVE | Noted: 2023-10-04

## 2023-10-04 LAB
GLUCOSE BLD STRIP.AUTO-MCNC: 116 MG/DL (ref 65–117)
SERVICE CMNT-IMP: NORMAL

## 2023-10-04 PROCEDURE — 6360000002 HC RX W HCPCS: Performed by: ANESTHESIOLOGY

## 2023-10-04 PROCEDURE — 88304 TISSUE EXAM BY PATHOLOGIST: CPT

## 2023-10-04 PROCEDURE — 3600000012 HC SURGERY LEVEL 2 ADDTL 15MIN: Performed by: SURGERY

## 2023-10-04 PROCEDURE — 3700000001 HC ADD 15 MINUTES (ANESTHESIA): Performed by: SURGERY

## 2023-10-04 PROCEDURE — 3600000002 HC SURGERY LEVEL 2 BASE: Performed by: SURGERY

## 2023-10-04 PROCEDURE — 2709999900 HC NON-CHARGEABLE SUPPLY: Performed by: SURGERY

## 2023-10-04 PROCEDURE — 6360000002 HC RX W HCPCS: Performed by: SURGERY

## 2023-10-04 PROCEDURE — 3700000000 HC ANESTHESIA ATTENDED CARE: Performed by: SURGERY

## 2023-10-04 PROCEDURE — 11424 EXC H-F-NK-SP B9+MARG 3.1-4: CPT | Performed by: SURGERY

## 2023-10-04 PROCEDURE — 7100000001 HC PACU RECOVERY - ADDTL 15 MIN: Performed by: SURGERY

## 2023-10-04 PROCEDURE — 2580000003 HC RX 258: Performed by: SURGERY

## 2023-10-04 PROCEDURE — 7100000000 HC PACU RECOVERY - FIRST 15 MIN: Performed by: SURGERY

## 2023-10-04 PROCEDURE — 2500000003 HC RX 250 WO HCPCS: Performed by: ANESTHESIOLOGY

## 2023-10-04 PROCEDURE — 2500000003 HC RX 250 WO HCPCS: Performed by: SURGERY

## 2023-10-04 PROCEDURE — 82962 GLUCOSE BLOOD TEST: CPT

## 2023-10-04 RX ORDER — LABETALOL HYDROCHLORIDE 5 MG/ML
10 INJECTION, SOLUTION INTRAVENOUS
Status: DISCONTINUED | OUTPATIENT
Start: 2023-10-04 | End: 2023-10-04 | Stop reason: HOSPADM

## 2023-10-04 RX ORDER — DIPHENHYDRAMINE HYDROCHLORIDE 50 MG/ML
12.5 INJECTION INTRAMUSCULAR; INTRAVENOUS
Status: DISCONTINUED | OUTPATIENT
Start: 2023-10-04 | End: 2023-10-04 | Stop reason: HOSPADM

## 2023-10-04 RX ORDER — SODIUM CHLORIDE, SODIUM LACTATE, POTASSIUM CHLORIDE, CALCIUM CHLORIDE 600; 310; 30; 20 MG/100ML; MG/100ML; MG/100ML; MG/100ML
INJECTION, SOLUTION INTRAVENOUS CONTINUOUS
Status: DISCONTINUED | OUTPATIENT
Start: 2023-10-04 | End: 2023-10-04 | Stop reason: HOSPADM

## 2023-10-04 RX ORDER — BUPIVACAINE HYDROCHLORIDE 5 MG/ML
30 INJECTION, SOLUTION EPIDURAL; INTRACAUDAL ONCE
Status: DISCONTINUED | OUTPATIENT
Start: 2023-10-04 | End: 2023-10-04 | Stop reason: HOSPADM

## 2023-10-04 RX ORDER — LIDOCAINE HYDROCHLORIDE 20 MG/ML
INJECTION, SOLUTION EPIDURAL; INFILTRATION; INTRACAUDAL; PERINEURAL PRN
Status: DISCONTINUED | OUTPATIENT
Start: 2023-10-04 | End: 2023-10-04 | Stop reason: SDUPTHER

## 2023-10-04 RX ORDER — ONDANSETRON 2 MG/ML
4 INJECTION INTRAMUSCULAR; INTRAVENOUS
Status: DISCONTINUED | OUTPATIENT
Start: 2023-10-04 | End: 2023-10-04 | Stop reason: HOSPADM

## 2023-10-04 RX ORDER — SODIUM CHLORIDE 0.9 % (FLUSH) 0.9 %
5-40 SYRINGE (ML) INJECTION EVERY 12 HOURS SCHEDULED
Status: DISCONTINUED | OUTPATIENT
Start: 2023-10-04 | End: 2023-10-04 | Stop reason: HOSPADM

## 2023-10-04 RX ORDER — SODIUM CHLORIDE 9 MG/ML
INJECTION, SOLUTION INTRAVENOUS PRN
Status: DISCONTINUED | OUTPATIENT
Start: 2023-10-04 | End: 2023-10-04 | Stop reason: HOSPADM

## 2023-10-04 RX ORDER — OXYCODONE HYDROCHLORIDE 5 MG/1
5 TABLET ORAL
Status: DISCONTINUED | OUTPATIENT
Start: 2023-10-04 | End: 2023-10-04 | Stop reason: HOSPADM

## 2023-10-04 RX ORDER — BUPIVACAINE HYDROCHLORIDE 2.5 MG/ML
INJECTION, SOLUTION EPIDURAL; INFILTRATION; INTRACAUDAL PRN
Status: DISCONTINUED | OUTPATIENT
Start: 2023-10-04 | End: 2023-10-04 | Stop reason: ALTCHOICE

## 2023-10-04 RX ORDER — LIDOCAINE HYDROCHLORIDE AND EPINEPHRINE 10; 10 MG/ML; UG/ML
INJECTION, SOLUTION INFILTRATION; PERINEURAL PRN
Status: DISCONTINUED | OUTPATIENT
Start: 2023-10-04 | End: 2023-10-04 | Stop reason: ALTCHOICE

## 2023-10-04 RX ORDER — SODIUM CHLORIDE 0.9 % (FLUSH) 0.9 %
5-40 SYRINGE (ML) INJECTION PRN
Status: DISCONTINUED | OUTPATIENT
Start: 2023-10-04 | End: 2023-10-04 | Stop reason: HOSPADM

## 2023-10-04 RX ORDER — PROCHLORPERAZINE EDISYLATE 5 MG/ML
5 INJECTION INTRAMUSCULAR; INTRAVENOUS
Status: DISCONTINUED | OUTPATIENT
Start: 2023-10-04 | End: 2023-10-04 | Stop reason: HOSPADM

## 2023-10-04 RX ORDER — LIDOCAINE HYDROCHLORIDE AND EPINEPHRINE 10; 10 MG/ML; UG/ML
20 INJECTION, SOLUTION INFILTRATION; PERINEURAL ONCE
Status: DISCONTINUED | OUTPATIENT
Start: 2023-10-04 | End: 2023-10-04 | Stop reason: HOSPADM

## 2023-10-04 RX ORDER — IPRATROPIUM BROMIDE AND ALBUTEROL SULFATE 2.5; .5 MG/3ML; MG/3ML
1 SOLUTION RESPIRATORY (INHALATION)
Status: DISCONTINUED | OUTPATIENT
Start: 2023-10-04 | End: 2023-10-04 | Stop reason: HOSPADM

## 2023-10-04 RX ORDER — PROPOFOL 10 MG/ML
INJECTION, EMULSION INTRAVENOUS CONTINUOUS PRN
Status: DISCONTINUED | OUTPATIENT
Start: 2023-10-04 | End: 2023-10-04 | Stop reason: SDUPTHER

## 2023-10-04 RX ORDER — MIDAZOLAM HYDROCHLORIDE 1 MG/ML
INJECTION INTRAMUSCULAR; INTRAVENOUS PRN
Status: DISCONTINUED | OUTPATIENT
Start: 2023-10-04 | End: 2023-10-04 | Stop reason: SDUPTHER

## 2023-10-04 RX ADMIN — MIDAZOLAM 2 MG: 1 INJECTION INTRAMUSCULAR; INTRAVENOUS at 08:57

## 2023-10-04 RX ADMIN — MIDAZOLAM 2 MG: 1 INJECTION INTRAMUSCULAR; INTRAVENOUS at 08:50

## 2023-10-04 RX ADMIN — LIDOCAINE HYDROCHLORIDE 100 MG: 20 INJECTION, SOLUTION EPIDURAL; INFILTRATION; INTRACAUDAL; PERINEURAL at 09:00

## 2023-10-04 RX ADMIN — PROPOFOL 50 MCG/KG/MIN: 10 INJECTION, EMULSION INTRAVENOUS at 09:00

## 2023-10-04 RX ADMIN — SODIUM CHLORIDE, POTASSIUM CHLORIDE, SODIUM LACTATE AND CALCIUM CHLORIDE: 600; 310; 30; 20 INJECTION, SOLUTION INTRAVENOUS at 08:17

## 2023-10-04 ASSESSMENT — LIFESTYLE VARIABLES: SMOKING_STATUS: 1

## 2023-10-04 ASSESSMENT — ENCOUNTER SYMPTOMS: SHORTNESS OF BREATH: 1

## 2023-10-04 NOTE — PROGRESS NOTES
Pt denies c/o. Reviewed discharge instructions with pt's son. Voices understanding.  Pt meets criteria for discharge

## 2023-10-04 NOTE — ANESTHESIA POSTPROCEDURE EVALUATION
Department of Anesthesiology  Postprocedure Note    Patient: Dylan Lemus  MRN: [id number]  YOB: 1955  Date of evaluation: 10/4/2023      Procedure Summary     Date: 10/04/23 Room / Location: [facility] MAIN OR 1 / [facility] MAIN OR    Anesthesia Start: 1731 Anesthesia Stop: 3054    Procedure: EXCISION OF RIGHT POSTERIOR AURICULAR CYST (MAC W/LOCAL) (Right: Ear) Diagnosis:       Epidermoid cyst of ear      (Epidermoid cyst of ear [L72.0])    Surgeons: Susan Wise MD Responsible Provider: J Luis Lugo DO    Anesthesia Type: MAC ASA Status: 3          Anesthesia Type: No value filed.     Sumit Phase I: Sumit Score: (P) 10    Sumit Phase II:        Anesthesia Post Evaluation    Patient location during evaluation: PACU  Patient participation: complete - patient participated  Level of consciousness: awake and alert  Pain score: 0  Airway patency: patent  Nausea & Vomiting: no vomiting and no nausea  Complications: no  Cardiovascular status: blood pressure returned to baseline  Respiratory status: acceptable  Hydration status: euvolemic  Multimodal analgesia pain management approach  Pain management: adequate

## 2023-10-04 NOTE — DISCHARGE INSTRUCTIONS
Apply an ice pack every other hour while awake for the next 72 hours  Okay to resume Plavix and aspirin on Saturday. Tylenol as needed and directed for pain.

## 2023-10-04 NOTE — INTERVAL H&P NOTE
Update History & Physical    The patient's History and Physical of October 3, 2023 was reviewed with the patient and I examined the patient. There was no change. The surgical site was confirmed by the patient and me. Plan: The risks, benefits, expected outcome, and alternative to the recommended procedure have been discussed with the patient. Patient understands and wants to proceed with the procedure.      Electronically signed by Buddy Robles MD on 10/4/2023 at 8:39 AM

## 2023-10-04 NOTE — BRIEF OP NOTE
Brief Postoperative Note      Patient: Mukesh Lam  YOB: 1955  MRN: [id number]    Date of Procedure: 10/4/2023    Pre-Op Diagnosis Codes:     * Epidermoid cyst of ear [L72.0]    Post-Op Diagnosis: Same       Procedure(s):  EXCISION OF RIGHT POSTERIOR AURICULAR CYST (MAC W/LOCAL)    Surgeon(s):  Newton Diaz MD    Assistant:  * No surgical staff found *    Anesthesia: Monitor Anesthesia Care    Estimated Blood Loss (mL): Minimal    Complications: None    Specimens:   ID Type Source Tests Collected by Time Destination   1 : Right Posterior Auricular Sebaceous Cyst Tissue Ear SURGICAL PATHOLOGY Newton Diaz MD 10/4/2023 5499        Implants:  * No implants in log *      Drains: * No LDAs found *    Findings: Cyst of right posterior auricular skin  This procedure was not performed to treat primary cutaneous melanoma through wide local excision      Electronically signed by Kait Blake MD on 10/4/2023 at 9:30 AM

## 2023-10-04 NOTE — OP NOTE
[facility]  OPERATIVE REPORT    Name:  Mitali Tripathi  MR#:  [id number]  :  1955  ACCOUNT #:  [de-identified]  DATE OF SERVICE:  10/04/2023    PREOPERATIVE DIAGNOSIS:  Right posterior auricular cyst.    POSTOPERATIVE DIAGNOSIS:  Right posterior auricular cyst.    PROCEDURE PERFORMED:  Excision of right posterior auricular cyst.    SURGEON:  Beatrice Rios MD    ASSISTANT:  Dominique Parker RN. ANESTHESIA:  MAC with local anesthetic. COMPLICATIONS:  None. SPECIMENS REMOVED:  Right posterior auricular sebaceous cyst.    IMPLANTS:  None. DRAINS:  None. ESTIMATED BLOOD LOSS:  Minimal.    DISPOSITION:  Stable. FINDINGS:  Cyst of the right posterior auricular skin excised. PROCEDURE:  The patient was brought to the operative theater. Monitoring devices and nasal cannula O2 were placed on her and she was placed in a slightly elevated left lateral position with buttressing rolls placed to keep her in this position. A cotton ball was placed in her right ear and the ear was taped forward. The skin was then prepped with Betadine and then draped in sterile fashion. A time-out was then performed. An elliptical incision was made over the pinpoint opening from the cyst in the right posterior auricular skin using a 15-blade scalpel. We extended our incision approximately 5 mm above and below this pinpoint opening. We then proceeded to remove the cyst.  The posterior wall of the cyst came out intact. The cyst was passed off the field. We then irrigated the cavity. Hemostasis was obtained using Bovie cauterization. Of note, a field block had been established with a mixture of Marcaine and 1% lidocaine with epinephrine. After the area was seemed to be hemostatic, attention was paid towards the closure. 3-0 Monocryl interrupted dermal sutures were placed, followed by Dermabond. The sponge, needle, and instrument counts were correct at the end of the case x2.   The patient tolerated

## 2023-10-17 ENCOUNTER — OFFICE VISIT (OUTPATIENT)
Age: 68
End: 2023-10-17

## 2023-10-17 VITALS
SYSTOLIC BLOOD PRESSURE: 145 MMHG | WEIGHT: 174 LBS | BODY MASS INDEX: 28.99 KG/M2 | DIASTOLIC BLOOD PRESSURE: 78 MMHG | OXYGEN SATURATION: 94 % | HEART RATE: 75 BPM | TEMPERATURE: 98 F | RESPIRATION RATE: 16 BRPM | HEIGHT: 65 IN

## 2023-10-17 DIAGNOSIS — Z48.89 POSTOPERATIVE VISIT: Primary | ICD-10-CM

## 2023-10-17 PROCEDURE — 99024 POSTOP FOLLOW-UP VISIT: CPT | Performed by: SURGERY

## 2023-10-17 ASSESSMENT — PATIENT HEALTH QUESTIONNAIRE - PHQ9
SUM OF ALL RESPONSES TO PHQ QUESTIONS 1-9: 0
1. LITTLE INTEREST OR PLEASURE IN DOING THINGS: 0
SUM OF ALL RESPONSES TO PHQ9 QUESTIONS 1 & 2: 0
SUM OF ALL RESPONSES TO PHQ QUESTIONS 1-9: 0
SUM OF ALL RESPONSES TO PHQ QUESTIONS 1-9: 0
2. FEELING DOWN, DEPRESSED OR HOPELESS: 0
SUM OF ALL RESPONSES TO PHQ QUESTIONS 1-9: 0

## 2023-10-17 NOTE — PROGRESS NOTES
Identified pt with two pt identifiers (name and ). Reviewed chart in preparation for visit and have obtained necessary documentation. Dalia Alatorre is a 74 y.o. female Post-Op Check (Excision behind right ear)  . There were no vitals filed for this visit. 1. Have you been to the ER, urgent care clinic since your last visit? Hospitalized since your last visit?  no     2. Have you seen or consulted any other health care providers outside of the [address] [place] [address] since your last visit? Include any pap smears or colon screening. No    BP elevated. Patient advised to  follow up with PCP and check BP twice a day at home.

## 2023-10-24 DIAGNOSIS — B37.31 VAGINA, CANDIDIASIS: ICD-10-CM

## 2023-10-24 RX ORDER — FLUCONAZOLE 150 MG/1
150 TABLET ORAL
Qty: 2 TABLET | Refills: 1 | Status: SHIPPED | OUTPATIENT
Start: 2023-10-24 | End: 2023-10-30

## 2023-10-27 ENCOUNTER — HOSPITAL ENCOUNTER (EMERGENCY)
Facility: HOSPITAL | Age: 68
Discharge: HOME | End: 2023-10-27
Attending: EMERGENCY MEDICINE
Payer: MEDICARE

## 2023-10-27 ENCOUNTER — APPOINTMENT (OUTPATIENT)
Facility: HOSPITAL | Age: 68
End: 2023-10-27
Payer: MEDICARE

## 2023-10-27 VITALS
RESPIRATION RATE: 18 BRPM | SYSTOLIC BLOOD PRESSURE: 152 MMHG | WEIGHT: 181 LBS | HEART RATE: 79 BPM | TEMPERATURE: 97.8 F | DIASTOLIC BLOOD PRESSURE: 79 MMHG | HEIGHT: 65 IN | BODY MASS INDEX: 30.16 KG/M2 | OXYGEN SATURATION: 99 %

## 2023-10-27 DIAGNOSIS — S62.346A CLOSED NONDISPLACED FRACTURE OF BASE OF FIFTH METACARPAL BONE OF RIGHT HAND, INITIAL ENCOUNTER: Primary | ICD-10-CM

## 2023-10-27 DIAGNOSIS — T74.91XA DOMESTIC VIOLENCE OF ADULT, INITIAL ENCOUNTER: ICD-10-CM

## 2023-10-27 PROCEDURE — 70450 CT HEAD/BRAIN W/O DYE: CPT

## 2023-10-27 PROCEDURE — 99284 EMERGENCY DEPT VISIT MOD MDM: CPT

## 2023-10-27 PROCEDURE — 72070 X-RAY EXAM THORAC SPINE 2VWS: CPT

## 2023-10-27 PROCEDURE — 70486 CT MAXILLOFACIAL W/O DYE: CPT

## 2023-10-27 PROCEDURE — 6370000000 HC RX 637 (ALT 250 FOR IP): Performed by: EMERGENCY MEDICINE

## 2023-10-27 PROCEDURE — 72100 X-RAY EXAM L-S SPINE 2/3 VWS: CPT

## 2023-10-27 PROCEDURE — 73130 X-RAY EXAM OF HAND: CPT

## 2023-10-27 RX ORDER — ACETAMINOPHEN 500 MG
1000 TABLET ORAL
Status: COMPLETED | OUTPATIENT
Start: 2023-10-27 | End: 2023-10-27

## 2023-10-27 RX ORDER — OXYCODONE HYDROCHLORIDE 5 MG/1
5 TABLET ORAL EVERY 6 HOURS PRN
Qty: 12 TABLET | Refills: 0 | Status: SHIPPED | OUTPATIENT
Start: 2023-10-27 | End: 2023-10-30

## 2023-10-27 RX ADMIN — ACETAMINOPHEN 1000 MG: 500 TABLET ORAL at 09:10

## 2023-10-27 ASSESSMENT — LIFESTYLE VARIABLES: HOW OFTEN DO YOU HAVE A DRINK CONTAINING ALCOHOL: MONTHLY OR LESS

## 2023-10-27 ASSESSMENT — PAIN DESCRIPTION - LOCATION
LOCATION: BACK
LOCATION_2: HAND
LOCATION: BACK
LOCATION: BACK

## 2023-10-27 ASSESSMENT — PAIN DESCRIPTION - DESCRIPTORS
DESCRIPTORS_2: ACHING
DESCRIPTORS: ACHING

## 2023-10-27 ASSESSMENT — PAIN - FUNCTIONAL ASSESSMENT
PAIN_FUNCTIONAL_ASSESSMENT: 0-10
PAIN_FUNCTIONAL_ASSESSMENT: 0-10
PAIN_FUNCTIONAL_ASSESSMENT: ACTIVITIES ARE NOT PREVENTED

## 2023-10-27 ASSESSMENT — PAIN SCALES - GENERAL
PAINLEVEL_OUTOF10: 8
PAINLEVEL_OUTOF10: 2
PAINLEVEL_OUTOF10: 8
PAINLEVEL_OUTOF10: 8

## 2023-10-27 ASSESSMENT — PAIN DESCRIPTION - FREQUENCY: FREQUENCY: CONTINUOUS

## 2023-10-27 ASSESSMENT — PAIN DESCRIPTION - INTENSITY: RATING_2: 8

## 2023-10-27 ASSESSMENT — PAIN DESCRIPTION - ORIENTATION
ORIENTATION_2: RIGHT
ORIENTATION: LOWER;MID
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
## 2023-10-27 NOTE — ED NOTES
Instructions provided to patient in regards to discharge. Patient verbalized understanding. No questions at time of discharge. Ambulated out without any difficulty. Vital signs stable at time of discharge.       Yanna Lei RN  10/27/23 5062

## 2023-10-27 NOTE — ED NOTES
Ulnar Gutter splint applied to right arm/hand.   Arm sling applied     Jazz Krishnan RN  10/27/23 5555

## 2023-12-04 RX ORDER — MONTELUKAST SODIUM 10 MG/1
TABLET ORAL
Qty: 30 TABLET | Refills: 3 | Status: SHIPPED | OUTPATIENT
Start: 2023-12-04

## 2023-12-12 ENCOUNTER — TRANSCRIBE ORDERS (OUTPATIENT)
Facility: HOSPITAL | Age: 68
End: 2023-12-12

## 2023-12-12 ENCOUNTER — TELEPHONE (OUTPATIENT)
Age: 68
End: 2023-12-12

## 2023-12-12 DIAGNOSIS — Z12.31 SCREENING MAMMOGRAM FOR BREAST CANCER: Primary | ICD-10-CM

## 2024-01-12 ENCOUNTER — OFFICE VISIT (OUTPATIENT)
Age: 69
End: 2024-01-12

## 2024-01-12 VITALS
SYSTOLIC BLOOD PRESSURE: 148 MMHG | HEIGHT: 65 IN | DIASTOLIC BLOOD PRESSURE: 77 MMHG | BODY MASS INDEX: 29.26 KG/M2 | WEIGHT: 175.6 LBS | HEART RATE: 75 BPM | RESPIRATION RATE: 16 BRPM | OXYGEN SATURATION: 99 %

## 2024-01-12 DIAGNOSIS — E11.69 TYPE 2 DIABETES MELLITUS WITH OTHER SPECIFIED COMPLICATION, WITH LONG-TERM CURRENT USE OF INSULIN (HCC): ICD-10-CM

## 2024-01-12 DIAGNOSIS — E78.2 MIXED HYPERLIPIDEMIA: ICD-10-CM

## 2024-01-12 DIAGNOSIS — I10 ESSENTIAL (PRIMARY) HYPERTENSION: ICD-10-CM

## 2024-01-12 DIAGNOSIS — Z12.12 ENCOUNTER FOR SCREENING FOR COLORECTAL MALIGNANT NEOPLASM: ICD-10-CM

## 2024-01-12 DIAGNOSIS — I10 BENIGN ESSENTIAL HYPERTENSION: ICD-10-CM

## 2024-01-12 DIAGNOSIS — I10 BENIGN ESSENTIAL HYPERTENSION: Primary | ICD-10-CM

## 2024-01-12 DIAGNOSIS — Z79.4 TYPE 2 DIABETES MELLITUS WITH OTHER SPECIFIED COMPLICATION, WITH LONG-TERM CURRENT USE OF INSULIN (HCC): ICD-10-CM

## 2024-01-12 DIAGNOSIS — Z12.11 ENCOUNTER FOR SCREENING FOR COLORECTAL MALIGNANT NEOPLASM: ICD-10-CM

## 2024-01-12 DIAGNOSIS — Z00.00 MEDICARE ANNUAL WELLNESS VISIT, SUBSEQUENT: Primary | ICD-10-CM

## 2024-01-12 ASSESSMENT — PATIENT HEALTH QUESTIONNAIRE - PHQ9
SUM OF ALL RESPONSES TO PHQ QUESTIONS 1-9: 0
2. FEELING DOWN, DEPRESSED OR HOPELESS: 0
SUM OF ALL RESPONSES TO PHQ QUESTIONS 1-9: 0
SUM OF ALL RESPONSES TO PHQ9 QUESTIONS 1 & 2: 0
1. LITTLE INTEREST OR PLEASURE IN DOING THINGS: 0

## 2024-01-12 ASSESSMENT — LIFESTYLE VARIABLES
HOW OFTEN DO YOU HAVE A DRINK CONTAINING ALCOHOL: NEVER
HOW MANY STANDARD DRINKS CONTAINING ALCOHOL DO YOU HAVE ON A TYPICAL DAY: PATIENT DOES NOT DRINK

## 2024-01-12 NOTE — PROGRESS NOTES
Hyacinth Patterson is a 68 y.o. female presenting for/with:    Chief Complaint   Patient presents with    Medicare AWV    Hypertension     Routine F/U    Diabetes     Routine F/U    States is having issues with Freestyle lilliam. Requests to switch to Dexcom.        Vitals:    01/12/24 0745   BP: (!) 148/77   Site: Left Upper Arm   Position: Sitting   Cuff Size: Large Adult   Pulse: 75   Resp: 16   SpO2: 99%   Weight: 79.7 kg (175 lb 9.6 oz)   Height: 1.651 m (5' 5\")       Pain Scale: 0 - No pain/10  Pain Location:     \"Have you been to the ER, urgent care clinic since your last visit?  Hospitalized since your last visit?\"    NO    “Have you seen or consulted any other health care providers outside of [facility] since your last visit?”    NO                 1/12/2024     7:40 AM   PHQ-9    Little interest or pleasure in doing things 0   Feeling down, depressed, or hopeless 0   PHQ-2 Score 0   PHQ-9 Total Score 0           1/12/2024     7:30 AM 9/21/2023     3:00 PM 6/16/2023    10:00 AM 3/15/2023    12:00 AM 1/30/2023    12:00 AM 1/3/2023    12:00 AM 10/17/2022    12:00 AM   [facility] AMB LEARNING ASSESSMENT   Primary Learner Patient Patient Patient Patient Patient Patient Patient   co-learner caregiver  No  No No No    Primary Language ENGLISH ENGLISH ENGLISH ENGLISH ENGLISH ENGLISH ENGLISH   Learning Preference PICTURES DEMONSTRATION DEMONSTRATION DEMONSTRATION DEMONSTRATION DEMONSTRATION LISTENING   Answered By self pt pt pt pt pt self   Relationship to Learner SELF SELF SELF SELF SELF SELF SELF            1/12/2024     7:41 AM   Amb Fall Risk Assessment and TUG Test   Do you feel unsteady or are you worried about falling?  no   2 or more falls in past year? no   Fall with injury in past year? no           1/12/2024     7:00 AM 6/29/2023     2:00 PM   ADL ASSESSMENT   Feeding yourself No Help Needed No Help Needed   Getting from bed to chair No Help Needed No Help Needed   Getting dressed No Help Needed No Help 
Medicare Annual Wellness Visit    Hyacinth Patterson is here for Medicare AWV, Hypertension (Routine F/U), and Diabetes (Routine F/U//States is having issues with Freestyle lilliam. Requests to switch to Dexcom. )    Assessment & Plan   Medicare annual wellness visit, subsequent  Encounter for screening for colorectal malignant neoplasm  Essential (primary) hypertension  Type 2 diabetes mellitus with other specified complication, with long-term current use of insulin (HCC) [E11.69, Z79.4 (ICD-10-CM)]  -     Hemoglobin A1C  Mixed hyperlipidemia  -     Lipid Panel  Benign essential hypertension  -     Comprehensive Metabolic Panel  -     TSH  -     CBC with Auto Differential  Type 2 diabetes mellitus with other specified complication, with long-term current use of insulin (HCC)  -     Hemoglobin A1C    Recommendations for Preventive Services Due: see orders and patient instructions/AVS.  Recommended screening schedule for the next 5-10 years is provided to the patient in written form: see Patient Instructions/AVS.     No follow-ups on file.     Subjective     Patient's complete Health Risk Assessment and screening values have been reviewed and are found in Flowsheets. The following problems were reviewed today and where indicated follow up appointments were made and/or referrals ordered.    Positive Risk Factor Screenings with Interventions:       Cognitive:      Words recalled: 1 Word Recalled     Total Score Interpretation: Abnormal Mini-Cog    Interventions:  See AVS for additional education material      Drug Use:   Substance and Sexual Activity   Drug Use Yes    Types: Marijuana (Weed)       Interventions:  See AVS for additional education material         Activity, Diet, and Weight:  On average, how many days per week do you engage in moderate to strenuous exercise (like a brisk walk)?: 0 days  On average, how many minutes do you engage in exercise at this level?: 0 min    Do you eat balanced/healthy meals regularly?: 
SAWV  
vomiting, or diarrhea.  Denies wt loss, wt gain, hemoptysis, hematochezia or melena.    Physical Examination:    BP (!) 148/77 (Site: Left Upper Arm, Position: Sitting, Cuff Size: Large Adult)   Pulse 75   Resp 16   Ht 1.651 m (5' 5\")   Wt 79.7 kg (175 lb 9.6 oz)   LMP  (LMP Unknown)   SpO2 99%   BMI 29.22 kg/m²    General:  Alert, cooperative, no distress.    Head:  Normocephalic, without obvious abnormality, atraumatic.   Eyes:  Conjunctivae/corneas clear. Pupils equal, round, reactive to light.    Chest: No wheezes, rales. Rubs or ronchi   Cardiac: RRR.    Abdomen:  Extremities:  Skin:  +BS, soft and NT without palpable mass    No edema   No rash

## 2024-01-12 NOTE — PATIENT INSTRUCTIONS

## 2024-01-13 LAB
ALBUMIN SERPL-MCNC: 3.3 G/DL (ref 3.5–5)
ALBUMIN/GLOB SERPL: 0.9 (ref 1.1–2.2)
ALP SERPL-CCNC: 94 U/L (ref 45–117)
ALT SERPL-CCNC: 19 U/L (ref 12–78)
ANION GAP SERPL CALC-SCNC: 2 MMOL/L (ref 5–15)
AST SERPL-CCNC: 10 U/L (ref 15–37)
BASOPHILS # BLD: 0 K/UL (ref 0–0.1)
BASOPHILS NFR BLD: 1 % (ref 0–1)
BILIRUB SERPL-MCNC: 0.2 MG/DL (ref 0.2–1)
BUN SERPL-MCNC: 9 MG/DL (ref 6–20)
BUN/CREAT SERPL: 12 (ref 12–20)
CALCIUM SERPL-MCNC: 9.4 MG/DL (ref 8.5–10.1)
CHLORIDE SERPL-SCNC: 110 MMOL/L (ref 97–108)
CHOLEST SERPL-MCNC: 169 MG/DL
CO2 SERPL-SCNC: 28 MMOL/L (ref 21–32)
CREAT SERPL-MCNC: 0.78 MG/DL (ref 0.55–1.02)
DIFFERENTIAL METHOD BLD: ABNORMAL
EOSINOPHIL # BLD: 0.2 K/UL (ref 0–0.4)
EOSINOPHIL NFR BLD: 4 % (ref 0–7)
ERYTHROCYTE [DISTWIDTH] IN BLOOD BY AUTOMATED COUNT: 17.3 % (ref 11.5–14.5)
EST. AVERAGE GLUCOSE BLD GHB EST-MCNC: 114 MG/DL
GLOBULIN SER CALC-MCNC: 3.6 G/DL (ref 2–4)
GLUCOSE SERPL-MCNC: 104 MG/DL (ref 65–100)
HBA1C MFR BLD: 5.6 % (ref 4–5.6)
HCT VFR BLD AUTO: 34.2 % (ref 35–47)
HDLC SERPL-MCNC: 70 MG/DL
HDLC SERPL: 2.4 (ref 0–5)
HGB BLD-MCNC: 10.4 G/DL (ref 11.5–16)
IMM GRANULOCYTES # BLD AUTO: 0 K/UL (ref 0–0.04)
IMM GRANULOCYTES NFR BLD AUTO: 0 % (ref 0–0.5)
LDLC SERPL CALC-MCNC: 80.4 MG/DL (ref 0–100)
LYMPHOCYTES # BLD: 1.5 K/UL (ref 0.8–3.5)
LYMPHOCYTES NFR BLD: 29 % (ref 12–49)
MCH RBC QN AUTO: 24.9 PG (ref 26–34)
MCHC RBC AUTO-ENTMCNC: 30.4 G/DL (ref 30–36.5)
MCV RBC AUTO: 81.8 FL (ref 80–99)
MONOCYTES # BLD: 0.5 K/UL (ref 0–1)
MONOCYTES NFR BLD: 9 % (ref 5–13)
NEUTS SEG # BLD: 3 K/UL (ref 1.8–8)
NEUTS SEG NFR BLD: 57 % (ref 32–75)
NRBC # BLD: 0 K/UL (ref 0–0.01)
NRBC BLD-RTO: 0 PER 100 WBC
PLATELET # BLD AUTO: 209 K/UL (ref 150–400)
PMV BLD AUTO: 11.5 FL (ref 8.9–12.9)
POTASSIUM SERPL-SCNC: 4.5 MMOL/L (ref 3.5–5.1)
PROT SERPL-MCNC: 6.9 G/DL (ref 6.4–8.2)
RBC # BLD AUTO: 4.18 M/UL (ref 3.8–5.2)
SODIUM SERPL-SCNC: 140 MMOL/L (ref 136–145)
TRIGL SERPL-MCNC: 93 MG/DL
TSH SERPL DL<=0.05 MIU/L-ACNC: 0.66 UIU/ML (ref 0.36–3.74)
VLDLC SERPL CALC-MCNC: 18.6 MG/DL
WBC # BLD AUTO: 5.2 K/UL (ref 3.6–11)

## 2024-01-16 RX ORDER — ATORVASTATIN CALCIUM 40 MG/1
TABLET, FILM COATED ORAL
Qty: 90 TABLET | Refills: 3 | Status: SHIPPED | OUTPATIENT
Start: 2024-01-16

## 2024-01-25 ENCOUNTER — HOSPITAL ENCOUNTER (EMERGENCY)
Facility: HOSPITAL | Age: 69
Discharge: HOME | End: 2024-01-25
Attending: EMERGENCY MEDICINE
Payer: MEDICARE

## 2024-01-25 ENCOUNTER — APPOINTMENT (OUTPATIENT)
Facility: HOSPITAL | Age: 69
End: 2024-01-25
Payer: MEDICARE

## 2024-01-25 VITALS
BODY MASS INDEX: 29.29 KG/M2 | OXYGEN SATURATION: 98 % | RESPIRATION RATE: 18 BRPM | TEMPERATURE: 98.1 F | WEIGHT: 176 LBS | SYSTOLIC BLOOD PRESSURE: 177 MMHG | HEART RATE: 67 BPM | DIASTOLIC BLOOD PRESSURE: 78 MMHG

## 2024-01-25 DIAGNOSIS — R07.89 ATYPICAL CHEST PAIN: Primary | ICD-10-CM

## 2024-01-25 LAB
ANION GAP SERPL CALC-SCNC: 7 MMOL/L (ref 5–15)
BASOPHILS # BLD: 0.1 K/UL (ref 0–0.1)
BASOPHILS NFR BLD: 1 % (ref 0–1)
BUN SERPL-MCNC: 10 MG/DL (ref 6–20)
BUN/CREAT SERPL: 11 (ref 12–20)
CALCIUM SERPL-MCNC: 9.6 MG/DL (ref 8.5–10.1)
CHLORIDE SERPL-SCNC: 106 MMOL/L (ref 97–108)
CO2 SERPL-SCNC: 28 MMOL/L (ref 21–32)
CREAT SERPL-MCNC: 0.93 MG/DL (ref 0.55–1.02)
DIFFERENTIAL METHOD BLD: ABNORMAL
EKG ATRIAL RATE: 74 BPM
EKG DIAGNOSIS: NORMAL
EKG P AXIS: 47 DEGREES
EKG P-R INTERVAL: 144 MS
EKG Q-T INTERVAL: 400 MS
EKG QRS DURATION: 92 MS
EKG QTC CALCULATION (BAZETT): 444 MS
EKG R AXIS: -12 DEGREES
EKG T AXIS: -73 DEGREES
EKG VENTRICULAR RATE: 74 BPM
EOSINOPHIL # BLD: 0.2 K/UL (ref 0–0.4)
EOSINOPHIL NFR BLD: 3 % (ref 0–7)
ERYTHROCYTE [DISTWIDTH] IN BLOOD BY AUTOMATED COUNT: 16.2 % (ref 11.5–14.5)
FLUAV RNA SPEC QL NAA+PROBE: NOT DETECTED
FLUBV RNA SPEC QL NAA+PROBE: NOT DETECTED
GLUCOSE SERPL-MCNC: 186 MG/DL (ref 65–100)
HCT VFR BLD AUTO: 31.1 % (ref 35–47)
HGB BLD-MCNC: 10 G/DL (ref 11.5–16)
IMM GRANULOCYTES # BLD AUTO: 0 K/UL (ref 0–0.04)
IMM GRANULOCYTES NFR BLD AUTO: 0 % (ref 0–0.5)
LYMPHOCYTES # BLD: 2 K/UL (ref 0.8–3.5)
LYMPHOCYTES NFR BLD: 28 % (ref 12–49)
MCH RBC QN AUTO: 24.6 PG (ref 26–34)
MCHC RBC AUTO-ENTMCNC: 32.2 G/DL (ref 30–36.5)
MCV RBC AUTO: 76.4 FL (ref 80–99)
MONOCYTES # BLD: 0.6 K/UL (ref 0–1)
MONOCYTES NFR BLD: 9 % (ref 5–13)
NEUTS SEG # BLD: 4.4 K/UL (ref 1.8–8)
NEUTS SEG NFR BLD: 59 % (ref 32–75)
NRBC # BLD: 0 K/UL (ref 0–0.01)
NRBC BLD-RTO: 0 PER 100 WBC
PLATELET # BLD AUTO: 254 K/UL (ref 150–400)
PMV BLD AUTO: 11 FL (ref 8.9–12.9)
POTASSIUM SERPL-SCNC: 4.1 MMOL/L (ref 3.5–5.1)
RBC # BLD AUTO: 4.07 M/UL (ref 3.8–5.2)
SARS-COV-2 RNA RESP QL NAA+PROBE: NOT DETECTED
SODIUM SERPL-SCNC: 141 MMOL/L (ref 136–145)
TROPONIN I SERPL HS-MCNC: 23 NG/L (ref 0–51)
TROPONIN I SERPL HS-MCNC: 25 NG/L (ref 0–51)
WBC # BLD AUTO: 7.3 K/UL (ref 3.6–11)

## 2024-01-25 PROCEDURE — 87636 SARSCOV2 & INF A&B AMP PRB: CPT

## 2024-01-25 PROCEDURE — 2500000003 HC RX 250 WO HCPCS: Performed by: EMERGENCY MEDICINE

## 2024-01-25 PROCEDURE — 84484 ASSAY OF TROPONIN QUANT: CPT

## 2024-01-25 PROCEDURE — 80048 BASIC METABOLIC PNL TOTAL CA: CPT

## 2024-01-25 PROCEDURE — 6370000000 HC RX 637 (ALT 250 FOR IP): Performed by: EMERGENCY MEDICINE

## 2024-01-25 PROCEDURE — 96375 TX/PRO/DX INJ NEW DRUG ADDON: CPT

## 2024-01-25 PROCEDURE — 99285 EMERGENCY DEPT VISIT HI MDM: CPT

## 2024-01-25 PROCEDURE — 36415 COLL VENOUS BLD VENIPUNCTURE: CPT

## 2024-01-25 PROCEDURE — 71045 X-RAY EXAM CHEST 1 VIEW: CPT

## 2024-01-25 PROCEDURE — 85025 COMPLETE CBC W/AUTO DIFF WBC: CPT

## 2024-01-25 PROCEDURE — 6360000002 HC RX W HCPCS: Performed by: EMERGENCY MEDICINE

## 2024-01-25 PROCEDURE — 2580000003 HC RX 258: Performed by: EMERGENCY MEDICINE

## 2024-01-25 PROCEDURE — 96374 THER/PROPH/DIAG INJ IV PUSH: CPT

## 2024-01-25 PROCEDURE — 93005 ELECTROCARDIOGRAM TRACING: CPT | Performed by: EMERGENCY MEDICINE

## 2024-01-25 RX ORDER — LIDOCAINE HYDROCHLORIDE 20 MG/ML
15 SOLUTION OROPHARYNGEAL
Status: COMPLETED | OUTPATIENT
Start: 2024-01-25 | End: 2024-01-25

## 2024-01-25 RX ORDER — ONDANSETRON 2 MG/ML
4 INJECTION INTRAMUSCULAR; INTRAVENOUS ONCE
Status: COMPLETED | OUTPATIENT
Start: 2024-01-25 | End: 2024-01-25

## 2024-01-25 RX ORDER — OXYCODONE HYDROCHLORIDE AND ACETAMINOPHEN 5; 325 MG/1; MG/1
1 TABLET ORAL EVERY 6 HOURS PRN
Qty: 10 TABLET | Refills: 0 | Status: SHIPPED | OUTPATIENT
Start: 2024-01-25 | End: 2024-01-28

## 2024-01-25 RX ORDER — OXYCODONE HYDROCHLORIDE AND ACETAMINOPHEN 5; 325 MG/1; MG/1
1 TABLET ORAL
Status: COMPLETED | OUTPATIENT
Start: 2024-01-25 | End: 2024-01-25

## 2024-01-25 RX ORDER — MAGNESIUM HYDROXIDE/ALUMINUM HYDROXICE/SIMETHICONE 120; 1200; 1200 MG/30ML; MG/30ML; MG/30ML
30 SUSPENSION ORAL EVERY 6 HOURS PRN
Status: DISCONTINUED | OUTPATIENT
Start: 2024-01-25 | End: 2024-01-25 | Stop reason: HOSPADM

## 2024-01-25 RX ADMIN — OXYCODONE AND ACETAMINOPHEN 1 TABLET: 5; 325 TABLET ORAL at 14:39

## 2024-01-25 RX ADMIN — ONDANSETRON 4 MG: 2 INJECTION INTRAMUSCULAR; INTRAVENOUS at 14:41

## 2024-01-25 RX ADMIN — SODIUM CHLORIDE, PRESERVATIVE FREE 20 MG: 5 INJECTION INTRAVENOUS at 14:41

## 2024-01-25 RX ADMIN — ALUMINUM HYDROXIDE, MAGNESIUM HYDROXIDE, AND SIMETHICONE 30 ML: 200; 200; 20 SUSPENSION ORAL at 14:40

## 2024-01-25 RX ADMIN — LIDOCAINE HYDROCHLORIDE 15 ML: 20 SOLUTION ORAL at 14:40

## 2024-01-25 ASSESSMENT — PAIN SCALES - GENERAL
PAINLEVEL_OUTOF10: 8
PAINLEVEL_OUTOF10: 0

## 2024-01-25 ASSESSMENT — ENCOUNTER SYMPTOMS
EYE REDNESS: 0
SHORTNESS OF BREATH: 0
ABDOMINAL PAIN: 0
VOMITING: 0
DIARRHEA: 0
COUGH: 0
SORE THROAT: 0

## 2024-01-25 ASSESSMENT — PAIN DESCRIPTION - ORIENTATION: ORIENTATION: LEFT

## 2024-01-25 ASSESSMENT — PAIN DESCRIPTION - DESCRIPTORS: DESCRIPTORS: ACHING

## 2024-01-25 ASSESSMENT — PAIN - FUNCTIONAL ASSESSMENT: PAIN_FUNCTIONAL_ASSESSMENT: 0-10

## 2024-01-25 ASSESSMENT — PAIN DESCRIPTION - LOCATION: LOCATION: RIB CAGE

## 2024-01-25 NOTE — ED PROVIDER NOTES
EMERGENCY DEPARTMENT HISTORY AND PHYSICAL EXAM      Date: 1/25/2024  Patient Name: Hyacinth Patterson    History of Presenting Illness     Chief Complaint   Patient presents with    Rib Pain       History Provided By: Patient    HPI: Hyacinth Patterson, 68 y.o. female with PMHx significant for CAD, DM 2, PVD, hypertension, presents via EMS to the ED with cc of chest pain.  Patient reports left-sided chest pain under her left breast has been going on for the past 24 hours.  Pain has been constant.  She also reports some mild nausea.  She reports she has had a dry cough and some nasal congestion as well.  No pain radiation.  No diaphoresis.  Patient thinks it might be heartburn.        PMHx: Significant for DM 2, CAD, PVD, hypertension  PSHx: Significant for hernia repair, ex lap with right hemicolectomy 2019.  Coronary stents.  Partial hysterectomy.  Social Hx: one Third pack-a-day smoker for 40 years.  Denies alcohol use.    There are no other complaints, changes, or physical findings at this time.    PCP: Jeffery Alexis MD    No current facility-administered medications on file prior to encounter.     Current Outpatient Medications on File Prior to Encounter   Medication Sig Dispense Refill    diclofenac sodium (VOLTAREN) 1 % GEL Apply 4 g topically 4 times daily 100 g 5    atorvastatin (LIPITOR) 40 MG tablet TAKE 1 TABLET BY MOUTH DAILY (REPLACES SIMVASTATON) FOR CHOLESTEROL 90 tablet 3    montelukast (SINGULAIR) 10 MG tablet TAKE 1 TABLET BY MOUTH DAILY AT BEDTIME 30 tablet 3    isosorbide mononitrate (IMDUR) 30 MG extended release tablet TAKE 1 TABLET BY MOUTH EVERY MORNING 90 tablet 1    potassium chloride (KLOR-CON M) 20 MEQ extended release tablet Take 1 tablet by mouth 2 times daily 180 tablet 5    pantoprazole (PROTONIX) 40 MG tablet       Lancets MISC Use twice a day      albuterol sulfate HFA (PROVENTIL;VENTOLIN;PROAIR) 108 (90 Base) MCG/ACT inhaler Inhale 2 puffs into the lungs every 4 hours as needed

## 2024-01-25 NOTE — ED TRIAGE NOTES
Pt arrived via EMS with co left sided rib pain under her breast, has been going on for the past 24 hours, states she also has been nauseous and had cough and congestion

## 2024-01-31 NOTE — RESULT ENCOUNTER NOTE
A1c is 5.6 and you are no longer in the diabetic range.  Great work!  Kidneys and liver are fine.  No concerns or changes at this time ATTEMPTED TO CONTACT PATIENT, LINE STILL BUSY, UNABLE TO LEAVE MESSAGE. SC

## 2024-03-11 DIAGNOSIS — E87.6 HYPOKALEMIA: ICD-10-CM

## 2024-03-11 RX ORDER — POTASSIUM CHLORIDE 20 MEQ/1
20 TABLET, EXTENDED RELEASE ORAL 2 TIMES DAILY
Qty: 180 TABLET | Refills: 0 | Status: SHIPPED | OUTPATIENT
Start: 2024-03-11

## 2024-03-12 RX ORDER — ISOSORBIDE MONONITRATE 30 MG/1
30 TABLET, EXTENDED RELEASE ORAL EVERY MORNING
Qty: 30 TABLET | Refills: 0 | Status: SHIPPED | OUTPATIENT
Start: 2024-03-12

## 2024-03-12 NOTE — TELEPHONE ENCOUNTER
Refill Request Received for the Following Medication     Requested Prescriptions     Pending Prescriptions Disp Refills    isosorbide mononitrate (IMDUR) 30 MG extended release tablet 30 tablet 0     Sig: Take 1 tablet by mouth every morning       Last Prescribed: 08-    Last Appointment With Me:  Visit date not found     Future Appointments:  Future Appointments   Date Time Provider Department Center   3/27/2024  9:40 AM Sagar Borges MD [facility] BS AMB   6/7/2024  7:30 AM Jeffery Alexis MD [facility] MAIN BS AMB

## 2024-03-12 NOTE — TELEPHONE ENCOUNTER
Ms. Patterson first appointment with dr. Borges in March 27. Imdur, refilled until appointment day with new cardiologist.

## 2024-03-13 ENCOUNTER — TELEPHONE (OUTPATIENT)
Age: 69
End: 2024-03-13

## 2024-03-13 NOTE — TELEPHONE ENCOUNTER
Pt requesting med refills for Amlodipine 10mg and Labetalol 100mg be called in to [facility] [place]

## 2024-03-14 DIAGNOSIS — I10 ESSENTIAL (PRIMARY) HYPERTENSION: Primary | ICD-10-CM

## 2024-03-14 RX ORDER — LABETALOL 100 MG/1
TABLET, FILM COATED ORAL
Qty: 180 TABLET | Refills: 4 | Status: SHIPPED | OUTPATIENT
Start: 2024-03-14

## 2024-03-14 RX ORDER — LABETALOL 100 MG/1
TABLET, FILM COATED ORAL
Qty: 180 TABLET | Refills: 3 | Status: CANCELLED | OUTPATIENT
Start: 2024-03-14

## 2024-03-14 RX ORDER — AMLODIPINE BESYLATE 10 MG/1
10 TABLET ORAL DAILY
Qty: 90 TABLET | Refills: 3 | Status: CANCELLED | OUTPATIENT
Start: 2024-03-14

## 2024-03-14 RX ORDER — AMLODIPINE BESYLATE 10 MG/1
10 TABLET ORAL DAILY
Qty: 90 TABLET | Refills: 4 | Status: SHIPPED | OUTPATIENT
Start: 2024-03-14

## 2024-03-19 PROBLEM — Z86.79 STATUS POST ENDOVASCULAR ANEURYSM REPAIR (EVAR): Status: ACTIVE | Noted: 2024-03-19

## 2024-03-19 PROBLEM — Z98.890 STATUS POST ENDOVASCULAR ANEURYSM REPAIR (EVAR): Status: ACTIVE | Noted: 2024-03-19

## 2024-03-19 PROBLEM — H10.89: Status: RESOLVED | Noted: 2018-12-03 | Resolved: 2024-03-19

## 2024-03-19 PROBLEM — R07.9 CHEST PAIN, UNSPECIFIED: Status: RESOLVED | Noted: 2023-01-17 | Resolved: 2024-03-19
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
## 2024-03-19 PROBLEM — I10 ESSENTIAL HYPERTENSION: Status: ACTIVE | Noted: 2017-08-07

## 2024-03-19 PROBLEM — I25.9 CHRONIC ISCHEMIC HEART DISEASE: Status: RESOLVED | Noted: 2023-01-17 | Resolved: 2024-03-19

## 2024-03-19 PROBLEM — I25.5 ISCHEMIC CARDIOMYOPATHY: Status: ACTIVE | Noted: 2024-03-19

## 2024-03-19 PROBLEM — R94.31 ABNORMAL ECG: Status: RESOLVED | Noted: 2017-08-07 | Resolved: 2024-03-19

## 2024-03-19 PROBLEM — I25.10 CORONARY ARTERY DISEASE INVOLVING NATIVE CORONARY ARTERY OF NATIVE HEART WITHOUT ANGINA PECTORIS: Status: ACTIVE | Noted: 2023-01-20

## 2024-03-19 PROBLEM — I42.9 PRIMARY CARDIOMYOPATHY (HCC): Status: RESOLVED | Noted: 2023-01-17 | Resolved: 2024-03-19

## 2024-03-19 PROBLEM — E78.00 HYPERCHOLESTEROLEMIA: Status: ACTIVE | Noted: 2021-04-21

## 2024-03-19 PROBLEM — R10.13 INTRACTABLE EPIGASTRIC ABDOMINAL PAIN: Status: RESOLVED | Noted: 2018-11-10 | Resolved: 2024-03-19

## 2024-03-19 PROBLEM — I20.9 CARDIAC ANGINA (HCC): Status: RESOLVED | Noted: 2023-01-17 | Resolved: 2024-03-19

## 2024-03-19 PROBLEM — R06.02 SHORTNESS OF BREATH: Status: RESOLVED | Noted: 2023-01-17 | Resolved: 2024-03-19

## 2024-03-19 PROBLEM — R19.7 DIARRHEA OF PRESUMED INFECTIOUS ORIGIN: Status: RESOLVED | Noted: 2021-05-20 | Resolved: 2024-03-19

## 2024-03-27 ENCOUNTER — OFFICE VISIT (OUTPATIENT)
Age: 69
End: 2024-03-27
Payer: MEDICARE

## 2024-03-27 VITALS
HEART RATE: 76 BPM | HEIGHT: 65 IN | RESPIRATION RATE: 18 BRPM | DIASTOLIC BLOOD PRESSURE: 60 MMHG | OXYGEN SATURATION: 97 % | BODY MASS INDEX: 29.66 KG/M2 | WEIGHT: 178 LBS | SYSTOLIC BLOOD PRESSURE: 144 MMHG | TEMPERATURE: 98 F

## 2024-03-27 DIAGNOSIS — I25.10 CORONARY ARTERY DISEASE INVOLVING NATIVE CORONARY ARTERY OF NATIVE HEART WITHOUT ANGINA PECTORIS: Primary | ICD-10-CM

## 2024-03-27 DIAGNOSIS — I25.5 ISCHEMIC CARDIOMYOPATHY: ICD-10-CM

## 2024-03-27 DIAGNOSIS — Z72.0 TOBACCO ABUSE: ICD-10-CM

## 2024-03-27 DIAGNOSIS — I10 ESSENTIAL HYPERTENSION: ICD-10-CM

## 2024-03-27 DIAGNOSIS — E78.00 HYPERCHOLESTEROLEMIA: ICD-10-CM

## 2024-03-27 DIAGNOSIS — I73.9 PAD (PERIPHERAL ARTERY DISEASE) (HCC): ICD-10-CM

## 2024-03-27 PROCEDURE — 99214 OFFICE O/P EST MOD 30 MIN: CPT | Performed by: INTERNAL MEDICINE

## 2024-03-27 PROCEDURE — 1123F ACP DISCUSS/DSCN MKR DOCD: CPT | Performed by: INTERNAL MEDICINE

## 2024-03-27 PROCEDURE — 3078F DIAST BP <80 MM HG: CPT | Performed by: INTERNAL MEDICINE

## 2024-03-27 PROCEDURE — 3077F SYST BP >= 140 MM HG: CPT | Performed by: INTERNAL MEDICINE

## 2024-03-27 RX ORDER — ATORVASTATIN CALCIUM 80 MG/1
80 TABLET, FILM COATED ORAL DAILY
Qty: 90 TABLET | Refills: 3 | Status: SHIPPED | OUTPATIENT
Start: 2024-03-27

## 2024-03-27 RX ORDER — OMEPRAZOLE 20 MG/1
20 CAPSULE, DELAYED RELEASE ORAL EVERY MORNING
COMMUNITY
Start: 2024-03-07

## 2024-03-27 ASSESSMENT — PATIENT HEALTH QUESTIONNAIRE - PHQ9
1. LITTLE INTEREST OR PLEASURE IN DOING THINGS: NOT AT ALL
SUM OF ALL RESPONSES TO PHQ QUESTIONS 1-9: 0
SUM OF ALL RESPONSES TO PHQ9 QUESTIONS 1 & 2: 0
2. FEELING DOWN, DEPRESSED OR HOPELESS: NOT AT ALL
SUM OF ALL RESPONSES TO PHQ QUESTIONS 1-9: 0

## 2024-03-27 NOTE — PATIENT INSTRUCTIONS
I have ordered an echocardiogram for reevaluation of your heart function.  We will contact you with the results.  Please increase your atorvastatin to 80 mg daily to further lower your cholesterol.

## 2024-03-27 NOTE — PROGRESS NOTES
Room:   I have reviewed all needed documentation in preparation for visit. Verified patient by name and date of birth  Hyacinth Patterson is a 68 y.o. female who presents for Follow-up (6 month) and Coronary Artery Disease  .    Vitals:    03/27/24 0942   BP: (!) 144/60   Site: Left Upper Arm   Position: Sitting   Cuff Size: Large Adult   Pulse: 76   Resp: 18   Temp: 98 °F (36.7 °C)   TempSrc: Oral   SpO2: 97%   Weight: 80.7 kg (178 lb)   Height: 1.651 m (5' 5\")       No LMP recorded (lmp unknown). Patient has had a hysterectomy.    Pain Score:   0 - No pain    Health Maintenance Review: Patient reminded of \"due or due soon\" health maintenance. I have asked the patient to contact his/her primary care provider (PCP) Jeffery Alexis MD for follow-up on his/her health maintenance.    \"Have you been to the ER, urgent care clinic since your last visit?  Hospitalized since your last visit?\"    Yes 1/25/24 notes in chart for chest pain     “Have you seen or consulted any other health care providers outside of [facility] since your last visit?”    NO    Recent Travel Screening and Travel History documentation:     Travel Screening       Question Response    Have you been in contact with someone who was sick? No / Unsure    Do you have any of the following new or worsening symptoms? None of these    Have you traveled internationally or domestically in the last month? No          Travel History   Travel since 02/27/24    No documented travel since 02/27/24            
   PROT 6.9 01/12/2024    LABALBU 3.3 (L) 01/12/2024    BILITOT 0.2 01/12/2024    ALKPHOS 94 01/12/2024    AST 10 (L) 01/12/2024    ALT 19 01/12/2024    LABGLOM >60 01/25/2024    GFRAA >60 11/08/2021    AGRATIO 0.9 (L) 01/12/2024    GLOB 3.6 01/12/2024       LIPIDS  Lab Results   Component Value Date/Time    CHOL 169 01/12/2024 08:07 AM    TRIG 93 01/12/2024 08:07 AM    HDL 70 01/12/2024 08:07 AM    LDLCALC 80.4 01/12/2024 08:07 AM    CHOLHDLRATIO 2.4 01/12/2024 08:07 AM       Previous ECG:  Results for orders placed or performed during the hospital encounter of 01/25/24   EKG 12 Lead   Result Value Ref Range    Ventricular Rate 74 BPM    Atrial Rate 74 BPM    P-R Interval 144 ms    QRS Duration 92 ms    Q-T Interval 400 ms    QTc Calculation (Bazett) 444 ms    P Axis 47 degrees    R Axis -12 degrees    T Axis -73 degrees    Diagnosis       Normal sinus rhythm  Moderate voltage criteria for LVH, may be normal variant  T wave abnormality, consider inferolateral ischemia  Abnormal ECG  When compared with ECG of 20-FEB-2023 09:50,  T wave inversion less evident in Anterior leads  Confirmed by MD Rodriguez Paul A. ([id number]) on 1/25/2024 11:26:03 PM         Current EKG: (EKG has been independently visualized by me with interpretation below)  N/A    ECHO:  05/09/23    TRANSTHORACIC ECHOCARDIOGRAM (TTE) COMPLETE (CONTRAST/BUBBLE/3D PRN) 05/10/2023  7:05 PM (Final)    Interpretation Summary    Left Ventricle: Normal left ventricular systolic function with a visually estimated EF of 35 - 40%. Left ventricle size is normal. Mildly increased wall thickness. Normal wall motion. Normal diastolic function for age.    Left Atrium: Left atrium is mildly dilated. Left atrial volume index is mildly increased (35-41 mL/m2).    Mitral Valve: Mild regurgitation.    Tricuspid Valve: Mild regurgitation. Normal RVSP. The estimated RVSP is 31 mmHg.    Signed by: Paulo Borrego MD on 5/10/2023  7:05 PM      STRESS:  No results found for

## 2024-04-01 RX ORDER — MONTELUKAST SODIUM 10 MG/1
TABLET ORAL
Qty: 30 TABLET | Refills: 3 | Status: SHIPPED | OUTPATIENT
Start: 2024-04-01

## 2024-04-05 ENCOUNTER — HOSPITAL ENCOUNTER (OUTPATIENT)
Facility: HOSPITAL | Age: 69
End: 2024-04-05
Attending: INTERNAL MEDICINE
Payer: MEDICARE

## 2024-04-05 DIAGNOSIS — I25.5 ISCHEMIC CARDIOMYOPATHY: ICD-10-CM

## 2024-04-05 LAB
ECHO AO ROOT DIAM: 3.2 CM
ECHO AV MEAN GRADIENT: 3 MMHG
ECHO AV MEAN VELOCITY: 0.8 M/S
ECHO AV PEAK GRADIENT: 6 MMHG
ECHO AV PEAK VELOCITY: 1.2 M/S
ECHO AV VTI: 31.3 CM
ECHO EST RA PRESSURE: 3 MMHG
ECHO LA DIAMETER: 3.9 CM
ECHO LA TO AORTIC ROOT RATIO: 1.22
ECHO LA VOL A-L A2C: 70 ML (ref 22–52)
ECHO LA VOL A-L A4C: 103 ML (ref 22–52)
ECHO LA VOL MOD A2C: 67 ML (ref 22–52)
ECHO LA VOL MOD A4C: 99 ML (ref 22–52)
ECHO LA VOLUME AREA LENGTH: 90 ML
ECHO LV E' LATERAL VELOCITY: 7 CM/S
ECHO LV E' SEPTAL VELOCITY: 6 CM/S
ECHO LV EDV A2C: 157 ML
ECHO LV EDV A4C: 140 ML
ECHO LV EDV BP: 156 ML (ref 56–104)
ECHO LV EJECTION FRACTION A2C: 42 %
ECHO LV EJECTION FRACTION A4C: 24 %
ECHO LV EJECTION FRACTION BIPLANE: 33 % (ref 55–100)
ECHO LV ESV A2C: 91 ML
ECHO LV ESV A4C: 106 ML
ECHO LV ESV BP: 104 ML (ref 19–49)
ECHO LV FRACTIONAL SHORTENING: 21 % (ref 28–44)
ECHO LV INTERNAL DIMENSION DIASTOLIC: 5.8 CM (ref 3.9–5.3)
ECHO LV INTERNAL DIMENSION SYSTOLIC: 4.6 CM
ECHO LV IVSD: 1 CM (ref 0.6–0.9)
ECHO LV MASS 2D: 233.1 G (ref 67–162)
ECHO LV POSTERIOR WALL DIASTOLIC: 1 CM (ref 0.6–0.9)
ECHO LV RELATIVE WALL THICKNESS RATIO: 0.34
ECHO LVOT AREA: 2.3 CM2
ECHO LVOT DIAM: 1.7 CM
ECHO MV A VELOCITY: 0.87 M/S
ECHO MV E DECELERATION TIME (DT): 155 MS
ECHO MV E VELOCITY: 0.74 M/S
ECHO MV E/A RATIO: 0.85
ECHO MV E/E' LATERAL: 10.57
ECHO MV E/E' RATIO (AVERAGED): 11.45
ECHO PULMONARY ARTERY SYSTOLIC PRESSURE (PASP): 27 MMHG
ECHO RA AREA 4C: 15.5 CM2
ECHO RIGHT VENTRICULAR SYSTOLIC PRESSURE (RVSP): 27 MMHG
ECHO RV TAPSE: 2.7 CM (ref 1.7–?)
ECHO TV REGURGITANT MAX VELOCITY: 2.43 M/S
ECHO TV REGURGITANT PEAK GRADIENT: 24 MMHG

## 2024-04-05 PROCEDURE — 93306 TTE W/DOPPLER COMPLETE: CPT

## 2024-04-05 PROCEDURE — 93306 TTE W/DOPPLER COMPLETE: CPT | Performed by: SPECIALIST

## 2024-04-08 DIAGNOSIS — I42.9 CARDIOMYOPATHY, UNSPECIFIED TYPE (HCC): Primary | ICD-10-CM

## 2024-04-08 DIAGNOSIS — I10 ESSENTIAL (PRIMARY) HYPERTENSION: ICD-10-CM

## 2024-04-08 RX ORDER — CANDESARTAN 8 MG/1
8 TABLET ORAL DAILY
Qty: 30 TABLET | Refills: 3 | Status: SHIPPED | OUTPATIENT
Start: 2024-04-08

## 2024-04-08 RX ORDER — CARVEDILOL 12.5 MG/1
12.5 TABLET ORAL 2 TIMES DAILY
Qty: 60 TABLET | Refills: 5 | Status: SHIPPED | OUTPATIENT
Start: 2024-04-08

## 2024-04-08 RX ORDER — AMLODIPINE BESYLATE 10 MG/1
5 TABLET ORAL DAILY
Qty: 90 TABLET | Refills: 4
Start: 2024-04-08

## 2024-04-10 ENCOUNTER — TELEPHONE (OUTPATIENT)
Age: 69
End: 2024-04-10

## 2024-04-11 RX ORDER — ISOSORBIDE MONONITRATE 30 MG/1
30 TABLET, EXTENDED RELEASE ORAL EVERY MORNING
Qty: 30 TABLET | Refills: 6 | Status: SHIPPED | OUTPATIENT
Start: 2024-04-11

## 2024-04-17 ENCOUNTER — OFFICE VISIT (OUTPATIENT)
Age: 69
End: 2024-04-17

## 2024-04-17 ENCOUNTER — HOSPITAL ENCOUNTER (OUTPATIENT)
Facility: HOSPITAL | Age: 69
Discharge: HOME | End: 2024-04-20
Payer: MEDICARE

## 2024-04-17 ENCOUNTER — TELEPHONE (OUTPATIENT)
Age: 69
End: 2024-04-17

## 2024-04-17 VITALS
HEART RATE: 64 BPM | BODY MASS INDEX: 29.82 KG/M2 | WEIGHT: 179 LBS | HEIGHT: 65 IN | SYSTOLIC BLOOD PRESSURE: 110 MMHG | RESPIRATION RATE: 20 BRPM | TEMPERATURE: 98.3 F | DIASTOLIC BLOOD PRESSURE: 70 MMHG | OXYGEN SATURATION: 99 %

## 2024-04-17 DIAGNOSIS — I25.10 CORONARY ARTERY DISEASE INVOLVING NATIVE CORONARY ARTERY OF NATIVE HEART WITHOUT ANGINA PECTORIS: Primary | ICD-10-CM

## 2024-04-17 LAB
ANION GAP SERPL CALC-SCNC: 5 MMOL/L (ref 5–15)
BUN SERPL-MCNC: 9 MG/DL (ref 6–20)
BUN/CREAT SERPL: 11 (ref 12–20)
CALCIUM SERPL-MCNC: 9.1 MG/DL (ref 8.5–10.1)
CHLORIDE SERPL-SCNC: 109 MMOL/L (ref 97–108)
CO2 SERPL-SCNC: 29 MMOL/L (ref 21–32)
CREAT SERPL-MCNC: 0.83 MG/DL (ref 0.55–1.02)
GLUCOSE SERPL-MCNC: 69 MG/DL (ref 65–100)
POTASSIUM SERPL-SCNC: 5 MMOL/L (ref 3.5–5.1)
SODIUM SERPL-SCNC: 143 MMOL/L (ref 136–145)

## 2024-04-17 PROCEDURE — 36415 COLL VENOUS BLD VENIPUNCTURE: CPT

## 2024-04-17 PROCEDURE — 80048 BASIC METABOLIC PNL TOTAL CA: CPT

## 2024-04-17 NOTE — PROGRESS NOTES
Identified pt with two pt identifiers(name and ). Reviewed record in preparation for visit and have obtained necessary documentation.  No chief complaint on file.     /70 (Site: Left Upper Arm, Position: Sitting, Cuff Size: Medium Adult)   Pulse 64   Temp 98.3 °F (36.8 °C) (Temporal)   Resp 20   Ht 1.651 m (5' 5\")   Wt 81.2 kg (179 lb)   LMP  (LMP Unknown)   SpO2 99%   BMI 29.79 kg/m²       Medications reviewed    NO LOS.    Addendum:  Blood pressure stable after recent medication changes (discontinued labetalol and replaced with carvedilol 12.5 mg twice daily.  Decreased amlodipine to 5 mg daily and began candesartan 8 mg daily).  She feels well.  Denies dizziness and lightheadedness.  She is scheduled for chemistry panel today.  Keep follow-up appointment as scheduled 2024.  At that visit, will plan to discontinue amlodipine and increase candesartan dose.  EB

## 2024-04-17 NOTE — TELEPHONE ENCOUNTER
Called patient, ID x2. Reviewed lab results and recommendations per Dr. Borges (below). Patient verbalized understanding and acknowledged repeat lab work in 1 week.     ----- Message from Sagar ROBERTSON MD sent at 4/17/2024 12:49 PM EDT -----  Please inform Ms. Patterson her potassium level has increased since starting the candesartan.  Please instruct her to discontinue her potassium supplement.  Repeat chemistry panel in 1 week.  Thanks!

## 2024-04-24 ENCOUNTER — HOSPITAL ENCOUNTER (OUTPATIENT)
Facility: HOSPITAL | Age: 69
Discharge: HOME | End: 2024-04-27
Payer: MEDICARE

## 2024-04-24 LAB
ANION GAP SERPL CALC-SCNC: 10 MMOL/L (ref 5–15)
BUN SERPL-MCNC: 11 MG/DL (ref 6–20)
BUN/CREAT SERPL: 11 (ref 12–20)
CALCIUM SERPL-MCNC: 9.1 MG/DL (ref 8.5–10.1)
CHLORIDE SERPL-SCNC: 107 MMOL/L (ref 97–108)
CO2 SERPL-SCNC: 26 MMOL/L (ref 21–32)
CREAT SERPL-MCNC: 0.98 MG/DL (ref 0.55–1.02)
GLUCOSE SERPL-MCNC: 160 MG/DL (ref 65–100)
POTASSIUM SERPL-SCNC: 3.9 MMOL/L (ref 3.5–5.1)
SODIUM SERPL-SCNC: 143 MMOL/L (ref 136–145)

## 2024-04-24 PROCEDURE — 36415 COLL VENOUS BLD VENIPUNCTURE: CPT

## 2024-04-24 PROCEDURE — 80048 BASIC METABOLIC PNL TOTAL CA: CPT

## 2024-04-24 NOTE — RESULT ENCOUNTER NOTE
Verified Patient with two identifiers.    Patient verbalized understanding and all questions answered.

## 2024-04-29 NOTE — PROGRESS NOTES
ASSESSMENT and PLAN  1. Coronary artery disease involving native coronary artery of native heart without angina pectoris  Free of angina s/p prox-distal RCA PCI/FELA x4 on 1/20/2023 and s/p mid LAD PCI/FELA 2/8/2023.  Continue current medications including chronic DAPT.  Continue risk factor modification efforts.  May be able to discontinue the isosorbide mononitrate.    2. Ischemic cardiomyopathy  EF 30-35% on echo 4/5/2024.  No clinical signs or symptoms of CHF.  Continue carvedilol.  To optimize GDMT, discontinue amlodipine and increase candesartan to 16 mg daily. Not on Entresto due to history of angioedema on lisinopril.  Not on SGLT2 inhibitor due to history of UTIs.  Plan follow-up limited echo on GDMT in 3 months.  Refer to sleep disorders clinic to evaluate for occult obstructive sleep apnea contributing to LV dysfunction and fatigue.    3. PAD (peripheral artery disease) ([facility])  Status post EVAR and multiple bilateral lower extremity interventions.  Followed by vascular surgery ([facility]).    4. Essential hypertension  Well-controlled.  Continue to monitor with medication changes.    5. Hypercholesterolemia  Labs 1/12/2024 demonstrated total cholesterol 169, triglyceride 93, HDL 70, LDL 80.  LDL goal <70.  Increase atorvastatin to 80 mg daily if LDL remains above goal.    6.  Tobacco abuse  I congratulated her on discontinuing smoking.  Continue complete tobacco cessation.       Follow-up in 3 months with limited echo just prior to that visit.  The patient has been instructed and agrees to call our office with any issues or other concerns related to their cardiac condition(s) and/or complaint(s).      CHIEF COMPLAINT  Follow-up cardiomyopathy    HPI:    Hyacinth Patterson is a 68 y.o. female Here for follow-up of coronary artery disease and cardiomyopathy. She was stable at the last visit 3/27/2024.  An echo updated 4/5/2024 to follow-up cardiomyopathy revealed EF 30-35% with mild MR and normal right heart

## 2024-05-07 ENCOUNTER — OFFICE VISIT (OUTPATIENT)
Age: 69
End: 2024-05-07
Payer: MEDICARE

## 2024-05-07 VITALS
RESPIRATION RATE: 18 BRPM | OXYGEN SATURATION: 99 % | TEMPERATURE: 98.1 F | HEIGHT: 65 IN | SYSTOLIC BLOOD PRESSURE: 122 MMHG | WEIGHT: 179 LBS | BODY MASS INDEX: 29.82 KG/M2 | HEART RATE: 61 BPM | DIASTOLIC BLOOD PRESSURE: 64 MMHG

## 2024-05-07 DIAGNOSIS — E78.00 HYPERCHOLESTEROLEMIA: ICD-10-CM

## 2024-05-07 DIAGNOSIS — I25.10 CORONARY ARTERY DISEASE INVOLVING NATIVE CORONARY ARTERY OF NATIVE HEART WITHOUT ANGINA PECTORIS: Primary | ICD-10-CM

## 2024-05-07 DIAGNOSIS — I10 ESSENTIAL HYPERTENSION: ICD-10-CM

## 2024-05-07 DIAGNOSIS — I25.5 ISCHEMIC CARDIOMYOPATHY: ICD-10-CM

## 2024-05-07 DIAGNOSIS — Z72.0 TOBACCO ABUSE: ICD-10-CM

## 2024-05-07 DIAGNOSIS — I73.9 PAD (PERIPHERAL ARTERY DISEASE) (HCC): ICD-10-CM

## 2024-05-07 PROCEDURE — 1123F ACP DISCUSS/DSCN MKR DOCD: CPT | Performed by: INTERNAL MEDICINE

## 2024-05-07 PROCEDURE — 3078F DIAST BP <80 MM HG: CPT | Performed by: INTERNAL MEDICINE

## 2024-05-07 PROCEDURE — 99214 OFFICE O/P EST MOD 30 MIN: CPT | Performed by: INTERNAL MEDICINE

## 2024-05-07 PROCEDURE — 3074F SYST BP LT 130 MM HG: CPT | Performed by: INTERNAL MEDICINE

## 2024-05-07 RX ORDER — CANDESARTAN 16 MG/1
16 TABLET ORAL DAILY
Qty: 30 TABLET | Refills: 5 | Status: SHIPPED | OUTPATIENT
Start: 2024-05-07

## 2024-05-07 ASSESSMENT — PATIENT HEALTH QUESTIONNAIRE - PHQ9
SUM OF ALL RESPONSES TO PHQ QUESTIONS 1-9: 0
SUM OF ALL RESPONSES TO PHQ9 QUESTIONS 1 & 2: 0
1. LITTLE INTEREST OR PLEASURE IN DOING THINGS: NOT AT ALL
2. FEELING DOWN, DEPRESSED OR HOPELESS: NOT AT ALL
SUM OF ALL RESPONSES TO PHQ QUESTIONS 1-9: 0

## 2024-05-07 NOTE — PROGRESS NOTES
Identified pt with two pt identifiers(name and ). Reviewed record in preparation for visit and have obtained necessary documentation.  Chief Complaint   Patient presents with    Coronary Artery Disease    Circulatory Problem     PAD    Valvular Heart Disease    Cardiomyopathy    Hypertension    Cholesterol Problem      /64 (Site: Left Upper Arm, Position: Sitting, Cuff Size: Medium Adult)   Pulse 61   Temp 98.1 °F (36.7 °C) (Temporal)   Resp 18   Ht 1.651 m (5' 5\")   Wt 81.2 kg (179 lb)   LMP  (LMP Unknown)   SpO2 99%   BMI 29.79 kg/m²       Medications reviewed/approved by provider.      Health Maintenance Review: Patient reminded of \"due or due soon\" health maintenance. I have asked the patient to contact his/her primary care provider (PCP) for follow-up on his/her health maintenance.    Coordination of Care Questionnaire:  :   1) Have you been to an emergency room, urgent care, or hospitalized since your last visit?  If yes, where when, and reason for visit? no       2. Have seen or consulted any other health care provider since your last visit?   If yes, where when, and reason for visit?  no      Patient is accompanied by self I have received verbal consent from Hyacinth Patterson to discuss any/all medical information while they are present in the room.

## 2024-05-07 NOTE — PATIENT INSTRUCTIONS
Discontinue amlodipine.  Increase candesartan to 16 mg daily.  Refer to Dr. Trammell at the Sleep Disorders clinic in [place] to evaluate for obstructive sleep apnea.  Follow-up in me in 3 months with a limited echo a few days prior to that visit for reevaluation of your heart function.

## 2024-05-30 RX ORDER — INSULIN GLARGINE 100 [IU]/ML
25 INJECTION, SOLUTION SUBCUTANEOUS DAILY
Qty: 15 ML | Refills: 5 | Status: SHIPPED | OUTPATIENT
Start: 2024-05-30

## 2024-06-02 PROBLEM — Z87.19 HISTORY OF PANCREATITIS: Status: RESOLVED | Noted: 2018-10-30 | Resolved: 2024-06-02

## 2024-06-02 PROBLEM — G54.0 THORACIC OUTLET SYNDROME: Status: RESOLVED | Noted: 2018-11-09 | Resolved: 2024-06-02

## 2024-06-02 PROBLEM — T74.91XD: Status: RESOLVED | Noted: 2018-12-03 | Resolved: 2024-06-02

## 2024-06-02 PROBLEM — I71.9 PENETRATING ULCER OF AORTA (HCC): Status: RESOLVED | Noted: 2018-11-05 | Resolved: 2024-06-02

## 2024-06-02 NOTE — PROGRESS NOTES
Duration of visit: 30 minutes, primarily education and coordination of care.  Relevant labs and imaging reviewed today    1. Coronary artery disease involving native coronary artery of native heart without angina pectoris  Feels great.  Followed by cards.  No angina    2. PAD (peripheral artery disease) ([facility])  Denies sx    3. Painful diabetic neuropathy ([facility])  stable    4. Type 2 diabetes mellitus with other specified complication, with long-term current use of insulin ([facility])  Doing well with CGM    5. Benign essential hypertension  Add diuretic  - triamterene-hydroCHLOROthiazide (MAXZIDE-25) 37.5-25 MG per tablet; Take 1 tablet by mouth daily  Dispense: 90 tablet; Refill: 1         Chief Complaint   Patient presents with    Diabetes     Using her freestyle without issues.     Hypertension     States she thinks her BP is elevated. C/O swollen ankles.          No orders of the defined types were placed in this encounter.      Jeffery Alexis MD, FACP      HPI:         is a 68 y.o. female who arrives for here for eval of T2DM and BP.  Feels well.  No current pain.    CGM has taught her a great deal.  No longer eating cereal at breakfast.        Allergies   Allergen Reactions    Lisinopril Swelling, Other (See Comments) and Headaches    Morphine Hives, Itching, Rash and Other (See Comments)    Tramadol Itching and Other (See Comments)     Funny feeling    Dapagliflozin Other (See Comments)     Yeast infection    Yeast infection    Yeast infection    Gabapentin Itching and Other (See Comments)     Blurry vision       Outpatient Encounter Medications as of 6/7/2024   Medication Sig Dispense Refill    triamterene-hydroCHLOROthiazide (MAXZIDE-25) 37.5-25 MG per tablet Take 1 tablet by mouth daily 90 tablet 1    LANTUS SOLOSTAR 100 UNIT/ML injection pen INJECT 25 UNITS INTO THE SKIN DAILY 15 mL 5    candesartan (ATACAND) 16 MG tablet Take 1 tablet by mouth daily 30 tablet 5    isosorbide mononitrate (IMDUR) 30 MG

## 2024-06-07 ENCOUNTER — OFFICE VISIT (OUTPATIENT)
Age: 69
End: 2024-06-07
Payer: MEDICARE

## 2024-06-07 VITALS
HEIGHT: 65 IN | OXYGEN SATURATION: 99 % | HEART RATE: 80 BPM | SYSTOLIC BLOOD PRESSURE: 142 MMHG | RESPIRATION RATE: 18 BRPM | DIASTOLIC BLOOD PRESSURE: 76 MMHG | BODY MASS INDEX: 30.22 KG/M2 | WEIGHT: 181.4 LBS

## 2024-06-07 DIAGNOSIS — Z79.4 TYPE 2 DIABETES MELLITUS WITH OTHER SPECIFIED COMPLICATION, WITH LONG-TERM CURRENT USE OF INSULIN (HCC): ICD-10-CM

## 2024-06-07 DIAGNOSIS — E11.40 PAINFUL DIABETIC NEUROPATHY (HCC): ICD-10-CM

## 2024-06-07 DIAGNOSIS — I25.10 CORONARY ARTERY DISEASE INVOLVING NATIVE CORONARY ARTERY OF NATIVE HEART WITHOUT ANGINA PECTORIS: Primary | ICD-10-CM

## 2024-06-07 DIAGNOSIS — E11.69 TYPE 2 DIABETES MELLITUS WITH OTHER SPECIFIED COMPLICATION, WITH LONG-TERM CURRENT USE OF INSULIN (HCC): ICD-10-CM

## 2024-06-07 DIAGNOSIS — I73.9 PAD (PERIPHERAL ARTERY DISEASE) (HCC): ICD-10-CM

## 2024-06-07 DIAGNOSIS — I10 BENIGN ESSENTIAL HYPERTENSION: ICD-10-CM

## 2024-06-07 PROCEDURE — 3077F SYST BP >= 140 MM HG: CPT | Performed by: INTERNAL MEDICINE

## 2024-06-07 PROCEDURE — 3078F DIAST BP <80 MM HG: CPT | Performed by: INTERNAL MEDICINE

## 2024-06-07 PROCEDURE — 3044F HG A1C LEVEL LT 7.0%: CPT | Performed by: INTERNAL MEDICINE

## 2024-06-07 PROCEDURE — 1123F ACP DISCUSS/DSCN MKR DOCD: CPT | Performed by: INTERNAL MEDICINE

## 2024-06-07 PROCEDURE — 99214 OFFICE O/P EST MOD 30 MIN: CPT | Performed by: INTERNAL MEDICINE

## 2024-06-07 RX ORDER — TRIAMTERENE AND HYDROCHLOROTHIAZIDE 37.5; 25 MG/1; MG/1
1 TABLET ORAL DAILY
Qty: 90 TABLET | Refills: 1 | Status: SHIPPED | OUTPATIENT
Start: 2024-06-07

## 2024-06-07 ASSESSMENT — PATIENT HEALTH QUESTIONNAIRE - PHQ9
SUM OF ALL RESPONSES TO PHQ QUESTIONS 1-9: 0
SUM OF ALL RESPONSES TO PHQ9 QUESTIONS 1 & 2: 0
SUM OF ALL RESPONSES TO PHQ QUESTIONS 1-9: 0
SUM OF ALL RESPONSES TO PHQ QUESTIONS 1-9: 0
1. LITTLE INTEREST OR PLEASURE IN DOING THINGS: NOT AT ALL
2. FEELING DOWN, DEPRESSED OR HOPELESS: NOT AT ALL
SUM OF ALL RESPONSES TO PHQ QUESTIONS 1-9: 0

## 2024-06-07 NOTE — PROGRESS NOTES
Hyacinth Patterson is a 68 y.o. female presenting for/with:    Chief Complaint   Patient presents with    Diabetes     Using her freestyle without issues.     Hypertension     States she thinks her BP is elevated. C/O swollen ankles.        Vitals:    06/07/24 0734 06/07/24 0736   BP: (!) 149/77 (!) 142/76   Site: Right Upper Arm Right Upper Arm   Position: Sitting Sitting   Cuff Size: Medium Adult Medium Adult   Pulse: 80    Resp: 18    SpO2: 99%    Weight: 82.3 kg (181 lb 6.4 oz)    Height: 1.651 m (5' 5\")        Pain Scale: 0 - No pain/10  Pain Location:     \"Have you been to the ER, urgent care clinic since your last visit?  Hospitalized since your last visit?\"    NO    “Have you seen or consulted any other health care providers outside of [facility] since your last visit?”    NO                 6/7/2024     7:31 AM   PHQ-9    Little interest or pleasure in doing things 0   Feeling down, depressed, or hopeless 0   PHQ-2 Score 0   PHQ-9 Total Score 0           5/7/2024     9:20 AM 4/17/2024     9:00 AM 1/12/2024     7:30 AM 9/21/2023     3:00 PM 6/16/2023    10:00 AM 3/15/2023    12:00 AM 1/30/2023    12:00 AM   [facility] AMB LEARNING ASSESSMENT   Primary Learner Patient Patient Patient Patient Patient Patient Patient   co-learner caregiver    No  No No   Primary Language ENGLISH ENGLISH ENGLISH ENGLISH ENGLISH ENGLISH ENGLISH   Learning Preference DEMONSTRATION DEMONSTRATION PICTURES DEMONSTRATION DEMONSTRATION DEMONSTRATION DEMONSTRATION   Answered By pt pt self pt pt pt pt   Relationship to Learner SELF SELF SELF SELF SELF SELF SELF            6/7/2024     7:31 AM   Amb Fall Risk Assessment and TUG Test   Do you feel unsteady or are you worried about falling?  no   2 or more falls in past year? no   Fall with injury in past year? no           6/7/2024     7:00 AM 1/12/2024     7:00 AM 6/29/2023     2:00 PM   ADL ASSESSMENT   Feeding yourself No Help Needed No Help Needed No Help Needed   Getting from bed

## 2024-06-11 ENCOUNTER — TELEPHONE (OUTPATIENT)
Age: 69
End: 2024-06-11

## 2024-06-17 ENCOUNTER — HOSPITAL ENCOUNTER (OUTPATIENT)
Facility: HOSPITAL | Age: 69
Setting detail: OBSERVATION
Discharge: HOME | End: 2024-06-19
Attending: EMERGENCY MEDICINE | Admitting: HOSPITALIST
Payer: MEDICARE

## 2024-06-17 ENCOUNTER — APPOINTMENT (OUTPATIENT)
Facility: HOSPITAL | Age: 69
End: 2024-06-17
Payer: MEDICARE

## 2024-06-17 DIAGNOSIS — N17.9 AKI (ACUTE KIDNEY INJURY) (HCC): Primary | ICD-10-CM

## 2024-06-17 DIAGNOSIS — R10.32 LEFT LOWER QUADRANT ABDOMINAL PAIN: ICD-10-CM

## 2024-06-17 DIAGNOSIS — R19.7 DIARRHEA, UNSPECIFIED TYPE: ICD-10-CM

## 2024-06-17 DIAGNOSIS — K52.9 GASTROENTERITIS: ICD-10-CM

## 2024-06-17 PROBLEM — K55.1 MESENTERIC ARTERY STENOSIS (HCC): Status: ACTIVE | Noted: 2023-11-27

## 2024-06-17 PROBLEM — E11.8 TYPE 2 DIABETES MELLITUS WITH COMPLICATION, WITHOUT LONG-TERM CURRENT USE OF INSULIN (HCC): Status: RESOLVED | Noted: 2019-10-14 | Resolved: 2021-11-06

## 2024-06-17 LAB
ALBUMIN SERPL-MCNC: 3.5 G/DL (ref 3.5–5)
ALBUMIN/GLOB SERPL: 0.8 (ref 1.1–2.2)
ALP SERPL-CCNC: 104 U/L (ref 45–117)
ALT SERPL-CCNC: 27 U/L (ref 12–78)
ANION GAP SERPL CALC-SCNC: 10 MMOL/L (ref 5–15)
APPEARANCE UR: CLEAR
AST SERPL-CCNC: 24 U/L (ref 15–37)
BACTERIA URNS QL MICRO: NEGATIVE /HPF
BASOPHILS # BLD: 0.1 K/UL (ref 0–0.1)
BASOPHILS NFR BLD: 1 % (ref 0–1)
BILIRUB SERPL-MCNC: 0.2 MG/DL (ref 0.2–1)
BILIRUB UR QL: NEGATIVE
BUN SERPL-MCNC: 35 MG/DL (ref 6–20)
BUN/CREAT SERPL: 16 (ref 12–20)
CALCIUM SERPL-MCNC: 9.1 MG/DL (ref 8.5–10.1)
CHLORIDE SERPL-SCNC: 103 MMOL/L (ref 97–108)
CO2 SERPL-SCNC: 27 MMOL/L (ref 21–32)
COLOR UR: ABNORMAL
CREAT SERPL-MCNC: 2.14 MG/DL (ref 0.55–1.02)
DIFFERENTIAL METHOD BLD: ABNORMAL
EOSINOPHIL # BLD: 0.2 K/UL (ref 0–0.4)
EOSINOPHIL NFR BLD: 3 % (ref 0–7)
EPITH CASTS URNS QL MICRO: ABNORMAL /LPF
ERYTHROCYTE [DISTWIDTH] IN BLOOD BY AUTOMATED COUNT: 16.6 % (ref 11.5–14.5)
EST. AVERAGE GLUCOSE BLD GHB EST-MCNC: 114 MG/DL
GLOBULIN SER CALC-MCNC: 4.3 G/DL (ref 2–4)
GLUCOSE BLD STRIP.AUTO-MCNC: 147 MG/DL (ref 65–117)
GLUCOSE SERPL-MCNC: 171 MG/DL (ref 65–100)
GLUCOSE UR STRIP.AUTO-MCNC: NEGATIVE MG/DL
GRAN CASTS URNS QL MICRO: ABNORMAL /LPF
HBA1C MFR BLD: 5.6 % (ref 4–5.6)
HCT VFR BLD AUTO: 33.8 % (ref 35–47)
HEMOCCULT STL QL: POSITIVE
HGB BLD-MCNC: 10.5 G/DL (ref 11.5–16)
HGB UR QL STRIP: NEGATIVE
HYALINE CASTS URNS QL MICRO: ABNORMAL /LPF (ref 0–5)
IMM GRANULOCYTES # BLD AUTO: 0 K/UL (ref 0–0.04)
IMM GRANULOCYTES NFR BLD AUTO: 0 % (ref 0–0.5)
IRON SATN MFR SERPL: 8 % (ref 20–50)
IRON SERPL-MCNC: 26 UG/DL (ref 50–170)
KETONES UR QL STRIP.AUTO: NEGATIVE MG/DL
LACTATE SERPL-SCNC: 1 MMOL/L (ref 0.4–2)
LACTATE SERPL-SCNC: 2.1 MMOL/L (ref 0.4–2)
LEUKOCYTE ESTERASE UR QL STRIP.AUTO: NEGATIVE
LIPASE SERPL-CCNC: 35 U/L (ref 13–75)
LYMPHOCYTES # BLD: 1.3 K/UL (ref 0.8–3.5)
LYMPHOCYTES NFR BLD: 22 % (ref 12–49)
MCH RBC QN AUTO: 24.5 PG (ref 26–34)
MCHC RBC AUTO-ENTMCNC: 31.1 G/DL (ref 30–36.5)
MCV RBC AUTO: 78.8 FL (ref 80–99)
MONOCYTES # BLD: 0.5 K/UL (ref 0–1)
MONOCYTES NFR BLD: 9 % (ref 5–13)
NEUTS SEG # BLD: 3.9 K/UL (ref 1.8–8)
NEUTS SEG NFR BLD: 65 % (ref 32–75)
NITRITE UR QL STRIP.AUTO: NEGATIVE
NRBC # BLD: 0 K/UL (ref 0–0.01)
NRBC BLD-RTO: 0 PER 100 WBC
PH UR STRIP: 6 (ref 5–8)
PLATELET # BLD AUTO: 234 K/UL (ref 150–400)
PMV BLD AUTO: 11.6 FL (ref 8.9–12.9)
POTASSIUM SERPL-SCNC: 4.3 MMOL/L (ref 3.5–5.1)
PROT SERPL-MCNC: 7.8 G/DL (ref 6.4–8.2)
PROT UR STRIP-MCNC: ABNORMAL MG/DL
RBC # BLD AUTO: 4.29 M/UL (ref 3.8–5.2)
RBC #/AREA URNS HPF: ABNORMAL /HPF (ref 0–5)
RBC MORPH BLD: ABNORMAL
RBC MORPH BLD: ABNORMAL
SERVICE CMNT-IMP: ABNORMAL
SODIUM SERPL-SCNC: 140 MMOL/L (ref 136–145)
SP GR UR REFRACTOMETRY: 1.01 (ref 1–1.03)
TIBC SERPL-MCNC: 322 UG/DL (ref 250–450)
URINE CULTURE IF INDICATED: ABNORMAL
UROBILINOGEN UR QL STRIP.AUTO: 0.2 EU/DL (ref 0.2–1)
WBC # BLD AUTO: 6 K/UL (ref 3.6–11)
WBC URNS QL MICRO: ABNORMAL /HPF (ref 0–4)

## 2024-06-17 PROCEDURE — 81001 URINALYSIS AUTO W/SCOPE: CPT

## 2024-06-17 PROCEDURE — 87506 IADNA-DNA/RNA PROBE TQ 6-11: CPT

## 2024-06-17 PROCEDURE — 74176 CT ABD & PELVIS W/O CONTRAST: CPT

## 2024-06-17 PROCEDURE — 83550 IRON BINDING TEST: CPT

## 2024-06-17 PROCEDURE — 6360000002 HC RX W HCPCS: Performed by: EMERGENCY MEDICINE

## 2024-06-17 PROCEDURE — 2580000003 HC RX 258: Performed by: EMERGENCY MEDICINE

## 2024-06-17 PROCEDURE — 80053 COMPREHEN METABOLIC PANEL: CPT

## 2024-06-17 PROCEDURE — 6370000000 HC RX 637 (ALT 250 FOR IP): Performed by: HOSPITALIST

## 2024-06-17 PROCEDURE — 83605 ASSAY OF LACTIC ACID: CPT

## 2024-06-17 PROCEDURE — 96361 HYDRATE IV INFUSION ADD-ON: CPT

## 2024-06-17 PROCEDURE — 82272 OCCULT BLD FECES 1-3 TESTS: CPT

## 2024-06-17 PROCEDURE — 82962 GLUCOSE BLOOD TEST: CPT

## 2024-06-17 PROCEDURE — 82746 ASSAY OF FOLIC ACID SERUM: CPT

## 2024-06-17 PROCEDURE — 83690 ASSAY OF LIPASE: CPT

## 2024-06-17 PROCEDURE — 99285 EMERGENCY DEPT VISIT HI MDM: CPT

## 2024-06-17 PROCEDURE — 83540 ASSAY OF IRON: CPT

## 2024-06-17 PROCEDURE — 96374 THER/PROPH/DIAG INJ IV PUSH: CPT

## 2024-06-17 PROCEDURE — 36415 COLL VENOUS BLD VENIPUNCTURE: CPT

## 2024-06-17 PROCEDURE — 96376 TX/PRO/DX INJ SAME DRUG ADON: CPT

## 2024-06-17 PROCEDURE — 83036 HEMOGLOBIN GLYCOSYLATED A1C: CPT

## 2024-06-17 PROCEDURE — 85025 COMPLETE CBC W/AUTO DIFF WBC: CPT

## 2024-06-17 PROCEDURE — 82607 VITAMIN B-12: CPT

## 2024-06-17 PROCEDURE — G0378 HOSPITAL OBSERVATION PER HR: HCPCS

## 2024-06-17 PROCEDURE — 2580000003 HC RX 258: Performed by: HOSPITALIST

## 2024-06-17 RX ORDER — BUDESONIDE 0.5 MG/2ML
0.25 INHALANT ORAL
Status: DISCONTINUED | OUTPATIENT
Start: 2024-06-17 | End: 2024-06-19 | Stop reason: HOSPADM

## 2024-06-17 RX ORDER — ENOXAPARIN SODIUM 100 MG/ML
30 INJECTION SUBCUTANEOUS DAILY
Status: DISCONTINUED | OUTPATIENT
Start: 2024-06-18 | End: 2024-06-19 | Stop reason: HOSPADM

## 2024-06-17 RX ORDER — POLYETHYLENE GLYCOL 3350 17 G/17G
17 POWDER, FOR SOLUTION ORAL DAILY PRN
Status: DISCONTINUED | OUTPATIENT
Start: 2024-06-17 | End: 2024-06-19 | Stop reason: HOSPADM

## 2024-06-17 RX ORDER — DEXTROSE MONOHYDRATE 100 MG/ML
INJECTION, SOLUTION INTRAVENOUS CONTINUOUS PRN
Status: DISCONTINUED | OUTPATIENT
Start: 2024-06-17 | End: 2024-06-19 | Stop reason: HOSPADM

## 2024-06-17 RX ORDER — 0.9 % SODIUM CHLORIDE 0.9 %
1000 INTRAVENOUS SOLUTION INTRAVENOUS ONCE
Status: COMPLETED | OUTPATIENT
Start: 2024-06-17 | End: 2024-06-17

## 2024-06-17 RX ORDER — POTASSIUM CHLORIDE 750 MG/1
40 TABLET, FILM COATED, EXTENDED RELEASE ORAL PRN
Status: DISCONTINUED | OUTPATIENT
Start: 2024-06-17 | End: 2024-06-19 | Stop reason: HOSPADM

## 2024-06-17 RX ORDER — FENTANYL CITRATE 50 UG/ML
50 INJECTION, SOLUTION INTRAMUSCULAR; INTRAVENOUS
Status: COMPLETED | OUTPATIENT
Start: 2024-06-17 | End: 2024-06-17

## 2024-06-17 RX ORDER — SODIUM CHLORIDE 0.9 % (FLUSH) 0.9 %
5-40 SYRINGE (ML) INJECTION EVERY 12 HOURS SCHEDULED
Status: DISCONTINUED | OUTPATIENT
Start: 2024-06-17 | End: 2024-06-19 | Stop reason: HOSPADM

## 2024-06-17 RX ORDER — ATORVASTATIN CALCIUM 40 MG/1
80 TABLET, FILM COATED ORAL DAILY
Status: DISCONTINUED | OUTPATIENT
Start: 2024-06-18 | End: 2024-06-19 | Stop reason: HOSPADM

## 2024-06-17 RX ORDER — POTASSIUM CHLORIDE 7.45 MG/ML
10 INJECTION INTRAVENOUS PRN
Status: DISCONTINUED | OUTPATIENT
Start: 2024-06-17 | End: 2024-06-19 | Stop reason: HOSPADM

## 2024-06-17 RX ORDER — ONDANSETRON 2 MG/ML
4 INJECTION INTRAMUSCULAR; INTRAVENOUS EVERY 6 HOURS PRN
Status: DISCONTINUED | OUTPATIENT
Start: 2024-06-17 | End: 2024-06-19 | Stop reason: HOSPADM

## 2024-06-17 RX ORDER — INSULIN GLARGINE 100 [IU]/ML
0.15 INJECTION, SOLUTION SUBCUTANEOUS NIGHTLY
Status: DISCONTINUED | OUTPATIENT
Start: 2024-06-17 | End: 2024-06-19 | Stop reason: HOSPADM

## 2024-06-17 RX ORDER — CLOPIDOGREL BISULFATE 75 MG/1
75 TABLET ORAL DAILY
Status: DISCONTINUED | OUTPATIENT
Start: 2024-06-18 | End: 2024-06-19 | Stop reason: HOSPADM

## 2024-06-17 RX ORDER — PANTOPRAZOLE SODIUM 40 MG/1
40 TABLET, DELAYED RELEASE ORAL
Status: DISCONTINUED | OUTPATIENT
Start: 2024-06-18 | End: 2024-06-19 | Stop reason: HOSPADM

## 2024-06-17 RX ORDER — SODIUM CHLORIDE 9 MG/ML
125 INJECTION, SOLUTION INTRAVENOUS ONCE
Status: COMPLETED | OUTPATIENT
Start: 2024-06-17 | End: 2024-06-18

## 2024-06-17 RX ORDER — ISOSORBIDE MONONITRATE 30 MG/1
30 TABLET, EXTENDED RELEASE ORAL EVERY MORNING
Status: DISCONTINUED | OUTPATIENT
Start: 2024-06-18 | End: 2024-06-19 | Stop reason: HOSPADM

## 2024-06-17 RX ORDER — SODIUM CHLORIDE 0.9 % (FLUSH) 0.9 %
5-40 SYRINGE (ML) INJECTION PRN
Status: DISCONTINUED | OUTPATIENT
Start: 2024-06-17 | End: 2024-06-19 | Stop reason: HOSPADM

## 2024-06-17 RX ORDER — MAGNESIUM SULFATE IN WATER 40 MG/ML
2000 INJECTION, SOLUTION INTRAVENOUS PRN
Status: DISCONTINUED | OUTPATIENT
Start: 2024-06-17 | End: 2024-06-19 | Stop reason: HOSPADM

## 2024-06-17 RX ORDER — ARFORMOTEROL TARTRATE 15 UG/2ML
15 SOLUTION RESPIRATORY (INHALATION)
Status: DISCONTINUED | OUTPATIENT
Start: 2024-06-17 | End: 2024-06-19 | Stop reason: HOSPADM

## 2024-06-17 RX ORDER — INSULIN LISPRO 100 [IU]/ML
0-4 INJECTION, SOLUTION INTRAVENOUS; SUBCUTANEOUS
Status: DISCONTINUED | OUTPATIENT
Start: 2024-06-18 | End: 2024-06-19 | Stop reason: HOSPADM

## 2024-06-17 RX ORDER — ACETAMINOPHEN 650 MG/1
650 SUPPOSITORY RECTAL EVERY 6 HOURS PRN
Status: DISCONTINUED | OUTPATIENT
Start: 2024-06-17 | End: 2024-06-18

## 2024-06-17 RX ORDER — SODIUM CHLORIDE, SODIUM LACTATE, POTASSIUM CHLORIDE, CALCIUM CHLORIDE 600; 310; 30; 20 MG/100ML; MG/100ML; MG/100ML; MG/100ML
INJECTION, SOLUTION INTRAVENOUS CONTINUOUS
Status: DISCONTINUED | OUTPATIENT
Start: 2024-06-17 | End: 2024-06-19

## 2024-06-17 RX ORDER — DORZOLAMIDE HYDROCHLORIDE AND TIMOLOL MALEATE 20; 5 MG/ML; MG/ML
1 SOLUTION/ DROPS OPHTHALMIC 2 TIMES DAILY
Status: DISCONTINUED | OUTPATIENT
Start: 2024-06-17 | End: 2024-06-19 | Stop reason: HOSPADM

## 2024-06-17 RX ORDER — CARVEDILOL 12.5 MG/1
12.5 TABLET ORAL 2 TIMES DAILY
Status: DISCONTINUED | OUTPATIENT
Start: 2024-06-17 | End: 2024-06-19 | Stop reason: HOSPADM

## 2024-06-17 RX ORDER — ONDANSETRON 4 MG/1
4 TABLET, ORALLY DISINTEGRATING ORAL EVERY 8 HOURS PRN
Status: DISCONTINUED | OUTPATIENT
Start: 2024-06-17 | End: 2024-06-19 | Stop reason: HOSPADM

## 2024-06-17 RX ORDER — GLUCAGON 1 MG/ML
1 KIT INJECTION PRN
Status: DISCONTINUED | OUTPATIENT
Start: 2024-06-17 | End: 2024-06-19 | Stop reason: HOSPADM

## 2024-06-17 RX ORDER — MONTELUKAST SODIUM 10 MG/1
10 TABLET ORAL NIGHTLY
Status: DISCONTINUED | OUTPATIENT
Start: 2024-06-17 | End: 2024-06-19 | Stop reason: HOSPADM

## 2024-06-17 RX ORDER — INSULIN LISPRO 100 [IU]/ML
0-4 INJECTION, SOLUTION INTRAVENOUS; SUBCUTANEOUS NIGHTLY
Status: DISCONTINUED | OUTPATIENT
Start: 2024-06-17 | End: 2024-06-19 | Stop reason: HOSPADM

## 2024-06-17 RX ORDER — INSULIN LISPRO 100 [IU]/ML
0.05 INJECTION, SOLUTION INTRAVENOUS; SUBCUTANEOUS
Status: DISCONTINUED | OUTPATIENT
Start: 2024-06-18 | End: 2024-06-19 | Stop reason: HOSPADM

## 2024-06-17 RX ORDER — FENTANYL CITRATE 0.05 MG/ML
50 INJECTION, SOLUTION INTRAMUSCULAR; INTRAVENOUS ONCE
Status: COMPLETED | OUTPATIENT
Start: 2024-06-17 | End: 2024-06-17

## 2024-06-17 RX ORDER — SODIUM CHLORIDE 9 MG/ML
INJECTION, SOLUTION INTRAVENOUS PRN
Status: DISCONTINUED | OUTPATIENT
Start: 2024-06-17 | End: 2024-06-19 | Stop reason: HOSPADM

## 2024-06-17 RX ORDER — ACETAMINOPHEN 325 MG/1
650 TABLET ORAL EVERY 6 HOURS PRN
Status: DISCONTINUED | OUTPATIENT
Start: 2024-06-17 | End: 2024-06-18

## 2024-06-17 RX ADMIN — FENTANYL CITRATE 50 MCG: 50 INJECTION INTRAMUSCULAR; INTRAVENOUS at 20:26

## 2024-06-17 RX ADMIN — CARVEDILOL 12.5 MG: 12.5 TABLET, FILM COATED ORAL at 23:59

## 2024-06-17 RX ADMIN — ACETAMINOPHEN 650 MG: 325 TABLET ORAL at 23:59

## 2024-06-17 RX ADMIN — SODIUM CHLORIDE, PRESERVATIVE FREE 6 ML: 5 INJECTION INTRAVENOUS at 22:54

## 2024-06-17 RX ADMIN — SODIUM CHLORIDE 1000 ML: 9 INJECTION, SOLUTION INTRAVENOUS at 18:25

## 2024-06-17 RX ADMIN — FENTANYL CITRATE 50 MCG: 0.05 INJECTION, SOLUTION INTRAMUSCULAR; INTRAVENOUS at 18:26

## 2024-06-17 RX ADMIN — MONTELUKAST 10 MG: 10 TABLET, FILM COATED ORAL at 23:59

## 2024-06-17 ASSESSMENT — PAIN DESCRIPTION - DESCRIPTORS
DESCRIPTORS: SHARP

## 2024-06-17 ASSESSMENT — PAIN DESCRIPTION - ORIENTATION
ORIENTATION: LEFT
ORIENTATION: LEFT
ORIENTATION: LEFT;LOWER
ORIENTATION: LEFT

## 2024-06-17 ASSESSMENT — PAIN DESCRIPTION - LOCATION
LOCATION: ABDOMEN
LOCATION: ABDOMEN
LOCATION: GROIN;BACK
LOCATION: ABDOMEN

## 2024-06-17 ASSESSMENT — PAIN - FUNCTIONAL ASSESSMENT
PAIN_FUNCTIONAL_ASSESSMENT: 0-10
PAIN_FUNCTIONAL_ASSESSMENT: ACTIVITIES ARE NOT PREVENTED

## 2024-06-17 ASSESSMENT — PAIN SCALES - GENERAL
PAINLEVEL_OUTOF10: 7
PAINLEVEL_OUTOF10: 5
PAINLEVEL_OUTOF10: 6
PAINLEVEL_OUTOF10: 8
PAINLEVEL_OUTOF10: 5
PAINLEVEL_OUTOF10: 8
PAINLEVEL_OUTOF10: 7
PAINLEVEL_OUTOF10: 8

## 2024-06-17 ASSESSMENT — LIFESTYLE VARIABLES
HOW MANY STANDARD DRINKS CONTAINING ALCOHOL DO YOU HAVE ON A TYPICAL DAY: PATIENT DOES NOT DRINK
HOW OFTEN DO YOU HAVE A DRINK CONTAINING ALCOHOL: NEVER

## 2024-06-17 NOTE — ED PROVIDER NOTES
Jun 17, 2024 1822 BUN,BUNPL(!): 35 [PV]   1822 Creatinine(!): 2.14 [PV]      ED Course User Index  [PV] Gini Tao MD     Patient has a notably elevated BUN/creatinine concerning for dehydration, possibly related to recent diarrheal symptoms.  No blood in diarrhea.  Will give IV fluids.  Lactic 2.1, will plan for reassessment after fluids.  Case signed out to Dr. Hu  pending CT results.      FINAL IMPRESSION     1. ANTIONETTE (acute kidney injury) (HCC)    2. Diarrhea, unspecified type       Final disposition pending at signout.     PATIENT REFERRED TO:  No follow-up provider specified.     DISCHARGE MEDICATIONS:     Medication List        CONTINUE taking these medications      Lancets Misc            ASK your doctor about these medications      albuterol sulfate  (90 Base) MCG/ACT inhaler  Commonly known as: PROVENTIL;VENTOLIN;PROAIR     aspirin 81 MG EC tablet     atorvastatin 80 MG tablet  Commonly known as: LIPITOR  Take 1 tablet by mouth daily     candesartan 16 MG tablet  Commonly known as: ATACAND  Take 1 tablet by mouth daily     carvedilol 12.5 MG tablet  Commonly known as: COREG  Take 1 tablet by mouth 2 times daily     clopidogrel 75 MG tablet  Commonly known as: PLAVIX     diclofenac sodium 1 % Gel  Commonly known as: VOLTAREN  Apply 4 g topically 4 times daily     dorzolamide-timolol 2-0.5 % ophthalmic solution  Commonly known as: COSOPT     fluticasone-salmeterol 250-50 MCG/ACT Aepb diskus inhaler  Commonly known as: ADVAIR     ipratropium 0.06 % nasal spray  Commonly known as: ATROVENT     isosorbide mononitrate 30 MG extended release tablet  Commonly known as: IMDUR  Take 1 tablet by mouth every morning     Lantus SoloStar 100 UNIT/ML injection pen  Generic drug: insulin glargine  INJECT 25 UNITS INTO THE SKIN DAILY     levocetirizine 5 MG tablet  Commonly known as: XYZAL     montelukast 10 MG tablet  Commonly known as: SINGULAIR  TAKE 1 TABLET BY MOUTH DAILY AT BEDTIME     omeprazole

## 2024-06-17 NOTE — ED TRIAGE NOTES
Pt arrived with c/o left sided groin pain that radiates to her back. States this has been going on since Saturday. She also notes frequent bowel movements since saturday

## 2024-06-18 LAB
ALBUMIN SERPL-MCNC: 2.8 G/DL (ref 3.5–5)
ALBUMIN/GLOB SERPL: 0.8 (ref 1.1–2.2)
ALP SERPL-CCNC: 83 U/L (ref 45–117)
ALT SERPL-CCNC: 17 U/L (ref 12–78)
ANION GAP SERPL CALC-SCNC: 9 MMOL/L (ref 5–15)
AST SERPL-CCNC: 17 U/L (ref 15–37)
BASOPHILS # BLD: 0.1 K/UL (ref 0–0.1)
BASOPHILS NFR BLD: 1 % (ref 0–1)
BILIRUB SERPL-MCNC: 0.2 MG/DL (ref 0.2–1)
BUN SERPL-MCNC: 29 MG/DL (ref 6–20)
BUN/CREAT SERPL: 22 (ref 12–20)
CALCIUM SERPL-MCNC: 8.5 MG/DL (ref 8.5–10.1)
CHLORIDE SERPL-SCNC: 109 MMOL/L (ref 97–108)
CO2 SERPL-SCNC: 24 MMOL/L (ref 21–32)
CREAT SERPL-MCNC: 1.32 MG/DL (ref 0.55–1.02)
DIFFERENTIAL METHOD BLD: ABNORMAL
EOSINOPHIL # BLD: 0.1 K/UL (ref 0–0.4)
EOSINOPHIL NFR BLD: 2 % (ref 0–7)
ERYTHROCYTE [DISTWIDTH] IN BLOOD BY AUTOMATED COUNT: 16.3 % (ref 11.5–14.5)
FOLATE SERPL-MCNC: 48.7 NG/ML (ref 5–21)
GLOBULIN SER CALC-MCNC: 3.7 G/DL (ref 2–4)
GLUCOSE BLD STRIP.AUTO-MCNC: 131 MG/DL (ref 65–117)
GLUCOSE BLD STRIP.AUTO-MCNC: 147 MG/DL (ref 65–117)
GLUCOSE BLD STRIP.AUTO-MCNC: 98 MG/DL (ref 65–117)
GLUCOSE SERPL-MCNC: 131 MG/DL (ref 65–100)
HCT VFR BLD AUTO: 29.6 % (ref 35–47)
HCT VFR BLD AUTO: 30 % (ref 35–47)
HGB BLD-MCNC: 9 G/DL (ref 11.5–16)
HGB BLD-MCNC: 9.3 G/DL (ref 11.5–16)
IMM GRANULOCYTES # BLD AUTO: 0 K/UL (ref 0–0.04)
IMM GRANULOCYTES NFR BLD AUTO: 0 % (ref 0–0.5)
INR PPP: 1 (ref 0.9–1.1)
LACTATE SERPL-SCNC: 0.5 MMOL/L (ref 0.4–2)
LYMPHOCYTES # BLD: 1.3 K/UL (ref 0.8–3.5)
LYMPHOCYTES NFR BLD: 27 % (ref 12–49)
MCH RBC QN AUTO: 24.5 PG (ref 26–34)
MCHC RBC AUTO-ENTMCNC: 30.4 G/DL (ref 30–36.5)
MCV RBC AUTO: 80.4 FL (ref 80–99)
MONOCYTES # BLD: 0.5 K/UL (ref 0–1)
MONOCYTES NFR BLD: 10 % (ref 5–13)
NEUTS SEG # BLD: 2.8 K/UL (ref 1.8–8)
NEUTS SEG NFR BLD: 60 % (ref 32–75)
NRBC # BLD: 0 K/UL (ref 0–0.01)
NRBC BLD-RTO: 0 PER 100 WBC
PHOSPHATE SERPL-MCNC: 3.4 MG/DL (ref 2.6–4.7)
PLATELET # BLD AUTO: 200 K/UL (ref 150–400)
PMV BLD AUTO: 11.7 FL (ref 8.9–12.9)
POTASSIUM SERPL-SCNC: 4.4 MMOL/L (ref 3.5–5.1)
PROT SERPL-MCNC: 6.5 G/DL (ref 6.4–8.2)
PROTHROMBIN TIME: 10.2 SEC (ref 9–11.1)
RBC # BLD AUTO: 3.68 M/UL (ref 3.8–5.2)
SERVICE CMNT-IMP: ABNORMAL
SERVICE CMNT-IMP: ABNORMAL
SERVICE CMNT-IMP: NORMAL
SODIUM SERPL-SCNC: 142 MMOL/L (ref 136–145)
VIT B12 SERPL-MCNC: 744 PG/ML (ref 193–986)
WBC # BLD AUTO: 4.7 K/UL (ref 3.6–11)

## 2024-06-18 PROCEDURE — G0378 HOSPITAL OBSERVATION PER HR: HCPCS

## 2024-06-18 PROCEDURE — 96375 TX/PRO/DX INJ NEW DRUG ADDON: CPT

## 2024-06-18 PROCEDURE — 94640 AIRWAY INHALATION TREATMENT: CPT

## 2024-06-18 PROCEDURE — 83605 ASSAY OF LACTIC ACID: CPT

## 2024-06-18 PROCEDURE — 6360000002 HC RX W HCPCS: Performed by: HOSPITALIST

## 2024-06-18 PROCEDURE — 96372 THER/PROPH/DIAG INJ SC/IM: CPT

## 2024-06-18 PROCEDURE — 80053 COMPREHEN METABOLIC PANEL: CPT

## 2024-06-18 PROCEDURE — 94760 N-INVAS EAR/PLS OXIMETRY 1: CPT

## 2024-06-18 PROCEDURE — 6370000000 HC RX 637 (ALT 250 FOR IP): Performed by: INTERNAL MEDICINE

## 2024-06-18 PROCEDURE — 85018 HEMOGLOBIN: CPT

## 2024-06-18 PROCEDURE — 82962 GLUCOSE BLOOD TEST: CPT

## 2024-06-18 PROCEDURE — 84100 ASSAY OF PHOSPHORUS: CPT

## 2024-06-18 PROCEDURE — 85610 PROTHROMBIN TIME: CPT

## 2024-06-18 PROCEDURE — 36415 COLL VENOUS BLD VENIPUNCTURE: CPT

## 2024-06-18 PROCEDURE — 85025 COMPLETE CBC W/AUTO DIFF WBC: CPT

## 2024-06-18 PROCEDURE — 85014 HEMATOCRIT: CPT

## 2024-06-18 PROCEDURE — 2580000003 HC RX 258: Performed by: HOSPITALIST

## 2024-06-18 PROCEDURE — 6370000000 HC RX 637 (ALT 250 FOR IP): Performed by: HOSPITALIST

## 2024-06-18 RX ORDER — ACETAMINOPHEN 325 MG/1
650 TABLET ORAL EVERY 6 HOURS PRN
Status: DISCONTINUED | OUTPATIENT
Start: 2024-06-18 | End: 2024-06-19 | Stop reason: HOSPADM

## 2024-06-18 RX ORDER — ACETAMINOPHEN 650 MG/1
650 SUPPOSITORY RECTAL EVERY 6 HOURS PRN
Status: DISCONTINUED | OUTPATIENT
Start: 2024-06-18 | End: 2024-06-19 | Stop reason: HOSPADM

## 2024-06-18 RX ORDER — LIDOCAINE 4 G/G
1 PATCH TOPICAL DAILY
Status: DISCONTINUED | OUTPATIENT
Start: 2024-06-18 | End: 2024-06-19 | Stop reason: HOSPADM

## 2024-06-18 RX ORDER — OXYCODONE HYDROCHLORIDE AND ACETAMINOPHEN 5; 325 MG/1; MG/1
1 TABLET ORAL EVERY 4 HOURS PRN
Status: DISCONTINUED | OUTPATIENT
Start: 2024-06-18 | End: 2024-06-19 | Stop reason: HOSPADM

## 2024-06-18 RX ADMIN — INSULIN GLARGINE 12 UNITS: 100 INJECTION, SOLUTION SUBCUTANEOUS at 20:37

## 2024-06-18 RX ADMIN — CLOPIDOGREL BISULFATE 75 MG: 75 TABLET ORAL at 08:21

## 2024-06-18 RX ADMIN — CARVEDILOL 12.5 MG: 12.5 TABLET, FILM COATED ORAL at 08:21

## 2024-06-18 RX ADMIN — SODIUM CHLORIDE 125 ML/HR: 9 INJECTION, SOLUTION INTRAVENOUS at 00:06

## 2024-06-18 RX ADMIN — OXYCODONE HYDROCHLORIDE AND ACETAMINOPHEN 1 TABLET: 5; 325 TABLET ORAL at 20:36

## 2024-06-18 RX ADMIN — SODIUM CHLORIDE, PRESERVATIVE FREE 6 ML: 5 INJECTION INTRAVENOUS at 20:38

## 2024-06-18 RX ADMIN — ARFORMOTEROL TARTRATE 15 MCG: 15 SOLUTION RESPIRATORY (INHALATION) at 19:36

## 2024-06-18 RX ADMIN — BUDESONIDE INHALATION 250 MCG: 0.5 SUSPENSION RESPIRATORY (INHALATION) at 19:36

## 2024-06-18 RX ADMIN — SODIUM CHLORIDE, POTASSIUM CHLORIDE, SODIUM LACTATE AND CALCIUM CHLORIDE: 600; 310; 30; 20 INJECTION, SOLUTION INTRAVENOUS at 20:35

## 2024-06-18 RX ADMIN — MONTELUKAST 10 MG: 10 TABLET, FILM COATED ORAL at 20:36

## 2024-06-18 RX ADMIN — INSULIN GLARGINE 12 UNITS: 100 INJECTION, SOLUTION SUBCUTANEOUS at 00:07

## 2024-06-18 RX ADMIN — ENOXAPARIN SODIUM 30 MG: 100 INJECTION SUBCUTANEOUS at 08:21

## 2024-06-18 RX ADMIN — OXYCODONE HYDROCHLORIDE AND ACETAMINOPHEN 1 TABLET: 5; 325 TABLET ORAL at 16:30

## 2024-06-18 RX ADMIN — IRON SUCROSE 200 MG: 20 INJECTION, SOLUTION INTRAVENOUS at 09:49

## 2024-06-18 RX ADMIN — PANTOPRAZOLE SODIUM 40 MG: 40 TABLET, DELAYED RELEASE ORAL at 15:07

## 2024-06-18 RX ADMIN — ISOSORBIDE MONONITRATE 30 MG: 30 TABLET, EXTENDED RELEASE ORAL at 08:21

## 2024-06-18 RX ADMIN — ARFORMOTEROL TARTRATE 15 MCG: 15 SOLUTION RESPIRATORY (INHALATION) at 07:14

## 2024-06-18 RX ADMIN — ATORVASTATIN CALCIUM 80 MG: 40 TABLET, FILM COATED ORAL at 08:21

## 2024-06-18 RX ADMIN — CARVEDILOL 12.5 MG: 12.5 TABLET, FILM COATED ORAL at 20:36

## 2024-06-18 RX ADMIN — SODIUM CHLORIDE, POTASSIUM CHLORIDE, SODIUM LACTATE AND CALCIUM CHLORIDE: 600; 310; 30; 20 INJECTION, SOLUTION INTRAVENOUS at 09:49

## 2024-06-18 RX ADMIN — INSULIN LISPRO 4 UNITS: 100 INJECTION, SOLUTION INTRAVENOUS; SUBCUTANEOUS at 08:20

## 2024-06-18 RX ADMIN — BUDESONIDE INHALATION 250 MCG: 0.5 SUSPENSION RESPIRATORY (INHALATION) at 07:14

## 2024-06-18 RX ADMIN — PANTOPRAZOLE SODIUM 40 MG: 40 TABLET, DELAYED RELEASE ORAL at 06:39

## 2024-06-18 RX ADMIN — INSULIN LISPRO 4 UNITS: 100 INJECTION, SOLUTION INTRAVENOUS; SUBCUTANEOUS at 12:33

## 2024-06-18 RX ADMIN — INSULIN LISPRO 4 UNITS: 100 INJECTION, SOLUTION INTRAVENOUS; SUBCUTANEOUS at 18:07

## 2024-06-18 RX ADMIN — ACETAMINOPHEN 650 MG: 325 TABLET ORAL at 15:07

## 2024-06-18 ASSESSMENT — PAIN DESCRIPTION - ORIENTATION: ORIENTATION: LEFT

## 2024-06-18 ASSESSMENT — PAIN DESCRIPTION - LOCATION
LOCATION: ABDOMEN;BACK
LOCATION: ABDOMEN
LOCATION: ABDOMEN;BACK

## 2024-06-18 ASSESSMENT — PAIN SCALES - GENERAL
PAINLEVEL_OUTOF10: 8
PAINLEVEL_OUTOF10: 0
PAINLEVEL_OUTOF10: 7
PAINLEVEL_OUTOF10: 6

## 2024-06-18 ASSESSMENT — PAIN DESCRIPTION - DESCRIPTORS
DESCRIPTORS: SHOOTING;SHARP
DESCRIPTORS: CRAMPING
DESCRIPTORS: SHOOTING;SHARP

## 2024-06-18 ASSESSMENT — PAIN - FUNCTIONAL ASSESSMENT: PAIN_FUNCTIONAL_ASSESSMENT: ACTIVITIES ARE NOT PREVENTED

## 2024-06-18 NOTE — PLAN OF CARE
Problem: Pain  Goal: Verbalizes/displays adequate comfort level or baseline comfort level  6/18/2024 1348 by Shayla Whitney LPN  Outcome: Progressing  6/18/2024 0522 by Srikanth Begum RN  Outcome: Progressing     Problem: Safety - Adult  Goal: Free from fall injury  6/18/2024 1348 by Shayla Whitney LPN  Outcome: Progressing  6/18/2024 0522 by Srikanth Begum RN  Outcome: Progressing     Problem: Respiratory - Adult  Goal: Achieves optimal ventilation and oxygenation  6/18/2024 0718 by Lino Ba Sr., P  Outcome: Progressing

## 2024-06-18 NOTE — PLAN OF CARE
Problem: Respiratory - Adult  Goal: Achieves optimal ventilation and oxygenation  6/18/2024 1940 by Valerie Hood, RT  Outcome: Progressing  6/18/2024 0718 by Lino Ba Sr., P  Outcome: Progressing

## 2024-06-18 NOTE — ED NOTES
Stool sample obtained. Stool watery, light brown in color with small solid pieces. No obvious blood visible

## 2024-06-18 NOTE — CARE COORDINATION
Care Management Initial Assessment       RUR:n/a obs   Readmission? No  1st IM letter given? No  1st  letter given: No     06/18/24 1441   Service Assessment   Patient Orientation Alert and Oriented   Cognition Alert   History Provided By Patient   Primary Caregiver Self   Accompanied By/Relationship friend   Support Systems Family Members   Patient's Healthcare Decision Maker is: Legal Next of Kin   PCP Verified by CM Yes  (Jeffery Alexis MD)   Last Visit to PCP Within last 3 months   Prior Functional Level Independent in ADLs/IADLs   Current Functional Level Independent in ADLs/IADLs   Can patient return to prior living arrangement Yes   Ability to make needs known: Good   Family able to assist with home care needs: Yes   Would you like for me to discuss the discharge plan with any other family members/significant others, and if so, who? No   Financial Resources Medicare   Social/Functional History   Lives With Alone   Type of Home House   Home Equipment None   Active  Yes   Discharge Planning   Type of Residence House   Living Arrangements Alone   Current Services Prior To Admission None   Potential Assistance Needed N/A   Patient expects to be discharged to: House     Ms. Patterson lives at home alone. Independent with self care needs. Does NOT use any DME. States she does NOT need HH post discharge. Ms. Patterson is currently in observation status. Medicare Outpatient Observation Notice provided to Hyacinth Patterson. Oral explanation was provided and all questions answered. Signed document placed on the bedside chart to be scanned under the media tab. Copy provided to Hyacinth Patterson. Patient also provided  intro letter and told her to contact CM for any questions or concerns during/after her stay.

## 2024-06-18 NOTE — ACP (ADVANCE CARE PLANNING)
Advance Care Planning     General Advance Care Planning (ACP) Conversation    Date of Conversation: 6/18/2024  Conducted with: Patient with Decision Making Capacity  Other persons present:  Friend    Healthcare Decision Maker:   Primary Decision Maker: Christina Romero - Niece/Nephew - [phone]  Click here to complete Healthcare Decision Makers including selection of the Healthcare Decision Maker Relationship (ie \"Primary\").   Today we documented Decision Maker(s) consistent with Legal Next of Kin hierarchy.    Content/Action Overview:  Has NO ACP documents-Information provided  treatment goals  N/a    Length of Voluntary ACP Conversation in minutes:  <16 minutes (Non-Billable)    Kika Rice

## 2024-06-18 NOTE — H&P
V2.0  History and Physical      Name:  Hyacinth Patterson /Age/Sex: 1955  (68 y.o. female)   MRN & CSN:  [id number] & [id number] Encounter Date/Time: 24   Location:  [facility]/ PCP: Jeffery Alexis MD       Hospital Day: 2    Assessment and Plan:   Hyacinth Patterson is a 68 y.o. female with a pmh of PAD, DM and HTN who presents with complaints of intractable N/V    Hospital Problems             Last Modified POA    Gastroenteritis 2024 Yes    Anemia due to chronic blood loss 2024 Yes    Essential hypertension 2024 Yes       Plan:  Gastroenteritis  - Acute onset symptoms w/o reports of bleeding or fever present w/ previous Rt hemicolectomy noted but otherwise no acute pathology noted on CT A/P  - Viral etiology suspected w/ no empiric ATBX planned @ this time   - Continue IVF support and advance diet as tolerated    Type 2 diabetes mellitus with complication, without long-term current use of insulin (HCC)  - A1c <6 w/o acute inpt hyperglycemia noted  - Continue Wt based Lantus w/ prandial Novolog for inpt management  - Resume low CHO diet    Essential hypertension  - Chronic presentation w/ SBP stable & home regimen reviewed  - Titrate for goal SBP<140     Anemia due to chronic blood loss  - Chronic microcytic/hypochromic dz w/ previous mesenteric ischemia and bleeding but no active blood loss present  - Underlying Iron deficiency suspected with replacement ordered  - Transfuse for Hgb<7    Disposition:   Current Living situation: Home  Expected Disposition: Home  Estimated D/C: TBD    Diet ADULT DIET; Full Liquid   DVT Prophylaxis [] Lovenox, [x]  Heparin, [] SCDs, [] Ambulation,  [] Eliquis, [] Xarelto, [] Coumadin   Code Status Full Code   Surrogate Decision Maker/ POA      Personally reviewed Lab Studies and Imaging     History from:     patient, electronic medical record    History of Present Illness:     Chief Complaint:   Hyacinth Patterson is a 68 y.o. female with a pmh of PAD, DM and HTN who

## 2024-06-18 NOTE — ED NOTES
Med/Surg contacted about transporting pt to floor. Advised that the receiving nurse will call me. Awaiting call back at this time

## 2024-06-18 NOTE — ASSESSMENT & PLAN NOTE
- Chronic microcytic/hypochromic dz w/ previous mesenteric ischemia and bleeding but no active blood loss present  - Underlying Iron deficiency suspected with replacement ordered  - Transfuse for Hgb<7

## 2024-06-18 NOTE — ED NOTES
Admission SBAR Note  Situation/Background:     Patient is being transferred to Med/Surg ([facility]), Room# 120    Patient's Chief Complaint was LLQ pain and diarrhea and is admitted for ANTIONETTE, LLQ Abdominal Pain, diarrhea.    CODE STATUS: Full  CSSRS: 0 - No Risk    ISOLATION/PRECAUTIONS: No    Is this a behavioral health patient? No    STAT labs collected: Yes    Repeat Lactic Acid DUE? No    All STAT orders are complete: Yes    The following personal items will be sent with the patient during transfer to the floor:     All valuables: see flowsheet      ASSESSMENT:    NEURO:   ORIENTATION LEVEL: ORIENTATION LEVEL: Person, Place, Time, and Situation  Cognition:  appropriate decision making, appropriate for age attention/concentration, appropriate safety awareness, decreased attention/concentration, following commands  follows multi-step complex commands/direction, and recognition of people/places  Speech: shows no evidence of impairment    Is patient impulsive? No  Is patient oriented? Yes  Do they follow commands? Yes  Is the patient ambulatory? Yes    FALL RISK? Yes  Interventions: Implemented/recommended use of non-skid footwear, Implemented/recommended use of fall risk identification flag to all team members, Implemented/recommended environmental changes (remove hazards, lower bed, improve lighting, etc.), and Implemented/recommended increased supervision/assistance    RESPIRATORY:   Is patient on oxygen? No  Oxygen therapy: room air    CARDIAC:   Is cardiac monitoring ordered? Yes  Last Rhythm: Rhythm including paccardio: Normal Sinus Rhythm   Patient to transfer with tele box on? Yes  Infusions: Meds; iv fluids: none  LINE ACCESS: 20G Peripheral IV , Antecubital and Left        /GI:   Continent Bowel/Bladder? Yes  Was UA with reflex sent to lab? Yes  If no, collect and send prior to transport to inpatient area.    INTEGUMENTARY:  IS THE PATIENT

## 2024-06-18 NOTE — ASSESSMENT & PLAN NOTE
- A1c <6 w/o acute inpt hyperglycemia noted  - Continue Wt based Lantus w/ prandial Novolog for inpt management  - Resume low CHO diet

## 2024-06-18 NOTE — ASSESSMENT & PLAN NOTE
- Acute onset symptoms w/o reports of bleeding or fever present w/ previous Rt hemicolectomy noted but otherwise no acute pathology noted on CT A/P  - Viral etiology suspected w/ no empiric ATBX planned @ this time   - Continue IVF support and advance diet as tolerated

## 2024-06-19 VITALS
DIASTOLIC BLOOD PRESSURE: 84 MMHG | HEART RATE: 60 BPM | OXYGEN SATURATION: 95 % | TEMPERATURE: 97.7 F | SYSTOLIC BLOOD PRESSURE: 174 MMHG | BODY MASS INDEX: 28.19 KG/M2 | WEIGHT: 169.2 LBS | HEIGHT: 65 IN | RESPIRATION RATE: 18 BRPM

## 2024-06-19 LAB
ANION GAP SERPL CALC-SCNC: 6 MMOL/L (ref 5–15)
BASOPHILS # BLD: 0.1 K/UL (ref 0–0.1)
BASOPHILS NFR BLD: 2 % (ref 0–1)
BUN SERPL-MCNC: 17 MG/DL (ref 6–20)
BUN/CREAT SERPL: 14 (ref 12–20)
C COLI+JEJUNI TUF STL QL NAA+PROBE: NEGATIVE
CALCIUM SERPL-MCNC: 8.7 MG/DL (ref 8.5–10.1)
CHLORIDE SERPL-SCNC: 110 MMOL/L (ref 97–108)
CO2 SERPL-SCNC: 28 MMOL/L (ref 21–32)
CREAT SERPL-MCNC: 1.18 MG/DL (ref 0.55–1.02)
DIFFERENTIAL METHOD BLD: ABNORMAL
EC STX1+STX2 GENES STL QL NAA+PROBE: NEGATIVE
EOSINOPHIL # BLD: 0.2 K/UL (ref 0–0.4)
EOSINOPHIL NFR BLD: 5 % (ref 0–7)
ERYTHROCYTE [DISTWIDTH] IN BLOOD BY AUTOMATED COUNT: 16.3 % (ref 11.5–14.5)
ETEC ELTA+ESTB GENES STL QL NAA+PROBE: NEGATIVE
GLUCOSE BLD STRIP.AUTO-MCNC: 102 MG/DL (ref 65–117)
GLUCOSE BLD STRIP.AUTO-MCNC: 93 MG/DL (ref 65–117)
GLUCOSE SERPL-MCNC: 86 MG/DL (ref 65–100)
HCT VFR BLD AUTO: 30.9 % (ref 35–47)
HGB BLD-MCNC: 9.5 G/DL (ref 11.5–16)
IMM GRANULOCYTES # BLD AUTO: 0 K/UL (ref 0–0.04)
IMM GRANULOCYTES NFR BLD AUTO: 0 % (ref 0–0.5)
LYMPHOCYTES # BLD: 1.3 K/UL (ref 0.8–3.5)
LYMPHOCYTES NFR BLD: 36 % (ref 12–49)
MCH RBC QN AUTO: 24.2 PG (ref 26–34)
MCHC RBC AUTO-ENTMCNC: 30.7 G/DL (ref 30–36.5)
MCV RBC AUTO: 78.8 FL (ref 80–99)
MONOCYTES # BLD: 0.4 K/UL (ref 0–1)
MONOCYTES NFR BLD: 11 % (ref 5–13)
NEUTS SEG # BLD: 1.6 K/UL (ref 1.8–8)
NEUTS SEG NFR BLD: 46 % (ref 32–75)
NRBC # BLD: 0 K/UL (ref 0–0.01)
NRBC BLD-RTO: 0 PER 100 WBC
P SHIGELLOIDES DNA STL QL NAA+PROBE: NEGATIVE
PLATELET # BLD AUTO: 201 K/UL (ref 150–400)
PLATELET COMMENT: ABNORMAL
PMV BLD AUTO: 11.3 FL (ref 8.9–12.9)
POTASSIUM SERPL-SCNC: 4.1 MMOL/L (ref 3.5–5.1)
RBC # BLD AUTO: 3.92 M/UL (ref 3.8–5.2)
RBC MORPH BLD: ABNORMAL
SALMONELLA SP SPAO STL QL NAA+PROBE: NEGATIVE
SERVICE CMNT-IMP: NORMAL
SERVICE CMNT-IMP: NORMAL
SHIGELLA SP+EIEC IPAH STL QL NAA+PROBE: NEGATIVE
SODIUM SERPL-SCNC: 144 MMOL/L (ref 136–145)
V CHOL+PARA+VUL DNA STL QL NAA+NON-PROBE: NEGATIVE
WBC # BLD AUTO: 3.6 K/UL (ref 3.6–11)
Y ENTEROCOL DNA STL QL NAA+NON-PROBE: NEGATIVE

## 2024-06-19 PROCEDURE — 96375 TX/PRO/DX INJ NEW DRUG ADDON: CPT

## 2024-06-19 PROCEDURE — 2580000003 HC RX 258: Performed by: HOSPITALIST

## 2024-06-19 PROCEDURE — 94760 N-INVAS EAR/PLS OXIMETRY 1: CPT

## 2024-06-19 PROCEDURE — G0378 HOSPITAL OBSERVATION PER HR: HCPCS

## 2024-06-19 PROCEDURE — 6360000002 HC RX W HCPCS: Performed by: HOSPITALIST

## 2024-06-19 PROCEDURE — 80048 BASIC METABOLIC PNL TOTAL CA: CPT

## 2024-06-19 PROCEDURE — 96372 THER/PROPH/DIAG INJ SC/IM: CPT

## 2024-06-19 PROCEDURE — 36415 COLL VENOUS BLD VENIPUNCTURE: CPT

## 2024-06-19 PROCEDURE — 6370000000 HC RX 637 (ALT 250 FOR IP): Performed by: INTERNAL MEDICINE

## 2024-06-19 PROCEDURE — 6370000000 HC RX 637 (ALT 250 FOR IP): Performed by: HOSPITALIST

## 2024-06-19 PROCEDURE — 94640 AIRWAY INHALATION TREATMENT: CPT

## 2024-06-19 PROCEDURE — 85025 COMPLETE CBC W/AUTO DIFF WBC: CPT

## 2024-06-19 PROCEDURE — 82962 GLUCOSE BLOOD TEST: CPT

## 2024-06-19 PROCEDURE — 93005 ELECTROCARDIOGRAM TRACING: CPT | Performed by: INTERNAL MEDICINE

## 2024-06-19 RX ORDER — OXYCODONE HYDROCHLORIDE AND ACETAMINOPHEN 5; 325 MG/1; MG/1
1 TABLET ORAL EVERY 6 HOURS PRN
Qty: 7 TABLET | Refills: 0 | Status: SHIPPED | OUTPATIENT
Start: 2024-06-19 | End: 2024-06-22

## 2024-06-19 RX ADMIN — ISOSORBIDE MONONITRATE 30 MG: 30 TABLET, EXTENDED RELEASE ORAL at 08:39

## 2024-06-19 RX ADMIN — ATORVASTATIN CALCIUM 80 MG: 40 TABLET, FILM COATED ORAL at 08:39

## 2024-06-19 RX ADMIN — ENOXAPARIN SODIUM 30 MG: 100 INJECTION SUBCUTANEOUS at 08:39

## 2024-06-19 RX ADMIN — SODIUM CHLORIDE, POTASSIUM CHLORIDE, SODIUM LACTATE AND CALCIUM CHLORIDE: 600; 310; 30; 20 INJECTION, SOLUTION INTRAVENOUS at 06:38

## 2024-06-19 RX ADMIN — OXYCODONE HYDROCHLORIDE AND ACETAMINOPHEN 1 TABLET: 5; 325 TABLET ORAL at 08:39

## 2024-06-19 RX ADMIN — CLOPIDOGREL BISULFATE 75 MG: 75 TABLET ORAL at 08:39

## 2024-06-19 RX ADMIN — ARFORMOTEROL TARTRATE 15 MCG: 15 SOLUTION RESPIRATORY (INHALATION) at 07:53

## 2024-06-19 RX ADMIN — PANTOPRAZOLE SODIUM 40 MG: 40 TABLET, DELAYED RELEASE ORAL at 06:17

## 2024-06-19 RX ADMIN — INSULIN LISPRO 4 UNITS: 100 INJECTION, SOLUTION INTRAVENOUS; SUBCUTANEOUS at 12:52

## 2024-06-19 RX ADMIN — ONDANSETRON 4 MG: 2 INJECTION INTRAMUSCULAR; INTRAVENOUS at 10:38

## 2024-06-19 RX ADMIN — SODIUM CHLORIDE, PRESERVATIVE FREE 10 ML: 5 INJECTION INTRAVENOUS at 08:39

## 2024-06-19 RX ADMIN — INSULIN LISPRO 4 UNITS: 100 INJECTION, SOLUTION INTRAVENOUS; SUBCUTANEOUS at 08:39

## 2024-06-19 RX ADMIN — CARVEDILOL 12.5 MG: 12.5 TABLET, FILM COATED ORAL at 08:39

## 2024-06-19 RX ADMIN — BUDESONIDE INHALATION 250 MCG: 0.5 SUSPENSION RESPIRATORY (INHALATION) at 07:53

## 2024-06-19 ASSESSMENT — PAIN SCALES - GENERAL: PAINLEVEL_OUTOF10: 7

## 2024-06-19 ASSESSMENT — PAIN DESCRIPTION - ORIENTATION: ORIENTATION: LEFT;LOWER

## 2024-06-19 ASSESSMENT — PAIN DESCRIPTION - LOCATION: LOCATION: ABDOMEN

## 2024-06-19 ASSESSMENT — PAIN - FUNCTIONAL ASSESSMENT: PAIN_FUNCTIONAL_ASSESSMENT: ACTIVITIES ARE NOT PREVENTED

## 2024-06-19 NOTE — CARE COORDINATION
06/19/24 1434   Services At/After Discharge   Transition of Care Consult (CM Consult) N/A   Services At/After Discharge None   [place] Resource Information Provided? No   Mode of Transport at Discharge Self   Confirm Follow Up Transport Family     Ms. Patterson was discharged home. No needs identified. Patient is aware and agrees with the discharge plan. She is aware to contact CM for any questions or concerns even after discharge.     Transition of Care Plan:    RUR: n/a   Prior Level of Functioning: Independent   Disposition: Home   If SNF or IPR: Date FOC offered:   Date FOC received:   Accepting facility:   Date authorization started with reference number:   Date authorization received and expires:   Follow up appointments: Jeffery Alexis 6/20 at 2pm   DME needed:   Transportation at discharge:   IM/[facility] Medicare/ letter given: n/a   Is patient a  and connected with [facility]?    If yes, was  transfer form completed and [facility] notified?   Caregiver Contact:   Discharge Caregiver contacted prior to discharge?   Care Conference needed?   Barriers to discharge: None

## 2024-06-19 NOTE — PLAN OF CARE
Problem: Pain  Goal: Verbalizes/displays adequate comfort level or baseline comfort level  6/19/2024 1246 by Yue Barksdale RN  Outcome: Adequate for Discharge  6/19/2024 1242 by Yue Barksdale RN  Outcome: Progressing     Problem: Safety - Adult  Goal: Free from fall injury  6/19/2024 1246 by Yue Barksdale RN  Outcome: Adequate for Discharge  6/19/2024 1242 by Yue Barksdale RN  Outcome: Progressing     Problem: Respiratory - Adult  Goal: Achieves optimal ventilation and oxygenation  6/19/2024 1246 by Yue Barksdale RN  Outcome: Adequate for Discharge  6/19/2024 1242 by Yue Barksdale RN  Outcome: Progressing  6/19/2024 0801 by June Zheng, RT  Outcome: Progressing     Problem: Chronic Conditions and Co-morbidities  Goal: Patient's chronic conditions and co-morbidity symptoms are monitored and maintained or improved  6/19/2024 1246 by Yue Barksdale RN  Outcome: Adequate for Discharge  6/19/2024 1242 by Yue Barksdale RN  Outcome: Progressing

## 2024-06-19 NOTE — DISCHARGE SUMMARY
Discharge Summary    Name: Hyacinth Patterson  [id number]  YOB: 1955 (Age: 68 y.o.)   Date of Admission: 6/17/2024  Date of Discharge: 6/19/2024  Attending Physician: No att. providers found    Discharge Diagnosis:   Intractable nausea and vomiting  Acute gastroenteritis    Secondary Diagnosis:  DM Type 2  Hypertension  Chronic anemia    Consultations:  None      Brief Admission History/Reason for Admission   Ms. Hyacinth Patterson is a 68 year old female with a medical history significant for a right hemicolectomy, DM Type 2, hypertension and chronic anemia who presented to the ED with a multitude of complaints including intractable left lower quadrant abdominal pain with associated diarrhea for three days duration.  She denied any hematochezia, melena, hematemesis or chest pain however presented to the ED for further evaluation.    Brief Hospital Course by Main Problems:   On the acute medicine floor she was started on IV fluids and diet was advanced which she tolerated without difficulty.  She had significant improvement in pain, experienced no vomiting on arrival and at time of discharge she was medically stable.      Discharge Exam:  Patient seen and examined by me on discharge day.  Pertinent Findings:  Patient Vitals for the past 24 hrs:   BP Temp Temp src Pulse Resp SpO2   06/19/24 1045 (!) 174/84 -- -- 60 18 95 %   06/19/24 0814 -- -- -- 65 18 100 %   06/19/24 0753 -- -- -- 64 18 100 %   06/19/24 0732 (!) 146/61 97.7 °F (36.5 °C) Oral 57 18 97 %   06/18/24 2347 (!) 134/54 98.2 °F (36.8 °C) Oral 73 17 100 %   06/18/24 2036 -- -- -- -- 17 --   06/18/24 1936 -- -- -- 60 16 98 %   06/18/24 1600 (!) 142/55 97.9 °F (36.6 °C) Oral 63 18 100 %       Gen:    Not in distress  Chest: Clear lungs  CVS:   Regular rhythm.  No edema  Abd:  Soft, not distended, not tender  Neuro: awake, moving all exts    Discharge/Recent Laboratory Results:  Recent Labs     06/18/24  0623 06/19/24  0752

## 2024-06-19 NOTE — PLAN OF CARE
Problem: Respiratory - Adult  Goal: Achieves optimal ventilation and oxygenation  6/19/2024 0801 by June Zheng, RT  Outcome: Progressing  6/18/2024 1940 by Valerie Hood, RT  Outcome: Progressing

## 2024-06-19 NOTE — PROGRESS NOTES
Discharge Summary    Hyacinth Patterson  :  1955  MRN:  [id number]    ADMIT DATE:  2024  DISCHARGE DATE:  2024      Discharge instruction reviewed with patient.    Home med's returned No    Personal belongings returned No.    Patient Walked to front entrance with Nurse.        SIGNED:    Yue Barksdale RN

## 2024-06-19 NOTE — PLAN OF CARE
Problem: Pain  Goal: Verbalizes/displays adequate comfort level or baseline comfort level  Outcome: Progressing     Problem: Safety - Adult  Goal: Free from fall injury  Outcome: Progressing     Problem: Respiratory - Adult  Goal: Achieves optimal ventilation and oxygenation  6/19/2024 1242 by Yue Barksdale RN  Outcome: Progressing  6/19/2024 0801 by June Zheng, RT  Outcome: Progressing     Problem: Chronic Conditions and Co-morbidities  Goal: Patient's chronic conditions and co-morbidity symptoms are monitored and maintained or improved  Outcome: Progressing

## 2024-06-19 NOTE — DISCHARGE INSTRUCTIONS
DISCHARGE SUMMARY from Nurse    PATIENT INSTRUCTIONS:      *  Please give a list of your current medications to your Primary Care Provider.    *  Please update this list whenever your medications are discontinued, doses are      changed, or new medications (including over-the-counter products) are added.    *  Please carry medication information at all times in case of emergency situations.    These are general instructions for a healthy lifestyle:    No smoking/ No tobacco products/ Avoid exposure to second hand smoke  Surgeon General's Warning:  Quitting smoking now greatly reduces serious risk to your health.    Obesity, smoking, and sedentary lifestyle greatly increases your risk for illness    A healthy diet, regular physical exercise & weight monitoring are important for maintaining a healthy lifestyle    You may be retaining fluid if you have a history of heart failure or if you experience any of the following symptoms:  Weight gain of 3 pounds or more overnight or 5 pounds in a week, increased swelling in our hands or feet or shortness of breath while lying flat in bed.  Please call your doctor as soon as you notice any of these symptoms; do not wait until your next office visit.        ___________________________________________________________________________________________________________________________________

## 2024-06-20 ENCOUNTER — TELEPHONE (OUTPATIENT)
Age: 69
End: 2024-06-20

## 2024-06-20 ENCOUNTER — OFFICE VISIT (OUTPATIENT)
Age: 69
End: 2024-06-20

## 2024-06-20 VITALS
OXYGEN SATURATION: 100 % | BODY MASS INDEX: 29.39 KG/M2 | WEIGHT: 176.4 LBS | TEMPERATURE: 98.1 F | SYSTOLIC BLOOD PRESSURE: 130 MMHG | RESPIRATION RATE: 18 BRPM | HEIGHT: 65 IN | HEART RATE: 65 BPM | DIASTOLIC BLOOD PRESSURE: 77 MMHG

## 2024-06-20 DIAGNOSIS — M54.50 MIDLINE LOW BACK PAIN WITHOUT SCIATICA, UNSPECIFIED CHRONICITY: ICD-10-CM

## 2024-06-20 DIAGNOSIS — K92.1 HEMATOCHEZIA: Primary | ICD-10-CM

## 2024-06-20 LAB
EKG ATRIAL RATE: 53 BPM
EKG DIAGNOSIS: NORMAL
EKG P AXIS: 52 DEGREES
EKG P-R INTERVAL: 150 MS
EKG Q-T INTERVAL: 456 MS
EKG QRS DURATION: 94 MS
EKG QTC CALCULATION (BAZETT): 427 MS
EKG R AXIS: -7 DEGREES
EKG T AXIS: -78 DEGREES
EKG VENTRICULAR RATE: 53 BPM

## 2024-06-20 RX ORDER — LIDOCAINE 50 MG/G
1 PATCH TOPICAL DAILY
Qty: 30 PATCH | Refills: 0 | Status: SHIPPED | OUTPATIENT
Start: 2024-06-20 | End: 2024-07-20

## 2024-06-20 ASSESSMENT — PATIENT HEALTH QUESTIONNAIRE - PHQ9
2. FEELING DOWN, DEPRESSED OR HOPELESS: NOT AT ALL
SUM OF ALL RESPONSES TO PHQ QUESTIONS 1-9: 0
SUM OF ALL RESPONSES TO PHQ QUESTIONS 1-9: 0
SUM OF ALL RESPONSES TO PHQ9 QUESTIONS 1 & 2: 0
SUM OF ALL RESPONSES TO PHQ QUESTIONS 1-9: 0
1. LITTLE INTEREST OR PLEASURE IN DOING THINGS: NOT AT ALL
SUM OF ALL RESPONSES TO PHQ QUESTIONS 1-9: 0

## 2024-06-20 NOTE — TELEPHONE ENCOUNTER
Care Transitions Initial Follow Up Call    Outreach made within 2 business days of discharge: Yes    Patient: Hyaicnth Patterson Patient : 1955   MRN: [id number]  Reason for Admission: There are no discharge diagnoses documented for the most recent discharge.  Discharge Date: 24       Spoke with: Hyacinth Patterson    Discharge department/facility: [facility] Interactive Patient Contact:  Was patient able to fill all prescriptions: Yes  Was patient instructed to bring all medications to the follow-up visit: Yes  Is patient taking all medications as directed in the discharge summary? Yes  Does patient understand their discharge instructions: Yes  Does patient have questions or concerns that need addressed prior to 7-14 day follow up office visit: no    Scheduled appointment with PCP within 7-14 days    Follow Up  Future Appointments   Date Time Provider Department Center   2024  2:00 PM Jeffery Aleixs MD [facility] MAIN [facility]   2024  7:30 AM Jeffery Alexis MD [facility] MAIN BS AMB   2024 10:40 AM Sagar Borges MD [facility] BS AMB   2024 11:20 AM Sagar Borges MD [facility]  AMB       ILIANA DOW LPN
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
## 2024-06-27 ENCOUNTER — TELEPHONE (OUTPATIENT)
Age: 69
End: 2024-06-27

## 2024-06-27 NOTE — TELEPHONE ENCOUNTER
Returned pt vm to be [facility] for NP appt. Could not LVM as mailbox is full.     Ref. Sagar Borges MD

## 2024-06-30 ENCOUNTER — HOSPITAL ENCOUNTER (EMERGENCY)
Facility: HOSPITAL | Age: 69
Discharge: LEFT BEFORE DISCHARGE | End: 2024-06-30
Attending: EMERGENCY MEDICINE
Payer: MEDICARE

## 2024-06-30 ENCOUNTER — HOSPITAL ENCOUNTER (EMERGENCY)
Facility: HOSPITAL | Age: 69
Discharge: HOME | End: 2024-07-01
Attending: EMERGENCY MEDICINE
Payer: MEDICARE

## 2024-06-30 ENCOUNTER — APPOINTMENT (OUTPATIENT)
Facility: HOSPITAL | Age: 69
End: 2024-06-30
Payer: MEDICARE

## 2024-06-30 VITALS
BODY MASS INDEX: 28.62 KG/M2 | DIASTOLIC BLOOD PRESSURE: 114 MMHG | HEART RATE: 64 BPM | WEIGHT: 172 LBS | TEMPERATURE: 97.8 F | OXYGEN SATURATION: 100 % | RESPIRATION RATE: 18 BRPM | SYSTOLIC BLOOD PRESSURE: 139 MMHG

## 2024-06-30 VITALS
HEART RATE: 65 BPM | RESPIRATION RATE: 18 BRPM | OXYGEN SATURATION: 98 % | HEIGHT: 65 IN | WEIGHT: 170.64 LBS | SYSTOLIC BLOOD PRESSURE: 130 MMHG | DIASTOLIC BLOOD PRESSURE: 64 MMHG | BODY MASS INDEX: 28.43 KG/M2 | TEMPERATURE: 97.5 F

## 2024-06-30 DIAGNOSIS — N17.9 AKI (ACUTE KIDNEY INJURY) (HCC): ICD-10-CM

## 2024-06-30 DIAGNOSIS — R19.7 DIARRHEA, UNSPECIFIED TYPE: Primary | ICD-10-CM

## 2024-06-30 DIAGNOSIS — R11.2 NAUSEA AND VOMITING, UNSPECIFIED VOMITING TYPE: ICD-10-CM

## 2024-06-30 DIAGNOSIS — R19.7 DIARRHEA, UNSPECIFIED TYPE: ICD-10-CM

## 2024-06-30 DIAGNOSIS — R10.9 ABDOMINAL PAIN, UNSPECIFIED ABDOMINAL LOCATION: Primary | ICD-10-CM

## 2024-06-30 LAB
ALBUMIN SERPL-MCNC: 3.4 G/DL (ref 3.5–5)
ALBUMIN/GLOB SERPL: 0.8 (ref 1.1–2.2)
ALP SERPL-CCNC: 94 U/L (ref 45–117)
ALT SERPL-CCNC: 20 U/L (ref 12–78)
ANION GAP SERPL CALC-SCNC: 8 MMOL/L (ref 5–15)
APPEARANCE UR: CLEAR
AST SERPL-CCNC: 14 U/L (ref 15–37)
BACTERIA URNS QL MICRO: NEGATIVE /HPF
BASOPHILS # BLD: 0.1 K/UL (ref 0–0.1)
BASOPHILS # BLD: 0.1 K/UL (ref 0–0.1)
BASOPHILS NFR BLD: 1 % (ref 0–1)
BASOPHILS NFR BLD: 1 % (ref 0–1)
BILIRUB SERPL-MCNC: 0.2 MG/DL (ref 0.2–1)
BILIRUB UR QL: NEGATIVE
BUN SERPL-MCNC: 39 MG/DL (ref 6–20)
BUN/CREAT SERPL: 25 (ref 12–20)
CALCIUM SERPL-MCNC: 9.4 MG/DL (ref 8.5–10.1)
CHLORIDE SERPL-SCNC: 104 MMOL/L (ref 97–108)
CO2 SERPL-SCNC: 28 MMOL/L (ref 21–32)
COLOR UR: ABNORMAL
CREAT SERPL-MCNC: 1.56 MG/DL (ref 0.55–1.02)
DIFFERENTIAL METHOD BLD: ABNORMAL
DIFFERENTIAL METHOD BLD: ABNORMAL
EOSINOPHIL # BLD: 0.1 K/UL (ref 0–0.4)
EOSINOPHIL # BLD: 0.2 K/UL (ref 0–0.4)
EOSINOPHIL NFR BLD: 2 % (ref 0–7)
EOSINOPHIL NFR BLD: 4 % (ref 0–7)
EPITH CASTS URNS QL MICRO: ABNORMAL /LPF
ERYTHROCYTE [DISTWIDTH] IN BLOOD BY AUTOMATED COUNT: 16.2 % (ref 11.5–14.5)
ERYTHROCYTE [DISTWIDTH] IN BLOOD BY AUTOMATED COUNT: 16.7 % (ref 11.5–14.5)
GLOBULIN SER CALC-MCNC: 4.1 G/DL (ref 2–4)
GLUCOSE SERPL-MCNC: 81 MG/DL (ref 65–100)
GLUCOSE UR STRIP.AUTO-MCNC: NEGATIVE MG/DL
HCT VFR BLD AUTO: 33.2 % (ref 35–47)
HCT VFR BLD AUTO: 34.2 % (ref 35–47)
HGB BLD-MCNC: 10.3 G/DL (ref 11.5–16)
HGB BLD-MCNC: 10.3 G/DL (ref 11.5–16)
HGB UR QL STRIP: ABNORMAL
IMM GRANULOCYTES # BLD AUTO: 0 K/UL (ref 0–0.04)
IMM GRANULOCYTES # BLD AUTO: 0 K/UL (ref 0–0.04)
IMM GRANULOCYTES NFR BLD AUTO: 0 % (ref 0–0.5)
IMM GRANULOCYTES NFR BLD AUTO: 0 % (ref 0–0.5)
KETONES UR QL STRIP.AUTO: NEGATIVE MG/DL
LEUKOCYTE ESTERASE UR QL STRIP.AUTO: NEGATIVE
LIPASE SERPL-CCNC: 31 U/L (ref 13–75)
LYMPHOCYTES # BLD: 1.4 K/UL (ref 0.8–3.5)
LYMPHOCYTES # BLD: 1.7 K/UL (ref 0.8–3.5)
LYMPHOCYTES NFR BLD: 26 % (ref 12–49)
LYMPHOCYTES NFR BLD: 28 % (ref 12–49)
MCH RBC QN AUTO: 24.2 PG (ref 26–34)
MCH RBC QN AUTO: 24.7 PG (ref 26–34)
MCHC RBC AUTO-ENTMCNC: 30.1 G/DL (ref 30–36.5)
MCHC RBC AUTO-ENTMCNC: 31 G/DL (ref 30–36.5)
MCV RBC AUTO: 79.6 FL (ref 80–99)
MCV RBC AUTO: 80.5 FL (ref 80–99)
MONOCYTES # BLD: 0.4 K/UL (ref 0–1)
MONOCYTES # BLD: 0.6 K/UL (ref 0–1)
MONOCYTES NFR BLD: 8 % (ref 5–13)
MONOCYTES NFR BLD: 9 % (ref 5–13)
NEUTS SEG # BLD: 2.9 K/UL (ref 1.8–8)
NEUTS SEG # BLD: 4.2 K/UL (ref 1.8–8)
NEUTS SEG NFR BLD: 58 % (ref 32–75)
NEUTS SEG NFR BLD: 63 % (ref 32–75)
NITRITE UR QL STRIP.AUTO: NEGATIVE
NRBC # BLD: 0 K/UL (ref 0–0.01)
NRBC # BLD: 0 K/UL (ref 0–0.01)
NRBC BLD-RTO: 0 PER 100 WBC
NRBC BLD-RTO: 0 PER 100 WBC
PH UR STRIP: 6 (ref 5–8)
PLATELET # BLD AUTO: 248 K/UL (ref 150–400)
PLATELET # BLD AUTO: 265 K/UL (ref 150–400)
PMV BLD AUTO: 12.3 FL (ref 8.9–12.9)
PMV BLD AUTO: 12.6 FL (ref 8.9–12.9)
POTASSIUM SERPL-SCNC: 4 MMOL/L (ref 3.5–5.1)
PROT SERPL-MCNC: 7.5 G/DL (ref 6.4–8.2)
PROT UR STRIP-MCNC: NEGATIVE MG/DL
RBC # BLD AUTO: 4.17 M/UL (ref 3.8–5.2)
RBC # BLD AUTO: 4.25 M/UL (ref 3.8–5.2)
RBC #/AREA URNS HPF: ABNORMAL /HPF (ref 0–5)
SODIUM SERPL-SCNC: 140 MMOL/L (ref 136–145)
SP GR UR REFRACTOMETRY: 1.01 (ref 1–1.03)
URINE CULTURE IF INDICATED: ABNORMAL
UROBILINOGEN UR QL STRIP.AUTO: 0.2 EU/DL (ref 0.2–1)
WBC # BLD AUTO: 5 K/UL (ref 3.6–11)
WBC # BLD AUTO: 6.7 K/UL (ref 3.6–11)
WBC URNS QL MICRO: ABNORMAL /HPF (ref 0–4)

## 2024-06-30 PROCEDURE — 80053 COMPREHEN METABOLIC PANEL: CPT

## 2024-06-30 PROCEDURE — 36415 COLL VENOUS BLD VENIPUNCTURE: CPT

## 2024-06-30 PROCEDURE — 6360000002 HC RX W HCPCS: Performed by: EMERGENCY MEDICINE

## 2024-06-30 PROCEDURE — 85025 COMPLETE CBC W/AUTO DIFF WBC: CPT

## 2024-06-30 PROCEDURE — 99285 EMERGENCY DEPT VISIT HI MDM: CPT

## 2024-06-30 PROCEDURE — 83690 ASSAY OF LIPASE: CPT

## 2024-06-30 PROCEDURE — 99283 EMERGENCY DEPT VISIT LOW MDM: CPT

## 2024-06-30 PROCEDURE — 93005 ELECTROCARDIOGRAM TRACING: CPT | Performed by: EMERGENCY MEDICINE

## 2024-06-30 PROCEDURE — 81001 URINALYSIS AUTO W/SCOPE: CPT

## 2024-06-30 PROCEDURE — 96360 HYDRATION IV INFUSION INIT: CPT

## 2024-06-30 PROCEDURE — 83735 ASSAY OF MAGNESIUM: CPT

## 2024-06-30 PROCEDURE — 2580000003 HC RX 258: Performed by: EMERGENCY MEDICINE

## 2024-06-30 RX ORDER — DICYCLOMINE HCL 20 MG
20 TABLET ORAL ONCE
Status: COMPLETED | OUTPATIENT
Start: 2024-06-30 | End: 2024-07-01

## 2024-06-30 RX ORDER — ONDANSETRON 2 MG/ML
4 INJECTION INTRAMUSCULAR; INTRAVENOUS EVERY 6 HOURS PRN
Status: DISCONTINUED | OUTPATIENT
Start: 2024-06-30 | End: 2024-07-01 | Stop reason: HOSPADM

## 2024-06-30 RX ORDER — 0.9 % SODIUM CHLORIDE 0.9 %
1000 INTRAVENOUS SOLUTION INTRAVENOUS ONCE
Status: COMPLETED | OUTPATIENT
Start: 2024-06-30 | End: 2024-07-01

## 2024-06-30 RX ORDER — 0.9 % SODIUM CHLORIDE 0.9 %
1000 INTRAVENOUS SOLUTION INTRAVENOUS ONCE
Status: DISCONTINUED | OUTPATIENT
Start: 2024-06-30 | End: 2024-06-30 | Stop reason: HOSPADM

## 2024-06-30 ASSESSMENT — PAIN DESCRIPTION - LOCATION
LOCATION: ABDOMEN;BACK
LOCATION: ABDOMEN
LOCATION: ABDOMEN

## 2024-06-30 ASSESSMENT — LIFESTYLE VARIABLES
HOW OFTEN DO YOU HAVE A DRINK CONTAINING ALCOHOL: NEVER
HOW MANY STANDARD DRINKS CONTAINING ALCOHOL DO YOU HAVE ON A TYPICAL DAY: PATIENT DOES NOT DRINK
HOW MANY STANDARD DRINKS CONTAINING ALCOHOL DO YOU HAVE ON A TYPICAL DAY: 1 OR 2
HOW OFTEN DO YOU HAVE A DRINK CONTAINING ALCOHOL: MONTHLY OR LESS

## 2024-06-30 ASSESSMENT — PAIN DESCRIPTION - PAIN TYPE
TYPE: ACUTE PAIN
TYPE: ACUTE PAIN

## 2024-06-30 ASSESSMENT — PAIN SCALES - GENERAL
PAINLEVEL_OUTOF10: 7

## 2024-06-30 ASSESSMENT — PAIN DESCRIPTION - ORIENTATION
ORIENTATION: MID
ORIENTATION: MID
ORIENTATION: LEFT

## 2024-06-30 ASSESSMENT — PAIN DESCRIPTION - DESCRIPTORS
DESCRIPTORS: ACHING;SHARP
DESCRIPTORS: ACHING

## 2024-06-30 ASSESSMENT — PAIN DESCRIPTION - FREQUENCY: FREQUENCY: INTERMITTENT

## 2024-06-30 ASSESSMENT — PAIN - FUNCTIONAL ASSESSMENT
PAIN_FUNCTIONAL_ASSESSMENT: 0-10
PAIN_FUNCTIONAL_ASSESSMENT: 0-10

## 2024-06-30 ASSESSMENT — PAIN DESCRIPTION - ONSET: ONSET: SUDDEN

## 2024-06-30 ASSESSMENT — PAIN DESCRIPTION - DIRECTION: RADIATING_TOWARDS: BACK

## 2024-06-30 NOTE — ED PROVIDER NOTES
off.     montelukast 10 MG tablet  Commonly known as: SINGULAIR  TAKE 1 TABLET BY MOUTH DAILY AT BEDTIME     omeprazole 20 MG delayed release capsule  Commonly known as: PRILOSEC     triamterene-hydroCHLOROthiazide 37.5-25 MG per tablet  Commonly known as: MAXZIDE-25  Take 1 tablet by mouth daily                DISCONTINUED MEDICATIONS:  Current Discharge Medication List          I am the Primary Clinician of Record.   Gini Tao MD (electronically signed)    (Please note that parts of this dictation were completed with voice recognition software. Quite often unanticipated grammatical, syntax, homophones, and other interpretive errors are inadvertently transcribed by the computer software. Please disregards these errors. Please excuse any errors that have escaped final proofreading.)         Gini Tao MD  06/30/24 2581

## 2024-06-30 NOTE — ED TRIAGE NOTES
Pt arrived with c/o left sided back and abd pain that started at 4am. She states she has been \"sick\" since she was seen here last 2 weeks ago and has had diarrhea since then. She has no chest pain or nausea

## 2024-06-30 NOTE — ED NOTES
Pt out in hallway stating she is leaving, attempted to ask what can be done to assist her but pt continues to walk out the door.

## 2024-07-01 ENCOUNTER — APPOINTMENT (OUTPATIENT)
Facility: HOSPITAL | Age: 69
End: 2024-07-01
Payer: MEDICARE

## 2024-07-01 LAB
ALBUMIN SERPL-MCNC: 3.3 G/DL (ref 3.5–5)
ALBUMIN/GLOB SERPL: 0.8 (ref 1.1–2.2)
ALP SERPL-CCNC: 97 U/L (ref 45–117)
ALT SERPL-CCNC: 20 U/L (ref 12–78)
ANION GAP SERPL CALC-SCNC: 5 MMOL/L (ref 5–15)
AST SERPL-CCNC: 20 U/L (ref 15–37)
BILIRUB SERPL-MCNC: 0.5 MG/DL (ref 0.2–1)
BUN SERPL-MCNC: 43 MG/DL (ref 6–20)
BUN/CREAT SERPL: 22 (ref 12–20)
CALCIUM SERPL-MCNC: 9.4 MG/DL (ref 8.5–10.1)
CHLORIDE SERPL-SCNC: 110 MMOL/L (ref 97–108)
CO2 SERPL-SCNC: 23 MMOL/L (ref 21–32)
CREAT SERPL-MCNC: 1.98 MG/DL (ref 0.55–1.02)
GLOBULIN SER CALC-MCNC: 4.1 G/DL (ref 2–4)
GLUCOSE SERPL-MCNC: 181 MG/DL (ref 65–100)
LIPASE SERPL-CCNC: 35 U/L (ref 13–75)
MAGNESIUM SERPL-MCNC: 2.4 MG/DL (ref 1.6–2.4)
POTASSIUM SERPL-SCNC: 4.1 MMOL/L (ref 3.5–5.1)
PROT SERPL-MCNC: 7.4 G/DL (ref 6.4–8.2)
SODIUM SERPL-SCNC: 138 MMOL/L (ref 136–145)

## 2024-07-01 PROCEDURE — 96360 HYDRATION IV INFUSION INIT: CPT

## 2024-07-01 PROCEDURE — 74177 CT ABD & PELVIS W/CONTRAST: CPT

## 2024-07-01 PROCEDURE — 6360000004 HC RX CONTRAST MEDICATION: Performed by: EMERGENCY MEDICINE

## 2024-07-01 PROCEDURE — 6370000000 HC RX 637 (ALT 250 FOR IP): Performed by: EMERGENCY MEDICINE

## 2024-07-01 PROCEDURE — 2580000003 HC RX 258: Performed by: EMERGENCY MEDICINE

## 2024-07-01 RX ORDER — DICYCLOMINE HCL 20 MG
20 TABLET ORAL 3 TIMES DAILY PRN
Qty: 24 TABLET | Refills: 0 | Status: SHIPPED | OUTPATIENT
Start: 2024-07-01

## 2024-07-01 RX ORDER — LOPERAMIDE HYDROCHLORIDE 2 MG/1
2 CAPSULE ORAL 4 TIMES DAILY PRN
Qty: 12 CAPSULE | Refills: 0 | Status: SHIPPED | OUTPATIENT
Start: 2024-07-01 | End: 2024-07-11

## 2024-07-01 RX ORDER — 0.9 % SODIUM CHLORIDE 0.9 %
1000 INTRAVENOUS SOLUTION INTRAVENOUS ONCE
Status: DISCONTINUED | OUTPATIENT
Start: 2024-07-01 | End: 2024-07-01

## 2024-07-01 RX ORDER — ONDANSETRON 8 MG/1
8 TABLET, ORALLY DISINTEGRATING ORAL EVERY 8 HOURS PRN
Qty: 15 TABLET | Refills: 0 | Status: SHIPPED | OUTPATIENT
Start: 2024-07-01

## 2024-07-01 RX ADMIN — SODIUM CHLORIDE 1000 ML: 9 INJECTION, SOLUTION INTRAVENOUS at 00:21

## 2024-07-01 RX ADMIN — DICYCLOMINE HYDROCHLORIDE 20 MG: 20 TABLET ORAL at 00:21

## 2024-07-01 RX ADMIN — IOPAMIDOL 100 ML: 755 INJECTION, SOLUTION INTRAVENOUS at 00:10

## 2024-07-01 RX ADMIN — ALUMINUM HYDROXIDE, MAGNESIUM HYDROXIDE, AND SIMETHICONE 40 ML: 1200; 120; 1200 SUSPENSION ORAL at 00:21

## 2024-07-01 NOTE — ED TRIAGE NOTES
Pt presents with abdominal pain and notes her stool has been black. Last BM was 4hrs ago. Was hospitalized 2wks ago for the same reason; received fluids for dehydration caused by excessive diarrhea. Pain extends from abdomen to back. Pain is described to be sharp, intermittent, and 7/10 on pain scale. Pt had art of colon removed ~4 years ago.

## 2024-07-01 NOTE — ED PROVIDER NOTES
37.5-25 MG per tablet  Commonly known as: MAXZIDE-25  Take 1 tablet by mouth daily               Where to Get Your Medications        These medications were sent to [facility] - [place], [place] - [address] - P [phone] - F [phone]  [address] [address]      Phone: [phone]   dicyclomine 20 MG tablet  loperamide 2 MG capsule  ondansetron 8 MG Tbdp disintegrating tablet  psyllium 60.3 % Pack powder           DISCONTINUED MEDICATIONS:  Discharge Medication List as of 7/1/2024  1:12 AM          I am the Primary Clinician of Record.   Too Orozco DO (electronically signed)    (Please note that parts of this dictation were completed with voice recognition software. Quite often unanticipated grammatical, syntax, homophones, and other interpretive errors are inadvertently transcribed by the computer software. Please disregards these errors. Please excuse any errors that have escaped final proofreading.)         Too Orozco DO  07/01/24 1615

## 2024-07-02 LAB
EKG ATRIAL RATE: 61 BPM
EKG DIAGNOSIS: NORMAL
EKG P AXIS: 27 DEGREES
EKG P-R INTERVAL: 126 MS
EKG Q-T INTERVAL: 436 MS
EKG QRS DURATION: 92 MS
EKG QTC CALCULATION (BAZETT): 438 MS
EKG R AXIS: -10 DEGREES
EKG T AXIS: -61 DEGREES
EKG VENTRICULAR RATE: 61 BPM

## 2024-08-04 NOTE — PROGRESS NOTES
1. Type 2 diabetes mellitus with other specified complication, with long-term current use of insulin ([facility])  Decrease Lantus to 20 units daily  - Hemoglobin A1C; Future    2. Benign essential hypertension  She is at goal  - CBC with Auto Differential; Future  - Comprehensive Metabolic Panel; Future    3. Diarrhea of presumed infectious origin  She has likely been exposed to Giardia and other pathogens  - Ova and Parasite Examination; Future  - Culture, Stool; Future  - Giardia / Cryptosporidum antigens; Future  - Gastrointestinal Panel, Molecular; Future    4. Type 2 diabetes mellitus with diabetic autonomic neuropathy, with long-term current use of insulin ([facility])  Decrease insulin to 10 units daily  - Gastrointestinal Panel, Molecular; Future         Chief Complaint   Patient presents with    Hypertension    Diabetes         Orders Placed This Encounter   Procedures    Ova and Parasite Examination     Standing Status:   Future     Standing Expiration Date:   8/9/2025    Culture, Stool     Standing Status:   Future     Standing Expiration Date:   8/9/2025    Gastrointestinal Panel, Molecular     Standing Status:   Future     Standing Expiration Date:   9/9/2024    CBC with Auto Differential     Standing Status:   Future     Standing Expiration Date:   8/9/2025    Hemoglobin A1C     Standing Status:   Future     Standing Expiration Date:   8/9/2025    Comprehensive Metabolic Panel     Standing Status:   Future     Standing Expiration Date:   8/9/2025    Giardia / Cryptosporidum antigens     Standing Status:   Future     Standing Expiration Date:   8/9/2025       Jeffery Alexis MD, FACP      HPI:         is a 68 y.o. female who arrives for diarrhea, decreased po intake, nausea, vomiting and diabetes.    Hemoglobin A1C   Date Value Ref Range Status   06/17/2024 5.6 4.0 - 5.6 % Final     Comment:     (NOTE)  HbA1C Interpretive Ranges  <5.7              Normal  5.7 - 6.4         Consider Prediabetes  >6.5

## 2024-08-09 ENCOUNTER — OFFICE VISIT (OUTPATIENT)
Age: 69
End: 2024-08-09
Payer: MEDICARE

## 2024-08-09 VITALS
RESPIRATION RATE: 18 BRPM | TEMPERATURE: 98.6 F | BODY MASS INDEX: 28.39 KG/M2 | WEIGHT: 170.4 LBS | SYSTOLIC BLOOD PRESSURE: 121 MMHG | DIASTOLIC BLOOD PRESSURE: 54 MMHG | OXYGEN SATURATION: 98 % | HEART RATE: 64 BPM | HEIGHT: 65 IN

## 2024-08-09 DIAGNOSIS — I10 BENIGN ESSENTIAL HYPERTENSION: ICD-10-CM

## 2024-08-09 DIAGNOSIS — Z79.4 TYPE 2 DIABETES MELLITUS WITH DIABETIC AUTONOMIC NEUROPATHY, WITH LONG-TERM CURRENT USE OF INSULIN (HCC): ICD-10-CM

## 2024-08-09 DIAGNOSIS — R19.7 DIARRHEA OF PRESUMED INFECTIOUS ORIGIN: ICD-10-CM

## 2024-08-09 DIAGNOSIS — E11.69 TYPE 2 DIABETES MELLITUS WITH OTHER SPECIFIED COMPLICATION, WITH LONG-TERM CURRENT USE OF INSULIN (HCC): Primary | ICD-10-CM

## 2024-08-09 DIAGNOSIS — Z79.4 TYPE 2 DIABETES MELLITUS WITH OTHER SPECIFIED COMPLICATION, WITH LONG-TERM CURRENT USE OF INSULIN (HCC): Primary | ICD-10-CM

## 2024-08-09 DIAGNOSIS — E11.43 TYPE 2 DIABETES MELLITUS WITH DIABETIC AUTONOMIC NEUROPATHY, WITH LONG-TERM CURRENT USE OF INSULIN (HCC): ICD-10-CM

## 2024-08-09 LAB
ALBUMIN SERPL-MCNC: 3.4 G/DL (ref 3.5–5)
ALBUMIN/GLOB SERPL: 1 (ref 1.1–2.2)
ALP SERPL-CCNC: 98 U/L (ref 45–117)
ALT SERPL-CCNC: 17 U/L (ref 12–78)
ANION GAP SERPL CALC-SCNC: 2 MMOL/L (ref 5–15)
AST SERPL-CCNC: 8 U/L (ref 15–37)
BASOPHILS # BLD: 0 K/UL (ref 0–0.1)
BASOPHILS NFR BLD: 1 % (ref 0–1)
BILIRUB SERPL-MCNC: 0.1 MG/DL (ref 0.2–1)
BUN SERPL-MCNC: 24 MG/DL (ref 6–20)
BUN/CREAT SERPL: 16 (ref 12–20)
CALCIUM SERPL-MCNC: 9.5 MG/DL (ref 8.5–10.1)
CHLORIDE SERPL-SCNC: 113 MMOL/L (ref 97–108)
CO2 SERPL-SCNC: 27 MMOL/L (ref 21–32)
CREAT SERPL-MCNC: 1.49 MG/DL (ref 0.55–1.02)
DIFFERENTIAL METHOD BLD: ABNORMAL
EOSINOPHIL # BLD: 0.2 K/UL (ref 0–0.4)
EOSINOPHIL NFR BLD: 4 % (ref 0–7)
ERYTHROCYTE [DISTWIDTH] IN BLOOD BY AUTOMATED COUNT: 18.6 % (ref 11.5–14.5)
EST. AVERAGE GLUCOSE BLD GHB EST-MCNC: 126 MG/DL
GLOBULIN SER CALC-MCNC: 3.4 G/DL (ref 2–4)
GLUCOSE SERPL-MCNC: 137 MG/DL (ref 65–100)
HBA1C MFR BLD: 6 % (ref 4–5.6)
HCT VFR BLD AUTO: 26.7 % (ref 35–47)
HGB BLD-MCNC: 7.9 G/DL (ref 11.5–16)
IMM GRANULOCYTES # BLD AUTO: 0 K/UL (ref 0–0.04)
IMM GRANULOCYTES NFR BLD AUTO: 0 % (ref 0–0.5)
LYMPHOCYTES # BLD: 1.1 K/UL (ref 0.8–3.5)
LYMPHOCYTES NFR BLD: 22 % (ref 12–49)
MCH RBC QN AUTO: 24.2 PG (ref 26–34)
MCHC RBC AUTO-ENTMCNC: 29.6 G/DL (ref 30–36.5)
MCV RBC AUTO: 81.9 FL (ref 80–99)
MONOCYTES # BLD: 0.4 K/UL (ref 0–1)
MONOCYTES NFR BLD: 8 % (ref 5–13)
NEUTS SEG # BLD: 3.2 K/UL (ref 1.8–8)
NEUTS SEG NFR BLD: 65 % (ref 32–75)
NRBC # BLD: 0 K/UL (ref 0–0.01)
NRBC BLD-RTO: 0 PER 100 WBC
PLATELET # BLD AUTO: 256 K/UL (ref 150–400)
PMV BLD AUTO: 12.4 FL (ref 8.9–12.9)
POTASSIUM SERPL-SCNC: 4.3 MMOL/L (ref 3.5–5.1)
PROT SERPL-MCNC: 6.8 G/DL (ref 6.4–8.2)
RBC # BLD AUTO: 3.26 M/UL (ref 3.8–5.2)
SODIUM SERPL-SCNC: 142 MMOL/L (ref 136–145)
WBC # BLD AUTO: 5 K/UL (ref 3.6–11)

## 2024-08-09 PROCEDURE — 3078F DIAST BP <80 MM HG: CPT | Performed by: INTERNAL MEDICINE

## 2024-08-09 PROCEDURE — 1123F ACP DISCUSS/DSCN MKR DOCD: CPT | Performed by: INTERNAL MEDICINE

## 2024-08-09 PROCEDURE — 3074F SYST BP LT 130 MM HG: CPT | Performed by: INTERNAL MEDICINE

## 2024-08-09 PROCEDURE — 3044F HG A1C LEVEL LT 7.0%: CPT | Performed by: INTERNAL MEDICINE

## 2024-08-09 PROCEDURE — 99214 OFFICE O/P EST MOD 30 MIN: CPT | Performed by: INTERNAL MEDICINE

## 2024-08-09 SDOH — ECONOMIC STABILITY: FOOD INSECURITY: WITHIN THE PAST 12 MONTHS, YOU WORRIED THAT YOUR FOOD WOULD RUN OUT BEFORE YOU GOT MONEY TO BUY MORE.: NEVER TRUE

## 2024-08-09 SDOH — ECONOMIC STABILITY: FOOD INSECURITY: WITHIN THE PAST 12 MONTHS, THE FOOD YOU BOUGHT JUST DIDN'T LAST AND YOU DIDN'T HAVE MONEY TO GET MORE.: NEVER TRUE

## 2024-08-09 SDOH — ECONOMIC STABILITY: INCOME INSECURITY: HOW HARD IS IT FOR YOU TO PAY FOR THE VERY BASICS LIKE FOOD, HOUSING, MEDICAL CARE, AND HEATING?: NOT HARD AT ALL

## 2024-08-09 ASSESSMENT — PATIENT HEALTH QUESTIONNAIRE - PHQ9
SUM OF ALL RESPONSES TO PHQ QUESTIONS 1-9: 0
1. LITTLE INTEREST OR PLEASURE IN DOING THINGS: NOT AT ALL
SUM OF ALL RESPONSES TO PHQ9 QUESTIONS 1 & 2: 0
SUM OF ALL RESPONSES TO PHQ QUESTIONS 1-9: 0
2. FEELING DOWN, DEPRESSED OR HOPELESS: NOT AT ALL
SUM OF ALL RESPONSES TO PHQ QUESTIONS 1-9: 0
SUM OF ALL RESPONSES TO PHQ QUESTIONS 1-9: 0

## 2024-08-09 NOTE — PROGRESS NOTES
Hyacinth Patterson is a 68 y.o. female presenting for/with:    Chief Complaint   Patient presents with    Hypertension    Diabetes       Vitals:    08/09/24 0741   BP: (!) 121/54   Site: Right Upper Arm   Position: Sitting   Cuff Size: Medium Adult   Pulse: 64   Resp: 18   Temp: 98.6 °F (37 °C)   TempSrc: Temporal   SpO2: 98%   Weight: 77.3 kg (170 lb 6.4 oz)   Height: 1.651 m (5' 5\")       Pain Scale: 0 - No pain/10  Pain Location:     \"Have you been to the ER, urgent care clinic since your last visit?  Hospitalized since your last visit?\"    NO    “Have you seen or consulted any other health care providers outside of [facility] since your last visit?”    NO                 8/9/2024     7:39 AM   PHQ-9    Little interest or pleasure in doing things 0   Feeling down, depressed, or hopeless 0   PHQ-2 Score 0   PHQ-9 Total Score 0           5/7/2024     9:20 AM 4/17/2024     9:00 AM 1/12/2024     7:30 AM 9/21/2023     3:00 PM 6/16/2023    10:00 AM 3/15/2023    12:00 AM 1/30/2023    12:00 AM   [facility] AMB LEARNING ASSESSMENT   Primary Learner Patient Patient Patient Patient Patient Patient Patient   co-learner caregiver    No  No No   Primary Language ENGLISH ENGLISH ENGLISH ENGLISH ENGLISH ENGLISH ENGLISH   Learning Preference DEMONSTRATION DEMONSTRATION PICTURES DEMONSTRATION DEMONSTRATION DEMONSTRATION DEMONSTRATION   Answered By pt pt self pt pt pt pt   Relationship to Learner SELF SELF SELF SELF SELF SELF SELF            8/9/2024     7:39 AM   Amb Fall Risk Assessment and TUG Test   Do you feel unsteady or are you worried about falling?  yes   2 or more falls in past year? no   Fall with injury in past year? no           8/9/2024     7:00 AM 6/20/2024     2:00 PM 6/7/2024     7:00 AM 1/12/2024     7:00 AM 6/29/2023     2:00 PM   ADL ASSESSMENT   Feeding yourself No Help Needed No Help Needed No Help Needed No Help Needed No Help Needed   Getting from bed to chair No Help Needed No Help Needed No Help Needed

## 2024-08-12 RX ORDER — MONTELUKAST SODIUM 10 MG/1
TABLET ORAL
Qty: 30 TABLET | Refills: 3 | Status: SHIPPED | OUTPATIENT
Start: 2024-08-12

## 2024-08-13 ENCOUNTER — TELEPHONE (OUTPATIENT)
Age: 69
End: 2024-08-13

## 2024-08-13 LAB
CRYPTOSP AG STL QL: NEGATIVE
G LAMBLIA AG STL QL: NEGATIVE

## 2024-08-15 LAB
O+P SPEC MICRO: NORMAL
O+P STL CONC: NORMAL
SPECIMEN SOURCE: NORMAL

## 2024-08-16 LAB

## 2024-08-17 LAB
BACTERIA SPEC CULT: NORMAL
CAMPYLOBACTER STL CULT: NORMAL
E COLI SXT STL QL IA: NEGATIVE
SALMONELLA/SHIGELLA SCREEN: NORMAL
SPECIMEN SOURCE: NORMAL

## 2024-08-22 ENCOUNTER — HOSPITAL ENCOUNTER (OUTPATIENT)
Facility: HOSPITAL | Age: 69
Discharge: HOME | End: 2024-08-24
Attending: INTERNAL MEDICINE
Payer: MEDICARE

## 2024-08-22 VITALS — BODY MASS INDEX: 28.32 KG/M2 | WEIGHT: 170 LBS | HEIGHT: 65 IN

## 2024-08-22 DIAGNOSIS — I25.5 ISCHEMIC CARDIOMYOPATHY: ICD-10-CM

## 2024-08-22 PROBLEM — D50.0 ANEMIA DUE TO CHRONIC BLOOD LOSS: Status: RESOLVED | Noted: 2021-04-21 | Resolved: 2024-08-22

## 2024-08-22 PROBLEM — I25.10 CORONARY ARTERY DISEASE INVOLVING NATIVE CORONARY ARTERY OF NATIVE HEART WITHOUT ANGINA PECTORIS: Status: ACTIVE | Noted: 2024-08-22

## 2024-08-22 PROBLEM — N18.32 STAGE 3B CHRONIC KIDNEY DISEASE (HCC): Status: ACTIVE | Noted: 2024-08-22

## 2024-08-22 PROBLEM — E78.00 HYPERCHOLESTEROLEMIA: Status: ACTIVE | Noted: 2024-08-22

## 2024-08-22 PROBLEM — Z72.0 TOBACCO ABUSE: Status: ACTIVE | Noted: 2024-08-22

## 2024-08-22 PROBLEM — I73.9 PAD (PERIPHERAL ARTERY DISEASE) (HCC): Status: ACTIVE | Noted: 2024-08-22

## 2024-08-22 PROBLEM — K52.9 GASTROENTERITIS: Status: RESOLVED | Noted: 2024-06-17 | Resolved: 2024-08-22

## 2024-08-22 PROCEDURE — 93308 TTE F-UP OR LMTD: CPT
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
## 2024-08-23 LAB
ECHO AO ROOT DIAM: 3.1 CM
ECHO AO ROOT INDEX: 1.68 CM/M2
ECHO AV AREA PEAK VELOCITY: 2.4 CM2
ECHO AV AREA/BSA PEAK VELOCITY: 1.3 CM2/M2
ECHO AV PEAK GRADIENT: 10 MMHG
ECHO AV PEAK VELOCITY: 1.6 M/S
ECHO AV VELOCITY RATIO: 0.75
ECHO BSA: 1.88 M2
ECHO EST RA PRESSURE: 8 MMHG
ECHO LA DIAMETER INDEX: 2.16 CM/M2
ECHO LA DIAMETER: 4 CM
ECHO LA TO AORTIC ROOT RATIO: 1.29
ECHO LV EF PHYSICIAN: 40 %
ECHO LV FRACTIONAL SHORTENING: 18 % (ref 28–44)
ECHO LV INTERNAL DIMENSION DIASTOLE INDEX: 2.65 CM/M2
ECHO LV INTERNAL DIMENSION DIASTOLIC: 4.9 CM (ref 3.9–5.3)
ECHO LV INTERNAL DIMENSION SYSTOLIC INDEX: 2.16 CM/M2
ECHO LV INTERNAL DIMENSION SYSTOLIC: 4 CM
ECHO LV IVSD: 1.1 CM (ref 0.6–0.9)
ECHO LV MASS 2D: 200.5 G (ref 67–162)
ECHO LV MASS INDEX 2D: 108.4 G/M2 (ref 43–95)
ECHO LV POSTERIOR WALL DIASTOLIC: 1.1 CM (ref 0.6–0.9)
ECHO LV RELATIVE WALL THICKNESS RATIO: 0.45
ECHO LVOT AREA: 3.1 CM2
ECHO LVOT DIAM: 2 CM
ECHO LVOT PEAK GRADIENT: 6 MMHG
ECHO LVOT PEAK VELOCITY: 1.2 M/S
ECHO RIGHT VENTRICULAR SYSTOLIC PRESSURE (RVSP): 32 MMHG
ECHO TV REGURGITANT MAX VELOCITY: 2.44 M/S
ECHO TV REGURGITANT PEAK GRADIENT: 24 MMHG

## 2024-08-29 ENCOUNTER — OFFICE VISIT (OUTPATIENT)
Age: 69
End: 2024-08-29
Payer: MEDICARE

## 2024-08-29 ENCOUNTER — LAB (OUTPATIENT)
Age: 69
End: 2024-08-29

## 2024-08-29 VITALS
WEIGHT: 169 LBS | DIASTOLIC BLOOD PRESSURE: 60 MMHG | BODY MASS INDEX: 28.16 KG/M2 | TEMPERATURE: 98.2 F | HEART RATE: 70 BPM | SYSTOLIC BLOOD PRESSURE: 104 MMHG | HEIGHT: 65 IN | RESPIRATION RATE: 18 BRPM | OXYGEN SATURATION: 98 %

## 2024-08-29 DIAGNOSIS — E78.00 HYPERCHOLESTEROLEMIA: ICD-10-CM

## 2024-08-29 DIAGNOSIS — N18.32 STAGE 3B CHRONIC KIDNEY DISEASE (HCC): ICD-10-CM

## 2024-08-29 DIAGNOSIS — I73.9 PAD (PERIPHERAL ARTERY DISEASE) (HCC): ICD-10-CM

## 2024-08-29 DIAGNOSIS — I25.5 ISCHEMIC CARDIOMYOPATHY: ICD-10-CM

## 2024-08-29 DIAGNOSIS — I25.10 CORONARY ARTERY DISEASE INVOLVING NATIVE CORONARY ARTERY OF NATIVE HEART WITHOUT ANGINA PECTORIS: Primary | ICD-10-CM

## 2024-08-29 DIAGNOSIS — D50.9 IRON DEFICIENCY ANEMIA, UNSPECIFIED IRON DEFICIENCY ANEMIA TYPE: Primary | ICD-10-CM

## 2024-08-29 PROCEDURE — 99214 OFFICE O/P EST MOD 30 MIN: CPT | Performed by: INTERNAL MEDICINE

## 2024-08-29 PROCEDURE — 1123F ACP DISCUSS/DSCN MKR DOCD: CPT | Performed by: INTERNAL MEDICINE

## 2024-08-29 PROCEDURE — 3078F DIAST BP <80 MM HG: CPT | Performed by: INTERNAL MEDICINE

## 2024-08-29 PROCEDURE — 3074F SYST BP LT 130 MM HG: CPT | Performed by: INTERNAL MEDICINE

## 2024-08-29 ASSESSMENT — PATIENT HEALTH QUESTIONNAIRE - PHQ9
SUM OF ALL RESPONSES TO PHQ QUESTIONS 1-9: 0
2. FEELING DOWN, DEPRESSED OR HOPELESS: NOT AT ALL
SUM OF ALL RESPONSES TO PHQ9 QUESTIONS 1 & 2: 0
SUM OF ALL RESPONSES TO PHQ QUESTIONS 1-9: 0
1. LITTLE INTEREST OR PLEASURE IN DOING THINGS: NOT AT ALL

## 2024-08-29 NOTE — PATIENT INSTRUCTIONS
Please discontinue the isosorbide mononitrate to see if this helps with the intermittent lightheadedness you are experiencing.

## 2024-08-29 NOTE — PROGRESS NOTES
Identified pt with two pt identifiers(name and ). Reviewed record in preparation for visit and have obtained necessary documentation.  Chief Complaint   Patient presents with    Coronary Artery Disease    Cardiomyopathy    Cholesterol Problem    Hypertension      /60 (Site: Left Upper Arm, Position: Sitting, Cuff Size: Medium Adult)   Pulse 70   Temp 98.2 °F (36.8 °C) (Temporal)   Resp 18   Ht 1.651 m (5' 5\")   Wt 76.7 kg (169 lb)   LMP  (LMP Unknown)   SpO2 98%   BMI 28.12 kg/m²       Medications reviewed/approved by provider.      Health Maintenance Review: Patient reminded of \"due or due soon\" health maintenance. I have asked the patient to contact his/her primary care provider (PCP) for follow-up on his/her health maintenance.    Coordination of Care Questionnaire:  :   1) Have you been to an emergency room, urgent care, or hospitalized since your last visit?  If yes, where when, and reason for visit? no       2. Have seen or consulted any other health care provider since your last visit?   If yes, where when, and reason for visit?  no      Patient is accompanied by SELF I have received verbal consent from Hyacinth Patterson to discuss any/all medical information while they are present in the room.

## 2024-08-30 LAB
BASOPHILS # BLD: 0.1 K/UL (ref 0–0.1)
BASOPHILS NFR BLD: 1 % (ref 0–1)
DIFFERENTIAL METHOD BLD: ABNORMAL
EOSINOPHIL # BLD: 0.2 K/UL (ref 0–0.4)
EOSINOPHIL NFR BLD: 4 % (ref 0–7)
ERYTHROCYTE [DISTWIDTH] IN BLOOD BY AUTOMATED COUNT: 20.5 % (ref 11.5–14.5)
HCT VFR BLD AUTO: 30.6 % (ref 35–47)
HGB BLD-MCNC: 8.6 G/DL (ref 11.5–16)
IMM GRANULOCYTES # BLD AUTO: 0 K/UL (ref 0–0.04)
IMM GRANULOCYTES NFR BLD AUTO: 0 % (ref 0–0.5)
LYMPHOCYTES # BLD: 1.6 K/UL (ref 0.8–3.5)
LYMPHOCYTES NFR BLD: 27 % (ref 12–49)
MCH RBC QN AUTO: 24.2 PG (ref 26–34)
MCHC RBC AUTO-ENTMCNC: 28.1 G/DL (ref 30–36.5)
MCV RBC AUTO: 86 FL (ref 80–99)
MONOCYTES # BLD: 0.5 K/UL (ref 0–1)
MONOCYTES NFR BLD: 8 % (ref 5–13)
NEUTS SEG # BLD: 3.5 K/UL (ref 1.8–8)
NEUTS SEG NFR BLD: 60 % (ref 32–75)
NRBC # BLD: 0 K/UL (ref 0–0.01)
NRBC BLD-RTO: 0 PER 100 WBC
PLATELET # BLD AUTO: 210 K/UL (ref 150–400)
PMV BLD AUTO: 12.3 FL (ref 8.9–12.9)
RBC # BLD AUTO: 3.56 M/UL (ref 3.8–5.2)
RBC MORPH BLD: ABNORMAL
WBC # BLD AUTO: 5.9 K/UL (ref 3.6–11)

## 2024-09-10 RX ORDER — CLOPIDOGREL BISULFATE 75 MG/1
75 TABLET ORAL DAILY
Qty: 30 TABLET | Refills: 5 | Status: SHIPPED | OUTPATIENT
Start: 2024-09-10

## 2024-09-19 ENCOUNTER — OFFICE VISIT (OUTPATIENT)
Age: 69
End: 2024-09-19
Payer: MEDICARE

## 2024-09-19 VITALS
BODY MASS INDEX: 27.63 KG/M2 | RESPIRATION RATE: 18 BRPM | HEIGHT: 65 IN | WEIGHT: 165.8 LBS | SYSTOLIC BLOOD PRESSURE: 149 MMHG | OXYGEN SATURATION: 97 % | HEART RATE: 76 BPM | TEMPERATURE: 98.4 F | DIASTOLIC BLOOD PRESSURE: 64 MMHG

## 2024-09-19 DIAGNOSIS — I73.9 PAD (PERIPHERAL ARTERY DISEASE) (HCC): ICD-10-CM

## 2024-09-19 DIAGNOSIS — I25.10 CORONARY ARTERY DISEASE INVOLVING NATIVE CORONARY ARTERY OF NATIVE HEART WITHOUT ANGINA PECTORIS: ICD-10-CM

## 2024-09-19 DIAGNOSIS — I10 BENIGN ESSENTIAL HYPERTENSION: ICD-10-CM

## 2024-09-19 DIAGNOSIS — E11.69 TYPE 2 DIABETES MELLITUS WITH OTHER SPECIFIED COMPLICATION, WITH LONG-TERM CURRENT USE OF INSULIN (HCC): Primary | ICD-10-CM

## 2024-09-19 DIAGNOSIS — Z79.4 TYPE 2 DIABETES MELLITUS WITH OTHER SPECIFIED COMPLICATION, WITH LONG-TERM CURRENT USE OF INSULIN (HCC): Primary | ICD-10-CM

## 2024-09-19 PROCEDURE — 99214 OFFICE O/P EST MOD 30 MIN: CPT | Performed by: INTERNAL MEDICINE

## 2024-09-19 PROCEDURE — 3044F HG A1C LEVEL LT 7.0%: CPT | Performed by: INTERNAL MEDICINE

## 2024-09-19 PROCEDURE — 3077F SYST BP >= 140 MM HG: CPT | Performed by: INTERNAL MEDICINE

## 2024-09-19 PROCEDURE — 1123F ACP DISCUSS/DSCN MKR DOCD: CPT | Performed by: INTERNAL MEDICINE

## 2024-09-19 PROCEDURE — 3078F DIAST BP <80 MM HG: CPT | Performed by: INTERNAL MEDICINE

## 2024-09-19 ASSESSMENT — PATIENT HEALTH QUESTIONNAIRE - PHQ9
SUM OF ALL RESPONSES TO PHQ QUESTIONS 1-9: 0
2. FEELING DOWN, DEPRESSED OR HOPELESS: NOT AT ALL
SUM OF ALL RESPONSES TO PHQ9 QUESTIONS 1 & 2: 0
1. LITTLE INTEREST OR PLEASURE IN DOING THINGS: NOT AT ALL
SUM OF ALL RESPONSES TO PHQ QUESTIONS 1-9: 0

## 2024-09-26 ENCOUNTER — TELEPHONE (OUTPATIENT)
Age: 69
End: 2024-09-26

## 2024-09-27 RX ORDER — ATORVASTATIN CALCIUM 80 MG/1
80 TABLET, FILM COATED ORAL DAILY
Qty: 90 TABLET | Refills: 1 | Status: SHIPPED | OUTPATIENT
Start: 2024-09-27

## 2024-10-08 ENCOUNTER — OFFICE VISIT (OUTPATIENT)
Age: 69
End: 2024-10-08
Payer: MEDICARE

## 2024-10-08 VITALS
HEART RATE: 68 BPM | TEMPERATURE: 97 F | SYSTOLIC BLOOD PRESSURE: 112 MMHG | OXYGEN SATURATION: 100 % | BODY MASS INDEX: 28.06 KG/M2 | HEIGHT: 65 IN | DIASTOLIC BLOOD PRESSURE: 60 MMHG | WEIGHT: 168.4 LBS

## 2024-10-08 DIAGNOSIS — G47.33 OBSTRUCTIVE SLEEP APNEA (ADULT) (PEDIATRIC): Primary | ICD-10-CM

## 2024-10-08 PROCEDURE — 3078F DIAST BP <80 MM HG: CPT | Performed by: INTERNAL MEDICINE

## 2024-10-08 PROCEDURE — 1123F ACP DISCUSS/DSCN MKR DOCD: CPT | Performed by: INTERNAL MEDICINE

## 2024-10-08 PROCEDURE — 3074F SYST BP LT 130 MM HG: CPT | Performed by: INTERNAL MEDICINE

## 2024-10-08 PROCEDURE — 99204 OFFICE O/P NEW MOD 45 MIN: CPT | Performed by: INTERNAL MEDICINE

## 2024-10-08 ASSESSMENT — SLEEP AND FATIGUE QUESTIONNAIRES
HOW LIKELY ARE YOU TO NOD OFF OR FALL ASLEEP WHILE WATCHING TV: WOULD NEVER DOZE
ESS TOTAL SCORE: 0
HOW LIKELY ARE YOU TO NOD OFF OR FALL ASLEEP WHILE SITTING AND READING: WOULD NEVER DOZE
HOW LIKELY ARE YOU TO NOD OFF OR FALL ASLEEP WHEN YOU ARE A PASSENGER IN A CAR FOR AN HOUR WITHOUT A BREAK: WOULD NEVER DOZE
HOW LIKELY ARE YOU TO NOD OFF OR FALL ASLEEP WHILE SITTING AND TALKING TO SOMEONE: WOULD NEVER DOZE
HOW LIKELY ARE YOU TO NOD OFF OR FALL ASLEEP IN A CAR, WHILE STOPPED FOR A FEW MINUTES IN TRAFFIC: WOULD NEVER DOZE
HOW LIKELY ARE YOU TO NOD OFF OR FALL ASLEEP WHILE LYING DOWN TO REST IN THE AFTERNOON WHEN CIRCUMSTANCES PERMIT: WOULD NEVER DOZE
HOW LIKELY ARE YOU TO NOD OFF OR FALL ASLEEP WHILE SITTING QUIETLY AFTER LUNCH WITHOUT ALCOHOL: WOULD NEVER DOZE
HOW LIKELY ARE YOU TO NOD OFF OR FALL ASLEEP WHILE SITTING INACTIVE IN A PUBLIC PLACE: WOULD NEVER DOZE

## 2024-10-08 NOTE — PATIENT INSTRUCTIONS
[address]  [place] [address]  Tel.  [phone]  Fax. [phone] [address]  [place] [address]  Tel.  [phone]  Fax. [phone] [address] [place] [address]  Tel.  [phone]  Fax. [phone]     Sleep Apnea: After Your Visit  Your Care Instructions  Sleep apnea occurs when you frequently stop breathing for 10 seconds or longer during sleep. It can be mild to severe, based on the number of times per hour that you stop breathing or have slowed breathing. Blocked or narrowed airways in your nose, mouth, or throat can cause sleep apnea. Your airway can become blocked when your throat muscles and tongue relax during sleep.  Sleep apnea is common, occurring in 1 out of 20 individuals.  Individuals having any of the following characteristics should be evaluated and treated right away due to high risk and detrimental consequences from untreated sleep apnea:  Obesity  Congestive Heart failure  Atrial Fibrillation  Uncontrolled Hypertension  Type II Diabetes  Night-time Arrhythmias  Stroke  Pulmonary Hypertension  High-risk Driving Populations (pilots, truck drivers, etc.)  Patients Considering Weight-loss Surgery    How do you know you have sleep apnea?  You probably have sleep apnea if you answer 'yes' to 3 or more of the following questions:  S - Have you been told that you Snore?   T - Are you often Tired during the day?  O - Has anyone Observed you stop breathing while sleeping?  P- Do you have (or are being treated for) high blood Pressure?    B - Are you obese (Body Mass Index > 35)?  A - Is your Age 50 years old or older?  N - Is your Neck size greater than 16 inches?  G - Are you male Gender?  A sleep physician can prescribe a breathing device that prevents tissues in the throat from blocking your airway. Or your doctor may recommend using a dental device (oral breathing device) to help keep your airway open. In some cases, surgery may
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
## 2024-10-10 RX ORDER — CARVEDILOL 12.5 MG/1
12.5 TABLET ORAL 2 TIMES DAILY
Qty: 60 TABLET | Refills: 5 | Status: SHIPPED | OUTPATIENT
Start: 2024-10-10

## 2024-10-10 NOTE — TELEPHONE ENCOUNTER
PCP: Jeffery Alexis MD    Last appt: 8/29/2024   Future Appointments   Date Time Provider Department Center   10/17/2024  7:10 AM Jeffery Alexis MD [facility] MAIN [facility]   10/21/2024  8:00 PM [facility] SLEEP RM 1 RGHSLEEP [facility]   3/13/2025 10:40 AM José Miguel Mayo MD [facility] BS AMB       Requested Prescriptions     Signed Prescriptions Disp Refills    carvedilol (COREG) 12.5 MG tablet 60 tablet 5     Sig: Take 1 tablet by mouth 2 times daily     Authorizing Provider: JOSÉ MIGUEL MAYO     Ordering User: DEVORAH LUCAS         Other Comments:  Verbal order per provider.  Order (medication, dose, route, frequency, amount, refills) repeated and verified twice.

## 2024-10-10 NOTE — PROGRESS NOTES
for interval assessment of low blood sugar after a recent reduction in her long-acting insulin to 15 units a day.  She still reports episodes of weakness and blood glucose readings as low as 85.    Her blood pressure remains well-controlled.    She is in the midst of a GI workup and needs stool studies and labs.  A CT scan is in the works.        Allergies   Allergen Reactions    Lisinopril Swelling, Other (See Comments) and Headaches    Morphine Hives, Itching, Rash and Other (See Comments)    Tramadol Itching and Other (See Comments)     Funny feeling    Dapagliflozin Other (See Comments)     Yeast infection    Yeast infection    Yeast infection    Gabapentin Itching and Other (See Comments)     Blurry vision       Outpatient Encounter Medications as of 10/17/2024   Medication Sig Dispense Refill    insulin glargine (LANTUS SOLOSTAR) 100 UNIT/ML injection pen Inject 15 Units into the skin daily 15 mL 5    carvedilol (COREG) 12.5 MG tablet Take 1 tablet by mouth 2 times daily 60 tablet 5    clopidogrel (PLAVIX) 75 MG tablet Take 1 tablet by mouth daily 30 tablet 5    montelukast (SINGULAIR) 10 MG tablet TAKE 1 TABLET BY MOUTH DAILY AT BEDTIME 30 tablet 3    dicyclomine (BENTYL) 20 MG tablet Take 1 tablet by mouth 3 times daily as needed (Abdominal cramping) 24 tablet 0    ondansetron (ZOFRAN-ODT) 8 MG TBDP disintegrating tablet Place 1 tablet under the tongue every 8 hours as needed for Nausea or Vomiting 15 tablet 0    psyllium (KONSYL) 60.3 % PACK powder Take 1 packet by mouth 2 times daily 30 each 0    triamterene-hydroCHLOROthiazide (MAXZIDE-25) 37.5-25 MG per tablet Take 1 tablet by mouth daily 90 tablet 1    candesartan (ATACAND) 16 MG tablet Take 1 tablet by mouth daily 30 tablet 5    omeprazole (PRILOSEC) 20 MG delayed release capsule Take 1 capsule by mouth every morning      aspirin 81 MG EC tablet Take 1 tablet by mouth daily      fluticasone-salmeterol (ADVAIR) 250-50 MCG/ACT AEPB diskus inhaler Inhale

## 2024-10-11 NOTE — RESULT ENCOUNTER NOTE
Labs look great with the exception of a potassium of 3.3. Contacted Mrs. Yvonne Dobbins today and she says that she is taking her potassium once a day. Will increase this to 20 mEq twice a day and reevaluate at her next visit. Patient is calling to request labs.  PSR reviewed labs and no order exists.  Patient would like to speak with the nurse to determine if appropriate.    Labs requested: PSA    Patient stated that he didn't have on during his last physical and wants to have one done before his follow up visit on 10/28/24

## 2024-10-17 ENCOUNTER — OFFICE VISIT (OUTPATIENT)
Age: 69
End: 2024-10-17
Payer: MEDICARE

## 2024-10-17 VITALS
OXYGEN SATURATION: 100 % | WEIGHT: 162.8 LBS | RESPIRATION RATE: 18 BRPM | SYSTOLIC BLOOD PRESSURE: 110 MMHG | HEART RATE: 73 BPM | HEIGHT: 65 IN | BODY MASS INDEX: 27.12 KG/M2 | TEMPERATURE: 98.3 F | DIASTOLIC BLOOD PRESSURE: 64 MMHG

## 2024-10-17 DIAGNOSIS — I10 BENIGN ESSENTIAL HYPERTENSION: Primary | ICD-10-CM

## 2024-10-17 DIAGNOSIS — I10 ESSENTIAL (PRIMARY) HYPERTENSION: ICD-10-CM

## 2024-10-17 DIAGNOSIS — R19.7 DIARRHEA OF PRESUMED INFECTIOUS ORIGIN: ICD-10-CM

## 2024-10-17 DIAGNOSIS — Z79.4 TYPE 2 DIABETES MELLITUS WITH OTHER SPECIFIED COMPLICATION, WITH LONG-TERM CURRENT USE OF INSULIN (HCC): ICD-10-CM

## 2024-10-17 DIAGNOSIS — I42.9 CARDIOMYOPATHY, UNSPECIFIED TYPE (HCC): ICD-10-CM

## 2024-10-17 DIAGNOSIS — Z23 NEEDS FLU SHOT: ICD-10-CM

## 2024-10-17 DIAGNOSIS — E08.43 DIABETES MELLITUS DUE TO UNDERLYING CONDITION WITH DIABETIC AUTONOMIC NEUROPATHY, WITH LONG-TERM CURRENT USE OF INSULIN (HCC): ICD-10-CM

## 2024-10-17 DIAGNOSIS — Z79.4 DIABETES MELLITUS DUE TO UNDERLYING CONDITION WITH DIABETIC AUTONOMIC NEUROPATHY, WITH LONG-TERM CURRENT USE OF INSULIN (HCC): ICD-10-CM

## 2024-10-17 DIAGNOSIS — E11.69 TYPE 2 DIABETES MELLITUS WITH OTHER SPECIFIED COMPLICATION, WITH LONG-TERM CURRENT USE OF INSULIN (HCC): ICD-10-CM

## 2024-10-17 PROCEDURE — 3074F SYST BP LT 130 MM HG: CPT | Performed by: INTERNAL MEDICINE

## 2024-10-17 PROCEDURE — 3044F HG A1C LEVEL LT 7.0%: CPT | Performed by: INTERNAL MEDICINE

## 2024-10-17 PROCEDURE — 1123F ACP DISCUSS/DSCN MKR DOCD: CPT | Performed by: INTERNAL MEDICINE

## 2024-10-17 PROCEDURE — 90653 IIV ADJUVANT VACCINE IM: CPT | Performed by: INTERNAL MEDICINE

## 2024-10-17 PROCEDURE — 3078F DIAST BP <80 MM HG: CPT | Performed by: INTERNAL MEDICINE

## 2024-10-17 PROCEDURE — 99214 OFFICE O/P EST MOD 30 MIN: CPT | Performed by: INTERNAL MEDICINE

## 2024-10-17 PROCEDURE — G0008 ADMIN INFLUENZA VIRUS VAC: HCPCS | Performed by: INTERNAL MEDICINE

## 2024-10-17 RX ORDER — INSULIN GLARGINE 100 [IU]/ML
15 INJECTION, SOLUTION SUBCUTANEOUS DAILY
Qty: 15 ML | Refills: 5
Start: 2024-10-17

## 2024-10-17 NOTE — PROGRESS NOTES
Needed No Help Needed   Getting from bed to chair No Help Needed No Help Needed No Help Needed No Help Needed No Help Needed No Help Needed No Help Needed   Getting dressed No Help Needed No Help Needed No Help Needed No Help Needed No Help Needed No Help Needed No Help Needed   Bathing or showering No Help Needed No Help Needed No Help Needed No Help Needed No Help Needed No Help Needed No Help Needed   Walk across the room (includes cane/walker) No Help Needed No Help Needed No Help Needed No Help Needed No Help Needed No Help Needed No Help Needed   Using the telphone No Help Needed No Help Needed No Help Needed No Help Needed No Help Needed No Help Needed No Help Needed   Taking your medications No Help Needed No Help Needed No Help Needed No Help Needed No Help Needed No Help Needed No Help Needed   Preparing meals No Help Needed No Help Needed No Help Needed No Help Needed No Help Needed No Help Needed No Help Needed   Managing money (expenses/bills) No Help Needed No Help Needed No Help Needed No Help Needed No Help Needed No Help Needed No Help Needed   Moderately strenuous housework (laundry) No Help Needed No Help Needed No Help Needed No Help Needed No Help Needed No Help Needed No Help Needed   Shopping for personal items (toiletries/medicines) No Help Needed No Help Needed No Help Needed No Help Needed No Help Needed No Help Needed No Help Needed   Shopping for groceries No Help Needed No Help Needed No Help Needed No Help Needed No Help Needed No Help Needed No Help Needed   Driving No Help Needed No Help Needed No Help Needed No Help Needed No Help Needed No Help Needed No Help Needed   Climbing a flight of stairs No Help Needed No Help Needed No Help Needed No Help Needed No Help Needed No Help Needed No Help Needed   Getting to places beyond walking distances No Help Needed No Help Needed No Help Needed No Help Needed No Help Needed No Help Needed No Help Needed           10/17/2024     6:00 AM

## 2024-10-18 ENCOUNTER — APPOINTMENT (OUTPATIENT)
Facility: HOSPITAL | Age: 69
End: 2024-10-18
Payer: MEDICARE

## 2024-10-18 ENCOUNTER — HOSPITAL ENCOUNTER (OUTPATIENT)
Facility: HOSPITAL | Age: 69
Setting detail: OBSERVATION
Discharge: HOME | End: 2024-10-20
Attending: EMERGENCY MEDICINE | Admitting: HOSPITALIST
Payer: MEDICARE

## 2024-10-18 DIAGNOSIS — N17.9 AKI (ACUTE KIDNEY INJURY) (HCC): ICD-10-CM

## 2024-10-18 DIAGNOSIS — R55 SYNCOPE AND COLLAPSE: Primary | ICD-10-CM

## 2024-10-18 LAB
ALBUMIN SERPL-MCNC: 3 G/DL (ref 3.5–5)
ALBUMIN SERPL-MCNC: 3.5 G/DL (ref 3.5–5)
ALBUMIN/GLOB SERPL: 0.8 (ref 1.1–2.2)
ALBUMIN/GLOB SERPL: 1 (ref 1.1–2.2)
ALP SERPL-CCNC: 101 U/L (ref 45–117)
ALP SERPL-CCNC: 108 U/L (ref 45–117)
ALT SERPL-CCNC: 15 U/L (ref 12–78)
ALT SERPL-CCNC: 15 U/L (ref 12–78)
ANION GAP SERPL CALC-SCNC: 10 MMOL/L (ref 2–12)
ANION GAP SERPL CALC-SCNC: 2 MMOL/L (ref 2–12)
ANION GAP SERPL CALC-SCNC: 4 MMOL/L (ref 2–12)
AST SERPL-CCNC: 10 U/L (ref 15–37)
AST SERPL-CCNC: 16 U/L (ref 15–37)
BASOPHILS # BLD: 0 K/UL (ref 0–0.1)
BASOPHILS NFR BLD: 0 % (ref 0–1)
BILIRUB SERPL-MCNC: 0.2 MG/DL (ref 0.2–1)
BILIRUB SERPL-MCNC: 0.2 MG/DL (ref 0.2–1)
BUN SERPL-MCNC: 29 MG/DL (ref 6–20)
BUN SERPL-MCNC: 30 MG/DL (ref 6–20)
BUN SERPL-MCNC: 32 MG/DL (ref 6–20)
BUN/CREAT SERPL: 13 (ref 12–20)
BUN/CREAT SERPL: 15 (ref 12–20)
BUN/CREAT SERPL: 16 (ref 12–20)
CALCIUM SERPL-MCNC: 9.2 MG/DL (ref 8.5–10.1)
CALCIUM SERPL-MCNC: 9.6 MG/DL (ref 8.5–10.1)
CALCIUM SERPL-MCNC: 9.6 MG/DL (ref 8.5–10.1)
CHLORIDE SERPL-SCNC: 107 MMOL/L (ref 97–108)
CHLORIDE SERPL-SCNC: 114 MMOL/L (ref 97–108)
CHLORIDE SERPL-SCNC: 115 MMOL/L (ref 97–108)
CO2 SERPL-SCNC: 24 MMOL/L (ref 21–32)
CO2 SERPL-SCNC: 26 MMOL/L (ref 21–32)
CO2 SERPL-SCNC: 27 MMOL/L (ref 21–32)
CREAT SERPL-MCNC: 1.91 MG/DL (ref 0.55–1.02)
CREAT SERPL-MCNC: 1.97 MG/DL (ref 0.55–1.02)
CREAT SERPL-MCNC: 2.55 MG/DL (ref 0.55–1.02)
DIFFERENTIAL METHOD BLD: ABNORMAL
EOSINOPHIL # BLD: 0.2 K/UL (ref 0–0.4)
EOSINOPHIL NFR BLD: 3 % (ref 0–7)
ERYTHROCYTE [DISTWIDTH] IN BLOOD BY AUTOMATED COUNT: 18.4 % (ref 11.5–14.5)
GLOBULIN SER CALC-MCNC: 3.5 G/DL (ref 2–4)
GLOBULIN SER CALC-MCNC: 3.9 G/DL (ref 2–4)
GLUCOSE SERPL-MCNC: 162 MG/DL (ref 65–100)
GLUCOSE SERPL-MCNC: 85 MG/DL (ref 65–100)
GLUCOSE SERPL-MCNC: 88 MG/DL (ref 65–100)
HCT VFR BLD AUTO: 24.4 % (ref 35–47)
HEMOCCULT STL QL: NEGATIVE
HGB BLD-MCNC: 7.4 G/DL (ref 11.5–16)
IMM GRANULOCYTES # BLD AUTO: 0 K/UL (ref 0–0.04)
IMM GRANULOCYTES NFR BLD AUTO: 0 % (ref 0–0.5)
LIPASE SERPL-CCNC: 31 U/L (ref 13–75)
LIPASE SERPL-CCNC: 32 U/L (ref 13–75)
LYMPHOCYTES # BLD: 1.4 K/UL (ref 0.8–3.5)
LYMPHOCYTES NFR BLD: 20 % (ref 12–49)
MCH RBC QN AUTO: 25.8 PG (ref 26–34)
MCHC RBC AUTO-ENTMCNC: 30.3 G/DL (ref 30–36.5)
MCV RBC AUTO: 85 FL (ref 80–99)
MONOCYTES # BLD: 0.6 K/UL (ref 0–1)
MONOCYTES NFR BLD: 8 % (ref 5–13)
NEUTS SEG # BLD: 4.7 K/UL (ref 1.8–8)
NEUTS SEG NFR BLD: 68 % (ref 32–75)
NRBC # BLD: 0 K/UL (ref 0–0.01)
NRBC BLD-RTO: 0 PER 100 WBC
PLATELET # BLD AUTO: 242 K/UL (ref 150–400)
PMV BLD AUTO: 11.6 FL (ref 8.9–12.9)
POTASSIUM SERPL-SCNC: 4 MMOL/L (ref 3.5–5.1)
POTASSIUM SERPL-SCNC: 4.1 MMOL/L (ref 3.5–5.1)
POTASSIUM SERPL-SCNC: 4.2 MMOL/L (ref 3.5–5.1)
PROT SERPL-MCNC: 6.9 G/DL (ref 6.4–8.2)
PROT SERPL-MCNC: 7 G/DL (ref 6.4–8.2)
RBC # BLD AUTO: 2.87 M/UL (ref 3.8–5.2)
SODIUM SERPL-SCNC: 141 MMOL/L (ref 136–145)
SODIUM SERPL-SCNC: 144 MMOL/L (ref 136–145)
SODIUM SERPL-SCNC: 144 MMOL/L (ref 136–145)
TROPONIN I SERPL HS-MCNC: 15 NG/L (ref 0–51)
WBC # BLD AUTO: 6.9 K/UL (ref 3.6–11)

## 2024-10-18 PROCEDURE — 6370000000 HC RX 637 (ALT 250 FOR IP): Performed by: EMERGENCY MEDICINE

## 2024-10-18 PROCEDURE — 36415 COLL VENOUS BLD VENIPUNCTURE: CPT

## 2024-10-18 PROCEDURE — 81001 URINALYSIS AUTO W/SCOPE: CPT

## 2024-10-18 PROCEDURE — 71045 X-RAY EXAM CHEST 1 VIEW: CPT

## 2024-10-18 PROCEDURE — 74176 CT ABD & PELVIS W/O CONTRAST: CPT

## 2024-10-18 PROCEDURE — 99285 EMERGENCY DEPT VISIT HI MDM: CPT

## 2024-10-18 PROCEDURE — G0378 HOSPITAL OBSERVATION PER HR: HCPCS

## 2024-10-18 PROCEDURE — 73630 X-RAY EXAM OF FOOT: CPT

## 2024-10-18 PROCEDURE — 84484 ASSAY OF TROPONIN QUANT: CPT

## 2024-10-18 PROCEDURE — 94762 N-INVAS EAR/PLS OXIMTRY CONT: CPT

## 2024-10-18 PROCEDURE — 6370000000 HC RX 637 (ALT 250 FOR IP): Performed by: HOSPITALIST

## 2024-10-18 PROCEDURE — 80053 COMPREHEN METABOLIC PANEL: CPT

## 2024-10-18 PROCEDURE — 82272 OCCULT BLD FECES 1-3 TESTS: CPT

## 2024-10-18 PROCEDURE — 2580000003 HC RX 258: Performed by: EMERGENCY MEDICINE

## 2024-10-18 PROCEDURE — 70450 CT HEAD/BRAIN W/O DYE: CPT

## 2024-10-18 PROCEDURE — 83690 ASSAY OF LIPASE: CPT

## 2024-10-18 PROCEDURE — 84300 ASSAY OF URINE SODIUM: CPT

## 2024-10-18 PROCEDURE — 85025 COMPLETE CBC W/AUTO DIFF WBC: CPT

## 2024-10-18 PROCEDURE — 2580000003 HC RX 258: Performed by: HOSPITALIST

## 2024-10-18 PROCEDURE — 82570 ASSAY OF URINE CREATININE: CPT

## 2024-10-18 RX ORDER — POLYETHYLENE GLYCOL 3350 17 G/17G
17 POWDER, FOR SOLUTION ORAL DAILY PRN
Status: DISCONTINUED | OUTPATIENT
Start: 2024-10-18 | End: 2024-10-20 | Stop reason: HOSPADM

## 2024-10-18 RX ORDER — SODIUM CHLORIDE 0.9 % (FLUSH) 0.9 %
5-40 SYRINGE (ML) INJECTION PRN
Status: DISCONTINUED | OUTPATIENT
Start: 2024-10-18 | End: 2024-10-20 | Stop reason: HOSPADM

## 2024-10-18 RX ORDER — ENOXAPARIN SODIUM 100 MG/ML
30 INJECTION SUBCUTANEOUS DAILY
Status: DISCONTINUED | OUTPATIENT
Start: 2024-10-19 | End: 2024-10-20 | Stop reason: HOSPADM

## 2024-10-18 RX ORDER — ONDANSETRON 2 MG/ML
4 INJECTION INTRAMUSCULAR; INTRAVENOUS EVERY 6 HOURS PRN
Status: DISCONTINUED | OUTPATIENT
Start: 2024-10-18 | End: 2024-10-20 | Stop reason: HOSPADM

## 2024-10-18 RX ORDER — 0.9 % SODIUM CHLORIDE 0.9 %
500 INTRAVENOUS SOLUTION INTRAVENOUS ONCE
Status: COMPLETED | OUTPATIENT
Start: 2024-10-18 | End: 2024-10-18

## 2024-10-18 RX ORDER — ACETAMINOPHEN 650 MG/1
650 SUPPOSITORY RECTAL EVERY 6 HOURS PRN
Status: DISCONTINUED | OUTPATIENT
Start: 2024-10-18 | End: 2024-10-20 | Stop reason: HOSPADM

## 2024-10-18 RX ORDER — ACETAMINOPHEN 325 MG/1
650 TABLET ORAL EVERY 6 HOURS PRN
Status: DISCONTINUED | OUTPATIENT
Start: 2024-10-18 | End: 2024-10-20 | Stop reason: HOSPADM

## 2024-10-18 RX ORDER — ONDANSETRON 4 MG/1
4 TABLET, ORALLY DISINTEGRATING ORAL EVERY 8 HOURS PRN
Status: DISCONTINUED | OUTPATIENT
Start: 2024-10-18 | End: 2024-10-20 | Stop reason: HOSPADM

## 2024-10-18 RX ORDER — 0.9 % SODIUM CHLORIDE 0.9 %
1000 INTRAVENOUS SOLUTION INTRAVENOUS ONCE
Status: DISCONTINUED | OUTPATIENT
Start: 2024-10-18 | End: 2024-10-18

## 2024-10-18 RX ORDER — SODIUM CHLORIDE 0.9 % (FLUSH) 0.9 %
5-40 SYRINGE (ML) INJECTION EVERY 12 HOURS SCHEDULED
Status: DISCONTINUED | OUTPATIENT
Start: 2024-10-18 | End: 2024-10-20 | Stop reason: HOSPADM

## 2024-10-18 RX ORDER — OXYCODONE HYDROCHLORIDE 5 MG/1
5 TABLET ORAL ONCE
Status: COMPLETED | OUTPATIENT
Start: 2024-10-18 | End: 2024-10-18

## 2024-10-18 RX ORDER — SODIUM CHLORIDE 9 MG/ML
INJECTION, SOLUTION INTRAVENOUS PRN
Status: DISCONTINUED | OUTPATIENT
Start: 2024-10-18 | End: 2024-10-20 | Stop reason: HOSPADM

## 2024-10-18 RX ORDER — SODIUM CHLORIDE 9 MG/ML
INJECTION, SOLUTION INTRAVENOUS CONTINUOUS
Status: DISPENSED | OUTPATIENT
Start: 2024-10-18 | End: 2024-10-19

## 2024-10-18 RX ORDER — IOPAMIDOL 755 MG/ML
100 INJECTION, SOLUTION INTRAVASCULAR ONCE
Status: DISCONTINUED | OUTPATIENT
Start: 2024-10-18 | End: 2024-10-20 | Stop reason: HOSPADM

## 2024-10-18 RX ADMIN — SODIUM CHLORIDE 500 ML: 9 INJECTION, SOLUTION INTRAVENOUS at 18:02

## 2024-10-18 RX ADMIN — SODIUM CHLORIDE: 9 INJECTION, SOLUTION INTRAVENOUS at 21:10

## 2024-10-18 RX ADMIN — ACETAMINOPHEN 650 MG: 325 TABLET ORAL at 21:57

## 2024-10-18 RX ADMIN — OXYCODONE HYDROCHLORIDE 5 MG: 5 TABLET ORAL at 21:00

## 2024-10-18 RX ADMIN — SODIUM CHLORIDE, PRESERVATIVE FREE 10 ML: 5 INJECTION INTRAVENOUS at 22:09

## 2024-10-18 ASSESSMENT — PAIN SCALES - GENERAL
PAINLEVEL_OUTOF10: 8
PAINLEVEL_OUTOF10: 8
PAINLEVEL_OUTOF10: 7

## 2024-10-18 ASSESSMENT — PAIN DESCRIPTION - DESCRIPTORS
DESCRIPTORS: STABBING
DESCRIPTORS: SHOOTING;STABBING
DESCRIPTORS: ACHING;THROBBING

## 2024-10-18 ASSESSMENT — PAIN DESCRIPTION - ORIENTATION
ORIENTATION: LEFT

## 2024-10-18 ASSESSMENT — PAIN DESCRIPTION - LOCATION
LOCATION: FOOT

## 2024-10-18 NOTE — ED NOTES
Admission SBAR Note  Situation/Background:     Patient is being transferred to [place] ([facility]), Room# TBD    Patient's Chief Complaint was Syncope and is admitted observation for Syncope .    CODE STATUS: Full  CSSRS: 0 - No Risk    ISOLATION/PRECAUTIONS: No  ISOLATION TYPE:     Is this a behavioral health patient? No      Called outstanding consults: Yes    STAT labs collected: Yes    Repeat Lactic Acid DUE? No      All STAT orders are complete: Yes    The following personal items will be sent with the patient during transfer to the floor:     All valuables: purse,  with patient at bedside  and clothing shirt, pants, undergarments, socks, shoes,  with patient at bedside       ASSESSMENT:    NEURO:   NIH SCORE: 0,1-4,5-15,15-20,21-42: 0   DOMINIQUE SWALLOW SCREEN COMPLETE: Yes  ORIENTATION LEVEL: ORIENTATION LEVEL: Person, Place, Time, and Situation  Cognition:  appropriate decision making  Speech: shows no evidence of impairment    Is patient impulsive? No  Is patient oriented? Yes  Do they follow commands? Yes  Is the patient ambulatory? Yes    FALL RISK? Yes  Interventions: Implemented/recommended use of non-skid footwear, Implemented/recommended use of fall risk identification flag to all team members, Implemented/recommended assistive devices and encouraged their use, Implemented/recommended resources for alarm system (personal alarm, bed alarm, call bell, etc.) , Implemented/recommended environmental changes (remove hazards, lower bed, improve lighting, etc.), and Implemented/recommended increased supervision/assistance    RESPIRATORY:   Is patient on oxygen? No  Oxygen therapy: room air  O2 rate:     CARDIAC:   Is cardiac monitoring ordered? Yes    Last Rhythm: Rhythm including paccardio: Normal Sinus Rhythm/ Sinus rhythm and Sinus rosa at times  Patient to transfer with tele box on? Yes  Infusions: Meds; iv fluids: normal saline  LINE ACCESS: 20G

## 2024-10-18 NOTE — ED TRIAGE NOTES
Weak with possible syncopal /dizzy episode just prior to arrival while standing up to go to  for a BM , admits to smoking marijuana today . Negative orthostatics , no cp or SOB

## 2024-10-19 LAB
ALBUMIN SERPL-MCNC: 2.6 G/DL (ref 3.5–5)
ALBUMIN/GLOB SERPL: 0.9 (ref 1.1–2.2)
ALP SERPL-CCNC: 96 U/L (ref 45–117)
ALT SERPL-CCNC: 14 U/L (ref 12–78)
ANION GAP SERPL CALC-SCNC: 9 MMOL/L (ref 2–12)
APPEARANCE UR: CLEAR
AST SERPL-CCNC: 10 U/L (ref 15–37)
BACTERIA URNS QL MICRO: NEGATIVE /HPF
BASOPHILS # BLD: 0.1 K/UL (ref 0–0.1)
BASOPHILS NFR BLD: 1 % (ref 0–1)
BILIRUB SERPL-MCNC: 0.2 MG/DL (ref 0.2–1)
BILIRUB UR QL: NEGATIVE
BUN SERPL-MCNC: 27 MG/DL (ref 6–20)
BUN/CREAT SERPL: 14 (ref 12–20)
CALCIUM SERPL-MCNC: 8.6 MG/DL (ref 8.5–10.1)
CHLORIDE SERPL-SCNC: 110 MMOL/L (ref 97–108)
CO2 SERPL-SCNC: 25 MMOL/L (ref 21–32)
COLOR UR: ABNORMAL
CREAT SERPL-MCNC: 1.94 MG/DL (ref 0.55–1.02)
CREAT UR-MCNC: 130 MG/DL
DIFFERENTIAL METHOD BLD: ABNORMAL
EOSINOPHIL # BLD: 0.2 K/UL (ref 0–0.4)
EOSINOPHIL NFR BLD: 3 % (ref 0–7)
EPITH CASTS URNS QL MICRO: ABNORMAL /LPF
ERYTHROCYTE [DISTWIDTH] IN BLOOD BY AUTOMATED COUNT: 18 % (ref 11.5–14.5)
GLOBULIN SER CALC-MCNC: 3 G/DL (ref 2–4)
GLUCOSE BLD STRIP.AUTO-MCNC: 159 MG/DL (ref 65–117)
GLUCOSE BLD STRIP.AUTO-MCNC: 193 MG/DL (ref 65–117)
GLUCOSE BLD STRIP.AUTO-MCNC: 89 MG/DL (ref 65–117)
GLUCOSE SERPL-MCNC: 70 MG/DL (ref 65–100)
GLUCOSE UR STRIP.AUTO-MCNC: NEGATIVE MG/DL
HCT VFR BLD AUTO: 21.2 % (ref 35–47)
HCT VFR BLD AUTO: 25 % (ref 35–47)
HGB BLD-MCNC: 6.5 G/DL (ref 11.5–16)
HGB BLD-MCNC: 7.8 G/DL (ref 11.5–16)
HGB UR QL STRIP: NEGATIVE
HISTORY CHECK: NORMAL
IMM GRANULOCYTES # BLD AUTO: 0 K/UL (ref 0–0.04)
IMM GRANULOCYTES NFR BLD AUTO: 0 % (ref 0–0.5)
KETONES UR QL STRIP.AUTO: NEGATIVE MG/DL
LEUKOCYTE ESTERASE UR QL STRIP.AUTO: NEGATIVE
LYMPHOCYTES # BLD: 1.2 K/UL (ref 0.8–3.5)
LYMPHOCYTES NFR BLD: 22 % (ref 12–49)
MCH RBC QN AUTO: 25.5 PG (ref 26–34)
MCHC RBC AUTO-ENTMCNC: 30.7 G/DL (ref 30–36.5)
MCV RBC AUTO: 83.1 FL (ref 80–99)
MONOCYTES # BLD: 0.5 K/UL (ref 0–1)
MONOCYTES NFR BLD: 10 % (ref 5–13)
NEUTS SEG # BLD: 3.3 K/UL (ref 1.8–8)
NEUTS SEG NFR BLD: 64 % (ref 32–75)
NITRITE UR QL STRIP.AUTO: NEGATIVE
NRBC # BLD: 0 K/UL (ref 0–0.01)
NRBC BLD-RTO: 0 PER 100 WBC
PH UR STRIP: 5.5 (ref 5–8)
PLATELET # BLD AUTO: 217 K/UL (ref 150–400)
PLATELET COMMENT: ABNORMAL
PMV BLD AUTO: 11.6 FL (ref 8.9–12.9)
POTASSIUM SERPL-SCNC: 4 MMOL/L (ref 3.5–5.1)
PROT SERPL-MCNC: 5.6 G/DL (ref 6.4–8.2)
PROT UR STRIP-MCNC: NEGATIVE MG/DL
RBC # BLD AUTO: 2.55 M/UL (ref 3.8–5.2)
RBC #/AREA URNS HPF: ABNORMAL /HPF (ref 0–5)
RBC MORPH BLD: ABNORMAL
RBC MORPH BLD: ABNORMAL
SERVICE CMNT-IMP: ABNORMAL
SERVICE CMNT-IMP: ABNORMAL
SERVICE CMNT-IMP: NORMAL
SODIUM SERPL-SCNC: 144 MMOL/L (ref 136–145)
SODIUM UR-SCNC: 49 MMOL/L
SP GR UR REFRACTOMETRY: 1.02 (ref 1–1.03)
TROPONIN I SERPL HS-MCNC: 18 NG/L (ref 0–51)
URINE CULTURE IF INDICATED: ABNORMAL
UROBILINOGEN UR QL STRIP.AUTO: 0.2 EU/DL (ref 0.2–1)
WBC # BLD AUTO: 5.3 K/UL (ref 3.6–11)
WBC URNS QL MICRO: ABNORMAL /HPF (ref 0–4)

## 2024-10-19 PROCEDURE — 6360000002 HC RX W HCPCS: Performed by: HOSPITALIST

## 2024-10-19 PROCEDURE — 86900 BLOOD TYPING SEROLOGIC ABO: CPT

## 2024-10-19 PROCEDURE — 36415 COLL VENOUS BLD VENIPUNCTURE: CPT

## 2024-10-19 PROCEDURE — 96372 THER/PROPH/DIAG INJ SC/IM: CPT

## 2024-10-19 PROCEDURE — 84484 ASSAY OF TROPONIN QUANT: CPT

## 2024-10-19 PROCEDURE — 6370000000 HC RX 637 (ALT 250 FOR IP): Performed by: HOSPITALIST

## 2024-10-19 PROCEDURE — P9016 RBC LEUKOCYTES REDUCED: HCPCS

## 2024-10-19 PROCEDURE — 85014 HEMATOCRIT: CPT

## 2024-10-19 PROCEDURE — G0378 HOSPITAL OBSERVATION PER HR: HCPCS

## 2024-10-19 PROCEDURE — 94760 N-INVAS EAR/PLS OXIMETRY 1: CPT

## 2024-10-19 PROCEDURE — 86901 BLOOD TYPING SEROLOGIC RH(D): CPT

## 2024-10-19 PROCEDURE — 94640 AIRWAY INHALATION TREATMENT: CPT

## 2024-10-19 PROCEDURE — 36430 TRANSFUSION BLD/BLD COMPNT: CPT

## 2024-10-19 PROCEDURE — 86923 COMPATIBILITY TEST ELECTRIC: CPT

## 2024-10-19 PROCEDURE — 85018 HEMOGLOBIN: CPT

## 2024-10-19 PROCEDURE — 86850 RBC ANTIBODY SCREEN: CPT

## 2024-10-19 PROCEDURE — 82962 GLUCOSE BLOOD TEST: CPT

## 2024-10-19 PROCEDURE — 85025 COMPLETE CBC W/AUTO DIFF WBC: CPT

## 2024-10-19 PROCEDURE — 80053 COMPREHEN METABOLIC PANEL: CPT

## 2024-10-19 PROCEDURE — 2580000003 HC RX 258: Performed by: HOSPITALIST

## 2024-10-19 RX ORDER — CLOPIDOGREL BISULFATE 75 MG/1
75 TABLET ORAL DAILY
Status: DISCONTINUED | OUTPATIENT
Start: 2024-10-19 | End: 2024-10-20 | Stop reason: HOSPADM

## 2024-10-19 RX ORDER — ATORVASTATIN CALCIUM 80 MG/1
80 TABLET, FILM COATED ORAL DAILY
COMMUNITY

## 2024-10-19 RX ORDER — GLUCAGON 1 MG/ML
1 KIT INJECTION PRN
Status: DISCONTINUED | OUTPATIENT
Start: 2024-10-19 | End: 2024-10-20 | Stop reason: HOSPADM

## 2024-10-19 RX ORDER — ALBUTEROL SULFATE 0.83 MG/ML
2.5 SOLUTION RESPIRATORY (INHALATION) EVERY 4 HOURS PRN
Status: DISCONTINUED | OUTPATIENT
Start: 2024-10-19 | End: 2024-10-20 | Stop reason: HOSPADM

## 2024-10-19 RX ORDER — INSULIN LISPRO 100 [IU]/ML
0-8 INJECTION, SOLUTION INTRAVENOUS; SUBCUTANEOUS
Status: DISCONTINUED | OUTPATIENT
Start: 2024-10-19 | End: 2024-10-20 | Stop reason: HOSPADM

## 2024-10-19 RX ORDER — SODIUM CHLORIDE 9 MG/ML
INJECTION, SOLUTION INTRAVENOUS PRN
Status: DISCONTINUED | OUTPATIENT
Start: 2024-10-19 | End: 2024-10-20 | Stop reason: HOSPADM

## 2024-10-19 RX ORDER — ASPIRIN 81 MG/1
81 TABLET ORAL DAILY
Status: DISCONTINUED | OUTPATIENT
Start: 2024-10-19 | End: 2024-10-20 | Stop reason: HOSPADM

## 2024-10-19 RX ORDER — DEXTROSE MONOHYDRATE 100 MG/ML
INJECTION, SOLUTION INTRAVENOUS CONTINUOUS PRN
Status: DISCONTINUED | OUTPATIENT
Start: 2024-10-19 | End: 2024-10-20 | Stop reason: HOSPADM

## 2024-10-19 RX ORDER — MONTELUKAST SODIUM 10 MG/1
10 TABLET ORAL NIGHTLY
Status: DISCONTINUED | OUTPATIENT
Start: 2024-10-19 | End: 2024-10-20 | Stop reason: HOSPADM

## 2024-10-19 RX ORDER — OXYCODONE HYDROCHLORIDE 5 MG/1
5 TABLET ORAL EVERY 6 HOURS PRN
Status: DISCONTINUED | OUTPATIENT
Start: 2024-10-19 | End: 2024-10-20 | Stop reason: HOSPADM

## 2024-10-19 RX ORDER — INSULIN GLARGINE 100 [IU]/ML
10 INJECTION, SOLUTION SUBCUTANEOUS NIGHTLY
Status: DISCONTINUED | OUTPATIENT
Start: 2024-10-19 | End: 2024-10-20 | Stop reason: HOSPADM

## 2024-10-19 RX ADMIN — CLOPIDOGREL BISULFATE 75 MG: 75 TABLET ORAL at 08:26

## 2024-10-19 RX ADMIN — OXYCODONE 5 MG: 5 TABLET ORAL at 04:14

## 2024-10-19 RX ADMIN — INSULIN GLARGINE 10 UNITS: 100 INJECTION, SOLUTION SUBCUTANEOUS at 20:55

## 2024-10-19 RX ADMIN — ACETAMINOPHEN 650 MG: 325 TABLET ORAL at 08:35

## 2024-10-19 RX ADMIN — ACETAMINOPHEN 650 MG: 325 TABLET ORAL at 20:54

## 2024-10-19 RX ADMIN — MONTELUKAST 10 MG: 10 TABLET, FILM COATED ORAL at 20:54

## 2024-10-19 RX ADMIN — ARFORMOTEROL TARTRATE: 15 SOLUTION RESPIRATORY (INHALATION) at 19:21

## 2024-10-19 RX ADMIN — ASPIRIN 81 MG: 81 TABLET, COATED ORAL at 08:26

## 2024-10-19 RX ADMIN — ARFORMOTEROL TARTRATE: 15 SOLUTION RESPIRATORY (INHALATION) at 08:46

## 2024-10-19 RX ADMIN — SODIUM CHLORIDE, PRESERVATIVE FREE 10 ML: 5 INJECTION INTRAVENOUS at 20:59

## 2024-10-19 RX ADMIN — ENOXAPARIN SODIUM 30 MG: 100 INJECTION SUBCUTANEOUS at 08:25

## 2024-10-19 RX ADMIN — SODIUM CHLORIDE, PRESERVATIVE FREE 10 ML: 5 INJECTION INTRAVENOUS at 14:52

## 2024-10-19 ASSESSMENT — PAIN DESCRIPTION - LOCATION
LOCATION: FOOT

## 2024-10-19 ASSESSMENT — PAIN DESCRIPTION - ORIENTATION
ORIENTATION: LEFT

## 2024-10-19 ASSESSMENT — PAIN DESCRIPTION - DESCRIPTORS
DESCRIPTORS: THROBBING;ACHING
DESCRIPTORS: SHARP;ACHING
DESCRIPTORS: ACHING;SHOOTING
DESCRIPTORS: ACHING
DESCRIPTORS: ACHING;SORE
DESCRIPTORS: THROBBING;ACHING

## 2024-10-19 ASSESSMENT — PAIN SCALES - GENERAL
PAINLEVEL_OUTOF10: 3
PAINLEVEL_OUTOF10: 7
PAINLEVEL_OUTOF10: 4
PAINLEVEL_OUTOF10: 8
PAINLEVEL_OUTOF10: 6
PAINLEVEL_OUTOF10: 7

## 2024-10-19 ASSESSMENT — PAIN - FUNCTIONAL ASSESSMENT: PAIN_FUNCTIONAL_ASSESSMENT: PREVENTS OR INTERFERES SOME ACTIVE ACTIVITIES AND ADLS

## 2024-10-19 NOTE — PLAN OF CARE
Problem: Chronic Conditions and Co-morbidities  Goal: Patient's chronic conditions and co-morbidity symptoms are monitored and maintained or improved  10/18/2024 2239 by Shelly Singh, RN  Outcome: Progressing     Problem: ABCDS Injury Assessment  Goal: Absence of physical injury  10/18/2024 2239 by Shelly Singh, RN  Outcome: Progressing     Problem: Safety - Adult  Goal: Free from fall injury  10/18/2024 2239 by Shelly Singh, RN  Outcome: Progressing

## 2024-10-19 NOTE — H&P
widely patent. Three vessel run/off is noted with triphasic flow at the ankle. In the left leg the above knee femoropopliteral bypass is patent. Elevated velocities noted at the proximal anastomosis. Triphasic flow visualized throughout the extremity. The ankle brachial index on the right measures 1.0 and the left is 1.03. When compared to previous exam from 11-27-23 the velocities in the left proximal anastomosis are stable.      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 She is here for routine follow up imaging. Previous results from 11-27-23 measured the residual aorta sac at 3.0  x 3.0 cm without evidence of an endoleak. FINDINGS:   The above named patient is referred for abdominal aortic arterial duplex examination to include B-mode, color and pulse wave Doppler interrogation of the abdominal aorta, bilateral iliac arteries, bilateral renal arteries, celiac, and SMA to assess for organ size/appearance, presence/absence, location and severity of arterial stenosis, aneurysmal formation and/or other abnormalities. Aneurysmal Size: The aorta proximally measures 1.64 cm and the maximum aorta is 3.1 cm x 3.1 cm. The right common iliac artery is 0.98 cm and the left common iliac artery is 0.82 cm. Duplex Examination: The aorta proximally has a velocity of 57/12 cm/s, at the renals it is 100 cm/s and the aortic limb is 32 cm/s. The right common iliac limb is 351 cm/s, the right external iliac artery is 222 cm/s. The left common iliac limb is 187 cm/s and the left external iliac artery is 197 cm/s. The celiac artery has a velocity of 235/45 cm/s, the SMA is 153/58 cm/s, the right renal artery is 147/34 cm/s and the left is 191/31 cm/s.   Other Findings: The endograft appears to be well positioned starting at the level of the renal arteries and extending to the iliac limbs. The

## 2024-10-19 NOTE — ED PROVIDER NOTES
They convey agreement and understanding for the need to be admitted and for their admission diagnosis.  Consultation has been made with the inpatient physician specialist for hospitalization.    Disposition:  admission    PLAN:  1. Admit     Diagnosis     Clinical Impression:   1. Syncope and collapse    2. ANTIONETTE (acute kidney injury) (HCC)        Giacomo GRADY MD am the first provider for this patient and am the attending of record for this patient encounter.    Giacomo Eugene MD    Please note that this dictation was completed with Dragon, computer voice recognition software.  Quite often unanticipated grammatical, syntax, homophones, and other interpretive errors are inadvertently transcribed by the computer software.  Please disregard these errors.  Additionally, please excuse any errors that have escaped final proofreading.           Giacomo Eugene MD  10/18/24 2037

## 2024-10-19 NOTE — CARE COORDINATION
CM spoke with patient via phone. CM spoke with patient in reference to MOONS and OBS status. Patient was not happy due to the conversation at this hour 6:58 PM.   CM apologize and wish patient a good night.     Tamika ABRAHAM

## 2024-10-19 NOTE — PLAN OF CARE
Problem: Chronic Conditions and Co-morbidities  Goal: Patient's chronic conditions and co-morbidity symptoms are monitored and maintained or improved  Outcome: HH/HSPC Progressing     Problem: ABCDS Injury Assessment  Goal: Absence of physical injury  Outcome: HH/HSPC Progressing     Problem: Safety - Adult  Goal: Free from fall injury  Outcome: HH/HSPC Progressing     Problem: Pain  Goal: Verbalizes/displays adequate comfort level or baseline comfort level  Outcome: HH/HSPC Progressing

## 2024-10-19 NOTE — PROGRESS NOTES
Blood consent form signed at 0935, after MD had consented patient (MD placed consent note at 0937 after leaving room). Witnessed by this RN. Education provided and note signed.

## 2024-10-19 NOTE — PROGRESS NOTES
Discussed HG 6.5 Patient will consent to transfusion. Education on Anemia and possible causes as well as Falls risk R/T Anemia. Instructed patient to call for assistance out of bed to Bathroom. Type and Screen completed 0822 pending.

## 2024-10-19 NOTE — PROGRESS NOTES
Admission Medication Regimen     The patient was interviewed regarding clarification of the prior to admission medication regimen.Only the Patient present in room.     Information Obtained From: Patient    Allergies updated/ Reaction: Lisinopril, Morphine, Tramadol, Dapagliflozin, Gabapentin    Organizer (pill box, bottles, etc): Pill Organizer    Additions: Atorvastatin    Medications that need DELETION: Dicyclomine, Dorzolamide-timolol, ipratropium, levocetirizine, omeprazole, ondansetron, psyllium, triamterene-hydrochlorothiazide    Changes: None    Pertinent Pharmacy Findings:  Updated patient’s preferred outpatient pharmacy to: [facility] in [place], [place]    Patient was questioned regarding use of any other inhalers, topical products, over the counter medications, herbal medications, vitamin products or ophthalmic/nasal/otic medication use.        Patient was inquired about side effects and was asked about pharmacist consultation if needed or desired (Y/N): Y      PTA medication list was corrected to the following:     Prior to Admission Medications   Prescriptions Last Dose Informant Patient Reported? Taking?   aspirin 81 MG EC tablet 10/18/2024 at am  Yes Yes   Sig: Take 1 tablet by mouth daily   atorvastatin (LIPITOR) 80 MG tablet 10/18/2024  Yes Yes   Sig: Take 1 tablet by mouth daily   candesartan (ATACAND) 16 MG tablet 10/18/2024 at am  Yes Yes   Sig: Take 1 tablet by mouth daily   carvedilol (COREG) 12.5 MG tablet 10/18/2024 at am  Yes Yes   Sig: Take 1 tablet by mouth 2 times daily   clopidogrel (PLAVIX) 75 MG tablet 10/18/2024  Yes Yes   Sig: Take 1 tablet by mouth daily   fluticasone-salmeterol (ADVAIR) 250-50 MCG/ACT AEPB diskus inhaler Past Month  Yes Yes   Sig: Inhale 1 puff into the lungs in the morning and 1 puff in the evening.   insulin glargine (LANTUS SOLOSTAR) 100 UNIT/ML injection pen 10/18/2024 at night  Yes Yes   Sig: Inject 15 Units into the skin daily   montelukast

## 2024-10-19 NOTE — CONSENT
Informed Consent for Blood Component Transfusion Note    I have discussed with the patient the rationale for blood component transfusion; its benefits in treating or preventing fatigue, organ damage, or death; and its risk which includes mild transfusion reactions, rare risk of blood borne infection, or more serious but rare reactions. I have discussed the alternatives to transfusion, including the risk and consequences of not receiving transfusion. The patient had an opportunity to ask questions and had agreed to proceed with transfusion of blood components.    Electronically signed by Su Vicente MD on 10/19/24 at 9:37 AM EDT

## 2024-10-20 VITALS
SYSTOLIC BLOOD PRESSURE: 143 MMHG | HEIGHT: 65 IN | HEART RATE: 76 BPM | BODY MASS INDEX: 28.92 KG/M2 | OXYGEN SATURATION: 100 % | TEMPERATURE: 98.3 F | DIASTOLIC BLOOD PRESSURE: 50 MMHG | WEIGHT: 173.6 LBS | RESPIRATION RATE: 18 BRPM

## 2024-10-20 LAB
ANION GAP SERPL CALC-SCNC: 8 MMOL/L (ref 2–12)
BASOPHILS # BLD: 0.1 K/UL (ref 0–0.1)
BASOPHILS NFR BLD: 1 % (ref 0–1)
BUN SERPL-MCNC: 21 MG/DL (ref 6–20)
BUN/CREAT SERPL: 14 (ref 12–20)
CALCIUM SERPL-MCNC: 9.3 MG/DL (ref 8.5–10.1)
CHLORIDE SERPL-SCNC: 110 MMOL/L (ref 97–108)
CO2 SERPL-SCNC: 28 MMOL/L (ref 21–32)
CREAT SERPL-MCNC: 1.55 MG/DL (ref 0.55–1.02)
DIFFERENTIAL METHOD BLD: ABNORMAL
EOSINOPHIL # BLD: 0.2 K/UL (ref 0–0.4)
EOSINOPHIL NFR BLD: 4 % (ref 0–7)
ERYTHROCYTE [DISTWIDTH] IN BLOOD BY AUTOMATED COUNT: 18.3 % (ref 11.5–14.5)
EST. AVERAGE GLUCOSE BLD GHB EST-MCNC: 134 MG/DL
GLUCOSE BLD STRIP.AUTO-MCNC: 158 MG/DL (ref 65–117)
GLUCOSE SERPL-MCNC: 91 MG/DL (ref 65–100)
HBA1C MFR BLD: 6.3 % (ref 4–5.6)
HCT VFR BLD AUTO: 26.8 % (ref 35–47)
HGB BLD-MCNC: 8.2 G/DL (ref 11.5–16)
IGA SERPL-MCNC: 379 MG/DL (ref 87–352)
IMM GRANULOCYTES # BLD AUTO: 0 K/UL (ref 0–0.04)
IMM GRANULOCYTES NFR BLD AUTO: 0 % (ref 0–0.5)
LYMPHOCYTES # BLD: 1.2 K/UL (ref 0.8–3.5)
LYMPHOCYTES NFR BLD: 22 % (ref 12–49)
MCH RBC QN AUTO: 25.2 PG (ref 26–34)
MCHC RBC AUTO-ENTMCNC: 30.6 G/DL (ref 30–36.5)
MCV RBC AUTO: 82.5 FL (ref 80–99)
MONOCYTES # BLD: 0.5 K/UL (ref 0–1)
MONOCYTES NFR BLD: 10 % (ref 5–13)
NEUTS SEG # BLD: 3.3 K/UL (ref 1.8–8)
NEUTS SEG NFR BLD: 63 % (ref 32–75)
NRBC # BLD: 0 K/UL (ref 0–0.01)
NRBC BLD-RTO: 0 PER 100 WBC
PLATELET # BLD AUTO: 228 K/UL (ref 150–400)
PMV BLD AUTO: 12 FL (ref 8.9–12.9)
POTASSIUM SERPL-SCNC: 4.2 MMOL/L (ref 3.5–5.1)
RBC # BLD AUTO: 3.25 M/UL (ref 3.8–5.2)
SERVICE CMNT-IMP: ABNORMAL
SODIUM SERPL-SCNC: 146 MMOL/L (ref 136–145)
TTG IGA SER-ACNC: <2 U/ML (ref 0–3)
WBC # BLD AUTO: 5.3 K/UL (ref 3.6–11)

## 2024-10-20 PROCEDURE — 36415 COLL VENOUS BLD VENIPUNCTURE: CPT

## 2024-10-20 PROCEDURE — 85025 COMPLETE CBC W/AUTO DIFF WBC: CPT

## 2024-10-20 PROCEDURE — 6360000002 HC RX W HCPCS: Performed by: HOSPITALIST

## 2024-10-20 PROCEDURE — 82962 GLUCOSE BLOOD TEST: CPT

## 2024-10-20 PROCEDURE — 96372 THER/PROPH/DIAG INJ SC/IM: CPT

## 2024-10-20 PROCEDURE — G0378 HOSPITAL OBSERVATION PER HR: HCPCS

## 2024-10-20 PROCEDURE — 80048 BASIC METABOLIC PNL TOTAL CA: CPT

## 2024-10-20 PROCEDURE — 94640 AIRWAY INHALATION TREATMENT: CPT

## 2024-10-20 PROCEDURE — 83036 HEMOGLOBIN GLYCOSYLATED A1C: CPT

## 2024-10-20 PROCEDURE — 6370000000 HC RX 637 (ALT 250 FOR IP): Performed by: HOSPITALIST

## 2024-10-20 RX ORDER — PANTOPRAZOLE SODIUM 40 MG/1
40 TABLET, DELAYED RELEASE ORAL
Qty: 30 TABLET | Refills: 0 | Status: SHIPPED | OUTPATIENT
Start: 2024-10-20

## 2024-10-20 RX ORDER — FERROUS SULFATE 325(65) MG
325 TABLET ORAL 2 TIMES DAILY
Qty: 180 TABLET | Refills: 1 | Status: SHIPPED | OUTPATIENT
Start: 2024-10-20

## 2024-10-20 RX ADMIN — ASPIRIN 81 MG: 81 TABLET, COATED ORAL at 09:07

## 2024-10-20 RX ADMIN — ENOXAPARIN SODIUM 30 MG: 100 INJECTION SUBCUTANEOUS at 09:07

## 2024-10-20 RX ADMIN — ARFORMOTEROL TARTRATE: 15 SOLUTION RESPIRATORY (INHALATION) at 07:22

## 2024-10-20 RX ADMIN — CLOPIDOGREL BISULFATE 75 MG: 75 TABLET ORAL at 09:07

## 2024-10-20 NOTE — DISCHARGE SUMMARY
Discharge Summary    Name: Hyacinth Patterson  [id number]  YOB: 1955 (Age: 68 y.o.)   Date of Admission: 10/18/2024  Date of Discharge: 10/20/2024  Attending Physician: Su Vicente MD    Discharge Diagnosis:   Syncope  Acute on chronic kidney disease stage IV  Acute on chronic anemia requiring 1u pRBC    Secondary diagnosis:  Insulin-dependent DM Type 2  Hypertension  Asthma  GERD    Consultations:  None      Brief Admission History/Reason for Admission   Hyacinth Patterson is a 68 y.o.  female with PMHx significant for insulin-dependent diabetes mellitus type 2, hypertension, GERD and asthma who presented to the Emergency Department complaining of dizziness and a syncopal episode where she hurt her left foot.  She reported that over the last month she has felt generally unwell.  She states that she has had difficulty keeping down foods but has been trying to drink liquids.  She states that she is following up with GI with plans for endoscopy in early November.  On the day of admission she had an episode of dizziness prompting a fall when she went from sitting to standing up.  She denied any associated chest pain, palpitations, cough, shortness of breath, fever/chills or lower extremity swelling.       In the emergency department, her blood pressure was on the low side with otherwise stable vital signs within normal limits.  The CBC was significant for hemoglobin of 7.4 which is close to her baseline and a CMP was significant for a creatinine of 2.6 which is above her baseline of 2.  The UA was unremarkable.  X-ray of her left foot was negative as well as an unremarkable chest x-ray and unremarkable CT head and abdomen and pelvis.  She was given 500 cc fluid bolus in the emergency department and was admitted under the Hospitalist service for further management.  Please see H&P for additional details.      Brief Hospital Course by Main Problems:   On the acute medicine floor

## 2024-10-20 NOTE — PROGRESS NOTES
Patient cleared for discharge to home with instructions Hg 8.2. For Gastroenterology follow up endoscopy to Rule out GI Bleed. IV Removed. Discharge Instructions to patient. Friend to transport home via Car.

## 2024-10-21 ENCOUNTER — HOSPITAL ENCOUNTER (OUTPATIENT)
Facility: HOSPITAL | Age: 69
Discharge: HOME | End: 2024-10-24
Payer: MEDICARE

## 2024-10-21 ENCOUNTER — TELEPHONE (OUTPATIENT)
Age: 69
End: 2024-10-21

## 2024-10-21 DIAGNOSIS — G47.33 OBSTRUCTIVE SLEEP APNEA (ADULT) (PEDIATRIC): ICD-10-CM

## 2024-10-21 PROCEDURE — 95810 POLYSOM 6/> YRS 4/> PARAM: CPT | Performed by: INTERNAL MEDICINE

## 2024-10-21 NOTE — TELEPHONE ENCOUNTER
Care Transitions Initial Follow Up Call    Outreach made within 2 business days of discharge: Yes    Patient: Hyacinth Patterson Patient : 1955   MRN: [id number]  Reason for Admission: Syncope  Discharge Date: 10/20/24       Spoke with: Patient    Discharge department/facility: [facility] Interactive Patient Contact:  Was patient able to fill all prescriptions: Yes  Was patient instructed to bring all medications to the follow-up visit: Yes  Is patient taking all medications as directed in the discharge summary? Yes  Does patient understand their discharge instructions: Yes  Does patient have questions or concerns that need addressed prior to 7-14 day follow up office visit: no    Additional needs identified to be addressed with provider  No needs identified             Scheduled appointment with PCP within 7-14 days (scheduled follow up JUSTINO 10/28/24 with PCP)    Follow Up  Future Appointments   Date Time Provider Department Center   10/21/2024  8:00 PM [facility] SLEEP RM 1 RGHSLEEP [facility]   2024  8:30 AM Jeffery Alexis MD [facility] MAIN [facility]   3/13/2025 10:40 AM Sagar Borges MD [facility] BS [facility]       ILIANA ANTONY LPN

## 2024-10-22 VITALS
BODY MASS INDEX: 28.82 KG/M2 | HEART RATE: 80 BPM | DIASTOLIC BLOOD PRESSURE: 60 MMHG | TEMPERATURE: 98.6 F | RESPIRATION RATE: 20 BRPM | OXYGEN SATURATION: 95 % | WEIGHT: 173 LBS | SYSTOLIC BLOOD PRESSURE: 121 MMHG | HEIGHT: 65 IN

## 2024-10-22 LAB
ABO + RH BLD: NORMAL
BLD PROD TYP BPU: NORMAL
BLOOD BANK BLOOD PRODUCT EXPIRATION DATE: NORMAL
BLOOD BANK DISPENSE STATUS: NORMAL
BLOOD BANK ISBT PRODUCT BLOOD TYPE: 6200
BLOOD BANK UNIT TYPE AND RH: NORMAL
BLOOD GROUP ANTIBODIES SERPL: NORMAL
BPU ID: NORMAL
CROSSMATCH RESULT: NORMAL
ENDOMYSIUM IGA SER QL: NEGATIVE
IGA SERPL-MCNC: 379 MG/DL (ref 87–352)
SPECIMEN EXP DATE BLD: NORMAL
TTG IGA SER-ACNC: <2 U/ML (ref 0–3)
UNIT DIVISION: 0
UNIT ISSUE DATE/TIME: NORMAL

## 2024-10-23 LAB
EKG ATRIAL RATE: 64 BPM
EKG DIAGNOSIS: NORMAL
EKG P AXIS: 37 DEGREES
EKG P-R INTERVAL: 142 MS
EKG Q-T INTERVAL: 394 MS
EKG QRS DURATION: 88 MS
EKG QTC CALCULATION (BAZETT): 406 MS
EKG R AXIS: 7 DEGREES
EKG T AXIS: -58 DEGREES
EKG VENTRICULAR RATE: 64 BPM

## 2024-10-25 ENCOUNTER — CLINICAL DOCUMENTATION (OUTPATIENT)
Age: 69
End: 2024-10-25

## 2024-10-25 DIAGNOSIS — G47.33 OBSTRUCTIVE SLEEP APNEA (ADULT) (PEDIATRIC): Primary | ICD-10-CM

## 2024-10-25 NOTE — PROGRESS NOTES
Discussed results with patient who expressed understanding. Patient is willing to proceed with a titration study.

## 2024-10-25 NOTE — PROGRESS NOTES
Results of sleep study in [facility] to convey results to patient    Attended polysomnogram showed an AHI of 14/hour overall but REM AHI 51/hour and lowest oxygen saturation was 77%. This is consistent with significant sleep apnea.     Based on these results, PAP therapy would be beneficial and is recommended    I recommend that she return to the sleep center for an attended PAP titration where we will be able to find the pressure setting that best controls her sleep apnea and allows her the opportunity to adjust to PAP therapy. Mask options will be presented at this time. Once results reviewed, I will order a PAP device for her to use at home and we will see her in follow-up about 6-8 weeks after initiation of PAP.  she will be called with results.

## 2024-10-28 ENCOUNTER — OFFICE VISIT (OUTPATIENT)
Age: 69
End: 2024-10-28

## 2024-10-28 VITALS
SYSTOLIC BLOOD PRESSURE: 118 MMHG | HEIGHT: 65 IN | OXYGEN SATURATION: 98 % | BODY MASS INDEX: 27.46 KG/M2 | RESPIRATION RATE: 18 BRPM | DIASTOLIC BLOOD PRESSURE: 61 MMHG | WEIGHT: 164.8 LBS | HEART RATE: 75 BPM | TEMPERATURE: 97.8 F

## 2024-10-28 DIAGNOSIS — R19.7 DIARRHEA OF PRESUMED INFECTIOUS ORIGIN: ICD-10-CM

## 2024-10-28 DIAGNOSIS — E11.22 CKD STAGE 4 DUE TO TYPE 2 DIABETES MELLITUS (HCC): ICD-10-CM

## 2024-10-28 DIAGNOSIS — I10 ESSENTIAL (PRIMARY) HYPERTENSION: ICD-10-CM

## 2024-10-28 DIAGNOSIS — E11.69 TYPE 2 DIABETES MELLITUS WITH OTHER SPECIFIED COMPLICATION, WITH LONG-TERM CURRENT USE OF INSULIN (HCC): ICD-10-CM

## 2024-10-28 DIAGNOSIS — Z79.4 DIABETES MELLITUS DUE TO UNDERLYING CONDITION WITH DIABETIC AUTONOMIC NEUROPATHY, WITH LONG-TERM CURRENT USE OF INSULIN (HCC): ICD-10-CM

## 2024-10-28 DIAGNOSIS — Z09 HOSPITAL DISCHARGE FOLLOW-UP: Primary | ICD-10-CM

## 2024-10-28 DIAGNOSIS — D50.0 IRON DEFICIENCY ANEMIA DUE TO CHRONIC BLOOD LOSS: ICD-10-CM

## 2024-10-28 DIAGNOSIS — N18.4 CKD STAGE 4 DUE TO TYPE 2 DIABETES MELLITUS (HCC): ICD-10-CM

## 2024-10-28 DIAGNOSIS — E08.43 DIABETES MELLITUS DUE TO UNDERLYING CONDITION WITH DIABETIC AUTONOMIC NEUROPATHY, WITH LONG-TERM CURRENT USE OF INSULIN (HCC): ICD-10-CM

## 2024-10-28 DIAGNOSIS — Z79.4 TYPE 2 DIABETES MELLITUS WITH OTHER SPECIFIED COMPLICATION, WITH LONG-TERM CURRENT USE OF INSULIN (HCC): ICD-10-CM

## 2024-10-28 ASSESSMENT — PATIENT HEALTH QUESTIONNAIRE - PHQ9
2. FEELING DOWN, DEPRESSED OR HOPELESS: NOT AT ALL
1. LITTLE INTEREST OR PLEASURE IN DOING THINGS: NOT AT ALL
SUM OF ALL RESPONSES TO PHQ QUESTIONS 1-9: 0
SUM OF ALL RESPONSES TO PHQ9 QUESTIONS 1 & 2: 0
SUM OF ALL RESPONSES TO PHQ QUESTIONS 1-9: 0

## 2024-10-28 NOTE — PROGRESS NOTES
JUSTINO  A:  [facility] 10/18/24--D: 10/20/24  Syncope, stage 4 CKD and anemia--Hgb 6.5    Duration 30 minutes primarily education, review of imaging, labs and records.    Assessment & Plan  1. Anemia.  Her hemoglobin level was recorded at 6.5 on 18 October, and she received a blood transfusion. A blood count will be conducted today to assess her current hemoglobin level. She is advised to continue her iron supplementation regimen, taking it every other day instead of twice daily.    2. Decreased kidney function.  Potential causes could include diabetes, hypertension, anemia, or medication side effects. Hypotension could also contribute to reduced kidney function. A referral will be made to Dr. Camara, a nephrologist. Her kidney function will be reassessed.    3. Diabetes Mellitus.  Her diabetes is well-managed, with blood sugar levels consistently within the target range. She is advised to maintain her current insulin dosage of 10 units of Lantus, taken in the evening.    Follow-up  Return in 1 month for follow up.          Chief Complaint   Patient presents with    Follow-Up from Hospital     10/18/2024-10/20/2024- [facility] Admission - Syncope and Collapse     Chronic Kidney Disease         No orders of the defined types were placed in this encounter.      Jeffery Alexis MD, FACP      History of Present Illness  The patient presents for evaluation of multiple medical concerns.    On 10/18/2024, she experienced a fainting episode and was subsequently taken to the emergency room by 911. She was diagnosed with anemia and received a blood transfusion. This marks her third blood transfusion. Currently, she reports feeling well and has not experienced any further fainting episodes. She is scheduled for a gastrointestinal evaluation on 11/07/2024.    She has been informed that her kidney function is suboptimal. She is not currently under the care of a nephrologist.    Her diabetes is well-managed. She administers 10 units of Lantus 
No Help Needed No Help Needed No Help Needed No Help Needed No Help Needed No Help Needed           10/28/2024    10:00 AM   AMB Abuse Screening   Do you ever feel afraid of your partner? N   Are you in a relationship with someone who physically or mentally threatens you? N   Is it safe for you to go home? Y       Advance Care Planning     The patient has appointed the following active healthcare agents:    Primary Decision Maker: Christina Romero - Niece/Nephew - [phone]

## 2024-10-29 LAB
ANION GAP SERPL CALC-SCNC: 6 MMOL/L (ref 2–12)
BASOPHILS # BLD: 0.1 K/UL (ref 0–0.1)
BASOPHILS NFR BLD: 1 % (ref 0–1)
BUN SERPL-MCNC: 26 MG/DL (ref 6–20)
BUN/CREAT SERPL: 14 (ref 12–20)
CALCIUM SERPL-MCNC: 9.4 MG/DL (ref 8.5–10.1)
CHLORIDE SERPL-SCNC: 109 MMOL/L (ref 97–108)
CO2 SERPL-SCNC: 27 MMOL/L (ref 21–32)
CREAT SERPL-MCNC: 1.85 MG/DL (ref 0.55–1.02)
DIFFERENTIAL METHOD BLD: ABNORMAL
EOSINOPHIL # BLD: 0.2 K/UL (ref 0–0.4)
EOSINOPHIL NFR BLD: 3 % (ref 0–7)
ERYTHROCYTE [DISTWIDTH] IN BLOOD BY AUTOMATED COUNT: 17.4 % (ref 11.5–14.5)
GLUCOSE SERPL-MCNC: 131 MG/DL (ref 65–100)
HCT VFR BLD AUTO: 28.6 % (ref 35–47)
HGB BLD-MCNC: 8.4 G/DL (ref 11.5–16)
IMM GRANULOCYTES # BLD AUTO: 0 K/UL (ref 0–0.04)
IMM GRANULOCYTES NFR BLD AUTO: 0 % (ref 0–0.5)
LYMPHOCYTES # BLD: 1.2 K/UL (ref 0.8–3.5)
LYMPHOCYTES NFR BLD: 20 % (ref 12–49)
MCH RBC QN AUTO: 25.3 PG (ref 26–34)
MCHC RBC AUTO-ENTMCNC: 29.4 G/DL (ref 30–36.5)
MCV RBC AUTO: 86.1 FL (ref 80–99)
MONOCYTES # BLD: 0.5 K/UL (ref 0–1)
MONOCYTES NFR BLD: 8 % (ref 5–13)
NEUTS SEG # BLD: 4 K/UL (ref 1.8–8)
NEUTS SEG NFR BLD: 68 % (ref 32–75)
NRBC # BLD: 0 K/UL (ref 0–0.01)
NRBC BLD-RTO: 0 PER 100 WBC
PLATELET # BLD AUTO: 228 K/UL (ref 150–400)
PMV BLD AUTO: 11.9 FL (ref 8.9–12.9)
POTASSIUM SERPL-SCNC: 4.6 MMOL/L (ref 3.5–5.1)
RBC # BLD AUTO: 3.32 M/UL (ref 3.8–5.2)
SODIUM SERPL-SCNC: 142 MMOL/L (ref 136–145)
WBC # BLD AUTO: 6 K/UL (ref 3.6–11)

## 2024-10-29 NOTE — RESULT ENCOUNTER NOTE
Kidney function is stable.  Please keep appt with kidney specialist, Dr Kumar when scheduled.  Hemoglobin is stable.

## 2024-10-31 LAB
ADV 40+41 DNA STL QL NAA+NON-PROBE: NOT DETECTED
ASTRO TYP 1-8 RNA STL QL NAA+NON-PROBE: NOT DETECTED
C CAYETANENSIS DNA STL QL NAA+NON-PROBE: NOT DETECTED
C COLI+JEJ+UPSA DNA STL QL NAA+NON-PROBE: NOT DETECTED
C DIF TOX TCDA+TCDB STL QL NAA+NON-PROBE: NOT DETECTED
CALPROTECTIN STL-MCNT: 786 UG/G (ref 0–120)
CRYPTOSP DNA STL QL NAA+NON-PROBE: NOT DETECTED
E COLI O157 DNA STL QL NAA+NON-PROBE: NORMAL
E HISTOLYT DNA STL QL NAA+NON-PROBE: NOT DETECTED
EAEC PAA PLAS AGGR+AATA ST NAA+NON-PRB: NOT DETECTED
EC STX1+STX2 GENES STL QL NAA+NON-PROBE: NOT DETECTED
EPEC EAE GENE STL QL NAA+NON-PROBE: NOT DETECTED
ETEC LTA+ST1A+ST1B TOX ST NAA+NON-PROBE: NOT DETECTED
G LAMBLIA DNA STL QL NAA+NON-PROBE: NOT DETECTED
NOROVIRUS GI+II RNA STL QL NAA+NON-PROBE: NOT DETECTED
P SHIGELLOIDES DNA STL QL NAA+NON-PROBE: NOT DETECTED
RVA RNA STL QL NAA+NON-PROBE: NOT DETECTED
S ENT+BONG DNA STL QL NAA+NON-PROBE: NOT DETECTED
SAPO I+II+IV+V RNA STL QL NAA+NON-PROBE: NOT DETECTED
SHIGELLA SP+EIEC IPAH ST NAA+NON-PROBE: NOT DETECTED
SPECIMEN SOURCE: NORMAL
V CHOL+PARA+VUL DNA STL QL NAA+NON-PROBE: NOT DETECTED
V CHOLERAE DNA STL QL NAA+NON-PROBE: NOT DETECTED
Y ENTEROCOL DNA STL QL NAA+NON-PROBE: NOT DETECTED

## 2024-11-01 NOTE — RESULT ENCOUNTER NOTE
Some signs of bowel inflammation present (fecal calprotecting POS), may be related to pt's iron deficiency anemia. Will CC to Dr. Goldman for review and further workup. Nursing- please check status of referral.

## 2024-11-04 ENCOUNTER — HOSPITAL ENCOUNTER (OUTPATIENT)
Facility: HOSPITAL | Age: 69
Discharge: HOME | End: 2024-11-07
Payer: MEDICARE

## 2024-11-04 DIAGNOSIS — G47.33 OBSTRUCTIVE SLEEP APNEA (ADULT) (PEDIATRIC): ICD-10-CM

## 2024-11-04 PROCEDURE — 95811 POLYSOM 6/>YRS CPAP 4/> PARM: CPT | Performed by: INTERNAL MEDICINE

## 2024-11-05 VITALS
HEART RATE: 68 BPM | WEIGHT: 164 LBS | DIASTOLIC BLOOD PRESSURE: 49 MMHG | TEMPERATURE: 97 F | HEIGHT: 65 IN | BODY MASS INDEX: 27.32 KG/M2 | OXYGEN SATURATION: 99 % | SYSTOLIC BLOOD PRESSURE: 120 MMHG | RESPIRATION RATE: 18 BRPM

## 2024-11-06 ENCOUNTER — ANESTHESIA EVENT (OUTPATIENT)
Facility: HOSPITAL | Age: 69
End: 2024-11-06
Payer: MEDICARE

## 2024-11-06 RX ORDER — CANDESARTAN 16 MG/1
16 TABLET ORAL DAILY
Qty: 30 TABLET | Refills: 5 | Status: SHIPPED | OUTPATIENT
Start: 2024-11-06

## 2024-11-06 ASSESSMENT — LIFESTYLE VARIABLES: SMOKING_STATUS: 1

## 2024-11-06 NOTE — ANESTHESIA PRE PROCEDURE
NEG 10/19/2024       Drug/Infectious Status (If Applicable):  Lab Results   Component Value Date/Time    HEPCAB NONREACTIVE 03/04/2021 10:13 AM       COVID-19 Screening (If Applicable):   Lab Results   Component Value Date/Time    COVID19 Not detected 01/25/2024 11:58 AM           Anesthesia Evaluation  Patient summary reviewed and Nursing notes reviewed  Airway: Mallampati: III  TM distance: >3 FB   Neck ROM: full  Mouth opening: > = 3 FB   Dental: normal exam         Pulmonary: breath sounds clear to auscultation  (+)           current smoker          Patient smoked on day of surgery.                ROS comment: Marijuana use   Cardiovascular:  Exercise tolerance: good (>4 METS)  (+) hypertension:, CAD: obstructive, CABG/stent:, hyperlipidemia        Rhythm: regular  Rate: normal                 ROS comment: Ischemic cardiomyopathy    40-45% EF by Echo     Neuro/Psych:                ROS comment: Syncope  Cervical spinal stenosis GI/Hepatic/Renal:   (+) renal disease: CRI, bowel prep         ROS comment: AVMs.   Endo/Other:    (+) DiabetesType II DM, well controlled, blood dyscrasia: anemia:..                 Abdominal:             Vascular:   + PVD, aortic or cerebral.        ROS comment: Aortic endograft. Other Findings:       Anesthesia Plan      TIVA     ASA 3       Induction: intravenous.  continuous noninvasive hemodynamic monitor    Anesthetic plan and risks discussed with patient.    Use of blood products discussed with patient whom.    Plan discussed with CRNA.                ASHLEY Cannon MD   11/6/2024

## 2024-11-07 ENCOUNTER — ANESTHESIA (OUTPATIENT)
Facility: HOSPITAL | Age: 69
End: 2024-11-07
Payer: MEDICARE

## 2024-11-07 ENCOUNTER — HOSPITAL ENCOUNTER (OUTPATIENT)
Facility: HOSPITAL | Age: 69
Setting detail: OUTPATIENT SURGERY
Discharge: HOME | End: 2024-11-07
Attending: INTERNAL MEDICINE | Admitting: INTERNAL MEDICINE
Payer: MEDICARE

## 2024-11-07 VITALS
HEIGHT: 65 IN | TEMPERATURE: 97.6 F | WEIGHT: 163.2 LBS | BODY MASS INDEX: 27.19 KG/M2 | SYSTOLIC BLOOD PRESSURE: 114 MMHG | OXYGEN SATURATION: 96 % | DIASTOLIC BLOOD PRESSURE: 77 MMHG | HEART RATE: 77 BPM | RESPIRATION RATE: 23 BRPM

## 2024-11-07 LAB
GLUCOSE BLD STRIP.AUTO-MCNC: 79 MG/DL (ref 65–117)
SERVICE CMNT-IMP: NORMAL

## 2024-11-07 PROCEDURE — 82962 GLUCOSE BLOOD TEST: CPT

## 2024-11-07 PROCEDURE — 2709999900 HC NON-CHARGEABLE SUPPLY: Performed by: INTERNAL MEDICINE

## 2024-11-07 PROCEDURE — 2500000003 HC RX 250 WO HCPCS

## 2024-11-07 PROCEDURE — 88305 TISSUE EXAM BY PATHOLOGIST: CPT

## 2024-11-07 PROCEDURE — 3600007512: Performed by: INTERNAL MEDICINE

## 2024-11-07 PROCEDURE — 2580000003 HC RX 258

## 2024-11-07 PROCEDURE — 2720000010 HC SURG SUPPLY STERILE: Performed by: INTERNAL MEDICINE

## 2024-11-07 PROCEDURE — 6360000002 HC RX W HCPCS

## 2024-11-07 PROCEDURE — 6370000000 HC RX 637 (ALT 250 FOR IP)

## 2024-11-07 PROCEDURE — 3700000000 HC ANESTHESIA ATTENDED CARE: Performed by: INTERNAL MEDICINE

## 2024-11-07 PROCEDURE — 3700000001 HC ADD 15 MINUTES (ANESTHESIA): Performed by: INTERNAL MEDICINE

## 2024-11-07 PROCEDURE — 3600007502: Performed by: INTERNAL MEDICINE

## 2024-11-07 PROCEDURE — 7100000010 HC PHASE II RECOVERY - FIRST 15 MIN: Performed by: INTERNAL MEDICINE

## 2024-11-07 PROCEDURE — 7100000011 HC PHASE II RECOVERY - ADDTL 15 MIN: Performed by: INTERNAL MEDICINE

## 2024-11-07 RX ORDER — PHENYLEPHRINE HCL IN 0.9% NACL 0.4MG/10ML
SYRINGE (ML) INTRAVENOUS
Status: DISCONTINUED | OUTPATIENT
Start: 2024-11-07 | End: 2024-11-07 | Stop reason: SDUPTHER

## 2024-11-07 RX ORDER — SODIUM CHLORIDE 9 MG/ML
INJECTION, SOLUTION INTRAVENOUS PRN
Status: DISCONTINUED | OUTPATIENT
Start: 2024-11-07 | End: 2024-11-07 | Stop reason: HOSPADM

## 2024-11-07 RX ORDER — SODIUM CHLORIDE 0.9 % (FLUSH) 0.9 %
5-40 SYRINGE (ML) INJECTION EVERY 12 HOURS SCHEDULED
Status: DISCONTINUED | OUTPATIENT
Start: 2024-11-07 | End: 2024-11-07 | Stop reason: HOSPADM

## 2024-11-07 RX ORDER — SODIUM CHLORIDE 9 MG/ML
INJECTION, SOLUTION INTRAVENOUS
Status: DISCONTINUED | OUTPATIENT
Start: 2024-11-07 | End: 2024-11-07 | Stop reason: SDUPTHER

## 2024-11-07 RX ORDER — SODIUM CHLORIDE 0.9 % (FLUSH) 0.9 %
5-40 SYRINGE (ML) INJECTION PRN
Status: DISCONTINUED | OUTPATIENT
Start: 2024-11-07 | End: 2024-11-07 | Stop reason: HOSPADM

## 2024-11-07 RX ORDER — GLYCOPYRROLATE 0.2 MG/ML
INJECTION INTRAMUSCULAR; INTRAVENOUS
Status: DISCONTINUED | OUTPATIENT
Start: 2024-11-07 | End: 2024-11-07 | Stop reason: SDUPTHER

## 2024-11-07 RX ORDER — LIDOCAINE HYDROCHLORIDE 20 MG/ML
INJECTION, SOLUTION EPIDURAL; INFILTRATION; INTRACAUDAL; PERINEURAL
Status: DISCONTINUED | OUTPATIENT
Start: 2024-11-07 | End: 2024-11-07 | Stop reason: SDUPTHER

## 2024-11-07 RX ADMIN — SODIUM CHLORIDE: 9 INJECTION, SOLUTION INTRAVENOUS at 09:46

## 2024-11-07 RX ADMIN — PROPOFOL 30 MG: 10 INJECTION, EMULSION INTRAVENOUS at 10:12

## 2024-11-07 RX ADMIN — PROPOFOL 30 MG: 10 INJECTION, EMULSION INTRAVENOUS at 10:28

## 2024-11-07 RX ADMIN — Medication 160 MCG: at 10:05

## 2024-11-07 RX ADMIN — PROPOFOL 30 MG: 10 INJECTION, EMULSION INTRAVENOUS at 10:00

## 2024-11-07 RX ADMIN — Medication 160 MCG: at 10:28

## 2024-11-07 RX ADMIN — BENZOCAINE 1 EACH: 200 SPRAY DENTAL; ORAL; PERIODONTAL at 09:56

## 2024-11-07 RX ADMIN — Medication 120 MCG: at 10:25

## 2024-11-07 RX ADMIN — PROPOFOL 30 MG: 10 INJECTION, EMULSION INTRAVENOUS at 10:02

## 2024-11-07 RX ADMIN — PROPOFOL 30 MG: 10 INJECTION, EMULSION INTRAVENOUS at 10:04

## 2024-11-07 RX ADMIN — Medication 160 MCG: at 10:19

## 2024-11-07 RX ADMIN — PROPOFOL 30 MG: 10 INJECTION, EMULSION INTRAVENOUS at 10:15

## 2024-11-07 RX ADMIN — PROPOFOL 30 MG: 10 INJECTION, EMULSION INTRAVENOUS at 10:22

## 2024-11-07 RX ADMIN — GLYCOPYRROLATE 0.2 MG: 0.2 INJECTION, SOLUTION INTRAMUSCULAR; INTRAVENOUS at 10:11

## 2024-11-07 RX ADMIN — LIDOCAINE HYDROCHLORIDE 100 MG: 20 INJECTION, SOLUTION EPIDURAL; INFILTRATION; INTRACAUDAL; PERINEURAL at 09:58

## 2024-11-07 RX ADMIN — PROPOFOL 30 MG: 10 INJECTION, EMULSION INTRAVENOUS at 10:19

## 2024-11-07 RX ADMIN — PROPOFOL 60 MG: 10 INJECTION, EMULSION INTRAVENOUS at 09:58

## 2024-11-07 RX ADMIN — PROPOFOL 30 MG: 10 INJECTION, EMULSION INTRAVENOUS at 10:25

## 2024-11-07 RX ADMIN — PROPOFOL 30 MG: 10 INJECTION, EMULSION INTRAVENOUS at 10:09

## 2024-11-07 RX ADMIN — Medication 200 MCG: at 10:09

## 2024-11-07 RX ADMIN — PROPOFOL 30 MG: 10 INJECTION, EMULSION INTRAVENOUS at 10:06

## 2024-11-07 ASSESSMENT — PAIN - FUNCTIONAL ASSESSMENT: PAIN_FUNCTIONAL_ASSESSMENT: NONE - DENIES PAIN

## 2024-11-07 NOTE — OP NOTE
NAME:  Hyacinth Patterson   :   1955   MRN:   [id number]     Date/Time:  2024 10:45 AM    Colonoscopy Operative Report    Procedure Type:   Colonoscopy with biopsy     Indications:     Diarrhea, Iron deficiency anemia, elevated fecal calprotectin  Pre-operative Diagnosis: see indication above  Post-operative Diagnosis:  See findings below  :  Jose Luis Goldman MD  Referring Provider: -Jeffery Alexis MD    Exam:  Airway: clear, no airway problems anticipated  Heart: RRR, without gallops or rubs  Lungs: clear bilaterally without wheezes, crackles, or rhonchi  Abdomen: soft, nontender, nondistended, bowel sounds present  Mental Status: awake, alert and oriented to person, place and time    Sedation:  MAC anesthesia Propofol 390mg IV  Procedure Details:  After informed consent was obtained with all risks and benefits of procedure explained and preoperative exam completed, the patient was taken to the endoscopy suite and placed in the left lateral decubitus position.  Upon sequential sedation as per above, a digital rectal exam was performed demonstrating internal hemorrhoids.  The Olympus videocolonoscope  was inserted in the rectum and carefully advanced to the terminal ileum with ileocolonic anastomosis and right hemicolectomy nted.   The quality of preparation was excellent.  The colonoscope was slowly withdrawn with careful evaluation between folds. Retroflexion in the rectum was completed demonstrating internal hemorrhoids.     Findings:     -Normal neoterminal ileum; biopsied to exclude inflammation  -Normal ileo-colonic mucosa consistent with right hemicolectomy anatomy  -Normal colon mucosa without masses, polyps, or inflammation; biopsied to exclude microscopic colitis  -Small grade 1 internal hemorrhoids    Specimen Removed:  # 3 random colon; #4 ileum  Complications: None.   EBL:  None.    Impression:    -Normal neoterminal ileum; biopsied to exclude inflammation  -Normal ileo-colonic mucosa  consistent with right hemicolectomy anatomy  -Normal colon mucosa without masses, polyps, or inflammation; biopsied to exclude microscopic colitis  -Small grade 1 internal hemorrhoids    Recommendations: --Await pathology., -For colon cancer screening in this average-risk patient, colonoscopy may be repeated in 10 years. High fiber diet.  Resume normal medication(s).  You will receive a letter about the biopsy results in about 10 days.  You may be asked to call your doctor's office for the results.   '  Resume Plavix tomorrow    Discharge Disposition:  Home in the company of a  when able to ambulate.    Jose Luis Goldman MD

## 2024-11-07 NOTE — PROGRESS NOTES
Endoscopy Case End Note:     1030:  Procedure scope was pre-cleaned, per protocol, at bedside by Chay Arriaga.   1034:  Report received from anesthesia - CARLTON Barfield.  See anesthesia flowsheet for intra-procedure vital signs and events.    1034:  Dentures returned to patient.

## 2024-11-07 NOTE — ANESTHESIA POSTPROCEDURE EVALUATION
Department of Anesthesiology  Postprocedure Note    Patient: Hyacinth Patterson  MRN: [id number]  YOB: 1955  Date of evaluation: 11/7/2024    Procedure Summary       Date: 11/07/24 Room / Location: [facility] ENDO 02 / [facility] ENDOSCOPY    Anesthesia Start: 0946 Anesthesia Stop: 1035    Procedures:       ENTEROSCOPY PUSH BIOPSY, COLONOSCOPY (Upper GI Region)      COLONOSCOPY BIOPSY (Lower GI Region)      ENTEROSCOPY PUSH BIOPSY Diagnosis:       Iron deficiency anemia, unspecified iron deficiency anemia type      AVM (arteriovenous malformation)      Gastritis      Diarrhea      (Iron deficiency anemia, unspecified iron deficiency anemia type [D50.9])      (AVM (arteriovenous malformation) [Q27.30])    Surgeons: Jose Luis Goldman MD Responsible Provider: MAX Cannon MD    Anesthesia Type: MAC ASA Status: 3            Anesthesia Type: MAC    Sumit Phase I: Sumit Score: 10    Sumit Phase II: Sumit Score: 10    Anesthesia Post Evaluation    Patient location during evaluation: bedside  Patient participation: complete - patient participated  Level of consciousness: responsive to verbal stimuli and awake and alert  Pain score: 2  Nausea & Vomiting: no nausea  Cardiovascular status: blood pressure returned to baseline  Respiratory status: acceptable  Hydration status: euvolemic  Multimodal analgesia pain management approach  Pain management: adequate    No notable events documented.

## 2024-11-07 NOTE — PROGRESS NOTES
ARRIVAL INFORMATION:  Verified patient name and date of birth, scheduled procedure, and informed consent.     : Ehsan Penny cousin, contact number: [phone]  Physician and staff can share information with the .     Receive texts: YES    Belongings with patient include:  Clothing,Jewelry(x1 pair earrings), purse    GI FOCUSED ASSESSMENT:  Neuro: Awake, alert, oriented x4  Respiratory: even and unlabored   GI: soft and non-distended  EKG Rhythm: normal sinus rhythm    Education:Reviewed general discharge instructions and  information.

## 2024-11-07 NOTE — OP NOTE
NAME:  Hyacinth Patterson   :   1955   MRN:   [id number]     Date/Time:  2024 10:47 AM    Enteroscopy Procedure Note    Procedure: Enteroscopy with biopsy, control of bleeding, argon plasma coagulation    Indication: Iron deficiency anemia, hx duodenal AVMs  Pre-operative Diagnosis: see indication above  Post-operative Diagnosis: see findings below  :  Jose Luis Goldman MD  Referring Provider:   -Jeffery Alexis MD    Exam:  Airway: clear, no airway problems anticipated  Heart: RRR, without gallops or rubs  Lungs: clear bilaterally without wheezes, crackles, or rhonchi  Abdomen: soft, nontender, nondistended, bowel sounds present  Mental Status: awake, alert and oriented to person, place and time     Anethesia/Sedation:  MAC anesthesia Propofol as per colonoscopy  Procedure Details   After informed consent was obtained for the procedure, with all risks and benefits of procedure explained the patient was taken to the endoscopy suite and placed in the left lateral decubitus position.  Following sequential administration of sedation as per above, the [id number] gastroscope was inserted into the mouth and advanced under direct vision to fourth portion of the duodenum.  A careful inspection was made as the gastroscope was withdrawn, including a retroflexed view of the proximal stomach; findings and interventions are described below.                  Findings:    -Normal esophagus; biopsied to exclude inflammation  -Minimal gastric erythema (suggestive of gastropathy or gastritis); biopsied to exclude inflammation  -Two diminutive 2mm non-bleeding duodenal AVM in 4th portion duodenum at 140cm; ablated with APC device  -Normal duodenal mucosa otherwise    Therapies:  biopsy of esophagus; biopsy of stomach; APC ablation of duodenal AVMs  Specimens: #1 gastric; #2 distal esoph  EBL:  None.         Complications:   None; patient tolerated the procedure well.           Impression:    -Normal esophagus; biopsied to

## 2024-11-07 NOTE — PERIOP NOTE
The risks and benefits of the bite block have been explained to patient.  Patient verbalizes understanding.      Patient removed upper and lower partial dentures prior to procedure start.

## 2024-11-07 NOTE — H&P
Reconciliation, Ar   atorvastatin (LIPITOR) 80 MG tablet Take 1 tablet by mouth daily  Patient not taking: Reported on 11/7/2024    Provider, MD Nathaniel   clopidogrel (PLAVIX) 75 MG tablet Take 1 tablet by mouth daily 9/10/24   Sagar Borges MD     Physical Exam:   Vital Signs: Blood pressure (!) 153/43, pulse 75, resp. rate 18, height 1.651 m (5' 5\"), weight 74 kg (163 lb 3.2 oz), SpO2 100%.  General: well developed, well nourished   HEENT: unremarkable   Heart: regular rhythm no mumur    Lungs: clear   Abdominal:  benign   Neurological: unremarkable   Extremities: no edema     Findings/Diagnosis: FE def anemia w/ hx SB AVMs; off Plavix for 6d  Plan of Care/Planned Procedure: Enterosopcy/colonosocpy with conscious/deep sedation    Signed:  Jose Luis Goldman MD 11/7/2024

## 2024-11-07 NOTE — DISCHARGE INSTRUCTIONS
Hyacinth Patterson  [id number]  1955    EGD/COLON DISCHARGE INSTRUCTIONS  Discomfort:  Redness at IV site- apply warm compress to area; if redness or soreness persist- contact your physician  There may be a slight amount of blood passed from the rectum  Gaseous discomfort- walking, belching will help relieve any discomfort  You may not operate a vehicle for 12 hours  You may not engage in an occupation involving machinery or appliances for rest of today  You may not drink alcoholic beverages for at least 12 hours  Avoid making any critical decisions for at least 24 hour  DIET:   High fiber diet.   - however -  remember your colon is empty and a heavy meal will produce gas.   Avoid these foods:  vegetables, fried / greasy foods, carbonated drinks for today  MEDICATION:  [unfilled]     ACTIVITY:  You may not resume your normal daily activities until tomorrow AM; it is recommended that you spend the remainder of the day resting -  avoid any strenuous activity.  CALL M.D.  ANY SIGN OF:   Increasing pain, nausea, vomiting  Abdominal distension (swelling)  New increased bleeding (oral or rectal)  Fever (chills)  Pain in chest area  Bloody discharge from nose or mouth  Shortness of breath    IMPRESSION:  -Normal esophagus; biopsied to exclude inflammation  -Minimal gastric erythema (suggestive of gastropathy or gastritis); biopsied to exclude inflammation  -Two diminutive 2mm non-bleeding duodenal AVM in 4th portion duodenum at 140cm; ablated with APC device  -Normal duodenal mucosa otherwise  -Normal neoterminal ileum; biopsied to exclude inflammation  -Normal ileo-colonic mucosa consistent with right hemicolectomy anatomy  -Normal colon mucosa without masses, polyps, or inflammation; biopsied to exclude microscopic colitis  -Small grade 1 internal hemorrhoids    Follow-up Instructions:   Call Dr. Goldman for the results of procedure / biopsy in 7-10 days  Telephone # [phone]  Repeat colonoscopy in 10 years  Resume

## 2024-11-10 NOTE — PROGRESS NOTES
Duration 30 minutes primarily education, review of imaging, labs and records.    Assessment & Plan  1. Lightheadedness.  Her blood pressure is well managed with candesartan 16 mg and carvedilol 12.5 mg. The lightheadedness could be attributed to dehydration or a potential infection. She has been drinking a lot of water. Aspirin, which she takes due to her history of vascular surgery, may also contribute to minor bleeding. She will monitor her symptoms and ensure adequate hydration.    2. Small intestine bleeding.  She had an upper endoscopy and colonoscopy, which revealed very tiny bleeding in the small intestine. No changes were made to her medication regimen. The aspirin she takes may contribute to minor bleeding, but it is necessary due to her vascular surgery history.    3. Upcoming vascular surgery.  She has a blockage and is scheduled for surgery in January at [facility] with Dr. Kelly. Her niece will accompany her from [place].              Chief Complaint   Patient presents with    Follow-up     1 month follow up      Hypertension         No orders of the defined types were placed in this encounter.      Jeffery Alexis MD, FACP      History of Present Illness  The patient presents for evaluation of lightheadedness.    She experiences occasional lightheadedness, particularly upon standing, which prompts her to sit down due to fear of falling. This sensation persists throughout the day. Despite this, she feels confident in her ability to drive. She has been maintaining hydration by consuming ample amounts of water.    She recently had contact with a friend who tested positive for COVID-19, but she herself feels well.    She has undergone an upper endoscopy and colonoscopy, both of which showed no abnormalities. However, a push enteroscopy revealed minor bleeding in the small intestine.    Her medication regimen remains unchanged. She is scheduled for surgery in 01/2025 to address a blockage, as

## 2024-11-11 ENCOUNTER — TELEPHONE (OUTPATIENT)
Age: 69
End: 2024-11-11

## 2024-11-11 NOTE — TELEPHONE ENCOUNTER
----- Message from Erickemanuel EMANUEL sent at 11/11/2024  9:40 AM EST -----  Regarding: ECC Message to Provider  ECC Message to Provider    Relationship to Patient: Self     Additional Information Patient called in asking the details of her appointment for a kidney doctor since she mentioned she accidentally throw the envelope.  --------------------------------------------------------------------------------------------------------------------------    Call Back Information: OK to leave message on voicemail  Preferred Call Back Number: Phone [phone]

## 2024-11-11 NOTE — TELEPHONE ENCOUNTER
Pt called and the following information was given.  10/29/024  FAXED Referral, demographics, notes, LABS, images to:  [phone]  NEPHROLOGY   Dr. Verenice Kumar  [address]  [address]  [place] [address] [phone] (Tel)  [phone] (Fax)  Office will review notes & contact pt to schedule next avail apt date & time

## 2024-11-13 ENCOUNTER — CLINICAL DOCUMENTATION (OUTPATIENT)
Age: 69
End: 2024-11-13

## 2024-11-13 DIAGNOSIS — G47.33 OBSTRUCTIVE SLEEP APNEA (ADULT) (PEDIATRIC): Primary | ICD-10-CM

## 2024-11-13 NOTE — PROGRESS NOTES
Results of sleep study in [facility]  Lead tech to convey results to patient    PAP titration was performed to optimize airflow settings to control her apnea/respiratory events. It was found that a setting of CPAP 7 cmH20 adequately controlled her respiratory events. Oxygen saturation was maintained at or above 94% at this setting.       As discussed, I will order a PAP device for her home use. It will start a little lower than the final pressure setting in the lab ,for her comfort,and will adjust up during the night. she should be seen in the sleep center after she has been on PAP for 4-6 weeks. We will assess how she is doing on PAP therapy and make any further adjustments (if needed).     Patient should call the office the day she gets set up with new PAP device so we can schedule her for an adherence/compliance visit within 31-90 days of obtaining a new device.     PAP order attached.  Staff to kindly order PAP and call patient and let them know which [employer] company they should be hearing from.    (patient will need a first adherence visit after 4-6 weeks on therapy)

## 2024-11-14 NOTE — PROGRESS NOTES
Discussed results with patient who expressed understanding. Patient is willing to proceed with a trial of APAP.

## 2024-11-21 ENCOUNTER — TELEPHONE (OUTPATIENT)
Age: 69
End: 2024-11-21

## 2024-11-21 NOTE — TELEPHONE ENCOUNTER
Called to inform patient her PAP order was faxed to [employer] and provide her the phone number for the company. Also scheduled the 1st adherence appointment

## 2024-11-25 ENCOUNTER — OFFICE VISIT (OUTPATIENT)
Age: 69
End: 2024-11-25
Payer: MEDICARE

## 2024-11-25 ENCOUNTER — TELEPHONE (OUTPATIENT)
Age: 69
End: 2024-11-25

## 2024-11-25 VITALS
OXYGEN SATURATION: 100 % | BODY MASS INDEX: 26.99 KG/M2 | RESPIRATION RATE: 18 BRPM | WEIGHT: 162 LBS | HEART RATE: 55 BPM | TEMPERATURE: 97 F | HEIGHT: 65 IN | DIASTOLIC BLOOD PRESSURE: 69 MMHG | SYSTOLIC BLOOD PRESSURE: 138 MMHG

## 2024-11-25 DIAGNOSIS — Z79.4 TYPE 2 DIABETES MELLITUS WITH OTHER SPECIFIED COMPLICATION, WITH LONG-TERM CURRENT USE OF INSULIN (HCC): ICD-10-CM

## 2024-11-25 DIAGNOSIS — E11.69 TYPE 2 DIABETES MELLITUS WITH OTHER SPECIFIED COMPLICATION, WITH LONG-TERM CURRENT USE OF INSULIN (HCC): ICD-10-CM

## 2024-11-25 DIAGNOSIS — I10 ESSENTIAL (PRIMARY) HYPERTENSION: ICD-10-CM

## 2024-11-25 DIAGNOSIS — D50.0 IRON DEFICIENCY ANEMIA DUE TO CHRONIC BLOOD LOSS: Primary | ICD-10-CM

## 2024-11-25 PROCEDURE — 1159F MED LIST DOCD IN RCRD: CPT | Performed by: INTERNAL MEDICINE

## 2024-11-25 PROCEDURE — 3075F SYST BP GE 130 - 139MM HG: CPT | Performed by: INTERNAL MEDICINE

## 2024-11-25 PROCEDURE — 99214 OFFICE O/P EST MOD 30 MIN: CPT | Performed by: INTERNAL MEDICINE

## 2024-11-25 PROCEDURE — 3044F HG A1C LEVEL LT 7.0%: CPT | Performed by: INTERNAL MEDICINE

## 2024-11-25 PROCEDURE — 1123F ACP DISCUSS/DSCN MKR DOCD: CPT | Performed by: INTERNAL MEDICINE

## 2024-11-25 PROCEDURE — 1126F AMNT PAIN NOTED NONE PRSNT: CPT | Performed by: INTERNAL MEDICINE

## 2024-11-25 PROCEDURE — 3078F DIAST BP <80 MM HG: CPT | Performed by: INTERNAL MEDICINE

## 2024-11-25 ASSESSMENT — PATIENT HEALTH QUESTIONNAIRE - PHQ9
SUM OF ALL RESPONSES TO PHQ QUESTIONS 1-9: 0
2. FEELING DOWN, DEPRESSED OR HOPELESS: NOT AT ALL
SUM OF ALL RESPONSES TO PHQ QUESTIONS 1-9: 0
SUM OF ALL RESPONSES TO PHQ9 QUESTIONS 1 & 2: 0
1. LITTLE INTEREST OR PLEASURE IN DOING THINGS: NOT AT ALL

## 2024-11-25 NOTE — TELEPHONE ENCOUNTER
Received notice from [employer] regarding needing additional info to support PAP setup authorization. Called and informed [employer] ([employer]) who says they might need a signed DWO. She will send us this. We should sign and send back to [employer] for them to reinitiate the pre certification for pap therapy.     Scanned in media the notice from [employer].

## 2024-11-25 NOTE — PROGRESS NOTES
Hyacinth Patterson is a 68 y.o. female presenting for/with:    Chief Complaint   Patient presents with    Follow-up     1 month follow up      Hypertension       Vitals:    11/25/24 0730   BP: 138/69   Site: Left Upper Arm   Position: Sitting   Cuff Size: Medium Adult   Pulse: 55   Resp: 18   Temp: 97 °F (36.1 °C)   TempSrc: Temporal   SpO2: 100%   Weight: 73.5 kg (162 lb)   Height: 1.651 m (5' 5\")       Pain Scale: 0 - No pain/10  Pain Location:     \"Have you been to the ER, urgent care clinic since your last visit?  Hospitalized since your last visit?\"    NO    “Have you seen or consulted any other health care providers outside of [facility] since your last visit?”    NO                 11/25/2024     7:26 AM   PHQ-9    Little interest or pleasure in doing things 0   Feeling down, depressed, or hopeless 0   PHQ-2 Score 0   PHQ-9 Total Score 0           8/29/2024    10:40 AM 5/7/2024     9:20 AM 4/17/2024     9:00 AM 1/12/2024     7:30 AM 9/21/2023     3:00 PM 6/16/2023    10:00 AM 3/15/2023    12:00 AM   [facility] AMB LEARNING ASSESSMENT   Primary Learner Patient Patient Patient Patient Patient Patient Patient   co-learner caregiver     No  No   Primary Language ENGLISH ENGLISH ENGLISH ENGLISH ENGLISH ENGLISH ENGLISH   Learning Preference DEMONSTRATION DEMONSTRATION DEMONSTRATION PICTURES DEMONSTRATION DEMONSTRATION DEMONSTRATION   Answered By pt pt pt self pt pt pt   Relationship to Learner SELF SELF SELF SELF SELF SELF SELF            11/25/2024     7:26 AM   Amb Fall Risk Assessment and TUG Test   Do you feel unsteady or are you worried about falling?  no   2 or more falls in past year? no   Fall with injury in past year? no           11/25/2024     7:00 AM 10/28/2024    10:00 AM 10/17/2024     6:00 AM 9/19/2024     7:00 AM 8/9/2024     7:00 AM 6/20/2024     2:00 PM 6/7/2024     7:00 AM   ADL ASSESSMENT   Feeding yourself No Help Needed No Help Needed No Help Needed No Help Needed No Help Needed No Help

## 2024-11-29 DIAGNOSIS — I10 BENIGN ESSENTIAL HYPERTENSION: ICD-10-CM

## 2024-11-29 RX ORDER — TRIAMTERENE AND HYDROCHLOROTHIAZIDE 37.5; 25 MG/1; MG/1
1 TABLET ORAL DAILY
Qty: 90 TABLET | Refills: 1 | OUTPATIENT
Start: 2024-11-29

## 2024-12-02 ENCOUNTER — TELEPHONE (OUTPATIENT)
Age: 69
End: 2024-12-02

## 2024-12-02 ENCOUNTER — TRANSCRIBE ORDERS (OUTPATIENT)
Facility: HOSPITAL | Age: 69
End: 2024-12-02

## 2024-12-02 DIAGNOSIS — N18.9 CHRONIC KIDNEY DISEASE, UNSPECIFIED CKD STAGE: Primary | ICD-10-CM

## 2024-12-02 NOTE — TELEPHONE ENCOUNTER
Spoke with the pt.  Verified patient with two patient identifiers.  Pt states that the last episode of \"fainting\" was 10/18.  Per [facility] documentation, \"On 10/18/2024, she experienced a fainting episode and was subsequently taken to the emergency room by 911. She was diagnosed with anemia and received a blood transfusion.\"    Pt has been experiencing dizziness for \"months\".   Pts vitals taken by the pt at home, 116-130's/50-60's. HR \"normal\" per the pt (no numbers to report).    Pt stopped the candesartan and coreg on 11/29. Pt reports that the dizziness has resolved.

## 2024-12-02 NOTE — TELEPHONE ENCOUNTER
Patient is calling because she believes the Candesartan is making her dizzy.Patient said she is passing out from the medicine because it's causing her extreme dizziness.    Patient is trying to see if she can get an appointment to be seen today.    [phone]

## 2024-12-03 NOTE — TELEPHONE ENCOUNTER
Per Dr. Borges:  Keep track of BPs for the next week or so and if they are running the same or higher, could consider reinstituting the candesartan at a lower dose.  She is scheduled for follow-up with Dr. Alexis on 12/27.     Pt verbalized understanding and will monitor BP.

## 2024-12-05 ENCOUNTER — TRANSCRIBE ORDERS (OUTPATIENT)
Facility: HOSPITAL | Age: 69
End: 2024-12-05

## 2024-12-05 DIAGNOSIS — Z12.31 SCREENING MAMMOGRAM FOR BREAST CANCER: Primary | ICD-10-CM

## 2024-12-09 DIAGNOSIS — I10 BENIGN ESSENTIAL HYPERTENSION: ICD-10-CM

## 2024-12-09 RX ORDER — TRIAMTERENE AND HYDROCHLOROTHIAZIDE 37.5; 25 MG/1; MG/1
1 TABLET ORAL DAILY
Qty: 90 TABLET | Refills: 1 | OUTPATIENT
Start: 2024-12-09

## 2024-12-10 RX ORDER — ATORVASTATIN CALCIUM 80 MG/1
80 TABLET, FILM COATED ORAL DAILY
Qty: 90 TABLET | Refills: 0 | Status: SHIPPED | OUTPATIENT
Start: 2024-12-10

## 2024-12-10 NOTE — TELEPHONE ENCOUNTER
PCP: Jeffery Alexis MD    Last appt: 8/29/2024   Future Appointments   Date Time Provider Department Center   12/27/2024  8:30 AM Jeffery Alexis MD [facility] MAIN [facility]   12/31/2024  8:00 AM [facility] MAMMO 1 RGHRMAM [facility]   3/13/2025 10:40 AM Sagar Borges MD [facility] AMB   4/15/2025  3:40 PM Teodora Trammell MD [facility] AMB       Requested Prescriptions     Signed Prescriptions Disp Refills    atorvastatin (LIPITOR) 80 MG tablet 90 tablet 0     Sig: Take 1 tablet by mouth daily     Authorizing Provider: SAGAR BORGES     Ordering User: DEVORAH LUCAS         Other Comments:  Verbal order per provider.  Order (medication, dose, route, frequency, amount, refills) repeated and verified twice.

## 2024-12-17 DIAGNOSIS — I10 BENIGN ESSENTIAL HYPERTENSION: ICD-10-CM

## 2024-12-17 RX ORDER — TRIAMTERENE AND HYDROCHLOROTHIAZIDE 37.5; 25 MG/1; MG/1
1 TABLET ORAL DAILY
Qty: 90 TABLET | Refills: 1 | Status: SHIPPED | OUTPATIENT
Start: 2024-12-17

## 2024-12-17 RX ORDER — MONTELUKAST SODIUM 10 MG/1
TABLET ORAL
Qty: 30 TABLET | Refills: 3 | Status: SHIPPED | OUTPATIENT
Start: 2024-12-17

## 2024-12-19 ENCOUNTER — HOSPITAL ENCOUNTER (OUTPATIENT)
Facility: HOSPITAL | Age: 69
Discharge: HOME | End: 2024-12-22
Payer: MEDICARE

## 2024-12-19 DIAGNOSIS — N18.9 CHRONIC KIDNEY DISEASE, UNSPECIFIED CKD STAGE: ICD-10-CM

## 2024-12-19 PROCEDURE — 76770 US EXAM ABDO BACK WALL COMP: CPT

## 2024-12-27 ENCOUNTER — OFFICE VISIT (OUTPATIENT)
Age: 69
End: 2024-12-27
Payer: MEDICARE

## 2024-12-27 VITALS
OXYGEN SATURATION: 100 % | TEMPERATURE: 98.2 F | HEART RATE: 91 BPM | RESPIRATION RATE: 18 BRPM | SYSTOLIC BLOOD PRESSURE: 143 MMHG | WEIGHT: 153.2 LBS | BODY MASS INDEX: 25.52 KG/M2 | DIASTOLIC BLOOD PRESSURE: 72 MMHG | HEIGHT: 65 IN

## 2024-12-27 DIAGNOSIS — Z79.4 TYPE 2 DIABETES MELLITUS WITHOUT COMPLICATION, WITH LONG-TERM CURRENT USE OF INSULIN (HCC): ICD-10-CM

## 2024-12-27 DIAGNOSIS — D50.0 IRON DEFICIENCY ANEMIA DUE TO CHRONIC BLOOD LOSS: ICD-10-CM

## 2024-12-27 DIAGNOSIS — I10 BENIGN ESSENTIAL HYPERTENSION: Primary | ICD-10-CM

## 2024-12-27 DIAGNOSIS — E78.2 MIXED HYPERLIPIDEMIA: ICD-10-CM

## 2024-12-27 DIAGNOSIS — I10 ESSENTIAL (PRIMARY) HYPERTENSION: ICD-10-CM

## 2024-12-27 DIAGNOSIS — E11.9 TYPE 2 DIABETES MELLITUS WITHOUT COMPLICATION, WITH LONG-TERM CURRENT USE OF INSULIN (HCC): ICD-10-CM

## 2024-12-27 PROCEDURE — 1159F MED LIST DOCD IN RCRD: CPT | Performed by: INTERNAL MEDICINE

## 2024-12-27 PROCEDURE — 99214 OFFICE O/P EST MOD 30 MIN: CPT | Performed by: INTERNAL MEDICINE

## 2024-12-27 PROCEDURE — 3077F SYST BP >= 140 MM HG: CPT | Performed by: INTERNAL MEDICINE

## 2024-12-27 PROCEDURE — 1123F ACP DISCUSS/DSCN MKR DOCD: CPT | Performed by: INTERNAL MEDICINE

## 2024-12-27 PROCEDURE — 3044F HG A1C LEVEL LT 7.0%: CPT | Performed by: INTERNAL MEDICINE

## 2024-12-27 PROCEDURE — 3078F DIAST BP <80 MM HG: CPT | Performed by: INTERNAL MEDICINE

## 2024-12-27 PROCEDURE — 1126F AMNT PAIN NOTED NONE PRSNT: CPT | Performed by: INTERNAL MEDICINE

## 2024-12-27 RX ORDER — FERROUS SULFATE 325(65) MG
325 TABLET ORAL
Qty: 180 TABLET | Refills: 1
Start: 2024-12-27

## 2024-12-27 RX ORDER — INSULIN GLARGINE 100 [IU]/ML
10 INJECTION, SOLUTION SUBCUTANEOUS DAILY
Qty: 15 ML | Refills: 5
Start: 2024-12-27

## 2024-12-27 ASSESSMENT — PATIENT HEALTH QUESTIONNAIRE - PHQ9
SUM OF ALL RESPONSES TO PHQ QUESTIONS 1-9: 0
1. LITTLE INTEREST OR PLEASURE IN DOING THINGS: NOT AT ALL
SUM OF ALL RESPONSES TO PHQ9 QUESTIONS 1 & 2: 0
2. FEELING DOWN, DEPRESSED OR HOPELESS: NOT AT ALL

## 2024-12-27 NOTE — PROGRESS NOTES
Needed No Help Needed No Help Needed           12/27/2024     8:00 AM   AMB Abuse Screening   Do you ever feel afraid of your partner? N   Are you in a relationship with someone who physically or mentally threatens you? N   Is it safe for you to go home? Y       Advance Care Planning     The patient has appointed the following active healthcare agents:    Primary Decision Maker: Christina Romero - Niece/Nephew - [phone]  
the appointment consented to the use of AI, including the recording.

## 2024-12-30 ENCOUNTER — TRANSCRIBE ORDERS (OUTPATIENT)
Facility: HOSPITAL | Age: 69
End: 2024-12-30

## 2024-12-30 ENCOUNTER — HOSPITAL ENCOUNTER (OUTPATIENT)
Facility: HOSPITAL | Age: 69
Discharge: HOME | End: 2025-01-02

## 2024-12-30 ENCOUNTER — HOSPITAL ENCOUNTER (OUTPATIENT)
Facility: HOSPITAL | Age: 69
Discharge: HOME | End: 2025-01-02
Payer: MEDICARE

## 2024-12-30 DIAGNOSIS — I10 BENIGN ESSENTIAL HYPERTENSION: ICD-10-CM

## 2024-12-30 DIAGNOSIS — I10 ESSENTIAL HYPERTENSION, BENIGN: Primary | ICD-10-CM

## 2024-12-30 DIAGNOSIS — E87.6 HYPOKALEMIA: Primary | ICD-10-CM

## 2024-12-30 LAB
ALBUMIN SERPL-MCNC: 3.4 G/DL (ref 3.5–5)
ALBUMIN/GLOB SERPL: 0.8 (ref 1.1–2.2)
ALP SERPL-CCNC: 117 U/L (ref 45–117)
ALT SERPL-CCNC: 20 U/L (ref 12–78)
ANION GAP SERPL CALC-SCNC: 6 MMOL/L (ref 2–12)
AST SERPL-CCNC: 17 U/L (ref 15–37)
BASOPHILS # BLD: 0.1 K/UL (ref 0–0.1)
BASOPHILS NFR BLD: 1 % (ref 0–1)
BILIRUB SERPL-MCNC: 0.3 MG/DL (ref 0.2–1)
BUN SERPL-MCNC: 11 MG/DL (ref 6–20)
BUN/CREAT SERPL: 7 (ref 12–20)
CALCIUM SERPL-MCNC: 9.6 MG/DL (ref 8.5–10.1)
CHLORIDE SERPL-SCNC: 101 MMOL/L (ref 97–108)
CHOLEST SERPL-MCNC: 141 MG/DL
CO2 SERPL-SCNC: 30 MMOL/L (ref 21–32)
CREAT SERPL-MCNC: 1.65 MG/DL (ref 0.55–1.02)
DIFFERENTIAL METHOD BLD: ABNORMAL
EKG ATRIAL RATE: 88 BPM
EKG DIAGNOSIS: NORMAL
EKG P AXIS: 64 DEGREES
EKG P-R INTERVAL: 146 MS
EKG Q-T INTERVAL: 374 MS
EKG QRS DURATION: 94 MS
EKG QTC CALCULATION (BAZETT): 452 MS
EKG R AXIS: 13 DEGREES
EKG T AXIS: -71 DEGREES
EKG VENTRICULAR RATE: 88 BPM
EOSINOPHIL # BLD: 0.2 K/UL (ref 0–0.4)
EOSINOPHIL NFR BLD: 2 % (ref 0–7)
ERYTHROCYTE [DISTWIDTH] IN BLOOD BY AUTOMATED COUNT: 18.3 % (ref 11.5–14.5)
EST. AVERAGE GLUCOSE BLD GHB EST-MCNC: 137 MG/DL
FERRITIN SERPL-MCNC: 57 NG/ML (ref 8–252)
GLOBULIN SER CALC-MCNC: 4.2 G/DL (ref 2–4)
GLUCOSE SERPL-MCNC: 110 MG/DL (ref 65–100)
HBA1C MFR BLD: 6.4 % (ref 4–5.6)
HCT VFR BLD AUTO: 28.9 % (ref 35–47)
HDLC SERPL-MCNC: 54 MG/DL
HDLC SERPL: 2.6 (ref 0–5)
HGB BLD-MCNC: 8.7 G/DL (ref 11.5–16)
IMM GRANULOCYTES # BLD AUTO: 0 K/UL (ref 0–0.04)
IMM GRANULOCYTES NFR BLD AUTO: 0 % (ref 0–0.5)
LDLC SERPL CALC-MCNC: 61.6 MG/DL (ref 0–100)
LYMPHOCYTES # BLD: 1.6 K/UL (ref 0.8–3.5)
LYMPHOCYTES NFR BLD: 23 % (ref 12–49)
MCH RBC QN AUTO: 25.9 PG (ref 26–34)
MCHC RBC AUTO-ENTMCNC: 30.1 G/DL (ref 30–36.5)
MCV RBC AUTO: 86 FL (ref 80–99)
MONOCYTES # BLD: 0.7 K/UL (ref 0–1)
MONOCYTES NFR BLD: 9 % (ref 5–13)
NEUTS SEG # BLD: 4.6 K/UL (ref 1.8–8)
NEUTS SEG NFR BLD: 65 % (ref 32–75)
NRBC # BLD: 0 K/UL (ref 0–0.01)
NRBC BLD-RTO: 0 PER 100 WBC
PLATELET # BLD AUTO: 337 K/UL (ref 150–400)
PMV BLD AUTO: 11.4 FL (ref 8.9–12.9)
POTASSIUM SERPL-SCNC: 2.9 MMOL/L (ref 3.5–5.1)
PROT SERPL-MCNC: 7.6 G/DL (ref 6.4–8.2)
RBC # BLD AUTO: 3.36 M/UL (ref 3.8–5.2)
SODIUM SERPL-SCNC: 137 MMOL/L (ref 136–145)
TRIGL SERPL-MCNC: 127 MG/DL
VLDLC SERPL CALC-MCNC: 25.4 MG/DL
WBC # BLD AUTO: 7.1 K/UL (ref 3.6–11)

## 2024-12-30 PROCEDURE — 80061 LIPID PANEL: CPT

## 2024-12-30 PROCEDURE — 80053 COMPREHEN METABOLIC PANEL: CPT

## 2024-12-30 PROCEDURE — 82728 ASSAY OF FERRITIN: CPT

## 2024-12-30 PROCEDURE — 36415 COLL VENOUS BLD VENIPUNCTURE: CPT

## 2024-12-30 PROCEDURE — 85025 COMPLETE CBC W/AUTO DIFF WBC: CPT

## 2024-12-30 PROCEDURE — 83036 HEMOGLOBIN GLYCOSYLATED A1C: CPT

## 2024-12-30 RX ORDER — POTASSIUM CHLORIDE 1500 MG/1
20 TABLET, EXTENDED RELEASE ORAL DAILY
Qty: 30 TABLET | Refills: 5 | Status: SHIPPED | OUTPATIENT
Start: 2024-12-30

## 2024-12-31 ENCOUNTER — HOSPITAL ENCOUNTER (OUTPATIENT)
Facility: HOSPITAL | Age: 69
Discharge: HOME | End: 2025-01-03
Attending: INTERNAL MEDICINE
Payer: MEDICARE

## 2024-12-31 ENCOUNTER — APPOINTMENT (OUTPATIENT)
Facility: HOSPITAL | Age: 69
End: 2024-12-31
Payer: MEDICARE

## 2024-12-31 ENCOUNTER — HOSPITAL ENCOUNTER (EMERGENCY)
Facility: HOSPITAL | Age: 69
Discharge: HOME | End: 2024-12-31
Attending: EMERGENCY MEDICINE
Payer: MEDICARE

## 2024-12-31 VITALS
DIASTOLIC BLOOD PRESSURE: 64 MMHG | SYSTOLIC BLOOD PRESSURE: 134 MMHG | HEIGHT: 65 IN | OXYGEN SATURATION: 99 % | HEART RATE: 98 BPM | WEIGHT: 154 LBS | RESPIRATION RATE: 20 BRPM | BODY MASS INDEX: 25.66 KG/M2 | TEMPERATURE: 98.2 F

## 2024-12-31 DIAGNOSIS — Z12.31 SCREENING MAMMOGRAM FOR BREAST CANCER: ICD-10-CM

## 2024-12-31 DIAGNOSIS — E87.6 HYPOKALEMIA: Primary | ICD-10-CM

## 2024-12-31 DIAGNOSIS — D64.9 CHRONIC ANEMIA: ICD-10-CM

## 2024-12-31 LAB
ALBUMIN SERPL-MCNC: 3.1 G/DL (ref 3.5–5)
ALBUMIN/GLOB SERPL: 0.8 (ref 1.1–2.2)
ALP SERPL-CCNC: 108 U/L (ref 45–117)
ALT SERPL-CCNC: 18 U/L (ref 12–78)
ANION GAP SERPL CALC-SCNC: 12 MMOL/L (ref 2–12)
AST SERPL-CCNC: 18 U/L (ref 15–37)
BASOPHILS # BLD: 0.1 K/UL (ref 0–0.1)
BASOPHILS NFR BLD: 1 % (ref 0–1)
BILIRUB SERPL-MCNC: 0.3 MG/DL (ref 0.2–1)
BUN SERPL-MCNC: 14 MG/DL (ref 6–20)
BUN/CREAT SERPL: 8 (ref 12–20)
CALCIUM SERPL-MCNC: 9.5 MG/DL (ref 8.5–10.1)
CHLORIDE SERPL-SCNC: 98 MMOL/L (ref 97–108)
CO2 SERPL-SCNC: 27 MMOL/L (ref 21–32)
CREAT SERPL-MCNC: 1.83 MG/DL (ref 0.55–1.02)
DIFFERENTIAL METHOD BLD: ABNORMAL
EOSINOPHIL # BLD: 0.1 K/UL (ref 0–0.4)
EOSINOPHIL NFR BLD: 2 % (ref 0–7)
ERYTHROCYTE [DISTWIDTH] IN BLOOD BY AUTOMATED COUNT: 18 % (ref 11.5–14.5)
GLOBULIN SER CALC-MCNC: 4.1 G/DL (ref 2–4)
GLUCOSE SERPL-MCNC: 224 MG/DL (ref 65–100)
HCT VFR BLD AUTO: 27.1 % (ref 35–47)
HGB BLD-MCNC: 8.2 G/DL (ref 11.5–16)
IMM GRANULOCYTES # BLD AUTO: 0 K/UL (ref 0–0.04)
IMM GRANULOCYTES NFR BLD AUTO: 0 % (ref 0–0.5)
LIPASE SERPL-CCNC: 38 U/L (ref 13–75)
LYMPHOCYTES # BLD: 1.2 K/UL (ref 0.8–3.5)
LYMPHOCYTES NFR BLD: 22 % (ref 12–49)
MAGNESIUM SERPL-MCNC: 2.1 MG/DL (ref 1.6–2.4)
MCH RBC QN AUTO: 25.3 PG (ref 26–34)
MCHC RBC AUTO-ENTMCNC: 30.3 G/DL (ref 30–36.5)
MCV RBC AUTO: 83.6 FL (ref 80–99)
MONOCYTES # BLD: 0.5 K/UL (ref 0–1)
MONOCYTES NFR BLD: 10 % (ref 5–13)
NEUTS SEG # BLD: 3.6 K/UL (ref 1.8–8)
NEUTS SEG NFR BLD: 65 % (ref 32–75)
NRBC # BLD: 0 K/UL (ref 0–0.01)
NRBC BLD-RTO: 0 PER 100 WBC
PLATELET # BLD AUTO: 329 K/UL (ref 150–400)
PMV BLD AUTO: 10.3 FL (ref 8.9–12.9)
POTASSIUM SERPL-SCNC: 3.1 MMOL/L (ref 3.5–5.1)
PROT SERPL-MCNC: 7.2 G/DL (ref 6.4–8.2)
RBC # BLD AUTO: 3.24 M/UL (ref 3.8–5.2)
SODIUM SERPL-SCNC: 137 MMOL/L (ref 136–145)
TROPONIN I SERPL HS-MCNC: 25 NG/L (ref 0–51)
WBC # BLD AUTO: 5.4 K/UL (ref 3.6–11)

## 2024-12-31 PROCEDURE — 71045 X-RAY EXAM CHEST 1 VIEW: CPT

## 2024-12-31 PROCEDURE — 83690 ASSAY OF LIPASE: CPT

## 2024-12-31 PROCEDURE — 85025 COMPLETE CBC W/AUTO DIFF WBC: CPT

## 2024-12-31 PROCEDURE — 99285 EMERGENCY DEPT VISIT HI MDM: CPT

## 2024-12-31 PROCEDURE — 84484 ASSAY OF TROPONIN QUANT: CPT

## 2024-12-31 PROCEDURE — 36415 COLL VENOUS BLD VENIPUNCTURE: CPT

## 2024-12-31 PROCEDURE — 77063 BREAST TOMOSYNTHESIS BI: CPT

## 2024-12-31 PROCEDURE — 6370000000 HC RX 637 (ALT 250 FOR IP): Performed by: EMERGENCY MEDICINE

## 2024-12-31 PROCEDURE — 80053 COMPREHEN METABOLIC PANEL: CPT

## 2024-12-31 PROCEDURE — 83735 ASSAY OF MAGNESIUM: CPT

## 2024-12-31 PROCEDURE — 93005 ELECTROCARDIOGRAM TRACING: CPT | Performed by: EMERGENCY MEDICINE

## 2024-12-31 RX ORDER — POTASSIUM CHLORIDE 7.45 MG/ML
10 INJECTION INTRAVENOUS
Status: DISCONTINUED | OUTPATIENT
Start: 2024-12-31 | End: 2024-12-31

## 2024-12-31 RX ORDER — POTASSIUM CHLORIDE 1500 MG/1
20 TABLET, EXTENDED RELEASE ORAL 2 TIMES DAILY
Qty: 30 TABLET | Refills: 0 | Status: SHIPPED | OUTPATIENT
Start: 2024-12-31

## 2024-12-31 RX ORDER — POTASSIUM CHLORIDE 750 MG/1
40 TABLET, EXTENDED RELEASE ORAL ONCE
Status: COMPLETED | OUTPATIENT
Start: 2024-12-31 | End: 2024-12-31

## 2024-12-31 RX ADMIN — POTASSIUM CHLORIDE 40 MEQ: 750 TABLET, EXTENDED RELEASE ORAL at 10:04

## 2024-12-31 ASSESSMENT — PAIN SCALES - GENERAL: PAINLEVEL_OUTOF10: 0

## 2024-12-31 ASSESSMENT — PAIN - FUNCTIONAL ASSESSMENT: PAIN_FUNCTIONAL_ASSESSMENT: 0-10

## 2024-12-31 NOTE — DISCHARGE INSTRUCTIONS
become worse or you do not improve as expected and you are unable to reach your usual health care provider, you should return to the Emergency Department. We are available 24 hours a day.    Please take your discharge instructions with you when you go to your follow-up appointment.     If a prescription has been provided, please have it filled as soon as possible to prevent a delay in treatment. Read the entire medication instruction sheet provided to you by the pharmacy. If you have any questions or reservations about taking the medication due to side effects or interactions with other medications, please call your primary care physician or contact the ER to speak with the charge nurse.     Please make an appointment with your family doctor or the physician you were referred to for follow-up of this visit as instructed on your discharge paperwork. Return to the ER if you are unable to be seen or if you are unable to be seen in a timely manner.    Should you experience abdominal pain lasting greater than 6 hours, chest pain, difficulty breathing, fever/chills, numbness/tingling, skin changes or other symptoms that concern you, return to the ED sooner. If you feel worse over the next 24 hours, please return to the ED. We are available 24 hours a day. Thank you for trusting us with your care!

## 2024-12-31 NOTE — ED NOTES
Discharge instructions provided and reviewed with pt. Opportunity provided for discussion and pt has no further questions at this time.
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
## 2024-12-31 NOTE — ED TRIAGE NOTES
Pt arrived with complaint of SOB.  Pt reprots she is short winded and her K+ is low and that is why she is so tired.  Pt awake alert and oriented X 4, pt speaking in full complete sentences  NAD.  Pt was able to get out of w/c and walk to stretcher without difficulty and no ROBERTS noted.  Pt educated on ER flow

## 2025-01-02 LAB
EKG ATRIAL RATE: 67 BPM
EKG DIAGNOSIS: NORMAL
EKG P AXIS: 46 DEGREES
EKG P-R INTERVAL: 166 MS
EKG Q-T INTERVAL: 378 MS
EKG QRS DURATION: 96 MS
EKG QTC CALCULATION (BAZETT): 399 MS
EKG R AXIS: -4 DEGREES
EKG T AXIS: -45 DEGREES
EKG VENTRICULAR RATE: 67 BPM

## 2025-01-08 ENCOUNTER — HOSPITAL ENCOUNTER (EMERGENCY)
Facility: HOSPITAL | Age: 70
Discharge: HOME | End: 2025-01-08
Attending: EMERGENCY MEDICINE
Payer: MEDICARE

## 2025-01-08 ENCOUNTER — APPOINTMENT (OUTPATIENT)
Facility: HOSPITAL | Age: 70
End: 2025-01-08
Payer: MEDICARE

## 2025-01-08 VITALS
HEIGHT: 65 IN | OXYGEN SATURATION: 98 % | TEMPERATURE: 98.2 F | SYSTOLIC BLOOD PRESSURE: 154 MMHG | BODY MASS INDEX: 24.26 KG/M2 | DIASTOLIC BLOOD PRESSURE: 133 MMHG | HEART RATE: 72 BPM | RESPIRATION RATE: 21 BRPM | WEIGHT: 145.6 LBS

## 2025-01-08 DIAGNOSIS — I74.9 ARTERIAL THROMBOSIS (HCC): ICD-10-CM

## 2025-01-08 DIAGNOSIS — M54.50 LEFT-SIDED LOW BACK PAIN WITHOUT SCIATICA, UNSPECIFIED CHRONICITY: ICD-10-CM

## 2025-01-08 DIAGNOSIS — R10.10 ACUTE UPPER ABDOMINAL PAIN: Primary | ICD-10-CM

## 2025-01-08 LAB
ALBUMIN SERPL-MCNC: 3.4 G/DL (ref 3.5–5)
ALBUMIN/GLOB SERPL: 1 (ref 1.1–2.2)
ALP SERPL-CCNC: 116 U/L (ref 45–117)
ALT SERPL-CCNC: 20 U/L (ref 12–78)
ANION GAP SERPL CALC-SCNC: 14 MMOL/L (ref 2–12)
APPEARANCE UR: CLEAR
AST SERPL-CCNC: 22 U/L (ref 15–37)
BACTERIA URNS QL MICRO: NEGATIVE /HPF
BASOPHILS # BLD: 0.06 K/UL (ref 0–0.1)
BASOPHILS NFR BLD: 0.8 % (ref 0–1)
BILIRUB SERPL-MCNC: 0.3 MG/DL (ref 0.2–1)
BILIRUB UR QL: NEGATIVE
BUN SERPL-MCNC: 15 MG/DL (ref 6–20)
BUN/CREAT SERPL: 10 (ref 12–20)
CALCIUM SERPL-MCNC: 9.6 MG/DL (ref 8.5–10.1)
CHLORIDE SERPL-SCNC: 102 MMOL/L (ref 97–108)
CO2 SERPL-SCNC: 26 MMOL/L (ref 21–32)
COLOR UR: ABNORMAL
CREAT SERPL-MCNC: 1.49 MG/DL (ref 0.55–1.02)
DIFFERENTIAL METHOD BLD: ABNORMAL
EKG ATRIAL RATE: 71 BPM
EKG DIAGNOSIS: NORMAL
EKG P AXIS: 43 DEGREES
EKG P-R INTERVAL: 134 MS
EKG Q-T INTERVAL: 418 MS
EKG QRS DURATION: 92 MS
EKG QTC CALCULATION (BAZETT): 454 MS
EKG R AXIS: -1 DEGREES
EKG T AXIS: -65 DEGREES
EKG VENTRICULAR RATE: 71 BPM
EOSINOPHIL # BLD: 0.18 K/UL (ref 0–0.4)
EOSINOPHIL NFR BLD: 2.4 % (ref 0–0.7)
EPITH CASTS URNS QL MICRO: ABNORMAL /LPF
ERYTHROCYTE [DISTWIDTH] IN BLOOD BY AUTOMATED COUNT: 17.2 % (ref 11.5–14.5)
GLOBULIN SER CALC-MCNC: 3.5 G/DL (ref 2–4)
GLUCOSE SERPL-MCNC: 108 MG/DL (ref 65–100)
GLUCOSE UR STRIP.AUTO-MCNC: NEGATIVE MG/DL
HCT VFR BLD AUTO: 27.4 % (ref 35–47)
HGB BLD-MCNC: 8.4 G/DL (ref 11.5–16)
HGB UR QL STRIP: NEGATIVE
IMM GRANULOCYTES # BLD AUTO: 0.02 K/UL (ref 0–0.04)
IMM GRANULOCYTES NFR BLD AUTO: 0.3 % (ref 0–0.5)
KETONES UR QL STRIP.AUTO: ABNORMAL MG/DL
LACTATE SERPL-SCNC: 1.7 MMOL/L (ref 0.4–2)
LACTATE SERPL-SCNC: 3 MMOL/L (ref 0.4–2)
LEUKOCYTE ESTERASE UR QL STRIP.AUTO: NEGATIVE
LIPASE SERPL-CCNC: 29 U/L (ref 13–75)
LYMPHOCYTES # BLD: 1.83 K/UL (ref 0.8–3.5)
LYMPHOCYTES NFR BLD: 24.8 % (ref 12–49)
MCH RBC QN AUTO: 25.6 PG (ref 26–34)
MCHC RBC AUTO-ENTMCNC: 30.7 G/DL (ref 30–36.5)
MCV RBC AUTO: 83.5 FL (ref 80–99)
MONOCYTES # BLD: 0.61 K/UL (ref 0–1)
MONOCYTES NFR BLD: 8.3 % (ref 5–13)
NEUTS SEG # BLD: 4.69 K/UL (ref 1.8–8)
NEUTS SEG NFR BLD: 63.4 % (ref 32–75)
NITRITE UR QL STRIP.AUTO: NEGATIVE
NRBC # BLD: 0 K/UL (ref 0–0.01)
NRBC BLD-RTO: 0 PER 100 WBC
PH UR STRIP: 7 (ref 5–8)
PLATELET # BLD AUTO: 358 K/UL (ref 150–400)
PMV BLD AUTO: 10.8 FL (ref 8.9–12.9)
POTASSIUM SERPL-SCNC: 3.7 MMOL/L (ref 3.5–5.1)
PROT SERPL-MCNC: 6.9 G/DL (ref 6.4–8.2)
PROT UR STRIP-MCNC: NEGATIVE MG/DL
RBC # BLD AUTO: 3.28 M/UL (ref 3.8–5.2)
RBC #/AREA URNS HPF: ABNORMAL /HPF (ref 0–5)
SODIUM SERPL-SCNC: 142 MMOL/L (ref 136–145)
SP GR UR REFRACTOMETRY: 1 (ref 1–1.03)
TROPONIN I SERPL HS-MCNC: 21 NG/L (ref 0–51)
URINE CULTURE IF INDICATED: ABNORMAL
UROBILINOGEN UR QL STRIP.AUTO: 0.2 EU/DL (ref 0.2–1)
WBC # BLD AUTO: 7.4 K/UL (ref 3.6–11)
WBC URNS QL MICRO: ABNORMAL /HPF (ref 0–4)

## 2025-01-08 PROCEDURE — 80053 COMPREHEN METABOLIC PANEL: CPT

## 2025-01-08 PROCEDURE — 6370000000 HC RX 637 (ALT 250 FOR IP): Performed by: EMERGENCY MEDICINE

## 2025-01-08 PROCEDURE — 85025 COMPLETE CBC W/AUTO DIFF WBC: CPT

## 2025-01-08 PROCEDURE — 81001 URINALYSIS AUTO W/SCOPE: CPT

## 2025-01-08 PROCEDURE — 93005 ELECTROCARDIOGRAM TRACING: CPT | Performed by: EMERGENCY MEDICINE

## 2025-01-08 PROCEDURE — 83690 ASSAY OF LIPASE: CPT

## 2025-01-08 PROCEDURE — 74174 CTA ABD&PLVS W/CONTRAST: CPT

## 2025-01-08 PROCEDURE — 84484 ASSAY OF TROPONIN QUANT: CPT

## 2025-01-08 PROCEDURE — 99285 EMERGENCY DEPT VISIT HI MDM: CPT

## 2025-01-08 PROCEDURE — 6360000002 HC RX W HCPCS: Performed by: EMERGENCY MEDICINE

## 2025-01-08 PROCEDURE — 96374 THER/PROPH/DIAG INJ IV PUSH: CPT

## 2025-01-08 PROCEDURE — 36415 COLL VENOUS BLD VENIPUNCTURE: CPT

## 2025-01-08 PROCEDURE — 96375 TX/PRO/DX INJ NEW DRUG ADDON: CPT

## 2025-01-08 PROCEDURE — 6360000004 HC RX CONTRAST MEDICATION: Performed by: EMERGENCY MEDICINE

## 2025-01-08 PROCEDURE — 83605 ASSAY OF LACTIC ACID: CPT

## 2025-01-08 PROCEDURE — 2580000003 HC RX 258: Performed by: EMERGENCY MEDICINE

## 2025-01-08 RX ORDER — FENTANYL CITRATE 50 UG/ML
50 INJECTION, SOLUTION INTRAMUSCULAR; INTRAVENOUS ONCE
Status: COMPLETED | OUTPATIENT
Start: 2025-01-08 | End: 2025-01-08

## 2025-01-08 RX ORDER — 0.9 % SODIUM CHLORIDE 0.9 %
1000 INTRAVENOUS SOLUTION INTRAVENOUS ONCE
Status: COMPLETED | OUTPATIENT
Start: 2025-01-08 | End: 2025-01-08

## 2025-01-08 RX ORDER — IOPAMIDOL 755 MG/ML
100 INJECTION, SOLUTION INTRAVASCULAR
Status: COMPLETED | OUTPATIENT
Start: 2025-01-08 | End: 2025-01-08

## 2025-01-08 RX ORDER — OXYCODONE HYDROCHLORIDE 5 MG/1
5 TABLET ORAL EVERY 6 HOURS PRN
Qty: 5 TABLET | Refills: 0 | Status: SHIPPED | OUTPATIENT
Start: 2025-01-08 | End: 2025-01-11

## 2025-01-08 RX ORDER — ONDANSETRON 2 MG/ML
4 INJECTION INTRAMUSCULAR; INTRAVENOUS ONCE
Status: COMPLETED | OUTPATIENT
Start: 2025-01-08 | End: 2025-01-08

## 2025-01-08 RX ADMIN — APIXABAN 10 MG: 2.5 TABLET, FILM COATED ORAL at 08:25

## 2025-01-08 RX ADMIN — IOPAMIDOL 100 ML: 755 INJECTION, SOLUTION INTRAVENOUS at 07:10

## 2025-01-08 RX ADMIN — SODIUM CHLORIDE 1000 ML: 9 INJECTION, SOLUTION INTRAVENOUS at 06:30

## 2025-01-08 RX ADMIN — ONDANSETRON 4 MG: 2 INJECTION, SOLUTION INTRAMUSCULAR; INTRAVENOUS at 05:30

## 2025-01-08 RX ADMIN — FENTANYL CITRATE 50 MCG: 50 INJECTION INTRAMUSCULAR; INTRAVENOUS at 05:32

## 2025-01-08 ASSESSMENT — ENCOUNTER SYMPTOMS
SHORTNESS OF BREATH: 0
NAUSEA: 1
VOMITING: 0
ABDOMINAL PAIN: 1
CONSTIPATION: 1

## 2025-01-08 ASSESSMENT — PAIN DESCRIPTION - LOCATION
LOCATION: ABDOMEN
LOCATION: ABDOMEN

## 2025-01-08 ASSESSMENT — PAIN - FUNCTIONAL ASSESSMENT: PAIN_FUNCTIONAL_ASSESSMENT: ACTIVITIES ARE NOT PREVENTED

## 2025-01-08 ASSESSMENT — PAIN DESCRIPTION - ORIENTATION: ORIENTATION: LEFT

## 2025-01-08 ASSESSMENT — PAIN SCALES - GENERAL
PAINLEVEL_OUTOF10: 7
PAINLEVEL_OUTOF10: 10

## 2025-01-08 ASSESSMENT — PAIN DESCRIPTION - FREQUENCY: FREQUENCY: INTERMITTENT

## 2025-01-08 ASSESSMENT — PAIN DESCRIPTION - DESCRIPTORS: DESCRIPTORS: SHARP

## 2025-01-08 NOTE — ED PROVIDER NOTES
Patient signed out from Dr. Glass Patient had recent mesentery artery stenting done at [facility], recently discharged, complaining of abdominal pain, no white count, H&H stable, creatinine stable, awaiting CT imaging         Transfer called and is paging out patient's vascular surgeon at [facility].  Patient has a nonobstructing thrombus in the left distal external iliac.  Calling for further recommendations regarding thrombus, may be related to recent vascular access.      Discussed with Dr. Howard.  Vascular surgeon at [facility].  He states that the thrombus may be from recent vascular access, but does recommend starting patient on Eliquis.  She can be discharged if she has no signs of claudication or significant arterial occlusion with decreased pulse or decrease sensation or motor deficits.      Exam:  Palpable pulses in the DP on the left foot, foot is warm, sensation intact.  No motor deficits        ICD-10-CM    1. Acute upper abdominal pain  R10.10 oxyCODONE (ROXICODONE) 5 MG immediate release tablet      2. Left-sided low back pain without sciatica, unspecified chronicity  M54.50 oxyCODONE (ROXICODONE) 5 MG immediate release tablet      3. Arterial thrombosis (HCC)  I74.9              Too Orozco DO  01/08/25 0919    
for you. Read the directions carefully, and ask your doctor or other care provider to review them with you.                    DISCONTINUED MEDICATIONS:  Current Discharge Medication List          Return to ED if worse      CHIEF COMPLAINT       Chief Complaint   Patient presents with    Abdominal Pain        HISTORY OF PRESENT ILLNESS: 1 or more elements      History From: Patient  None     HPI    Hyacinth Patterson is a 69 y.o. female who presents complaining of upper abdominal pain rating to the left flank that started after she had a stent placed in her SMA at [facility] on Monday.  She was just discharged yesterday she reports the pain was present after the procedure.  Pain became worse at 3 AM causing her to come in.  She reports she took Tylenol after discharge.  She was sent home with Flexeril to take for pain. pain is colicky and similar to the pain she had before the procedure but more severe.  She did report she attempted to eat at home had pain associated with eating.  She reports nausea no vomiting.  She reported no bowel movement since surgery to the nurse.  No diarrhea patient has significant peripheral vascular disease      There are no other complaints, changes, or physical findings at this time.     Nursing Notes were all reviewed and agreed with or any disagreements were addressed in the HPI.     REVIEW OF SYSTEMS      Review of Systems   Constitutional:  Negative for chills and fever.   Respiratory:  Negative for shortness of breath.    Cardiovascular:  Negative for chest pain.   Gastrointestinal:  Positive for abdominal pain, constipation and nausea. Negative for vomiting.   Genitourinary:  Positive for flank pain.   Musculoskeletal:  Negative for neck pain and neck stiffness.   Skin:  Negative for rash and wound.   Neurological:  Negative for syncope and headaches.   All other systems reviewed and are negative.       Positives and Pertinent negatives as per HPI.    PAST HISTORY     Past Medical

## 2025-01-08 NOTE — ED NOTES
Phlebotomy at bedside to draw repeat lactic acid.  Provider at bedside to update patient on plan of care

## 2025-01-08 NOTE — DISCHARGE INSTRUCTIONS
The imaging of your abdomen did not show any acute process in the abdomen, your labs are reassuring.  On imaging of the abdomen though we did find some clot in the left external iliac artery.  This is an artery in your groin.  This is likely from your recent surgery, but it requires blood thinning medication.  Please take the blood thinner as prescribed in addition to your aspirin and Plavix.  Please follow-up closely with your vascular surgeon as planned

## 2025-01-08 NOTE — ED NOTES
Pt belligerent yelling and screaming slamming things and throwing things around the room stating \"I dont understand how a hospital can send me home with a blood clot\" \"I don't even have anywhere to go I'm homeless\" I hope I die and can autumn this f'[facility] hospital\" Explained instructions and MD orders, pt rips off vital sign monitoring devices and throws blankets and call bell at this writer. Gets up out of bed and briskly walks out of ED

## 2025-01-08 NOTE — ED TRIAGE NOTES
Pt arrived via EMS from home with report of left sided abdominal pain radiating around to her left side. Reports that she had a surgical procedure on Monday at [facility] (bowel obstruction)  and has had pain since after surgery. C/O nausea. Took a Tylenol to help her to rest last night. Pain got worse around 0300 this morning. States that she has not had a BM since before surgery.

## 2025-02-26 PROBLEM — R55 SYNCOPE AND COLLAPSE: Status: RESOLVED | Noted: 2024-10-18 | Resolved: 2025-02-26

## 2025-02-26 PROBLEM — G47.33 OSA (OBSTRUCTIVE SLEEP APNEA): Status: ACTIVE | Noted: 2025-02-26

## 2025-02-26 PROBLEM — R55 SYNCOPE, UNSPECIFIED SYNCOPE TYPE: Status: RESOLVED | Noted: 2024-10-18 | Resolved: 2025-02-26

## 2025-02-26 NOTE — PROGRESS NOTES
ASSESSMENT and PLAN  1. Coronary artery disease involving native coronary artery of native heart without angina pectoris  Free of angina s/p prox-distal RCA PCI/FELA x4 on 1/20/2023 and s/p mid LAD PCI/FELA 2/8/2023.  Continue current medications.  Continue risk factor modification efforts.    2. Ischemic cardiomyopathy  EF 40-45% on echo 8/22/2024, improved.  Euvolemic by exam.  Carvedilol and candesartan discontinued 12/2024 due to low BPs.  Not on Entresto due to history of angioedema on lisinopril.  Not on SGLT2 inhibitor due to history of frequent UTIs.  She is experiencing some exertional dyspnea.  Begin losartan 25 mg daily and schedule limited echo to reevaluate LVEF.    3. PAD (peripheral artery disease) (HCC)  Status post EVAR, multiple bilateral lower extremity interventions and most recently SMA stenting 1/7/2025.  Currently on DAPT.  Eliquis initiated 1/8/2025 for postprocedure distal left external iliac artery nonobstructive thrombus.  Management per vascular surgery (Dr. Kelly, [place])    4.  Primary hypertension  Monitor BP with the addition of losartan.    5. Hypercholesterolemia  Labs 12/30/2024 demonstrated total cholesterol 141, triglyceride 127, HDL 54, LDL 62.  LDL goal <70.  Continue atorvastatin as prescribed.    6.  Stage IIIb chronic kidney disease  Creatinine 1.49 and GFR 38 on labs 1/8/2025.  Stable.  Repeat labs on losartan at the follow-up visit with Dr. Alexis on 3/24/2025.    7.  VINOD (obstructive sleep apnea)  Followed by Dr. Trammell.  Continue consistent CPAP use.       Follow-up in 6 months.  The patient has been instructed and agrees to call our office with any issues or other concerns related to their cardiac condition(s) and/or complaint(s).      CHIEF COMPLAINT  Follow-up cardiomyopathy    HPI:    Hyacinth Patterson is a 69 y.o. female here for follow-up of coronary artery disease and cardiomyopathy. She was stable at the last visit on 8/29/2024.  In the interim, she was

## 2025-03-13 ENCOUNTER — OFFICE VISIT (OUTPATIENT)
Age: 70
End: 2025-03-13
Payer: MEDICARE

## 2025-03-13 VITALS
HEART RATE: 56 BPM | TEMPERATURE: 98.3 F | HEIGHT: 65 IN | OXYGEN SATURATION: 100 % | SYSTOLIC BLOOD PRESSURE: 138 MMHG | BODY MASS INDEX: 24.16 KG/M2 | DIASTOLIC BLOOD PRESSURE: 70 MMHG | WEIGHT: 145 LBS

## 2025-03-13 DIAGNOSIS — R06.02 SHORTNESS OF BREATH: ICD-10-CM

## 2025-03-13 DIAGNOSIS — I25.10 CORONARY ARTERY DISEASE INVOLVING NATIVE CORONARY ARTERY OF NATIVE HEART WITHOUT ANGINA PECTORIS: Primary | ICD-10-CM

## 2025-03-13 DIAGNOSIS — E78.00 HYPERCHOLESTEROLEMIA: ICD-10-CM

## 2025-03-13 DIAGNOSIS — I73.9 PAD (PERIPHERAL ARTERY DISEASE): ICD-10-CM

## 2025-03-13 DIAGNOSIS — G47.33 OSA (OBSTRUCTIVE SLEEP APNEA): ICD-10-CM

## 2025-03-13 DIAGNOSIS — N18.32 STAGE 3B CHRONIC KIDNEY DISEASE (HCC): ICD-10-CM

## 2025-03-13 DIAGNOSIS — I25.5 ISCHEMIC CARDIOMYOPATHY: ICD-10-CM

## 2025-03-13 PROCEDURE — 3075F SYST BP GE 130 - 139MM HG: CPT | Performed by: INTERNAL MEDICINE

## 2025-03-13 PROCEDURE — 99214 OFFICE O/P EST MOD 30 MIN: CPT | Performed by: INTERNAL MEDICINE

## 2025-03-13 PROCEDURE — 3078F DIAST BP <80 MM HG: CPT | Performed by: INTERNAL MEDICINE

## 2025-03-13 PROCEDURE — 1126F AMNT PAIN NOTED NONE PRSNT: CPT | Performed by: INTERNAL MEDICINE

## 2025-03-13 PROCEDURE — 1123F ACP DISCUSS/DSCN MKR DOCD: CPT | Performed by: INTERNAL MEDICINE

## 2025-03-13 RX ORDER — LOSARTAN POTASSIUM 25 MG/1
25 TABLET ORAL DAILY
Qty: 30 TABLET | Refills: 5 | Status: SHIPPED | OUTPATIENT
Start: 2025-03-13

## 2025-03-13 ASSESSMENT — PATIENT HEALTH QUESTIONNAIRE - PHQ9
SUM OF ALL RESPONSES TO PHQ QUESTIONS 1-9: 0
2. FEELING DOWN, DEPRESSED OR HOPELESS: NOT AT ALL
SUM OF ALL RESPONSES TO PHQ QUESTIONS 1-9: 0
1. LITTLE INTEREST OR PLEASURE IN DOING THINGS: NOT AT ALL

## 2025-03-13 NOTE — PATIENT INSTRUCTIONS
I have ordered a limited echocardiogram to reevaluate your heart function.  Begin losartan 25 mg daily.

## 2025-03-13 NOTE — PROGRESS NOTES
Identified pt with two pt identifiers(name and ). Reviewed record in preparation for visit and have obtained necessary documentation.  Chief Complaint   Patient presents with    Coronary Artery Disease    Cardiomyopathy    Hypertension    Cholesterol Problem      /70 (BP Site: Left Upper Arm, Patient Position: Sitting, BP Cuff Size: Medium Adult)   Pulse 56   Temp 98.3 °F (36.8 °C) (Temporal)   Ht 1.651 m (5' 5\")   Wt 65.8 kg (145 lb)   LMP  (LMP Unknown)   SpO2 100%   BMI 24.13 kg/m²       Medications reviewed/approved by provider.      Health Maintenance Review: Patient reminded of \"due or due soon\" health maintenance. I have asked the patient to contact his/her primary care provider (PCP) for follow-up on his/her health maintenance.    Coordination of Care Questionnaire:  :   1) Have you been to an emergency room, urgent care, or hospitalized since your last visit?  If yes, where when, and reason for visit? no       2. Have seen or consulted any other health care provider since your last visit?   If yes, where when, and reason for visit?  no      Patient is accompanied by self I have received verbal consent from Hyacinth Patterson to discuss any/all medical information while they are present in the room.

## 2025-03-21 ENCOUNTER — RESULTS FOLLOW-UP (OUTPATIENT)
Facility: HOSPITAL | Age: 70
End: 2025-03-21

## 2025-03-21 ENCOUNTER — HOSPITAL ENCOUNTER (OUTPATIENT)
Facility: HOSPITAL | Age: 70
Discharge: HOME | End: 2025-03-24
Attending: INTERNAL MEDICINE
Payer: MEDICARE

## 2025-03-21 DIAGNOSIS — R06.02 SHORTNESS OF BREATH: ICD-10-CM

## 2025-03-21 DIAGNOSIS — I10 BENIGN ESSENTIAL HYPERTENSION: ICD-10-CM

## 2025-03-21 DIAGNOSIS — I25.5 ISCHEMIC CARDIOMYOPATHY: ICD-10-CM

## 2025-03-21 LAB
ECHO AO ROOT DIAM: 2.2 CM
ECHO LA DIAMETER: 3.7 CM
ECHO LA TO AORTIC ROOT RATIO: 1.68
ECHO LA VOL A-L A2C: 41 ML (ref 22–52)
ECHO LA VOL A-L A4C: 59 ML (ref 22–52)
ECHO LA VOL MOD A2C: 39 ML (ref 22–52)
ECHO LA VOL MOD A4C: 57 ML (ref 22–52)
ECHO LA VOLUME AREA LENGTH: 51 ML
ECHO LV E' LATERAL VELOCITY: 5.31 CM/S
ECHO LV E' SEPTAL VELOCITY: 4.32 CM/S
ECHO LV EDV A2C: 108 ML
ECHO LV EDV A4C: 113 ML
ECHO LV EDV BP: 112 ML (ref 56–104)
ECHO LV EF PHYSICIAN: 35 %
ECHO LV EJECTION FRACTION A2C: 33 %
ECHO LV EJECTION FRACTION A4C: 39 %
ECHO LV EJECTION FRACTION BIPLANE: 35 % (ref 55–100)
ECHO LV ESV A2C: 73 ML
ECHO LV ESV A4C: 68 ML
ECHO LV ESV BP: 73 ML (ref 19–49)
ECHO LV FRACTIONAL SHORTENING: 15 % (ref 28–44)
ECHO LV INTERNAL DIMENSION DIASTOLIC: 5.3 CM (ref 3.9–5.3)
ECHO LV INTERNAL DIMENSION SYSTOLIC: 4.5 CM
ECHO LV IVSD: 0.9 CM (ref 0.6–0.9)
ECHO LV MASS 2D: 213.9 G (ref 67–162)
ECHO LV POSTERIOR WALL DIASTOLIC: 1.2 CM (ref 0.6–0.9)
ECHO LV RELATIVE WALL THICKNESS RATIO: 0.45
ECHO LVOT AREA: 2.8 CM2
ECHO LVOT DIAM: 1.9 CM
ECHO MV A VELOCITY: 0.96 M/S
ECHO MV E DECELERATION TIME (DT): 283.1 MS
ECHO MV E VELOCITY: 0.68 M/S
ECHO MV E/A RATIO: 0.71
ECHO MV E/E' LATERAL: 12.81
ECHO MV E/E' RATIO (AVERAGED): 14.27
ECHO MV E/E' SEPTAL: 15.74
ECHO RA AREA 4C: 11.1 CM2
ECHO RV BASAL DIMENSION: 3 CM
ECHO RV TAPSE: 2.2 CM (ref 1.7–?)

## 2025-03-21 PROCEDURE — 93306 TTE W/DOPPLER COMPLETE: CPT | Performed by: INTERNAL MEDICINE

## 2025-03-21 PROCEDURE — 93308 TTE F-UP OR LMTD: CPT

## 2025-03-21 RX ORDER — TRIAMTERENE AND HYDROCHLOROTHIAZIDE 37.5; 25 MG/1; MG/1
1 TABLET ORAL DAILY
Qty: 90 TABLET | Refills: 1 | OUTPATIENT
Start: 2025-03-21

## 2025-03-21 NOTE — RESULT ENCOUNTER NOTE
The EF is dropped a bit, now 35% (normal 50-70%) and down from EF 40-45% on the echo in 8/2024.  Hoping we can get this back up with the recent addition of losartan. If the BP remains stable, we may also be able to reinstitute a beta-blocker like carvedilol or metoprolol succinate which will also help.    Return for nurse visit in 2 weeks for BP check.  Also check a BMP unless one is done at the visit with Dr. Alexis on 3/24.

## 2025-03-22 NOTE — PROGRESS NOTES
Medicare Annual Wellness Visit    Hyacinth Patterson is here for Medicare AWV    Assessment & Plan   Medicare annual wellness visit, subsequent  Benign essential hypertension  -     CBC with Auto Differential; Future  -     Comprehensive Metabolic Panel; Future  -     TSH; Future  Iron deficiency anemia due to chronic blood loss  -     CBC with Auto Differential; Future  -     Ferritin; Future  Type 2 diabetes mellitus without complication, with long-term current use of insulin (HCC)  -     Hemoglobin A1C; Future  Gastroesophageal reflux disease without esophagitis  -     pantoprazole (PROTONIX) 40 MG tablet; Take 1 tablet by mouth 2 times daily (before meals), Disp-180 tablet, R-1Normal       No follow-ups on file.     Subjective     Patient's complete Health Risk Assessment and screening values have been reviewed and are found in Flowsheets. The following problems were reviewed today and where indicated follow up appointments were made and/or referrals ordered.    Positive Risk Factor Screenings with Interventions:        Drug Use:   Substance and Sexual Activity   Drug Use Yes    Types: Marijuana (Weed)    Comment: 2-3 a month       Interventions:  See AVS for additional education material         Inactivity:    (!) Abnormal       Interventions:  See AVS for additional education material            Tobacco Use:    Tobacco Use      Smoking status: Every Day        Packs/day: 0.25        Years: 0.3 packs/day for 41.5 years (13.6 ttl pk-yrs)        Types: Cigarettes        Start date: 1980        Last attempt to quit: 6/30/2020        Passive exposure: Current      Smokeless tobacco: Never     Interventions:  See AVS for additional education material            Objective   Vitals:    03/24/25 0817   BP: (!) 143/66   BP Site: Left Upper Arm   Patient Position: Sitting   BP Cuff Size: Small Adult   Pulse: 57   Resp: 18   Temp: 97.4 °F (36.3 °C)   TempSrc: Temporal   SpO2: 99%   Weight: 65 kg (143 lb 6.4 oz)   Height: 1.651

## 2025-03-24 ENCOUNTER — OFFICE VISIT (OUTPATIENT)
Age: 70
End: 2025-03-24
Payer: MEDICARE

## 2025-03-24 VITALS
HEIGHT: 65 IN | DIASTOLIC BLOOD PRESSURE: 66 MMHG | RESPIRATION RATE: 18 BRPM | BODY MASS INDEX: 23.89 KG/M2 | HEART RATE: 57 BPM | TEMPERATURE: 97.4 F | OXYGEN SATURATION: 99 % | WEIGHT: 143.4 LBS | SYSTOLIC BLOOD PRESSURE: 143 MMHG

## 2025-03-24 DIAGNOSIS — Z00.00 MEDICARE ANNUAL WELLNESS VISIT, SUBSEQUENT: Primary | ICD-10-CM

## 2025-03-24 DIAGNOSIS — K21.9 GASTROESOPHAGEAL REFLUX DISEASE WITHOUT ESOPHAGITIS: ICD-10-CM

## 2025-03-24 DIAGNOSIS — I10 BENIGN ESSENTIAL HYPERTENSION: ICD-10-CM

## 2025-03-24 DIAGNOSIS — Z79.4 TYPE 2 DIABETES MELLITUS WITHOUT COMPLICATION, WITH LONG-TERM CURRENT USE OF INSULIN: ICD-10-CM

## 2025-03-24 DIAGNOSIS — E11.9 TYPE 2 DIABETES MELLITUS WITHOUT COMPLICATION, WITH LONG-TERM CURRENT USE OF INSULIN: ICD-10-CM

## 2025-03-24 DIAGNOSIS — D50.0 IRON DEFICIENCY ANEMIA DUE TO CHRONIC BLOOD LOSS: ICD-10-CM

## 2025-03-24 PROCEDURE — G2211 COMPLEX E/M VISIT ADD ON: HCPCS | Performed by: INTERNAL MEDICINE

## 2025-03-24 PROCEDURE — G0439 PPPS, SUBSEQ VISIT: HCPCS | Performed by: INTERNAL MEDICINE

## 2025-03-24 PROCEDURE — 1126F AMNT PAIN NOTED NONE PRSNT: CPT | Performed by: INTERNAL MEDICINE

## 2025-03-24 PROCEDURE — 1159F MED LIST DOCD IN RCRD: CPT | Performed by: INTERNAL MEDICINE

## 2025-03-24 PROCEDURE — 99214 OFFICE O/P EST MOD 30 MIN: CPT | Performed by: INTERNAL MEDICINE

## 2025-03-24 PROCEDURE — 3078F DIAST BP <80 MM HG: CPT | Performed by: INTERNAL MEDICINE

## 2025-03-24 PROCEDURE — 1123F ACP DISCUSS/DSCN MKR DOCD: CPT | Performed by: INTERNAL MEDICINE

## 2025-03-24 PROCEDURE — 3077F SYST BP >= 140 MM HG: CPT | Performed by: INTERNAL MEDICINE

## 2025-03-24 RX ORDER — FLUTICASONE PROPIONATE AND SALMETEROL 250; 50 UG/1; UG/1
1 POWDER RESPIRATORY (INHALATION) EVERY 12 HOURS
Qty: 60 EACH | Refills: 11 | Status: SHIPPED | OUTPATIENT
Start: 2025-03-24

## 2025-03-24 RX ORDER — PANTOPRAZOLE SODIUM 40 MG/1
40 TABLET, DELAYED RELEASE ORAL
Qty: 180 TABLET | Refills: 1 | Status: SHIPPED | OUTPATIENT
Start: 2025-03-24

## 2025-03-24 ASSESSMENT — PATIENT HEALTH QUESTIONNAIRE - PHQ9
2. FEELING DOWN, DEPRESSED OR HOPELESS: NOT AT ALL
SUM OF ALL RESPONSES TO PHQ QUESTIONS 1-9: 0
1. LITTLE INTEREST OR PLEASURE IN DOING THINGS: NOT AT ALL
SUM OF ALL RESPONSES TO PHQ QUESTIONS 1-9: 0

## 2025-03-24 NOTE — PROGRESS NOTES
Duration 30 minutes primarily education, review of imaging, labs and records.    Assessment & Plan  1. CHF.  Her ejection fraction is currently at 35%, indicating suboptimal cardiac function.  Heart sounds are strong, suggesting the EF might be better than estimated.  Advised to cease smoking immediately to improve heart health significantly.  A comprehensive blood workup will be conducted today to assess hemoglobin levels and rule out anemia.  Will follow up with Dr Borges and with us in 6 weeks.  Would she be an entresto candidate?    2. Diabetes Mellitus.  Blood glucose levels have been consistently around 119 to 120.  Instructed to reduce insulin dosage to 5 units for the remainder of the week and discontinue it on Sunday.  An A1c test will be performed today. If A1c levels are within the normal range, she will remain off insulin temporarily.  Follow-up appointment scheduled in 6 weeks to monitor progress.    3. Anemia.  History of anemia with previous iron infusions.  A blood test will be conducted today to check current hemoglobin levels and determine if she is still anemic.  No recent iron infusions reported.  Results will guide further management.    4. Medication Management.  Prescriptions for Advair inhaler and pantoprazole 40 mg, to be taken twice daily, have been renewed and sent to [facility].  Discussed the importance of medication adherence.  Patient reports the medications are effective.  Will reassess medication needs at the follow-up appointment in 6 weeks.          Chief Complaint   Patient presents with    Medicare AWV         Orders Placed This Encounter   Procedures    CBC with Auto Differential     Standing Status:   Future     Number of Occurrences:   1     Expected Date:   3/24/2025     Expiration Date:   3/24/2026    Ferritin     Standing Status:   Future     Number of Occurrences:   1     Expected Date:   3/24/2025     Expiration Date:   3/24/2026    Comprehensive Metabolic Panel

## 2025-03-24 NOTE — PROGRESS NOTES
Hyacinth Patterson is a 69 y.o. female presenting for/with:    Chief Complaint   Patient presents with    Medicare AWV       Vitals:    03/24/25 0817   BP: (!) 143/66   BP Site: Left Upper Arm   Patient Position: Sitting   BP Cuff Size: Small Adult   Pulse: 57   Resp: 18   Temp: 97.4 °F (36.3 °C)   TempSrc: Temporal   SpO2: 99%   Weight: 65 kg (143 lb 6.4 oz)   Height: 1.651 m (5' 5\")       Pain Scale: 0 - No pain/10  Pain Location:     \"Have you been to the ER, urgent care clinic since your last visit?  Hospitalized since your last visit?\"    NO    “Have you seen or consulted any other health care providers outside of [facility] since your last visit?”    NO                 3/24/2025     8:07 AM   PHQ-9    Little interest or pleasure in doing things 0   Feeling down, depressed, or hopeless 0   PHQ-2 Score 0   PHQ-9 Total Score 0           3/13/2025    10:40 AM 8/29/2024    10:40 AM 5/7/2024     9:20 AM 4/17/2024     9:00 AM 1/12/2024     7:30 AM 9/21/2023     3:00 PM 6/16/2023    10:00 AM   [facility] AMB LEARNING ASSESSMENT   Primary Learner Patient Patient Patient Patient Patient Patient Patient   co-learner caregiver      No    Primary Language ENGLISH ENGLISH ENGLISH ENGLISH ENGLISH ENGLISH ENGLISH   Learning Preference DEMONSTRATION DEMONSTRATION DEMONSTRATION DEMONSTRATION PICTURES DEMONSTRATION DEMONSTRATION   Answered By pt pt pt pt self pt pt   Relationship to Learner SELF SELF SELF SELF SELF SELF SELF            3/24/2025     8:05 AM   Amb Fall Risk Assessment and TUG Test   Do you feel unsteady or are you worried about falling?  no   2 or more falls in past year? no   Fall with injury in past year? no           3/24/2025     8:00 AM 12/27/2024     8:00 AM 11/25/2024     7:00 AM 10/28/2024    10:00 AM 10/17/2024     6:00 AM 9/19/2024     7:00 AM 8/9/2024     7:00 AM   ADL ASSESSMENT   Feeding yourself No Help Needed No Help Needed No Help Needed No Help Needed No Help Needed No Help Needed No Help

## 2025-03-24 NOTE — PATIENT INSTRUCTIONS

## 2025-03-25 LAB
ALBUMIN SERPL-MCNC: 3.5 G/DL (ref 3.5–5)
ALBUMIN/GLOB SERPL: 1.1 (ref 1.1–2.2)
ALP SERPL-CCNC: 99 U/L (ref 45–117)
ALT SERPL-CCNC: 15 U/L (ref 12–78)
ANION GAP SERPL CALC-SCNC: 6 MMOL/L (ref 2–12)
AST SERPL-CCNC: 14 U/L (ref 15–37)
BASOPHILS # BLD: 0.07 K/UL (ref 0–0.1)
BASOPHILS NFR BLD: 1.1 % (ref 0–1)
BILIRUB SERPL-MCNC: 0.2 MG/DL (ref 0.2–1)
BUN SERPL-MCNC: 11 MG/DL (ref 6–20)
BUN/CREAT SERPL: 9 (ref 12–20)
CALCIUM SERPL-MCNC: 9.7 MG/DL (ref 8.5–10.1)
CHLORIDE SERPL-SCNC: 107 MMOL/L (ref 97–108)
CO2 SERPL-SCNC: 27 MMOL/L (ref 21–32)
CREAT SERPL-MCNC: 1.29 MG/DL (ref 0.55–1.02)
DIFFERENTIAL METHOD BLD: ABNORMAL
EOSINOPHIL # BLD: 0.21 K/UL (ref 0–0.4)
EOSINOPHIL NFR BLD: 3.3 % (ref 0–7)
ERYTHROCYTE [DISTWIDTH] IN BLOOD BY AUTOMATED COUNT: 16.2 % (ref 11.5–14.5)
EST. AVERAGE GLUCOSE BLD GHB EST-MCNC: 97 MG/DL
FERRITIN SERPL-MCNC: 36 NG/ML (ref 26–388)
GLOBULIN SER CALC-MCNC: 3.3 G/DL (ref 2–4)
GLUCOSE SERPL-MCNC: 100 MG/DL (ref 65–100)
HBA1C MFR BLD: 5 % (ref 4–5.6)
HCT VFR BLD AUTO: 27.2 % (ref 35–47)
HGB BLD-MCNC: 7.8 G/DL (ref 11.5–16)
IMM GRANULOCYTES # BLD AUTO: 0.07 K/UL (ref 0–0.04)
IMM GRANULOCYTES NFR BLD AUTO: 1.1 % (ref 0–0.5)
LYMPHOCYTES # BLD: 1.41 K/UL (ref 0.8–3.5)
LYMPHOCYTES NFR BLD: 22 % (ref 12–49)
MCH RBC QN AUTO: 25.6 PG (ref 26–34)
MCHC RBC AUTO-ENTMCNC: 28.7 G/DL (ref 30–36.5)
MCV RBC AUTO: 89.2 FL (ref 80–99)
MONOCYTES # BLD: 0.49 K/UL (ref 0–1)
MONOCYTES NFR BLD: 7.6 % (ref 5–13)
NEUTS SEG # BLD: 4.15 K/UL (ref 1.8–8)
NEUTS SEG NFR BLD: 64.9 % (ref 32–75)
NRBC # BLD: 0 K/UL (ref 0–0.01)
NRBC BLD-RTO: 0 PER 100 WBC
PLATELET # BLD AUTO: 348 K/UL (ref 150–400)
PMV BLD AUTO: 11.3 FL (ref 8.9–12.9)
POTASSIUM SERPL-SCNC: 3.8 MMOL/L (ref 3.5–5.1)
PROT SERPL-MCNC: 6.8 G/DL (ref 6.4–8.2)
RBC # BLD AUTO: 3.05 M/UL (ref 3.8–5.2)
RBC MORPH BLD: ABNORMAL
RBC MORPH BLD: ABNORMAL
SODIUM SERPL-SCNC: 140 MMOL/L (ref 136–145)
TSH SERPL DL<=0.05 MIU/L-ACNC: 0.88 UIU/ML (ref 0.36–3.74)
WBC # BLD AUTO: 6.4 K/UL (ref 3.6–11)

## 2025-03-26 ENCOUNTER — TELEPHONE (OUTPATIENT)
Age: 70
End: 2025-03-26

## 2025-03-26 RX ORDER — ATORVASTATIN CALCIUM 80 MG/1
80 TABLET, FILM COATED ORAL DAILY
Qty: 90 TABLET | Refills: 4 | Status: SHIPPED | OUTPATIENT
Start: 2025-03-26

## 2025-03-26 RX ORDER — ATORVASTATIN CALCIUM 80 MG/1
80 TABLET, FILM COATED ORAL DAILY
Qty: 90 TABLET | Refills: 1 | Status: SHIPPED | OUTPATIENT
Start: 2025-03-26

## 2025-03-26 NOTE — TELEPHONE ENCOUNTER
Patient: Hyacinth Patterson  :  1955    Patient identified with two identifiers.    Patient called. She is returning Martha Saini LPN's phone call.  Please call back.    [phone]

## 2025-03-26 NOTE — TELEPHONE ENCOUNTER
Per Dr. Borges:  The EF is dropped a bit, now 35% (normal 50-70%) and down from EF 40-45% on the echo in 8/2024.  Hoping we can get this back up with the recent addition of losartan. If the BP remains stable, we may also be able to reinstitute a beta-blocker like carvedilol or metoprolol succinate which will also help.   Return for nurse visit in 2 weeks for BP check.  Also check a BMP unless one is done at the visit with Dr. Alexis on 3/24.     CMP done 3/24/25 with Dr. Alexis     S/W the pt.  .Verified patient with two patient identifiers.  Pt states that she has been experiencing dizziness since the losartan was added. Recently she cut the pill in half and takes a half once daily. But she is still experiencing dizziness.      Patients BP last two days: 130/66, 147/56    Advised to come in on Monday for nurse visit.     Pt to rise slowly with positional changes.

## 2025-03-27 ENCOUNTER — RESULTS FOLLOW-UP (OUTPATIENT)
Age: 70
End: 2025-03-27

## 2025-03-31 ENCOUNTER — CLINICAL SUPPORT (OUTPATIENT)
Age: 70
End: 2025-03-31

## 2025-03-31 VITALS
HEART RATE: 85 BPM | SYSTOLIC BLOOD PRESSURE: 136 MMHG | WEIGHT: 151 LBS | DIASTOLIC BLOOD PRESSURE: 66 MMHG | OXYGEN SATURATION: 98 % | TEMPERATURE: 98.7 F | BODY MASS INDEX: 25.16 KG/M2 | HEIGHT: 65 IN

## 2025-03-31 DIAGNOSIS — I10 PRIMARY HYPERTENSION: Primary | ICD-10-CM

## 2025-03-31 DIAGNOSIS — I25.10 CORONARY ARTERY DISEASE INVOLVING NATIVE CORONARY ARTERY OF NATIVE HEART WITHOUT ANGINA PECTORIS: ICD-10-CM

## 2025-03-31 NOTE — PROGRESS NOTES
Identified pt with two pt identifiers(name and ). Reviewed record in preparation for visit and have obtained necessary documentation.  Chief Complaint   Patient presents with    Nurse/MA Visit     BP check      /66 (BP Site: Left Upper Arm, Patient Position: Sitting, BP Cuff Size: Medium Adult)   Pulse 85   Temp 98.7 °F (37.1 °C) (Temporal)   Ht 1.651 m (5' 5\")   Wt 68.5 kg (151 lb)   LMP  (LMP Unknown)   SpO2 98%   BMI 25.13 kg/m²       Medications reviewed    Patient complained about some dizziness that lasts all day. Reports she lives at home by herself and does make her feel nervous that she might fall. She states she has taken BP while she is dizzy and reports BP Systolic is usually in the 130's and Dystolic can run anywhere between high 50's to low 70's.    NO LOS.    Addendum:  Continue losartan as currently prescribed.  Her BP readings would not seem to explain her dizziness.  Continue home BP monitoring.  If symptoms persist, consider decreasing the losartan to 12.5 mg daily to see if this alleviates symptoms.    EB

## 2025-04-08 ENCOUNTER — TELEPHONE (OUTPATIENT)
Age: 70
End: 2025-04-08

## 2025-04-08 NOTE — TELEPHONE ENCOUNTER
Spoke with Solange from [facility] regarding compliance data for Ms. Patterson. Solange stated that the patient was not set up with PAP. She stated that the patient missed three scheduled appointments to  the device.

## 2025-04-09 ENCOUNTER — TELEPHONE (OUTPATIENT)
Age: 70
End: 2025-04-09

## 2025-04-09 NOTE — TELEPHONE ENCOUNTER
Phoned patient to reschedule.  She stated that she cannot afford PAP therapy at this time.  Appointment offered to discuss other options.  Patient has declined.

## 2025-04-09 NOTE — Clinical Note
Balloon catheter inserted. use: Yes     Alcohol/week: 3.0 standard drinks of alcohol     Types: 3 Glasses of wine per week     Comment: Occ       Social History     Substance and Sexual Activity   Sexual Activity Yes    Partners: Male    Comment:      OB History    Para Term  AB Living   0 0 0 0 0 0   SAB IAB Ectopic Molar Multiple Live Births   0 0 0 0 0 0       Health Maintenance  Mammogram:   Colonoscopy:   Bone Density:    ROS:  Review of Systems  General: Not Present- Fatigue, Insomnia, Hot flashes/Night sweats, Weight gain, Brain fog  Breast: Not Present- Breast Mass, Breast Pain, Breast Swelling, Nipple Discharge, Nipple Pain, Recent Breast Size Changes and Skin Changes.  Gastrointestinal: Not Present- Abdominal Pain,  Bloating, Constipation, Diarrhea, Nausea, Rectal bleeding  Female Genitourinary: Not Present- Dysmenorrhea, Dyspareunia, Decreased libido, Excessive Menstrual Bleeding, Menstrual Irregularities, Pelvic Pain, Urinary Complaints, Vaginal Discharge, Vaginal itching/burning, Vaginal odor, Vaginal dryness  Psychiatric: Not Present- Anxiety, Depression, Mood changes and Panic Attacks.    PHYSICAL EXAM:    /72   Ht 1.6 m (5' 3\")   Wt 57.6 kg (127 lb)   BMI 22.50 kg/m²         General   Mental Status - Well groomed; well nourished.  Oriented x 3.  Appropriate mood and affect      Female Genitourinary     External Genitalia   Vulva: Normal.   Perineum: Normal.   Bartholin's Gland:  Normal.   Clitoris :  Normal.   Introitus:  Normal.   Urethra:  Normal.     Speculum & Bimanual   Vagina: -  Normal.   Vaginal Lesions - None.   Cervix: Characteristics - Normal.   Uterus: Characteristics - Normal.   Adnexa: - Normal.   Bladder - Normal.       Medical problems and test results were reviewed with the patient today.     ASSESSMENT and PLAN    1. Unsatisfactory cervical Papanicolaou smear  -     PAP LB, Reflex HPV ASCUS ()             Time:  I spent  30 minutes in preparing to see patient (including chart

## 2025-04-10 NOTE — TELEPHONE ENCOUNTER
S/W Narcisa with Dr. Garcia office.    Dr. Kelly would like to continue the plavix until she is seen by him on 5/5. He will go ahead and renew the medication.  He viewed her hgb over the course of a year.

## 2025-04-10 NOTE — TELEPHONE ENCOUNTER
Dr. Borges advised,   \"Would check with vascular surgery (Dr. Kelly, [place]) since she is on DAPT s/p superior mesenteric artery stenting on 1/7/2025.    From a cardiac standpoint, she does not have an absolute indication for ongoing DAPT since she is over 2 years out from her coronary stenting procedures.  She could de-escalate to aspirin 81 mg daily, especially if she has had, issues with anemia.\"     Plavix rx renewal, labs faxed to Dr. Kelly for review and advice. Confirmatio received.

## 2025-04-20 ENCOUNTER — APPOINTMENT (OUTPATIENT)
Facility: HOSPITAL | Age: 70
End: 2025-04-20
Payer: MEDICARE

## 2025-04-20 ENCOUNTER — HOSPITAL ENCOUNTER (EMERGENCY)
Facility: HOSPITAL | Age: 70
Discharge: ANOTHER HOSPITAL | End: 2025-04-20
Attending: EMERGENCY MEDICINE
Payer: MEDICARE

## 2025-04-20 VITALS
SYSTOLIC BLOOD PRESSURE: 164 MMHG | TEMPERATURE: 98 F | OXYGEN SATURATION: 100 % | DIASTOLIC BLOOD PRESSURE: 76 MMHG | HEART RATE: 112 BPM | HEIGHT: 65 IN | BODY MASS INDEX: 23.66 KG/M2 | WEIGHT: 142 LBS | RESPIRATION RATE: 25 BRPM

## 2025-04-20 DIAGNOSIS — D64.9 ANEMIA, UNSPECIFIED TYPE: ICD-10-CM

## 2025-04-20 DIAGNOSIS — I50.9 ACUTE CONGESTIVE HEART FAILURE, UNSPECIFIED HEART FAILURE TYPE (HCC): Primary | ICD-10-CM

## 2025-04-20 DIAGNOSIS — I21.4 NSTEMI (NON-ST ELEVATED MYOCARDIAL INFARCTION) (HCC): ICD-10-CM

## 2025-04-20 LAB
ALBUMIN SERPL-MCNC: 2.8 G/DL (ref 3.5–5)
ALBUMIN/GLOB SERPL: 0.8 (ref 1.1–2.2)
ALP SERPL-CCNC: 89 U/L (ref 45–117)
ALT SERPL-CCNC: 21 U/L (ref 12–78)
ANION GAP SERPL CALC-SCNC: 8 MMOL/L (ref 2–12)
AST SERPL-CCNC: 16 U/L (ref 15–37)
BASOPHILS # BLD: 0.04 K/UL (ref 0–0.1)
BASOPHILS NFR BLD: 0.5 % (ref 0–1)
BILIRUB SERPL-MCNC: 0.2 MG/DL (ref 0.2–1)
BUN SERPL-MCNC: 10 MG/DL (ref 6–20)
BUN/CREAT SERPL: 9 (ref 12–20)
CALCIUM SERPL-MCNC: 8.6 MG/DL (ref 8.5–10.1)
CHLORIDE SERPL-SCNC: 109 MMOL/L (ref 97–108)
CO2 SERPL-SCNC: 25 MMOL/L (ref 21–32)
CREAT SERPL-MCNC: 1.1 MG/DL (ref 0.55–1.02)
DIFFERENTIAL METHOD BLD: ABNORMAL
EOSINOPHIL # BLD: 0.15 K/UL (ref 0–0.4)
EOSINOPHIL NFR BLD: 1.8 % (ref 0–0.7)
ERYTHROCYTE [DISTWIDTH] IN BLOOD BY AUTOMATED COUNT: 16.1 % (ref 11.5–14.5)
ERYTHROCYTE [DISTWIDTH] IN BLOOD BY AUTOMATED COUNT: 17.1 % (ref 11.5–14.5)
FLUAV RNA SPEC QL NAA+PROBE: NOT DETECTED
FLUBV RNA SPEC QL NAA+PROBE: NOT DETECTED
GLOBULIN SER CALC-MCNC: 3.6 G/DL (ref 2–4)
GLUCOSE SERPL-MCNC: 161 MG/DL (ref 65–100)
HCT VFR BLD AUTO: 21.9 % (ref 35–47)
HCT VFR BLD AUTO: 27.1 % (ref 35–47)
HGB BLD-MCNC: 6.2 G/DL (ref 11.5–16)
HGB BLD-MCNC: 8.2 G/DL (ref 11.5–16)
HISTORY CHECK: NORMAL
IMM GRANULOCYTES # BLD AUTO: 0.04 K/UL (ref 0–0.04)
IMM GRANULOCYTES NFR BLD AUTO: 0.5 % (ref 0–0.5)
LIPASE SERPL-CCNC: 21 U/L (ref 13–75)
LYMPHOCYTES # BLD: 1.15 K/UL (ref 0.8–3.5)
LYMPHOCYTES NFR BLD: 13.4 % (ref 12–49)
MCH RBC QN AUTO: 25.3 PG (ref 26–34)
MCH RBC QN AUTO: 26.9 PG (ref 26–34)
MCHC RBC AUTO-ENTMCNC: 28.3 G/DL (ref 30–36.5)
MCHC RBC AUTO-ENTMCNC: 30.3 G/DL (ref 30–36.5)
MCV RBC AUTO: 88.9 FL (ref 80–99)
MCV RBC AUTO: 89.4 FL (ref 80–99)
MONOCYTES # BLD: 0.76 K/UL (ref 0–1)
MONOCYTES NFR BLD: 8.8 % (ref 5–13)
NEUTS SEG # BLD: 6.45 K/UL (ref 1.8–8)
NEUTS SEG NFR BLD: 75 % (ref 32–75)
NRBC # BLD: 0 K/UL (ref 0–0.01)
NRBC # BLD: 0.02 K/UL (ref 0–0.01)
NRBC BLD-RTO: 0 PER 100 WBC
NRBC BLD-RTO: 0.2 PER 100 WBC
NT PRO BNP: ABNORMAL PG/ML (ref 0–125)
PLATELET # BLD AUTO: 250 K/UL (ref 150–400)
PLATELET # BLD AUTO: 253 K/UL (ref 150–400)
PLATELET COMMENT: ABNORMAL
PMV BLD AUTO: 11.3 FL (ref 8.9–12.9)
PMV BLD AUTO: 11.6 FL (ref 8.9–12.9)
POTASSIUM SERPL-SCNC: 3.8 MMOL/L (ref 3.5–5.1)
PROT SERPL-MCNC: 6.4 G/DL (ref 6.4–8.2)
RBC # BLD AUTO: 2.45 M/UL (ref 3.8–5.2)
RBC # BLD AUTO: 3.05 M/UL (ref 3.8–5.2)
RBC MORPH BLD: ABNORMAL
SARS-COV-2 RNA RESP QL NAA+PROBE: NOT DETECTED
SODIUM SERPL-SCNC: 142 MMOL/L (ref 136–145)
SOURCE: NORMAL
TROPONIN I SERPL HS-MCNC: 352 NG/L (ref 0–51)
TROPONIN I SERPL HS-MCNC: 585 NG/L (ref 0–51)
TROPONIN I SERPL HS-MCNC: 840 NG/L (ref 0–51)
WBC # BLD AUTO: 11.2 K/UL (ref 3.6–11)
WBC # BLD AUTO: 8.6 K/UL (ref 3.6–11)

## 2025-04-20 PROCEDURE — 86850 RBC ANTIBODY SCREEN: CPT

## 2025-04-20 PROCEDURE — 80053 COMPREHEN METABOLIC PANEL: CPT

## 2025-04-20 PROCEDURE — 85025 COMPLETE CBC W/AUTO DIFF WBC: CPT

## 2025-04-20 PROCEDURE — 36415 COLL VENOUS BLD VENIPUNCTURE: CPT

## 2025-04-20 PROCEDURE — 96375 TX/PRO/DX INJ NEW DRUG ADDON: CPT

## 2025-04-20 PROCEDURE — 36430 TRANSFUSION BLD/BLD COMPNT: CPT

## 2025-04-20 PROCEDURE — 2580000003 HC RX 258: Performed by: EMERGENCY MEDICINE

## 2025-04-20 PROCEDURE — 83880 ASSAY OF NATRIURETIC PEPTIDE: CPT

## 2025-04-20 PROCEDURE — 83690 ASSAY OF LIPASE: CPT

## 2025-04-20 PROCEDURE — 87636 SARSCOV2 & INF A&B AMP PRB: CPT

## 2025-04-20 PROCEDURE — 86923 COMPATIBILITY TEST ELECTRIC: CPT

## 2025-04-20 PROCEDURE — 6370000000 HC RX 637 (ALT 250 FOR IP): Performed by: EMERGENCY MEDICINE

## 2025-04-20 PROCEDURE — P9016 RBC LEUKOCYTES REDUCED: HCPCS

## 2025-04-20 PROCEDURE — 96374 THER/PROPH/DIAG INJ IV PUSH: CPT

## 2025-04-20 PROCEDURE — 86900 BLOOD TYPING SEROLOGIC ABO: CPT

## 2025-04-20 PROCEDURE — 99285 EMERGENCY DEPT VISIT HI MDM: CPT

## 2025-04-20 PROCEDURE — 86901 BLOOD TYPING SEROLOGIC RH(D): CPT

## 2025-04-20 PROCEDURE — 85027 COMPLETE CBC AUTOMATED: CPT

## 2025-04-20 PROCEDURE — 84484 ASSAY OF TROPONIN QUANT: CPT

## 2025-04-20 PROCEDURE — 71045 X-RAY EXAM CHEST 1 VIEW: CPT

## 2025-04-20 PROCEDURE — 74174 CTA ABD&PLVS W/CONTRAST: CPT

## 2025-04-20 PROCEDURE — 6360000002 HC RX W HCPCS: Performed by: EMERGENCY MEDICINE

## 2025-04-20 PROCEDURE — 93005 ELECTROCARDIOGRAM TRACING: CPT | Performed by: EMERGENCY MEDICINE

## 2025-04-20 PROCEDURE — 6360000004 HC RX CONTRAST MEDICATION: Performed by: EMERGENCY MEDICINE

## 2025-04-20 RX ORDER — SODIUM CHLORIDE 9 MG/ML
INJECTION, SOLUTION INTRAVENOUS PRN
Status: DISCONTINUED | OUTPATIENT
Start: 2025-04-20 | End: 2025-04-20 | Stop reason: HOSPADM

## 2025-04-20 RX ORDER — IOPAMIDOL 755 MG/ML
100 INJECTION, SOLUTION INTRAVASCULAR
Status: COMPLETED | OUTPATIENT
Start: 2025-04-20 | End: 2025-04-20

## 2025-04-20 RX ORDER — FUROSEMIDE 10 MG/ML
40 INJECTION INTRAMUSCULAR; INTRAVENOUS
Status: COMPLETED | OUTPATIENT
Start: 2025-04-20 | End: 2025-04-20

## 2025-04-20 RX ORDER — ONDANSETRON 2 MG/ML
4 INJECTION INTRAMUSCULAR; INTRAVENOUS ONCE
Status: COMPLETED | OUTPATIENT
Start: 2025-04-20 | End: 2025-04-20

## 2025-04-20 RX ORDER — OXYCODONE HYDROCHLORIDE 5 MG/1
5 TABLET ORAL
Refills: 0 | Status: COMPLETED | OUTPATIENT
Start: 2025-04-20 | End: 2025-04-20

## 2025-04-20 RX ADMIN — IOPAMIDOL 100 ML: 755 INJECTION, SOLUTION INTRAVENOUS at 11:34

## 2025-04-20 RX ADMIN — FUROSEMIDE 40 MG: 10 INJECTION, SOLUTION INTRAMUSCULAR; INTRAVENOUS at 10:31

## 2025-04-20 RX ADMIN — SODIUM CHLORIDE 40 MG: 9 INJECTION INTRAMUSCULAR; INTRAVENOUS; SUBCUTANEOUS at 13:15

## 2025-04-20 RX ADMIN — ONDANSETRON 4 MG: 2 INJECTION, SOLUTION INTRAMUSCULAR; INTRAVENOUS at 12:26

## 2025-04-20 RX ADMIN — OXYCODONE HYDROCHLORIDE 5 MG: 5 TABLET ORAL at 17:01

## 2025-04-20 RX ADMIN — HYDROMORPHONE HYDROCHLORIDE 0.5 MG: 0.5 INJECTION, SOLUTION INTRAMUSCULAR; INTRAVENOUS; SUBCUTANEOUS at 11:01

## 2025-04-20 ASSESSMENT — PAIN DESCRIPTION - LOCATION
LOCATION: CHEST
LOCATION: CHEST

## 2025-04-20 ASSESSMENT — PAIN DESCRIPTION - ORIENTATION
ORIENTATION: MID
ORIENTATION: LEFT;MID;UPPER

## 2025-04-20 ASSESSMENT — PAIN SCALES - GENERAL
PAINLEVEL_OUTOF10: 10
PAINLEVEL_OUTOF10: 6

## 2025-04-20 ASSESSMENT — PAIN - FUNCTIONAL ASSESSMENT: PAIN_FUNCTIONAL_ASSESSMENT: 0-10
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
## 2025-04-20 NOTE — ED NOTES
Pt O2 titrated up to 6L at this time, sats in 84-86 range. Improvement to 92% after placement. Pt to go to CT at this time. Able to diurese with bedpan 150 ml clear yellow urine with attempt at bedpan, unable to defecate. 10/10 chest pain, dilaudid given

## 2025-04-20 NOTE — ED NOTES
TRANSFER - OUT REPORT:    Verbal report given to Marylin Peters RN on Hyacinth Patterson  being transferred to [address] for routine progression of patient care       Report consisted of patient's Situation, Background, Assessment and   Recommendations(SBAR).     Information from the following report(s) Nurse Handoff Report, ED SBAR, Adult Overview, Intake/Output, MAR, Med Rec Status, Cardiac Rhythm Sinus Tach, and Quality Measures was reviewed with the receiving nurse.    [place] Fall Assessment:    Presents to emergency department  because of falls (Syncope, seizure, or loss of consciousness): No  Age > 70: No  Altered Mental Status, Intoxication with alcohol or substance confusion (Disorientation, impaired judgment, poor safety awaremess, or inability to follow instructions): No  Impaired Mobility: Ambulates or transfers with assistive devices or assistance; Unable to ambulate or transer.: Yes  Nursing Judgement: Yes          Lines:   Peripheral IV 04/20/25 Left Antecubital (Active)       Peripheral IV 04/20/25 Right Antecubital (Active)   Site Assessment Clean, dry & intact 04/20/25 1005   Line Status Blood return noted;Brisk blood return 04/20/25 1005   Phlebitis Assessment No symptoms 04/20/25 1005   Infiltration Assessment 0 04/20/25 1005   Dressing Status New dressing applied;Clean, dry & intact 04/20/25 1005   Dressing Type Transparent 04/20/25 1005        Opportunity for questions and clarification was provided.      Patient to be transported with:  [facility] on cardiac monitoring, continued supplemental oxygenation

## 2025-04-20 NOTE — CONSENT
Informed Consent for Blood Component Transfusion Note    I have discussed with the patient the rationale for blood component transfusion; its benefits in treating or preventing fatigue, organ damage, or death; and its risk which includes mild transfusion reactions, rare risk of blood borne infection, or more serious but rare reactions. I have discussed the alternatives to transfusion, including the risk and consequences of not receiving transfusion. The patient had an opportunity to ask questions and had agreed to proceed with transfusion of blood components.    Electronically signed by Gini Tao MD on 4/20/25 at 9:49 AM EDT

## 2025-04-20 NOTE — ED NOTES
Pt tolerating blood transfusion without difficulty, no signs of reaction at this time. Rate to be increased. Output in purewick 200ml clear urine.

## 2025-04-20 NOTE — ED NOTES
Per BS transfer center , pt has been accepted to [facility] in [place] by Dr Kishor Fam, room assignment is  [address] [facility] [address]     Number for report is     #[phone]    Nursing Sup aware of need for ALS transport

## 2025-04-20 NOTE — ED NOTES
Received assignment, assuming care. Pt requires second IV, lasix, blood transfusion, transfer. This RN familiarizing self with chart.

## 2025-04-20 NOTE — ED TRIAGE NOTES
C/O Chest pain after eating at a cookout yesterday, reproducible with palpation and movement. ROBERTS with hx of CHF

## 2025-04-20 NOTE — PROGRESS NOTES
Writer contacted [employer] and spoke with Nickolas to arrange ALS transport for this patient to [facility] [address]. Requested information provided (Pt diagnosis, weight, demographics, cardiac monitoring, O2 at 6 liters via nasal cannula, saline lock, no isolation precautions and insurance information given) ETA 1830-- ED staff made aware.

## 2025-04-20 NOTE — ED NOTES
TRANSFER - OUT REPORT:    Verbal report given to Gwen D'Amico EMT-P on Hyacinth Patterson  being transferred to [place] [address] for routine progression of patient care       Report consisted of patient's Situation, Background, Assessment and   Recommendations(SBAR).     Information from the following report(s) Nurse Handoff Report, ED SBAR, Adult Overview, MAR, Recent Results, Med Rec Status, Cardiac Rhythm Sinus Tach, and Quality Measures was reviewed with the receiving nurse.    [place] Fall Assessment:    Presents to emergency department  because of falls (Syncope, seizure, or loss of consciousness): No  Age > 70: No  Altered Mental Status, Intoxication with alcohol or substance confusion (Disorientation, impaired judgment, poor safety awaremess, or inability to follow instructions): No  Impaired Mobility: Ambulates or transfers with assistive devices or assistance; Unable to ambulate or transer.: Yes  Nursing Judgement: Yes          Lines:   Peripheral IV 04/20/25 Left Antecubital (Active)       Peripheral IV 04/20/25 Right Antecubital (Active)   Site Assessment Clean, dry & intact 04/20/25 1005   Line Status Blood return noted;Brisk blood return 04/20/25 1005   Phlebitis Assessment No symptoms 04/20/25 1005   Infiltration Assessment 0 04/20/25 1005   Dressing Status New dressing applied;Clean, dry & intact 04/20/25 1005   Dressing Type Transparent 04/20/25 1005        Opportunity for questions and clarification was provided.      Patient transported with:  [facility] on cardiac monitoring, supplemental oxygen

## 2025-04-21 LAB
EKG ATRIAL RATE: 102 BPM
EKG DIAGNOSIS: NORMAL
EKG P AXIS: 47 DEGREES
EKG P-R INTERVAL: 144 MS
EKG Q-T INTERVAL: 358 MS
EKG QRS DURATION: 92 MS
EKG QTC CALCULATION (BAZETT): 466 MS
EKG R AXIS: -4 DEGREES
EKG T AXIS: 162 DEGREES
EKG VENTRICULAR RATE: 102 BPM

## 2025-04-22 LAB
ABO + RH BLD: NORMAL
BLD PROD TYP BPU: NORMAL
BLOOD BANK BLOOD PRODUCT EXPIRATION DATE: NORMAL
BLOOD BANK DISPENSE STATUS: NORMAL
BLOOD BANK ISBT PRODUCT BLOOD TYPE: 6200
BLOOD BANK PRODUCT CODE: NORMAL
BLOOD BANK UNIT TYPE AND RH: NORMAL
BLOOD GROUP ANTIBODIES SERPL: NORMAL
BPU ID: NORMAL
CROSSMATCH RESULT: NORMAL
SPECIMEN EXP DATE BLD: NORMAL
UNIT DIVISION: 0
UNIT ISSUE DATE/TIME: NORMAL

## 2025-04-27 ENCOUNTER — HOSPITAL ENCOUNTER (INPATIENT)
Facility: HOSPITAL | Age: 70
LOS: 4 days | Discharge: HOME | DRG: 321 | End: 2025-05-01
Attending: STUDENT IN AN ORGANIZED HEALTH CARE EDUCATION/TRAINING PROGRAM | Admitting: STUDENT IN AN ORGANIZED HEALTH CARE EDUCATION/TRAINING PROGRAM
Payer: MEDICARE

## 2025-04-27 ENCOUNTER — HOSPITAL ENCOUNTER (EMERGENCY)
Facility: HOSPITAL | Age: 70
Discharge: ANOTHER HOSPITAL | End: 2025-04-27
Attending: EMERGENCY MEDICINE
Payer: MEDICARE

## 2025-04-27 ENCOUNTER — APPOINTMENT (OUTPATIENT)
Facility: HOSPITAL | Age: 70
DRG: 321 | End: 2025-04-27
Payer: MEDICARE

## 2025-04-27 ENCOUNTER — APPOINTMENT (OUTPATIENT)
Facility: HOSPITAL | Age: 70
End: 2025-04-27
Payer: MEDICARE

## 2025-04-27 VITALS
HEART RATE: 106 BPM | RESPIRATION RATE: 23 BRPM | HEIGHT: 65 IN | TEMPERATURE: 98.4 F | BODY MASS INDEX: 25.64 KG/M2 | DIASTOLIC BLOOD PRESSURE: 81 MMHG | OXYGEN SATURATION: 98 % | SYSTOLIC BLOOD PRESSURE: 168 MMHG | WEIGHT: 153.9 LBS

## 2025-04-27 DIAGNOSIS — I50.9 ACUTE ON CHRONIC CONGESTIVE HEART FAILURE, UNSPECIFIED HEART FAILURE TYPE (HCC): Primary | ICD-10-CM

## 2025-04-27 DIAGNOSIS — R07.9 CHEST PAIN: ICD-10-CM

## 2025-04-27 DIAGNOSIS — I50.9 ACUTE EXACERBATION OF CHRONIC HEART FAILURE (HCC): Primary | ICD-10-CM

## 2025-04-27 DIAGNOSIS — E87.6 HYPOKALEMIA: ICD-10-CM

## 2025-04-27 DIAGNOSIS — R07.9 CHEST PAIN, UNSPECIFIED TYPE: ICD-10-CM

## 2025-04-27 DIAGNOSIS — I21.4 NSTEMI (NON-ST ELEVATED MYOCARDIAL INFARCTION) (HCC): ICD-10-CM

## 2025-04-27 DIAGNOSIS — J96.01 ACUTE RESPIRATORY FAILURE WITH HYPOXIA (HCC): ICD-10-CM

## 2025-04-27 DIAGNOSIS — R79.89 ELEVATED TROPONIN: ICD-10-CM

## 2025-04-27 LAB
ABO + RH BLD: NORMAL
ALBUMIN SERPL-MCNC: 3 G/DL (ref 3.5–5)
ALBUMIN/GLOB SERPL: 0.8 (ref 1.1–2.2)
ALP SERPL-CCNC: 92 U/L (ref 45–117)
ALT SERPL-CCNC: 18 U/L (ref 12–78)
ANION GAP SERPL CALC-SCNC: 12 MMOL/L (ref 2–12)
ANION GAP SERPL CALC-SCNC: 4 MMOL/L (ref 2–12)
AST SERPL-CCNC: 23 U/L (ref 15–37)
BASOPHILS # BLD: 0.07 K/UL (ref 0–0.1)
BASOPHILS NFR BLD: 0.8 % (ref 0–1)
BILIRUB SERPL-MCNC: 0.3 MG/DL (ref 0.2–1)
BLOOD GROUP ANTIBODIES SERPL: NORMAL
BUN SERPL-MCNC: 13 MG/DL (ref 6–20)
BUN SERPL-MCNC: 17 MG/DL (ref 6–20)
BUN/CREAT SERPL: 15 (ref 12–20)
BUN/CREAT SERPL: 15 (ref 12–20)
CALCIUM SERPL-MCNC: 7.3 MG/DL (ref 8.5–10.1)
CALCIUM SERPL-MCNC: 8.7 MG/DL (ref 8.5–10.1)
CHLORIDE SERPL-SCNC: 108 MMOL/L (ref 97–108)
CHLORIDE SERPL-SCNC: 121 MMOL/L (ref 97–108)
CO2 SERPL-SCNC: 18 MMOL/L (ref 21–32)
CO2 SERPL-SCNC: 19 MMOL/L (ref 21–32)
CREAT SERPL-MCNC: 0.86 MG/DL (ref 0.55–1.02)
CREAT SERPL-MCNC: 1.13 MG/DL (ref 0.55–1.02)
DIFFERENTIAL METHOD BLD: ABNORMAL
EOSINOPHIL # BLD: 0.32 K/UL (ref 0–0.4)
EOSINOPHIL NFR BLD: 3.8 % (ref 0–0.7)
ERYTHROCYTE [DISTWIDTH] IN BLOOD BY AUTOMATED COUNT: 16.8 % (ref 11.5–14.5)
GLOBULIN SER CALC-MCNC: 3.6 G/DL (ref 2–4)
GLUCOSE BLD STRIP.AUTO-MCNC: 139 MG/DL (ref 65–117)
GLUCOSE BLD STRIP.AUTO-MCNC: 161 MG/DL (ref 65–117)
GLUCOSE BLD STRIP.AUTO-MCNC: 198 MG/DL (ref 65–117)
GLUCOSE SERPL-MCNC: 102 MG/DL (ref 65–100)
GLUCOSE SERPL-MCNC: 164 MG/DL (ref 65–100)
HCT VFR BLD AUTO: 29.7 % (ref 35–47)
HCT VFR BLD AUTO: 30.8 % (ref 35–47)
HGB BLD-MCNC: 8.7 G/DL (ref 11.5–16)
HGB BLD-MCNC: 9 G/DL (ref 11.5–16)
IMM GRANULOCYTES # BLD AUTO: 0.02 K/UL (ref 0–0.04)
IMM GRANULOCYTES NFR BLD AUTO: 0.2 % (ref 0–0.5)
INR PPP: 1 (ref 0.9–1.1)
LYMPHOCYTES # BLD: 0.97 K/UL (ref 0.8–3.5)
LYMPHOCYTES NFR BLD: 11.4 % (ref 12–49)
MAGNESIUM SERPL-MCNC: 1.9 MG/DL (ref 1.6–2.4)
MCH RBC QN AUTO: 25.9 PG (ref 26–34)
MCHC RBC AUTO-ENTMCNC: 29.2 G/DL (ref 30–36.5)
MCV RBC AUTO: 88.5 FL (ref 80–99)
MONOCYTES # BLD: 0.57 K/UL (ref 0–1)
MONOCYTES NFR BLD: 6.7 % (ref 5–13)
NEUTS SEG # BLD: 6.56 K/UL (ref 1.8–8)
NEUTS SEG NFR BLD: 77.1 % (ref 32–75)
NRBC # BLD: 0 K/UL (ref 0–0.01)
NRBC BLD-RTO: 0 PER 100 WBC
NT PRO BNP: ABNORMAL PG/ML (ref 0–125)
PLATELET # BLD AUTO: 282 K/UL (ref 150–400)
PMV BLD AUTO: 12.3 FL (ref 8.9–12.9)
POTASSIUM SERPL-SCNC: 3.5 MMOL/L (ref 3.5–5.1)
POTASSIUM SERPL-SCNC: 5 MMOL/L (ref 3.5–5.1)
PROT SERPL-MCNC: 6.6 G/DL (ref 6.4–8.2)
PROTHROMBIN TIME: 10.2 SEC (ref 9.2–11.2)
RBC # BLD AUTO: 3.48 M/UL (ref 3.8–5.2)
SERVICE CMNT-IMP: ABNORMAL
SODIUM SERPL-SCNC: 139 MMOL/L (ref 136–145)
SODIUM SERPL-SCNC: 143 MMOL/L (ref 136–145)
SPECIMEN EXP DATE BLD: NORMAL
TROPONIN I SERPL HS-MCNC: 172 NG/L (ref 0–51)
TROPONIN I SERPL HS-MCNC: 174 NG/L (ref 0–51)
WBC # BLD AUTO: 8.5 K/UL (ref 3.6–11)

## 2025-04-27 PROCEDURE — 71045 X-RAY EXAM CHEST 1 VIEW: CPT

## 2025-04-27 PROCEDURE — 6370000000 HC RX 637 (ALT 250 FOR IP): Performed by: STUDENT IN AN ORGANIZED HEALTH CARE EDUCATION/TRAINING PROGRAM

## 2025-04-27 PROCEDURE — 80048 BASIC METABOLIC PNL TOTAL CA: CPT

## 2025-04-27 PROCEDURE — 84484 ASSAY OF TROPONIN QUANT: CPT

## 2025-04-27 PROCEDURE — 86850 RBC ANTIBODY SCREEN: CPT

## 2025-04-27 PROCEDURE — 74174 CTA ABD&PLVS W/CONTRAST: CPT

## 2025-04-27 PROCEDURE — 96374 THER/PROPH/DIAG INJ IV PUSH: CPT

## 2025-04-27 PROCEDURE — 2580000003 HC RX 258: Performed by: STUDENT IN AN ORGANIZED HEALTH CARE EDUCATION/TRAINING PROGRAM

## 2025-04-27 PROCEDURE — 86900 BLOOD TYPING SEROLOGIC ABO: CPT

## 2025-04-27 PROCEDURE — 85014 HEMATOCRIT: CPT

## 2025-04-27 PROCEDURE — 36415 COLL VENOUS BLD VENIPUNCTURE: CPT

## 2025-04-27 PROCEDURE — 83880 ASSAY OF NATRIURETIC PEPTIDE: CPT

## 2025-04-27 PROCEDURE — 86901 BLOOD TYPING SEROLOGIC RH(D): CPT

## 2025-04-27 PROCEDURE — 93005 ELECTROCARDIOGRAM TRACING: CPT | Performed by: EMERGENCY MEDICINE

## 2025-04-27 PROCEDURE — 99285 EMERGENCY DEPT VISIT HI MDM: CPT

## 2025-04-27 PROCEDURE — 2500000003 HC RX 250 WO HCPCS: Performed by: STUDENT IN AN ORGANIZED HEALTH CARE EDUCATION/TRAINING PROGRAM

## 2025-04-27 PROCEDURE — 6360000002 HC RX W HCPCS: Performed by: EMERGENCY MEDICINE

## 2025-04-27 PROCEDURE — 82962 GLUCOSE BLOOD TEST: CPT

## 2025-04-27 PROCEDURE — 80053 COMPREHEN METABOLIC PANEL: CPT

## 2025-04-27 PROCEDURE — 85025 COMPLETE CBC W/AUTO DIFF WBC: CPT

## 2025-04-27 PROCEDURE — 6360000002 HC RX W HCPCS: Performed by: STUDENT IN AN ORGANIZED HEALTH CARE EDUCATION/TRAINING PROGRAM

## 2025-04-27 PROCEDURE — 6370000000 HC RX 637 (ALT 250 FOR IP): Performed by: EMERGENCY MEDICINE

## 2025-04-27 PROCEDURE — 85610 PROTHROMBIN TIME: CPT

## 2025-04-27 PROCEDURE — 6360000004 HC RX CONTRAST MEDICATION: Performed by: STUDENT IN AN ORGANIZED HEALTH CARE EDUCATION/TRAINING PROGRAM

## 2025-04-27 PROCEDURE — 83735 ASSAY OF MAGNESIUM: CPT

## 2025-04-27 PROCEDURE — 94640 AIRWAY INHALATION TREATMENT: CPT

## 2025-04-27 PROCEDURE — 96375 TX/PRO/DX INJ NEW DRUG ADDON: CPT

## 2025-04-27 PROCEDURE — 85018 HEMOGLOBIN: CPT

## 2025-04-27 PROCEDURE — 2700000000 HC OXYGEN THERAPY PER DAY

## 2025-04-27 PROCEDURE — 1100000000 HC RM PRIVATE

## 2025-04-27 RX ORDER — CLOPIDOGREL BISULFATE 75 MG/1
75 TABLET ORAL DAILY
Status: DISCONTINUED | OUTPATIENT
Start: 2025-04-27 | End: 2025-05-01 | Stop reason: HOSPADM

## 2025-04-27 RX ORDER — ENOXAPARIN SODIUM 100 MG/ML
40 INJECTION SUBCUTANEOUS DAILY
Status: DISCONTINUED | OUTPATIENT
Start: 2025-04-27 | End: 2025-05-01 | Stop reason: HOSPADM

## 2025-04-27 RX ORDER — ONDANSETRON 2 MG/ML
4 INJECTION INTRAMUSCULAR; INTRAVENOUS EVERY 6 HOURS PRN
Status: DISCONTINUED | OUTPATIENT
Start: 2025-04-27 | End: 2025-05-01 | Stop reason: HOSPADM

## 2025-04-27 RX ORDER — ONDANSETRON 4 MG/1
4 TABLET, ORALLY DISINTEGRATING ORAL EVERY 8 HOURS PRN
Status: DISCONTINUED | OUTPATIENT
Start: 2025-04-27 | End: 2025-05-01 | Stop reason: HOSPADM

## 2025-04-27 RX ORDER — POLYETHYLENE GLYCOL 3350 17 G/17G
17 POWDER, FOR SOLUTION ORAL DAILY PRN
Status: DISCONTINUED | OUTPATIENT
Start: 2025-04-27 | End: 2025-05-01 | Stop reason: HOSPADM

## 2025-04-27 RX ORDER — FENTANYL CITRATE 50 UG/ML
50 INJECTION, SOLUTION INTRAMUSCULAR; INTRAVENOUS ONCE
Refills: 0 | Status: COMPLETED | OUTPATIENT
Start: 2025-04-27 | End: 2025-04-27

## 2025-04-27 RX ORDER — ACETAMINOPHEN 650 MG/1
650 SUPPOSITORY RECTAL EVERY 6 HOURS PRN
Status: DISCONTINUED | OUTPATIENT
Start: 2025-04-27 | End: 2025-05-01 | Stop reason: HOSPADM

## 2025-04-27 RX ORDER — FUROSEMIDE 10 MG/ML
40 INJECTION INTRAMUSCULAR; INTRAVENOUS
Status: CANCELLED | OUTPATIENT
Start: 2025-04-27 | End: 2025-04-27

## 2025-04-27 RX ORDER — FERROUS SULFATE 325(65) MG
325 TABLET ORAL
Status: DISCONTINUED | OUTPATIENT
Start: 2025-04-27 | End: 2025-05-01 | Stop reason: HOSPADM

## 2025-04-27 RX ORDER — SODIUM CHLORIDE 9 MG/ML
INJECTION, SOLUTION INTRAVENOUS PRN
Status: DISCONTINUED | OUTPATIENT
Start: 2025-04-27 | End: 2025-05-01 | Stop reason: HOSPADM

## 2025-04-27 RX ORDER — FUROSEMIDE 10 MG/ML
40 INJECTION INTRAMUSCULAR; INTRAVENOUS 2 TIMES DAILY
Status: DISCONTINUED | OUTPATIENT
Start: 2025-04-27 | End: 2025-04-29

## 2025-04-27 RX ORDER — ACETAMINOPHEN 325 MG/1
650 TABLET ORAL EVERY 6 HOURS PRN
Status: DISCONTINUED | OUTPATIENT
Start: 2025-04-27 | End: 2025-05-01 | Stop reason: HOSPADM

## 2025-04-27 RX ORDER — ATORVASTATIN CALCIUM 40 MG/1
80 TABLET, FILM COATED ORAL DAILY
Status: DISCONTINUED | OUTPATIENT
Start: 2025-04-27 | End: 2025-05-01 | Stop reason: HOSPADM

## 2025-04-27 RX ORDER — ASPIRIN 81 MG/1
81 TABLET ORAL DAILY
Status: DISCONTINUED | OUTPATIENT
Start: 2025-04-27 | End: 2025-05-01 | Stop reason: HOSPADM

## 2025-04-27 RX ORDER — INSULIN LISPRO 100 [IU]/ML
0-8 INJECTION, SOLUTION INTRAVENOUS; SUBCUTANEOUS
Status: DISCONTINUED | OUTPATIENT
Start: 2025-04-27 | End: 2025-05-01 | Stop reason: HOSPADM

## 2025-04-27 RX ORDER — FUROSEMIDE 10 MG/ML
40 INJECTION INTRAMUSCULAR; INTRAVENOUS
Status: COMPLETED | OUTPATIENT
Start: 2025-04-27 | End: 2025-04-27

## 2025-04-27 RX ORDER — IPRATROPIUM BROMIDE AND ALBUTEROL SULFATE 2.5; .5 MG/3ML; MG/3ML
1 SOLUTION RESPIRATORY (INHALATION) EVERY 4 HOURS PRN
Status: DISCONTINUED | OUTPATIENT
Start: 2025-04-27 | End: 2025-05-01 | Stop reason: HOSPADM

## 2025-04-27 RX ORDER — DEXTROSE MONOHYDRATE 100 MG/ML
INJECTION, SOLUTION INTRAVENOUS CONTINUOUS PRN
Status: DISCONTINUED | OUTPATIENT
Start: 2025-04-27 | End: 2025-05-01 | Stop reason: HOSPADM

## 2025-04-27 RX ORDER — MONTELUKAST SODIUM 10 MG/1
10 TABLET ORAL NIGHTLY
Status: DISCONTINUED | OUTPATIENT
Start: 2025-04-27 | End: 2025-05-01 | Stop reason: HOSPADM

## 2025-04-27 RX ORDER — SODIUM CHLORIDE 0.9 % (FLUSH) 0.9 %
5-40 SYRINGE (ML) INJECTION EVERY 12 HOURS SCHEDULED
Status: DISCONTINUED | OUTPATIENT
Start: 2025-04-27 | End: 2025-05-01 | Stop reason: HOSPADM

## 2025-04-27 RX ORDER — SODIUM CHLORIDE 0.9 % (FLUSH) 0.9 %
5-40 SYRINGE (ML) INJECTION PRN
Status: DISCONTINUED | OUTPATIENT
Start: 2025-04-27 | End: 2025-05-01 | Stop reason: HOSPADM

## 2025-04-27 RX ORDER — GLUCAGON 1 MG/ML
1 KIT INJECTION PRN
Status: DISCONTINUED | OUTPATIENT
Start: 2025-04-27 | End: 2025-05-01 | Stop reason: HOSPADM

## 2025-04-27 RX ORDER — ACETAMINOPHEN 325 MG/1
650 TABLET ORAL EVERY 6 HOURS PRN
Status: DISCONTINUED | OUTPATIENT
Start: 2025-04-27 | End: 2025-04-27 | Stop reason: SDUPTHER

## 2025-04-27 RX ORDER — IOPAMIDOL 755 MG/ML
100 INJECTION, SOLUTION INTRAVASCULAR
Status: COMPLETED | OUTPATIENT
Start: 2025-04-27 | End: 2025-04-27

## 2025-04-27 RX ADMIN — FUROSEMIDE 40 MG: 10 INJECTION, SOLUTION INTRAMUSCULAR; INTRAVENOUS at 18:31

## 2025-04-27 RX ADMIN — FERROUS SULFATE TAB 325 MG (65 MG ELEMENTAL FE) 325 MG: 325 (65 FE) TAB at 16:16

## 2025-04-27 RX ADMIN — NITROGLYCERIN 0.5 INCH: 20 OINTMENT TOPICAL at 05:08

## 2025-04-27 RX ADMIN — INSULIN LISPRO 2 UNITS: 100 INJECTION, SOLUTION INTRAVENOUS; SUBCUTANEOUS at 20:42

## 2025-04-27 RX ADMIN — SODIUM CHLORIDE, PRESERVATIVE FREE 10 ML: 5 INJECTION INTRAVENOUS at 20:38

## 2025-04-27 RX ADMIN — IPRATROPIUM BROMIDE AND ALBUTEROL SULFATE 1 DOSE: 2.5; .5 SOLUTION RESPIRATORY (INHALATION) at 19:49

## 2025-04-27 RX ADMIN — MONTELUKAST 10 MG: 10 TABLET, FILM COATED ORAL at 22:47

## 2025-04-27 RX ADMIN — ASPIRIN 81 MG: 81 TABLET, COATED ORAL at 16:16

## 2025-04-27 RX ADMIN — CLOPIDOGREL BISULFATE 75 MG: 75 TABLET, FILM COATED ORAL at 16:16

## 2025-04-27 RX ADMIN — ATORVASTATIN CALCIUM 80 MG: 40 TABLET, FILM COATED ORAL at 16:16

## 2025-04-27 RX ADMIN — FUROSEMIDE 40 MG: 10 INJECTION, SOLUTION INTRAMUSCULAR; INTRAVENOUS at 05:10

## 2025-04-27 RX ADMIN — IOPAMIDOL 100 ML: 755 INJECTION, SOLUTION INTRAVENOUS at 18:41

## 2025-04-27 RX ADMIN — PANTOPRAZOLE SODIUM 40 MG: 40 INJECTION, POWDER, LYOPHILIZED, FOR SOLUTION INTRAVENOUS at 16:16

## 2025-04-27 RX ADMIN — ARFORMOTEROL TARTRATE: 15 SOLUTION RESPIRATORY (INHALATION) at 19:54

## 2025-04-27 RX ADMIN — FENTANYL CITRATE 50 MCG: 50 INJECTION, SOLUTION INTRAMUSCULAR; INTRAVENOUS at 04:42

## 2025-04-27 ASSESSMENT — PAIN SCALES - GENERAL
PAINLEVEL_OUTOF10: 8
PAINLEVEL_OUTOF10: 8
PAINLEVEL_OUTOF10: 7
PAINLEVEL_OUTOF10: 8
PAINLEVEL_OUTOF10: 8

## 2025-04-27 ASSESSMENT — PAIN - FUNCTIONAL ASSESSMENT
PAIN_FUNCTIONAL_ASSESSMENT: 0-10
PAIN_FUNCTIONAL_ASSESSMENT: 0-10

## 2025-04-27 ASSESSMENT — LIFESTYLE VARIABLES
HOW MANY STANDARD DRINKS CONTAINING ALCOHOL DO YOU HAVE ON A TYPICAL DAY: PATIENT DOES NOT DRINK
HOW OFTEN DO YOU HAVE A DRINK CONTAINING ALCOHOL: NEVER
HOW MANY STANDARD DRINKS CONTAINING ALCOHOL DO YOU HAVE ON A TYPICAL DAY: PATIENT DOES NOT DRINK
HOW OFTEN DO YOU HAVE A DRINK CONTAINING ALCOHOL: NEVER

## 2025-04-27 ASSESSMENT — PAIN DESCRIPTION - LOCATION
LOCATION: CHEST
LOCATION: BACK
LOCATION: CHEST

## 2025-04-27 ASSESSMENT — PAIN DESCRIPTION - ORIENTATION: ORIENTATION: MID

## 2025-04-27 NOTE — ED TRIAGE NOTES
Patient arrives as a transfer from [facility] for acute CHF exacerbation, shortness of breath, and elevated troponin. Patient received lasix, fentanyl, and nitro at previous facility. Patient arrives on 2L nasal cannula

## 2025-04-27 NOTE — H&P
Hospitalist Admission Note    NAME:   Hyacinth Patterson   : 1955   MRN: [id number]     Date/Time: 2025 4:26 PM    Patient PCP: Jeffery Alexis MD    ______________________________________________________________________  Given the patient's current clinical presentation, I have a high level of concern for decompensation if discharged from the emergency department.  Complex decision making was performed, which includes reviewing the patient's available past medical records, laboratory results, and x-ray films.       My assessment of this patient's clinical condition and my plan of care is as follows.    Assessment / Plan:  CAD s/p FELA with Acute on chronic CHFrEF last EF 20-25%   Elevated proBNP 15,750  elevated troponin 174  Currently on 2 L nasal cannula (not on oxygen at home)  Extensive PAD with a stent  Chest pain  Lasix given  EKG without ST depression or elevation  Will continue with Lasix twice daily  Last echo  showed LVEF of 35% with severe hypokinesis  Cardio consult  Continue with Coreg  CXR today showed congestive failure with interstitial pulmonary edema  Will add Lasix 40 mg twice daily  Patient stated she only on Plavix now, she been told to stop aspirin      GI bleed?  Reported chronic black stool  Patient on iron  Has EGD on 2025 with AVMs in upper GI tract, and Eliquis discontinued at that time  Check CTA to rule out active bleed  Will add Protonix twice daily  Clear liquid diet  We will consult GI  Monitor H&H every 8 hours    DM  2 will add sliding scale    History of asthma  Continue with montelukast  Continue with Advair  DuoNeb as needed    History of schizophrenia    History of mesenteric artery stenosis POA  Chronic mesenteric ischemia POA  PAD      Medical Decision Making:   I personally reviewed labs: yes  I personally reviewed imaging:yes  I personally reviewed EKG:yes  Toxic drug monitoring: yes  Discussed case with: ED provider. After discussion I am in

## 2025-04-27 NOTE — ED NOTES
TRANSFER - OUT REPORT:    Verbal report given to Susy Villegas RN on Hyacinth Patterson  being transferred to [facility] ER for routine progression of patient care       Report consisted of patient's Situation, Background, Assessment and   Recommendations(SBAR).     Information from the following report(s) Nurse Handoff Report, ED Encounter Summary, ED SBAR, MAR, Recent Results, and Cardiac Rhythm NSR-ST  was reviewed with the receiving nurse.    [place] Fall Assessment:    Presents to emergency department  because of falls (Syncope, seizure, or loss of consciousness): No  Age > 70: No  Altered Mental Status, Intoxication with alcohol or substance confusion (Disorientation, impaired judgment, poor safety awaremess, or inability to follow instructions): No  Impaired Mobility: Ambulates or transfers with assistive devices or assistance; Unable to ambulate or transer.: No  Nursing Judgement: No          Lines:   Peripheral IV 04/27/25 Posterior;Right Wrist (Active)        Opportunity for questions and clarification was provided.      Patient transported with:  Monitor and O2 @ 2lpm [facility]

## 2025-04-27 NOTE — ED NOTES
Bedside shift change report given to Shayla (oncoming nurse) by Rachel (offgoing nurse). Report included the following information Nurse Handoff Report, ED Encounter Summary, ED SBAR, Adult Overview, MAR, and Recent Results.

## 2025-04-27 NOTE — ED TRIAGE NOTES
Chest pain all day yesterday the became worst throughout the night. Pain 8/10. No home medication attempted.

## 2025-04-27 NOTE — ED NOTES
Patient access center states still no beds calling [facility] ER for transfer.

## 2025-04-27 NOTE — ED NOTES
Contacted [facility] regarding transfer of pt to [facility], spoke with Eve, arranged for ALS transport with cardiac monitor, saline lock, no isolation, and with the patient on 2L O2/NC. Insurance information, ht/wt, and primary diagnosis provided. Received a 1230 ETA. ODILON Lindsay RN made aware of ETA.

## 2025-04-27 NOTE — ED PROVIDER NOTES
EMERGENCY DEPARTMENT HISTORY AND PHYSICAL EXAM      Date: 4/27/2025  Patient Name: Hyacinth Patterson    History of Presenting Illness     Chief Complaint   Patient presents with    Shortness of Breath         HPI: History From: patient, History limited by: none  Hyacinth Patterson, 69 y.o. female presents to the ED with cc of [facility] transfer for CHF exacerbation, she reports feeling more short of breath starting yesterday with dyspnea on exertion.  She now feels better, she is on nasal cannula oxygen saturating well.  She denies chest pain.          There are no other complaints, changes, or physical findings at this time.    PCP: Jeffery Alexis MD    No current facility-administered medications on file prior to encounter.     Current Outpatient Medications on File Prior to Encounter   Medication Sig Dispense Refill    atorvastatin (LIPITOR) 80 MG tablet Take 1 tablet by mouth daily 90 tablet 4    atorvastatin (LIPITOR) 80 MG tablet Take 1 tablet by mouth daily 90 tablet 1    fluticasone-salmeterol (ADVAIR) 250-50 MCG/ACT AEPB diskus inhaler Inhale 1 puff into the lungs in the morning and 1 puff in the evening. 60 each 11    pantoprazole (PROTONIX) 40 MG tablet Take 1 tablet by mouth 2 times daily (before meals) 180 tablet 1    losartan (COZAAR) 25 MG tablet Take 1 tablet by mouth daily (Patient not taking: Reported on 3/24/2025) 30 tablet 5    potassium chloride (KLOR-CON M) 20 MEQ extended release tablet Take 1 tablet by mouth daily 30 tablet 5    insulin glargine (LANTUS SOLOSTAR) 100 UNIT/ML injection pen Inject 10 Units into the skin daily 15 mL 5    ferrous sulfate (IRON 325) 325 (65 Fe) MG tablet Take 1 tablet by mouth every 48 hours 180 tablet 1    montelukast (SINGULAIR) 10 MG tablet TAKE 1 TABLET BY MOUTH DAILY AT BEDTIME 30 tablet 3    clopidogrel (PLAVIX) 75 MG tablet Take 1 tablet by mouth daily 30 tablet 5    aspirin 81 MG EC tablet Take 1 tablet by mouth daily         Past History     Past Medical

## 2025-04-27 NOTE — ED NOTES
Patient oxygen stat drop to the mid 80's while sleeping this writer placed the patient on 2 liters of O2 for comfort.

## 2025-04-27 NOTE — ED NOTES
Transfer center called ER and updated that there were no beds available.  Patient eating breakfast tray

## 2025-04-27 NOTE — ED PROVIDER NOTES
for this visit including, but not limited to, labs, xrays, and EKGs. I have reviewed all pertinent and currently available medical records. My plan of care and further evaluation and/or disposition is based on these results, as well as the initial, and subsequent, history and physical exam, as well as any additional complaints during the visit.    Paulo Glass MD          Medical Decision Making  Problems Addressed:  Acute exacerbation of chronic heart failure (HCC): acute illness or injury  Acute respiratory failure with hypoxia (HCC): acute illness or injury    Amount and/or Complexity of Data Reviewed  Independent Historian: caregiver  External Data Reviewed: labs, radiology and notes.  Labs: ordered. Decision-making details documented in ED Course.  Radiology: ordered.  ECG/medicine tests: ordered and independent interpretation performed. Decision-making details documented in ED Course.     Details: Nsr no stemi    Risk  Prescription drug management.  Decision regarding hospitalization.    Critical Care  Total time providing critical care: 40 minutes      MDM  Reviewed: previous chart, nursing note and vitals  Reviewed previous: labs and ECG  Interpretation: ECG, x-ray and labs  Total time providing critical care: 30-74 minutes. This excludes time spent performing separately reportable procedures and services.  Consults: admitting MD                  Disposition Considerations (Tests considered, Shared Decision Making, Pt Expectation of Test or Tx.):       I am the Primary Clinician of Record.   Paulo Glass MD (electronically signed)    (Please note that parts of this dictation were completed with voice recognition software. Quite often unanticipated grammatical, syntax, homophones, and other interpretive errors are inadvertently transcribed by the computer software. Please disregards these errors. Please excuse any errors that have escaped final proofreading.)             KEN Glass MD  04/27/25

## 2025-04-28 LAB
ANION GAP SERPL CALC-SCNC: 5 MMOL/L (ref 2–12)
BASOPHILS # BLD: 0.06 K/UL (ref 0–0.1)
BASOPHILS NFR BLD: 0.7 % (ref 0–1)
BUN SERPL-MCNC: 14 MG/DL (ref 6–20)
BUN/CREAT SERPL: 12 (ref 12–20)
CALCIUM SERPL-MCNC: 8.9 MG/DL (ref 8.5–10.1)
CHLORIDE SERPL-SCNC: 111 MMOL/L (ref 97–108)
CO2 SERPL-SCNC: 25 MMOL/L (ref 21–32)
CREAT SERPL-MCNC: 1.17 MG/DL (ref 0.55–1.02)
DIFFERENTIAL METHOD BLD: ABNORMAL
EOSINOPHIL # BLD: 0.21 K/UL (ref 0–0.4)
EOSINOPHIL NFR BLD: 2.3 % (ref 0–7)
ERYTHROCYTE [DISTWIDTH] IN BLOOD BY AUTOMATED COUNT: 16.5 % (ref 11.5–14.5)
FLUAV RNA SPEC QL NAA+PROBE: NOT DETECTED
FLUBV RNA SPEC QL NAA+PROBE: NOT DETECTED
GLUCOSE BLD STRIP.AUTO-MCNC: 128 MG/DL (ref 65–117)
GLUCOSE BLD STRIP.AUTO-MCNC: 178 MG/DL (ref 65–117)
GLUCOSE BLD STRIP.AUTO-MCNC: 208 MG/DL (ref 65–117)
GLUCOSE BLD STRIP.AUTO-MCNC: 233 MG/DL (ref 65–117)
GLUCOSE SERPL-MCNC: 103 MG/DL (ref 65–100)
HCT VFR BLD AUTO: 29.2 % (ref 35–47)
HCT VFR BLD AUTO: 29.3 % (ref 35–47)
HGB BLD-MCNC: 8.9 G/DL (ref 11.5–16)
HGB BLD-MCNC: 8.9 G/DL (ref 11.5–16)
IMM GRANULOCYTES # BLD AUTO: 0.03 K/UL (ref 0–0.04)
IMM GRANULOCYTES NFR BLD AUTO: 0.3 % (ref 0–0.5)
LYMPHOCYTES # BLD: 0.93 K/UL (ref 0.8–3.5)
LYMPHOCYTES NFR BLD: 10.2 % (ref 12–49)
MCH RBC QN AUTO: 25.8 PG (ref 26–34)
MCHC RBC AUTO-ENTMCNC: 30.4 G/DL (ref 30–36.5)
MCV RBC AUTO: 84.9 FL (ref 80–99)
MONOCYTES # BLD: 0.72 K/UL (ref 0–1)
MONOCYTES NFR BLD: 7.9 % (ref 5–13)
NEUTS SEG # BLD: 7.19 K/UL (ref 1.8–8)
NEUTS SEG NFR BLD: 78.6 % (ref 32–75)
NRBC # BLD: 0 K/UL (ref 0–0.01)
NRBC BLD-RTO: 0 PER 100 WBC
PLATELET # BLD AUTO: 264 K/UL (ref 150–400)
PMV BLD AUTO: 11.7 FL (ref 8.9–12.9)
POTASSIUM SERPL-SCNC: 3.8 MMOL/L (ref 3.5–5.1)
PROCALCITONIN SERPL-MCNC: <0.05 NG/ML
RBC # BLD AUTO: 3.45 M/UL (ref 3.8–5.2)
SARS-COV-2 RNA RESP QL NAA+PROBE: NOT DETECTED
SERVICE CMNT-IMP: ABNORMAL
SODIUM SERPL-SCNC: 141 MMOL/L (ref 136–145)
SOURCE: NORMAL
WBC # BLD AUTO: 9.1 K/UL (ref 3.6–11)

## 2025-04-28 PROCEDURE — 82962 GLUCOSE BLOOD TEST: CPT

## 2025-04-28 PROCEDURE — 36415 COLL VENOUS BLD VENIPUNCTURE: CPT

## 2025-04-28 PROCEDURE — 6360000002 HC RX W HCPCS: Performed by: STUDENT IN AN ORGANIZED HEALTH CARE EDUCATION/TRAINING PROGRAM

## 2025-04-28 PROCEDURE — 2500000003 HC RX 250 WO HCPCS: Performed by: STUDENT IN AN ORGANIZED HEALTH CARE EDUCATION/TRAINING PROGRAM

## 2025-04-28 PROCEDURE — 80048 BASIC METABOLIC PNL TOTAL CA: CPT

## 2025-04-28 PROCEDURE — 6370000000 HC RX 637 (ALT 250 FOR IP): Performed by: STUDENT IN AN ORGANIZED HEALTH CARE EDUCATION/TRAINING PROGRAM

## 2025-04-28 PROCEDURE — 85025 COMPLETE CBC W/AUTO DIFF WBC: CPT

## 2025-04-28 PROCEDURE — 94640 AIRWAY INHALATION TREATMENT: CPT

## 2025-04-28 PROCEDURE — 85014 HEMATOCRIT: CPT

## 2025-04-28 PROCEDURE — 87636 SARSCOV2 & INF A&B AMP PRB: CPT

## 2025-04-28 PROCEDURE — 2580000003 HC RX 258: Performed by: STUDENT IN AN ORGANIZED HEALTH CARE EDUCATION/TRAINING PROGRAM

## 2025-04-28 PROCEDURE — 85018 HEMOGLOBIN: CPT

## 2025-04-28 PROCEDURE — 2060000000 HC ICU INTERMEDIATE R&B

## 2025-04-28 PROCEDURE — 6370000000 HC RX 637 (ALT 250 FOR IP): Performed by: INTERNAL MEDICINE

## 2025-04-28 PROCEDURE — 84145 PROCALCITONIN (PCT): CPT

## 2025-04-28 PROCEDURE — 2700000000 HC OXYGEN THERAPY PER DAY

## 2025-04-28 RX ORDER — METOPROLOL SUCCINATE 25 MG/1
12.5 TABLET, EXTENDED RELEASE ORAL DAILY
Status: DISCONTINUED | OUTPATIENT
Start: 2025-04-28 | End: 2025-05-01 | Stop reason: HOSPADM

## 2025-04-28 RX ORDER — PANTOPRAZOLE SODIUM 40 MG/1
40 TABLET, DELAYED RELEASE ORAL
Status: DISCONTINUED | OUTPATIENT
Start: 2025-04-28 | End: 2025-05-01 | Stop reason: HOSPADM

## 2025-04-28 RX ADMIN — ATORVASTATIN CALCIUM 80 MG: 40 TABLET, FILM COATED ORAL at 08:17

## 2025-04-28 RX ADMIN — ARFORMOTEROL TARTRATE: 15 SOLUTION RESPIRATORY (INHALATION) at 07:51

## 2025-04-28 RX ADMIN — INSULIN LISPRO 2 UNITS: 100 INJECTION, SOLUTION INTRAVENOUS; SUBCUTANEOUS at 21:02

## 2025-04-28 RX ADMIN — FUROSEMIDE 40 MG: 10 INJECTION, SOLUTION INTRAMUSCULAR; INTRAVENOUS at 17:44

## 2025-04-28 RX ADMIN — INSULIN LISPRO 2 UNITS: 100 INJECTION, SOLUTION INTRAVENOUS; SUBCUTANEOUS at 12:45

## 2025-04-28 RX ADMIN — FUROSEMIDE 40 MG: 10 INJECTION, SOLUTION INTRAMUSCULAR; INTRAVENOUS at 08:18

## 2025-04-28 RX ADMIN — PANTOPRAZOLE SODIUM 40 MG: 40 INJECTION, POWDER, LYOPHILIZED, FOR SOLUTION INTRAVENOUS at 04:44

## 2025-04-28 RX ADMIN — CLOPIDOGREL BISULFATE 75 MG: 75 TABLET, FILM COATED ORAL at 08:17

## 2025-04-28 RX ADMIN — ARFORMOTEROL TARTRATE: 15 SOLUTION RESPIRATORY (INHALATION) at 20:20

## 2025-04-28 RX ADMIN — PANTOPRAZOLE SODIUM 40 MG: 40 TABLET, DELAYED RELEASE ORAL at 17:44

## 2025-04-28 RX ADMIN — METOPROLOL SUCCINATE 12.5 MG: 25 TABLET, EXTENDED RELEASE ORAL at 17:44

## 2025-04-28 RX ADMIN — MONTELUKAST 10 MG: 10 TABLET, FILM COATED ORAL at 20:53

## 2025-04-28 RX ADMIN — SODIUM CHLORIDE, PRESERVATIVE FREE 10 ML: 5 INJECTION INTRAVENOUS at 20:53

## 2025-04-28 RX ADMIN — SODIUM CHLORIDE, PRESERVATIVE FREE 10 ML: 5 INJECTION INTRAVENOUS at 08:19

## 2025-04-28 ASSESSMENT — PAIN SCALES - GENERAL
PAINLEVEL_OUTOF10: 0

## 2025-04-28 NOTE — ED NOTES
TRANSFER - OUT REPORT:    Verbal report given to Murphy on Hyacinth Patterson  being transferred to 2[address] for routine progression of patient care       Report consisted of patient's Situation, Background, Assessment and   Recommendations(SBAR).     Information from the following report(s) ED SBAR was reviewed with the receiving nurse.    [place] Fall Assessment:    Presents to emergency department  because of falls (Syncope, seizure, or loss of consciousness): No  Age > 70: No  Altered Mental Status, Intoxication with alcohol or substance confusion (Disorientation, impaired judgment, poor safety awaremess, or inability to follow instructions): No  Impaired Mobility: Ambulates or transfers with assistive devices or assistance; Unable to ambulate or transer.: No  Nursing Judgement: No          Lines:   Peripheral IV 04/27/25 Posterior;Right Wrist (Active)       Peripheral IV 04/27/25 Left Antecubital (Active)   Site Assessment Clean, dry & intact 04/27/25 1716   Line Status Blood return noted 04/27/25 1716   Phlebitis Assessment No symptoms 04/27/25 1716   Infiltration Assessment 0 04/27/25 1716   Dressing Status New dressing applied 04/27/25 1716   Dressing Intervention New 04/27/25 1716        Opportunity for questions and clarification was provided.      Patient transported with:  Monitor, O2 @ 2lpm, Registered Nurse, and Tech
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
## 2025-04-29 LAB
ANION GAP SERPL CALC-SCNC: 6 MMOL/L (ref 2–12)
BASOPHILS # BLD: 0.06 K/UL (ref 0–0.1)
BASOPHILS NFR BLD: 0.7 % (ref 0–1)
BUN SERPL-MCNC: 16 MG/DL (ref 6–20)
BUN/CREAT SERPL: 12 (ref 12–20)
CALCIUM SERPL-MCNC: 9.2 MG/DL (ref 8.5–10.1)
CHLORIDE SERPL-SCNC: 108 MMOL/L (ref 97–108)
CO2 SERPL-SCNC: 28 MMOL/L (ref 21–32)
CREAT SERPL-MCNC: 1.33 MG/DL (ref 0.55–1.02)
DIFFERENTIAL METHOD BLD: ABNORMAL
EOSINOPHIL # BLD: 0.32 K/UL (ref 0–0.4)
EOSINOPHIL NFR BLD: 3.5 % (ref 0–7)
ERYTHROCYTE [DISTWIDTH] IN BLOOD BY AUTOMATED COUNT: 16.3 % (ref 11.5–14.5)
GLUCOSE BLD STRIP.AUTO-MCNC: 137 MG/DL (ref 65–117)
GLUCOSE BLD STRIP.AUTO-MCNC: 138 MG/DL (ref 65–117)
GLUCOSE BLD STRIP.AUTO-MCNC: 146 MG/DL (ref 65–117)
GLUCOSE BLD STRIP.AUTO-MCNC: 231 MG/DL (ref 65–117)
GLUCOSE SERPL-MCNC: 131 MG/DL (ref 65–100)
HCT VFR BLD AUTO: 32.3 % (ref 35–47)
HGB BLD-MCNC: 9.9 G/DL (ref 11.5–16)
IMM GRANULOCYTES # BLD AUTO: 0.02 K/UL (ref 0–0.04)
IMM GRANULOCYTES NFR BLD AUTO: 0.2 % (ref 0–0.5)
LYMPHOCYTES # BLD: 1.14 K/UL (ref 0.8–3.5)
LYMPHOCYTES NFR BLD: 12.4 % (ref 12–49)
MAGNESIUM SERPL-MCNC: 1.8 MG/DL (ref 1.6–2.4)
MCH RBC QN AUTO: 26 PG (ref 26–34)
MCHC RBC AUTO-ENTMCNC: 30.7 G/DL (ref 30–36.5)
MCV RBC AUTO: 84.8 FL (ref 80–99)
MONOCYTES # BLD: 0.95 K/UL (ref 0–1)
MONOCYTES NFR BLD: 10.3 % (ref 5–13)
NEUTS SEG # BLD: 6.71 K/UL (ref 1.8–8)
NEUTS SEG NFR BLD: 72.9 % (ref 32–75)
NRBC # BLD: 0 K/UL (ref 0–0.01)
NRBC BLD-RTO: 0 PER 100 WBC
PHOSPHATE SERPL-MCNC: 4.1 MG/DL (ref 2.6–4.7)
PLATELET # BLD AUTO: 280 K/UL (ref 150–400)
PMV BLD AUTO: 11.9 FL (ref 8.9–12.9)
POTASSIUM SERPL-SCNC: 3.4 MMOL/L (ref 3.5–5.1)
RBC # BLD AUTO: 3.81 M/UL (ref 3.8–5.2)
SERVICE CMNT-IMP: ABNORMAL
SODIUM SERPL-SCNC: 142 MMOL/L (ref 136–145)
WBC # BLD AUTO: 9.2 K/UL (ref 3.6–11)

## 2025-04-29 PROCEDURE — 6360000002 HC RX W HCPCS: Performed by: STUDENT IN AN ORGANIZED HEALTH CARE EDUCATION/TRAINING PROGRAM

## 2025-04-29 PROCEDURE — 84100 ASSAY OF PHOSPHORUS: CPT

## 2025-04-29 PROCEDURE — 6370000000 HC RX 637 (ALT 250 FOR IP): Performed by: STUDENT IN AN ORGANIZED HEALTH CARE EDUCATION/TRAINING PROGRAM

## 2025-04-29 PROCEDURE — 2700000000 HC OXYGEN THERAPY PER DAY

## 2025-04-29 PROCEDURE — 85025 COMPLETE CBC W/AUTO DIFF WBC: CPT

## 2025-04-29 PROCEDURE — 6370000000 HC RX 637 (ALT 250 FOR IP): Performed by: HOSPITALIST

## 2025-04-29 PROCEDURE — 2500000003 HC RX 250 WO HCPCS: Performed by: STUDENT IN AN ORGANIZED HEALTH CARE EDUCATION/TRAINING PROGRAM

## 2025-04-29 PROCEDURE — 36415 COLL VENOUS BLD VENIPUNCTURE: CPT

## 2025-04-29 PROCEDURE — 82962 GLUCOSE BLOOD TEST: CPT

## 2025-04-29 PROCEDURE — 94640 AIRWAY INHALATION TREATMENT: CPT

## 2025-04-29 PROCEDURE — 6370000000 HC RX 637 (ALT 250 FOR IP): Performed by: INTERNAL MEDICINE

## 2025-04-29 PROCEDURE — 83735 ASSAY OF MAGNESIUM: CPT

## 2025-04-29 PROCEDURE — 2060000000 HC ICU INTERMEDIATE R&B

## 2025-04-29 PROCEDURE — 80048 BASIC METABOLIC PNL TOTAL CA: CPT

## 2025-04-29 RX ORDER — POTASSIUM CHLORIDE 1500 MG/1
40 TABLET, EXTENDED RELEASE ORAL ONCE
Status: COMPLETED | OUTPATIENT
Start: 2025-04-29 | End: 2025-04-29

## 2025-04-29 RX ADMIN — ASPIRIN 81 MG: 81 TABLET, COATED ORAL at 10:36

## 2025-04-29 RX ADMIN — FUROSEMIDE 40 MG: 10 INJECTION, SOLUTION INTRAMUSCULAR; INTRAVENOUS at 10:40

## 2025-04-29 RX ADMIN — FERROUS SULFATE TAB 325 MG (65 MG ELEMENTAL FE) 325 MG: 325 (65 FE) TAB at 15:40

## 2025-04-29 RX ADMIN — SODIUM CHLORIDE, PRESERVATIVE FREE 10 ML: 5 INJECTION INTRAVENOUS at 21:34

## 2025-04-29 RX ADMIN — CLOPIDOGREL BISULFATE 75 MG: 75 TABLET, FILM COATED ORAL at 10:36

## 2025-04-29 RX ADMIN — ARFORMOTEROL TARTRATE: 15 SOLUTION RESPIRATORY (INHALATION) at 19:45

## 2025-04-29 RX ADMIN — POTASSIUM CHLORIDE 40 MEQ: 1500 TABLET, EXTENDED RELEASE ORAL at 10:36

## 2025-04-29 RX ADMIN — METOPROLOL SUCCINATE 12.5 MG: 25 TABLET, EXTENDED RELEASE ORAL at 15:40

## 2025-04-29 RX ADMIN — ARFORMOTEROL TARTRATE: 15 SOLUTION RESPIRATORY (INHALATION) at 07:08

## 2025-04-29 RX ADMIN — ATORVASTATIN CALCIUM 80 MG: 40 TABLET, FILM COATED ORAL at 15:40

## 2025-04-29 RX ADMIN — MONTELUKAST 10 MG: 10 TABLET, FILM COATED ORAL at 21:33

## 2025-04-29 RX ADMIN — INSULIN LISPRO 2 UNITS: 100 INJECTION, SOLUTION INTRAVENOUS; SUBCUTANEOUS at 17:30

## 2025-04-29 RX ADMIN — PANTOPRAZOLE SODIUM 40 MG: 40 TABLET, DELAYED RELEASE ORAL at 15:41

## 2025-04-29 RX ADMIN — SODIUM CHLORIDE, PRESERVATIVE FREE 10 ML: 5 INJECTION INTRAVENOUS at 10:36

## 2025-04-29 ASSESSMENT — PAIN SCALES - GENERAL: PAINLEVEL_OUTOF10: 0

## 2025-04-29 NOTE — PLAN OF CARE
Problem: Respiratory - Adult  Goal: Achieves optimal ventilation and oxygenation  4/28/2025 2033 by Rosangela Angeles, RN  Outcome: Progressing  4/28/2025 0921 by Scot Martinez RT  Outcome: Progressing  Flowsheets (Taken 4/28/2025 0730 by Rosangela Angeles, RN)  Achieves optimal ventilation and oxygenation:   Assess for changes in respiratory status   Assess for changes in mentation and behavior     Problem: Chronic Conditions and Co-morbidities  Goal: Patient's chronic conditions and co-morbidity symptoms are monitored and maintained or improved  Outcome: Progressing  Flowsheets (Taken 4/28/2025 0730)  Care Plan - Patient's Chronic Conditions and Co-Morbidity Symptoms are Monitored and Maintained or Improved:   Monitor and assess patient's chronic conditions and comorbid symptoms for stability, deterioration, or improvement   Collaborate with multidisciplinary team to address chronic and comorbid conditions and prevent exacerbation or deterioration     Problem: Discharge Planning  Goal: Discharge to home or other facility with appropriate resources  Outcome: Progressing  Flowsheets (Taken 4/28/2025 0730)  Discharge to home or other facility with appropriate resources:   Identify barriers to discharge with patient and caregiver   Arrange for needed discharge resources and transportation as appropriate     Problem: Pain  Goal: Verbalizes/displays adequate comfort level or baseline comfort level  Outcome: Progressing  Flowsheets  Taken 4/28/2025 1538  Verbalizes/displays adequate comfort level or baseline comfort level:   Encourage patient to monitor pain and request assistance   Assess pain using appropriate pain scale  Taken 4/28/2025 1200  Verbalizes/displays adequate comfort level or baseline comfort level:   Encourage patient to monitor pain and request assistance   Assess pain using appropriate pain scale  Taken 4/28/2025 0730  Verbalizes/displays adequate comfort level or baseline comfort level:   Encourage 
  Problem: Respiratory - Adult  Goal: Achieves optimal ventilation and oxygenation  Outcome: Progressing     Problem: Chronic Conditions and Co-morbidities  Goal: Patient's chronic conditions and co-morbidity symptoms are monitored and maintained or improved  Outcome: Progressing     Problem: Discharge Planning  Goal: Discharge to home or other facility with appropriate resources  Outcome: Progressing     Problem: Pain  Goal: Verbalizes/displays adequate comfort level or baseline comfort level  Outcome: Progressing     Problem: Skin/Tissue Integrity  Goal: Skin integrity remains intact  Description: 1.  Monitor for areas of redness and/or skin breakdown2.  Assess vascular access sites hourly3.  Every 4-6 hours minimum:  Change oxygen saturation probe site4.  Every 4-6 hours:  If on nasal continuous positive airway pressure, respiratory therapy assess nares and determine need for appliance change or resting period  Outcome: Progressing     Problem: ABCDS Injury Assessment  Goal: Absence of physical injury  Outcome: Progressing     
[place], Ugo, RN  Outcome: Progressing  4/28/2025 2033 by Rosangela Angeles RN  Outcome: Progressing  Flowsheets (Taken 4/28/2025 0730)  Skin Integrity Remains Intact:   Monitor for areas of redness and/or skin breakdown   Assess vascular access sites hourly     Problem: ABCDS Injury Assessment  Goal: Absence of physical injury  4/29/2025 0838 by Dari Penny RN  Outcome: Progressing  4/28/2025 2235 by Ugo Atkinson RN  Outcome: Progressing  4/28/2025 2033 by Rosangela Angeles RN  Outcome: Progressing     Problem: Safety - Adult  Goal: Free from fall injury  4/29/2025 0838 by Dari Penny RN  Outcome: Progressing  4/28/2025 2235 by Ugo Atkinson RN  Outcome: Progressing  4/28/2025 2033 by Rosangela Angeles RN  Outcome: Progressing     
blood glucose as ordered   Assess for signs and symptoms of hyperglycemia and hypoglycemia

## 2025-04-29 NOTE — CARE COORDINATION
Care Management Initial Assessment       RUR: 15% (moderate RUR)  Readmission? No  1st IM letter given? Yes - 4/27/2025  1st  letter given: No     0918 - CM contacted patient via room phone to complete initial assessment.  Patient's preferred pharmacy is: [facility] [place], [place] - [address] - P [phone] - F [phone].  Patient previously verified that she needs a ride home but has keys to get inside her residence.    0939 to 0937 - CM contacted [facility] [employer] at: [phone] to determine if patient has transportation benefits.  CM reached someone at the provider line who transferred CM back to the transportation line.  CM reached [facility] who verified that patient has not used any rides yet through her [facility] and they can arrange a ride home for her, as long as it is not stretcher.  Patient does not appear to need stretcher transportation at this time.  CM will call back to arrange ride once discharge has been confirmed.    1151 - Patient transferred to IVCU; brief handoff provided to Unit CM.     04/29/25 0920   Service Assessment   Patient Orientation Alert and Oriented   Cognition Alert   History Provided By Patient   Primary Caregiver Self   Accompanied By/Relationship N/A   Support Systems Family Members   Patient's Healthcare Decision Maker is: Legal Next of Kin   PCP Verified by CM Yes   Last Visit to PCP Within last 3 months  (Last month)   Prior Functional Level Independent in ADLs/IADLs   Current Functional Level Independent in ADLs/IADLs   Can patient return to prior living arrangement Yes   Ability to make needs known: Good   Family able to assist with home care needs: Yes   Would you like for me to discuss the discharge plan with any other family members/significant others, and if so, who? No   Financial Resources Medicare;Medicaid   Community Resources None   Social/Functional History   Lives With Alone   Type of Home House   Home Layout One

## 2025-04-30 LAB
ACT BLD: 216 SECS (ref 79–138)
ANION GAP SERPL CALC-SCNC: 7 MMOL/L (ref 2–12)
BUN SERPL-MCNC: 15 MG/DL (ref 6–20)
BUN/CREAT SERPL: 12 (ref 12–20)
CALCIUM SERPL-MCNC: 9 MG/DL (ref 8.5–10.1)
CHLORIDE SERPL-SCNC: 108 MMOL/L (ref 97–108)
CO2 SERPL-SCNC: 26 MMOL/L (ref 21–32)
CREAT SERPL-MCNC: 1.28 MG/DL (ref 0.55–1.02)
ECHO BSA: 1.78 M2
EKG ATRIAL RATE: 100 BPM
EKG DIAGNOSIS: NORMAL
EKG P AXIS: 48 DEGREES
EKG P-R INTERVAL: 150 MS
EKG Q-T INTERVAL: 366 MS
EKG QRS DURATION: 90 MS
EKG QTC CALCULATION (BAZETT): 472 MS
EKG R AXIS: -3 DEGREES
EKG T AXIS: 188 DEGREES
EKG VENTRICULAR RATE: 100 BPM
GLUCOSE BLD STRIP.AUTO-MCNC: 137 MG/DL (ref 65–117)
GLUCOSE BLD STRIP.AUTO-MCNC: 166 MG/DL (ref 65–117)
GLUCOSE BLD STRIP.AUTO-MCNC: 167 MG/DL (ref 65–117)
GLUCOSE SERPL-MCNC: 138 MG/DL (ref 65–100)
POTASSIUM SERPL-SCNC: 3.6 MMOL/L (ref 3.5–5.1)
SERVICE CMNT-IMP: ABNORMAL
SODIUM SERPL-SCNC: 141 MMOL/L (ref 136–145)

## 2025-04-30 PROCEDURE — 2060000000 HC ICU INTERMEDIATE R&B

## 2025-04-30 PROCEDURE — 6370000000 HC RX 637 (ALT 250 FOR IP): Performed by: STUDENT IN AN ORGANIZED HEALTH CARE EDUCATION/TRAINING PROGRAM

## 2025-04-30 PROCEDURE — C9600 PERC DRUG-EL COR STENT SING: HCPCS | Performed by: STUDENT IN AN ORGANIZED HEALTH CARE EDUCATION/TRAINING PROGRAM

## 2025-04-30 PROCEDURE — C1760 CLOSURE DEV, VASC: HCPCS | Performed by: STUDENT IN AN ORGANIZED HEALTH CARE EDUCATION/TRAINING PROGRAM

## 2025-04-30 PROCEDURE — 99153 MOD SED SAME PHYS/QHP EA: CPT | Performed by: STUDENT IN AN ORGANIZED HEALTH CARE EDUCATION/TRAINING PROGRAM

## 2025-04-30 PROCEDURE — 6360000002 HC RX W HCPCS: Performed by: STUDENT IN AN ORGANIZED HEALTH CARE EDUCATION/TRAINING PROGRAM

## 2025-04-30 PROCEDURE — 93458 L HRT ARTERY/VENTRICLE ANGIO: CPT | Performed by: STUDENT IN AN ORGANIZED HEALTH CARE EDUCATION/TRAINING PROGRAM

## 2025-04-30 PROCEDURE — C1887 CATHETER, GUIDING: HCPCS | Performed by: STUDENT IN AN ORGANIZED HEALTH CARE EDUCATION/TRAINING PROGRAM

## 2025-04-30 PROCEDURE — 99152 MOD SED SAME PHYS/QHP 5/>YRS: CPT | Performed by: STUDENT IN AN ORGANIZED HEALTH CARE EDUCATION/TRAINING PROGRAM

## 2025-04-30 PROCEDURE — C1874 STENT, COATED/COV W/DEL SYS: HCPCS | Performed by: STUDENT IN AN ORGANIZED HEALTH CARE EDUCATION/TRAINING PROGRAM

## 2025-04-30 PROCEDURE — 6360000004 HC RX CONTRAST MEDICATION: Performed by: STUDENT IN AN ORGANIZED HEALTH CARE EDUCATION/TRAINING PROGRAM

## 2025-04-30 PROCEDURE — 82962 GLUCOSE BLOOD TEST: CPT

## 2025-04-30 PROCEDURE — 027034Z DILATION OF CORONARY ARTERY, ONE ARTERY WITH DRUG-ELUTING INTRALUMINAL DEVICE, PERCUTANEOUS APPROACH: ICD-10-PCS | Performed by: STUDENT IN AN ORGANIZED HEALTH CARE EDUCATION/TRAINING PROGRAM

## 2025-04-30 PROCEDURE — 2500000003 HC RX 250 WO HCPCS: Performed by: STUDENT IN AN ORGANIZED HEALTH CARE EDUCATION/TRAINING PROGRAM

## 2025-04-30 PROCEDURE — 80048 BASIC METABOLIC PNL TOTAL CA: CPT

## 2025-04-30 PROCEDURE — B2111ZZ FLUOROSCOPY OF MULTIPLE CORONARY ARTERIES USING LOW OSMOLAR CONTRAST: ICD-10-PCS | Performed by: STUDENT IN AN ORGANIZED HEALTH CARE EDUCATION/TRAINING PROGRAM

## 2025-04-30 PROCEDURE — 85347 COAGULATION TIME ACTIVATED: CPT

## 2025-04-30 PROCEDURE — 4A023N7 MEASUREMENT OF CARDIAC SAMPLING AND PRESSURE, LEFT HEART, PERCUTANEOUS APPROACH: ICD-10-PCS | Performed by: STUDENT IN AN ORGANIZED HEALTH CARE EDUCATION/TRAINING PROGRAM

## 2025-04-30 PROCEDURE — 6370000000 HC RX 637 (ALT 250 FOR IP): Performed by: INTERNAL MEDICINE

## 2025-04-30 PROCEDURE — 36415 COLL VENOUS BLD VENIPUNCTURE: CPT

## 2025-04-30 PROCEDURE — C1894 INTRO/SHEATH, NON-LASER: HCPCS | Performed by: STUDENT IN AN ORGANIZED HEALTH CARE EDUCATION/TRAINING PROGRAM

## 2025-04-30 PROCEDURE — C1713 ANCHOR/SCREW BN/BN,TIS/BN: HCPCS | Performed by: STUDENT IN AN ORGANIZED HEALTH CARE EDUCATION/TRAINING PROGRAM

## 2025-04-30 PROCEDURE — C1769 GUIDE WIRE: HCPCS | Performed by: STUDENT IN AN ORGANIZED HEALTH CARE EDUCATION/TRAINING PROGRAM

## 2025-04-30 PROCEDURE — 76937 US GUIDE VASCULAR ACCESS: CPT | Performed by: STUDENT IN AN ORGANIZED HEALTH CARE EDUCATION/TRAINING PROGRAM

## 2025-04-30 PROCEDURE — 94640 AIRWAY INHALATION TREATMENT: CPT

## 2025-04-30 PROCEDURE — 2709999900 HC NON-CHARGEABLE SUPPLY: Performed by: STUDENT IN AN ORGANIZED HEALTH CARE EDUCATION/TRAINING PROGRAM

## 2025-04-30 PROCEDURE — 2580000003 HC RX 258: Performed by: STUDENT IN AN ORGANIZED HEALTH CARE EDUCATION/TRAINING PROGRAM

## 2025-04-30 DEVICE — STENT ONYXNG20012UX ONYX 2.00X12RX
Type: IMPLANTABLE DEVICE | Status: FUNCTIONAL
Brand: ONYX FRONTIER™

## 2025-04-30 RX ORDER — LIDOCAINE HYDROCHLORIDE 10 MG/ML
INJECTION, SOLUTION INFILTRATION; PERINEURAL PRN
Status: DISCONTINUED | OUTPATIENT
Start: 2025-04-30 | End: 2025-04-30 | Stop reason: HOSPADM

## 2025-04-30 RX ORDER — IOPAMIDOL 755 MG/ML
INJECTION, SOLUTION INTRAVASCULAR PRN
Status: DISCONTINUED | OUTPATIENT
Start: 2025-04-30 | End: 2025-04-30 | Stop reason: HOSPADM

## 2025-04-30 RX ORDER — VERAPAMIL HYDROCHLORIDE 2.5 MG/ML
INJECTION, SOLUTION INTRAVENOUS PRN
Status: DISCONTINUED | OUTPATIENT
Start: 2025-04-30 | End: 2025-04-30 | Stop reason: HOSPADM

## 2025-04-30 RX ORDER — FENTANYL CITRATE 50 UG/ML
INJECTION, SOLUTION INTRAMUSCULAR; INTRAVENOUS PRN
Status: DISCONTINUED | OUTPATIENT
Start: 2025-04-30 | End: 2025-04-30 | Stop reason: HOSPADM

## 2025-04-30 RX ORDER — SODIUM CHLORIDE 0.9 % (FLUSH) 0.9 %
5-40 SYRINGE (ML) INJECTION PRN
Status: DISCONTINUED | OUTPATIENT
Start: 2025-04-30 | End: 2025-05-01 | Stop reason: HOSPADM

## 2025-04-30 RX ORDER — HEPARIN SODIUM 1000 [USP'U]/ML
INJECTION, SOLUTION INTRAVENOUS; SUBCUTANEOUS PRN
Status: DISCONTINUED | OUTPATIENT
Start: 2025-04-30 | End: 2025-04-30 | Stop reason: HOSPADM

## 2025-04-30 RX ORDER — ACETAMINOPHEN 325 MG/1
650 TABLET ORAL EVERY 4 HOURS PRN
Status: DISCONTINUED | OUTPATIENT
Start: 2025-04-30 | End: 2025-05-01 | Stop reason: HOSPADM

## 2025-04-30 RX ORDER — SODIUM CHLORIDE 9 MG/ML
INJECTION, SOLUTION INTRAVENOUS CONTINUOUS
Status: ACTIVE | OUTPATIENT
Start: 2025-04-30 | End: 2025-04-30

## 2025-04-30 RX ORDER — SODIUM CHLORIDE 0.9 % (FLUSH) 0.9 %
5-40 SYRINGE (ML) INJECTION EVERY 12 HOURS SCHEDULED
Status: DISCONTINUED | OUTPATIENT
Start: 2025-04-30 | End: 2025-05-01 | Stop reason: HOSPADM

## 2025-04-30 RX ORDER — HEPARIN SODIUM 10000 [USP'U]/ML
INJECTION, SOLUTION INTRAVENOUS; SUBCUTANEOUS PRN
Status: DISCONTINUED | OUTPATIENT
Start: 2025-04-30 | End: 2025-04-30 | Stop reason: HOSPADM

## 2025-04-30 RX ORDER — SODIUM CHLORIDE 9 MG/ML
INJECTION, SOLUTION INTRAVENOUS PRN
Status: DISCONTINUED | OUTPATIENT
Start: 2025-04-30 | End: 2025-05-01 | Stop reason: HOSPADM

## 2025-04-30 RX ADMIN — PANTOPRAZOLE SODIUM 40 MG: 40 TABLET, DELAYED RELEASE ORAL at 18:51

## 2025-04-30 RX ADMIN — ARFORMOTEROL TARTRATE: 15 SOLUTION RESPIRATORY (INHALATION) at 21:06

## 2025-04-30 RX ADMIN — MONTELUKAST 10 MG: 10 TABLET, FILM COATED ORAL at 21:21

## 2025-04-30 RX ADMIN — CLOPIDOGREL BISULFATE 75 MG: 75 TABLET, FILM COATED ORAL at 09:26

## 2025-04-30 RX ADMIN — SODIUM CHLORIDE: 0.9 INJECTION, SOLUTION INTRAVENOUS at 18:07

## 2025-04-30 RX ADMIN — SODIUM CHLORIDE, PRESERVATIVE FREE 10 ML: 5 INJECTION INTRAVENOUS at 08:00

## 2025-04-30 RX ADMIN — ARFORMOTEROL TARTRATE: 15 SOLUTION RESPIRATORY (INHALATION) at 07:20

## 2025-04-30 RX ADMIN — ATORVASTATIN CALCIUM 80 MG: 40 TABLET, FILM COATED ORAL at 18:50

## 2025-04-30 RX ADMIN — METOPROLOL SUCCINATE 12.5 MG: 25 TABLET, EXTENDED RELEASE ORAL at 18:50

## 2025-04-30 RX ADMIN — SODIUM CHLORIDE, PRESERVATIVE FREE 10 ML: 5 INJECTION INTRAVENOUS at 21:21

## 2025-04-30 RX ADMIN — SODIUM CHLORIDE, PRESERVATIVE FREE 10 ML: 5 INJECTION INTRAVENOUS at 21:22

## 2025-04-30 RX ADMIN — ASPIRIN 81 MG: 81 TABLET, COATED ORAL at 09:26

## 2025-04-30 ASSESSMENT — PAIN SCALES - GENERAL: PAINLEVEL_OUTOF10: 0

## 2025-05-01 ENCOUNTER — TELEPHONE (OUTPATIENT)
Age: 70
End: 2025-05-01

## 2025-05-01 VITALS
DIASTOLIC BLOOD PRESSURE: 53 MMHG | SYSTOLIC BLOOD PRESSURE: 124 MMHG | WEIGHT: 141.6 LBS | TEMPERATURE: 97.7 F | RESPIRATION RATE: 28 BRPM | OXYGEN SATURATION: 97 % | BODY MASS INDEX: 23.59 KG/M2 | HEART RATE: 78 BPM | HEIGHT: 65 IN

## 2025-05-01 PROBLEM — I21.4 NSTEMI (NON-ST ELEVATED MYOCARDIAL INFARCTION) (HCC): Status: RESOLVED | Noted: 2025-04-27 | Resolved: 2025-05-01

## 2025-05-01 LAB
ANION GAP SERPL CALC-SCNC: 6 MMOL/L (ref 2–12)
BUN SERPL-MCNC: 14 MG/DL (ref 6–20)
BUN/CREAT SERPL: 12 (ref 12–20)
CALCIUM SERPL-MCNC: 8.6 MG/DL (ref 8.5–10.1)
CHLORIDE SERPL-SCNC: 112 MMOL/L (ref 97–108)
CO2 SERPL-SCNC: 23 MMOL/L (ref 21–32)
CREAT SERPL-MCNC: 1.21 MG/DL (ref 0.55–1.02)
EKG ATRIAL RATE: 87 BPM
EKG DIAGNOSIS: NORMAL
EKG P AXIS: 63 DEGREES
EKG P-R INTERVAL: 140 MS
EKG Q-T INTERVAL: 410 MS
EKG QRS DURATION: 104 MS
EKG QTC CALCULATION (BAZETT): 493 MS
EKG R AXIS: 3 DEGREES
EKG T AXIS: 179 DEGREES
EKG VENTRICULAR RATE: 87 BPM
ERYTHROCYTE [DISTWIDTH] IN BLOOD BY AUTOMATED COUNT: 16.3 % (ref 11.5–14.5)
GLUCOSE BLD STRIP.AUTO-MCNC: 147 MG/DL (ref 65–117)
GLUCOSE SERPL-MCNC: 157 MG/DL (ref 65–100)
HCT VFR BLD AUTO: 29.3 % (ref 35–47)
HGB BLD-MCNC: 8.9 G/DL (ref 11.5–16)
MAGNESIUM SERPL-MCNC: 1.9 MG/DL (ref 1.6–2.4)
MCH RBC QN AUTO: 26.1 PG (ref 26–34)
MCHC RBC AUTO-ENTMCNC: 30.4 G/DL (ref 30–36.5)
MCV RBC AUTO: 85.9 FL (ref 80–99)
NRBC # BLD: 0 K/UL (ref 0–0.01)
NRBC BLD-RTO: 0 PER 100 WBC
PHOSPHATE SERPL-MCNC: 2.8 MG/DL (ref 2.6–4.7)
PLATELET # BLD AUTO: 272 K/UL (ref 150–400)
PMV BLD AUTO: 12.5 FL (ref 8.9–12.9)
POTASSIUM SERPL-SCNC: 3.1 MMOL/L (ref 3.5–5.1)
RBC # BLD AUTO: 3.41 M/UL (ref 3.8–5.2)
SERVICE CMNT-IMP: ABNORMAL
SODIUM SERPL-SCNC: 141 MMOL/L (ref 136–145)
WBC # BLD AUTO: 7.6 K/UL (ref 3.6–11)

## 2025-05-01 PROCEDURE — 6360000002 HC RX W HCPCS: Performed by: STUDENT IN AN ORGANIZED HEALTH CARE EDUCATION/TRAINING PROGRAM

## 2025-05-01 PROCEDURE — 6370000000 HC RX 637 (ALT 250 FOR IP): Performed by: STUDENT IN AN ORGANIZED HEALTH CARE EDUCATION/TRAINING PROGRAM

## 2025-05-01 PROCEDURE — 85027 COMPLETE CBC AUTOMATED: CPT

## 2025-05-01 PROCEDURE — 6370000000 HC RX 637 (ALT 250 FOR IP): Performed by: INTERNAL MEDICINE

## 2025-05-01 PROCEDURE — 94640 AIRWAY INHALATION TREATMENT: CPT

## 2025-05-01 PROCEDURE — 2500000003 HC RX 250 WO HCPCS: Performed by: STUDENT IN AN ORGANIZED HEALTH CARE EDUCATION/TRAINING PROGRAM

## 2025-05-01 PROCEDURE — 36415 COLL VENOUS BLD VENIPUNCTURE: CPT

## 2025-05-01 PROCEDURE — 84100 ASSAY OF PHOSPHORUS: CPT

## 2025-05-01 PROCEDURE — 83735 ASSAY OF MAGNESIUM: CPT

## 2025-05-01 PROCEDURE — 82962 GLUCOSE BLOOD TEST: CPT

## 2025-05-01 PROCEDURE — 80048 BASIC METABOLIC PNL TOTAL CA: CPT

## 2025-05-01 RX ORDER — POTASSIUM CHLORIDE 1500 MG/1
20 TABLET, EXTENDED RELEASE ORAL DAILY
Qty: 30 TABLET | Refills: 0 | Status: SHIPPED | OUTPATIENT
Start: 2025-05-01

## 2025-05-01 RX ORDER — METOPROLOL SUCCINATE 25 MG/1
12.5 TABLET, EXTENDED RELEASE ORAL DAILY
Qty: 30 TABLET | Refills: 3 | Status: SHIPPED | OUTPATIENT
Start: 2025-05-02

## 2025-05-01 RX ORDER — POTASSIUM CHLORIDE 1500 MG/1
40 TABLET, EXTENDED RELEASE ORAL 2 TIMES DAILY WITH MEALS
Status: DISCONTINUED | OUTPATIENT
Start: 2025-05-01 | End: 2025-05-01 | Stop reason: HOSPADM

## 2025-05-01 RX ADMIN — SODIUM CHLORIDE, PRESERVATIVE FREE 10 ML: 5 INJECTION INTRAVENOUS at 09:17

## 2025-05-01 RX ADMIN — ARFORMOTEROL TARTRATE: 15 SOLUTION RESPIRATORY (INHALATION) at 07:18

## 2025-05-01 RX ADMIN — ASPIRIN 81 MG: 81 TABLET, COATED ORAL at 09:15

## 2025-05-01 RX ADMIN — PANTOPRAZOLE SODIUM 40 MG: 40 TABLET, DELAYED RELEASE ORAL at 06:58

## 2025-05-01 RX ADMIN — METOPROLOL SUCCINATE 12.5 MG: 25 TABLET, EXTENDED RELEASE ORAL at 09:15

## 2025-05-01 RX ADMIN — CLOPIDOGREL BISULFATE 75 MG: 75 TABLET, FILM COATED ORAL at 09:15

## 2025-05-01 RX ADMIN — POTASSIUM CHLORIDE 40 MEQ: 1500 TABLET, EXTENDED RELEASE ORAL at 09:15

## 2025-05-01 RX ADMIN — ATORVASTATIN CALCIUM 80 MG: 40 TABLET, FILM COATED ORAL at 09:15

## 2025-05-01 NOTE — CARDIO/PULMONARY
Chart reviewed: Patient is 69 y.o. female admitted with NSTEMI (non-ST elevated myocardial infarction) ([facility]) [I21.4]  Acute on chronic congestive heart failure, unspecified heart failure type ([facility]) [I50.9]    Education: CAD education folder given to Hyacinth Patterson.      Educated using teach back method. Reviewed CAD diagnosis definition and purpose of intervention. Discussed risk factors for CAD to include the following: family history, elevated BMI, hyperlipidemia, hypertension, diabetes, stress, and smoking. Smoking Cessation Program link added to AVS. Discussed Heart Healthy/Low Sodium (2000 mg) diet. Reviewed the importance of medication compliance and potential side effects. Discussed follow up appointments with cardiologist, signs and symptoms of angina, and what to report to physician after discharge.  Emphasized the value of cardiac rehab. Discussed Cardiac Rehab Program format, benefits, and encouraged enrollment to assist with risk modification and management.     Patient lives in [place], [place] and will pursue a program closer to home.

## 2025-05-01 NOTE — PROGRESS NOTES
[facility]  [address] [place] [address]  [phone]  [email or web address]  CARDIOLOGY PROGRESS NOTE    5/1/2025 11:20 AM    Admit Date: 4/27/2025    Admit Diagnosis:   NSTEMI (non-ST elevated myocardial infarction) (HCC) [I21.4]  Acute on chronic congestive heart failure, unspecified heart failure type (HCC) [I50.9]    Subjective:     Hyacinth Patterson was seen and examined at the bedside.  Denies any chest pain, right wrist/forearm pain or right groin pain.    BP (!) 124/53   Pulse 78   Temp 97.7 °F (36.5 °C) (Oral)   Resp 28   Ht 1.651 m (5' 5\")   Wt 64.2 kg (141 lb 9.6 oz)   LMP  (LMP Unknown)   SpO2 97%   BMI 23.56 kg/m²     Current Facility-Administered Medications   Medication Dose Route Frequency    potassium chloride (KLOR-CON M) extended release tablet 40 mEq  40 mEq Oral BID WC    sodium chloride flush 0.9 % injection 5-40 mL  5-40 mL IntraVENous 2 times per day    sodium chloride flush 0.9 % injection 5-40 mL  5-40 mL IntraVENous PRN    0.9 % sodium chloride infusion   IntraVENous PRN    acetaminophen (TYLENOL) tablet 650 mg  650 mg Oral Q4H PRN    pantoprazole (PROTONIX) tablet 40 mg  40 mg Oral BID AC    metoprolol succinate (TOPROL XL) extended release tablet 12.5 mg  12.5 mg Oral Daily    sodium chloride flush 0.9 % injection 5-40 mL  5-40 mL IntraVENous 2 times per day    sodium chloride flush 0.9 % injection 5-40 mL  5-40 mL IntraVENous PRN    0.9 % sodium chloride infusion   IntraVENous PRN    [Held by provider] enoxaparin (LOVENOX) injection 40 mg  40 mg SubCUTAneous Daily    ondansetron (ZOFRAN-ODT) disintegrating tablet 4 mg  4 mg Oral Q8H PRN    Or    ondansetron (ZOFRAN) injection 4 mg  4 mg IntraVENous Q6H PRN    polyethylene glycol (GLYCOLAX) packet 17 g  17 g Oral Daily PRN    acetaminophen (TYLENOL) tablet 650 mg  650 mg Oral Q6H PRN    Or    acetaminophen (TYLENOL) suppository 650 mg  650 mg Rectal Q6H PRN    ipratropium 0.5 
        [facility]  [address] [place] [address]  [phone]  [email or web address]  CARDIOLOGY PROGRESS NOTE    4/30/2025 5:36 PM    Admit Date: 4/27/2025    Admit Diagnosis:   NSTEMI (non-ST elevated myocardial infarction) (HCC) [I21.4]  Acute on chronic congestive heart failure, unspecified heart failure type (HCC) [I50.9]    Subjective:     Hyacinth Patterson was seen at the bedside.  The patient has a good appetite.  Would like to eat.    BP (!) 157/55   Pulse 93   Temp 98.9 °F (37.2 °C) (Oral)   Resp 20   Ht 1.651 m (5' 5\")   Wt 63.6 kg (140 lb 1.6 oz)   LMP  (LMP Unknown)   SpO2 99%   BMI 23.31 kg/m²     Current Facility-Administered Medications   Medication Dose Route Frequency    midazolam (VERSED) injection    PRN    lidocaine 1 % injection    PRN    verapamil (ISOPTIN) injection    PRN    nitroGLYCERIN 200 mcg    PRN    lidocaine 1 % injection    PRN    fentaNYL (SUBLIMAZE) injection    PRN    heparin (porcine) injection    PRN    iopamidol (ISOVUE-370) 76 % injection    PRN    pantoprazole (PROTONIX) tablet 40 mg  40 mg Oral BID AC    metoprolol succinate (TOPROL XL) extended release tablet 12.5 mg  12.5 mg Oral Daily    sodium chloride flush 0.9 % injection 5-40 mL  5-40 mL IntraVENous 2 times per day    sodium chloride flush 0.9 % injection 5-40 mL  5-40 mL IntraVENous PRN    0.9 % sodium chloride infusion   IntraVENous PRN    [Held by provider] enoxaparin (LOVENOX) injection 40 mg  40 mg SubCUTAneous Daily    ondansetron (ZOFRAN-ODT) disintegrating tablet 4 mg  4 mg Oral Q8H PRN    Or    ondansetron (ZOFRAN) injection 4 mg  4 mg IntraVENous Q6H PRN    polyethylene glycol (GLYCOLAX) packet 17 g  17 g Oral Daily PRN    acetaminophen (TYLENOL) tablet 650 mg  650 mg Oral Q6H PRN    Or    acetaminophen (TYLENOL) suppository 650 mg  650 mg Rectal Q6H PRN    ipratropium 0.5 mg-albuterol 2.5 mg (DUONEB) nebulizer solution 1 Dose  1 Dose Inhalation Q4H PRN    
      Hospitalist Progress Note    NAME:   Hyacinth Patterson   : 1955   MRN: [id number]     Date/Time: 2025 9:47 AM  Patient PCP: Jeffery Alexis MD    Estimated discharge date:      Assessment / Plan:  CAD s/p FELA with Acute on chronic CHFrEF last EF 20-25%   Elevated proBNP 15,750  elevated troponin 174  Currently on 2 L nasal cannula (not on oxygen at home)  Extensive PAD with a stent  Chest pain  CTA chest: b/l pleural effusions. Bilateral perihilar airspace opacities suspicious for infection  Dry wt is 142#, cont' daily wt  Check procal, Covid, flu (reports cough with sputum, sob)  Cont' IV lasix  Cont' plavix.  Off ASA since last admission at [facility] -25.  Cardiology consult  Wean O2 as tolerated  Lab in the AM    Chronic anemia   Cont' PO iron  Change to PO PPI BID  Appreciate GI's recs      Medical Decision Making:   I personally reviewed labs  I personally reviewed imaging  Toxic drug monitoring  I personally reviewed EKG  Discussed case with: RN, discussed plan of care and dispo during round        Code Status: Full  DVT Prophylaxis: scd    Subjective:   Pt is sob, remains on 2LNC.       Discussed with RN events overnight.       Objective:     VITALS:   Last 24hrs VS reviewed since prior progress note. Most recent are:  Patient Vitals for the past 24 hrs:   BP Temp Temp src Pulse Resp SpO2 Height Weight   25 0818 125/64 -- -- -- -- -- -- --   25 0516 (!) 128/57 98.5 °F (36.9 °C) Oral -- -- 99 % -- --   25 0400 (!) 103/31 -- -- 88 16 99 % -- --   25 0200 (!) 124/57 -- -- 93 15 97 % -- --   25 0130 (!) 131/56 -- -- 96 17 91 % -- --   25 0100 -- -- -- 87 16 96 % -- --   25 0045 -- -- -- 88 16 95 % -- --   25 0030 (!) 126/55 -- -- 89 15 95 % -- --   25 0015 (!) 141/72 -- -- 93 15 99 % -- --   25 0000 (!) 128/59 -- -- 87 16 95 % -- --   25 2345 134/60 -- -- 98 15 97 % -- --   25 2330 (!) 148/84 -- -- 
      Hospitalist Progress Note    NAME:   Hyacinth Patterson   : 1955   MRN: [id number]     Date/Time: 2025 12:06 PM  Patient PCP: Jeffery Alexis MD    Estimated discharge date:  Barriers:       Assessment / Plan:  CAD s/p FELA with Acute on chronic CHFrEF last EF 20-25%   Elevated proBNP 15,750  elevated troponin 174  Currently on 2 L nasal cannula (not on oxygen at home)  Extensive PAD with a stent  Chest pain-ACS rule out    CTA chest: b/l pleural effusions. Bilateral perihilar airspace opacities suspicious for infection  Procalcitonin<0.05  COVID and flu negative  Dry wt is 142#, cont' daily wt-weight appropriately trending down  Orthostatic vitals on  negative    -Scheduled for cardiac catheterization on   -Continue with aspirin, Plavix, Lipitor, Toprol  -Creatinine on  slightly elevated, diuresis on hold.  Creatinine on -improving  -Cardiology recommendations    Diabetes mellitus type 2  -Correctional insulin   - POCT glucose  -Hypoglycemia treatment ordered     Acute on chronic anemia   -Patient was admitted to [facility]  - 2025 after presenting with symptomatic anemia, hemoglobin 6.2.  She underwent an EGD that showed gastritis and a duodenal nodule.  Biopsy with negligible inflammation likely bile reflux.  Patient was advised outpatient EGD.  Hemoglobin on discharge was 8.1  -Evaluated by GI on this admission.  No anticipated GI procedure at this time.  Follow-up with [facility] after discharge  -Continue with iron supplementation.  Hemoglobin 9.9 on .  No active signs of bleeding     Hypokalemia-resolved    History of asthma  Continue montelukast, Advair and DuoNeb as needed        Prior history of  Schizophrenia  Chronic mesenteric ischemia POA  Peripheral arterial disease  Colonic surgery in 2019  S/p abdominal wall mesh removal and small bowel repair in 2019-complicated by acute on chronic large bowel obstruction  S/p exploratory laparotomy 
(R) radial TR Band off, site remains CDI, absent discoloration, site covered with Quik Clot and Tegaderm, +2 radial pulse noted, patient denies pain/discomfort, (R) femoral site CDI, soft, absent discoloration, will cont to monitor.  
0700: Bedside and Verbal shift change report given to Dari Penny RN (oncoming nurse) by ADRIANA Arizmendi (offgoing nurse). Report included the following information Nurse Handoff Report, Adult Overview, Intake/Output, MAR, and Recent Results.     0900: TRANSFER - OUT REPORT:    Verbal report given to ADRIANA Narvaez on Hyacinth Patterson  being transferred to IVCU for ordered procedure       Report consisted of patient's Situation, Background, Assessment and   Recommendations(SBAR).     Information from the following report(s) Nurse Handoff Report, Adult Overview, Intake/Output, MAR, and Recent Results was reviewed with the receiving nurse.  [place] Assessment: Presents to emergency department  because of falls (Syncope, seizure, or loss of consciousness): No, Age > 70: No, Altered Mental Status, Intoxication with alcohol or substance confusion (Disorientation, impaired judgment, poor safety awaremess, or inability to follow instructions): No, Impaired Mobility: Ambulates or transfers with assistive devices or assistance; Unable to ambulate or transer.: No, Nursing Judgement: No  Lines:   Peripheral IV 04/27/25 Left Antecubital (Active)   Site Assessment Clean, dry & intact 04/29/25 1029   Line Status Capped;Flushed 04/29/25 1029   Line Care Connections checked and tightened 04/29/25 1029   Phlebitis Assessment No symptoms 04/29/25 1029   Infiltration Assessment 0 04/29/25 1029   Alcohol Cap Used Yes 04/29/25 1029   Dressing Status Clean, dry & intact 04/29/25 1029   Dressing Type Transparent 04/29/25 1029   Dressing Intervention New 04/27/25 7786        Opportunity for questions and clarification was provided.      Patient transported with:  Monitor        
0730 Bedside and Verbal shift change report given to ADRIANA Adames (oncoming nurse) by ADRIANA Arizmendi (offgoing nurse). Report included the following information Nurse Handoff Report.     End of Shift Note    Bedside shift change report given to ADRIANA Arizmendi (oncoming nurse) by Rosangela Angeles RN (offgoing nurse).  Report included the following information SBAR    Shift worked:  [id number]     Shift summary and any significant changes:     VSS. Q8 H&H d/c. Pt with no c/o pain. Per gastroenterology: f/u outpatient. Continued diuresing.      Concerns for physician to address:  See above     Zone phone for oncoming shift:        Activity:  Level of Assistance: Moderate assist, patient does 50-74%  Number times ambulated in hallways past shift: 0  Number of times OOB to chair past shift: 1    Cardiac:   Cardiac Monitoring: Yes      Cardiac Rhythm: Sinus rhythm, Sinus tachy    Access:  Current line(s): PIV     Genitourinary:        Respiratory:   O2 Device: Nasal cannula  Chronic home O2 use?: NO  Incentive spirometer at bedside: NO    GI:     Current diet:  ADULT DIET; Regular  Passing flatus: YES    Pain Management:   Patient states pain is manageable on current regimen: YES    Skin:  Edenilson Scale Score: 18  Interventions:      Patient Safety:  Fall Risk: Nursing Judgement-Fall Risk High(Add Comments): Yes  Fall Risk Interventions  Nursing Judgement-Fall Risk High(Add Comments): Yes  Toilet Every 2 Hours-In Advance of Need: Yes  Hourly Visual Checks: Awake, In bed  Fall Visual Posted: Armband  Room Door Open: No (Comment)  Alarm On: Bed  Patient Moved Closer to Nursing Station: No    Active Consults:   IP CONSULT TO HOSPITALIST  IP CONSULT TO CARDIOLOGY  IP CONSULT TO GI    Length of Stay:  Expected LOS: 3  Actual LOS: 1    Rosangela Angeles RN                            
1900 Bedside and Verbal shift change report given to Ugo WRIGHT (oncoming nurse) by RN (offgoing nurse). Report included the following information Nurse Handoff Report, MAR, Recent Results, and Cardiac Rhythm NSR.     0200 Lab samples collected.    0700 Bedside shift change report given to Dari WRIGHT (oncoming nurse) by Ugo Atkinson RN (offgoing nurse).  Report included the following information SBAR, MAR, Recent Results, and Cardiac Rhythm NSR  
Attempted to schedule hospital follow up CARDIOLOGY appointment. Office  will contact the patient with appointment information per office protocol. PCP Hospital follow-up transitional care appointment has been scheduled with Dr. Jeffery Alexis on 5/8/25 0800. This is a previously scheduled appt and currently first available. [facility] information on AVS for patient resource. Pending patient discharge.   
Bedside and Verbal shift change report given to ADRIANA Manrique (oncoming nurse) by ADRIANA Nava (offgoing nurse). Report included the following information Nurse Handoff Report, Adult Overview, Surgery Report, Intake/Output, MAR, Recent Results, and Cardiac Rhythm SR .     1200: I have reviewed Discharge Instructions with the patient. The patient verbalized understanding. Discharge medications reviewed with patient along with appropriate educational materials.  Opportunity for questions and clarification was provided. All lines removed without difficulty. Cath sites are clean, dry, and intact. Patient's belongings gathered and with patient. Patient is ready for discharge.    
End of Shift Note    Bedside shift change report given to ADRIANA Manrique (oncoming nurse) by Brandy Wheatley RN (offgoing nurse).  Report included the following information SBAR, Intake/Output, MAR, Recent Results, and Cardiac Rhythm Sinus Rhythm.    Shift worked:  1900-0730       Shift summary and any significant changes:     TR band removed without issue.     Concerns for physician to address: Patient anxious to discharge.       Zone phone for oncoming shift:   N/A         Activity:  Level of Assistance: Independent  Number times ambulated in hallways past shift: 4  Number of times OOB to chair past shift: 1    Cardiac:   Cardiac Monitoring: Yes      Cardiac Rhythm: Sinus rhythm    Access:  Current line(s): PIV    Genitourinary:   Urinary Status: Voiding    Respiratory:   O2 Device: None (Room air)  Chronic home O2 use?: NO  Incentive spirometer at bedside: NO    GI:  Last BM (including prior to admit): 04/30/25  Current diet:  ADULT DIET; Regular; 4 carb choices (60 gm/meal); Low Fat/Low Chol/High Fiber/2 gm Na  Passing flatus: YES    Pain Management:   Patient states pain is manageable on current regimen: YES    Skin:  Edenilson Scale Score: 23  Interventions: Wound Offloading (Prevention Methods): Turning, Repositioning, Pillows    Patient Safety:  Fall Risk: Nursing Judgement-Fall Risk High(Add Comments): Yes  Fall Risk Interventions  Nursing Judgement-Fall Risk High(Add Comments): Yes  Toilet Every 2 Hours-In Advance of Need: Yes  Hourly Visual Checks: In bed  Fall Visual Posted: Armband, Fall sign posted, Socks  Room Door Open: Deferred to promote rest  Alarm On: Bed  Patient Moved Closer to Nursing Station: No    Active Consults:   IP CONSULT TO HOSPITALIST  IP CONSULT TO CARDIOLOGY  IP CONSULT TO GI  IP CONSULT TO CARDIAC REHAB  IP CONSULT TO CARDIAC REHAB    Length of Stay:  Expected LOS: 4  Actual LOS: 4    Brandy Wheatley RN     
End of Shift Note    Bedside shift change report given to Amalia (oncoming nurse) by Solange William RN (offgoing nurse).  Report included the following information Nurse Handoff Report and Index    Shift worked:  7a-7p     Shift summary and any significant changes:     Pt did not go for Cath today. Cr elevated, d/c IV lasix. Plan for cath tomorrow. Patient remins CP free without SOB       Concerns for physician to address:  N/A      Zone phone for oncoming shift:   N/A        Activity:  Activity: In bed  Number times ambulated in hallways past shift: ambulated in the room  Number of times OOB to chair past shift: 2    Cardiac:   Cardiac Monitoring: Yes      Cardiac Rhythm: Sinus tachy    Access:  Current line(s): PIV     Genitourinary:   Urinary status: voiding    Respiratory:   O2 Device: None (Room air)  Chronic home O2 use?: NO  Incentive spirometer at bedside: NO       GI:  Last BM (including prior to admit): 04/29/25  Current diet:    Diet NPO  ADULT DIET; Regular; Low Fat/Low Chol/High Fiber/2 gm Na  Passing flatus: YES  Tolerating current diet: YES       Pain Management:   Patient states pain is manageable on current regimen: YES    Skin:  Edenilson Scale Score: 18  Interventions: float heels    Patient Safety:  Fall Score: Schreiber Total Score: 20  Interventions: bed/chair alarm, gripper socks, pt to call before getting OOB, and stay with me (per policy)       Length of Stay:  Expected LOS: 3  Actual LOS: 2      Solange William, RN    
Patient arrived to IVCU from Cath Lab. Right radial, TR band at 10cc, CDI, no bleeding/hematoma. Immobilizer in place Right groin site Perc close quick clot and tegaderm. CDI, no bruising or hematoma.. VSS, NSR, RA, Afebrile. IV fluids infusing. Patient on bedrest.     
TRANSFER - IN REPORT:    Verbal report received from Brianne WRIGHT on Hyacinth Patterson  being received by Ugo WRIGHT for routine progression of patient care      Report consisted of patient's Situation, Background, Assessment and   Recommendations(SBAR).     Information from the following report(s) Nurse Handoff Report, MAR, Recent Results, and Cardiac Rhythm NSR  was reviewed with the receiving nurse.    Opportunity for questions and clarification was provided.      0520 Assessment completed upon patient's arrival to unit and care assumed.     0630 Lab samples collected and sent to lab.    0700 Bedside shift change report given to Rosangela WRIGHT (oncoming nurse) by Ugo Atkinson RN (offgoing nurse).  Report included the following information SBAR, MAR, Recent Results, and Cardiac Rhythm NSR                                  
  GLUCOSE 164* 102* 103* 131*  --    BUN 17 13 14 16  --    CREATININE 1.13* 0.86 1.17* 1.33*  --    CALCIUM 8.7 7.3* 8.9 9.2  --    MG 1.9  --   --   --  1.8   PHOS  --   --   --   --  4.1   BILITOT 0.3  --   --   --   --    AST 23  --   --   --   --    ALT 18  --   --   --   --    INR 1.0  --   --   --   --        Signed: Gabriel Berg MD          
*      Plan:     Chest pain free  On room air now, but now has creatinine elevation, hold furosemide and check for orthostatic hypotension  Cardiac catheterization tomorrow    Thank you for allowing us to participate in the care of this patient.  We will follow.    Kemal Parnell MD

## 2025-05-01 NOTE — DISCHARGE SUMMARY
wall mesh removal and small bowel repair in 8/2019-complicated by acute on chronic large bowel obstruction  S/p exploratory laparotomy with extended right hemicolectomy into the mid transverse colon, resection with ileocolic anastomosis, extensive pelvic adhesiolysis of the small bowel, resection of terminal ileum approximately 15/18 cm due to vascular compromise and removal of previous abdominal wall SCA  History of SMA stent       Discharge Exam:  Patient seen and examined by me on discharge day.  Pertinent Findings:  Patient Vitals for the past 24 hrs:   BP Temp Temp src Pulse Resp SpO2 Weight   05/01/25 0915 (!) 124/53 -- -- 78 -- -- --   05/01/25 0730 (!) 128/59 97.7 °F (36.5 °C) Oral 94 28 97 % --   05/01/25 0718 -- -- -- -- -- 97 % --   05/01/25 0600 -- -- -- -- -- -- 64.2 kg (141 lb 9.6 oz)   04/30/25 2334 (!) 111/45 98 °F (36.7 °C) Oral 96 29 96 % --   04/30/25 2300 -- -- -- 87 15 96 % --   04/30/25 2200 -- -- -- 80 19 100 % --   04/30/25 2100 (!) 108/58 -- -- 81 12 98 % --   04/30/25 2000 132/60 97.6 °F (36.4 °C) Oral 87 20 93 % --   04/30/25 1947 -- 97.8 °F (36.6 °C) Oral -- -- -- --   04/30/25 1900 (!) 142/47 -- -- (!) 101 20 99 % --   04/30/25 1845 (!) 126/51 -- -- 78 20 -- --   04/30/25 1830 (!) 135/48 -- -- 79 17 -- --   04/30/25 1817 (!) 126/48 -- -- 89 16 99 % --   04/30/25 1803 (!) 142/57 -- -- 84 17 -- --   04/30/25 1750 (!) 136/56 97.8 °F (36.6 °C) Oral 94 17 99 % --   04/30/25 1612 -- -- -- -- -- 99 % --   04/30/25 1500 (!) 157/55 -- Oral 93 20 96 % --       Gen:    Not in distress  Chest: Clear lungs  CVS:   Regular rhythm.  No edema  Abd:  Soft, not distended, not tender  Neuro: awake, moving all exts    Discharge/Recent Laboratory Results:  Recent Labs     05/01/25  0518      K 3.1*   *   CO2 23   BUN 14   CREATININE 1.21*   GLUCOSE 157*   CALCIUM 8.6   PHOS 2.8   MG 1.9     Recent Labs     05/01/25  0518   HGB 8.9*   HCT 29.3*   WBC 7.6          Discharge Medications:

## 2025-05-01 NOTE — CARE COORDINATION
Pt clear from CM standpoint - Medicaid transport (3hr window) from 1155AM-1455PM.    Transition of Care Plan:    RUR: 14% \"low risk\"  Prior Level of Functioning: Independent with ADL's  Disposition: Home with family support   TOMAS: 5/1  If SNF or IPR: Date FOC offered: N/A  Follow up appointments: PCP/Specialists as indicated  DME needed: None  Transportation at discharge: Medicaid transport (3hr window), Trip ID: [id number]  IM/IMM Medicare/ letter given: 2nd IM given by CM on 5/1  Is patient a [place] and connected with [facility]? No  Caregiver Contact: Christina Romero (niece), [phone]  Discharge Caregiver contacted prior to discharge? To be contacted   Care Conference needed? Not at this time   Barriers to discharge: None    1149 AM: CM contacted [place] [employer] at [phone] to arrange Medicaid transport. [facility] [facility] ride arranged (3 hour window), Trip ID: [id number].     1046 AM: Chart reviewed. CM following for d/c planning. Pt to return home independently. Pt will need Medicaid transport and will go to [address] [place] [id number]. CM to arrange ride upon medical clearance.      05/01/25 1220   Services At/After Discharge   Transition of Care Consult (CM Consult) Discharge Planning   Services At/After Discharge Transport    Resource Information Provided? No   Mode of Transport at Discharge Other (see comment)  ([facility])   Hospital Transport Time of Discharge 1500   Confirm Follow Up Transport Cab   Condition of Participation: Discharge Planning   The Plan for Transition of Care is related to the following treatment goals: Return home independently   The Patient and/or Patient Representative was provided with a Choice of Provider? Patient   The Patient and/Or Patient Representative agree with the Discharge Plan? Yes     MATILDA Mensah  Care Management  [facility]   [phone]

## 2025-05-01 NOTE — DISCHARGE INSTRUCTIONS
HOSPITALIST DISCHARGE INSTRUCTIONS    NAME: Hyacinth Patterson   :  1955   MRN:  [id number]     Date/Time:  2025 11:33 AM    ADMIT DATE: 2025     DISCHARGE DATE: 2025     DISCHARGE DIAGNOSIS:  Acute on chronic CHFrEF last EF 20-25%   NSTEMI -CAD s/p FELA  Diabetes mellitus type 2   Acute on chronic anemia   Hypokalemia-resolved   Prior history of  Schizophrenia  Chronic mesenteric ischemia POA  Peripheral arterial disease  Colonic surgery in 2019  S/p abdominal wall mesh removal and small bowel repair in 2019-complicated by acute on chronic large bowel obstruction  S/p exploratory laparotomy with extended right hemicolectomy into the mid transverse colon, resection with ileocolic anastomosis, extensive pelvic adhesiolysis of the small bowel, resection of terminal ileum approximately 15/18 cm due to vascular compromise and removal of previous abdominal wall SCA  History of SMA stent    MEDICATIONS:  As per medication reconciliation  list  It is important that you take the medication exactly as they are prescribed.   Keep your medication in the bottles provided by the pharmacist and keep a list of the medication names, dosages, and times to be taken in your wallet.   Do not take other medications without consulting your doctor.     Pain Management: per above medications    What to do at Home:  Please follow up with cardio clinic as scheduled    Losartan on hold - please follow up with PCP/Cardio to resume.    Recommended diet:  cardiac diet and diabetic diet    Recommended activity: activity as tolerated    If you have questions regarding the hospital related prescriptions or hospital related issues please call at .    If you experience any of the following symptoms then please call your primary care physician or return to the emergency room if you cannot get hold of your doctor:  Fever, chills, nausea, vomiting, diarrhea, change in mentation, falling, bleeding, shortness of

## 2025-05-02 ENCOUNTER — TELEPHONE (OUTPATIENT)
Age: 70
End: 2025-05-02

## 2025-05-02 NOTE — TELEPHONE ENCOUNTER
Care Transitions Initial Follow Up Call    Outreach made within 2 business days of discharge: Yes    Patient: Hyacinth Patterson Patient : 1955   MRN: [id number]  Reason for Admission: NSTEMI  Discharge Date: 25       Spoke with: Patient    Discharge department/facility: [facility] Interactive Patient Contact:  Was patient able to fill all prescriptions: Yes  Was patient instructed to bring all medications to the follow-up visit: Yes  Is patient taking all medications as directed in the discharge summary? Yes  Does patient understand their discharge instructions: Yes  Does patient have questions or concerns that need addressed prior to 7-14 day follow up office visit: no    Additional needs identified to be addressed with provider  No needs identified             Scheduled appointment with PCP within 7-14 days    Follow Up  Future Appointments   Date Time Provider Department Center   2025  8:00 AM Jeffery Alexis MD [facility]   2025  9:20 AM Sagar Borges MD [facility]       HUNTER BARRIENTOS, LASHELLN

## 2025-05-08 ENCOUNTER — OFFICE VISIT (OUTPATIENT)
Age: 70
End: 2025-05-08

## 2025-05-08 VITALS
OXYGEN SATURATION: 100 % | HEART RATE: 88 BPM | RESPIRATION RATE: 18 BRPM | WEIGHT: 150.8 LBS | BODY MASS INDEX: 25.12 KG/M2 | DIASTOLIC BLOOD PRESSURE: 57 MMHG | SYSTOLIC BLOOD PRESSURE: 105 MMHG | TEMPERATURE: 97.3 F | HEIGHT: 65 IN

## 2025-05-08 DIAGNOSIS — Z95.5 HISTORY OF HEART ARTERY STENT: ICD-10-CM

## 2025-05-08 DIAGNOSIS — Z09 HOSPITAL DISCHARGE FOLLOW-UP: Primary | ICD-10-CM

## 2025-05-08 DIAGNOSIS — I50.23 ACUTE ON CHRONIC SYSTOLIC (CONGESTIVE) HEART FAILURE (HCC): ICD-10-CM
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
## 2025-05-09 LAB
ANION GAP SERPL CALC-SCNC: 1 MMOL/L (ref 2–12)
BASOPHILS # BLD: 0.06 K/UL (ref 0–0.1)
BASOPHILS NFR BLD: 0.8 % (ref 0–1)
BUN SERPL-MCNC: 21 MG/DL (ref 6–20)
BUN/CREAT SERPL: 17 (ref 12–20)
CALCIUM SERPL-MCNC: 8.8 MG/DL (ref 8.5–10.1)
CHLORIDE SERPL-SCNC: 113 MMOL/L (ref 97–108)
CO2 SERPL-SCNC: 24 MMOL/L (ref 21–32)
CREAT SERPL-MCNC: 1.25 MG/DL (ref 0.55–1.02)
DIFFERENTIAL METHOD BLD: ABNORMAL
EOSINOPHIL # BLD: 0.36 K/UL (ref 0–0.4)
EOSINOPHIL NFR BLD: 4.9 % (ref 0–7)
ERYTHROCYTE [DISTWIDTH] IN BLOOD BY AUTOMATED COUNT: 16.8 % (ref 11.5–14.5)
GLUCOSE SERPL-MCNC: 167 MG/DL (ref 65–100)
HCT VFR BLD AUTO: 24.4 % (ref 35–47)
HGB BLD-MCNC: 7.1 G/DL (ref 11.5–16)
IMM GRANULOCYTES # BLD AUTO: 0.02 K/UL (ref 0–0.04)
IMM GRANULOCYTES NFR BLD AUTO: 0.3 % (ref 0–0.5)
LYMPHOCYTES # BLD: 1.19 K/UL (ref 0.8–3.5)
LYMPHOCYTES NFR BLD: 16.3 % (ref 12–49)
MCH RBC QN AUTO: 25.3 PG (ref 26–34)
MCHC RBC AUTO-ENTMCNC: 29.1 G/DL (ref 30–36.5)
MCV RBC AUTO: 86.8 FL (ref 80–99)
MONOCYTES # BLD: 0.58 K/UL (ref 0–1)
MONOCYTES NFR BLD: 8 % (ref 5–13)
NEUTS SEG # BLD: 5.07 K/UL (ref 1.8–8)
NEUTS SEG NFR BLD: 69.7 % (ref 32–75)
NRBC # BLD: 0 K/UL (ref 0–0.01)
NRBC BLD-RTO: 0 PER 100 WBC
PLATELET # BLD AUTO: 271 K/UL (ref 150–400)
PMV BLD AUTO: 12.8 FL (ref 8.9–12.9)
POTASSIUM SERPL-SCNC: 4.8 MMOL/L (ref 3.5–5.1)
RBC # BLD AUTO: 2.81 M/UL (ref 3.8–5.2)
SODIUM SERPL-SCNC: 138 MMOL/L (ref 136–145)
WBC # BLD AUTO: 7.3 K/UL (ref 3.6–11)

## 2025-05-12 RX ORDER — MONTELUKAST SODIUM 10 MG/1
10 TABLET ORAL NIGHTLY
Qty: 30 TABLET | Refills: 5 | Status: ON HOLD | OUTPATIENT
Start: 2025-05-12

## 2025-05-13 ENCOUNTER — APPOINTMENT (OUTPATIENT)
Facility: HOSPITAL | Age: 70
End: 2025-05-13
Payer: MEDICARE

## 2025-05-13 ENCOUNTER — HOSPITAL ENCOUNTER (EMERGENCY)
Facility: HOSPITAL | Age: 70
Discharge: ANOTHER HOSPITAL | End: 2025-05-13
Attending: EMERGENCY MEDICINE
Payer: MEDICARE

## 2025-05-13 ENCOUNTER — HOSPITAL ENCOUNTER (INPATIENT)
Facility: HOSPITAL | Age: 70
LOS: 6 days | Discharge: HOME | DRG: 175 | End: 2025-05-19
Attending: STUDENT IN AN ORGANIZED HEALTH CARE EDUCATION/TRAINING PROGRAM | Admitting: STUDENT IN AN ORGANIZED HEALTH CARE EDUCATION/TRAINING PROGRAM
Payer: MEDICARE

## 2025-05-13 VITALS
HEART RATE: 97 BPM | TEMPERATURE: 98.2 F | DIASTOLIC BLOOD PRESSURE: 82 MMHG | SYSTOLIC BLOOD PRESSURE: 169 MMHG | RESPIRATION RATE: 20 BRPM | OXYGEN SATURATION: 90 % | BODY MASS INDEX: 26.63 KG/M2 | WEIGHT: 160 LBS

## 2025-05-13 DIAGNOSIS — I26.99 ACUTE PULMONARY EMBOLISM WITHOUT ACUTE COR PULMONALE, UNSPECIFIED PULMONARY EMBOLISM TYPE (HCC): Primary | ICD-10-CM

## 2025-05-13 DIAGNOSIS — D64.9 CHRONIC ANEMIA: ICD-10-CM

## 2025-05-13 DIAGNOSIS — J96.01 ACUTE RESPIRATORY FAILURE WITH HYPOXIA (HCC): ICD-10-CM

## 2025-05-13 DIAGNOSIS — I50.23 ACUTE ON CHRONIC SYSTOLIC CONGESTIVE HEART FAILURE (HCC): Primary | ICD-10-CM

## 2025-05-13 DIAGNOSIS — I26.09 ACUTE PULMONARY EMBOLISM WITH ACUTE COR PULMONALE, UNSPECIFIED PULMONARY EMBOLISM TYPE (HCC): ICD-10-CM

## 2025-05-13 DIAGNOSIS — I50.23 ACUTE ON CHRONIC SYSTOLIC CONGESTIVE HEART FAILURE (HCC): ICD-10-CM

## 2025-05-13 PROBLEM — I50.9 ACUTE EXACERBATION OF CHRONIC HEART FAILURE (HCC): Status: ACTIVE | Noted: 2025-05-13

## 2025-05-13 LAB
ABO + RH BLD: NORMAL
ALBUMIN SERPL-MCNC: 3.3 G/DL (ref 3.5–5)
ALBUMIN/GLOB SERPL: 0.8 (ref 1.1–2.2)
ALP SERPL-CCNC: 90 U/L (ref 45–117)
ALT SERPL-CCNC: 18 U/L (ref 12–78)
ANION GAP SERPL CALC-SCNC: 10 MMOL/L (ref 2–12)
ANION GAP SERPL CALC-SCNC: 3 MMOL/L (ref 2–12)
APTT PPP: <20 SEC (ref 22.1–31)
AST SERPL-CCNC: 21 U/L (ref 15–37)
BASOPHILS # BLD: 0.07 K/UL (ref 0–0.1)
BASOPHILS NFR BLD: 0.8 % (ref 0–1)
BILIRUB SERPL-MCNC: 0.3 MG/DL (ref 0.2–1)
BLOOD GROUP ANTIBODIES SERPL: NORMAL
BUN SERPL-MCNC: 15 MG/DL (ref 6–20)
BUN SERPL-MCNC: 15 MG/DL (ref 6–20)
BUN/CREAT SERPL: 12 (ref 12–20)
BUN/CREAT SERPL: 13 (ref 12–20)
CALCIUM SERPL-MCNC: 9.3 MG/DL (ref 8.5–10.1)
CALCIUM SERPL-MCNC: 9.5 MG/DL (ref 8.5–10.1)
CHLORIDE SERPL-SCNC: 106 MMOL/L (ref 97–108)
CHLORIDE SERPL-SCNC: 109 MMOL/L (ref 97–108)
CO2 SERPL-SCNC: 25 MMOL/L (ref 21–32)
CO2 SERPL-SCNC: 27 MMOL/L (ref 21–32)
CREAT SERPL-MCNC: 1.16 MG/DL (ref 0.55–1.02)
CREAT SERPL-MCNC: 1.26 MG/DL (ref 0.55–1.02)
DIFFERENTIAL METHOD BLD: ABNORMAL
EKG ATRIAL RATE: 106 BPM
EKG DIAGNOSIS: NORMAL
EKG P AXIS: 52 DEGREES
EKG P-R INTERVAL: 148 MS
EKG Q-T INTERVAL: 354 MS
EKG QRS DURATION: 94 MS
EKG QTC CALCULATION (BAZETT): 470 MS
EKG R AXIS: 23 DEGREES
EKG T AXIS: 146 DEGREES
EKG VENTRICULAR RATE: 106 BPM
EOSINOPHIL # BLD: 0.2 K/UL (ref 0–0.4)
EOSINOPHIL NFR BLD: 2.2 % (ref 0–0.7)
ERYTHROCYTE [DISTWIDTH] IN BLOOD BY AUTOMATED COUNT: 17.2 % (ref 11.5–14.5)
ERYTHROCYTE [DISTWIDTH] IN BLOOD BY AUTOMATED COUNT: 17.5 % (ref 11.5–14.5)
ERYTHROCYTE [SEDIMENTATION RATE] IN BLOOD: 49 MM/HR (ref 0–30)
FERRITIN SERPL-MCNC: 39 NG/ML (ref 26–388)
GLOBULIN SER CALC-MCNC: 4.1 G/DL (ref 2–4)
GLUCOSE SERPL-MCNC: 141 MG/DL (ref 65–100)
GLUCOSE SERPL-MCNC: 156 MG/DL (ref 65–100)
HCT VFR BLD AUTO: 25.8 % (ref 35–47)
HCT VFR BLD AUTO: 26.5 % (ref 35–47)
HGB BLD-MCNC: 7.6 G/DL (ref 11.5–16)
HGB BLD-MCNC: 7.7 G/DL (ref 11.5–16)
IMM GRANULOCYTES # BLD AUTO: 0.02 K/UL (ref 0–0.04)
IMM GRANULOCYTES NFR BLD AUTO: 0.2 % (ref 0–0.5)
INR PPP: 1 (ref 0.9–1.1)
IRON SATN MFR SERPL: 6 % (ref 20–50)
IRON SERPL-MCNC: 22 UG/DL (ref 35–150)
LACTATE SERPL-SCNC: 1.5 MMOL/L (ref 0.4–2)
LYMPHOCYTES # BLD: 1.32 K/UL (ref 0.8–3.5)
LYMPHOCYTES NFR BLD: 14.5 % (ref 12–49)
MAGNESIUM SERPL-MCNC: 2 MG/DL (ref 1.6–2.4)
MAGNESIUM SERPL-MCNC: 2 MG/DL (ref 1.6–2.4)
MCH RBC QN AUTO: 24.8 PG (ref 26–34)
MCH RBC QN AUTO: 25.3 PG (ref 26–34)
MCHC RBC AUTO-ENTMCNC: 29.1 G/DL (ref 30–36.5)
MCHC RBC AUTO-ENTMCNC: 29.5 G/DL (ref 30–36.5)
MCV RBC AUTO: 85.2 FL (ref 80–99)
MCV RBC AUTO: 86 FL (ref 80–99)
MONOCYTES # BLD: 0.74 K/UL (ref 0–1)
MONOCYTES NFR BLD: 8.1 % (ref 5–13)
NEUTS SEG # BLD: 6.75 K/UL (ref 1.8–8)
NEUTS SEG NFR BLD: 74.2 % (ref 32–75)
NRBC # BLD: 0 K/UL (ref 0–0.01)
NRBC # BLD: 0.02 K/UL (ref 0–0.01)
NRBC BLD-RTO: 0 PER 100 WBC
NRBC BLD-RTO: 0.2 PER 100 WBC
NT PRO BNP: ABNORMAL PG/ML (ref 0–125)
PHOSPHATE SERPL-MCNC: 4.3 MG/DL (ref 2.6–4.7)
PLATELET # BLD AUTO: 307 K/UL (ref 150–400)
PLATELET # BLD AUTO: 312 K/UL (ref 150–400)
PMV BLD AUTO: 12.1 FL (ref 8.9–12.9)
PMV BLD AUTO: 12.7 FL (ref 8.9–12.9)
POTASSIUM SERPL-SCNC: 4.5 MMOL/L (ref 3.5–5.1)
POTASSIUM SERPL-SCNC: 4.6 MMOL/L (ref 3.5–5.1)
PROT SERPL-MCNC: 7.4 G/DL (ref 6.4–8.2)
PROTHROMBIN TIME: 10.6 SEC (ref 9.2–11.2)
RBC # BLD AUTO: 3 M/UL (ref 3.8–5.2)
RBC # BLD AUTO: 3.11 M/UL (ref 3.8–5.2)
SODIUM SERPL-SCNC: 139 MMOL/L (ref 136–145)
SODIUM SERPL-SCNC: 141 MMOL/L (ref 136–145)
SPECIMEN EXP DATE BLD: NORMAL
T4 FREE SERPL-MCNC: 1.5 NG/DL (ref 0.8–1.5)
THERAPEUTIC RANGE: ABNORMAL SECS (ref 58–77)
TIBC SERPL-MCNC: 343 UG/DL (ref 250–450)
TROPONIN I SERPL HS-MCNC: 162 NG/L (ref 0–51)
TROPONIN I SERPL HS-MCNC: 164 NG/L (ref 0–51)
TROPONIN I SERPL HS-MCNC: 97 NG/L (ref 0–51)
TSH SERPL DL<=0.05 MIU/L-ACNC: 2.29 UIU/ML (ref 0.36–3.74)
UFH PPP CHRO-ACNC: <0.1 IU/ML
URATE SERPL-MCNC: 5.7 MG/DL (ref 2.6–6)
WBC # BLD AUTO: 11.8 K/UL (ref 3.6–11)
WBC # BLD AUTO: 9.1 K/UL (ref 3.6–11)

## 2025-05-13 PROCEDURE — 2060000000 HC ICU INTERMEDIATE R&B

## 2025-05-13 PROCEDURE — 83880 ASSAY OF NATRIURETIC PEPTIDE: CPT

## 2025-05-13 PROCEDURE — 6360000002 HC RX W HCPCS: Performed by: EMERGENCY MEDICINE

## 2025-05-13 PROCEDURE — 80048 BASIC METABOLIC PNL TOTAL CA: CPT

## 2025-05-13 PROCEDURE — 83550 IRON BINDING TEST: CPT

## 2025-05-13 PROCEDURE — 85652 RBC SED RATE AUTOMATED: CPT

## 2025-05-13 PROCEDURE — 6360000004 HC RX CONTRAST MEDICATION: Performed by: EMERGENCY MEDICINE

## 2025-05-13 PROCEDURE — 6360000002 HC RX W HCPCS: Performed by: STUDENT IN AN ORGANIZED HEALTH CARE EDUCATION/TRAINING PROGRAM

## 2025-05-13 PROCEDURE — 86900 BLOOD TYPING SEROLOGIC ABO: CPT

## 2025-05-13 PROCEDURE — 83605 ASSAY OF LACTIC ACID: CPT

## 2025-05-13 PROCEDURE — 84100 ASSAY OF PHOSPHORUS: CPT

## 2025-05-13 PROCEDURE — 84484 ASSAY OF TROPONIN QUANT: CPT

## 2025-05-13 PROCEDURE — 85520 HEPARIN ASSAY: CPT

## 2025-05-13 PROCEDURE — 80053 COMPREHEN METABOLIC PANEL: CPT

## 2025-05-13 PROCEDURE — 80061 LIPID PANEL: CPT

## 2025-05-13 PROCEDURE — 85610 PROTHROMBIN TIME: CPT

## 2025-05-13 PROCEDURE — 84443 ASSAY THYROID STIM HORMONE: CPT

## 2025-05-13 PROCEDURE — 84550 ASSAY OF BLOOD/URIC ACID: CPT

## 2025-05-13 PROCEDURE — 84439 ASSAY OF FREE THYROXINE: CPT

## 2025-05-13 PROCEDURE — 94640 AIRWAY INHALATION TREATMENT: CPT

## 2025-05-13 PROCEDURE — 83540 ASSAY OF IRON: CPT

## 2025-05-13 PROCEDURE — 36415 COLL VENOUS BLD VENIPUNCTURE: CPT

## 2025-05-13 PROCEDURE — 71045 X-RAY EXAM CHEST 1 VIEW: CPT

## 2025-05-13 PROCEDURE — 99285 EMERGENCY DEPT VISIT HI MDM: CPT

## 2025-05-13 PROCEDURE — 85025 COMPLETE CBC W/AUTO DIFF WBC: CPT

## 2025-05-13 PROCEDURE — 96365 THER/PROPH/DIAG IV INF INIT: CPT

## 2025-05-13 PROCEDURE — 86850 RBC ANTIBODY SCREEN: CPT

## 2025-05-13 PROCEDURE — 86901 BLOOD TYPING SEROLOGIC RH(D): CPT

## 2025-05-13 PROCEDURE — 85730 THROMBOPLASTIN TIME PARTIAL: CPT

## 2025-05-13 PROCEDURE — 82728 ASSAY OF FERRITIN: CPT

## 2025-05-13 PROCEDURE — 71275 CT ANGIOGRAPHY CHEST: CPT

## 2025-05-13 PROCEDURE — 96375 TX/PRO/DX INJ NEW DRUG ADDON: CPT

## 2025-05-13 PROCEDURE — 83735 ASSAY OF MAGNESIUM: CPT

## 2025-05-13 PROCEDURE — 6370000000 HC RX 637 (ALT 250 FOR IP)

## 2025-05-13 PROCEDURE — 85027 COMPLETE CBC AUTOMATED: CPT

## 2025-05-13 PROCEDURE — 6370000000 HC RX 637 (ALT 250 FOR IP): Performed by: EMERGENCY MEDICINE

## 2025-05-13 PROCEDURE — 96366 THER/PROPH/DIAG IV INF ADDON: CPT

## 2025-05-13 PROCEDURE — 6370000000 HC RX 637 (ALT 250 FOR IP): Performed by: STUDENT IN AN ORGANIZED HEALTH CARE EDUCATION/TRAINING PROGRAM

## 2025-05-13 RX ORDER — HEPARIN SODIUM 1000 [USP'U]/ML
80 INJECTION, SOLUTION INTRAVENOUS; SUBCUTANEOUS PRN
Status: DISCONTINUED | OUTPATIENT
Start: 2025-05-13 | End: 2025-05-13 | Stop reason: HOSPADM

## 2025-05-13 RX ORDER — IOPAMIDOL 755 MG/ML
100 INJECTION, SOLUTION INTRAVASCULAR
Status: COMPLETED | OUTPATIENT
Start: 2025-05-13 | End: 2025-05-13

## 2025-05-13 RX ORDER — HYDRALAZINE HYDROCHLORIDE 20 MG/ML
5 INJECTION INTRAMUSCULAR; INTRAVENOUS EVERY 6 HOURS PRN
Status: DISCONTINUED | OUTPATIENT
Start: 2025-05-13 | End: 2025-05-16 | Stop reason: HOSPADM

## 2025-05-13 RX ORDER — SODIUM CHLORIDE 0.9 % (FLUSH) 0.9 %
5-40 SYRINGE (ML) INJECTION PRN
Status: DISCONTINUED | OUTPATIENT
Start: 2025-05-13 | End: 2025-05-19 | Stop reason: HOSPADM

## 2025-05-13 RX ORDER — ASPIRIN 81 MG/1
81 TABLET ORAL DAILY
Status: DISCONTINUED | OUTPATIENT
Start: 2025-05-13 | End: 2025-05-16

## 2025-05-13 RX ORDER — ONDANSETRON 4 MG/1
4 TABLET, ORALLY DISINTEGRATING ORAL EVERY 8 HOURS PRN
Status: DISCONTINUED | OUTPATIENT
Start: 2025-05-13 | End: 2025-05-19 | Stop reason: HOSPADM

## 2025-05-13 RX ORDER — HEPARIN SODIUM 1000 [USP'U]/ML
80 INJECTION, SOLUTION INTRAVENOUS; SUBCUTANEOUS ONCE
Status: COMPLETED | OUTPATIENT
Start: 2025-05-13 | End: 2025-05-13

## 2025-05-13 RX ORDER — OXYCODONE HYDROCHLORIDE 5 MG/1
5 TABLET ORAL EVERY 6 HOURS PRN
Refills: 0 | Status: DISCONTINUED | OUTPATIENT
Start: 2025-05-13 | End: 2025-05-19 | Stop reason: HOSPADM

## 2025-05-13 RX ORDER — PANTOPRAZOLE SODIUM 40 MG/1
40 TABLET, DELAYED RELEASE ORAL
Status: DISCONTINUED | OUTPATIENT
Start: 2025-05-13 | End: 2025-05-14

## 2025-05-13 RX ORDER — SODIUM CHLORIDE 9 MG/ML
INJECTION, SOLUTION INTRAVENOUS PRN
Status: DISCONTINUED | OUTPATIENT
Start: 2025-05-13 | End: 2025-05-19 | Stop reason: HOSPADM

## 2025-05-13 RX ORDER — HEPARIN SODIUM 10000 [USP'U]/100ML
5-30 INJECTION, SOLUTION INTRAVENOUS CONTINUOUS
Status: DISCONTINUED | OUTPATIENT
Start: 2025-05-13 | End: 2025-05-14

## 2025-05-13 RX ORDER — SODIUM CHLORIDE 0.9 % (FLUSH) 0.9 %
5-40 SYRINGE (ML) INJECTION EVERY 12 HOURS SCHEDULED
Status: DISCONTINUED | OUTPATIENT
Start: 2025-05-13 | End: 2025-05-19 | Stop reason: HOSPADM

## 2025-05-13 RX ORDER — MAGNESIUM SULFATE IN WATER 40 MG/ML
2000 INJECTION, SOLUTION INTRAVENOUS PRN
Status: DISCONTINUED | OUTPATIENT
Start: 2025-05-13 | End: 2025-05-17

## 2025-05-13 RX ORDER — POTASSIUM CHLORIDE 1500 MG/1
40 TABLET, EXTENDED RELEASE ORAL PRN
Status: DISCONTINUED | OUTPATIENT
Start: 2025-05-13 | End: 2025-05-17

## 2025-05-13 RX ORDER — CARVEDILOL 6.25 MG/1
6.25 TABLET ORAL 2 TIMES DAILY WITH MEALS
Status: ON HOLD | COMMUNITY
Start: 2025-04-23 | End: 2025-05-13

## 2025-05-13 RX ORDER — ACETAMINOPHEN 325 MG/1
975 TABLET ORAL EVERY 8 HOURS SCHEDULED
Status: DISCONTINUED | OUTPATIENT
Start: 2025-05-13 | End: 2025-05-19 | Stop reason: HOSPADM

## 2025-05-13 RX ORDER — METOPROLOL SUCCINATE 25 MG/1
12.5 TABLET, EXTENDED RELEASE ORAL DAILY
Status: DISCONTINUED | OUTPATIENT
Start: 2025-05-14 | End: 2025-05-14

## 2025-05-13 RX ORDER — METOPROLOL SUCCINATE 25 MG/1
12.5 TABLET, EXTENDED RELEASE ORAL DAILY
Status: DISCONTINUED | OUTPATIENT
Start: 2025-05-13 | End: 2025-05-13 | Stop reason: HOSPADM

## 2025-05-13 RX ORDER — FENTANYL CITRATE 50 UG/ML
50 INJECTION, SOLUTION INTRAMUSCULAR; INTRAVENOUS ONCE
Refills: 0 | Status: COMPLETED | OUTPATIENT
Start: 2025-05-13 | End: 2025-05-13

## 2025-05-13 RX ORDER — LIDOCAINE 4 G/G
1 PATCH TOPICAL DAILY
Status: DISCONTINUED | OUTPATIENT
Start: 2025-05-13 | End: 2025-05-19 | Stop reason: HOSPADM

## 2025-05-13 RX ORDER — CLOPIDOGREL BISULFATE 75 MG/1
75 TABLET ORAL DAILY
Status: DISCONTINUED | OUTPATIENT
Start: 2025-05-13 | End: 2025-05-18

## 2025-05-13 RX ORDER — POLYETHYLENE GLYCOL 3350 17 G/17G
17 POWDER, FOR SOLUTION ORAL DAILY PRN
Status: DISCONTINUED | OUTPATIENT
Start: 2025-05-13 | End: 2025-05-19 | Stop reason: HOSPADM

## 2025-05-13 RX ORDER — ONDANSETRON 2 MG/ML
4 INJECTION INTRAMUSCULAR; INTRAVENOUS ONCE
Status: COMPLETED | OUTPATIENT
Start: 2025-05-13 | End: 2025-05-13

## 2025-05-13 RX ORDER — ONDANSETRON 2 MG/ML
4 INJECTION INTRAMUSCULAR; INTRAVENOUS EVERY 6 HOURS PRN
Status: DISCONTINUED | OUTPATIENT
Start: 2025-05-13 | End: 2025-05-19 | Stop reason: HOSPADM

## 2025-05-13 RX ORDER — MONTELUKAST SODIUM 10 MG/1
10 TABLET ORAL NIGHTLY
Status: DISCONTINUED | OUTPATIENT
Start: 2025-05-13 | End: 2025-05-19 | Stop reason: HOSPADM

## 2025-05-13 RX ORDER — HEPARIN SODIUM 1000 [USP'U]/ML
40 INJECTION, SOLUTION INTRAVENOUS; SUBCUTANEOUS PRN
Status: DISCONTINUED | OUTPATIENT
Start: 2025-05-13 | End: 2025-05-14

## 2025-05-13 RX ORDER — HEPARIN SODIUM 1000 [USP'U]/ML
40 INJECTION, SOLUTION INTRAVENOUS; SUBCUTANEOUS PRN
Status: DISCONTINUED | OUTPATIENT
Start: 2025-05-13 | End: 2025-05-13 | Stop reason: HOSPADM

## 2025-05-13 RX ORDER — HEPARIN SODIUM 10000 [USP'U]/100ML
5-30 INJECTION, SOLUTION INTRAVENOUS CONTINUOUS
Status: DISCONTINUED | OUTPATIENT
Start: 2025-05-13 | End: 2025-05-13 | Stop reason: HOSPADM

## 2025-05-13 RX ORDER — FERROUS SULFATE 325(65) MG
325 TABLET, DELAYED RELEASE (ENTERIC COATED) ORAL
Status: DISCONTINUED | OUTPATIENT
Start: 2025-05-13 | End: 2025-05-19 | Stop reason: HOSPADM

## 2025-05-13 RX ORDER — ATORVASTATIN CALCIUM 40 MG/1
80 TABLET, FILM COATED ORAL DAILY
Status: DISCONTINUED | OUTPATIENT
Start: 2025-05-13 | End: 2025-05-19 | Stop reason: HOSPADM

## 2025-05-13 RX ORDER — POTASSIUM CHLORIDE 7.45 MG/ML
10 INJECTION INTRAVENOUS PRN
Status: DISCONTINUED | OUTPATIENT
Start: 2025-05-13 | End: 2025-05-17

## 2025-05-13 RX ORDER — ACETAMINOPHEN 650 MG/1
650 SUPPOSITORY RECTAL EVERY 6 HOURS PRN
Status: DISCONTINUED | OUTPATIENT
Start: 2025-05-13 | End: 2025-05-19 | Stop reason: HOSPADM

## 2025-05-13 RX ORDER — FUROSEMIDE 10 MG/ML
40 INJECTION INTRAMUSCULAR; INTRAVENOUS ONCE
Status: COMPLETED | OUTPATIENT
Start: 2025-05-13 | End: 2025-05-13

## 2025-05-13 RX ORDER — CARVEDILOL 6.25 MG/1
6.25 TABLET ORAL
Status: COMPLETED | OUTPATIENT
Start: 2025-05-13 | End: 2025-05-13

## 2025-05-13 RX ORDER — HEPARIN SODIUM 1000 [USP'U]/ML
80 INJECTION, SOLUTION INTRAVENOUS; SUBCUTANEOUS PRN
Status: DISCONTINUED | OUTPATIENT
Start: 2025-05-13 | End: 2025-05-14

## 2025-05-13 RX ORDER — ACETAMINOPHEN 325 MG/1
650 TABLET ORAL EVERY 6 HOURS PRN
Status: DISCONTINUED | OUTPATIENT
Start: 2025-05-13 | End: 2025-05-19 | Stop reason: HOSPADM

## 2025-05-13 RX ORDER — FUROSEMIDE 10 MG/ML
40 INJECTION INTRAMUSCULAR; INTRAVENOUS 2 TIMES DAILY
Status: DISCONTINUED | OUTPATIENT
Start: 2025-05-13 | End: 2025-05-14

## 2025-05-13 RX ORDER — HEPARIN SODIUM 1000 [USP'U]/ML
80 INJECTION, SOLUTION INTRAVENOUS; SUBCUTANEOUS ONCE
Status: DISCONTINUED | OUTPATIENT
Start: 2025-05-13 | End: 2025-05-13 | Stop reason: SDUPTHER

## 2025-05-13 RX ADMIN — FUROSEMIDE 40 MG: 10 INJECTION, SOLUTION INTRAMUSCULAR; INTRAVENOUS at 19:11

## 2025-05-13 RX ADMIN — FENTANYL CITRATE 50 MCG: 50 INJECTION INTRAMUSCULAR; INTRAVENOUS at 08:31

## 2025-05-13 RX ADMIN — ACETAMINOPHEN 650 MG: 325 TABLET ORAL at 18:18

## 2025-05-13 RX ADMIN — MONTELUKAST 10 MG: 10 TABLET, FILM COATED ORAL at 21:10

## 2025-05-13 RX ADMIN — CLOPIDOGREL BISULFATE 75 MG: 75 TABLET, FILM COATED ORAL at 18:19

## 2025-05-13 RX ADMIN — CARVEDILOL 6.25 MG: 6.25 TABLET, FILM COATED ORAL at 13:56

## 2025-05-13 RX ADMIN — ACETAMINOPHEN 975 MG: 325 TABLET ORAL at 21:09

## 2025-05-13 RX ADMIN — HEPARIN SODIUM 5800 UNITS: 1000 INJECTION INTRAVENOUS; SUBCUTANEOUS at 10:24

## 2025-05-13 RX ADMIN — ASPIRIN 81 MG: 81 TABLET, COATED ORAL at 18:18

## 2025-05-13 RX ADMIN — ONDANSETRON 4 MG: 2 INJECTION, SOLUTION INTRAMUSCULAR; INTRAVENOUS at 08:28

## 2025-05-13 RX ADMIN — FERROUS SULFATE TAB EC 325 MG (65 MG FE EQUIVALENT) 325 MG: 325 (65 FE) TABLET DELAYED RESPONSE at 18:18

## 2025-05-13 RX ADMIN — IOPAMIDOL 100 ML: 755 INJECTION, SOLUTION INTRAVENOUS at 09:09

## 2025-05-13 RX ADMIN — HEPARIN SODIUM 18 UNITS/KG/HR: 10000 INJECTION, SOLUTION INTRAVENOUS at 10:30

## 2025-05-13 RX ADMIN — ARFORMOTEROL TARTRATE: 15 SOLUTION RESPIRATORY (INHALATION) at 19:39

## 2025-05-13 RX ADMIN — HYDROMORPHONE HYDROCHLORIDE 0.5 MG: 1 INJECTION, SOLUTION INTRAMUSCULAR; INTRAVENOUS; SUBCUTANEOUS at 10:22

## 2025-05-13 RX ADMIN — FUROSEMIDE 40 MG: 10 INJECTION, SOLUTION INTRAMUSCULAR; INTRAVENOUS at 09:31

## 2025-05-13 RX ADMIN — MELATONIN 3 MG: at 21:44

## 2025-05-13 RX ADMIN — METOPROLOL SUCCINATE 12.5 MG: 25 TABLET, EXTENDED RELEASE ORAL at 13:56

## 2025-05-13 RX ADMIN — PANTOPRAZOLE SODIUM 40 MG: 40 TABLET, DELAYED RELEASE ORAL at 18:18

## 2025-05-13 RX ADMIN — OXYCODONE 5 MG: 5 TABLET ORAL at 18:18

## 2025-05-13 ASSESSMENT — PAIN SCALES - GENERAL
PAINLEVEL_OUTOF10: 8
PAINLEVEL_OUTOF10: 7
PAINLEVEL_OUTOF10: 8
PAINLEVEL_OUTOF10: 4
PAINLEVEL_OUTOF10: 9
PAINLEVEL_OUTOF10: 8
PAINLEVEL_OUTOF10: 4
PAINLEVEL_OUTOF10: 7
PAINLEVEL_OUTOF10: 5
PAINLEVEL_OUTOF10: 0

## 2025-05-13 ASSESSMENT — PAIN DESCRIPTION - LOCATION
LOCATION: ABDOMEN;CHEST
LOCATION: BACK
LOCATION: ABDOMEN;CHEST

## 2025-05-13 ASSESSMENT — PAIN - FUNCTIONAL ASSESSMENT
PAIN_FUNCTIONAL_ASSESSMENT: 0-10

## 2025-05-13 ASSESSMENT — PAIN DESCRIPTION - ORIENTATION: ORIENTATION: POSTERIOR

## 2025-05-13 ASSESSMENT — PAIN DESCRIPTION - DESCRIPTORS: DESCRIPTORS: ACHING

## 2025-05-13 NOTE — ED NOTES
TRANSFER - OUT REPORT:    Verbal report given to Alexia Zheng RN on Hyacinth Patterson  being transferred to [facility] room 2313 for routine progression of patient care       Report consisted of patient's Situation, Background, Assessment and   Recommendations(SBAR).     Information from the following report(s) Nurse Handoff Report, ED Encounter Summary, ED SBAR, Intake/Output, MAR, Med Rec Status, and Cardiac Rhythm NSR  was reviewed with the receiving nurse.    Emmett Fall Assessment:    Presents to emergency department  because of falls (Syncope, seizure, or loss of consciousness): No  Age > 70: No  Altered Mental Status, Intoxication with alcohol or substance confusion (Disorientation, impaired judgment, poor safety awaremess, or inability to follow instructions): No  Impaired Mobility: Ambulates or transfers with assistive devices or assistance; Unable to ambulate or transer.: No  Nursing Judgement: No          Lines:   Peripheral IV 05/13/25 Left Antecubital (Active)   Site Assessment Clean, dry & intact 05/13/25 0827   Line Status Brisk blood return;Blood return noted 05/13/25 0827   Line Care Connections checked and tightened 05/13/25 0827   Phlebitis Assessment No symptoms 05/13/25 0827   Infiltration Assessment 0 05/13/25 0827   Alcohol Cap Used Yes 05/13/25 0827   Dressing Status Clean, dry & intact 05/13/25 0827   Dressing Type Transparent 05/13/25 0827   Dressing Intervention New 05/13/25 0827        Opportunity for questions and clarification was provided.      Patient transported with:  Monitor and O2 @ 4lpm with [facility]

## 2025-05-13 NOTE — ED NOTES
Difficult to keep a consistent 02 saturation monitoring. RT called for assistance.  When reading is 92%

## 2025-05-13 NOTE — H&P
Hospitalist Admission Note    NAME:   Hyacinth Patterson   : 1955   MRN: [id number]     Date/Time: 2025 5:21 PM    Patient PCP: Jeffery Alexis MD    ______________________________________________________________________  Given the patient's current clinical presentation, I have a high level of concern for decompensation if discharged from the emergency department.  Complex decision making was performed, which includes reviewing the patient's available past medical records, laboratory results, and x-ray films.       My assessment of this patient's clinical condition and my plan of care is as follows.    Assessment / Plan:      Right upper lobe PE  Pulmonary edema 2/2 CHF exacerbation  Bilateral pleural effusion  CHF exacerbation  HFrEF 35%  Acute hypoxic respiratory failure 2/2 RUL PE, CHF exacerbation, hypertensive urgency, B/L pleural effusion  --Symptoms of shortness of breath on exertion, chest pains  - proBNP 24,000  - Troponin 97  - CTA chest shows small right upper lobe PE, no deviation of the interventricular septum.  Pulmonary edema with right greater than the left pleural effusion  -CMP nonactionable,  - CBC negative for leukocytosis  -Was started on heparin and Lasix at the outside facility  - C/W heparin drip for PE may transition to oral agent prior to discharge  - C/W Lasix twice daily, monitor electrolytes while on diuretics  -Repeat x-ray tomorrow morning if no improvement with diuretics can consider thoracentesis if pleural effusions.  Patient wants to try diuretics first before thoracentesis.    Hypertensive urgency, improved  HTN  -Was in hypertensive urgency at the outside facility, that has improved once she arrived here  - Monitor resume blood pressure medications  -Was on carvedilol before was discontinued per cardiology as per patient  -C/W metoprolol, hydralazine as needed systolic blood pressure 160    CAD s/p stents  ACS eval  NSTEMI  --S/p  Cardiac cath on  - High-grade

## 2025-05-13 NOTE — ED NOTES
Patient accepted at [facility] at 1122 awaiting bed assignment.  RT called for humidified 02 bottle.

## 2025-05-13 NOTE — ED PROVIDER NOTES
additionally agrees to follow up as discussed.  She also agrees with the care-plan and conveys that all of her questions have been answered.  I have also provided discharge instructions for her that include: educational information regarding their diagnosis and treatment, and list of reasons why they would want to return to the ED prior to their follow-up appointment, should her condition change.     CLINICAL IMPRESSION    Transfer: The patient is being transferred to [facility] for higher level care. The results of their tests and reasons for their transfer have been discussed with the patient and/or available family. The patient/family has conveyed agreement and understanding for the need to be admitted and for their admission diagnosis. Consultation has been made with Dr. Rascon, who agrees to accept the transfer.      PATIENT REFERRED TO:  No follow-up provider specified.     DISCHARGE MEDICATIONS:     Medication List        ASK your doctor about these medications      aspirin 81 MG EC tablet     atorvastatin 80 MG tablet  Commonly known as: LIPITOR  Take 1 tablet by mouth daily     carvedilol 6.25 MG tablet  Commonly known as: COREG     clopidogrel 75 MG tablet  Commonly known as: PLAVIX  Take 1 tablet by mouth daily     ferrous sulfate 325 (65 Fe) MG tablet  Commonly known as: IRON 325  Take 1 tablet by mouth every 48 hours     fluticasone-salmeterol 250-50 MCG/ACT Aepb diskus inhaler  Commonly known as: ADVAIR  Inhale 1 puff into the lungs in the morning and 1 puff in the evening.     Lantus SoloStar 100 UNIT/ML injection pen  Generic drug: insulin glargine  Inject 10 Units into the skin daily     metoprolol succinate 25 MG extended release tablet  Commonly known as: TOPROL XL  Take 0.5 tablets by mouth daily     montelukast 10 MG tablet  Commonly known as: SINGULAIR  TAKE 1 TABLET BY MOUTH DAILY AT BEDTIME     pantoprazole 40 MG tablet  Commonly known as: PROTONIX  Take 1 tablet by mouth 2 times daily (before

## 2025-05-13 NOTE — ED NOTES
Patient more comfortable resting. Checked intake note no bed assignment yet. Has voided 800 ml via purwick to present

## 2025-05-13 NOTE — ED NOTES
Contacted [facility] regarding transfer of pt to [facility][id number], spoke with Mohan, arranged for ALS transport with cardiac monitor, heparin drip, no isolation, and with the patient on 4L O2/NC. Insurance information, ht/wt, and primary diagnosis provided. Received a 1450 ETA. Angélica, ED charge RN made aware of ETA.

## 2025-05-13 NOTE — ED TRIAGE NOTES
Pt arrives EMS with c/o CP. Began last night. Described as tightness. Had recent stent placement.  Pt also notes cough and congestion. Of note pt had low oxygen levels en route with EMS, placed on oxygen. On arrival pt is 85%, placed on 3LNC

## 2025-05-13 NOTE — ED NOTES
Report given to Vladimir Hutton Paramedic at [facility].  Set up transport pump for transfer with heparin and expained rate to crew.

## 2025-05-14 ENCOUNTER — APPOINTMENT (OUTPATIENT)
Facility: HOSPITAL | Age: 70
DRG: 175 | End: 2025-05-14
Attending: STUDENT IN AN ORGANIZED HEALTH CARE EDUCATION/TRAINING PROGRAM
Payer: MEDICARE

## 2025-05-14 LAB
ALBUMIN SERPL-MCNC: 2.5 G/DL (ref 3.5–5)
ALBUMIN/GLOB SERPL: 0.7 (ref 1.1–2.2)
ALP SERPL-CCNC: 72 U/L (ref 45–117)
ALT SERPL-CCNC: 13 U/L (ref 12–78)
ANION GAP SERPL CALC-SCNC: 6 MMOL/L (ref 2–12)
AST SERPL-CCNC: 11 U/L (ref 15–37)
BILIRUB DIRECT SERPL-MCNC: 0.1 MG/DL (ref 0–0.2)
BILIRUB SERPL-MCNC: 0.3 MG/DL (ref 0.2–1)
BUN SERPL-MCNC: 16 MG/DL (ref 6–20)
BUN/CREAT SERPL: 13 (ref 12–20)
CALCIUM SERPL-MCNC: 8.7 MG/DL (ref 8.5–10.1)
CHLORIDE SERPL-SCNC: 110 MMOL/L (ref 97–108)
CHOLEST SERPL-MCNC: 164 MG/DL
CO2 SERPL-SCNC: 24 MMOL/L (ref 21–32)
CREAT SERPL-MCNC: 1.26 MG/DL (ref 0.55–1.02)
ERYTHROCYTE [DISTWIDTH] IN BLOOD BY AUTOMATED COUNT: 17.2 % (ref 11.5–14.5)
GLOBULIN SER CALC-MCNC: 3.5 G/DL (ref 2–4)
GLUCOSE SERPL-MCNC: 113 MG/DL (ref 65–100)
HCT VFR BLD AUTO: 22.1 % (ref 35–47)
HCT VFR BLD AUTO: 25.4 % (ref 35–47)
HDLC SERPL-MCNC: 67 MG/DL
HDLC SERPL: 2.4 (ref 0–5)
HEMOCCULT STL QL: POSITIVE
HGB BLD-MCNC: 6.6 G/DL (ref 11.5–16)
HGB BLD-MCNC: 7.6 G/DL (ref 11.5–16)
HISTORY CHECK: NORMAL
LACTATE SERPL-SCNC: 1.6 MMOL/L (ref 0.4–2)
LDLC SERPL CALC-MCNC: 76.8 MG/DL (ref 0–100)
MAGNESIUM SERPL-MCNC: 1.9 MG/DL (ref 1.6–2.4)
MCH RBC QN AUTO: 25 PG (ref 26–34)
MCHC RBC AUTO-ENTMCNC: 29.9 G/DL (ref 30–36.5)
MCV RBC AUTO: 83.7 FL (ref 80–99)
NRBC # BLD: 0 K/UL (ref 0–0.01)
NRBC BLD-RTO: 0 PER 100 WBC
PLATELET # BLD AUTO: 270 K/UL (ref 150–400)
PMV BLD AUTO: 11.7 FL (ref 8.9–12.9)
POTASSIUM SERPL-SCNC: 3.6 MMOL/L (ref 3.5–5.1)
PROT SERPL-MCNC: 6 G/DL (ref 6.4–8.2)
RBC # BLD AUTO: 2.64 M/UL (ref 3.8–5.2)
SODIUM SERPL-SCNC: 140 MMOL/L (ref 136–145)
TRIGL SERPL-MCNC: 101 MG/DL
UFH PPP CHRO-ACNC: 0.56 IU/ML
UFH PPP CHRO-ACNC: 1.42 IU/ML
UFH PPP CHRO-ACNC: >1.5 IU/ML
VLDLC SERPL CALC-MCNC: 20.2 MG/DL
WBC # BLD AUTO: 5.9 K/UL (ref 3.6–11)

## 2025-05-14 PROCEDURE — P9016 RBC LEUKOCYTES REDUCED: HCPCS

## 2025-05-14 PROCEDURE — 85018 HEMOGLOBIN: CPT

## 2025-05-14 PROCEDURE — 2580000003 HC RX 258: Performed by: INTERNAL MEDICINE

## 2025-05-14 PROCEDURE — 6370000000 HC RX 637 (ALT 250 FOR IP)

## 2025-05-14 PROCEDURE — 86900 BLOOD TYPING SEROLOGIC ABO: CPT

## 2025-05-14 PROCEDURE — 36430 TRANSFUSION BLD/BLD COMPNT: CPT

## 2025-05-14 PROCEDURE — 71045 X-RAY EXAM CHEST 1 VIEW: CPT

## 2025-05-14 PROCEDURE — 94640 AIRWAY INHALATION TREATMENT: CPT

## 2025-05-14 PROCEDURE — 82272 OCCULT BLD FECES 1-3 TESTS: CPT

## 2025-05-14 PROCEDURE — 80048 BASIC METABOLIC PNL TOTAL CA: CPT

## 2025-05-14 PROCEDURE — 6360000002 HC RX W HCPCS: Performed by: INTERNAL MEDICINE

## 2025-05-14 PROCEDURE — 36415 COLL VENOUS BLD VENIPUNCTURE: CPT

## 2025-05-14 PROCEDURE — 2060000000 HC ICU INTERMEDIATE R&B

## 2025-05-14 PROCEDURE — 85014 HEMATOCRIT: CPT

## 2025-05-14 PROCEDURE — P9047 ALBUMIN (HUMAN), 25%, 50ML: HCPCS | Performed by: INTERNAL MEDICINE

## 2025-05-14 PROCEDURE — 2700000000 HC OXYGEN THERAPY PER DAY

## 2025-05-14 PROCEDURE — 86901 BLOOD TYPING SEROLOGIC RH(D): CPT

## 2025-05-14 PROCEDURE — 2500000003 HC RX 250 WO HCPCS: Performed by: STUDENT IN AN ORGANIZED HEALTH CARE EDUCATION/TRAINING PROGRAM

## 2025-05-14 PROCEDURE — 6370000000 HC RX 637 (ALT 250 FOR IP): Performed by: STUDENT IN AN ORGANIZED HEALTH CARE EDUCATION/TRAINING PROGRAM

## 2025-05-14 PROCEDURE — 6360000002 HC RX W HCPCS: Performed by: STUDENT IN AN ORGANIZED HEALTH CARE EDUCATION/TRAINING PROGRAM

## 2025-05-14 PROCEDURE — 80076 HEPATIC FUNCTION PANEL: CPT

## 2025-05-14 PROCEDURE — 83735 ASSAY OF MAGNESIUM: CPT

## 2025-05-14 PROCEDURE — 86850 RBC ANTIBODY SCREEN: CPT

## 2025-05-14 PROCEDURE — 85520 HEPARIN ASSAY: CPT

## 2025-05-14 PROCEDURE — 93970 EXTREMITY STUDY: CPT

## 2025-05-14 PROCEDURE — 86923 COMPATIBILITY TEST ELECTRIC: CPT

## 2025-05-14 PROCEDURE — 85027 COMPLETE CBC AUTOMATED: CPT

## 2025-05-14 PROCEDURE — 83605 ASSAY OF LACTIC ACID: CPT

## 2025-05-14 RX ORDER — SENNA AND DOCUSATE SODIUM 50; 8.6 MG/1; MG/1
2 TABLET, FILM COATED ORAL DAILY PRN
Status: DISCONTINUED | OUTPATIENT
Start: 2025-05-14 | End: 2025-05-19 | Stop reason: HOSPADM

## 2025-05-14 RX ORDER — FUROSEMIDE 10 MG/ML
20 INJECTION INTRAMUSCULAR; INTRAVENOUS 2 TIMES DAILY
Status: DISCONTINUED | OUTPATIENT
Start: 2025-05-14 | End: 2025-05-15

## 2025-05-14 RX ORDER — LOSARTAN POTASSIUM 25 MG/1
25 TABLET ORAL DAILY
Qty: 30 TABLET | Refills: 5 | OUTPATIENT
Start: 2025-05-14

## 2025-05-14 RX ORDER — METOPROLOL SUCCINATE 25 MG/1
12.5 TABLET, EXTENDED RELEASE ORAL DAILY
Status: DISCONTINUED | OUTPATIENT
Start: 2025-05-15 | End: 2025-05-16

## 2025-05-14 RX ORDER — FUROSEMIDE 10 MG/ML
40 INJECTION INTRAMUSCULAR; INTRAVENOUS DAILY
Status: DISCONTINUED | OUTPATIENT
Start: 2025-05-15 | End: 2025-05-14

## 2025-05-14 RX ORDER — ALBUMIN (HUMAN) 12.5 G/50ML
25 SOLUTION INTRAVENOUS ONCE
Status: COMPLETED | OUTPATIENT
Start: 2025-05-14 | End: 2025-05-14

## 2025-05-14 RX ORDER — SODIUM CHLORIDE 9 MG/ML
INJECTION, SOLUTION INTRAVENOUS PRN
Status: DISCONTINUED | OUTPATIENT
Start: 2025-05-14 | End: 2025-05-19 | Stop reason: HOSPADM

## 2025-05-14 RX ORDER — NOREPINEPHRINE BITARTRATE 0.06 MG/ML
1-100 INJECTION, SOLUTION INTRAVENOUS CONTINUOUS
Status: DISCONTINUED | OUTPATIENT
Start: 2025-05-14 | End: 2025-05-14

## 2025-05-14 RX ADMIN — ARFORMOTEROL TARTRATE: 15 SOLUTION RESPIRATORY (INHALATION) at 19:51

## 2025-05-14 RX ADMIN — DICLOFENAC SODIUM 4 G: 10 GEL TOPICAL at 20:18

## 2025-05-14 RX ADMIN — ACETAMINOPHEN 975 MG: 325 TABLET ORAL at 15:56

## 2025-05-14 RX ADMIN — SODIUM CHLORIDE, PRESERVATIVE FREE 40 MG: 5 INJECTION INTRAVENOUS at 15:56

## 2025-05-14 RX ADMIN — SODIUM CHLORIDE, PRESERVATIVE FREE 10 ML: 5 INJECTION INTRAVENOUS at 08:28

## 2025-05-14 RX ADMIN — METOPROLOL SUCCINATE 12.5 MG: 25 TABLET, EXTENDED RELEASE ORAL at 08:22

## 2025-05-14 RX ADMIN — ASPIRIN 81 MG: 81 TABLET, COATED ORAL at 08:23

## 2025-05-14 RX ADMIN — ACETAMINOPHEN 975 MG: 325 TABLET ORAL at 06:22

## 2025-05-14 RX ADMIN — ATORVASTATIN CALCIUM 80 MG: 40 TABLET, FILM COATED ORAL at 08:23

## 2025-05-14 RX ADMIN — OXYCODONE 5 MG: 5 TABLET ORAL at 20:18

## 2025-05-14 RX ADMIN — ACETAMINOPHEN 975 MG: 325 TABLET ORAL at 20:18

## 2025-05-14 RX ADMIN — FUROSEMIDE 40 MG: 10 INJECTION, SOLUTION INTRAMUSCULAR; INTRAVENOUS at 08:22

## 2025-05-14 RX ADMIN — ALBUMIN (HUMAN) 25 G: 0.25 INJECTION, SOLUTION INTRAVENOUS at 11:03

## 2025-05-14 RX ADMIN — MELATONIN 3 MG: at 20:18

## 2025-05-14 RX ADMIN — CLOPIDOGREL BISULFATE 75 MG: 75 TABLET, FILM COATED ORAL at 08:23

## 2025-05-14 RX ADMIN — PANTOPRAZOLE SODIUM 40 MG: 40 TABLET, DELAYED RELEASE ORAL at 06:22

## 2025-05-14 RX ADMIN — ARFORMOTEROL TARTRATE: 15 SOLUTION RESPIRATORY (INHALATION) at 07:17

## 2025-05-14 RX ADMIN — OXYCODONE 5 MG: 5 TABLET ORAL at 09:56

## 2025-05-14 RX ADMIN — MONTELUKAST 10 MG: 10 TABLET, FILM COATED ORAL at 20:18

## 2025-05-14 ASSESSMENT — PAIN SCALES - GENERAL
PAINLEVEL_OUTOF10: 0
PAINLEVEL_OUTOF10: 7
PAINLEVEL_OUTOF10: 7
PAINLEVEL_OUTOF10: 3

## 2025-05-14 ASSESSMENT — PAIN DESCRIPTION - LOCATION
LOCATION: ABDOMEN
LOCATION: ABDOMEN

## 2025-05-14 NOTE — CONSENT
Informed Consent for Blood Component Transfusion Note    I have discussed with the patient the rationale for blood component transfusion; its benefits in treating or preventing fatigue, organ damage, or death; and its risk which includes mild transfusion reactions, rare risk of blood borne infection, or more serious but rare reactions. I have discussed the alternatives to transfusion, including the risk and consequences of not receiving transfusion. The patient had an opportunity to ask questions and had agreed to proceed with transfusion of blood components.    Electronically signed by Karen Watters MD on 5/14/25 at 4:23 PM EDT

## 2025-05-15 LAB
ABO + RH BLD: NORMAL
ANION GAP SERPL CALC-SCNC: 6 MMOL/L (ref 2–12)
BLD PROD TYP BPU: NORMAL
BLOOD BANK BLOOD PRODUCT EXPIRATION DATE: NORMAL
BLOOD BANK DISPENSE STATUS: NORMAL
BLOOD BANK ISBT PRODUCT BLOOD TYPE: 6200
BLOOD BANK PRODUCT CODE: NORMAL
BLOOD BANK UNIT TYPE AND RH: NORMAL
BLOOD GROUP ANTIBODIES SERPL: NORMAL
BPU ID: NORMAL
BUN SERPL-MCNC: 15 MG/DL (ref 6–20)
BUN/CREAT SERPL: 13 (ref 12–20)
CALCIUM SERPL-MCNC: 9.4 MG/DL (ref 8.5–10.1)
CHLORIDE SERPL-SCNC: 110 MMOL/L (ref 97–108)
CO2 SERPL-SCNC: 24 MMOL/L (ref 21–32)
CREAT SERPL-MCNC: 1.16 MG/DL (ref 0.55–1.02)
CROSSMATCH RESULT: NORMAL
GLUCOSE SERPL-MCNC: 116 MG/DL (ref 65–100)
HCT VFR BLD AUTO: 27.8 % (ref 35–47)
HCT VFR BLD AUTO: 29.3 % (ref 35–47)
HGB BLD-MCNC: 8.1 G/DL (ref 11.5–16)
HGB BLD-MCNC: 8.6 G/DL (ref 11.5–16)
MAGNESIUM SERPL-MCNC: 1.9 MG/DL (ref 1.6–2.4)
NT PRO BNP: ABNORMAL PG/ML
POTASSIUM SERPL-SCNC: 3.6 MMOL/L (ref 3.5–5.1)
SODIUM SERPL-SCNC: 140 MMOL/L (ref 136–145)
SPECIMEN EXP DATE BLD: NORMAL
UFH PPP CHRO-ACNC: <0.1 IU/ML
UNIT DIVISION: 0
UNIT ISSUE DATE/TIME: NORMAL

## 2025-05-15 PROCEDURE — 2060000000 HC ICU INTERMEDIATE R&B

## 2025-05-15 PROCEDURE — 6360000002 HC RX W HCPCS: Performed by: STUDENT IN AN ORGANIZED HEALTH CARE EDUCATION/TRAINING PROGRAM

## 2025-05-15 PROCEDURE — 83735 ASSAY OF MAGNESIUM: CPT

## 2025-05-15 PROCEDURE — 85018 HEMOGLOBIN: CPT

## 2025-05-15 PROCEDURE — 2500000003 HC RX 250 WO HCPCS: Performed by: STUDENT IN AN ORGANIZED HEALTH CARE EDUCATION/TRAINING PROGRAM

## 2025-05-15 PROCEDURE — 94640 AIRWAY INHALATION TREATMENT: CPT

## 2025-05-15 PROCEDURE — 2700000000 HC OXYGEN THERAPY PER DAY

## 2025-05-15 PROCEDURE — 2580000003 HC RX 258: Performed by: NURSE PRACTITIONER

## 2025-05-15 PROCEDURE — 85014 HEMATOCRIT: CPT

## 2025-05-15 PROCEDURE — 6370000000 HC RX 637 (ALT 250 FOR IP): Performed by: STUDENT IN AN ORGANIZED HEALTH CARE EDUCATION/TRAINING PROGRAM

## 2025-05-15 PROCEDURE — 80048 BASIC METABOLIC PNL TOTAL CA: CPT

## 2025-05-15 PROCEDURE — 6360000002 HC RX W HCPCS: Performed by: NURSE PRACTITIONER

## 2025-05-15 PROCEDURE — 85520 HEPARIN ASSAY: CPT

## 2025-05-15 PROCEDURE — 36415 COLL VENOUS BLD VENIPUNCTURE: CPT

## 2025-05-15 PROCEDURE — 6370000000 HC RX 637 (ALT 250 FOR IP)

## 2025-05-15 PROCEDURE — 6360000002 HC RX W HCPCS: Performed by: INTERNAL MEDICINE

## 2025-05-15 PROCEDURE — 83880 ASSAY OF NATRIURETIC PEPTIDE: CPT

## 2025-05-15 PROCEDURE — 2580000003 HC RX 258: Performed by: INTERNAL MEDICINE

## 2025-05-15 PROCEDURE — 99222 1ST HOSP IP/OBS MODERATE 55: CPT | Performed by: STUDENT IN AN ORGANIZED HEALTH CARE EDUCATION/TRAINING PROGRAM

## 2025-05-15 RX ORDER — SODIUM CHLORIDE 0.9 % (FLUSH) 0.9 %
5-40 SYRINGE (ML) INJECTION EVERY 12 HOURS SCHEDULED
Status: CANCELLED | OUTPATIENT
Start: 2025-05-15

## 2025-05-15 RX ORDER — SODIUM CHLORIDE 9 MG/ML
INJECTION, SOLUTION INTRAVENOUS PRN
Status: CANCELLED | OUTPATIENT
Start: 2025-05-15

## 2025-05-15 RX ORDER — SODIUM CHLORIDE 9 MG/ML
INJECTION, SOLUTION INTRAVENOUS CONTINUOUS
Status: CANCELLED | OUTPATIENT
Start: 2025-05-15

## 2025-05-15 RX ORDER — FUROSEMIDE 10 MG/ML
20 INJECTION INTRAMUSCULAR; INTRAVENOUS 2 TIMES DAILY
Status: DISCONTINUED | OUTPATIENT
Start: 2025-05-15 | End: 2025-05-18

## 2025-05-15 RX ORDER — SODIUM CHLORIDE 0.9 % (FLUSH) 0.9 %
5-40 SYRINGE (ML) INJECTION PRN
Status: CANCELLED | OUTPATIENT
Start: 2025-05-15

## 2025-05-15 RX ADMIN — SODIUM CHLORIDE, PRESERVATIVE FREE 10 ML: 5 INJECTION INTRAVENOUS at 08:50

## 2025-05-15 RX ADMIN — ARFORMOTEROL TARTRATE: 15 SOLUTION RESPIRATORY (INHALATION) at 07:50

## 2025-05-15 RX ADMIN — MELATONIN 3 MG: at 21:06

## 2025-05-15 RX ADMIN — ACETAMINOPHEN 975 MG: 325 TABLET ORAL at 21:02

## 2025-05-15 RX ADMIN — MONTELUKAST 10 MG: 10 TABLET, FILM COATED ORAL at 21:02

## 2025-05-15 RX ADMIN — FUROSEMIDE 20 MG: 10 INJECTION, SOLUTION INTRAMUSCULAR; INTRAVENOUS at 16:56

## 2025-05-15 RX ADMIN — DICLOFENAC SODIUM 4 G: 10 GEL TOPICAL at 21:06

## 2025-05-15 RX ADMIN — SODIUM CHLORIDE, PRESERVATIVE FREE 40 MG: 5 INJECTION INTRAVENOUS at 15:05

## 2025-05-15 RX ADMIN — ARFORMOTEROL TARTRATE: 15 SOLUTION RESPIRATORY (INHALATION) at 20:02

## 2025-05-15 RX ADMIN — ACETAMINOPHEN 975 MG: 325 TABLET ORAL at 06:37

## 2025-05-15 RX ADMIN — SODIUM CHLORIDE, PRESERVATIVE FREE 40 MG: 5 INJECTION INTRAVENOUS at 03:01

## 2025-05-15 RX ADMIN — IRON SUCROSE 300 MG: 20 INJECTION, SOLUTION INTRAVENOUS at 11:20

## 2025-05-15 RX ADMIN — OXYCODONE 5 MG: 5 TABLET ORAL at 16:55

## 2025-05-15 RX ADMIN — DICLOFENAC SODIUM 4 G: 10 GEL TOPICAL at 08:49

## 2025-05-15 RX ADMIN — FERROUS SULFATE TAB EC 325 MG (65 MG FE EQUIVALENT) 325 MG: 325 (65 FE) TABLET DELAYED RESPONSE at 16:56

## 2025-05-15 RX ADMIN — ACETAMINOPHEN 975 MG: 325 TABLET ORAL at 14:51

## 2025-05-15 RX ADMIN — ATORVASTATIN CALCIUM 80 MG: 40 TABLET, FILM COATED ORAL at 08:48

## 2025-05-15 RX ADMIN — FUROSEMIDE 20 MG: 10 INJECTION, SOLUTION INTRAMUSCULAR; INTRAVENOUS at 10:08

## 2025-05-15 ASSESSMENT — PAIN DESCRIPTION - LOCATION
LOCATION: BACK

## 2025-05-15 ASSESSMENT — PAIN SCALES - GENERAL
PAINLEVEL_OUTOF10: 6
PAINLEVEL_OUTOF10: 7
PAINLEVEL_OUTOF10: 0
PAINLEVEL_OUTOF10: 7
PAINLEVEL_OUTOF10: 0
PAINLEVEL_OUTOF10: 7
PAINLEVEL_OUTOF10: 7

## 2025-05-15 ASSESSMENT — PAIN DESCRIPTION - ORIENTATION
ORIENTATION: MID;LOWER
ORIENTATION: LOWER
ORIENTATION: LOWER;MID
ORIENTATION: MID;LOWER

## 2025-05-15 ASSESSMENT — PAIN DESCRIPTION - DESCRIPTORS
DESCRIPTORS: SHARP
DESCRIPTORS: ACHING
DESCRIPTORS: ACHING
DESCRIPTORS: SHARP

## 2025-05-15 NOTE — CONSULTS
[facility] at [facility]  [address] [place] [id number]  W: [phone] F: [phone]    Reason for Visit:   Hyacinth Patterson is a 69 y.o. female who is seen in consultation at the request of Dr. Watters for Severe anemia       Initial Presentation  (HPI):     Hospital Course:     69 year old with history of severe vascular disease, hx of mesenteric artery stenosis, chronic pancratitis, chronic anemia, DM2, HTN, presented to [facility] with chest pain and generalized weakness. She recently had cardiac stent placed on April 30th and has been on dual antiplatlet therapy since that time.  On admission she was seen to be severely anemic with Hg of 7.1.      Iron studies show iron deficiency anemia.    Lab Results   Component Value Date    FERRITIN 39 05/13/2025    FERRITIN 36 03/24/2025    FERRITIN 57 12/30/2024    IRONPERSAT 6 (L) 05/13/2025    IRONPERSAT 8 (L) 06/17/2024    IRONPERSAT 5 (L) 05/31/2021     Lab Results   Component Value Date    HGB 6.6 (L) 05/14/2025     She is receiving PRBC transfusion on 5/14/25.  Stool occult pending.      Case complicated by small acute pulmonary embolism.   CT Result (most recent):  CTA CHEST ABDOMEN PELVIS W CONTRAST 05/13/2025    FINDINGS:  MEDIASTINUM: No mass or lymphadenopathy.  LALITHA: No mass or lymphadenopathy.  THORACIC AORTA: No dissection or aneurysm. Mild vascular calcifications  MAIN PULMONARY ARTERY: Acute pulmonary embolus to the right upper lobe segmental  and subsegmental branches. Main pulmonary artery is borderline dilated at 2.9 cm  TRACHEA/BRONCHI: Patent.  ESOPHAGUS: No wall thickening or dilatation.  HEART: Normal in size. New deviation of the interventricular septum. Moderate  coronary artery calcification  PLEURA: Moderate right and small left pleural effusions.  LUNGS: Diffuse bilateral groundglass opacities with mild interlobular septal  thickening    ABDOMINAL AORTA: Aorta is 
systolic heart failure, EF 35%  Acute myocardial infarction, recent PCI  Acute pulmonary embolism (small right)  Acute blood loss anemia, history of GI bleeding    Recommend to hold metoprolol, aspirin, clopidogrel, heparin and decrease furosemide dose as she remains mildly volume up. Repeat blood counts and request gastroenterology and critical care consultation. Start norepinephrine, mean arterial pressure target greater then 60 mmHg.     Peripheral arterial disease, status post EVAR  Ckd  Adan  Tobacco abuse    Discussed with Dr. Watters with primary medicine. Briefly discussed earlier with the patient's nurse at the bedside.     Thank you for allowing us to participate in the care of this patient.  We will follow.      Kemal Parnell MD  CC:Jeffery Alexis MD        
23.78 kg/m²   General: alert, cooperative, no distress, poor dentition    Mental  status: normal mood, behavior, speech, dress, motor activity, and thought processes, able to follow commands   HENT: NCAT   Neck: no visualized mass   Resp: no respiratory distress   Neuro: no gross deficits   Skin: no discoloration or lesions of concern on visible areas   Psychiatric: normal affect, consistent with stated mood, no evidence of hallucinations         Results:     Lab Results   Component Value Date    WBC 5.9 05/14/2025    HGB 8.1 (L) 05/15/2025    HCT 27.8 (L) 05/15/2025    MCV 83.7 05/14/2025     05/14/2025     Lab Results   Component Value Date     05/15/2025    K 3.6 05/15/2025     (H) 05/15/2025    CO2 24 05/15/2025    BUN 15 05/15/2025    CREATININE 1.16 (H) 05/15/2025    GLUCOSE 116 (H) 05/15/2025    CALCIUM 9.4 05/15/2025    BILITOT 0.3 05/14/2025    ALKPHOS 72 05/14/2025    AST 11 (L) 05/14/2025    ALT 13 05/14/2025    LABGLOM 52 (L) 05/15/2025    GFRAA >60 11/08/2021    AGRATIO 0.9 (L) 02/24/2023    GLOB 3.5 05/14/2025       Assessment:     # Acute pulmonary embolism  # NSTEMI s/p recent stent placement  # Chest pain  # Acute pulmonary edema  # Acute CHF  # Cardiogenic vs. Hypovolemic Shock    MDM Elements:     1) Data Complexity -  I reviewed the patients most recent hematology labs, as well as relevant labs from prior encounters.   2) Complexity of problems addressed - High -   3) Risk of complications, morbidity, and mortality of treatment of management - High    Recommendations:     >> Hold heparin ggt and anticoagulation given severe anemia with underlying GIB and relatively small PE  >> Occult stool positive   >> GI Following   >> No need for IVC filter at present as there is no active DVT seen on dopplers.    >> If worsening desaturation/oxygen requirements, consider repeat CTA chest to evaluate for worsening of pulmonary embolism.   >> Will order IV venofer 300 mg x 3 days for CHITO 
97 - 108 mmol/L    CO2 27 21 - 32 mmol/L    Anion Gap 3 2 - 12 mmol/L    Glucose 141 (H) 65 - 100 mg/dL    BUN 15 6 - 20 MG/DL    Creatinine 1.26 (H) 0.55 - 1.02 MG/DL    BUN/Creatinine Ratio 12 12 - 20      Est, Glom Filt Rate 47 (L) >60 ml/min/1.73m2    Calcium 9.3 8.5 - 10.1 MG/DL   Phosphorus    Collection Time: 05/13/25  6:54 PM   Result Value Ref Range    Phosphorus 4.3 2.6 - 4.7 MG/DL   Magnesium    Collection Time: 05/13/25  6:54 PM   Result Value Ref Range    Magnesium 2.0 1.6 - 2.4 mg/dL   CBC    Collection Time: 05/13/25  6:54 PM   Result Value Ref Range    WBC 11.8 (H) 3.6 - 11.0 K/uL    RBC 3.11 (L) 3.80 - 5.20 M/uL    Hemoglobin 7.7 (L) 11.5 - 16.0 g/dL    Hematocrit 26.5 (L) 35.0 - 47.0 %    MCV 85.2 80.0 - 99.0 FL    MCH 24.8 (L) 26.0 - 34.0 PG    MCHC 29.1 (L) 30.0 - 36.5 g/dL    RDW 17.5 (H) 11.5 - 14.5 %    Platelets 312 150 - 400 K/uL    MPV 12.7 8.9 - 12.9 FL    Nucleated RBCs 0.2 (H) 0  WBC    nRBC 0.02 (H) 0.00 - 0.01 K/uL   Ferritin    Collection Time: 05/13/25  6:54 PM   Result Value Ref Range    Ferritin 39 26 - 388 NG/ML   T4, Free    Collection Time: 05/13/25  6:54 PM   Result Value Ref Range    T4 Free 1.5 0.8 - 1.5 NG/DL   TSH    Collection Time: 05/13/25  6:54 PM   Result Value Ref Range    TSH, 3rd Generation 2.29 0.36 - 3.74 uIU/mL   Troponin    Collection Time: 05/13/25  9:29 PM   Result Value Ref Range    Troponin, High Sensitivity 162 (HH) 0 - 51 ng/L   Anti-Xa, Unfractionated Heparin    Collection Time: 05/13/25 11:26 PM   Result Value Ref Range    Heparin Xa,LMWH and Unfrac >1.50 (HH) IU/mL   Basic Metabolic Panel    Collection Time: 05/14/25  6:52 AM   Result Value Ref Range    Sodium 140 136 - 145 mmol/L    Potassium 3.6 3.5 - 5.1 mmol/L    Chloride 110 (H) 97 - 108 mmol/L    CO2 24 21 - 32 mmol/L    Anion Gap 6 2 - 12 mmol/L    Glucose 113 (H) 65 - 100 mg/dL    BUN 16 6 - 20 MG/DL    Creatinine 1.26 (H) 0.55 - 1.02 MG/DL    BUN/Creatinine Ratio 13 12 - 20      Est,

## 2025-05-15 NOTE — PLAN OF CARE
Problem: Discharge Planning  Goal: Discharge to home or other facility with appropriate resources  Outcome: Progressing     Problem: Safety - Adult  Goal: Free from fall injury  Outcome: Progressing     Problem: Respiratory - Adult  Goal: Achieves optimal ventilation and oxygenation  5/15/2025 0030 by Amber Batista RN  Outcome: Progressing  5/14/2025 1949 by Nataly Wheatley, RT  Outcome: Progressing

## 2025-05-15 NOTE — PLAN OF CARE
Predictive Model Details          27 (Normal)  Factor Value    Calculated 5/15/2025 11:41 43% Supplemental oxygen Nasal cannula    Deterioration Index Model 43% Age 69 years old     6% Hematocrit abnormal (27.8 %)     4% Systolic 134     2% Potassium 3.6 mmol/L     2% Pulse 88     1% Pulse oximetry 98 %     0% Sodium 140 mmol/L     0% Respiratory rate 16     0% Temperature 98.3 °F (36.8 °C)     0% WBC count 5.9 K/uL        Problem: Chronic Conditions and Co-morbidities  Goal: Patient's chronic conditions and co-morbidity symptoms are monitored and maintained or improved  Outcome: Progressing  Flowsheets (Taken 5/15/2025 0848)  Care Plan - Patient's Chronic Conditions and Co-Morbidity Symptoms are Monitored and Maintained or Improved: Monitor and assess patient's chronic conditions and comorbid symptoms for stability, deterioration, or improvement     Problem: Discharge Planning  Goal: Discharge to home or other facility with appropriate resources  5/15/2025 1141 by Julian Marley RN  Outcome: Progressing  Flowsheets (Taken 5/15/2025 0848)  Discharge to home or other facility with appropriate resources: Identify barriers to discharge with patient and caregiver  5/15/2025 0030 by Amber Batista RN  Outcome: Progressing     Problem: Safety - Adult  Goal: Free from fall injury  5/15/2025 1141 by Julian Marley RN  Outcome: Progressing  5/15/2025 0030 by Amber Batista RN  Outcome: Progressing     Problem: Respiratory - Adult  Goal: Achieves optimal ventilation and oxygenation  5/15/2025 1141 by Julian Marley RN  Outcome: Progressing  Flowsheets (Taken 5/15/2025 0848)  Achieves optimal ventilation and oxygenation:   Assess for changes in respiratory status   Assess for changes in mentation and behavior   Position to facilitate oxygenation and minimize respiratory effort   Oxygen supplementation based on oxygen saturation or arterial blood gases  5/15/2025 0754 by Deisy Mooney, RT  Outcome:

## 2025-05-16 ENCOUNTER — ANESTHESIA EVENT (OUTPATIENT)
Facility: HOSPITAL | Age: 70
End: 2025-05-16
Payer: MEDICARE

## 2025-05-16 ENCOUNTER — ANESTHESIA (OUTPATIENT)
Facility: HOSPITAL | Age: 70
End: 2025-05-16
Payer: MEDICARE

## 2025-05-16 LAB
ANION GAP SERPL CALC-SCNC: 7 MMOL/L (ref 2–12)
APTT PPP: 28.9 SEC (ref 22.1–31)
BUN SERPL-MCNC: 15 MG/DL (ref 6–20)
BUN/CREAT SERPL: 14 (ref 12–20)
CALCIUM SERPL-MCNC: 8.7 MG/DL (ref 8.5–10.1)
CHLORIDE SERPL-SCNC: 108 MMOL/L (ref 97–108)
CO2 SERPL-SCNC: 25 MMOL/L (ref 21–32)
CREAT SERPL-MCNC: 1.07 MG/DL (ref 0.55–1.02)
ERYTHROCYTE [DISTWIDTH] IN BLOOD BY AUTOMATED COUNT: 18.1 % (ref 11.5–14.5)
GLUCOSE SERPL-MCNC: 98 MG/DL (ref 65–100)
HCT VFR BLD AUTO: 27.2 % (ref 35–47)
HCT VFR BLD AUTO: 30.3 % (ref 35–47)
HGB BLD-MCNC: 8.1 G/DL (ref 11.5–16)
HGB BLD-MCNC: 9.1 G/DL (ref 11.5–16)
INR PPP: 1.1 (ref 0.9–1.1)
MAGNESIUM SERPL-MCNC: 1.9 MG/DL (ref 1.6–2.4)
MCH RBC QN AUTO: 24.7 PG (ref 26–34)
MCHC RBC AUTO-ENTMCNC: 30 G/DL (ref 30–36.5)
MCV RBC AUTO: 82.3 FL (ref 80–99)
NRBC # BLD: 0 K/UL (ref 0–0.01)
NRBC BLD-RTO: 0 PER 100 WBC
PLATELET # BLD AUTO: 266 K/UL (ref 150–400)
PMV BLD AUTO: 11.6 FL (ref 8.9–12.9)
POTASSIUM SERPL-SCNC: 3.2 MMOL/L (ref 3.5–5.1)
PROTHROMBIN TIME: 11.8 SEC (ref 9.2–11.2)
RBC # BLD AUTO: 3.68 M/UL (ref 3.8–5.2)
SODIUM SERPL-SCNC: 140 MMOL/L (ref 136–145)
THERAPEUTIC RANGE: NORMAL SECS (ref 58–77)
UFH PPP CHRO-ACNC: <0.1 IU/ML
WBC # BLD AUTO: 12.5 K/UL (ref 3.6–11)

## 2025-05-16 PROCEDURE — 80048 BASIC METABOLIC PNL TOTAL CA: CPT

## 2025-05-16 PROCEDURE — 36415 COLL VENOUS BLD VENIPUNCTURE: CPT

## 2025-05-16 PROCEDURE — 85014 HEMATOCRIT: CPT

## 2025-05-16 PROCEDURE — 2500000003 HC RX 250 WO HCPCS: Performed by: INTERNAL MEDICINE

## 2025-05-16 PROCEDURE — 85610 PROTHROMBIN TIME: CPT

## 2025-05-16 PROCEDURE — 3700000000 HC ANESTHESIA ATTENDED CARE: Performed by: STUDENT IN AN ORGANIZED HEALTH CARE EDUCATION/TRAINING PROGRAM

## 2025-05-16 PROCEDURE — 6360000002 HC RX W HCPCS: Performed by: STUDENT IN AN ORGANIZED HEALTH CARE EDUCATION/TRAINING PROGRAM

## 2025-05-16 PROCEDURE — 6370000000 HC RX 637 (ALT 250 FOR IP)

## 2025-05-16 PROCEDURE — 2580000003 HC RX 258: Performed by: NURSE PRACTITIONER

## 2025-05-16 PROCEDURE — 2700000000 HC OXYGEN THERAPY PER DAY

## 2025-05-16 PROCEDURE — 3700000001 HC ADD 15 MINUTES (ANESTHESIA): Performed by: STUDENT IN AN ORGANIZED HEALTH CARE EDUCATION/TRAINING PROGRAM

## 2025-05-16 PROCEDURE — 94640 AIRWAY INHALATION TREATMENT: CPT

## 2025-05-16 PROCEDURE — 85730 THROMBOPLASTIN TIME PARTIAL: CPT

## 2025-05-16 PROCEDURE — C1889 IMPLANT/INSERT DEVICE, NOC: HCPCS | Performed by: STUDENT IN AN ORGANIZED HEALTH CARE EDUCATION/TRAINING PROGRAM

## 2025-05-16 PROCEDURE — 2060000000 HC ICU INTERMEDIATE R&B

## 2025-05-16 PROCEDURE — 7100000010 HC PHASE II RECOVERY - FIRST 15 MIN: Performed by: STUDENT IN AN ORGANIZED HEALTH CARE EDUCATION/TRAINING PROGRAM

## 2025-05-16 PROCEDURE — 6360000002 HC RX W HCPCS: Performed by: INTERNAL MEDICINE

## 2025-05-16 PROCEDURE — 6370000000 HC RX 637 (ALT 250 FOR IP): Performed by: STUDENT IN AN ORGANIZED HEALTH CARE EDUCATION/TRAINING PROGRAM

## 2025-05-16 PROCEDURE — 3600007502: Performed by: STUDENT IN AN ORGANIZED HEALTH CARE EDUCATION/TRAINING PROGRAM

## 2025-05-16 PROCEDURE — 83735 ASSAY OF MAGNESIUM: CPT

## 2025-05-16 PROCEDURE — 7100000011 HC PHASE II RECOVERY - ADDTL 15 MIN: Performed by: STUDENT IN AN ORGANIZED HEALTH CARE EDUCATION/TRAINING PROGRAM

## 2025-05-16 PROCEDURE — 0W3P8ZZ CONTROL BLEEDING IN GASTROINTESTINAL TRACT, VIA NATURAL OR ARTIFICIAL OPENING ENDOSCOPIC: ICD-10-PCS | Performed by: STUDENT IN AN ORGANIZED HEALTH CARE EDUCATION/TRAINING PROGRAM

## 2025-05-16 PROCEDURE — 2500000003 HC RX 250 WO HCPCS: Performed by: STUDENT IN AN ORGANIZED HEALTH CARE EDUCATION/TRAINING PROGRAM

## 2025-05-16 PROCEDURE — 30233N1 TRANSFUSION OF NONAUTOLOGOUS RED BLOOD CELLS INTO PERIPHERAL VEIN, PERCUTANEOUS APPROACH: ICD-10-PCS | Performed by: STUDENT IN AN ORGANIZED HEALTH CARE EDUCATION/TRAINING PROGRAM

## 2025-05-16 PROCEDURE — 6370000000 HC RX 637 (ALT 250 FOR IP): Performed by: INTERNAL MEDICINE

## 2025-05-16 PROCEDURE — 85520 HEPARIN ASSAY: CPT

## 2025-05-16 PROCEDURE — 6360000002 HC RX W HCPCS

## 2025-05-16 PROCEDURE — 2709999900 HC NON-CHARGEABLE SUPPLY: Performed by: STUDENT IN AN ORGANIZED HEALTH CARE EDUCATION/TRAINING PROGRAM

## 2025-05-16 PROCEDURE — 3600007512: Performed by: STUDENT IN AN ORGANIZED HEALTH CARE EDUCATION/TRAINING PROGRAM

## 2025-05-16 PROCEDURE — 2720000010 HC SURG SUPPLY STERILE: Performed by: STUDENT IN AN ORGANIZED HEALTH CARE EDUCATION/TRAINING PROGRAM

## 2025-05-16 PROCEDURE — 2580000003 HC RX 258: Performed by: INTERNAL MEDICINE

## 2025-05-16 PROCEDURE — 85018 HEMOGLOBIN: CPT

## 2025-05-16 PROCEDURE — 85027 COMPLETE CBC AUTOMATED: CPT

## 2025-05-16 PROCEDURE — 2580000003 HC RX 258

## 2025-05-16 PROCEDURE — 6360000002 HC RX W HCPCS: Performed by: NURSE PRACTITIONER

## 2025-05-16 PROCEDURE — 3E033XZ INTRODUCTION OF VASOPRESSOR INTO PERIPHERAL VEIN, PERCUTANEOUS APPROACH: ICD-10-PCS | Performed by: STUDENT IN AN ORGANIZED HEALTH CARE EDUCATION/TRAINING PROGRAM

## 2025-05-16 DEVICE — WORKING LENGTH 235CM, WORKING CHANNEL 2.8MM
Type: IMPLANTABLE DEVICE | Site: JEJUNUM | Status: FUNCTIONAL
Brand: RESOLUTION 360 CLIP

## 2025-05-16 RX ORDER — HEPARIN SODIUM 1000 [USP'U]/ML
40 INJECTION, SOLUTION INTRAVENOUS; SUBCUTANEOUS PRN
Status: DISCONTINUED | OUTPATIENT
Start: 2025-05-16 | End: 2025-05-18

## 2025-05-16 RX ORDER — HEPARIN SODIUM 10000 [USP'U]/100ML
5-30 INJECTION, SOLUTION INTRAVENOUS CONTINUOUS
Status: DISCONTINUED | OUTPATIENT
Start: 2025-05-16 | End: 2025-05-18

## 2025-05-16 RX ORDER — HEPARIN SODIUM 1000 [USP'U]/ML
80 INJECTION, SOLUTION INTRAVENOUS; SUBCUTANEOUS PRN
Status: DISCONTINUED | OUTPATIENT
Start: 2025-05-16 | End: 2025-05-18

## 2025-05-16 RX ORDER — PHENYLEPHRINE HCL IN 0.9% NACL 0.4MG/10ML
SYRINGE (ML) INTRAVENOUS
Status: DISCONTINUED | OUTPATIENT
Start: 2025-05-16 | End: 2025-05-16 | Stop reason: SDUPTHER

## 2025-05-16 RX ORDER — SODIUM CHLORIDE 9 MG/ML
INJECTION, SOLUTION INTRAVENOUS
Status: DISCONTINUED | OUTPATIENT
Start: 2025-05-16 | End: 2025-05-16 | Stop reason: SDUPTHER

## 2025-05-16 RX ORDER — LANOLIN ALCOHOL/MO/W.PET/CERES
400 CREAM (GRAM) TOPICAL DAILY
Status: DISCONTINUED | OUTPATIENT
Start: 2025-05-16 | End: 2025-05-19 | Stop reason: HOSPADM

## 2025-05-16 RX ORDER — METOPROLOL SUCCINATE 25 MG/1
12.5 TABLET, EXTENDED RELEASE ORAL DAILY
Status: DISCONTINUED | OUTPATIENT
Start: 2025-05-16 | End: 2025-05-19 | Stop reason: HOSPADM

## 2025-05-16 RX ORDER — LIDOCAINE HYDROCHLORIDE 20 MG/ML
INJECTION, SOLUTION EPIDURAL; INFILTRATION; INTRACAUDAL; PERINEURAL
Status: DISCONTINUED | OUTPATIENT
Start: 2025-05-16 | End: 2025-05-16 | Stop reason: SDUPTHER

## 2025-05-16 RX ORDER — GLUCAGON 1 MG/ML
1 KIT INJECTION ONCE
Status: COMPLETED | OUTPATIENT
Start: 2025-05-16 | End: 2025-05-16

## 2025-05-16 RX ORDER — POTASSIUM CHLORIDE 750 MG/1
40 TABLET, EXTENDED RELEASE ORAL ONCE
Status: COMPLETED | OUTPATIENT
Start: 2025-05-16 | End: 2025-05-16

## 2025-05-16 RX ADMIN — POTASSIUM CHLORIDE 40 MEQ: 750 TABLET, FILM COATED, EXTENDED RELEASE ORAL at 09:11

## 2025-05-16 RX ADMIN — IRON SUCROSE 300 MG: 20 INJECTION, SOLUTION INTRAVENOUS at 14:14

## 2025-05-16 RX ADMIN — FUROSEMIDE 20 MG: 10 INJECTION, SOLUTION INTRAMUSCULAR; INTRAVENOUS at 18:35

## 2025-05-16 RX ADMIN — SODIUM CHLORIDE, PRESERVATIVE FREE 40 MG: 5 INJECTION INTRAVENOUS at 03:28

## 2025-05-16 RX ADMIN — MELATONIN 3 MG: at 21:47

## 2025-05-16 RX ADMIN — HEPARIN SODIUM AND DEXTROSE 28.21 UNITS/KG/HR: 10000; 5 INJECTION INTRAVENOUS at 17:06

## 2025-05-16 RX ADMIN — ACETAMINOPHEN 975 MG: 325 TABLET ORAL at 15:51

## 2025-05-16 RX ADMIN — PROPOFOL 30 MG: 10 INJECTION, EMULSION INTRAVENOUS at 11:27

## 2025-05-16 RX ADMIN — LIDOCAINE HYDROCHLORIDE 100 MG: 20 INJECTION, SOLUTION EPIDURAL; INFILTRATION; INTRACAUDAL; PERINEURAL at 11:26

## 2025-05-16 RX ADMIN — GLUCAGON 1 MG: 1 INJECTION, POWDER, LYOPHILIZED, FOR SOLUTION INTRAMUSCULAR; INTRAVENOUS at 11:48

## 2025-05-16 RX ADMIN — Medication 120 MCG: at 11:49

## 2025-05-16 RX ADMIN — Medication 400 MG: at 15:51

## 2025-05-16 RX ADMIN — BENZOCAINE 1 EACH: 200 SPRAY DENTAL; ORAL; PERIODONTAL at 11:26

## 2025-05-16 RX ADMIN — ACETAMINOPHEN 975 MG: 325 TABLET ORAL at 20:42

## 2025-05-16 RX ADMIN — DICLOFENAC SODIUM 4 G: 10 GEL TOPICAL at 20:44

## 2025-05-16 RX ADMIN — PROPOFOL 50 MG: 10 INJECTION, EMULSION INTRAVENOUS at 11:26

## 2025-05-16 RX ADMIN — PROPOFOL 30 MG: 10 INJECTION, EMULSION INTRAVENOUS at 11:32

## 2025-05-16 RX ADMIN — SODIUM CHLORIDE: 9 INJECTION, SOLUTION INTRAVENOUS at 11:24

## 2025-05-16 RX ADMIN — SODIUM CHLORIDE, PRESERVATIVE FREE 10 ML: 5 INJECTION INTRAVENOUS at 20:46

## 2025-05-16 RX ADMIN — MONTELUKAST 10 MG: 10 TABLET, FILM COATED ORAL at 20:42

## 2025-05-16 RX ADMIN — PROPOFOL 30 MG: 10 INJECTION, EMULSION INTRAVENOUS at 11:30

## 2025-05-16 RX ADMIN — ARFORMOTEROL TARTRATE: 15 SOLUTION RESPIRATORY (INHALATION) at 07:45

## 2025-05-16 RX ADMIN — ACETAMINOPHEN 975 MG: 325 TABLET ORAL at 05:20

## 2025-05-16 RX ADMIN — ONDANSETRON 4 MG: 4 TABLET, ORALLY DISINTEGRATING ORAL at 20:42

## 2025-05-16 RX ADMIN — PROPOFOL 30 MG: 10 INJECTION, EMULSION INTRAVENOUS at 11:43

## 2025-05-16 RX ADMIN — ONDANSETRON 4 MG: 2 INJECTION, SOLUTION INTRAMUSCULAR; INTRAVENOUS at 16:05

## 2025-05-16 RX ADMIN — ATORVASTATIN CALCIUM 80 MG: 40 TABLET, FILM COATED ORAL at 15:51

## 2025-05-16 RX ADMIN — SODIUM CHLORIDE, PRESERVATIVE FREE 10 ML: 5 INJECTION INTRAVENOUS at 08:16

## 2025-05-16 RX ADMIN — Medication 200 MCG: at 11:52

## 2025-05-16 RX ADMIN — PROPOFOL 30 MG: 10 INJECTION, EMULSION INTRAVENOUS at 11:35

## 2025-05-16 RX ADMIN — METOPROLOL SUCCINATE 12.5 MG: 25 TABLET, FILM COATED, EXTENDED RELEASE ORAL at 16:41

## 2025-05-16 RX ADMIN — SODIUM CHLORIDE, PRESERVATIVE FREE 40 MG: 5 INJECTION INTRAVENOUS at 15:52

## 2025-05-16 ASSESSMENT — PAIN - FUNCTIONAL ASSESSMENT: PAIN_FUNCTIONAL_ASSESSMENT: NONE - DENIES PAIN

## 2025-05-16 ASSESSMENT — PAIN SCALES - GENERAL
PAINLEVEL_OUTOF10: 0
PAINLEVEL_OUTOF10: 3
PAINLEVEL_OUTOF10: 7

## 2025-05-16 ASSESSMENT — LIFESTYLE VARIABLES: SMOKING_STATUS: 1

## 2025-05-16 NOTE — PLAN OF CARE
Problem: Discharge Planning  Goal: Discharge to home or other facility with appropriate resources  Outcome: Not Progressing     Problem: Chronic Conditions and Co-morbidities  Goal: Patient's chronic conditions and co-morbidity symptoms are monitored and maintained or improved  Outcome: Progressing     Problem: Safety - Adult  Goal: Free from fall injury  Outcome: Progressing     Problem: Respiratory - Adult  Goal: Achieves optimal ventilation and oxygenation  5/16/2025 1803 by Kerri Richardson, RN  Outcome: Progressing  5/16/2025 0747 by Paulo Andrade, RT  Outcome: Progressing     Problem: ABCDS Injury Assessment  Goal: Absence of physical injury  Outcome: Progressing     Problem: Pain  Goal: Verbalizes/displays adequate comfort level or baseline comfort level  Outcome: Progressing     Problem: Discharge Planning  Goal: Discharge to home or other facility with appropriate resources  Outcome: Not Progressing

## 2025-05-16 NOTE — H&P
See note from 5/15/25.   No interim changes.  EGD today.    RAFAT Rojas D.O.  [facility]

## 2025-05-16 NOTE — PERIOP NOTE
ARRIVAL INFORMATION:  Verified patient name and date of birth, scheduled procedure, and informed consent.     Belongings with patient include:  Clothing,None    GI FOCUSED ASSESSMENT:  Neuro: Awake, alert, oriented x4  Respiratory: even and unlabored   GI: soft and non-distended  EKG Rhythm: normal sinus rhythm    Education:  The risks and benefits of the bite block have been explained to patient.  Patient verbalizes understanding.  Reviewed general post procedure instructions.

## 2025-05-16 NOTE — OP NOTE
[facility]  RAFAT Rojas DNANDO  [phone]             Esophagogastroduodenoscopy (EGD)/Small Bowel Enteroscopy Procedure Note    NAME: Hyacinth Patterson  :  1955  MRN:  [id number]    Indications:  hx of AVMs, UGI bleeding, melena      : Lolis Rojas DO    Referring Provider:  Jeffery Alexis MD    Staff: Circulator: Daksha Hensley RN; Yarely Wheat RN  Endoscopy Technician: Chay Arriaga    Prosthetic devices, grafts, tissues, transplant, or devices implanted: none    Medicine:  MAC anesthesia Propofol      Procedure Details:  After informed consent was obtained with all risks and benefits of the procedure explained and preprocedure exam completed, the patient was placed in the left lateral decubitus position.  Universal protocol for patient identification was performed and documented in the nursing notes.  Throughout the procedure, the patient's blood pressure was monitored at least every five minutes; pulse, and oxygen saturations were monitored continuously.  All vital signs were documented in the nursing notes.  The endoscope was inserted into the mouth and advanced under direct vision to proximal to mid-jejunum.  A careful inspection was made as the gastroscope was withdrawn, including a retroflexed view of the proximal stomach; findings and interventions are described below.      Findings:   Esophagus:  normal  Stomach:   normal   Duodenum/jejunum:   4 AVMs found in the proximal jejunum/distal duodenum  These were treated with APC. Two of the larger AVMs were also treated with clips afterwards. Two regular clips used in total.  No bleeding at the end of the procedure  The extent of exam was marked with 2 tattoos (3ccs used of rosangela ink). This estimated to be around 100-120cm distal to the pylorus (without accounting for looping).   Of note, duodenal nodule previously seen was not appreciated on this exam.     Interventions:   See  LAB INSTRUCTIONS:    Please complete labs in 4 week(s). Please fast for 8 hours prior to lab collection. The clinic will call you within 1 week of collection. If you have not heard from us within that amount of time, please call us at [phone].

## 2025-05-16 NOTE — ANESTHESIA PRE PROCEDURE
Department of Anesthesiology  Preprocedure Note       Name:  Hyacinth Patterson   Age:  69 y.o.  :  1955                                          MRN:  [id number]         Date:  2025      Surgeon: Surgeon(s):  Lolis Rojas DO    Procedure: Procedure(s):  ENTEROSCOPY PUSH DIAGNOSTIC    Medications prior to admission:   Prior to Admission medications    Medication Sig Start Date End Date Taking? Authorizing Provider   montelukast (SINGULAIR) 10 MG tablet TAKE 1 TABLET BY MOUTH DAILY AT BEDTIME 25   Jeffery Alexis MD   metoprolol succinate (TOPROL XL) 25 MG extended release tablet Take 0.5 tablets by mouth daily 25   Kaci Aly MD   potassium chloride (KLOR-CON M) 20 MEQ extended release tablet Take 1 tablet by mouth daily 25   Kaci Aly MD   atorvastatin (LIPITOR) 80 MG tablet Take 1 tablet by mouth daily 3/26/25   Sagar Borges MD   fluticasone-salmeterol (ADVAIR) 250-50 MCG/ACT AEPB diskus inhaler Inhale 1 puff into the lungs in the morning and 1 puff in the evening. 3/24/25   Jeffery Alexis MD   pantoprazole (PROTONIX) 40 MG tablet Take 1 tablet by mouth 2 times daily (before meals) 3/24/25   Jeffery Alexis MD   insulin glargine (LANTUS SOLOSTAR) 100 UNIT/ML injection pen Inject 10 Units into the skin daily 24   Jeffery Alexis MD   ferrous sulfate (IRON 325) 325 (65 Fe) MG tablet Take 1 tablet by mouth every 48 hours 24   Jeffery Alexis MD   clopidogrel (PLAVIX) 75 MG tablet Take 1 tablet by mouth daily 9/10/24   Sagar Borges MD   aspirin 81 MG EC tablet Take 1 tablet by mouth daily    Automatic Reconciliation, Ar       Current medications:    Current Facility-Administered Medications   Medication Dose Route Frequency Provider Last Rate Last Admin    furosemide (LASIX) injection 20 mg  20 mg IntraVENous BID Kemal Parnell MD   20 mg at 05/15/25 3975    iron sucrose (VENOFER) 300 mg in sodium chloride 0.9 % 250 mL IVPB  300

## 2025-05-16 NOTE — ANESTHESIA POSTPROCEDURE EVALUATION
Department of Anesthesiology  Postprocedure Note    Patient: Hyacinth Patterson  MRN: [id number]  YOB: 1955  Date of evaluation: 5/16/2025    Procedure Summary       Date: 05/16/25 Room / Location: [facility] ENDO 01 / [facility] ENDOSCOPY    Anesthesia Start: 1120 Anesthesia Stop: 1204    Procedure: ENTEROSCOPY PUSH DIAGNOSTIC (Upper GI Region) Diagnosis:       Anemia, unspecified type      (Anemia, unspecified type [D64.9])    Surgeons: Lolis Rojas DO Responsible Provider: Stuart Huffman MD    Anesthesia Type: MAC ASA Status: 3            Anesthesia Type: MAC    Sumit Phase I: Sumit Score: 10    Sumit Phase II:      Anesthesia Post Evaluation    Patient location during evaluation: PACU  Patient participation: complete - patient participated  Level of consciousness: awake  Airway patency: patent  Nausea & Vomiting: no vomiting  Cardiovascular status: hemodynamically stable  Respiratory status: acceptable  Hydration status: euvolemic    No notable events documented.

## 2025-05-16 NOTE — PERIOP NOTE
TRANSFER - IN REPORT:    Verbal report received from Bernadine WRIGHT on Hyacinth Patterson  being received from Rm 2312 for ordered procedure      Report consisted of patient's Situation, Background, Assessment and   Recommendations(SBAR).     Information from the following report(s) Nurse Handoff Report was reviewed with the receiving nurse.    Opportunity for questions and clarification was provided.      Assessment completed upon patient's arrival to unit and care assumed.

## 2025-05-17 LAB
ANION GAP SERPL CALC-SCNC: 11 MMOL/L (ref 2–12)
ANION GAP SERPL CALC-SCNC: 7 MMOL/L (ref 2–12)
BASOPHILS # BLD: 0.07 K/UL (ref 0–0.1)
BASOPHILS NFR BLD: 0.4 % (ref 0–1)
BUN SERPL-MCNC: 10 MG/DL (ref 6–20)
BUN SERPL-MCNC: 16 MG/DL (ref 6–20)
BUN/CREAT SERPL: 12 (ref 12–20)
BUN/CREAT SERPL: 9 (ref 12–20)
CALCIUM SERPL-MCNC: 6.6 MG/DL (ref 8.5–10.1)
CALCIUM SERPL-MCNC: 8.8 MG/DL (ref 8.5–10.1)
CHLORIDE SERPL-SCNC: 108 MMOL/L (ref 97–108)
CHLORIDE SERPL-SCNC: 112 MMOL/L (ref 97–108)
CO2 SERPL-SCNC: 15 MMOL/L (ref 21–32)
CO2 SERPL-SCNC: 25 MMOL/L (ref 21–32)
CREAT SERPL-MCNC: 1.06 MG/DL (ref 0.55–1.02)
CREAT SERPL-MCNC: 1.38 MG/DL (ref 0.55–1.02)
DIFFERENTIAL METHOD BLD: ABNORMAL
EOSINOPHIL # BLD: 0.27 K/UL (ref 0–0.4)
EOSINOPHIL NFR BLD: 1.4 % (ref 0–7)
ERYTHROCYTE [DISTWIDTH] IN BLOOD BY AUTOMATED COUNT: 18.4 % (ref 11.5–14.5)
GLUCOSE SERPL-MCNC: 141 MG/DL (ref 65–100)
GLUCOSE SERPL-MCNC: 262 MG/DL (ref 65–100)
HCT VFR BLD AUTO: 25.4 % (ref 35–47)
HCT VFR BLD AUTO: 25.5 % (ref 35–47)
HCT VFR BLD AUTO: 27.1 % (ref 35–47)
HCT VFR BLD AUTO: 27.2 % (ref 35–47)
HGB BLD-MCNC: 7.5 G/DL (ref 11.5–16)
HGB BLD-MCNC: 7.7 G/DL (ref 11.5–16)
HGB BLD-MCNC: 8.1 G/DL (ref 11.5–16)
HGB BLD-MCNC: 8.2 G/DL (ref 11.5–16)
IMM GRANULOCYTES # BLD AUTO: 0.12 K/UL (ref 0–0.04)
IMM GRANULOCYTES NFR BLD AUTO: 0.6 % (ref 0–0.5)
LYMPHOCYTES # BLD: 1.21 K/UL (ref 0.8–3.5)
LYMPHOCYTES NFR BLD: 6.1 % (ref 12–49)
MAGNESIUM SERPL-MCNC: 1.2 MG/DL (ref 1.6–2.4)
MCH RBC QN AUTO: 24.8 PG (ref 26–34)
MCHC RBC AUTO-ENTMCNC: 29.4 G/DL (ref 30–36.5)
MCV RBC AUTO: 84.2 FL (ref 80–99)
MONOCYTES # BLD: 1 K/UL (ref 0–1)
MONOCYTES NFR BLD: 5.1 % (ref 5–13)
NEUTS SEG # BLD: 17.12 K/UL (ref 1.8–8)
NEUTS SEG NFR BLD: 86.4 % (ref 32–75)
NRBC # BLD: 0.04 K/UL (ref 0–0.01)
NRBC BLD-RTO: 0.2 PER 100 WBC
NT PRO BNP: ABNORMAL PG/ML
PLATELET # BLD AUTO: 214 K/UL (ref 150–400)
PMV BLD AUTO: 12.2 FL (ref 8.9–12.9)
POTASSIUM SERPL-SCNC: 3.1 MMOL/L (ref 3.5–5.1)
POTASSIUM SERPL-SCNC: 4.2 MMOL/L (ref 3.5–5.1)
RBC # BLD AUTO: 3.03 M/UL (ref 3.8–5.2)
SODIUM SERPL-SCNC: 138 MMOL/L (ref 136–145)
SODIUM SERPL-SCNC: 140 MMOL/L (ref 136–145)
UFH PPP CHRO-ACNC: 0.74 IU/ML
UFH PPP CHRO-ACNC: 0.76 IU/ML
UFH PPP CHRO-ACNC: 0.77 IU/ML
WBC # BLD AUTO: 19.8 K/UL (ref 3.6–11)

## 2025-05-17 PROCEDURE — 2060000000 HC ICU INTERMEDIATE R&B

## 2025-05-17 PROCEDURE — 83880 ASSAY OF NATRIURETIC PEPTIDE: CPT

## 2025-05-17 PROCEDURE — 2580000003 HC RX 258: Performed by: NURSE PRACTITIONER

## 2025-05-17 PROCEDURE — 6360000002 HC RX W HCPCS: Performed by: STUDENT IN AN ORGANIZED HEALTH CARE EDUCATION/TRAINING PROGRAM

## 2025-05-17 PROCEDURE — 85014 HEMATOCRIT: CPT

## 2025-05-17 PROCEDURE — 6370000000 HC RX 637 (ALT 250 FOR IP): Performed by: STUDENT IN AN ORGANIZED HEALTH CARE EDUCATION/TRAINING PROGRAM

## 2025-05-17 PROCEDURE — 2580000003 HC RX 258: Performed by: INTERNAL MEDICINE

## 2025-05-17 PROCEDURE — 6370000000 HC RX 637 (ALT 250 FOR IP): Performed by: INTERNAL MEDICINE

## 2025-05-17 PROCEDURE — 36415 COLL VENOUS BLD VENIPUNCTURE: CPT

## 2025-05-17 PROCEDURE — 2700000000 HC OXYGEN THERAPY PER DAY

## 2025-05-17 PROCEDURE — 85018 HEMOGLOBIN: CPT

## 2025-05-17 PROCEDURE — 80048 BASIC METABOLIC PNL TOTAL CA: CPT

## 2025-05-17 PROCEDURE — 6360000002 HC RX W HCPCS: Performed by: INTERNAL MEDICINE

## 2025-05-17 PROCEDURE — 2500000003 HC RX 250 WO HCPCS: Performed by: STUDENT IN AN ORGANIZED HEALTH CARE EDUCATION/TRAINING PROGRAM

## 2025-05-17 PROCEDURE — 6370000000 HC RX 637 (ALT 250 FOR IP)

## 2025-05-17 PROCEDURE — 94640 AIRWAY INHALATION TREATMENT: CPT

## 2025-05-17 PROCEDURE — 85025 COMPLETE CBC W/AUTO DIFF WBC: CPT

## 2025-05-17 PROCEDURE — 83735 ASSAY OF MAGNESIUM: CPT

## 2025-05-17 PROCEDURE — 6360000002 HC RX W HCPCS: Performed by: NURSE PRACTITIONER

## 2025-05-17 PROCEDURE — 85520 HEPARIN ASSAY: CPT

## 2025-05-17 RX ORDER — MAGNESIUM SULFATE 1 G/100ML
1000 INJECTION INTRAVENOUS ONCE
Status: COMPLETED | OUTPATIENT
Start: 2025-05-17 | End: 2025-05-17

## 2025-05-17 RX ORDER — POTASSIUM CHLORIDE 1500 MG/1
40 TABLET, EXTENDED RELEASE ORAL 2 TIMES DAILY
Status: COMPLETED | OUTPATIENT
Start: 2025-05-17 | End: 2025-05-17

## 2025-05-17 RX ADMIN — ARFORMOTEROL TARTRATE: 15 SOLUTION RESPIRATORY (INHALATION) at 08:32

## 2025-05-17 RX ADMIN — HEPARIN SODIUM AND DEXTROSE 17 UNITS/KG/HR: 10000; 5 INJECTION INTRAVENOUS at 15:11

## 2025-05-17 RX ADMIN — FERROUS SULFATE TAB EC 325 MG (65 MG FE EQUIVALENT) 325 MG: 325 (65 FE) TABLET DELAYED RESPONSE at 18:37

## 2025-05-17 RX ADMIN — SODIUM CHLORIDE, PRESERVATIVE FREE 10 ML: 5 INJECTION INTRAVENOUS at 09:55

## 2025-05-17 RX ADMIN — FUROSEMIDE 20 MG: 10 INJECTION, SOLUTION INTRAMUSCULAR; INTRAVENOUS at 18:37

## 2025-05-17 RX ADMIN — CLOPIDOGREL BISULFATE 75 MG: 75 TABLET, FILM COATED ORAL at 09:54

## 2025-05-17 RX ADMIN — POTASSIUM CHLORIDE 40 MEQ: 1500 TABLET, EXTENDED RELEASE ORAL at 09:54

## 2025-05-17 RX ADMIN — ONDANSETRON 4 MG: 2 INJECTION, SOLUTION INTRAMUSCULAR; INTRAVENOUS at 03:28

## 2025-05-17 RX ADMIN — MELATONIN 3 MG: at 22:07

## 2025-05-17 RX ADMIN — ARFORMOTEROL TARTRATE: 15 SOLUTION RESPIRATORY (INHALATION) at 20:02

## 2025-05-17 RX ADMIN — ACETAMINOPHEN 975 MG: 325 TABLET ORAL at 22:08

## 2025-05-17 RX ADMIN — IRON SUCROSE 300 MG: 20 INJECTION, SOLUTION INTRAVENOUS at 09:59

## 2025-05-17 RX ADMIN — POTASSIUM CHLORIDE 40 MEQ: 1500 TABLET, EXTENDED RELEASE ORAL at 22:08

## 2025-05-17 RX ADMIN — DICLOFENAC SODIUM 4 G: 10 GEL TOPICAL at 22:57

## 2025-05-17 RX ADMIN — MONTELUKAST 10 MG: 10 TABLET, FILM COATED ORAL at 22:08

## 2025-05-17 RX ADMIN — DICLOFENAC SODIUM 4 G: 10 GEL TOPICAL at 09:54

## 2025-05-17 RX ADMIN — FUROSEMIDE 20 MG: 10 INJECTION, SOLUTION INTRAMUSCULAR; INTRAVENOUS at 09:54

## 2025-05-17 RX ADMIN — Medication 400 MG: at 09:53

## 2025-05-17 RX ADMIN — SODIUM CHLORIDE, PRESERVATIVE FREE 40 MG: 5 INJECTION INTRAVENOUS at 03:21

## 2025-05-17 RX ADMIN — ACETAMINOPHEN 975 MG: 325 TABLET ORAL at 06:27

## 2025-05-17 RX ADMIN — MAGNESIUM SULFATE HEPTAHYDRATE 1000 MG: 1 INJECTION, SOLUTION INTRAVENOUS at 10:22

## 2025-05-17 RX ADMIN — OXYCODONE 5 MG: 5 TABLET ORAL at 03:28

## 2025-05-17 RX ADMIN — ATORVASTATIN CALCIUM 80 MG: 40 TABLET, FILM COATED ORAL at 09:53

## 2025-05-17 RX ADMIN — METOPROLOL SUCCINATE 12.5 MG: 25 TABLET, FILM COATED, EXTENDED RELEASE ORAL at 09:53

## 2025-05-17 RX ADMIN — SODIUM CHLORIDE, PRESERVATIVE FREE 40 MG: 5 INJECTION INTRAVENOUS at 15:01

## 2025-05-17 RX ADMIN — ACETAMINOPHEN 975 MG: 325 TABLET ORAL at 15:00

## 2025-05-17 ASSESSMENT — PAIN SCALES - GENERAL
PAINLEVEL_OUTOF10: 8
PAINLEVEL_OUTOF10: 4
PAINLEVEL_OUTOF10: 3
PAINLEVEL_OUTOF10: 0
PAINLEVEL_OUTOF10: 3
PAINLEVEL_OUTOF10: 0
PAINLEVEL_OUTOF10: 7
PAINLEVEL_OUTOF10: 0

## 2025-05-17 ASSESSMENT — PAIN DESCRIPTION - FREQUENCY: FREQUENCY: INTERMITTENT

## 2025-05-17 ASSESSMENT — PAIN DESCRIPTION - DESCRIPTORS
DESCRIPTORS: ACHING
DESCRIPTORS: ACHING

## 2025-05-17 ASSESSMENT — PAIN DESCRIPTION - ONSET: ONSET: GRADUAL

## 2025-05-17 ASSESSMENT — PAIN DESCRIPTION - ORIENTATION
ORIENTATION: LOWER
ORIENTATION: LOWER

## 2025-05-17 ASSESSMENT — PAIN DESCRIPTION - LOCATION
LOCATION: ABDOMEN;BACK
LOCATION: BACK;ABDOMEN
LOCATION: BACK
LOCATION: BACK

## 2025-05-17 ASSESSMENT — PAIN DESCRIPTION - PAIN TYPE: TYPE: CHRONIC PAIN

## 2025-05-18 LAB
ANION GAP SERPL CALC-SCNC: 6 MMOL/L (ref 2–12)
BASOPHILS # BLD: 0.06 K/UL (ref 0–0.1)
BASOPHILS NFR BLD: 0.4 % (ref 0–1)
BUN SERPL-MCNC: 15 MG/DL (ref 6–20)
BUN/CREAT SERPL: 12 (ref 12–20)
CALCIUM SERPL-MCNC: 8.7 MG/DL (ref 8.5–10.1)
CHLORIDE SERPL-SCNC: 110 MMOL/L (ref 97–108)
CO2 SERPL-SCNC: 25 MMOL/L (ref 21–32)
CREAT SERPL-MCNC: 1.3 MG/DL (ref 0.55–1.02)
DIFFERENTIAL METHOD BLD: ABNORMAL
EOSINOPHIL # BLD: 0.43 K/UL (ref 0–0.4)
EOSINOPHIL NFR BLD: 2.9 % (ref 0–7)
ERYTHROCYTE [DISTWIDTH] IN BLOOD BY AUTOMATED COUNT: 18.6 % (ref 11.5–14.5)
GLUCOSE SERPL-MCNC: 155 MG/DL (ref 65–100)
HCT VFR BLD AUTO: 25.8 % (ref 35–47)
HGB BLD-MCNC: 7.5 G/DL (ref 11.5–16)
IMM GRANULOCYTES # BLD AUTO: 0.06 K/UL (ref 0–0.04)
IMM GRANULOCYTES NFR BLD AUTO: 0.4 % (ref 0–0.5)
LYMPHOCYTES # BLD: 1.14 K/UL (ref 0.8–3.5)
LYMPHOCYTES NFR BLD: 7.6 % (ref 12–49)
MAGNESIUM SERPL-MCNC: 2.1 MG/DL (ref 1.6–2.4)
MCH RBC QN AUTO: 24.4 PG (ref 26–34)
MCHC RBC AUTO-ENTMCNC: 29.1 G/DL (ref 30–36.5)
MCV RBC AUTO: 84 FL (ref 80–99)
MONOCYTES # BLD: 1.13 K/UL (ref 0–1)
MONOCYTES NFR BLD: 7.5 % (ref 5–13)
NEUTS SEG # BLD: 12.25 K/UL (ref 1.8–8)
NEUTS SEG NFR BLD: 81.2 % (ref 32–75)
NRBC # BLD: 0.03 K/UL (ref 0–0.01)
NRBC BLD-RTO: 0.2 PER 100 WBC
PLATELET # BLD AUTO: 226 K/UL (ref 150–400)
PMV BLD AUTO: 11.7 FL (ref 8.9–12.9)
POTASSIUM SERPL-SCNC: 4 MMOL/L (ref 3.5–5.1)
RBC # BLD AUTO: 3.07 M/UL (ref 3.8–5.2)
SODIUM SERPL-SCNC: 141 MMOL/L (ref 136–145)
UFH PPP CHRO-ACNC: 0.53 IU/ML
UFH PPP CHRO-ACNC: 0.66 IU/ML
WBC # BLD AUTO: 15.1 K/UL (ref 3.6–11)

## 2025-05-18 PROCEDURE — 6370000000 HC RX 637 (ALT 250 FOR IP): Performed by: STUDENT IN AN ORGANIZED HEALTH CARE EDUCATION/TRAINING PROGRAM

## 2025-05-18 PROCEDURE — 6360000002 HC RX W HCPCS: Performed by: STUDENT IN AN ORGANIZED HEALTH CARE EDUCATION/TRAINING PROGRAM

## 2025-05-18 PROCEDURE — 6370000000 HC RX 637 (ALT 250 FOR IP): Performed by: INTERNAL MEDICINE

## 2025-05-18 PROCEDURE — 80048 BASIC METABOLIC PNL TOTAL CA: CPT

## 2025-05-18 PROCEDURE — 36415 COLL VENOUS BLD VENIPUNCTURE: CPT

## 2025-05-18 PROCEDURE — 83735 ASSAY OF MAGNESIUM: CPT

## 2025-05-18 PROCEDURE — 2060000000 HC ICU INTERMEDIATE R&B

## 2025-05-18 PROCEDURE — 6360000002 HC RX W HCPCS: Performed by: INTERNAL MEDICINE

## 2025-05-18 PROCEDURE — 2580000003 HC RX 258: Performed by: INTERNAL MEDICINE

## 2025-05-18 PROCEDURE — 2500000003 HC RX 250 WO HCPCS: Performed by: STUDENT IN AN ORGANIZED HEALTH CARE EDUCATION/TRAINING PROGRAM

## 2025-05-18 PROCEDURE — 85520 HEPARIN ASSAY: CPT

## 2025-05-18 PROCEDURE — 94640 AIRWAY INHALATION TREATMENT: CPT

## 2025-05-18 PROCEDURE — 85025 COMPLETE CBC W/AUTO DIFF WBC: CPT

## 2025-05-18 RX ORDER — FUROSEMIDE 20 MG/1
20 TABLET ORAL 2 TIMES DAILY
Status: DISCONTINUED | OUTPATIENT
Start: 2025-05-19 | End: 2025-05-19 | Stop reason: HOSPADM

## 2025-05-18 RX ORDER — FUROSEMIDE 10 MG/ML
40 INJECTION INTRAMUSCULAR; INTRAVENOUS ONCE
Status: COMPLETED | OUTPATIENT
Start: 2025-05-18 | End: 2025-05-18

## 2025-05-18 RX ORDER — ASPIRIN 81 MG/1
81 TABLET ORAL DAILY
Status: DISCONTINUED | OUTPATIENT
Start: 2025-05-18 | End: 2025-05-19 | Stop reason: HOSPADM

## 2025-05-18 RX ADMIN — ATORVASTATIN CALCIUM 80 MG: 40 TABLET, FILM COATED ORAL at 09:37

## 2025-05-18 RX ADMIN — ASPIRIN 81 MG: 81 TABLET, COATED ORAL at 18:02

## 2025-05-18 RX ADMIN — Medication 400 MG: at 09:37

## 2025-05-18 RX ADMIN — CLOPIDOGREL BISULFATE 75 MG: 75 TABLET, FILM COATED ORAL at 09:37

## 2025-05-18 RX ADMIN — SODIUM CHLORIDE, PRESERVATIVE FREE 10 ML: 5 INJECTION INTRAVENOUS at 09:38

## 2025-05-18 RX ADMIN — SODIUM CHLORIDE, PRESERVATIVE FREE 10 ML: 5 INJECTION INTRAVENOUS at 21:46

## 2025-05-18 RX ADMIN — DICLOFENAC SODIUM 4 G: 10 GEL TOPICAL at 09:00

## 2025-05-18 RX ADMIN — ACETAMINOPHEN 975 MG: 325 TABLET ORAL at 05:17

## 2025-05-18 RX ADMIN — DICLOFENAC SODIUM 4 G: 10 GEL TOPICAL at 21:46

## 2025-05-18 RX ADMIN — SODIUM CHLORIDE, PRESERVATIVE FREE 10 ML: 5 INJECTION INTRAVENOUS at 02:53

## 2025-05-18 RX ADMIN — APIXABAN 10 MG: 5 TABLET, FILM COATED ORAL at 21:45

## 2025-05-18 RX ADMIN — ACETAMINOPHEN 975 MG: 325 TABLET ORAL at 21:45

## 2025-05-18 RX ADMIN — SODIUM CHLORIDE, PRESERVATIVE FREE 40 MG: 5 INJECTION INTRAVENOUS at 13:54

## 2025-05-18 RX ADMIN — SODIUM CHLORIDE, PRESERVATIVE FREE 40 MG: 5 INJECTION INTRAVENOUS at 02:52

## 2025-05-18 RX ADMIN — METOPROLOL SUCCINATE 12.5 MG: 25 TABLET, FILM COATED, EXTENDED RELEASE ORAL at 09:37

## 2025-05-18 RX ADMIN — ARFORMOTEROL TARTRATE: 15 SOLUTION RESPIRATORY (INHALATION) at 07:34

## 2025-05-18 RX ADMIN — FUROSEMIDE 20 MG: 10 INJECTION, SOLUTION INTRAMUSCULAR; INTRAVENOUS at 09:38

## 2025-05-18 RX ADMIN — FUROSEMIDE 40 MG: 10 INJECTION, SOLUTION INTRAMUSCULAR; INTRAVENOUS at 18:02

## 2025-05-18 RX ADMIN — ACETAMINOPHEN 975 MG: 325 TABLET ORAL at 13:53

## 2025-05-18 RX ADMIN — APIXABAN 10 MG: 5 TABLET, FILM COATED ORAL at 13:53

## 2025-05-18 RX ADMIN — ARFORMOTEROL TARTRATE: 15 SOLUTION RESPIRATORY (INHALATION) at 20:10

## 2025-05-18 RX ADMIN — MONTELUKAST 10 MG: 10 TABLET, FILM COATED ORAL at 21:45

## 2025-05-18 ASSESSMENT — PAIN SCALES - GENERAL
PAINLEVEL_OUTOF10: 0
PAINLEVEL_OUTOF10: 7
PAINLEVEL_OUTOF10: 0

## 2025-05-19 VITALS
RESPIRATION RATE: 18 BRPM | SYSTOLIC BLOOD PRESSURE: 131 MMHG | BODY MASS INDEX: 23.43 KG/M2 | WEIGHT: 140.65 LBS | DIASTOLIC BLOOD PRESSURE: 63 MMHG | OXYGEN SATURATION: 97 % | HEIGHT: 65 IN | TEMPERATURE: 98.2 F | HEART RATE: 84 BPM

## 2025-05-19 LAB
ANION GAP SERPL CALC-SCNC: 9 MMOL/L (ref 2–12)
BASOPHILS # BLD: 0.09 K/UL (ref 0–0.1)
BASOPHILS NFR BLD: 1 % (ref 0–1)
BUN SERPL-MCNC: 16 MG/DL (ref 6–20)
BUN/CREAT SERPL: 11 (ref 12–20)
CALCIUM SERPL-MCNC: 9.3 MG/DL (ref 8.5–10.1)
CHLORIDE SERPL-SCNC: 106 MMOL/L (ref 97–108)
CO2 SERPL-SCNC: 23 MMOL/L (ref 21–32)
CREAT SERPL-MCNC: 1.45 MG/DL (ref 0.55–1.02)
DIFFERENTIAL METHOD BLD: ABNORMAL
EKG ATRIAL RATE: 106 BPM
EKG DIAGNOSIS: NORMAL
EKG P AXIS: 52 DEGREES
EKG P-R INTERVAL: 148 MS
EKG Q-T INTERVAL: 354 MS
EKG QRS DURATION: 94 MS
EKG QTC CALCULATION (BAZETT): 470 MS
EKG R AXIS: 23 DEGREES
EKG T AXIS: 146 DEGREES
EKG VENTRICULAR RATE: 106 BPM
EOSINOPHIL # BLD: 0.38 K/UL (ref 0–0.4)
EOSINOPHIL NFR BLD: 4 % (ref 0–7)
ERYTHROCYTE [DISTWIDTH] IN BLOOD BY AUTOMATED COUNT: 18.7 % (ref 11.5–14.5)
GLUCOSE SERPL-MCNC: 235 MG/DL (ref 65–100)
HCT VFR BLD AUTO: 28.1 % (ref 35–47)
HGB BLD-MCNC: 8.3 G/DL (ref 11.5–16)
IMM GRANULOCYTES # BLD AUTO: 0 K/UL (ref 0–0.04)
IMM GRANULOCYTES NFR BLD AUTO: 0 % (ref 0–0.5)
LYMPHOCYTES # BLD: 0.66 K/UL (ref 0.8–3.5)
LYMPHOCYTES NFR BLD: 7 % (ref 12–49)
MAGNESIUM SERPL-MCNC: 2 MG/DL (ref 1.6–2.4)
MCH RBC QN AUTO: 24.6 PG (ref 26–34)
MCHC RBC AUTO-ENTMCNC: 29.5 G/DL (ref 30–36.5)
MCV RBC AUTO: 83.1 FL (ref 80–99)
MONOCYTES # BLD: 0.75 K/UL (ref 0–1)
MONOCYTES NFR BLD: 8 % (ref 5–13)
NEUTS SEG # BLD: 7.52 K/UL (ref 1.8–8)
NEUTS SEG NFR BLD: 80 % (ref 32–75)
NRBC # BLD: 0 K/UL (ref 0–0.01)
NRBC BLD-RTO: 0 PER 100 WBC
PHOSPHATE SERPL-MCNC: 2.1 MG/DL (ref 2.6–4.7)
PLATELET # BLD AUTO: 234 K/UL (ref 150–400)
PMV BLD AUTO: 11.7 FL (ref 8.9–12.9)
POTASSIUM SERPL-SCNC: 3.5 MMOL/L (ref 3.5–5.1)
RBC # BLD AUTO: 3.38 M/UL (ref 3.8–5.2)
RBC MORPH BLD: ABNORMAL
SODIUM SERPL-SCNC: 138 MMOL/L (ref 136–145)
WBC # BLD AUTO: 9.4 K/UL (ref 3.6–11)

## 2025-05-19 PROCEDURE — 83735 ASSAY OF MAGNESIUM: CPT

## 2025-05-19 PROCEDURE — 6370000000 HC RX 637 (ALT 250 FOR IP): Performed by: INTERNAL MEDICINE

## 2025-05-19 PROCEDURE — 36415 COLL VENOUS BLD VENIPUNCTURE: CPT

## 2025-05-19 PROCEDURE — 6370000000 HC RX 637 (ALT 250 FOR IP): Performed by: STUDENT IN AN ORGANIZED HEALTH CARE EDUCATION/TRAINING PROGRAM

## 2025-05-19 PROCEDURE — 6360000002 HC RX W HCPCS: Performed by: INTERNAL MEDICINE

## 2025-05-19 PROCEDURE — 2580000003 HC RX 258: Performed by: INTERNAL MEDICINE

## 2025-05-19 PROCEDURE — 84100 ASSAY OF PHOSPHORUS: CPT

## 2025-05-19 PROCEDURE — 6360000002 HC RX W HCPCS: Performed by: STUDENT IN AN ORGANIZED HEALTH CARE EDUCATION/TRAINING PROGRAM

## 2025-05-19 PROCEDURE — 80048 BASIC METABOLIC PNL TOTAL CA: CPT

## 2025-05-19 PROCEDURE — 85025 COMPLETE CBC W/AUTO DIFF WBC: CPT

## 2025-05-19 PROCEDURE — 94640 AIRWAY INHALATION TREATMENT: CPT

## 2025-05-19 RX ORDER — FUROSEMIDE 20 MG/1
20 TABLET ORAL 2 TIMES DAILY
Qty: 60 TABLET | Refills: 1 | Status: ON HOLD | OUTPATIENT
Start: 2025-05-19

## 2025-05-19 RX ADMIN — Medication 400 MG: at 08:59

## 2025-05-19 RX ADMIN — ARFORMOTEROL TARTRATE: 15 SOLUTION RESPIRATORY (INHALATION) at 08:33

## 2025-05-19 RX ADMIN — ACETAMINOPHEN 975 MG: 325 TABLET ORAL at 06:23

## 2025-05-19 RX ADMIN — METOPROLOL SUCCINATE 12.5 MG: 25 TABLET, FILM COATED, EXTENDED RELEASE ORAL at 08:58

## 2025-05-19 RX ADMIN — SODIUM CHLORIDE, PRESERVATIVE FREE 40 MG: 5 INJECTION INTRAVENOUS at 03:28

## 2025-05-19 RX ADMIN — APIXABAN 10 MG: 5 TABLET, FILM COATED ORAL at 08:58

## 2025-05-19 RX ADMIN — ATORVASTATIN CALCIUM 80 MG: 40 TABLET, FILM COATED ORAL at 08:58

## 2025-05-19 RX ADMIN — FUROSEMIDE 20 MG: 20 TABLET ORAL at 08:58

## 2025-05-19 RX ADMIN — ASPIRIN 81 MG: 81 TABLET, COATED ORAL at 08:58

## 2025-05-19 NOTE — PROGRESS NOTES
[facility]  [address] [place], [place] [id number] [phone]  [email or web address]  CARDIOLOGY PROGRESS NOTE    5/15/2025 9:06 AM    Admit Date: 5/13/2025    Admit Diagnosis:   CHF exacerbation (HCC) [I50.9]  Acute exacerbation of chronic heart failure (HCC) [I50.9]  Acute on chronic systolic congestive heart failure (HCC) [I50.23]    Subjective:     Hyacinth Patterson was seen and examined at the bedside. No chest pain or shortness of breath    /63   Pulse 88   Temp 98.3 °F (36.8 °C) (Oral)   Resp 16   Ht 1.651 m (5' 5\")   Wt 64.8 kg (142 lb 14.4 oz)   LMP  (LMP Unknown)   SpO2 98%   BMI 23.78 kg/m²     Current Facility-Administered Medications   Medication Dose Route Frequency    [Held by provider] metoprolol succinate (TOPROL XL) extended release tablet 12.5 mg  12.5 mg Oral Daily    sennosides-docusate sodium (SENOKOT-S) 8.6-50 MG tablet 2 tablet  2 tablet Oral Daily PRN    [Held by provider] furosemide (LASIX) injection 20 mg  20 mg IntraVENous BID    pantoprazole (PROTONIX) 40 mg in sodium chloride (PF) 0.9 % 10 mL injection  40 mg IntraVENous Q12H    0.9 % sodium chloride infusion   IntraVENous PRN    [Held by provider] aspirin EC tablet 81 mg  81 mg Oral Daily    atorvastatin (LIPITOR) tablet 80 mg  80 mg Oral Daily    [Held by provider] clopidogrel (PLAVIX) tablet 75 mg  75 mg Oral Daily    ferrous sulfate (FE TABS 325) EC tablet 325 mg  325 mg Oral Q48H    arformoterol 15 mcg-budesonide 0.5 mg neb solution   Nebulization BID RT    montelukast (SINGULAIR) tablet 10 mg  10 mg Oral Nightly    sodium chloride flush 0.9 % injection 5-40 mL  5-40 mL IntraVENous 2 times per day    sodium chloride flush 0.9 % injection 5-40 mL  5-40 mL IntraVENous PRN    0.9 % sodium chloride infusion   IntraVENous PRN    ondansetron (ZOFRAN-ODT) disintegrating tablet 4 mg  4 mg Oral Q8H PRN    Or    ondansetron (ZOFRAN) injection 4 mg  4 mg IntraVENous Q6H PRN    
      Hospitalist Progress Note    NAME:   Hyacinth Patterson   : 1955   MRN: [id number]     Date/Time: 2025 8:58 AM  Patient PCP: Jeffery Alexis MD    Estimated discharge date: , ? Versus OP cardiac rehab as before  Barriers:Hb Stable on Anticoag resumption,stable off oxygen, BP stable, cardio clearance., Gi clearance.       Assessment / Plan:    Hypokalemia- resolved, 4.0 today  Hypomagnesemia- - resolved, 2.1 today  S/p K+ Po BID x 2 doses   S/p Mag IV x 1  cont Po daily   Bmp daily    Hypotension POA- resolved  due to hemorrhagic shock  Due to Acute lower GI bleed    S/p Albumin   Initially per cardiology who recommended to hold aspirin Plavix, heparin drip discontinued by cardiology as patient continued bleeding -- now resumed  post EGD with AVM ablation by GI  IP GI consulted- following, ? 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   Resume regular diet since.  GI recommended to resume anticoagulants.    Change to Daily CBC/Hb check now      Right upper lobe PE  Pulmonary edema 2/2 CHF exacerbation  Bilateral pleural effusion  CHF exacerbation  HFrEF 35%  Acute hypoxic respiratory failure 2/2 RUL PE, CHF exacerbation, hypertensive urgency, B/L pleural effusion  --Symptoms of shortness of breath on exertion, chest pains  - proBNP 24,000  - Troponin 97  - CTA chest shows small right upper lobe PE, no deviation of the interventricular septum.  Pulmonary edema with right greater than the left pleural effusion  -CMP nonactionable,  - CBC negative for leukocytosis  -Was started on heparin and Lasix at the outside facility, currently on hold  - C/W Lasix twice daily, monitor electrolytes while on diuretics  Remain on 
      Hospitalist Progress Note    NAME:   Hyacinth Patterson   : 1955   MRN: [id number]     Date/Time: 2025 8:31 AM  Patient PCP: Jeffery Alexis MD    Estimated discharge date: ,   Barriers:Hb Stable on Anticoag resumption, Oxygen tapered off, BP stable      Assessment / Plan:    Hypokalemia  Hypomagnesemia  Replenish K+ Po BID today  Replenish Mag IV x 1 today, cont Po daily     Hypotension POA- resolved  due to hemorrhagic shock  Due to Acute lower GI bleed    S/p Albumin   Initially per cardiology who recommended to hold aspirin Plavix, heparin drip discontinued by cardiology as patient continued bleeding -- now resumed  post EGD with AVM ablation by GI  IP GI consulted- following, ? 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   Resume regular diet since.  GI recommended to resume anticoagulants.    Cont H/H Q 8 hrs for now    Right upper lobe PE  Pulmonary edema 2/2 CHF exacerbation  Bilateral pleural effusion  CHF exacerbation  HFrEF 35%  Acute hypoxic respiratory failure 2/2 RUL PE, CHF exacerbation, hypertensive urgency, B/L pleural effusion  --Symptoms of shortness of breath on exertion, chest pains  - proBNP 24,000  - Troponin 97  - CTA chest shows small right upper lobe PE, no deviation of the interventricular septum.  Pulmonary edema with right greater than the left pleural effusion  -CMP nonactionable,  - CBC negative for leukocytosis  -Was started on heparin and Lasix at the outside facility, currently on hold  - C/W Lasix twice daily, monitor electrolytes while on diuretics  Remain on oxygen 2 L- taper off oxygen with Diuresis & IV heparin for PE    Resumed heparin drip as GI cleared  after EGD-Continue serial 
      Hospitalist Progress Note    NAME:   Hyacinth Patterson   : 1955   MRN: [id number]     Date/Time: 2025 12:51 PM  Patient PCP: Jeffery Alexis MD    Estimated discharge date:  Barriers:       Assessment / Plan:  Hypotension, concern for hypovolemic shock versus obstructive shock  Patient diastolic blood pressures noticed to be in the 30s with MAP 58, although systolic blood pressure is in the 100  Patient is mentating well   Albumin ordered  Spoke with cardiology who recommended to hold aspirin Plavix, heparin drip discontinued by cardiology as patient continued bleeding is high  GI consulted  Hemoglobin was noticed to be trending down, repeat H&H pending  Called ICU for evaluation  Cardiology started Levophed  Spoke with intensivist, blood pressure after albumin is improved  DC Levophed  Repeat H&H is 6.6, will give a unit of PRBC  Continue to hold aspirin Plavix per cardiology  Check stool occult blood    Right upper lobe PE  Pulmonary edema 2/2 CHF exacerbation  Bilateral pleural effusion  CHF exacerbation  HFrEF 35%  Acute hypoxic respiratory failure 2/2 RUL PE, CHF exacerbation, hypertensive urgency, B/L pleural effusion  --Symptoms of shortness of breath on exertion, chest pains  - proBNP 24,000  - Troponin 97  - CTA chest shows small right upper lobe PE, no deviation of the interventricular septum.  Pulmonary edema with right greater than the left pleural effusion  -CMP nonactionable,  - CBC negative for leukocytosis  -Was started on heparin and Lasix at the outside facility, currently on hold  - C/W Lasix twice daily, monitor electrolytes while on diuretics  -Repeat x-ray tomorrow morning if no improvement with diuretics     Hypertensive urgency, improved  HTN  -Was in hypertensive urgency at the outside facility, that has improved once she arrived here  - Monitor resume blood pressure medications  -Was on carvedilol before was discontinued per cardiology as per patient  -hold metoprolol, 
      Hospitalist Progress Note    NAME:   Hyacinth Patterson   : 1955   MRN: [id number]     Date/Time: 5/15/2025 12:04 PM  Patient PCP: Jeffery Alexis MD    Estimated discharge date:  Barriers:       Assessment / Plan:  Hypotension, concern for hypovolemic shock versus obstructive shock resolved  Patient diastolic blood pressures noticed to be in the 30s with MAP 58, although systolic blood pressure is in the 100  Patient is mentating well   Albumin ordered  Spoke with cardiology who recommended to hold aspirin Plavix, heparin drip discontinued by cardiology as patient continued bleeding is high  GI consulted  Hemoglobin was noticed to be trending down, repeat H&H pending  Called ICU for evaluation  Cardiology started Levophed  Spoke with intensivist, blood pressure after albumin is improved  DC Levophed  Repeat H&H is 6.6, will give a unit of PRBC  Continue to hold aspirin Plavix per cardiology  Check stool occult blood  05/15: Status post 2 unit of PRBC, repeat hemoglobin 8.1, stool occult blood positive  By cardiology, GI planning to do EGD tomorrow, might need an later date post enteroscopy due to history of bleeding AVM  Continue to hold Plavix and aspirin for now    Right upper lobe PE  Pulmonary edema 2/2 CHF exacerbation  Bilateral pleural effusion  CHF exacerbation  HFrEF 35%  Acute hypoxic respiratory failure 2/2 RUL PE, CHF exacerbation, hypertensive urgency, B/L pleural effusion  --Symptoms of shortness of breath on exertion, chest pains  - proBNP 24,000  - Troponin 97  - CTA chest shows small right upper lobe PE, no deviation of the interventricular septum.  Pulmonary edema with right greater than the left pleural effusion  -CMP nonactionable,  - CBC negative for leukocytosis  -Was started on heparin and Lasix at the outside facility, currently on hold  - C/W Lasix twice daily, monitor electrolytes while on diuretics  Remain on oxygen 2 L    Hypertensive urgency, improved  HTN  -Was in 
      Hospitalist Progress Note    NAME:   Hyacinth Patterson   : 1955   MRN: [id number]     Date/Time: 2025 3:18 PM  Patient PCP: Jeffery Alexis MD    Estimated discharge date:   Barriers: cards recs, gi recs, imrpov of hb       Assessment / Plan:      Hypotension due to hemorrhagic shock  Acute lower GI bleed    Patient diastolic blood pressures noticed to be in the 30s with MAP 58, although systolic blood pressure is in the 100  Patient is mentating well   Albumin ordered  Spoke with cardiology who recommended to hold aspirin Plavix, heparin drip discontinued by cardiology as patient continued bleeding is high  GI consulted  Hemoglobin was noticed to be trending down, repeat H&H pending  Called ICU for evaluation  Cardiology started Levophed  Spoke with intensivist, blood pressure after albumin is improved  DC Levophed  Repeat H&H is 6.6, will give a unit of PRBC  Continue to hold aspirin Plavix per cardiology  Check stool occult blood  05/15: Status post 2 unit of PRBC, repeat hemoglobin 8.1, stool occult blood positive  By cardiology, GI planning to do EGD tomorrow, might need an later date post enteroscopy due to history of bleeding AVM  Continue to hold Plavix and aspirin for now    : Had endoscopy today and noted to have 4 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  GI recommendations noted.  Resume regular diet.  GI recommended to resume anticoagulants.  Consider octreotide.  GI will consider repeating PillCam as the next step.  Continue serial hemoglobin monitoring.    Right upper lobe PE  Pulmonary edema 2/2 CHF exacerbation  Bilateral pleural effusion  CHF exacerbation  HFrEF 35%  Acute hypoxic respiratory failure 2/2 RUL PE, CHF exacerbation, hypertensive urgency, B/L pleural effusion  --Symptoms of shortness of breath on exertion, chest pains  - proBNP 24,000  - Troponin 97  - CTA chest shows small right upper lobe PE, no deviation of the 
0725--Bedside shift change report given to ADRIANA Manning (oncoming nurse) by ADRIANA Denise (offgoing nurse). Report included the following information Nurse Handoff Report.        1030--TRANSFER - OUT REPORT:    Verbal report given to ADRIANA Vasquez on Hyacinth Patterson  being transferred to endoscopy for ordered procedure       Report consisted of patient's Situation, Background, Assessment and   Recommendations(SBAR).     Information from the following report(s) Nurse Handoff Report was reviewed with the receiving nurse.           Lines:   Peripheral IV 05/15/25 Right;Upper Cephalic (Active)   Site Assessment Clean, dry & intact 05/16/25 0400   Line Status Flushed;Capped 05/16/25 0400   Line Care Cap changed;Connections checked and tightened;Chlorhexidine wipes;Line pulled back;Ports disinfected 05/16/25 0400   Phlebitis Assessment Erythema at access site with or without pain 05/16/25 0400   Infiltration Assessment 0 05/16/25 0400   Alcohol Cap Used Yes 05/16/25 0400   Dressing Status Clean, dry & intact 05/16/25 0400   Dressing Type Transparent 05/15/25 2000        Opportunity for questions and clarification was provided.      Patient transported with:  Monitor    Patient off floor for EGD.       End of Shift Note    Bedside shift change report given to ADRIANA Denise (oncoming nurse) by Kerri Richardson RN and ADRIANA Jordan (offgoing nurse).  Report included the following information SBAR    Shift worked:  Day shift     Shift summary and any significant changes:     Patient nauseous after EGD and had elevated HR and RR. MD made aware and EKG obtained. Per MD, metoprolol given. PRN Zofran given for nausea.  Oxygen at 3 L NC.   Concerns for physician to address:  ---HR management  --plan from GI     Zone phone for oncoming shift:   [phone]       Activity:  Level of Assistance: Standby assist, set-up cues, supervision of patient - no hands on  Number times ambulated in hallways past shift: 0  Number of times OOB to chair past shift: 1    Cardiac: 
1220 pt has removed herself from the monitor in preparation of discharge.     1230 waiting on transportation setup.    1245 transport set up for 1315    1300 PIV removed    1320 Discharge paperwork reviewed with patient. Medication and follow up directions reviewed. No questions from patient at this time. Patient verbalized understanding of the information. Patient safely transported to awaiting [employer] vehicle outside with family member driving.   
AntiXa is critical >1.5. Notified primary RN and spoke to eBcky in pharmacy. Paused heparin for 1 hours. Will then decrease rate by 3 units/kg/hr; new rate will be 15 untis/kg/hr (see MAR protocol)  
Bedside shift change report given to ADRIANA Manning and ADRIANA Jordan (oncoming nurse) by ADRIANA Clark (offgoing nurse). Report included the following information Nurse Handoff Report and Cardiac Rhythm NSR to sinus tachycardia    0745--ADRIANA Jordan will be charting on this patient.    1945--This RN agrees with ADRIANA Jordan's charting.     End of Shift Note    Bedside shift change report given to ADRIANA Denise (oncoming nurse) by Kerri Richardson RN and ADRIANA Jordan (offgoing nurse).  Report included the following information SBAR    Shift worked:  Day shift     Shift summary and any significant changes:     Patient to have EGD tomorrow. Repeat hemoglobin 8.6.    Concerns for physician to address:  --EGD tomorrow     Zone phone for oncoming shift:   xx       Activity:  Level of Assistance: Standby assist, set-up cues, supervision of patient - no hands on  Number times ambulated in hallways past shift: 0  Number of times OOB to chair past shift: 1    Cardiac:   Cardiac Monitoring: Yes      Cardiac Rhythm: Sinus rhythm    Access:  Current line(s): PIV     Genitourinary:   Urinary Status: Voiding, External catheter    Respiratory:   O2 Device: Nasal cannula  Chronic home O2 use?: NO  Incentive spirometer at bedside:     GI:  Last BM (including prior to admit): 05/14/25  Current diet:  ADULT DIET; Regular; No Added Salt (3-4 gm); GI [place] (GERD/Peptic Ulcer)  Diet NPO Exceptions are: Sips of Water with Meds  Passing flatus: YES    Pain Management:   Patient states pain is manageable on current regimen: YES    Skin:  Edenilson Scale Score: 19  Interventions: Wound Offloading (Prevention Methods): Repositioning, Pillows    Patient Safety:  Fall Risk: Nursing Judgement-Fall Risk High(Add Comments): Yes  Fall Risk Interventions  Nursing Judgement-Fall Risk High(Add Comments): Yes  Toilet Every 2 Hours-In Advance of Need: Yes  Hourly Visual Checks: In bed, Awake  Fall Visual Posted: Armband  Room Door Open: Deferred to promote rest  Alarm On: 
Chart review only    Will get labs to check renal function    Thank you for allowing us to participate in the care of this patient.  We will follow peripherally  
Chart reviewed. Spoke to RN who reports that pt is functioning at her baseline independent level and has no skilled therapy needs. Pt has been independently ambulating to bathroom and exhibiting steady gait, per RN report. Will complete PT order at this time however please re-consult if need arises. Thank you    Ambar Dietz, PT, DPT  
End of Shift Note    Bedside shift change report given to ADRIANA Clark (oncoming nurse) by Nelly Conley RN (offgoing nurse).  Report included the following information SBAR, Intake/Output, MAR, Med Rec Status, Cardiac Rhythm  , and Alarm Parameters     Shift worked:  [id number]     Shift summary and any significant changes:    atient BP soft but stable. Seen by cards, GI, intensivist, and hematology during this RN shift. Heparin gtt d/c and plavix on hold due to suspecting GI bleed. Hgb 6.6. 1 PRBC infusing. Fecal occult sample sent to lab.       Concerns for physician to address:  lethargy     Zone phone for oncoming shift:   [phone]         Activity:  Level of Assistance: Minimal assist, patient does 75% or more  Number times ambulated in hallways past shift: 0  Number of times OOB to chair past shift: 1    Cardiac:   Cardiac Monitoring: Yes      Cardiac Rhythm: Sinus rhythm    Access:  Current line(s): PIV     Genitourinary:   Urinary Status: Voiding    Respiratory:   O2 Device: Nasal cannula  Chronic home O2 use?: NO  Incentive spirometer at bedside: YES    GI:  Last BM (including prior to admit): 05/14/25  Current diet:  ADULT DIET; Full Liquid; No Added Salt (3-4 gm)  Passing flatus: YES    Pain Management:   Patient states pain is manageable on current regimen: YES    Skin:  Edenilson Scale Score: 19  Interventions: Wound Offloading (Prevention Methods): Repositioning, Pillows    Patient Safety:  Fall Risk: Nursing Judgement-Fall Risk High(Add Comments): Yes  Fall Risk Interventions  Nursing Judgement-Fall Risk High(Add Comments): Yes  Toilet Every 2 Hours-In Advance of Need: Yes  Hourly Visual Checks: In bed, Eyes closed  Fall Visual Posted: Armband  Room Door Open: Deferred to promote rest  Alarm On: Bed    Active Consults:   IP CONSULT TO HEART FAILURE NURSE/COORDINATOR  IP CONSULT TO CARDIOLOGY  IP CONSULT TO GI  IP CONSULT TO INTENSIVIST  IP CONSULT TO HEMATOLOGY    Length of Stay:  Expected LOS: 3  Actual 
End of Shift Note    Bedside shift change report given to Camelia WRIGHT (oncoming nurse) by Julian Marley RN (offgoing nurse).  Report included the following information SBAR    Shift worked:  Day shfit     Shift summary and any significant changes:    Uneventful shift. Pt given IV lasix. Used purewick. 1x assist. Getting EGD tomorrow.      Concerns for physician to address:    Zone phone for oncoming shift:       Activity:  Level of Assistance: Standby assist, set-up cues, supervision of patient - no hands on  Number times ambulated in hallways past shift: 0  Number of times OOB to chair past shift: 1    Cardiac:   Cardiac Monitoring: Yes      Cardiac Rhythm: Sinus rhythm    Access:  Current line(s): PIV    Genitourinary:   Urinary Status: Voiding, External catheter    Respiratory:   O2 Device: Nasal cannula  Chronic home O2 use?: NO  Incentive spirometer at bedside: NO    GI:  Last BM (including prior to admit): 05/14/25  Current diet:  ADULT DIET; Regular; No Added Salt (3-4 gm); GI [place] (GERD/Peptic Ulcer)  Diet NPO Exceptions are: Sips of Water with Meds  Passing flatus: YES    Pain Management:   Patient states pain is manageable on current regimen: YES    Skin:  Edenilson Scale Score: 19  Interventions: Wound Offloading (Prevention Methods): Repositioning, Pillows    Patient Safety:  Fall Risk: Nursing Judgement-Fall Risk High(Add Comments): Yes  Fall Risk Interventions  Nursing Judgement-Fall Risk High(Add Comments): Yes  Toilet Every 2 Hours-In Advance of Need: Yes  Hourly Visual Checks: In bed, Awake  Fall Visual Posted: Armband  Room Door Open: Deferred to promote rest  Alarm On: Bed    Active Consults:   IP CONSULT TO HEART FAILURE NURSE/COORDINATOR  IP CONSULT TO CARDIOLOGY  IP CONSULT TO GI  IP CONSULT TO INTENSIVIST  IP CONSULT TO HEMATOLOGY    Length of Stay:  Expected LOS: 6  Actual LOS: 2    Julian Marley, RN      
End of Shift Note    Bedside shift change report given to Camelia WRIGHT (oncoming nurse) by Julian Marley RN (offgoing nurse).  Report included the following information SBAR    Shift worked:  Day shift     Shift summary and any significant changes:     Patient went to get EGD. Patient was put on 3L post EGD. Vomited once post EGD. Tachy post emesis. Metoprolol was unheld and given.      Concerns for physician to address:    Zone phone for oncoming shift:       Activity:  Level of Assistance: Standby assist, set-up cues, supervision of patient - no hands on  Number times ambulated in hallways past shift: 0  Number of times OOB to chair past shift: 0    Cardiac:   Cardiac Monitoring: Yes      Cardiac Rhythm: Sinus tachy, Sinus rosa    Access:  Current line(s): PIV    Genitourinary:   Urinary Status: Voiding    Respiratory:   O2 Device: Nasal cannula  Chronic home O2 use?: NO  Incentive spirometer at bedside: YES    GI:  Last BM (including prior to admit): 05/15/25  Current diet:  ADULT DIET; Regular; No Added Salt (3-4 gm); GI [place] (GERD/Peptic Ulcer)  Passing flatus: YES    Pain Management:   Patient states pain is manageable on current regimen: YES    Skin:  Edenilson Scale Score: 20  Interventions: Wound Offloading (Prevention Methods): Pillows, Repositioning, Turning    Patient Safety:  Fall Risk: Nursing Judgement-Fall Risk High(Add Comments): Yes  Fall Risk Interventions  Nursing Judgement-Fall Risk High(Add Comments): Yes  Toilet Every 2 Hours-In Advance of Need: Yes  Hourly Visual Checks: Awake, In bed  Fall Visual Posted: Fall sign posted, Socks  Room Door Open: Deferred to promote rest  Alarm On: Bed  Patient Moved Closer to Nursing Station: No    Active Consults:   IP CONSULT TO HEART FAILURE NURSE/COORDINATOR  IP CONSULT TO CARDIOLOGY  IP CONSULT TO GI  IP CONSULT TO INTENSIVIST  IP CONSULT TO HEMATOLOGY  IP CONSULT TO CASE MANAGEMENT    Length of Stay:  Expected LOS: 5  Actual LOS: 3    Julian Marley RN      
End of Shift Note    Bedside shift change report given to Camelia WRIGHT(oncoming nurse) by Zach Dumont RN (offgoing nurse).  Report included the following information SBAR    Shift worked:  1515- 1900     Shift summary and any significant changes:     Handoff received. Heparin infusing. New bag hung at shift change. Per report, Anti XA therapeutic times two and the next level will be due with morning labs. No signs of bleeding. Ongoing care provided.     Concerns for physician to address:       Zone phone for oncoming shift:          Activity:  Level of Assistance: Standby assist, set-up cues, supervision of patient - no hands on  Number times ambulated in hallways past shift: 0  Number of times OOB to chair past shift: 1    Cardiac:   Cardiac Monitoring: Yes      Cardiac Rhythm: Sinus rhythm    Access:  Current line(s): PIV     Genitourinary:   Urinary Status: Voiding    Respiratory:   O2 Device: None (Room air)  Chronic home O2 use?: NO  Incentive spirometer at bedside: NO    GI:  Last BM (including prior to admit): 05/17/25  Current diet:  ADULT DIET; Regular; No Added Salt (3-4 gm); GI [place] (GERD/Peptic Ulcer); Do not send oatmeal or hot cereal, cold cereal ok  Passing flatus: YES    Pain Management:   Patient states pain is manageable on current regimen: YES    Skin:  Edenilson Scale Score: 18  Interventions: Wound Offloading (Prevention Methods): Pillows, Repositioning, Turning    Patient Safety:  Fall Risk: Nursing Judgement-Fall Risk High(Add Comments): Yes  Fall Risk Interventions  Nursing Judgement-Fall Risk High(Add Comments): Yes  Toilet Every 2 Hours-In Advance of Need: Yes  Hourly Visual Checks: Awake, In bed  Fall Visual Posted: Armband, Socks  Room Door Open: Deferred to decrease stimulation  Alarm On: Bed  Patient Moved Closer to Nursing Station: No    Active Consults:   IP CONSULT TO HEART FAILURE NURSE/COORDINATOR  IP CONSULT TO CARDIOLOGY  IP CONSULT TO GI  IP CONSULT TO INTENSIVIST  IP CONSULT 
End of Shift Note    Bedside shift change report given to Deisy (oncoming nurse) by BUSHRA LEWIS, RN (offgoing nurse).  Report included the following information SBAR    Shift worked:  7p-7a     Shift summary and any significant changes:     Pt continues on Eliquis     Concerns for physician to address:       Zone phone for oncoming shift:          Activity:  Level of Assistance: Standby assist, set-up cues, supervision of patient - no hands on  Number times ambulated in hallways past shift: 2  Number of times OOB to chair past shift: 1    Cardiac:   Cardiac Monitoring: Yes      Cardiac Rhythm: Sinus rhythm    Access:  Current line(s): PIV     Genitourinary:   Urinary Status: External catheter    Respiratory:   O2 Device: None (Room air)  Chronic home O2 use?: NO  Incentive spirometer at bedside: YES    GI:  Last BM (including prior to admit): 05/18/25  Current diet:  ADULT DIET; Regular; No Added Salt (3-4 gm); GI [place] (GERD/Peptic Ulcer); Do not send oatmeal or hot cereal, cold cereal ok  Passing flatus: YES    Pain Management:   Patient states pain is manageable on current regimen: YES    Skin:  Edenilson Scale Score: 20  Interventions: Wound Offloading (Prevention Methods): Repositioning    Patient Safety:  Fall Risk: Nursing Judgement-Fall Risk High(Add Comments): Yes  Fall Risk Interventions  Nursing Judgement-Fall Risk High(Add Comments): Yes  Toilet Every 2 Hours-In Advance of Need: Yes  Hourly Visual Checks: Agitated  Fall Visual Posted: Armband, Socks  Room Door Open: Deferred to decrease stimulation  Alarm On: Bed  Patient Moved Closer to Nursing Station: No    Active Consults:   IP CONSULT TO HEART FAILURE NURSE/COORDINATOR  IP CONSULT TO CARDIOLOGY  IP CONSULT TO GI  IP CONSULT TO INTENSIVIST  IP CONSULT TO HEMATOLOGY  IP CONSULT TO CASE MANAGEMENT    Length of Stay:  Expected LOS: 5  Actual LOS: 6    BUSHRA LEWIS, RN                           
End of Shift Note    Bedside shift change report given to Funmi (oncoming nurse) by EOL GARZA RN (offgoing nurse).  Report included the following information SBAR, Kardex, and MAR    Shift worked:  7a-7p     Shift summary and any significant changes:     Pt transitioned to Eliquis after 2 theraputic Anti-Xa results.     Concerns for physician to address:       Zone phone for oncoming shift:          Activity:  Level of Assistance: Standby assist, set-up cues, supervision of patient - no hands on  Number times ambulated in hallways past shift: 0  Number of times OOB to chair past shift: 0    Cardiac:   Cardiac Monitoring: Yes      Cardiac Rhythm: Sinus rhythm    Access:  Current line(s): PIV     Genitourinary:   Urinary Status: [place]    Respiratory:   O2 Device: None (Room air)  Chronic home O2 use?: NO  Incentive spirometer at bedside: NO    GI:  Last BM (including prior to admit): 05/18/25  Current diet:  ADULT DIET; Regular; No Added Salt (3-4 gm); GI [place] (GERD/Peptic Ulcer); Do not send oatmeal or hot cereal, cold cereal ok  Passing flatus: YES    Pain Management:   Patient states pain is manageable on current regimen: N/A    Skin:  Edenilson Scale Score: 20  Interventions: Wound Offloading (Prevention Methods): Repositioning    Patient Safety:  Fall Risk: Nursing Judgement-Fall Risk High(Add Comments): Yes  Fall Risk Interventions  Nursing Judgement-Fall Risk High(Add Comments): Yes  Toilet Every 2 Hours-In Advance of Need: Yes  Hourly Visual Checks: Agitated  Fall Visual Posted: Armband, Socks  Room Door Open: Deferred to decrease stimulation  Alarm On: Bed  Patient Moved Closer to Nursing Station: No    Active Consults:   IP CONSULT TO HEART FAILURE NURSE/COORDINATOR  IP CONSULT TO CARDIOLOGY  IP CONSULT TO GI  IP CONSULT TO INTENSIVIST  IP CONSULT TO HEMATOLOGY  IP CONSULT TO CASE MANAGEMENT    Length of Stay:  Expected LOS: 5  Actual LOS: 5    ELO GARZA RN                            
End of Shift Note    Bedside shift change report given to Nelly RWIGHT (oncoming nurse) by Amber Batista RN (offgoing nurse).  Report included the following information SBAR, Intake/Output, MAR, Recent Results, and Cardiac Rhythm NSR    Shift worked:  7p-7a     Shift summary and any significant changes:     No events overnight, hep gtt now at 15 u. Next anti-xa @ 0730     Concerns for physician to address:       Zone phone for oncoming shift:          Activity:  Level of Assistance: Minimal assist, patient does 75% or more  Number times ambulated in hallways past shift: 0  Number of times OOB to chair past shift: 0    Cardiac:   Cardiac Monitoring: Yes      Cardiac Rhythm: Sinus rhythm    Access:  Current line(s): PIV     Genitourinary:   Urinary Status: Voiding, External catheter    Respiratory:   O2 Device: Nasal cannula  Chronic home O2 use?: NO  Incentive spirometer at bedside: N/A    GI:     Current diet:  ADULT DIET; Regular; No Added Salt (3-4 gm)  Passing flatus: YES    Pain Management:   Patient states pain is manageable on current regimen: N/A    Skin:  Edenilson Scale Score: 19  Interventions: Wound Offloading (Prevention Methods): Repositioning    Patient Safety:  Fall Risk: Nursing Judgement-Fall Risk High(Add Comments): Yes  Fall Risk Interventions  Nursing Judgement-Fall Risk High(Add Comments): Yes  Toilet Every 2 Hours-In Advance of Need: Yes  Hourly Visual Checks: Awake  Fall Visual Posted: Armband  Room Door Open: Deferred to promote rest  Alarm On: Bed    Active Consults:   IP CONSULT TO HEART FAILURE NURSE/COORDINATOR  IP CONSULT TO CARDIOLOGY    Length of Stay:  Expected LOS: 3  Actual LOS: 1    Amber Batista RN                           
End of Shift Note    Bedside shift change report given to Sunny WRIGHT (oncoming nurse) by Amber Batista RN (offgoing nurse).  Report included the following information SBAR, Intake/Output, MAR, Recent Results, and Cardiac Rhythm NSR    Shift worked:  7p-7a     Shift summary and any significant changes:     No events overnight, fecal occult positive. Hgb improved after 1 unit of blood.     Concerns for physician to address:       Zone phone for oncoming shift:          Activity:  Level of Assistance: Standby assist, set-up cues, supervision of patient - no hands on  Number times ambulated in hallways past shift: 0  Number of times OOB to chair past shift: 0    Cardiac:   Cardiac Monitoring: Yes      Cardiac Rhythm: Sinus rhythm    Access:  Current line(s): PIV     Genitourinary:   Urinary Status: Voiding    Respiratory:   O2 Device: Nasal cannula  Chronic home O2 use?: NO  Incentive spirometer at bedside: YES    GI:  Last BM (including prior to admit): 05/14/25  Current diet:  ADULT DIET; Full Liquid; No Added Salt (3-4 gm)  Passing flatus: YES    Pain Management:   Patient states pain is manageable on current regimen: N/A    Skin:  Edenilson Scale Score: 19  Interventions: Wound Offloading (Prevention Methods): Pillows, Repositioning    Patient Safety:  Fall Risk: Nursing Judgement-Fall Risk High(Add Comments): Yes  Fall Risk Interventions  Nursing Judgement-Fall Risk High(Add Comments): Yes  Toilet Every 2 Hours-In Advance of Need: Yes  Hourly Visual Checks: In bed, Awake  Fall Visual Posted: Armband  Room Door Open: Deferred to promote rest  Alarm On: Bed    Active Consults:   IP CONSULT TO HEART FAILURE NURSE/COORDINATOR  IP CONSULT TO CARDIOLOGY  IP CONSULT TO GI  IP CONSULT TO INTENSIVIST  IP CONSULT TO HEMATOLOGY    Length of Stay:  Expected LOS: 3  Actual LOS: 2    Amber Batista, RN                           
Endoscopy Case End Note:    1204:  Procedure scope was pre-cleaned, per protocol, at bedside by MOUNA MARTINEZ      1204:  Report received from anesthesia - CHHAYA Ochoa CRNA.  See anesthesia flowsheet for intra-procedure vital signs and events.  
Endoscopy recovery  Patient returned to baseline, vital signs stable (see vital sign flowsheet). Patient offered liquids and tolerated well. Respiratory- breathing shallow 12-16 and on Nasal canulla at 3 lpm. O2 sats - 92-95%. Abdomen soft not tender. Skin with in defined limits.       1302-  Transport here to return pt to 2312. On Tele box and Oxygen at 3 lpm.    
Heparin drip - at wrong rate. Rate and units reversed.     Paused drip and called Pharm.    Pharm advised to restart at 18 units.     1950 pump restarted  
Hospital follow-up CARDIO transitional care appointment has been scheduled with Dr. Sagar Borges on 6/11/25 at 1400  . This is the first available appt due to limited provider availability. PCP office does not offer alternate provider option for hospital follow up.  Pending patient discharge. Dari Poon, Care Management Assistant   
Hospital follow-up PCP transitional care appointment has been scheduled with Dr. Jeffery Alexis on 5/22/25 at 1330. This is the first available appt due to limited provider availability. PCP office does not offer alternate provider option for hospital follow up. Pending patient discharge. Dari Poon, Care Management Assistant   
I reviewed the patient's medical history, the findings on physical examination, the patient's diagnoses, and treatment plan as documented in the note.  I concur with the treatment plan as documented.  Additional suggestions noted.    Plan for EGD+push enteroscopy tomorrow.  NPO after MN.  Transfuse to keep Hgb > 7.     M. Bilal Bob MOTA  [facility]        GI PROGRESS NOTE  Dakota Snow PA-C  [phone] NP in-hospital cell phone M-F until 4:30  After 5pm or on weekends, please call  for physician on call    NAME:Hyacinth Patterson :1955 MRN:[id number]   ATTG: [unfilled]   PCP: Jeffery Alexis MD  Date/Time:  5/15/2025 12:32 PM     Reason for following: Acute on Chronic anemia    Assessment:   Acute on Chronic anemia  Small Pulmonary Embolism  w/ one time heparin bolus  (hep ggt at OSH)  Hx of recent PCI on DAPT last dose plavix  AM  Extensive hx of small bowel/gastric AVMs w/ numerous APCs of this.   VSS   HgB 7.6>6.6>8.1 (in line with baseline), was 6.2 2025 and EGD 2 days later was unremarkable  MCV nl  BUNCr nl w/ CKD  CT AP w/ 2025  1. Small right upper lobe PE. No deviation of the interventricular septum  2. Pulmonary edema with right greater left pleural effusions  3. 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 Biopsied.  - Nodule found in the duodenum. Removal was not attempted due to patient is on Plavix.  EGD 2025  - Normal examined duodenum.  - Three recently bleeding angiodysplastic lesions in the stomach. Treated with argon plasma coagulation  (APC).  - Normal esophagus.  - No specimens collected.      that showed gastritis (biopsied, path w/ negligible inflammation likely bile reflux) and a duodenal nodule for which outpatient EGD was advised   Push Enteroscopy 24 with Dr. Goldman:  Impression:    -Normal esophagus; biopsied to exclude inflammation  -Minimal gastric 
ICU was consulted for low diastolic BP.     I assessed the pt at bedside.     She reports that she is feeling little weak otherwise denies any chest pain, sob, nausea, vomiting.     PE:   General: Chronically ill appearing female, NAD   HEENT: Dry mucus membranes   Neck: Supple, no JVD   Abdomen:Soft, NT, ND   Neuro: aaox3       A/P:     # Hypotension:   Likely 2/2 hypovolemia   POCUS done, EF appears to be 15-20%. IVC collapsing. Fine with IV hydration   Hb came back 6.6, will recommended to give 1 unit of PRBC   CTA C/A/P -ve for any intra abdominal bleed   Pt did respond to IVF bolus. Please call us back with questions   Lactate checked ( -ve )   Thanks for consult   
Occupational therapy  Orders received and medical record reviewed.  Nurse reports pt is generally independent and here for testing.  Pt reports that today is the first day she's been OOB.  Encouraged pt to be up in chair for her meals and to call for assist when needing to walk to the bathroom.  Pt reports that when she leaves the hospital she is planning to go to cardiopulmonary rehab.  Pt has no complaints and appears comfortable reclined in the chair.   No further acute OT needs at this time; no need to initiate formal OT Evaluation.  Will complete the OT orders and d/c.  4070 - 2613.    
Orders received, chart reviewed. Pt admitted with + PE and currently on heparin drip. Anti-Xa at 06:52 5/14/25 is 1.42 which is outside therapeutic range for safe mobility/participation with therapy. Will defer however continue to follow.    Ambar Dietz, PT, DPT  
Orders reieved and chart reviewed.  Noted that pt is positive for PE and currently on heparin drip. Anit-Xa is 06:52 5/14/25 is 1.42 which is outside therapeutic range for safe mobility.  Will defer but continue to follow.       
Patient Heparin drip at the wrong rate. Rate was never adjusted after lab results at 1037. Labs called after shift change to confirm that original 0637 lab draw hemolyzed per dayshift charge. Labs redrawn by day nurse.  Redraw resulted and never was never adjusted.  Reviewed pump settings while getting v/s and confirmed  current running rate at 17 units. Reviewed Mar for orders order parameters and noticed no adjusts. Immediately notified Charge nurse and called pharm for rate corrections.     Pumped adjusted ( see Mar for accurate times.)    Next lab raw 6q from rate adjustment.    
Pharm called - Pt Xa .76 decrease units by 1.     Pump currently running at 18 units.  
Predictive Model Details          26 (Normal)  Factor Value    Calculated 5/18/2025 05:18 43% Age 69 years old    Deterioration Index Model 25% Respiratory rate 13     13% WBC count abnormal (15.1 K/uL)     9% Hematocrit abnormal (25.8 %)     5% Pulse 96     3% Systolic 121     0% Pulse oximetry 97 %     0% Sodium 141 mmol/L     0% Potassium 4.0 mmol/L     0% Temperature 98.4 °F (36.9 °C)       End of Shift Note    Bedside shift change report given to ADRIANA Olivas (oncoming nurse) by Camelia Stone RN (offgoing nurse).  Report included the following information SBAR, ED Summary, Intake/Output, MAR, Recent Results, Med Rec Status, Cardiac Rhythm NSR, Alarm Parameters , and Quality Measures    Shift worked:  Night     Shift summary and any significant changes:    Heparin drip at wrong rate, never adjusted during dayshift     Heparin at therapeutic level- No rate change at 0130 draw    Next draw at 0730am- if therapeutic then can go to q12    Otherwise unremarkable shift     Concerns for physician to address:  See above     Zone phone for oncoming shift:   [phone]       Activity:  Level of Assistance: Standby assist, set-up cues, supervision of patient - no hands on  Number times ambulated in hallways past shift: 0  Number of times OOB to chair past shift: 2    Cardiac:   Cardiac Monitoring: Yes      Cardiac Rhythm: Sinus rhythm    Access:  Current line(s): PIV     Genitourinary:   Urinary Status: Voiding    Respiratory:   O2 Device: None (Room air)  Chronic home O2 use?: NO  Incentive spirometer at bedside: YES    GI:  Last BM (including prior to admit): 05/17/25  Current diet:  ADULT DIET; Regular; No Added Salt (3-4 gm); GI [place] (GERD/Peptic Ulcer); Do not send oatmeal or hot cereal, cold cereal ok  Passing flatus: YES    Pain Management:   Patient states pain is manageable on current regimen: YES    Skin:  Edenilson Scale Score: 20  Interventions: Wound Offloading (Prevention Methods): Pillows, Repositioning, 
Predictive Model Details          27 (Normal)  Factor Value    Calculated 5/16/2025 03:57 43% Supplemental oxygen Oxygen    Deterioration Index Model 43% Age 69 years old     4% Hematocrit abnormal (29.3 %)     3% Respiratory rate 17     2% Pulse oximetry 99 %     2% Potassium 3.6 mmol/L     2% Systolic 118     2% Pulse 88     0% Sodium 140 mmol/L     0% Temperature 98.5 °F (36.9 °C)     0% WBC count 5.9 K/uL     End of Shift Note    Bedside shift change report given to ADRIANA Manning (oncoming nurse) by Camelia Stone RN (offgoing nurse).  Report included the following information SBAR, ED Summary, Intake/Output, MAR, Accordion, Recent Results, Med Rec Status, Cardiac Rhythm NSR, Alarm Parameters , and Quality Measures    Shift worked:  Night     Shift summary and any significant changes:    Uneventful shift  Placed pt on 1L NC while sleeping  AM labs pending  NPO - EGD this morning  Signed and held orders in chart- not released       Concerns for physician to address:  See above     Zone phone for oncoming shift:  [phone]       Activity:  Level of Assistance: Standby assist, set-up cues, supervision of patient - no hands on  Number times ambulated in hallways past shift: 0  Number of times OOB to chair past shift: 0    Cardiac:   Cardiac Monitoring: Yes      Cardiac Rhythm: Sinus rhythm    Access:  Current line(s): PIV     Genitourinary:   Urinary Status: Voiding, Bathroom privileges    Respiratory:   O2 Device: Nasal cannula  Chronic home O2 use?: NO  Incentive spirometer at bedside: YES    GI:  Last BM (including prior to admit): 05/15/25  Current diet:  Diet NPO Exceptions are: Sips of Water with Meds  Passing flatus: YES    Pain Management:   Patient states pain is manageable on current regimen: YES    Skin:  Edenilson Scale Score: 19  Interventions: Wound Offloading (Prevention Methods): Elevate heels, Repositioning, Turning    Patient Safety:  Fall Risk: Nursing Judgement-Fall Risk High(Add Comments): Yes  Fall Risk 
Spiritual Health History and Assessment/Progress Note  [facility]    Initial Encounter,  , Life Adjustments, Adjustment to illness,      Name: Hyacinth Patterson MRN: [id number]    Age: 69 y.o.     Sex: female   Language: English   Methodist: Sabianism   Acute exacerbation of chronic heart failure (HCC)     Date: 5/15/2025            Total Time Calculated: 19 min              Spiritual Assessment began in [facility] PROGRESSIVE CARE        Referral/Consult From: Rounding   Encounter Overview/Reason: Initial Encounter  Service Provided For: Patient    Melanie, Belief, Meaning:   Patient is connected with a melanie tradition or spiritual practice and has beliefs or practices that help with coping during difficult times  Family/Friends No family/friends present      Importance and Influence:  Patient has spiritual/personal beliefs that influence decisions regarding their health  Family/Friends No family/friends present    Community:  Patient is connected with a spiritual community and feels well-supported. Support system includes: Extended family  Family/Friends No family/friends present    Assessment and Plan of Care:     Patient Interventions include: Facilitated expression of thoughts and feelings, Explored spiritual coping/struggle/distress, Engaged in theological reflection, Affirmed coping skills/support systems, and Facilitated life review and/ or legacy  Family/Friends Interventions include: No family/friends present    Patient Plan of Care: Spiritual Care available upon further referral  Family/Friends Plan of Care: No family/friends present    Electronically signed by JODIE Saba on 5/15/2025 at 3:14 PM   
TRANSFER - OUT REPORT:    Verbal report given to ADRIANNA Marley RN on Hyacinth Patterson  being transferred to PCU  for routine post-op       Report consisted of patient's Situation, Background, Assessment and   Recommendations(SBAR).     Information from the following report(s) Nurse Handoff Report, Surgery Report, and MAR was reviewed with the receiving nurse.    Lines:   Peripheral IV 05/15/25 Right;Upper Cephalic (Active)   Site Assessment Clean, dry & intact 05/16/25 1214   Line Status Normal saline locked 05/16/25 1214   Line Care Connections checked and tightened;Cap changed 05/16/25 1214   Phlebitis Assessment No symptoms 05/16/25 1214   Infiltration Assessment 0 05/16/25 1214   Alcohol Cap Used Yes 05/16/25 1214   Dressing Status Clean, dry & intact 05/16/25 1214   Dressing Type Transparent 05/16/25 1214        Opportunity for questions and clarification was provided.        
PRN    Or    ondansetron (ZOFRAN) injection 4 mg  4 mg IntraVENous Q6H PRN    polyethylene glycol (GLYCOLAX) packet 17 g  17 g Oral Daily PRN    acetaminophen (TYLENOL) tablet 650 mg  650 mg Oral Q6H PRN    Or    acetaminophen (TYLENOL) suppository 650 mg  650 mg Rectal Q6H PRN    oxyCODONE (ROXICODONE) immediate release tablet 5 mg  5 mg Oral Q6H PRN    acetaminophen (TYLENOL) tablet 975 mg  975 mg Oral 3 times per day    diclofenac sodium (VOLTAREN) 1 % gel 4 g  4 g Topical BID    lidocaine 4 % external patch 1 patch  1 patch TransDERmal Daily    melatonin tablet 3 mg  3 mg Oral Nightly PRN         Objective:      Physical Exam  Physical Exam  Constitutional:       General: She is not in acute distress.  HENT:      Head: Normocephalic and atraumatic.   Neck:      Vascular: Hepatojugular reflux present. No JVD.   Cardiovascular:      Rate and Rhythm: Normal rate and regular rhythm.   Pulmonary:      Effort: Pulmonary effort is normal.   Musculoskeletal:         General: No swelling.      Cervical back: Neck supple.   Skin:     General: Skin is warm and dry.   Neurological:      Mental Status: She is alert.            Data Review:   Recent Labs     05/16/25  1521 05/16/25  2341 05/17/25  0637 05/17/25  1037 05/17/25  1642 05/18/25  0145   WBC 12.5*  --  19.8*  --   --  15.1*   HGB 9.1*   < > 7.5* 8.2* 8.1* 7.5*   HCT 30.3*   < > 25.5* 27.2* 27.1* 25.8*     --  214  --   --  226    < > = values in this interval not displayed.     Recent Labs     05/16/25  0327 05/16/25  1704 05/17/25  0637 05/17/25  1642 05/18/25  0145     --  138 140 141   K 3.2*  --  3.1* 4.2 4.0     --  112* 108 110*   CO2 25  --  15* 25 25   BUN 15  --  10 16 15   CREATININE 1.07*  --  1.06* 1.38* 1.30*   MG 1.9  --  1.2*  --  2.1   INR  --  1.1  --   --   --        Intake/Output Summary (Last 24 hours) at 5/18/2025 1534  Last data filed at 5/18/2025 1506  Gross per 24 hour   Intake 225.48 ml   Output 1000 ml   Net -774.52 ml 
agitated.    Lab and Radiology Data Reviewed: (see below)    Medications Reviewed: (see below)  PMH/SH reviewed - no change compared to H&P  ________________________________________________________________________  Total time spent with patient: 15 minutes ________________________________________________________________________  Care Plan discussed with:  Patient y   Family     RN y              Consultant:       Jose Luis Goldman MD     Procedures: see electronic medical records for all procedures/Xrays and details which were not copied into this note but were reviewed prior to creation of Plan.      LABS:  Recent Labs     05/18/25  0145 05/19/25  0555   WBC 15.1* 9.4   HGB 7.5* 8.3*   HCT 25.8* 28.1*    234     Recent Labs     05/17/25  0637 05/17/25  1642 05/18/25  0145    140 141   K 3.1* 4.2 4.0   * 108 110*   CO2 15* 25 25   BUN 10 16 15   MG 1.2*  --  2.1     No results for input(s): \"TP\", \"GLOB\", \"GGT\" in the last 72 hours.    Invalid input(s): \"SGOT\", \"GPT\", \"AP\", \"TBIL\", \"ALB\", \"AML\", \"AMYP\", \"LPSE\", \"HLPSE\"  Recent Labs     05/16/25  1704   INR 1.1   APTT 28.9      No results for input(s): \"TIBC\" in the last 72 hours.    Invalid input(s): \"FE\", \"PSAT\", \"FERR\"   No results found for: \"RBCF\"  No results for input(s): \"PH\", \"PCO2\", \"PO2\" in the last 72 hours.  No results for input(s): \"CPK\", \"CKMB\", \"TROPONINI\" in the last 72 hours.  @labua@    MEDICATIONS:  Current Facility-Administered Medications   Medication Dose Route Frequency    apixaban (ELIQUIS) tablet 10 mg  10 mg Oral BID    Followed by    [START ON 5/25/2025] apixaban (ELIQUIS) tablet 5 mg  5 mg Oral BID    aspirin EC tablet 81 mg  81 mg Oral Daily    furosemide (LASIX) tablet 20 mg  20 mg Oral BID    magnesium oxide (MAG-OX) tablet 400 mg  400 mg Oral Daily    metoprolol succinate (TOPROL XL) extended release tablet 12.5 mg  12.5 mg Oral Daily    sennosides-docusate sodium (SENOKOT-S) 8.6-50 MG tablet 2 tablet  2 tablet Oral Daily

## 2025-05-19 NOTE — CARE COORDINATION
05/14/25 0854   Readmission Assessment   Number of Days since last admission? 8-30 days  (4/27/2025 - 5/1/2025 (4 days))   Previous Disposition Home with Family   Who is being Interviewed Patient   What was the patient's/caregiver's perception as to why they think they needed to return back to the hospital? Other (Comment)  (The patient reports chest pain)   Did you visit your Primary Care Physician after you left the hospital, before you returned this time? Yes  (Dr. Jeffery Alexis on 5/8/25 0800.)   Did you see a specialist, such as Cardiac, Pulmonary, Orthopedic Physician, etc. after you left the hospital? No   Who advised the patient to return to the hospital? Self-referral   Does the patient report anything that got in the way of taking their medications? No   In our efforts to provide the best possible care to you and others like you, can you think of anything that we could have done to help you after you left the hospital the first time, so that you might not have needed to return so soon? Additional Community resources available for illness support     Readmission Assessment  Number of Days since last admission?: 8-30 days (4/27/2025 - 5/1/2025 (4 days))  Previous Disposition: Home with Family  Who is being Interviewed: Patient  What was the patient's/caregiver's perception as to why they think they needed to return back to the hospital?: Other (Comment) (The patient reports chest pain)  Did you visit your Primary Care Physician after you left the hospital, before you returned this time?: Yes (Dr. Jeffery Alexis on 5/8/25 0800.)  Did you see a specialist, such as Cardiac, Pulmonary, Orthopedic Physician, etc. after you left the hospital?: No  Who advised the patient to return to the hospital?: Self-referral  Does the patient report anything that got in the way of taking their medications?: No  In our efforts to provide the best possible care to you and others like you, can you think of anything that we 
[employer] at [phone] to arrange Medicaid transport ; clear from cm otherwise    Transition of Care Plan:    RUR:  21% High RUR  Prior Level of Functioning: Independent in ADLs/IADLs    Disposition: Home with home health [facility] Phone: [phone] soc within 48 hours  TOMAS: 5/17  If SNF or IPR: Date FOC offered: na  Date FOC received:   Accepting facility:   Date authorization started with reference number:   Date authorization received and expires:   Follow up appointments: pcp/specialist   DME needed: none   Transportation at discharge: ([employer] Medicaid at [phone] to arrange Medicaid transport.)  IM/IMM Medicare/ letter given: 2nd IM 05/16  Is patient a  and connected with VA? na   If yes, was [place] transfer form completed and VA notified? na  Caregiver Contact:  Christina Romero (Niece/Nephew) [phone]  Discharge Caregiver contacted prior to discharge? The patient to contact  Care Conference needed? no  Barriers to discharge: medical clearance    [employer] Medicaid at [phone] to arrange Medicaid transport       Due to the patient's restricted insurance coverage within network agencies and the patient challenging geographic location, a mass referral has been made for home health services. A home health list of the accepting agencies will be provided provided, emphasizing the patient/caregiver right to choose any agency/facility within network to meet their preference. The  has requested the selection of five preferred options within 24 hours to ensure a smooth discharge process and urges contacting CM once the choices are finalized to avoid any delays  [facility] Phone: [phone] is the only accepting agency.     the patient has no prior history with home health, skilled nursing facilities (SNF), or inpatient rehabilitation (IPR).   Moreover, the patient is alert, able to communicate  
Readmission Assessment  Number of Days since last admission?: 8-30 days (4/27/2025 - 5/1/2025 (4 days))  Previous Disposition: Home with Family  Who is being Interviewed: Patient  Did you visit your Primary Care Physician after you left the hospital, before you returned this time?: (P) Yes (Dr. Jeffery Alexis on 5/8/25 0800.)  Did you see a specialist, such as Cardiac, Pulmonary, Orthopedic Physician, etc. after you left the hospital?: No  Who advised the patient to return to the hospital?: (P) Self-referral  Does the patient report anything that got in the way of taking their medications?: (P) No  In our efforts to provide the best possible care to you and others like you, can you think of anything that we could have done to help you after you left the hospital the first time, so that you might not have needed to return so soon?: (P) Additional Community resources available for illness support     05/14/25 0854   Readmission Assessment   Number of Days since last admission? 8-30 days  (4/27/2025 - 5/1/2025 (4 days))   Previous Disposition Home with Family   Who is being Interviewed Patient   Did you visit your Primary Care Physician after you left the hospital, before you returned this time? Yes  (Dr. Jeffery Alexis on 5/8/25 0800.)   Did you see a specialist, such as Cardiac, Pulmonary, Orthopedic Physician, etc. after you left the hospital? No   Who advised the patient to return to the hospital? Self-referral   Does the patient report anything that got in the way of taking their medications? No   In our efforts to provide the best possible care to you and others like you, can you think of anything that we could have done to help you after you left the hospital the first time, so that you might not have needed to return so soon? Additional Community resources available for illness support       
Assistance;Home Health;Discharge Planning  ([facility] Phone: [phone])   Internal Home Health No   Reason Outside Agency Chosen Script used patient chose alternate agency   Services At/After Discharge Home Health;Transport  ([facility] Phone: [phone])   [facility] Resource Information Provided? No   Mode of Transport at Discharge Other (see comment)   Confirm Follow Up Transport Family   Condition of Participation: Discharge Planning   The Plan for Transition of Care is related to the following treatment goals: Care Transitions: Facilitate smooth transitions of care between different healthcare settings, such as rehabilitation facilities, and home care, ensuring that patients receive appropriate follow-up care and support to prevent gaps in care and reduce the risk of adverse events.   The Patient and/or Patient Representative was provided with a Choice of Provider? Patient   The Patient and/Or Patient Representative agree with the Discharge Plan? Yes   Freedom of Choice list was provided with basic dialogue that supports the patient's individualized plan of care/goals, treatment preferences, and shares the quality data associated with the providers?  Yes  (home health)     The  (LEIGH) noted no futher needs or concerns. .         Etelvina Segura RN  Case Management  [phone]    
Provided? No   Mode of Transport at Discharge Other (see comment)  ([employer] Medicaid at [phone] to arrange Medicaid transport.)   Confirm Follow Up Transport Cab   Condition of Participation: Discharge Planning   The Plan for Transition of Care is related to the following treatment goals: Care Transitions: Facilitate smooth transitions of care between different healthcare settings, such as rehabilitation facilities, and home care, ensuring that patients receive appropriate follow-up care and support to prevent gaps in care and reduce the risk of adverse events.   The Patient and/or Patient Representative was provided with a Choice of Provider? Patient   The Patient and/Or Patient Representative agree with the Discharge Plan? Yes         The  (CM) conducted an initial meeting with the patient , formally introducing themselves and clarifying their role in discharge planning and transitional care. Demographic and insurance details were verified for accuracy. Notably, the patient has no prior history with home health, skilled nursing facilities (SNF), or inpatient rehabilitation (IPR).   Moreover, the patient is alert, able to communicate  needs effectively,  independent in ADLS/IADLS, and able to drive. She lives alone in a house that has 3 MUNA and does not use any DMEs. The patient stated that she could benefit from oxygen and a nurse to check on her once discharged.   [employer] Medicaid at [phone] to arrange Medicaid transport     Etelvina Segura RN  Case Management  [phone]

## 2025-05-19 NOTE — PROGRESS NOTES
JUSTINO  5/13-5/19  GI bleed  PE  [facility]        \"She was admitted to the hospital and noted to have acute hemorrhagic shock due to lower GI bleed.  Gastroenterology was consulted.  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  Patient was also noted to have acute pulmonary embolism and was started on heparin drip and GI clearance was obtained.  She was later transitioned to Eliquis.  She was also noted to have hypertensive urgency-Home medications were adjusted as below.  Cardiology was consulted given her recent stents and cardiology recommended to continue Eliquis and aspirin and recommended to discontinue Plavix for.  Case management notified me that she needs a prior authorization for her Eliquis but I called pharmacy and they told me that there is no need for prior authorization as long as she takes her 5 mg of Eliquis.  She can take 2 pills 2 times a day for the next 6 days to complete her 10 mg and can continue with 5 mg twice daily after that.\"      Duration 30 minutes primarily education, review of imaging, labs and records.    Assessment & Plan  1. Post-hospitalization follow-up.  - History of significant coronary artery disease, recent hospitalization for GI bleed and small pulmonary embolism.  - Reports feeling tired and experiencing black stools, indicating possible ongoing blood loss.  - Lungs are clear, cardiac exam is normal.  - Prescription for Eliquis will be sent to pharmacy. CBC will be conducted today to monitor hemoglobin levels. If condition improves by next week, referral for cardiac rehabilitation will be considered.    Follow-up  The patient will follow up in 1 week.          Chief Complaint   Patient presents with    Other     GI Bleed follow up          Orders Placed This Encounter   Procedures    CBC with Auto Differential     Standing Status:   Future     Expected Date:   5/22/2025     Expiration Date:   5/22/2026

## 2025-05-19 NOTE — DISCHARGE INSTRUCTIONS
Patient Discharge Instructions    Hyacinth Patterson / [id number] : 1955    Admitted 2025 Discharged: 2025         DISCHARGE DIAGNOSIS:   Hypokalemia- resolved, 4.0 today  Hypomagnesemia- - resolved, 2.1 today  Hypotension POA- resolved  due to hemorrhagic shock  Due to Acute lower GI bleed  Right upper lobe PE  Pulmonary edema 2/2 CHF exacerbation  Bilateral pleural effusion  CHF exacerbation  HFrEF 35%  Acute hypoxic respiratory failure 2/2 RUL PE, CHF exacerbation, hypertensive urgency, B/L pleural effusion  Hypertensive urgency,resolved  HTN  CAD s/p stents  ACS eval  NSTEMI  Hx DM2  Hx acute on chronic anemia  Hx schizophrenia  Hx chronic mesenteric ischemia  Hx PAD  HLD  GERD      Take Home Medications     {Medication reconciliation information is now added to the patient's AVS automatically when it is printed.  There is no need to use this SmartLink in discharge instructions.  Highlight this text and delete it to clear this message}      General drug facts     If you have a very bad allergy, wear an allergy ID at all times.   It is important that you take the medication exactly as they are prescribed.   Keep your medication in the bottles provided by the pharmacist.  Keep a list of all your drugs (prescription, natural products, vitamins, OTC) with you. Give this list to your doctor.  Do not take other medications without consulting your doctor.    Do not share your drugs with others and do not take anyone else's drugs.   Keep all drugs out of the reach of children and pets.    Most drugs may be thrown away in household trash after mixing with coffee grounds or deejay litter and sealing in a plastic bag.    Keep a list Call your doctor for help with any side effects. If in the [place] you may also call the FDA at 2-481-FDA-6981    Talk with the doctor before starting any new drug, including OTC, natural products, or vitamins.        What to do at Home    1. Recommended diet: regular     2.

## 2025-05-19 NOTE — DISCHARGE SUMMARY
[id number]  [phone]          Jose Luis Goldman MD  [address]  [place] [address] [phone]    Schedule an appointment as soon as possible for a visit      Roshni Hernandez MD  [address]  [place] [address] [phone]    Schedule an appointment as soon as possible for a visit            Total time in minutes spent coordinating this discharge (includes going over instructions, follow-up, prescriptions, and preparing report for sign off to her PCP) :  35 minutes

## 2025-05-20 ENCOUNTER — TELEPHONE (OUTPATIENT)
Age: 70
End: 2025-05-20

## 2025-05-20 NOTE — TELEPHONE ENCOUNTER
Care Transitions Initial Follow Up Call    Outreach made within 2 business days of discharge: Yes    Patient: Hyacinth Patterson Patient : 1955   MRN: [id number]  Reason for Admission: acute CHF  Discharge Date: 25       Spoke with: patient    Discharge department/facility: [facility] Interactive Patient Contact:  Was patient able to fill all prescriptions: Yes  Was patient instructed to bring all medications to the follow-up visit: Yes  Is patient taking all medications as directed in the discharge summary? Yes  Does patient understand their discharge instructions: Yes  Does patient have questions or concerns that need addressed prior to 7-14 day follow up office visit: no    Additional needs identified to be addressed with provider  No needs identified             Scheduled appointment with PCP within 7-14 days    Follow Up  Future Appointments   Date Time Provider Department Center   2025  1:30 PM Jeffery Alexis MD [facility] DEP   2025  2:20 PM Sagar Borges MD [facility] AMB   2025  8:00 AM Jeffery Alexis MD [facility] DEP   2025  1:45 PM Steve Stone MD [facility] AMB   2025  9:20 AM Sagar Borges MD [facility] AMB       HUNTER BARRIENTOS, BAILEY

## 2025-05-22 ENCOUNTER — OFFICE VISIT (OUTPATIENT)
Age: 70
End: 2025-05-22

## 2025-05-22 VITALS
TEMPERATURE: 97.2 F | RESPIRATION RATE: 18 BRPM | HEIGHT: 65 IN | OXYGEN SATURATION: 100 % | DIASTOLIC BLOOD PRESSURE: 53 MMHG | BODY MASS INDEX: 23.63 KG/M2 | HEART RATE: 87 BPM | SYSTOLIC BLOOD PRESSURE: 124 MMHG | WEIGHT: 141.8 LBS

## 2025-05-22 DIAGNOSIS — I26.99 OTHER ACUTE PULMONARY EMBOLISM WITHOUT ACUTE COR PULMONALE (HCC): ICD-10-CM

## 2025-05-22 DIAGNOSIS — Z09 HOSPITAL DISCHARGE FOLLOW-UP: Primary | ICD-10-CM

## 2025-05-22 DIAGNOSIS — D50.0 IRON DEFICIENCY ANEMIA DUE TO CHRONIC BLOOD LOSS: ICD-10-CM

## 2025-05-22 NOTE — PROGRESS NOTES
Hyacinth Patterson is a 69 y.o. female presenting for/with:    Chief Complaint   Patient presents with    Other     GI Bleed follow up        Vitals:    05/22/25 1343   BP: (!) 124/53   BP Site: Left Upper Arm   Patient Position: Sitting   BP Cuff Size: Medium Adult   Pulse: 87   Resp: 18   Temp: 97.2 °F (36.2 °C)   TempSrc: Temporal   SpO2: 100%   Weight: 64.3 kg (141 lb 12.8 oz)   Height: 1.651 m (5' 5\")       Pain Scale: /10  Pain Location:     \"Have you been to the ER, urgent care clinic since your last visit?  Hospitalized since your last visit?\"    Yes     “Have you seen or consulted any other health care providers outside of [facility] since your last visit?”    NO                 3/24/2025     8:07 AM   PHQ-9    Little interest or pleasure in doing things 0   Feeling down, depressed, or hopeless 0   PHQ-2 Score 0   PHQ-9 Total Score 0           3/31/2025    11:30 AM 3/13/2025    10:40 AM 8/29/2024    10:40 AM 5/7/2024     9:20 AM 4/17/2024     9:00 AM 1/12/2024     7:30 AM 9/21/2023     3:00 PM   [facility] AMB LEARNING ASSESSMENT   Primary Learner Patient Patient Patient Patient Patient Patient Patient   co-learner caregiver       No   Primary Language ENGLISH ENGLISH ENGLISH ENGLISH ENGLISH ENGLISH ENGLISH   Learning Preference DEMONSTRATION DEMONSTRATION DEMONSTRATION DEMONSTRATION DEMONSTRATION PICTURES DEMONSTRATION   Answered By pt pt pt pt pt self pt   Relationship to Learner SELF SELF SELF SELF SELF SELF SELF            5/22/2025     1:38 PM   Amb Fall Risk Assessment and TUG Test   Do you feel unsteady or are you worried about falling?  no   2 or more falls in past year? no   Fall with injury in past year? no           5/22/2025     1:00 PM 5/8/2025     8:00 AM 3/24/2025     8:00 AM 12/27/2024     8:00 AM 11/25/2024     7:00 AM 10/28/2024    10:00 AM 10/17/2024     6:00 AM   ADL ASSESSMENT   Feeding yourself No Help Needed No Help Needed No Help Needed No Help Needed No Help Needed No Help Needed

## 2025-05-23 ENCOUNTER — APPOINTMENT (OUTPATIENT)
Facility: HOSPITAL | Age: 70
End: 2025-05-23
Payer: MEDICARE

## 2025-05-23 ENCOUNTER — HOSPITAL ENCOUNTER (INPATIENT)
Facility: HOSPITAL | Age: 70
LOS: 4 days | Discharge: HOME | DRG: 811 | End: 2025-05-27
Attending: STUDENT IN AN ORGANIZED HEALTH CARE EDUCATION/TRAINING PROGRAM | Admitting: INTERNAL MEDICINE
Payer: MEDICARE

## 2025-05-23 ENCOUNTER — RESULTS FOLLOW-UP (OUTPATIENT)
Age: 70
End: 2025-05-23

## 2025-05-23 ENCOUNTER — HOSPITAL ENCOUNTER (EMERGENCY)
Facility: HOSPITAL | Age: 70
Discharge: ANOTHER HOSPITAL | End: 2025-05-23
Attending: EMERGENCY MEDICINE
Payer: MEDICARE

## 2025-05-23 VITALS
SYSTOLIC BLOOD PRESSURE: 134 MMHG | RESPIRATION RATE: 16 BRPM | TEMPERATURE: 98.2 F | DIASTOLIC BLOOD PRESSURE: 57 MMHG | OXYGEN SATURATION: 100 % | HEART RATE: 80 BPM

## 2025-05-23 DIAGNOSIS — K21.9 GASTROESOPHAGEAL REFLUX DISEASE WITHOUT ESOPHAGITIS: ICD-10-CM

## 2025-05-23 DIAGNOSIS — D64.9 ACUTE ON CHRONIC ANEMIA: Primary | ICD-10-CM

## 2025-05-23 PROBLEM — K92.2 GI BLEED: Status: ACTIVE | Noted: 2025-05-23

## 2025-05-23 LAB
ABO + RH BLD: NORMAL
ALBUMIN SERPL-MCNC: 3 G/DL (ref 3.5–5)
ALBUMIN/GLOB SERPL: 0.8 (ref 1.1–2.2)
ALP SERPL-CCNC: 86 U/L (ref 45–117)
ALT SERPL-CCNC: 27 U/L (ref 12–78)
ANION GAP SERPL CALC-SCNC: 10 MMOL/L (ref 2–12)
AST SERPL-CCNC: 22 U/L (ref 15–37)
BASOPHILS # BLD: 0.04 K/UL (ref 0–0.1)
BASOPHILS # BLD: 0.05 K/UL (ref 0–0.1)
BASOPHILS NFR BLD: 0.5 % (ref 0–1)
BASOPHILS NFR BLD: 0.6 % (ref 0–1)
BILIRUB SERPL-MCNC: 0.1 MG/DL (ref 0.2–1)
BLD PROD TYP BPU: NORMAL
BLOOD BANK BLOOD PRODUCT EXPIRATION DATE: NORMAL
BLOOD BANK DISPENSE STATUS: NORMAL
BLOOD BANK ISBT PRODUCT BLOOD TYPE: 6200
BLOOD BANK UNIT TYPE AND RH: NORMAL
BLOOD GROUP ANTIBODIES SERPL: NORMAL
BPU ID: NORMAL
BUN SERPL-MCNC: 29 MG/DL (ref 6–20)
BUN/CREAT SERPL: 21 (ref 12–20)
CALCIUM SERPL-MCNC: 8.8 MG/DL (ref 8.5–10.1)
CHLORIDE SERPL-SCNC: 101 MMOL/L (ref 97–108)
CO2 SERPL-SCNC: 23 MMOL/L (ref 21–32)
CREAT SERPL-MCNC: 1.4 MG/DL (ref 0.55–1.02)
CROSSMATCH RESULT: NORMAL
DIFFERENTIAL METHOD BLD: ABNORMAL
DIFFERENTIAL METHOD BLD: ABNORMAL
EKG ATRIAL RATE: 85 BPM
EKG DIAGNOSIS: NORMAL
EKG P AXIS: 55 DEGREES
EKG P-R INTERVAL: 154 MS
EKG Q-T INTERVAL: 394 MS
EKG QRS DURATION: 98 MS
EKG QTC CALCULATION (BAZETT): 468 MS
EKG R AXIS: 4 DEGREES
EKG T AXIS: 189 DEGREES
EKG VENTRICULAR RATE: 85 BPM
EOSINOPHIL # BLD: 0.27 K/UL (ref 0–0.4)
EOSINOPHIL # BLD: 0.35 K/UL (ref 0–0.4)
EOSINOPHIL NFR BLD: 3.1 % (ref 0–0.7)
EOSINOPHIL NFR BLD: 4.5 % (ref 0–7)
ERYTHROCYTE [DISTWIDTH] IN BLOOD BY AUTOMATED COUNT: 19.1 % (ref 11.5–14.5)
ERYTHROCYTE [DISTWIDTH] IN BLOOD BY AUTOMATED COUNT: 19.9 % (ref 11.5–14.5)
GLOBULIN SER CALC-MCNC: 4 G/DL (ref 2–4)
GLUCOSE SERPL-MCNC: 207 MG/DL (ref 65–100)
HCT VFR BLD AUTO: 22.7 % (ref 35–47)
HCT VFR BLD AUTO: 24 % (ref 35–47)
HCT VFR BLD AUTO: 26.7 % (ref 35–47)
HGB BLD-MCNC: 6.8 G/DL (ref 11.5–16)
HGB BLD-MCNC: 6.8 G/DL (ref 11.5–16)
HGB BLD-MCNC: 8.3 G/DL (ref 11.5–16)
HISTORY CHECK: NORMAL
IMM GRANULOCYTES # BLD AUTO: 0.06 K/UL (ref 0–0.04)
IMM GRANULOCYTES # BLD AUTO: 0.07 K/UL (ref 0–0.04)
IMM GRANULOCYTES NFR BLD AUTO: 0.8 % (ref 0–0.5)
IMM GRANULOCYTES NFR BLD AUTO: 0.8 % (ref 0–0.5)
INR PPP: 1.1 (ref 0.9–1.1)
LYMPHOCYTES # BLD: 1.32 K/UL (ref 0.8–3.5)
LYMPHOCYTES # BLD: 1.46 K/UL (ref 0.8–3.5)
LYMPHOCYTES NFR BLD: 16.8 % (ref 12–49)
LYMPHOCYTES NFR BLD: 16.9 % (ref 12–49)
MCH RBC QN AUTO: 25.4 PG (ref 26–34)
MCH RBC QN AUTO: 25.6 PG (ref 26–34)
MCHC RBC AUTO-ENTMCNC: 28.3 G/DL (ref 30–36.5)
MCHC RBC AUTO-ENTMCNC: 30 G/DL (ref 30–36.5)
MCV RBC AUTO: 85.3 FL (ref 80–99)
MCV RBC AUTO: 89.6 FL (ref 80–99)
MONOCYTES # BLD: 0.64 K/UL (ref 0–1)
MONOCYTES # BLD: 0.66 K/UL (ref 0–1)
MONOCYTES NFR BLD: 7.4 % (ref 5–13)
MONOCYTES NFR BLD: 8.5 % (ref 5–13)
NEUTS SEG # BLD: 5.36 K/UL (ref 1.8–8)
NEUTS SEG # BLD: 6.22 K/UL (ref 1.8–8)
NEUTS SEG NFR BLD: 68.7 % (ref 32–75)
NEUTS SEG NFR BLD: 71.4 % (ref 32–75)
NRBC # BLD: 0 K/UL (ref 0–0.01)
NRBC # BLD: 0.02 K/UL (ref 0–0.01)
NRBC BLD-RTO: 0 PER 100 WBC
NRBC BLD-RTO: 0.2 PER 100 WBC
PLATELET # BLD AUTO: 244 K/UL (ref 150–400)
PLATELET # BLD AUTO: 302 K/UL (ref 150–400)
PLATELET COMMENT: ABNORMAL
PMV BLD AUTO: 12.9 FL (ref 8.9–12.9)
POTASSIUM SERPL-SCNC: 4.1 MMOL/L (ref 3.5–5.1)
PROT SERPL-MCNC: 7 G/DL (ref 6.4–8.2)
PROTHROMBIN TIME: 11.5 SEC (ref 9.2–11.2)
RBC # BLD AUTO: 2.66 M/UL (ref 3.8–5.2)
RBC # BLD AUTO: 2.68 M/UL (ref 3.8–5.2)
RBC MORPH BLD: ABNORMAL
SODIUM SERPL-SCNC: 134 MMOL/L (ref 136–145)
SPECIMEN EXP DATE BLD: NORMAL
UNIT DIVISION: 0
UNIT ISSUE DATE/TIME: NORMAL
WBC # BLD AUTO: 7.8 K/UL (ref 3.6–11)
WBC # BLD AUTO: 8.7 K/UL (ref 3.6–11)

## 2025-05-23 PROCEDURE — 86850 RBC ANTIBODY SCREEN: CPT

## 2025-05-23 PROCEDURE — 71045 X-RAY EXAM CHEST 1 VIEW: CPT

## 2025-05-23 PROCEDURE — 1100000000 HC RM PRIVATE

## 2025-05-23 PROCEDURE — 36430 TRANSFUSION BLD/BLD COMPNT: CPT

## 2025-05-23 PROCEDURE — P9016 RBC LEUKOCYTES REDUCED: HCPCS

## 2025-05-23 PROCEDURE — 2580000003 HC RX 258: Performed by: INTERNAL MEDICINE

## 2025-05-23 PROCEDURE — 85610 PROTHROMBIN TIME: CPT

## 2025-05-23 PROCEDURE — 80053 COMPREHEN METABOLIC PANEL: CPT

## 2025-05-23 PROCEDURE — 86900 BLOOD TYPING SEROLOGIC ABO: CPT

## 2025-05-23 PROCEDURE — 30233N1 TRANSFUSION OF NONAUTOLOGOUS RED BLOOD CELLS INTO PERIPHERAL VEIN, PERCUTANEOUS APPROACH: ICD-10-PCS | Performed by: INTERNAL MEDICINE

## 2025-05-23 PROCEDURE — 6360000002 HC RX W HCPCS: Performed by: INTERNAL MEDICINE

## 2025-05-23 PROCEDURE — 85025 COMPLETE CBC W/AUTO DIFF WBC: CPT

## 2025-05-23 PROCEDURE — 85014 HEMATOCRIT: CPT

## 2025-05-23 PROCEDURE — 86901 BLOOD TYPING SEROLOGIC RH(D): CPT

## 2025-05-23 PROCEDURE — 85018 HEMOGLOBIN: CPT

## 2025-05-23 PROCEDURE — 94640 AIRWAY INHALATION TREATMENT: CPT

## 2025-05-23 PROCEDURE — 86923 COMPATIBILITY TEST ELECTRIC: CPT

## 2025-05-23 PROCEDURE — 36415 COLL VENOUS BLD VENIPUNCTURE: CPT

## 2025-05-23 PROCEDURE — 6370000000 HC RX 637 (ALT 250 FOR IP): Performed by: INTERNAL MEDICINE

## 2025-05-23 PROCEDURE — 93005 ELECTROCARDIOGRAM TRACING: CPT

## 2025-05-23 PROCEDURE — 99285 EMERGENCY DEPT VISIT HI MDM: CPT

## 2025-05-23 PROCEDURE — 94760 N-INVAS EAR/PLS OXIMETRY 1: CPT

## 2025-05-23 PROCEDURE — 2500000003 HC RX 250 WO HCPCS: Performed by: INTERNAL MEDICINE

## 2025-05-23 RX ORDER — POLYETHYLENE GLYCOL 3350 17 G/17G
17 POWDER, FOR SOLUTION ORAL DAILY PRN
Status: DISCONTINUED | OUTPATIENT
Start: 2025-05-23 | End: 2025-05-27 | Stop reason: HOSPADM

## 2025-05-23 RX ORDER — MONTELUKAST SODIUM 10 MG/1
10 TABLET ORAL NIGHTLY
Status: DISCONTINUED | OUTPATIENT
Start: 2025-05-23 | End: 2025-05-27 | Stop reason: HOSPADM

## 2025-05-23 RX ORDER — POTASSIUM CHLORIDE 1500 MG/1
20 TABLET, EXTENDED RELEASE ORAL DAILY
Status: DISCONTINUED | OUTPATIENT
Start: 2025-05-23 | End: 2025-05-27 | Stop reason: HOSPADM

## 2025-05-23 RX ORDER — SODIUM CHLORIDE 9 MG/ML
INJECTION, SOLUTION INTRAVENOUS PRN
Status: DISCONTINUED | OUTPATIENT
Start: 2025-05-23 | End: 2025-05-23 | Stop reason: HOSPADM

## 2025-05-23 RX ORDER — ONDANSETRON 2 MG/ML
4 INJECTION INTRAMUSCULAR; INTRAVENOUS EVERY 6 HOURS PRN
Status: DISCONTINUED | OUTPATIENT
Start: 2025-05-23 | End: 2025-05-27 | Stop reason: HOSPADM

## 2025-05-23 RX ORDER — METOPROLOL SUCCINATE 25 MG/1
12.5 TABLET, EXTENDED RELEASE ORAL DAILY
Status: DISCONTINUED | OUTPATIENT
Start: 2025-05-24 | End: 2025-05-27 | Stop reason: HOSPADM

## 2025-05-23 RX ORDER — ACETAMINOPHEN 650 MG/1
650 SUPPOSITORY RECTAL EVERY 6 HOURS PRN
Status: DISCONTINUED | OUTPATIENT
Start: 2025-05-23 | End: 2025-05-27 | Stop reason: HOSPADM

## 2025-05-23 RX ORDER — SODIUM CHLORIDE 0.9 % (FLUSH) 0.9 %
5-40 SYRINGE (ML) INJECTION PRN
Status: DISCONTINUED | OUTPATIENT
Start: 2025-05-23 | End: 2025-05-27 | Stop reason: HOSPADM

## 2025-05-23 RX ORDER — MAGNESIUM SULFATE IN WATER 40 MG/ML
2000 INJECTION, SOLUTION INTRAVENOUS PRN
Status: DISCONTINUED | OUTPATIENT
Start: 2025-05-23 | End: 2025-05-27 | Stop reason: HOSPADM

## 2025-05-23 RX ORDER — ACETAMINOPHEN 325 MG/1
650 TABLET ORAL EVERY 6 HOURS PRN
Status: DISCONTINUED | OUTPATIENT
Start: 2025-05-23 | End: 2025-05-27 | Stop reason: HOSPADM

## 2025-05-23 RX ORDER — FERROUS SULFATE 325(65) MG
325 TABLET ORAL
Status: DISCONTINUED | OUTPATIENT
Start: 2025-05-23 | End: 2025-05-27 | Stop reason: HOSPADM

## 2025-05-23 RX ORDER — SODIUM CHLORIDE 9 MG/ML
INJECTION, SOLUTION INTRAVENOUS PRN
Status: DISCONTINUED | OUTPATIENT
Start: 2025-05-23 | End: 2025-05-27 | Stop reason: HOSPADM

## 2025-05-23 RX ORDER — ONDANSETRON 4 MG/1
4 TABLET, ORALLY DISINTEGRATING ORAL EVERY 8 HOURS PRN
Status: DISCONTINUED | OUTPATIENT
Start: 2025-05-23 | End: 2025-05-27 | Stop reason: HOSPADM

## 2025-05-23 RX ORDER — SODIUM CHLORIDE 0.9 % (FLUSH) 0.9 %
5-40 SYRINGE (ML) INJECTION EVERY 12 HOURS SCHEDULED
Status: DISCONTINUED | OUTPATIENT
Start: 2025-05-23 | End: 2025-05-27 | Stop reason: HOSPADM

## 2025-05-23 RX ORDER — ATORVASTATIN CALCIUM 40 MG/1
80 TABLET, FILM COATED ORAL DAILY
Status: DISCONTINUED | OUTPATIENT
Start: 2025-05-23 | End: 2025-05-27 | Stop reason: HOSPADM

## 2025-05-23 RX ADMIN — MONTELUKAST 10 MG: 10 TABLET, FILM COATED ORAL at 19:56

## 2025-05-23 RX ADMIN — ARFORMOTEROL TARTRATE: 15 SOLUTION RESPIRATORY (INHALATION) at 20:58

## 2025-05-23 RX ADMIN — FERROUS SULFATE TAB 325 MG (65 MG ELEMENTAL FE) 325 MG: 325 (65 FE) TAB at 19:55

## 2025-05-23 RX ADMIN — POTASSIUM CHLORIDE 20 MEQ: 1500 TABLET, EXTENDED RELEASE ORAL at 20:04

## 2025-05-23 RX ADMIN — ATORVASTATIN CALCIUM 80 MG: 40 TABLET, FILM COATED ORAL at 20:04

## 2025-05-23 RX ADMIN — SODIUM CHLORIDE, PRESERVATIVE FREE 40 MG: 5 INJECTION INTRAVENOUS at 19:55

## 2025-05-23 RX ADMIN — SODIUM CHLORIDE, PRESERVATIVE FREE 10 ML: 5 INJECTION INTRAVENOUS at 20:05

## 2025-05-23 ASSESSMENT — PAIN SCALES - GENERAL: PAINLEVEL_OUTOF10: 0

## 2025-05-23 ASSESSMENT — PAIN - FUNCTIONAL ASSESSMENT: PAIN_FUNCTIONAL_ASSESSMENT: NONE - DENIES PAIN

## 2025-05-23 NOTE — CONSENT
Informed Consent for Blood Component Transfusion Note    I have discussed with the patient the rationale for blood component transfusion; its benefits in treating or preventing fatigue, organ damage, or death; and its risk which includes mild transfusion reactions, rare risk of blood borne infection, or more serious but rare reactions. I have discussed the alternatives to transfusion, including the risk and consequences of not receiving transfusion. The patient had an opportunity to ask questions and had agreed to proceed with transfusion of blood components.    Electronically signed by Gini Tao MD on 5/23/25 at 10:55 AM EDT

## 2025-05-23 NOTE — PROGRESS NOTES
arranged ALS transportation from [facility] ED 4 to  [facility] 6539   Arranged ALS transport w/[facility] -  advised to bring appropiate equipment - ETA of 1345   given - updated ED staff w/ETA
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
## 2025-05-23 NOTE — ED NOTES
TRANSFER - OUT REPORT:    Verbal report given to Armin Diego EMT-P on Hyacinth Patterson  being transferred to [place] [address] for routine progression of patient care       Report consisted of patient's Situation, Background, Assessment and   Recommendations(SBAR).     Information from the following report(s) Nurse Handoff Report, ED SBAR, Adult Overview, Surgery Report, MAR, Med Rec Status, Cardiac Rhythm NSR w ST depression, and Quality Measures was reviewed with the receiving nurse.    [place] Fall Assessment:    Presents to emergency department  because of falls (Syncope, seizure, or loss of consciousness): No  Age > 70: No  Altered Mental Status, Intoxication with alcohol or substance confusion (Disorientation, impaired judgment, poor safety awaremess, or inability to follow instructions): No  Impaired Mobility: Ambulates or transfers with assistive devices or assistance; Unable to ambulate or transer.: No  Nursing Judgement: No          Lines:   Peripheral IV 05/23/25 Left Antecubital (Active)   Site Assessment Clean, dry & intact 05/23/25 1441   Line Status Blood return noted;Brisk blood return;Flushed;Normal saline locked 05/23/25 1441   Phlebitis Assessment No symptoms 05/23/25 1441   Infiltration Assessment 0 05/23/25 1441   Dressing Status New dressing applied;Clean, dry & intact 05/23/25 1441   Dressing Type Transparent 05/23/25 1441        Opportunity for questions and clarification was provided.      Patient transported with:  [facility] on continued blood transfusion, cardiac monitoring

## 2025-05-23 NOTE — ED NOTES
TRANSFER - OUT REPORT:    Verbal report given to Abbi Vlila RN on Hyacinth Patterson  being transferred to [place] [address] for routine progression of patient care       Report consisted of patient's Situation, Background, Assessment and   Recommendations(SBAR).     Information from the following report(s) Nurse Handoff Report, ED SBAR, Adult Overview, MAR, Med Rec Status, Cardiac Rhythm NSR w ST depression, and Quality Measures was reviewed with the receiving nurse.    [place] Fall Assessment:    Presents to emergency department  because of falls (Syncope, seizure, or loss of consciousness): No  Age > 70: No  Altered Mental Status, Intoxication with alcohol or substance confusion (Disorientation, impaired judgment, poor safety awaremess, or inability to follow instructions): No  Impaired Mobility: Ambulates or transfers with assistive devices or assistance; Unable to ambulate or transer.: No  Nursing Judgement: No          Lines:   Peripheral IV 05/23/25 Right Antecubital (Active)   Site Assessment Clean, dry & intact 05/23/25 1054   Line Status Blood return noted;Brisk blood return;Flushed;Normal saline locked 05/23/25 1054   Phlebitis Assessment No symptoms 05/23/25 1054   Infiltration Assessment 0 05/23/25 1054   Dressing Status New dressing applied;Clean, dry & intact 05/23/25 1054   Dressing Type Transparent 05/23/25 1054        Opportunity for questions and clarification was provided.      Patient transported with:  [facility] on continued infusion + cardiac monitoring

## 2025-05-23 NOTE — PROGRESS NOTES
arranged ALS transportation from [facility] ED 4  to  [facility] 3[facility].  Arranged ALS transport w/[facility] -  advised to bring appropiate equipment - ETA of   1530 given - updated ED staff w/ETA. Pump [id number] with 2 chambers sent. Signed out and [place] aware.

## 2025-05-23 NOTE — ED NOTES
[facility] has shown proficiency with Alaris pump, infusion proceeding as appropriate.

## 2025-05-23 NOTE — ED NOTES
Blood draw for type and screen x 2 by this RN, x 2 by phlebotomy, unable to obtain more blood. Additional assistance sought by ED staff.

## 2025-05-23 NOTE — H&P
Hospitalist Admission Note    NAME:   Hyacinth Patterson   : 1955   MRN: [id number]     Date/Time: 2025 6:17 PM    Patient PCP: Jeffery Alexis MD    ______________________________________________________________________  Given the patient's current clinical presentation, I have a high level of concern for decompensation if discharged from the emergency department.  Complex decision making was performed, which includes reviewing the patient's available past medical records, laboratory results, and x-ray films.       My assessment of this patient's clinical condition and my plan of care is as follows.    Assessment / Plan:  Acute on chronic anemia, concerning for GI bleed  Recent acute lower GI bleed  H/H down from 8.3/28 on  to 6.8/-->6.3/  S/p EGD on  showing multiple 4 AVMs and GI cleared for anticoagulation and recommend outpatient follow-up in the clinic.   S/p 1 U PRBC, hbg is improved to 8.3  Check FOBT  Cont' iron suppl  Start IV PPI BID  NPO pMN  GI consulted    Recent PE  Last dose of Eliquis was this AM.  Will hold further Eliquis for now  Consider starting therapeutic lovenox in the AM if hbg is stable and no active bleeding  Will involve hematology to help eval for decision on AC given complexity of medical condition.      Hypokalemia  Resume home dose KCl    Chronic systolic CHF  HTN  CAD, s/p recent stent placement.  Not in exacerbation  Monitor volume status  Resume home cardiac meds other than ASA (took last dose this AM prior to admission)    Hx schizophrenia   PAD  HLD   GERD      Medical Decision Making:  I personally reviewed labs  I personally reviewed imaging  Toxic drug monitoring  I personally reviewed EKG  Discussed case with: patient, RN, plan of care discussed.      Code Status: Full  DVT Prophylaxis: pt is ambulatory, add scds  Baseline: independent    Subjective:   CHIEF COMPLAINT: low hbg    HISTORY OF PRESENT ILLNESS:     Hyacinth Patterson is a 69 y.o.  female with  - 0.70 %    Basophils % 0.5 0.0 - 1.0 %    Immature Granulocytes % 0.8 (H) 0 - 0.5 %    Neutrophils Absolute 6.22 1.80 - 8.00 K/UL    Lymphocytes Absolute 1.46 0.80 - 3.50 K/UL    Monocytes Absolute 0.64 0.00 - 1.00 K/UL    Eosinophils Absolute 0.27 0.00 - 0.40 K/UL    Basophils Absolute 0.04 0.00 - 0.10 K/UL    Immature Granulocytes Absolute 0.07 (H) 0.00 - 0.04 K/UL    Differential Type SMEAR SCANNED      Platelet Comment ADEQUATE PLATELETS      RBC Comment ANISOCYTOSIS  2+       Comprehensive Metabolic Panel    Collection Time: 05/23/25 10:44 AM   Result Value Ref Range    Sodium 134 (L) 136 - 145 mmol/L    Potassium 4.1 3.5 - 5.1 mmol/L    Chloride 101 97 - 108 mmol/L    CO2 23 21 - 32 mmol/L    Anion Gap 10 2 - 12 mmol/L    Glucose 207 (H) 65 - 100 mg/dL    BUN 29 (H) 6 - 20 MG/DL    Creatinine 1.40 (H) 0.55 - 1.02 MG/DL    BUN/Creatinine Ratio 21 (H) 12 - 20      Est, Glom Filt Rate 41 (L) >60 ml/min/1.73m2    Calcium 8.8 8.5 - 10.1 MG/DL    Total Bilirubin 0.1 (L) 0.2 - 1.0 MG/DL    ALT 27 12 - 78 U/L    AST 22 15 - 37 U/L    Alk Phosphatase 86 45 - 117 U/L    Total Protein 7.0 6.4 - 8.2 g/dL    Albumin 3.0 (L) 3.5 - 5.0 g/dL    Globulin 4.0 2.0 - 4.0 g/dL    Albumin/Globulin Ratio 0.8 (L) 1.1 - 2.2     Protime-INR    Collection Time: 05/23/25 10:44 AM   Result Value Ref Range    INR 1.1 0.9 - 1.1      Protime 11.5 (H) 9.2 - 11.2 sec   PREPARE RBC (CROSSMATCH), 1 Units    Collection Time: 05/23/25 11:00 AM   Result Value Ref Range    History Check Historical check performed    EKG 12 Lead    Collection Time: 05/23/25 11:05 AM   Result Value Ref Range    Ventricular Rate 85 BPM    Atrial Rate 85 BPM    P-R Interval 154 ms    QRS Duration 98 ms    Q-T Interval 394 ms    QTc Calculation (Bazett) 468 ms    P Axis 55 degrees    R Axis 4 degrees    T Axis 189 degrees    Diagnosis       Normal sinus rhythm  Possible Left atrial enlargement  ST & T wave abnormality, consider inferolateral ischemia  Abnormal ECG  When  are age-appropriate.     Congestive failure with interstitial pulmonary edema. Electronically signed by Blake Wilson     _______________________________________________________________________    TOTAL TIME:  76 Minutes    Critical Care Provided     Minutes non procedure based    Signed: Nati Ugarte MD    Procedures: see electronic medical records for all procedures/Xrays and details which were not copied into this note but were reviewed prior to creation of Plan.

## 2025-05-23 NOTE — ED NOTES
This RN to stay with pt for first 15 minutes blood transfusion at bedside. Pt verbalized understanding.

## 2025-05-23 NOTE — ED NOTES
Multiple attempts being made by ED staff for pt IV access. Obtained at this time. Monitoring replaced on pt and infusion restarted.

## 2025-05-23 NOTE — ED NOTES
Pt now reporting that IV is painful. Flushed to check, no sign of infiltration but pt unable to tolerate flushing more than 2ml. New IV to be sought at this time. RN Supervisor at bedside.

## 2025-05-24 LAB
ANION GAP SERPL CALC-SCNC: 5 MMOL/L (ref 2–12)
BASOPHILS # BLD: 0.05 K/UL (ref 0–0.1)
BASOPHILS NFR BLD: 0.6 % (ref 0–1)
BUN SERPL-MCNC: 21 MG/DL (ref 6–20)
BUN/CREAT SERPL: 21 (ref 12–20)
CALCIUM SERPL-MCNC: 8.5 MG/DL (ref 8.5–10.1)
CHLORIDE SERPL-SCNC: 110 MMOL/L (ref 97–108)
CO2 SERPL-SCNC: 22 MMOL/L (ref 21–32)
CREAT SERPL-MCNC: 1.01 MG/DL (ref 0.55–1.02)
DIFFERENTIAL METHOD BLD: ABNORMAL
EOSINOPHIL # BLD: 0.27 K/UL (ref 0–0.4)
EOSINOPHIL NFR BLD: 3.5 % (ref 0–7)
ERYTHROCYTE [DISTWIDTH] IN BLOOD BY AUTOMATED COUNT: 19.1 % (ref 11.5–14.5)
GLUCOSE SERPL-MCNC: 137 MG/DL (ref 65–100)
HCT VFR BLD AUTO: 22.8 % (ref 35–47)
HCT VFR BLD AUTO: 23.1 % (ref 35–47)
HCT VFR BLD AUTO: 23.3 % (ref 35–47)
HEMOCCULT STL QL: POSITIVE
HGB BLD-MCNC: 7.1 G/DL (ref 11.5–16)
HGB BLD-MCNC: 7.2 G/DL (ref 11.5–16)
HGB BLD-MCNC: 7.2 G/DL (ref 11.5–16)
IMM GRANULOCYTES # BLD AUTO: 0.05 K/UL (ref 0–0.04)
IMM GRANULOCYTES NFR BLD AUTO: 0.6 % (ref 0–0.5)
LYMPHOCYTES # BLD: 1.24 K/UL (ref 0.8–3.5)
LYMPHOCYTES NFR BLD: 15.9 % (ref 12–49)
MCH RBC QN AUTO: 26.2 PG (ref 26–34)
MCHC RBC AUTO-ENTMCNC: 31.2 G/DL (ref 30–36.5)
MCV RBC AUTO: 84 FL (ref 80–99)
MONOCYTES # BLD: 0.74 K/UL (ref 0–1)
MONOCYTES NFR BLD: 9.5 % (ref 5–13)
NEUTS SEG # BLD: 5.44 K/UL (ref 1.8–8)
NEUTS SEG NFR BLD: 69.9 % (ref 32–75)
NRBC # BLD: 0 K/UL (ref 0–0.01)
NRBC BLD-RTO: 0 PER 100 WBC
PLATELET # BLD AUTO: 217 K/UL (ref 150–400)
PMV BLD AUTO: 11.6 FL (ref 8.9–12.9)
POTASSIUM SERPL-SCNC: 3.8 MMOL/L (ref 3.5–5.1)
RBC # BLD AUTO: 2.75 M/UL (ref 3.8–5.2)
SODIUM SERPL-SCNC: 137 MMOL/L (ref 136–145)
WBC # BLD AUTO: 7.8 K/UL (ref 3.6–11)

## 2025-05-24 PROCEDURE — 6360000002 HC RX W HCPCS: Performed by: INTERNAL MEDICINE

## 2025-05-24 PROCEDURE — 2500000003 HC RX 250 WO HCPCS: Performed by: INTERNAL MEDICINE

## 2025-05-24 PROCEDURE — 85018 HEMOGLOBIN: CPT

## 2025-05-24 PROCEDURE — 85025 COMPLETE CBC W/AUTO DIFF WBC: CPT

## 2025-05-24 PROCEDURE — 85014 HEMATOCRIT: CPT

## 2025-05-24 PROCEDURE — 99222 1ST HOSP IP/OBS MODERATE 55: CPT | Performed by: INTERNAL MEDICINE

## 2025-05-24 PROCEDURE — 82272 OCCULT BLD FECES 1-3 TESTS: CPT

## 2025-05-24 PROCEDURE — 80048 BASIC METABOLIC PNL TOTAL CA: CPT

## 2025-05-24 PROCEDURE — 2580000003 HC RX 258: Performed by: INTERNAL MEDICINE

## 2025-05-24 PROCEDURE — 94640 AIRWAY INHALATION TREATMENT: CPT

## 2025-05-24 PROCEDURE — 94761 N-INVAS EAR/PLS OXIMETRY MLT: CPT

## 2025-05-24 PROCEDURE — 6370000000 HC RX 637 (ALT 250 FOR IP): Performed by: INTERNAL MEDICINE

## 2025-05-24 PROCEDURE — 1100000000 HC RM PRIVATE

## 2025-05-24 PROCEDURE — 36415 COLL VENOUS BLD VENIPUNCTURE: CPT

## 2025-05-24 RX ORDER — OCTREOTIDE ACETATE 50 UG/ML
50 INJECTION, SOLUTION INTRAVENOUS; SUBCUTANEOUS EVERY 12 HOURS
Status: DISCONTINUED | OUTPATIENT
Start: 2025-05-24 | End: 2025-05-27 | Stop reason: HOSPADM

## 2025-05-24 RX ORDER — OCTREOTIDE ACETATE 50 UG/ML
50 INJECTION, SOLUTION INTRAVENOUS; SUBCUTANEOUS ONCE
Status: COMPLETED | OUTPATIENT
Start: 2025-05-24 | End: 2025-05-24

## 2025-05-24 RX ADMIN — OCTREOTIDE ACETATE 50 MCG: 50 INJECTION, SOLUTION INTRAVENOUS; SUBCUTANEOUS at 21:28

## 2025-05-24 RX ADMIN — SODIUM CHLORIDE, PRESERVATIVE FREE 10 ML: 5 INJECTION INTRAVENOUS at 09:04

## 2025-05-24 RX ADMIN — METOPROLOL SUCCINATE 12.5 MG: 25 TABLET, FILM COATED, EXTENDED RELEASE ORAL at 08:54

## 2025-05-24 RX ADMIN — ATORVASTATIN CALCIUM 80 MG: 40 TABLET, FILM COATED ORAL at 21:26

## 2025-05-24 RX ADMIN — POTASSIUM CHLORIDE 20 MEQ: 1500 TABLET, EXTENDED RELEASE ORAL at 08:54

## 2025-05-24 RX ADMIN — OCTREOTIDE ACETATE 50 MCG: 50 INJECTION, SOLUTION INTRAVENOUS; SUBCUTANEOUS at 11:49

## 2025-05-24 RX ADMIN — SODIUM CHLORIDE, PRESERVATIVE FREE 40 MG: 5 INJECTION INTRAVENOUS at 08:59

## 2025-05-24 RX ADMIN — SODIUM CHLORIDE, PRESERVATIVE FREE 10 ML: 5 INJECTION INTRAVENOUS at 21:29

## 2025-05-24 RX ADMIN — MONTELUKAST 10 MG: 10 TABLET, FILM COATED ORAL at 21:26

## 2025-05-24 RX ADMIN — ARFORMOTEROL TARTRATE: 15 SOLUTION RESPIRATORY (INHALATION) at 20:44

## 2025-05-24 RX ADMIN — ARFORMOTEROL TARTRATE: 15 SOLUTION RESPIRATORY (INHALATION) at 08:11

## 2025-05-24 RX ADMIN — SODIUM CHLORIDE, PRESERVATIVE FREE 40 MG: 5 INJECTION INTRAVENOUS at 18:46

## 2025-05-24 ASSESSMENT — PAIN SCALES - GENERAL
PAINLEVEL_OUTOF10: 0
PAINLEVEL_OUTOF10: 0

## 2025-05-24 NOTE — PROGRESS NOTES
Hospitalist Progress Note    NAME:   Hyacinth Patterson   : 1955   MRN: [id number]     Date/Time: 2025 10:14 AM  Patient PCP: Jeffery Alexis MD      Assessment / Plan:  Acute on chronic anemia, concerning for GI bleed  Recent acute lower GI bleed  H/H down from 8.3/28 on  to 6.8/24-->6.3/23  S/p EGD on  showing multiple 4 AVMs and GI cleared for anticoagulation and recommend outpatient follow-up in the clinic.   /p 1 U PRBC, hbg is improved to 8.3, now downtrending again to 7.2.  she may need another transfusion today. Will monitor h/h  Cont' iron suppl  IV PPI BID  NPO pMN  GI consulted     Recent PE  Last dose of Eliquis was this AM.  Will hold further Eliquis for now  Appreciate hematology's evaluation.       Hypokalemia  Resume home dose KCl     Chronic systolic CHF  HTN  CAD, s/p recent stent placement.  Not in exacerbation. Monitor volume status  cont' home cardiac meds other than ASA  Consult cardiology given complexity of medical condition, recent stent.  Addendum: spoke to Dr Burkett from cardiology, rec' to cont' to hold ASA and Eliquis for now pending Gi evaluation and recs.  Pt is ~24 days out from recent stent.       Hx schizophrenia   PAD  HLD   GERD            Medical Decision Making:   I personally reviewed labs  I personally reviewed imaging  Toxic drug monitoring  I personally reviewed EKG  Discussed case with: RN, CM, discussed plan of care and dispo during round        Code Status: full  DVT Prophylaxis: scd    Subjective:       No new complaint.  Denies cp or sob.    Discussed with RN events overnight. HD stable.       Objective:     VITALS:   Last 24hrs VS reviewed since prior progress note. Most recent are:  Patient Vitals for the past 24 hrs:   BP Temp Temp src Pulse Resp SpO2 Height Weight   25 0811 -- -- -- -- -- 99 % -- --   25 0800 119/62 98.8 °F (37.1 °C) Oral 79 20 100 % -- --   25 2347 -- -- -- -- -- -- 1.65 m (5' 4.96\") 64.4 kg (141 lb 15.6
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
## 2025-05-24 NOTE — PROGRESS NOTES
Called consult for GI, MD Chase Bell, will see patient tomorrow. Tried to call for hematology with no success.

## 2025-05-24 NOTE — CONSULTS
[facility] - [facility]  [address] [place] [address]  Phone- [phone]              Gastroenterology Consult     Referring Physician:    Consult Date: 5/24/2025     Subjective:     Chief Complaint: Low hemoglobin    History of Present Illness: Hyacinth Patterson is a 69 y.o. female who is seen in consultation for acute on chronic anemia.    Ms. Patterson has a hx of CAD s/p FELA 2023, DM2, PAD, hx SMA stent, hx of bilateral common iliac stents, HFrEF (EF 20-25%), PE, on eliquis and aspirin-had routine blood work with PCP on Friday due to chronic anemia which showed hemoglobin 6.8 g/dL so advised to come to ER for blood transfusion.      Patient has extensive GI history with chronic anemia related to GI AVM related blood loss.  Last EGD/post enteroscopy by Dr. Marquez on 5/16/2025 had several AVMs ablated with APC in the duodenum/proximal jejunum.  Tattoo was placed.    Patient was started on Eliquis after this endoscopy and takes aspirin 81 mg as well.  Last dose of each blood thinner was yesterday.  Denied taking any NSAIDs.  She does take iron supplement daily and has noted dark stools but no recent changes like tarry or bright red blood per rectum.  Denies any abdominal pain, nausea, vomiting.  She is taking Protonix 40 mg twice a day.  Has been feeling slightly weak/tired but no lightheadedness/dizziness or syncope.    Scope Hx  EGD 4/22/2025   - Gastritis. Biopsied.  - Nodule found in the duodenum. Removal was not attempted due to patient is on Plavix.  EGD 1/13/2025  - Normal examined duodenum.  - Three recently bleeding angiodysplastic lesions in the stomach. Treated with argon plasma coagulation  (APC).  - Normal esophagus.  - No specimens collected.      that showed gastritis (biopsied, path w/ negligible inflammation likely bile reflux) and a duodenal nodule for which outpatient EGD was advised   Push Enteroscopy 11/7/24 with

## 2025-05-24 NOTE — PROGRESS NOTES
End of Shift Note    Bedside shift change report given to ADRIANA Hackett (oncoming nurse) by DILIA FORD, ADRIANA (offgoing nurse).  Report included the following information SBAR, Kardex, Intake/Output, MAR, and Recent Results    Shift worked:  5763-6650     Shift summary and any significant changes:     Medications given per MAR. Education provided. Patient x 1 standby assist. Next H&H due at 0300. Awaiting BM for occult stool sample.     Concerns for physician to address:  None     Zone phone for oncoming shift:   [phone]       Activity:  Level of Assistance: Standby assist, set-up cues, supervision of patient - no hands on  Number times ambulated in hallways past shift: 0  Number of times OOB to chair past shift: 0    Cardiac:   Cardiac Monitoring: No      Cardiac Rhythm: Sinus rhythm    Access:  Current line(s): PIV     Genitourinary:   Urinary Status: Voiding, Bathroom privileges    Respiratory:   O2 Device: None (Room air)  Chronic home O2 use?: NO  Incentive spirometer at bedside: NO    GI:  Last BM (including prior to admit): 05/23/25  Current diet:  ADULT DIET; Regular  DIET ONE TIME MESSAGE;  Passing flatus: YES    Pain Management:   Patient states pain is manageable on current regimen: YES    Skin:  Edenilson Scale Score: 21  Interventions: Wound Offloading (Prevention Methods): Repositioning, Pillows, Turning    Patient Safety:  Fall Risk:    Fall Risk Interventions  Toilet Every 2 Hours-In Advance of Need: Yes  Hourly Visual Checks: Awake, In bed  Fall Visual Posted: Armband, Socks, Fall sign posted  Room Door Open: Deferred to promote rest  Alarm On: Bed  Patient Moved Closer to Nursing Station: No    Active Consults:   IP CONSULT TO GI  IP CONSULT TO HEMATOLOGY  IP CONSULT TO CARDIOLOGY    Length of Stay:  Expected LOS: 2  Actual LOS: 1    DILIA FORD, RN

## 2025-05-24 NOTE — PROGRESS NOTES
End of Shift Note    Bedside shift change report given to ADRIANA Liriano (oncoming nurse) by LORENA BARAHONA RN (offgoing nurse).  Report included the following information SBAR, MAR, and Recent Results    Shift worked:  7317-9404     Shift summary and any significant changes:     Continues on Q8 H&H checks.. Received one unit of Red blood packed cells. Last Hemoglobin was 7.2     Concerns for physician to address:  -     Zone phone for oncoming shift:   [phone]       Activity:  Level of Assistance: Standby assist, set-up cues, supervision of patient - no hands on  Number times ambulated in hallways past shift: 0  Number of times OOB to chair past shift: 0    Cardiac:   Cardiac Monitoring: Yes      Cardiac Rhythm: Sinus rhythm    Access:  Current line(s): PIV     Genitourinary:   Urinary Status: Voiding, Bathroom privileges    Respiratory:   O2 Device: None (Room air)  Chronic home O2 use?: NO  Incentive spirometer at bedside: NO    GI:  Last BM (including prior to admit): 05/23/25  Current diet:  Diet NPO  Passing flatus: YES    Pain Management:   Patient states pain is manageable on current regimen: YES    Skin:  Edenilson Scale Score: 21  Interventions: Wound Offloading (Prevention Methods): Repositioning, Elevate heels, Turning    Patient Safety:  Fall Risk:    Fall Risk Interventions  Toilet Every 2 Hours-In Advance of Need: Yes  Hourly Visual Checks: In bed, Quiet  Fall Visual Posted: Socks  Room Door Open: Deferred to promote rest  Alarm On: Bed  Patient Moved Closer to Nursing Station: No    Active Consults:   IP CONSULT TO GI  IP CONSULT TO HEMATOLOGY    Length of Stay:  Expected LOS: 2  Actual LOS: 1    LORENA BARAHONA RN

## 2025-05-25 LAB
ANION GAP SERPL CALC-SCNC: 5 MMOL/L (ref 2–12)
BASOPHILS # BLD: 0.04 K/UL (ref 0–0.1)
BASOPHILS NFR BLD: 0.6 % (ref 0–1)
BUN SERPL-MCNC: 14 MG/DL (ref 6–20)
BUN/CREAT SERPL: 12 (ref 12–20)
CALCIUM SERPL-MCNC: 8.6 MG/DL (ref 8.5–10.1)
CHLORIDE SERPL-SCNC: 114 MMOL/L (ref 97–108)
CO2 SERPL-SCNC: 20 MMOL/L (ref 21–32)
CREAT SERPL-MCNC: 1.15 MG/DL (ref 0.55–1.02)
DIFFERENTIAL METHOD BLD: ABNORMAL
EOSINOPHIL # BLD: 0.27 K/UL (ref 0–0.4)
EOSINOPHIL NFR BLD: 4.1 % (ref 0–7)
ERYTHROCYTE [DISTWIDTH] IN BLOOD BY AUTOMATED COUNT: 19.8 % (ref 11.5–14.5)
GLUCOSE SERPL-MCNC: 149 MG/DL (ref 65–100)
HCT VFR BLD AUTO: 25.1 % (ref 35–47)
HCT VFR BLD AUTO: 25.5 % (ref 35–47)
HCT VFR BLD AUTO: 25.9 % (ref 35–47)
HGB BLD-MCNC: 7.6 G/DL (ref 11.5–16)
IMM GRANULOCYTES # BLD AUTO: 0.03 K/UL (ref 0–0.04)
IMM GRANULOCYTES NFR BLD AUTO: 0.5 % (ref 0–0.5)
LYMPHOCYTES # BLD: 1.12 K/UL (ref 0.8–3.5)
LYMPHOCYTES NFR BLD: 17.1 % (ref 12–49)
MCH RBC QN AUTO: 26.2 PG (ref 26–34)
MCHC RBC AUTO-ENTMCNC: 30.3 G/DL (ref 30–36.5)
MCV RBC AUTO: 86.6 FL (ref 80–99)
MONOCYTES # BLD: 0.72 K/UL (ref 0–1)
MONOCYTES NFR BLD: 11 % (ref 5–13)
NEUTS SEG # BLD: 4.37 K/UL (ref 1.8–8)
NEUTS SEG NFR BLD: 66.7 % (ref 32–75)
NRBC # BLD: 0 K/UL (ref 0–0.01)
NRBC BLD-RTO: 0 PER 100 WBC
PLATELET # BLD AUTO: 211 K/UL (ref 150–400)
PMV BLD AUTO: 12.3 FL (ref 8.9–12.9)
POTASSIUM SERPL-SCNC: 4.2 MMOL/L (ref 3.5–5.1)
RBC # BLD AUTO: 2.9 M/UL (ref 3.8–5.2)
SODIUM SERPL-SCNC: 139 MMOL/L (ref 136–145)
WBC # BLD AUTO: 6.6 K/UL (ref 3.6–11)

## 2025-05-25 PROCEDURE — 94640 AIRWAY INHALATION TREATMENT: CPT

## 2025-05-25 PROCEDURE — 80048 BASIC METABOLIC PNL TOTAL CA: CPT

## 2025-05-25 PROCEDURE — 85018 HEMOGLOBIN: CPT

## 2025-05-25 PROCEDURE — 1100000000 HC RM PRIVATE

## 2025-05-25 PROCEDURE — 85014 HEMATOCRIT: CPT

## 2025-05-25 PROCEDURE — 2500000003 HC RX 250 WO HCPCS: Performed by: INTERNAL MEDICINE

## 2025-05-25 PROCEDURE — 94761 N-INVAS EAR/PLS OXIMETRY MLT: CPT

## 2025-05-25 PROCEDURE — 6360000002 HC RX W HCPCS: Performed by: INTERNAL MEDICINE

## 2025-05-25 PROCEDURE — 85025 COMPLETE CBC W/AUTO DIFF WBC: CPT

## 2025-05-25 PROCEDURE — 36415 COLL VENOUS BLD VENIPUNCTURE: CPT

## 2025-05-25 PROCEDURE — 6370000000 HC RX 637 (ALT 250 FOR IP): Performed by: INTERNAL MEDICINE

## 2025-05-25 RX ADMIN — SODIUM CHLORIDE, PRESERVATIVE FREE 10 ML: 5 INJECTION INTRAVENOUS at 20:32

## 2025-05-25 RX ADMIN — OCTREOTIDE ACETATE 50 MCG: 50 INJECTION, SOLUTION INTRAVENOUS; SUBCUTANEOUS at 20:31

## 2025-05-25 RX ADMIN — METOPROLOL SUCCINATE 12.5 MG: 25 TABLET, FILM COATED, EXTENDED RELEASE ORAL at 10:24

## 2025-05-25 RX ADMIN — SODIUM CHLORIDE, PRESERVATIVE FREE 10 ML: 5 INJECTION INTRAVENOUS at 10:24

## 2025-05-25 RX ADMIN — ARFORMOTEROL TARTRATE: 15 SOLUTION RESPIRATORY (INHALATION) at 09:51

## 2025-05-25 RX ADMIN — FERROUS SULFATE TAB 325 MG (65 MG ELEMENTAL FE) 325 MG: 325 (65 FE) TAB at 20:31

## 2025-05-25 RX ADMIN — OCTREOTIDE ACETATE 50 MCG: 50 INJECTION, SOLUTION INTRAVENOUS; SUBCUTANEOUS at 10:23

## 2025-05-25 RX ADMIN — POTASSIUM CHLORIDE 20 MEQ: 1500 TABLET, EXTENDED RELEASE ORAL at 10:24

## 2025-05-25 RX ADMIN — MONTELUKAST 10 MG: 10 TABLET, FILM COATED ORAL at 20:31

## 2025-05-25 RX ADMIN — ATORVASTATIN CALCIUM 80 MG: 40 TABLET, FILM COATED ORAL at 20:31

## 2025-05-25 NOTE — PROGRESS NOTES
GI On call Note    Hgb stable. Pt started on Octreotide which I agree with. Low utility to repeat Enteroscopy especially while on anticoagulation as clear is bleeding from known angiectasias. If Hgb remains stable ok to resume systemic anticoagulation on 5/26    GI will resume care on 5/27    Chase Bell MD

## 2025-05-25 NOTE — CONSULTS
[facility]  [address] [place] [address] [phone]  [email or web address]      Cardiology Progress Note      5/25/2025 11:54 AM    Admit Date: 5/23/2025    Admit Diagnosis:   GI bleed [K92.2]    Subjective:     Hyacinth Patterson     No CP    BP (!) 125/52   Pulse 78   Temp 97.9 °F (36.6 °C) (Oral)   Resp 16   Ht 1.65 m (5' 4.96\")   Wt 64.4 kg (141 lb 15.6 oz)   LMP  (LMP Unknown)   SpO2 100%   BMI 23.65 kg/m²     Current Facility-Administered Medications   Medication Dose Route Frequency    [START ON 5/26/2025] pantoprazole (PROTONIX) 40 mg in sodium chloride (PF) 0.9 % 10 mL injection  40 mg IntraVENous Daily    octreotide (SANDOSTATIN) injection 50 mcg  50 mcg SubCUTAneous Q12H    sodium chloride flush 0.9 % injection 5-40 mL  5-40 mL IntraVENous 2 times per day    sodium chloride flush 0.9 % injection 5-40 mL  5-40 mL IntraVENous PRN    0.9 % sodium chloride infusion   IntraVENous PRN    magnesium sulfate 2000 mg in 50 mL IVPB premix  2,000 mg IntraVENous PRN    ondansetron (ZOFRAN-ODT) disintegrating tablet 4 mg  4 mg Oral Q8H PRN    Or    ondansetron (ZOFRAN) injection 4 mg  4 mg IntraVENous Q6H PRN    polyethylene glycol (GLYCOLAX) packet 17 g  17 g Oral Daily PRN    acetaminophen (TYLENOL) tablet 650 mg  650 mg Oral Q6H PRN    Or    acetaminophen (TYLENOL) suppository 650 mg  650 mg Rectal Q6H PRN    atorvastatin (LIPITOR) tablet 80 mg  80 mg Oral Daily    ferrous sulfate (IRON 325) tablet 325 mg  325 mg Oral Q48H    arformoterol 15 mcg-budesonide 0.25 mg neb solution   Nebulization BID RT    metoprolol succinate (TOPROL XL) extended release tablet 12.5 mg  12.5 mg Oral Daily    montelukast (SINGULAIR) tablet 10 mg  10 mg Oral Nightly    potassium chloride (KLOR-CON M) extended release tablet 20 mEq  20 mEq Oral Daily       Objective:      Physical Exam:  General Appearance:    Chest:   Clear  Cardiovascular: rrr  Extremities: no edema  Skin:   Warm and dry.     Data Review:   Recent Labs     05/23/25  1044 05/23/25  1835 05/24/25  0232 05/24/25  1040 05/24/25  1847 05/25/25  0323   WBC 8.7  --  7.8  --   --  6.6   HGB 6.8*   < > 7.2* 7.2* 7.1* 7.6*   HCT 22.7*   < > 23.1* 23.3* 22.8* 25.1*     --  217  --   --  211    < > = values in this interval not displayed.     Recent Labs     05/23/25  1044 05/24/25  0232 05/25/25  0323   * 137 139   K 4.1 3.8 4.2    110* 114*   CO2 23 22 20*   BUN 29* 21* 14   ALT 27  --   --    INR 1.1  --   --        No results for input(s): \"TROPHS\", \"CPK\", \"CKMB\" in the last 72 hours.    No intake or output data in the 24 hours ending 05/25/25 1154     Telemetry:   EKG:  Cxray:    Assessment:     Principal Problem:    GI bleed  Resolved Problems:    * No resolved hospital problems. *      Plan:     GI note noted.  Poss resume plavix monotherapy tomorrow.      Roscoe Burkett M.D., [facility]

## 2025-05-25 NOTE — PLAN OF CARE
Problem: Chronic Conditions and Co-morbidities  Goal: Patient's chronic conditions and co-morbidity symptoms are monitored and maintained or improved  Outcome: Progressing     Problem: Discharge Planning  Goal: Discharge to home or other facility with appropriate resources  Outcome: Progressing     Problem: Respiratory - Adult  Goal: Achieves optimal ventilation and oxygenation  5/25/2025 0055 by Lew Gonzales RN  Outcome: Progressing  5/24/2025 2043 by Nataly Wheatley, RT  Outcome: Progressing     Problem: Safety - Adult  Goal: Free from fall injury  Outcome: Progressing

## 2025-05-25 NOTE — PROGRESS NOTES
End of Shift Note    Bedside shift change report given to BAILEY Blue (oncoming nurse) by Lew Gonzales RN (offgoing nurse).  Report included the following information SBAR, MAR, and Quality Measures    Shift worked:  7pm-7am     Shift summary and any significant changes:     Patient tolerated care well within the shift, she took her pills whole as per MAR.hourly rounding was done on patient within the shift. Patients H&H was done and patients HB read 7.6. Patients plan of care still ongoing.      Concerns for physician to address:  none     Zone phone for oncoming shift:   [phone]       Activity:  Level of Assistance: Independent  Number times ambulated in hallways past shift: 0  Number of times OOB to chair past shift: 0    Cardiac:   Cardiac Monitoring: No      Cardiac Rhythm: Sinus rhythm    Access:  Current line(s): PIV    Genitourinary:   Urinary Status: Voiding, Bathroom privileges    Respiratory:   O2 Device: None (Room air)  Chronic home O2 use?: NO  Incentive spirometer at bedside: NO    GI:  Last BM (including prior to admit): 05/24/25  Current diet:  ADULT DIET; Regular  DIET ONE TIME MESSAGE;  Passing flatus: YES    Pain Management:   Patient states pain is manageable on current regimen: YES    Skin:  Edenilson Scale Score: 22  Interventions: Wound Offloading (Prevention Methods): Pillows, Repositioning, Turning    Patient Safety:  Fall Risk: Nursing Judgement-Fall Risk High(Add Comments): No  Fall Risk Interventions  Nursing Judgement-Fall Risk High(Add Comments): No  Toilet Every 2 Hours-In Advance of Need: Yes  Hourly Visual Checks: Awake, In bed  Fall Visual Posted: Armband, Socks, Fall sign posted  Room Door Open: Deferred to promote rest  Alarm On: Bed  Patient Moved Closer to Nursing Station: No    Active Consults:   IP CONSULT TO GI  IP CONSULT TO HEMATOLOGY  IP CONSULT TO CARDIOLOGY    Length of Stay:  Expected LOS: 2  Actual LOS: 2    Lew Gonzales RN

## 2025-05-25 NOTE — PROGRESS NOTES
Admitted 5/23/25 on eliquis with Hgb 6.8 and dark stools, history of PE and CHF  Discharged 5/27/25 from [facility]    This is a JUSTINO encounter    Duration 30 minutes primarily education, review of imaging, labs and records.    Assessment & Plan  1. Gastrointestinal bleeding.  - Recently hospitalized and received a unit of blood due to low hemoglobin.  - Discharge summary recommends octreotide injections twice daily to prevent bleeding from small blood vessels in the GI tract.  This was confirmed via [employer] conversation with Dr Angela Jackson during this visit today--she is advised to take this while on Eliquis and DAPT  - Duration of treatment is currently unclear; messages sent to consulting doctors for clarification.  - Prescription for octreotide 50 mcg/mL, to be administered as 1 mL twice daily, will be sent to pharmacy. An order for intravenous iron will be placed today and sent to the outpatient infusion center at the hospital.    2. Pulmonary embolism.  - Currently on Eliquis 5 mg twice daily for a small pulmonary embolism detected on a scan in the ER.  - Treatment duration is expected to be at least 6 months.  - Also on Plavix and aspirin 81 mg daily, which increases the risk of bleeding.  - After discontinuing Eliquis, will likely need to resume Plavix due to cardiac stents.    Follow-up  - Follow-up appointment scheduled in 1 week.          Chief Complaint   Patient presents with    Follow-Up from Hospital     [facility] on 5-         No orders of the defined types were placed in this encounter.      Jeffery Alexis MD, FACP      History of Present Illness  The patient presents for evaluation of gastrointestinal bleeding.    She was recently discharged from [facility] on 05/27/2025, following a 4-day admission due to low hemoglobin levels, which necessitated a blood transfusion. During her hospital stay, she was informed about the potential need for self-administration of octreotide

## 2025-05-25 NOTE — PROGRESS NOTES
Hospitalist Progress Note        Demographics    Patient Name  Hyacinth Patterson   Date of Birth 1955   Medical Record Number  [id number]      Age  69 y.o.   PCP Jeffery Alexis MD   Admit date:  5/23/2025  5:38 PM     Room Number  3221/01  @ [facility]           Admission Diagnoses:  GI bleed   Admission Summary:  \" Hyacinth Patterson is a 69 y.o.  female with PMHx significant for DM, chronic pancreatitis, HTN, CAD recently discharged from [facility] with history of GI bleed, anemia and PE. Pt was notified by her PCP to go to the ER for blood transfusion d/t routine labs showing low hbg.  She denies any new changes to her stool.  Reports \"they are dark all the time because I'm on iron\".  She feels cold, however denies any cp, sob or dizziness.    In the ER, it was noted that her H/H was down from 8.3/28 on 5/19 to 6.8/24-->6.3/23 today. She was given a unit of prbc and transferred to [facility] for GI to evaluate due to concern of GI bleed.  Vitals/labs/imaging reviewed.  HD stable at the time of my encounter.     We were asked to admit for work up and evaluation of the above proble \"     Assessment and plan:     Acute on chronic anemia, concerning for GI bleed  Recent acute lower GI bleed  H/H down from 8.3/28 on 5/19 to 6.8/24-->6.3/23  S/p EGD on 5/16 showing multiple 4 AVMs and GI cleared for anticoagulation and recommend outpatient follow-up in the clinic.   S/p 1 U PRBC, hbg is improved. Now stable mid-7s. CTM  Cont' iron suppl  IV PPI BID  GI consulted. Appreciate input. Rec start subQ octreotide, protonix QD and hold eliquis x 24h and consider resuming AC thereafter and monitoring (consider starting on heparin gtts instead of eliquis to start)     Recent PE  Last dose of Eliquis was 5/24 in AM.  Will hold further Eliquis for now  Appreciate hematology's evaluation.       Hypokalemia  Resume home dose KCl     Chronic systolic CHF  HTN  CAD, s/p recent stent placement.  Not in exacerbation.

## 2025-05-26 LAB
APTT PPP: 21.7 SEC (ref 22.1–31)
ERYTHROCYTE [DISTWIDTH] IN BLOOD BY AUTOMATED COUNT: 20.5 % (ref 11.5–14.5)
HCT VFR BLD AUTO: 26.6 % (ref 35–47)
HCT VFR BLD AUTO: 29.5 % (ref 35–47)
HCT VFR BLD AUTO: 29.5 % (ref 35–47)
HGB BLD-MCNC: 7.9 G/DL (ref 11.5–16)
HGB BLD-MCNC: 8.4 G/DL (ref 11.5–16)
HGB BLD-MCNC: 8.7 G/DL (ref 11.5–16)
INR PPP: 1 (ref 0.9–1.1)
MCH RBC QN AUTO: 26.3 PG (ref 26–34)
MCHC RBC AUTO-ENTMCNC: 29.5 G/DL (ref 30–36.5)
MCV RBC AUTO: 89.1 FL (ref 80–99)
NRBC # BLD: 0 K/UL (ref 0–0.01)
NRBC BLD-RTO: 0 PER 100 WBC
PLATELET # BLD AUTO: 312 K/UL (ref 150–400)
PMV BLD AUTO: 11 FL (ref 8.9–12.9)
PROTHROMBIN TIME: 11.1 SEC (ref 9.2–11.2)
RBC # BLD AUTO: 3.31 M/UL (ref 3.8–5.2)
THERAPEUTIC RANGE: ABNORMAL SECS (ref 58–77)
UFH PPP CHRO-ACNC: <0.1 IU/ML
WBC # BLD AUTO: 7.1 K/UL (ref 3.6–11)

## 2025-05-26 PROCEDURE — 6360000002 HC RX W HCPCS: Performed by: INTERNAL MEDICINE

## 2025-05-26 PROCEDURE — 85730 THROMBOPLASTIN TIME PARTIAL: CPT

## 2025-05-26 PROCEDURE — 85520 HEPARIN ASSAY: CPT

## 2025-05-26 PROCEDURE — 1100000000 HC RM PRIVATE

## 2025-05-26 PROCEDURE — 36415 COLL VENOUS BLD VENIPUNCTURE: CPT

## 2025-05-26 PROCEDURE — 6370000000 HC RX 637 (ALT 250 FOR IP): Performed by: INTERNAL MEDICINE

## 2025-05-26 PROCEDURE — 85014 HEMATOCRIT: CPT

## 2025-05-26 PROCEDURE — 94640 AIRWAY INHALATION TREATMENT: CPT

## 2025-05-26 PROCEDURE — 2500000003 HC RX 250 WO HCPCS: Performed by: INTERNAL MEDICINE

## 2025-05-26 PROCEDURE — 6360000002 HC RX W HCPCS: Performed by: PHYSICIAN ASSISTANT

## 2025-05-26 PROCEDURE — 6370000000 HC RX 637 (ALT 250 FOR IP): Performed by: STUDENT IN AN ORGANIZED HEALTH CARE EDUCATION/TRAINING PROGRAM

## 2025-05-26 PROCEDURE — 85018 HEMOGLOBIN: CPT

## 2025-05-26 PROCEDURE — 2580000003 HC RX 258: Performed by: PHYSICIAN ASSISTANT

## 2025-05-26 PROCEDURE — 85610 PROTHROMBIN TIME: CPT

## 2025-05-26 PROCEDURE — 85027 COMPLETE CBC AUTOMATED: CPT

## 2025-05-26 RX ORDER — HEPARIN SODIUM 1000 [USP'U]/ML
30 INJECTION, SOLUTION INTRAVENOUS; SUBCUTANEOUS PRN
Status: DISCONTINUED | OUTPATIENT
Start: 2025-05-26 | End: 2025-05-26

## 2025-05-26 RX ORDER — HEPARIN SODIUM 1000 [USP'U]/ML
60 INJECTION, SOLUTION INTRAVENOUS; SUBCUTANEOUS ONCE
Status: DISCONTINUED | OUTPATIENT
Start: 2025-05-26 | End: 2025-05-26

## 2025-05-26 RX ORDER — HEPARIN SODIUM 1000 [USP'U]/ML
60 INJECTION, SOLUTION INTRAVENOUS; SUBCUTANEOUS PRN
Status: DISCONTINUED | OUTPATIENT
Start: 2025-05-26 | End: 2025-05-26

## 2025-05-26 RX ORDER — HEPARIN SODIUM 10000 [USP'U]/100ML
5-30 INJECTION, SOLUTION INTRAVENOUS CONTINUOUS
Status: DISCONTINUED | OUTPATIENT
Start: 2025-05-26 | End: 2025-05-26

## 2025-05-26 RX ADMIN — POTASSIUM CHLORIDE 20 MEQ: 1500 TABLET, EXTENDED RELEASE ORAL at 09:54

## 2025-05-26 RX ADMIN — ARFORMOTEROL TARTRATE: 15 SOLUTION RESPIRATORY (INHALATION) at 07:56

## 2025-05-26 RX ADMIN — MONTELUKAST 10 MG: 10 TABLET, FILM COATED ORAL at 21:24

## 2025-05-26 RX ADMIN — ATORVASTATIN CALCIUM 80 MG: 40 TABLET, FILM COATED ORAL at 21:23

## 2025-05-26 RX ADMIN — METOPROLOL SUCCINATE 12.5 MG: 25 TABLET, FILM COATED, EXTENDED RELEASE ORAL at 09:53

## 2025-05-26 RX ADMIN — APIXABAN 5 MG: 5 TABLET, FILM COATED ORAL at 12:09

## 2025-05-26 RX ADMIN — SODIUM CHLORIDE, PRESERVATIVE FREE 10 ML: 5 INJECTION INTRAVENOUS at 21:24

## 2025-05-26 RX ADMIN — OCTREOTIDE ACETATE 50 MCG: 50 INJECTION, SOLUTION INTRAVENOUS; SUBCUTANEOUS at 09:54

## 2025-05-26 RX ADMIN — APIXABAN 5 MG: 5 TABLET, FILM COATED ORAL at 21:24

## 2025-05-26 RX ADMIN — OCTREOTIDE ACETATE 50 MCG: 50 INJECTION, SOLUTION INTRAVENOUS; SUBCUTANEOUS at 21:24

## 2025-05-26 ASSESSMENT — PAIN SCALES - GENERAL: PAINLEVEL_OUTOF10: 0

## 2025-05-26 NOTE — PROGRESS NOTES
Nutrition Education    Educated on Iron rich foods  Learners: Patient  Readiness: Eager  Method: Explanation, Handout, and Teachback  Response: Demonstrated Understanding  Contact name and number provided.    Jennifer Monte RD  Contact Number: ext [phone]

## 2025-05-26 NOTE — PROGRESS NOTES
End of Shift Note    Bedside shift change report given to BAILEY Blue (oncoming nurse) by Lew Gonzales RN (offgoing nurse).  Report included the following information SBAR, Procedure Summary, Intake/Output, and Quality Measures    Shift worked:  [id number]     Shift summary and any significant changes:     Patient was calm throughout the entire shift. Patient was alert and oriented times 4. She took her pills whole as per MAR.hourly rounding done. Patient made comfortable to rest. Plan of care ongoing.       Concerns for physician to address:  none     Zone phone for oncoming shift:   [phone]       Activity:  Level of Assistance: Independent  Number times ambulated in hallways past shift: 0  Number of times OOB to chair past shift: 0    Cardiac:   Cardiac Monitoring: No      Cardiac Rhythm: Sinus rhythm    Access:  Current line(s): PIV    Genitourinary:   Urinary Status: Voiding, Bathroom privileges    Respiratory:   O2 Device: None (Room air)  Chronic home O2 use?: NO  Incentive spirometer at bedside: NO    GI:  Last BM (including prior to admit): 05/25/25  Current diet:  ADULT DIET; Regular  DIET ONE TIME MESSAGE;  Passing flatus: YES    Pain Management:   Patient states pain is manageable on current regimen: YES    Skin:  Edenilson Scale Score: 22  Interventions: Wound Offloading (Prevention Methods): Pillows, Repositioning, Turning    Patient Safety:  Fall Risk: Nursing Judgement-Fall Risk High(Add Comments): No  Fall Risk Interventions  Nursing Judgement-Fall Risk High(Add Comments): No  Toilet Every 2 Hours-In Advance of Need: Yes  Hourly Visual Checks: Awake, In bed  Fall Visual Posted: Armband, Socks, Fall sign posted  Room Door Open: Deferred to promote rest  Alarm On: Bed  Patient Moved Closer to Nursing Station: No    Active Consults:   IP CONSULT TO GI  IP CONSULT TO HEMATOLOGY  IP CONSULT TO CARDIOLOGY    Length of Stay:  Expected LOS: 3  Actual LOS: 3    Lew Gonzales RN

## 2025-05-26 NOTE — PROGRESS NOTES
[facility]  [address] [place] [address] [phone]  [email or web address]      Cardiology Progress Note      5/26/2025 12:37 PM    Admit Date: 5/23/2025    Admit Diagnosis:   GI bleed [K92.2]    Subjective:     Hyacinth Patterson     No further bleeding    BP (!) 123/52   Pulse 67   Temp 98.1 °F (36.7 °C) (Oral)   Resp 16   Ht 1.65 m (5' 4.96\")   Wt 64.4 kg (141 lb 15.6 oz)   LMP  (LMP Unknown)   SpO2 100%   BMI 23.65 kg/m²     Current Facility-Administered Medications   Medication Dose Route Frequency    apixaban (ELIQUIS) tablet 5 mg  5 mg Oral BID    pantoprazole (PROTONIX) 40 mg in sodium chloride (PF) 0.9 % 10 mL injection  40 mg IntraVENous Daily    octreotide (SANDOSTATIN) injection 50 mcg  50 mcg SubCUTAneous Q12H    sodium chloride flush 0.9 % injection 5-40 mL  5-40 mL IntraVENous 2 times per day    sodium chloride flush 0.9 % injection 5-40 mL  5-40 mL IntraVENous PRN    0.9 % sodium chloride infusion   IntraVENous PRN    magnesium sulfate 2000 mg in 50 mL IVPB premix  2,000 mg IntraVENous PRN    ondansetron (ZOFRAN-ODT) disintegrating tablet 4 mg  4 mg Oral Q8H PRN    Or    ondansetron (ZOFRAN) injection 4 mg  4 mg IntraVENous Q6H PRN    polyethylene glycol (GLYCOLAX) packet 17 g  17 g Oral Daily PRN    acetaminophen (TYLENOL) tablet 650 mg  650 mg Oral Q6H PRN    Or    acetaminophen (TYLENOL) suppository 650 mg  650 mg Rectal Q6H PRN    atorvastatin (LIPITOR) tablet 80 mg  80 mg Oral Daily    ferrous sulfate (IRON 325) tablet 325 mg  325 mg Oral Q48H    arformoterol 15 mcg-budesonide 0.25 mg neb solution   Nebulization BID RT    metoprolol succinate (TOPROL XL) extended release tablet 12.5 mg  12.5 mg Oral Daily    montelukast (SINGULAIR) tablet 10 mg  10 mg Oral Nightly    potassium chloride (KLOR-CON M) extended release tablet 20 mEq  20 mEq Oral Daily       Objective:      Physical Exam:  General Appearance:    Chest:    Clear  Cardiovascular: rrr  Extremities: no edema  Skin:  Warm and dry.     Data Review:   Recent Labs     05/24/25  0232 05/24/25  1040 05/25/25  0323 05/25/25  1306 05/25/25 2024 05/26/25  0456 05/26/25  1000   WBC 7.8  --  6.6  --   --   --  7.1   HGB 7.2*   < > 7.6*   < > 7.6* 7.9* 8.7*   HCT 23.1*   < > 25.1*   < > 25.9* 26.6* 29.5*     --  211  --   --   --  312    < > = values in this interval not displayed.     Recent Labs     05/24/25 0232 05/25/25 0323 05/26/25  1000    139  --    K 3.8 4.2  --    * 114*  --    CO2 22 20*  --    BUN 21* 14  --    INR  --   --  1.0       No results for input(s): \"TROPHS\", \"CPK\", \"CKMB\" in the last 72 hours.    No intake or output data in the 24 hours ending 05/26/25 1237     Telemetry:   EKG:  Cxray:    Assessment:     Principal Problem:    GI bleed  Resolved Problems:    * No resolved hospital problems. *      Plan:     I see that she is back on eliquis.  If no bleeding with this, possible plavix resumption in a few days    Roscoe Burkett M.D., [facility]

## 2025-05-26 NOTE — PROGRESS NOTES
Hospitalist Progress Note        Demographics    Patient Name  Hyacinth Patterson   Date of Birth 1955   Medical Record Number  [id number]      Age  69 y.o.   PCP Jeffery Alexis MD   Admit date:  5/23/2025  5:38 PM     Room Number  3221/01  @ [facility]           Admission Diagnoses:  GI bleed   Admission Summary:  \" Hyacinth Patterson is a 69 y.o.  female with PMHx significant for DM, chronic pancreatitis, HTN, CAD recently discharged from [facility] with history of GI bleed, anemia and PE. Pt was notified by her PCP to go to the ER for blood transfusion d/t routine labs showing low hbg.  She denies any new changes to her stool.  Reports \"they are dark all the time because I'm on iron\".  She feels cold, however denies any cp, sob or dizziness.    In the ER, it was noted that her H/H was down from 8.3/28 on 5/19 to 6.8/24-->6.3/23 today. She was given a unit of prbc and transferred to [facility] for GI to evaluate due to concern of GI bleed.  Vitals/labs/imaging reviewed.  HD stable at the time of my encounter.     We were asked to admit for work up and evaluation of the above proble \"     Assessment and plan:     Acute on chronic anemia, concerning for GI bleed  Recent acute lower GI bleed  H/H down from 8.3/28 on 5/19 to 6.8/24-->6.3/23  S/p EGD on 5/16 showing multiple 4 AVMs  S/p 1 U PRBC, hbg is improved. Now stable at 8.7  GI cleared for anticoagulation   Recommend outpatient follow-up in the clinic.   Continue IV PPI BID  Continue octreotide  No further bleeding-Eliquis started today-current H&H 8.7/29.5  Will continue to monitor while on Eliquis  Monitor H&H every 8 hours  Iron 5/13/2025 was 22, iron % saturation was 6  Continue iron supplement  Dietitian consulted to guide patient on iron rich diet  GI following appreciate input.       Recent PE  Last dose of Eliquis was 5/24 in AM.  Eliquis resumed today  Appreciate hematology's evaluation.  PT/OT consulted     Hypokalemia (Resolved)  Resume  home dose KCl     Chronic systolic CHF  HTN  CAD, s/p recent stent placement.  Not in exacerbation. Monitor volume status  Continue PTA metoprolol  Continue PTA atorvastatin  Cardiologist following- given complexity of medical condition, recent stent (placed 24d PTA).  Continue to hold PTA aspirin     Hx schizophrenia   PAD  HLD   GERD     Continue PTA atorvastatin  Continue PPI             CODE STATUS   Full   Functional Status:  independent   Capacity:  Pt has decision making capacity at time of assessment   Prophylaxis   SCD   Discharge Plan:  Home w/Family,     [facility]  Payor: [employer] MEDICARE / Plan: [employer] [place] [employer] [place] / Product Type: *No Product type* /   No active isolations No active infections   Query   None noted today    Prognosis   fair   Social issues  5/26-niece at bedside-plan of care discussed   Medical Decision Making:    Chart review:  Dr. Alexis 5/22/25   Relevant labs were reviewed:  Yes    Imaging report reviewed  Yes    EKG reviewed  Yes    High risk/toxic drug monitoring  NA   Care plan discussed with  Patient/Family , Nurse, , and Consultant Dr. Skinner      Subjective and Objective Data      \"She is doing ok \".  Reports dealing with anemia for a long time.  Denies shortness of breath, chest pain, nausea and vomiting.  Reports lightheadedness and dizziness.  She looks pale , conjunctivae pale, capillary refill unremarkable.     Review of Systems -   Pertinent ROS   Hematology-positive for blood transfusion  Eye-pale conjunctiva  Musculoskeletal: Weakness    Impression 69-year-old female sitting on the side of the bed, in no apparent distress , but pale looking appearance     Patient Vitals for the past 24 hrs:   BP   05/26/25 0930 (!) 123/52   05/25/25 1921 (!) 120/52      Patient Vitals for the past 24 hrs:   Pulse   05/26/25 0930 67   05/26/25 0756 67   05/25/25 1921 73      Patient Vitals for the past 24 hrs:   Resp   05/26/25 0930 16   05/26/25 0756 18

## 2025-05-26 NOTE — PLAN OF CARE
Problem: Chronic Conditions and Co-morbidities  Goal: Patient's chronic conditions and co-morbidity symptoms are monitored and maintained or improved  Outcome: Progressing     Problem: Discharge Planning  Goal: Discharge to home or other facility with appropriate resources  Outcome: Progressing     Problem: Respiratory - Adult  Goal: Achieves optimal ventilation and oxygenation  5/26/2025 0009 by Lew Gonzales RN  Outcome: Progressing  5/25/2025 2239 by Nataly Wheatley, RT  Outcome: Progressing     Problem: Safety - Adult  Goal: Free from fall injury  Outcome: Progressing     Problem: Cardiovascular - Adult  Goal: Maintains optimal cardiac output and hemodynamic stability  Outcome: Progressing     Problem: Skin/Tissue Integrity - Adult  Goal: Skin integrity remains intact  Outcome: Progressing

## 2025-05-27 VITALS
OXYGEN SATURATION: 100 % | HEIGHT: 65 IN | BODY MASS INDEX: 23.65 KG/M2 | SYSTOLIC BLOOD PRESSURE: 118 MMHG | TEMPERATURE: 98.1 F | HEART RATE: 58 BPM | RESPIRATION RATE: 17 BRPM | WEIGHT: 141.98 LBS | DIASTOLIC BLOOD PRESSURE: 50 MMHG

## 2025-05-27 LAB
HCT VFR BLD AUTO: 27.4 % (ref 35–47)
HCT VFR BLD AUTO: 31.4 % (ref 35–47)
HGB BLD-MCNC: 7.9 G/DL (ref 11.5–16)
HGB BLD-MCNC: 9.1 G/DL (ref 11.5–16)

## 2025-05-27 PROCEDURE — 6370000000 HC RX 637 (ALT 250 FOR IP): Performed by: INTERNAL MEDICINE

## 2025-05-27 PROCEDURE — 6370000000 HC RX 637 (ALT 250 FOR IP): Performed by: STUDENT IN AN ORGANIZED HEALTH CARE EDUCATION/TRAINING PROGRAM

## 2025-05-27 PROCEDURE — 97530 THERAPEUTIC ACTIVITIES: CPT

## 2025-05-27 PROCEDURE — 85018 HEMOGLOBIN: CPT

## 2025-05-27 PROCEDURE — 94640 AIRWAY INHALATION TREATMENT: CPT

## 2025-05-27 PROCEDURE — 6360000002 HC RX W HCPCS: Performed by: INTERNAL MEDICINE

## 2025-05-27 PROCEDURE — 6360000002 HC RX W HCPCS: Performed by: PHYSICIAN ASSISTANT

## 2025-05-27 PROCEDURE — 97161 PT EVAL LOW COMPLEX 20 MIN: CPT | Performed by: PHYSICAL THERAPIST

## 2025-05-27 PROCEDURE — 97116 GAIT TRAINING THERAPY: CPT | Performed by: PHYSICAL THERAPIST

## 2025-05-27 PROCEDURE — 97165 OT EVAL LOW COMPLEX 30 MIN: CPT

## 2025-05-27 PROCEDURE — 94761 N-INVAS EAR/PLS OXIMETRY MLT: CPT

## 2025-05-27 PROCEDURE — 36415 COLL VENOUS BLD VENIPUNCTURE: CPT

## 2025-05-27 PROCEDURE — 85014 HEMATOCRIT: CPT

## 2025-05-27 PROCEDURE — 2580000003 HC RX 258: Performed by: PHYSICIAN ASSISTANT

## 2025-05-27 PROCEDURE — 2500000003 HC RX 250 WO HCPCS: Performed by: INTERNAL MEDICINE

## 2025-05-27 RX ORDER — OCTREOTIDE ACETATE 50 UG/ML
50 INJECTION, SOLUTION INTRAVENOUS; SUBCUTANEOUS EVERY 12 HOURS
Qty: 60 ML | Refills: 0 | Status: SHIPPED | OUTPATIENT
Start: 2025-05-27 | End: 2025-05-29 | Stop reason: SDUPTHER

## 2025-05-27 RX ADMIN — OCTREOTIDE ACETATE 50 MCG: 50 INJECTION, SOLUTION INTRAVENOUS; SUBCUTANEOUS at 11:59

## 2025-05-27 RX ADMIN — SODIUM CHLORIDE, PRESERVATIVE FREE 10 ML: 5 INJECTION INTRAVENOUS at 08:26

## 2025-05-27 RX ADMIN — ACETAMINOPHEN 650 MG: 325 TABLET ORAL at 04:03

## 2025-05-27 RX ADMIN — METOPROLOL SUCCINATE 12.5 MG: 25 TABLET, FILM COATED, EXTENDED RELEASE ORAL at 08:24

## 2025-05-27 RX ADMIN — POTASSIUM CHLORIDE 20 MEQ: 1500 TABLET, EXTENDED RELEASE ORAL at 08:25

## 2025-05-27 RX ADMIN — APIXABAN 5 MG: 5 TABLET, FILM COATED ORAL at 08:26

## 2025-05-27 RX ADMIN — ARFORMOTEROL TARTRATE: 15 SOLUTION RESPIRATORY (INHALATION) at 07:51

## 2025-05-27 RX ADMIN — SODIUM CHLORIDE 40 MG: 9 INJECTION, SOLUTION INTRAMUSCULAR; INTRAVENOUS; SUBCUTANEOUS at 08:24

## 2025-05-27 ASSESSMENT — PAIN SCALES - GENERAL
PAINLEVEL_OUTOF10: 7
PAINLEVEL_OUTOF10: 0
PAINLEVEL_OUTOF10: 3

## 2025-05-27 ASSESSMENT — PAIN DESCRIPTION - DESCRIPTORS: DESCRIPTORS: ACHING

## 2025-05-27 ASSESSMENT — PAIN DESCRIPTION - LOCATION: LOCATION: BACK

## 2025-05-27 ASSESSMENT — PAIN DESCRIPTION - ORIENTATION: ORIENTATION: UPPER

## 2025-05-27 NOTE — PLAN OF CARE
Problem: Chronic Conditions and Co-morbidities  Goal: Patient's chronic conditions and co-morbidity symptoms are monitored and maintained or improved  5/27/2025 0121 by Laverne Altman RN  Outcome: Progressing  5/26/2025 2307 by Arpita Rees RN  Outcome: Progressing     Problem: Discharge Planning  Goal: Discharge to home or other facility with appropriate resources  5/27/2025 0121 by Laverne Altman RN  Outcome: Progressing  5/26/2025 2307 by Arpita Rees RN  Outcome: Progressing     Problem: Respiratory - Adult  Goal: Achieves optimal ventilation and oxygenation  5/27/2025 0121 by Laverne Altman RN  Outcome: Progressing  5/26/2025 2307 by Arpita Rees RN  Outcome: Progressing  5/26/2025 2237 by Nataly Wheatley RT  Outcome: Progressing     Problem: Safety - Adult  Goal: Free from fall injury  5/27/2025 0121 by Laverne Altman RN  Outcome: Progressing  5/26/2025 2307 by Arpita Rees RN  Outcome: Progressing     Problem: Cardiovascular - Adult  Goal: Maintains optimal cardiac output and hemodynamic stability  5/27/2025 0121 by Laverne Altman RN  Outcome: Progressing  5/26/2025 2307 by Arpita Rees RN  Outcome: Progressing     Problem: Skin/Tissue Integrity - Adult  Goal: Skin integrity remains intact  5/27/2025 0121 by Laverne Altman RN  Outcome: Progressing  5/26/2025 2307 by Arpita Rees RN  Outcome: Progressing

## 2025-05-27 NOTE — PLAN OF CARE
Problem: Chronic Conditions and Co-morbidities  Goal: Patient's chronic conditions and co-morbidity symptoms are monitored and maintained or improved  Outcome: Progressing     Problem: Discharge Planning  Goal: Discharge to home or other facility with appropriate resources  Outcome: Progressing     Problem: Respiratory - Adult  Goal: Achieves optimal ventilation and oxygenation  5/26/2025 2307 by Arpita Rees RN  Outcome: Progressing  5/26/2025 2237 by Nataly Wheatley, RT  Outcome: Progressing     Problem: Safety - Adult  Goal: Free from fall injury  Outcome: Progressing     Problem: Cardiovascular - Adult  Goal: Maintains optimal cardiac output and hemodynamic stability  Outcome: Progressing     Problem: Skin/Tissue Integrity - Adult  Goal: Skin integrity remains intact  Outcome: Progressing

## 2025-05-27 NOTE — PLAN OF CARE
Problem: Chronic Conditions and Co-morbidities  Goal: Patient's chronic conditions and co-morbidity symptoms are monitored and maintained or improved  5/27/2025 1829 by Hazel Adams RN  Outcome: Adequate for Discharge  5/27/2025 1128 by Hazel Adams RN  Outcome: Progressing     Problem: Discharge Planning  Goal: Discharge to home or other facility with appropriate resources  5/27/2025 1829 by Hazel Adams RN  Outcome: Adequate for Discharge  5/27/2025 1128 by Hazel Adams RN  Outcome: Progressing     Problem: Respiratory - Adult  Goal: Achieves optimal ventilation and oxygenation  5/27/2025 1829 by Hazel Adams RN  Outcome: Adequate for Discharge  5/27/2025 1128 by Hazel Adams RN  Outcome: Progressing  5/27/2025 0810 by Estefani Walker, RT  Outcome: Progressing     Problem: Safety - Adult  Goal: Free from fall injury  5/27/2025 1829 by Hazel Adams RN  Outcome: Adequate for Discharge  5/27/2025 1128 by Hazel Adams RN  Outcome: Progressing     Problem: Cardiovascular - Adult  Goal: Maintains optimal cardiac output and hemodynamic stability  5/27/2025 1829 by Hazel Adams RN  Outcome: Adequate for Discharge  5/27/2025 1128 by Hazel Adams RN  Outcome: Progressing     Problem: Skin/Tissue Integrity - Adult  Goal: Skin integrity remains intact  5/27/2025 1829 by Hazel Adams RN  Outcome: Adequate for Discharge  5/27/2025 1128 by Hazel Adams RN  Outcome: Progressing     Problem: Pain  Goal: Verbalizes/displays adequate comfort level or baseline comfort level  5/27/2025 1829 by Haezl Adams RN  Outcome: Adequate for Discharge  5/27/2025 1128 by Hazel Adams RN  Outcome: Progressing

## 2025-05-27 NOTE — PROGRESS NOTES
End of Shift Note    Bedside shift change report given to ADRIANA Palm (oncoming nurse) by Su Fleming LPN (offgoing nurse).  Report included the following information SBAR    Shift worked:  0700 - 1900     Shift summary and any significant changes:     Pt supposed to begin Heparin drip today however IV acces lost just prior to starting drip. Messaged MD who changed order to Eliquis. Continue to trend H&H Q8H per MD. Pt resting comfortably upon completion of shift. All concerns addressed.      Concerns for physician to address:  none     Zone phone for oncoming shift:          Activity:  Level of Assistance: Independent  Number times ambulated in hallways past shift: 0  Number of times OOB to chair past shift: 1    Cardiac:   Cardiac Monitoring: Yes      Cardiac Rhythm: Sinus rhythm    Access:  Current line(s): PIV    Genitourinary:   Urinary Status: Voiding, Bathroom privileges    Respiratory:   O2 Device: None (Room air)  Chronic home O2 use?: YES  Incentive spirometer at bedside: N/A    GI:  Last BM (including prior to admit): 05/26/25  Current diet:  ADULT DIET; Regular  DIET ONE TIME MESSAGE;  Passing flatus: YES    Pain Management:   Patient states pain is manageable on current regimen: YES    Skin:  Edenilson Scale Score: 22  Interventions: Wound Offloading (Prevention Methods): Pillows, Repositioning, Turning    Patient Safety:  Fall Risk: Nursing Judgement-Fall Risk High(Add Comments): No  Fall Risk Interventions  Nursing Judgement-Fall Risk High(Add Comments): No  Toilet Every 2 Hours-In Advance of Need: Yes  Hourly Visual Checks: Awake, In bed  Fall Visual Posted: Armband, Socks, Fall sign posted  Room Door Open: Deferred to promote rest  Alarm On: Bed  Patient Moved Closer to Nursing Station: No    Active Consults:   IP CONSULT TO GI  IP CONSULT TO HEMATOLOGY  IP CONSULT TO CARDIOLOGY  IP CONSULT TO DIETITIAN    Length of Stay:  Expected LOS: 3  Actual LOS: 3    Su Fleming LPN

## 2025-05-27 NOTE — PROGRESS NOTES
Pt discharged with all belongings. Pt transported home by family friend. Pt was really upset upon discharge because of lack of communication from . She wanted to  to directly talk to her about transportation home instead of RN.

## 2025-05-27 NOTE — PLAN OF CARE
Problem: Chronic Conditions and Co-morbidities  Goal: Patient's chronic conditions and co-morbidity symptoms are monitored and maintained or improved  5/27/2025 1128 by Hazel Adams RN  Outcome: Progressing  5/27/2025 0121 by Laverne Altman RN  Outcome: Progressing  5/26/2025 2307 by Arpita Rees RN  Outcome: Progressing     Problem: Discharge Planning  Goal: Discharge to home or other facility with appropriate resources  5/27/2025 1128 by Hazel Adams RN  Outcome: Progressing  5/27/2025 0121 by Laverne Altman RN  Outcome: Progressing  5/26/2025 2307 by Arpita Rees RN  Outcome: Progressing     Problem: Respiratory - Adult  Goal: Achieves optimal ventilation and oxygenation  5/27/2025 1128 by Hazel Adams RN  Outcome: Progressing  5/27/2025 0810 by Estefani Walker, RT  Outcome: Progressing  5/27/2025 0121 by Laverne Altman RN  Outcome: Progressing  5/26/2025 2307 by Arpita Rees RN  Outcome: Progressing  5/26/2025 2237 by Nataly Wheatley, RT  Outcome: Progressing     Problem: Safety - Adult  Goal: Free from fall injury  5/27/2025 1128 by Hazel Adams RN  Outcome: Progressing  5/27/2025 0121 by Laverne Altmna RN  Outcome: Progressing  5/26/2025 2307 by Arpita Rees RN  Outcome: Progressing     Problem: Cardiovascular - Adult  Goal: Maintains optimal cardiac output and hemodynamic stability  5/27/2025 1128 by Hazel Adams RN  Outcome: Progressing  5/27/2025 0121 by Laverne Altman RN  Outcome: Progressing  5/26/2025 2307 by Arpita Rees RN  Outcome: Progressing     Problem: Skin/Tissue Integrity - Adult  Goal: Skin integrity remains intact  5/27/2025 1128 by Hazel Adams RN  Outcome: Progressing  5/27/2025 0121 by Laverne Altman RN  Outcome: Progressing  5/26/2025 2307 by Arpita Rees RN  Outcome: Progressing     Problem: Pain  Goal: Verbalizes/displays adequate comfort level or baseline comfort level  Outcome: Progressing

## 2025-05-27 NOTE — CARE COORDINATION
Care Management Initial Assessment       RUR: 25%  Readmission? Yes - discharged on 5/19  1st IM letter given? Yes   1st  letter given: No     Chart reviewed. CM aware of discharge order. Met with pt at the bedside to introduce self and role as CM. Noted readmission to [facility]- recently discharged home on 5/19 with [facility]. Pt readmitted due to low hemoglobin. Saw PCP last week after discharge. Preferred pharmacy is [facility] in [place]. Pt lives alone in a one level home with 3 MUNA. She reports being independent and ambulatory without a device at baseline. No prior hx of SNF/rehab noted. Will need transport home. Declined ACP conversation at this time.    2nd IM given and reviewed. MELINDA orders sent to [facility] and notified of discharge. CM submitted order for rollator per PT/OT recommendation via [employer] which is pending at this time.     CM requested Medicaid transport for d/c home- ETA within three hours.    No further CM needs identified at this time.    Update 3:20 PM  [place] contact [place] to [place] as well as attempted to set up Round Trip through Medicaid. Trip was placed on hold due to needing more information. CM contacted [employer]- was advised that pt is not in their system and to contact [employer]. Per last CM note on 5/19, pt does not have transportation benefit through [employer]/Medicaid. CM has escalated to assigned RN to get admin approval for Round Trip or H2H. Pending response. Pt does not meet criteria for BLS transport at this time.    Update 4:14 PM  CM available until 4:30PM today to assist with transportation arrangements. Awaiting leadership approval for Roundtrip or H2H.     Update 4:29 PM  CM made aware that request for transport has been declined. RN to speak with pt about securing own transport home.       05/27/25 0870   Service Assessment   Patient Orientation Alert and Oriented   Cognition Alert   History  [place]  [phone]

## 2025-05-27 NOTE — PROGRESS NOTES
PHYSICAL THERAPY EVALUATION/DISCHARGE    Patient: Hyacinth Patterson (69 y.o. female)  Date: 5/27/2025  Primary Diagnosis: GI bleed [K92.2]           ASSESSMENT AND RECOMMENDATIONS:  Based on the objective data below, the patient presents independence with overall mobility. Patient sitting in chair upon arrival and agreeable to therapy. She is able to complete transfers and ambulation independently. Dynamic standing balance is good overall with no overt LOB noted but patient demonstrates occasional path deviations when ambulating in [place]. Patient reports difficulty walking community distances secondary to fatigue. She would benefit from rollator for community mobility which would allow patient to sit and rest as needed.    Further skilled acute physical therapy is not indicated at this time.  Patient had just initiated HH services prior to this admission. Recommend resumption of HH following discharge.       PLAN :  Recommendation for discharge: (in order for the patient to meet his/her long term goals):Resume   Intermittent physical therapy up to 2-3x/week in previous living setting    Other factors to consider for discharge: no additional factors    IF patient discharges home will need the following DME: rollator  Hyacinth Patterson was evaluated today and a DME order was entered for a wheeled walker with seat because she requires this to successfully complete daily living tasks of ambulating.  A wheeled walker with seat is necessary due to the patient's unsteady gait, upper body weakness, inability to  and ambulation device, ambulating only short distances by pushing a walker, and the need to sit for a short time before resuming ambulation.  These tasks cannot be completed with a lesser ambulation device such as a cane, crutch, or standard walker.  The need for this equipment was discussed with the patient and she understands and is in agreement.        SUBJECTIVE:   Patient stated “I get so tired when I'm  Independent  Stand to Sit: Independent  Balance:               Balance  Sitting: Intact  Standing: Impaired  Standing - Static: Good;Unsupported  Standing - Dynamic: Good;Unsupported  Ambulation/Gait Training:           Gait  Gait Training: Yes  Overall Level of Assistance: Independent  Distance (ft): 500 Feet  Assistive Device: None  Base of Support: Narrowed  Step Length:  (equal)  Gait Abnormalities:  (occasional slight path deviations)          Activity Tolerance:   Good    After treatment:   Patient left in no apparent distress sitting up in chair and Call bell within reach      COMMUNICATION/EDUCATION:   The patient's plan of care was discussed with: occupational therapist, registered nurse, and          Thank you for this referral.  Ramona Post, PT  Minutes: 18

## 2025-05-27 NOTE — PROGRESS NOTES
End of Shift Note    Bedside shift change report given to Hazel (oncoming nurse) by Laverne Altman RN (offgoing nurse).  Report included the following information SBAR, Kardex, and MAR    Shift worked:  7p-7a     Shift summary and any significant changes:     I did render care to this patient from 2300.  Patient was assessed and has been resting comfortably.  IV has been flushed and is patent.  Morning lab was done.  Patient was given Tylenol once, see PRN MAR.  Patient teaching and routine rounding has been done.     Concerns for physician to address:       Zone phone for oncoming shift:          Activity:  Level of Assistance: Independent  Number times ambulated in hallways past shift: 0  Number of times OOB to chair past shift: 0    Cardiac:   Cardiac Monitoring: No      Cardiac Rhythm: Sinus rhythm    Access:  Current line(s): PIV     Genitourinary:   Urinary Status: Voiding, Bathroom privileges    Respiratory:   O2 Device: None (Room air)  Chronic home O2 use?: NO  Incentive spirometer at bedside: NO    GI:  Last BM (including prior to admit): 05/26/25  Current diet:  ADULT DIET; Regular  DIET ONE TIME MESSAGE;  Passing flatus: YES    Pain Management:   Patient states pain is manageable on current regimen: YES    Skin:  Edenilson Scale Score: 21  Interventions: Wound Offloading (Prevention Methods): Blankets, Pillows, Repositioning    Patient Safety:  Fall Risk: Nursing Judgement-Fall Risk High(Add Comments): No  Fall Risk Interventions  Nursing Judgement-Fall Risk High(Add Comments): No  Toilet Every 2 Hours-In Advance of Need: Yes  Hourly Visual Checks: Awake, In bed  Fall Visual Posted: Armband, Socks  Room Door Open: Deferred to promote rest  Alarm On: Bed  Patient Moved Closer to Nursing Station: No    Active Consults:   IP CONSULT TO GI  IP CONSULT TO HEMATOLOGY  IP CONSULT TO CARDIOLOGY  IP CONSULT TO DIETITIAN    Length of Stay:  Expected LOS: 3  Actual LOS: 4    Laverne Altman

## 2025-05-27 NOTE — PROGRESS NOTES
PCP Hospital follow-up transitional care appointment has been scheduled with Dr. Jeffery Alexis on 5/29/25 8213. This is a previously scheduled appt and currently first available. Pending patient discharge.

## 2025-05-27 NOTE — DISCHARGE INSTRUCTIONS
Thank you for choosing our hospital for your care.  It is our privilege to care for you in your time of need.  In the next several days, you may receive a survey via email or mailed to your home about your experience with our team.  We would greatly appreciate you taking a few minutes to complete the survey, as we use this information to learn what we have done well and what we could be doing better. Thank you for trusting us with your care!    Here's some more information for you to know:  All of your medications from before your hospitalization are the same EXCEPT:  Your new prescription for you to start is octreotide for prevention of bleeding with your AVMs.  Please take all of your medications as prescribed. If prescribed any medications, please read all pharmacy instructions and inserts. Inform your doctor and pharmacist about all other medications and alternative therapies.  Please follow up with your PCP in 1-2 weeks to be reassessed after your hospital stay. Discuss stopping Eliquis 3 months after the discovery of your PE. This may help prevent bleeding from your AVMs.  Please also follow up with cardiology. They will want to resume plavix WHEN YOU AREN'T TAKING ELIQUIS ANYMORE.  If you start feeling any symptoms similar to what brought you into the hospital, please come back to the ED to be re-evaluated.    Again, THANK YOU for choosing us to care for YOU!     What is an AVM?  An arteriovenous malformation, also known as an AVM, is a tangle of blood vessels that creates irregular connections between arteries and veins. This disrupts blood flow and prevents tissues from receiving oxygen. An AVM can occur anywhere in the body, including in the brain.  Arteries move oxygen-rich blood from the heart to the brain and other organs. Veins drain the oxygen-depleted blood back to the lungs and heart. When an AVM disrupts this critical process, surrounding tissues might not get enough oxygen.  Because the  tangled blood vessels in an AVM do not form properly, they can weaken and burst. If an AVM in the brain bursts, it can cause bleeding in the brain, which can lead to a stroke or brain damage. Bleeding in the brain is known as a hemorrhage.  Read more about brain AVM (arteriovenous malformation).  The cause of AVMs is not clear. Rarely, they are passed down in families.    Remember, we cauterized the ones that we could see with EGD/scope. Remember your GI tract can be up to 30 feet long and our cameras don't go that far. The octreotide will try to help prevent any remaining ones from bleeding-but is not a cure. REMEMBER you can still have a bleed so coordinate close follow up with your PCP for monitoring your Hb. Your risk for bleeding should be lower off of Eliquis, but then your heart doctor may need you on plavix and aspirin after that. Talk to your heart doctor after your Eliquis course.

## 2025-05-27 NOTE — PROGRESS NOTES
GI PROGRESS NOTE  Chelsea Morel PA-C  [phone] JENNIFER in-hospital cell phone M-F until 4:30  After 5pm or on weekends, please call  for physician on call    NAME:Hyacinth Patterson :1955 MRN:[id number]   ATTG: Dr. Jackson  PCP: Jeffery Alexis MD  Date/Time:  2025 11:06 AM     Assessment:   Bleeding AVMs  Anemia  PE on Eliqus  CHF  CAD  Hgb 7.9 at baseline  MCV 89.1  S/p 1 unit PRBCs  CT A/P 2025  Small right upper lobe PE. 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 Two of the larger AVMs were also treated with clips afterwards. Two regular clips used in total.    Colonoscopy 2024  Impression:    -Normal neoterminal ileum; biopsied to exclude inflammation  -Normal ileo-colonic mucosa consistent with right hemicolectomy anatomy  -Normal colon mucosa without masses, polyps, or inflammation; biopsied to exclude microscopic colitis  -Small grade 1 internal hemorrhoids  Plan:   AVMs have a tendency to rebleed, especially while on DOAC. No repeat scopes indicated at this time.   Continue octreotide and continue once discharged  IV PPI BID and PO once discharged  We will sign off at this time.     Plan discussed with Dr. Bell  Subjective:   Patient seen and examined, sitting in bedside chair. Her niece is on the phone. She is disappointed to hear she will need octreotide injections indefinitely.     REVIEW OF SYSTEMS:    Constitutional: negative fever, negative chills, negative weight loss  Eyes:               negative visual changes  ENT:                negative sore throat, tongue or lip swelling   Respiratory:   negative cough, negative dyspnea  Cards:             negative for chest pain, palpitations,  M) extended release tablet 20 mEq  20 mEq Oral Daily
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
## 2025-05-28 LAB
ABO + RH BLD: NORMAL
BLD PROD TYP BPU: NORMAL
BLOOD BANK BLOOD PRODUCT EXPIRATION DATE: NORMAL
BLOOD BANK DISPENSE STATUS: NORMAL
BLOOD BANK ISBT PRODUCT BLOOD TYPE: 6200
BLOOD BANK UNIT TYPE AND RH: NORMAL
BLOOD GROUP ANTIBODIES SERPL: NORMAL
BPU ID: NORMAL
CROSSMATCH RESULT: NORMAL
EKG ATRIAL RATE: 85 BPM
EKG DIAGNOSIS: NORMAL
EKG P AXIS: 55 DEGREES
EKG P-R INTERVAL: 154 MS
EKG Q-T INTERVAL: 394 MS
EKG QRS DURATION: 98 MS
EKG QTC CALCULATION (BAZETT): 468 MS
EKG R AXIS: 4 DEGREES
EKG T AXIS: 189 DEGREES
EKG VENTRICULAR RATE: 85 BPM
SPECIMEN EXP DATE BLD: NORMAL
UNIT DIVISION: 0
UNIT ISSUE DATE/TIME: NORMAL

## 2025-05-29 ENCOUNTER — OFFICE VISIT (OUTPATIENT)
Age: 70
End: 2025-05-29

## 2025-05-29 VITALS
WEIGHT: 143.6 LBS | DIASTOLIC BLOOD PRESSURE: 50 MMHG | HEIGHT: 65 IN | SYSTOLIC BLOOD PRESSURE: 113 MMHG | OXYGEN SATURATION: 100 % | BODY MASS INDEX: 23.93 KG/M2 | RESPIRATION RATE: 18 BRPM | TEMPERATURE: 97.1 F | HEART RATE: 75 BPM

## 2025-05-29 DIAGNOSIS — Z79.899 HIGH RISK MEDICATION USE: ICD-10-CM

## 2025-05-29 DIAGNOSIS — I26.99 OTHER ACUTE PULMONARY EMBOLISM WITHOUT ACUTE COR PULMONALE (HCC): ICD-10-CM

## 2025-05-29 DIAGNOSIS — K31.811 GASTROINTESTINAL HEMORRHAGE ASSOCIATED WITH ANGIODYSPLASIA OF STOMACH AND DUODENUM: Primary | ICD-10-CM

## 2025-05-29 DIAGNOSIS — D50.0 IRON DEFICIENCY ANEMIA DUE TO CHRONIC BLOOD LOSS: ICD-10-CM

## 2025-05-29 RX ORDER — ONDANSETRON 2 MG/ML
8 INJECTION INTRAMUSCULAR; INTRAVENOUS
OUTPATIENT
Start: 2025-06-02

## 2025-05-29 RX ORDER — SODIUM CHLORIDE 9 MG/ML
5-250 INJECTION, SOLUTION INTRAVENOUS PRN
OUTPATIENT
Start: 2025-06-02

## 2025-05-29 RX ORDER — ACETAMINOPHEN 325 MG/1
650 TABLET ORAL
OUTPATIENT
Start: 2025-06-02

## 2025-05-29 RX ORDER — HYDROCORTISONE SODIUM SUCCINATE 100 MG/2ML
100 INJECTION INTRAMUSCULAR; INTRAVENOUS
OUTPATIENT
Start: 2025-06-02

## 2025-05-29 RX ORDER — OCTREOTIDE ACETATE 50 UG/ML
50 INJECTION, SOLUTION INTRAVENOUS; SUBCUTANEOUS EVERY 12 HOURS
Qty: 60 ML | Refills: 2 | Status: SHIPPED | OUTPATIENT
Start: 2025-05-29 | End: 2025-06-28

## 2025-05-29 RX ORDER — SODIUM CHLORIDE 0.9 % (FLUSH) 0.9 %
5-40 SYRINGE (ML) INJECTION PRN
OUTPATIENT
Start: 2025-06-02

## 2025-05-29 RX ORDER — ALBUTEROL SULFATE 90 UG/1
4 INHALANT RESPIRATORY (INHALATION) PRN
OUTPATIENT
Start: 2025-06-02

## 2025-05-29 RX ORDER — FAMOTIDINE 10 MG/ML
20 INJECTION, SOLUTION INTRAVENOUS
OUTPATIENT
Start: 2025-06-02

## 2025-05-29 RX ORDER — HEPARIN SODIUM (PORCINE) LOCK FLUSH IV SOLN 100 UNIT/ML 100 UNIT/ML
500 SOLUTION INTRAVENOUS PRN
OUTPATIENT
Start: 2025-06-02

## 2025-05-29 RX ORDER — DIPHENHYDRAMINE HYDROCHLORIDE 50 MG/ML
50 INJECTION, SOLUTION INTRAMUSCULAR; INTRAVENOUS
OUTPATIENT
Start: 2025-06-02

## 2025-05-29 RX ORDER — EPINEPHRINE 1 MG/ML
0.3 INJECTION, SOLUTION, CONCENTRATE INTRAVENOUS PRN
OUTPATIENT
Start: 2025-06-02

## 2025-05-29 RX ORDER — SODIUM CHLORIDE 9 MG/ML
INJECTION, SOLUTION INTRAVENOUS CONTINUOUS
OUTPATIENT
Start: 2025-06-02

## 2025-05-29 NOTE — PROGRESS NOTES
Hyacinth Patterson is a 69 y.o. female presenting for/with:    Chief Complaint   Patient presents with    Follow-Up from Hospital     [facility] on 5-       Vitals:    05/29/25 0815   BP: (!) 113/50   BP Site: Left Upper Arm   Patient Position: Sitting   BP Cuff Size: Medium Adult   Pulse: 75   Resp: 18   Temp: 97.1 °F (36.2 °C)   TempSrc: Temporal   SpO2: 100%   Weight: 65.1 kg (143 lb 9.6 oz)   Height: 1.65 m (5' 4.96\")       Pain Scale: /10  Pain Location:     \"Have you been to the ER, urgent care clinic since your last visit?  Hospitalized since your last visit?\"    Yes [facility]    “Have you seen or consulted any other health care providers outside of [facility] since your last visit?”    NO                 3/24/2025     8:07 AM   PHQ-9    Little interest or pleasure in doing things 0   Feeling down, depressed, or hopeless 0   PHQ-2 Score 0   PHQ-9 Total Score 0           3/31/2025    11:30 AM 3/13/2025    10:40 AM 8/29/2024    10:40 AM 5/7/2024     9:20 AM 4/17/2024     9:00 AM 1/12/2024     7:30 AM 9/21/2023     3:00 PM   [facility] AMB LEARNING ASSESSMENT   Primary Learner Patient Patient Patient Patient Patient Patient Patient   co-learner caregiver       No   Primary Language ENGLISH ENGLISH ENGLISH ENGLISH ENGLISH ENGLISH ENGLISH   Learning Preference DEMONSTRATION DEMONSTRATION DEMONSTRATION DEMONSTRATION DEMONSTRATION PICTURES DEMONSTRATION   Answered By pt pt pt pt pt self pt   Relationship to Learner SELF SELF SELF SELF SELF SELF SELF            5/22/2025     1:38 PM   Amb Fall Risk Assessment and TUG Test   Do you feel unsteady or are you worried about falling?  no   2 or more falls in past year? no   Fall with injury in past year? no           5/22/2025     1:00 PM 5/8/2025     8:00 AM 3/24/2025     8:00 AM 12/27/2024     8:00 AM 11/25/2024     7:00 AM 10/28/2024    10:00 AM 10/17/2024     6:00 AM   ADL ASSESSMENT   Feeding yourself No Help Needed No Help Needed No Help Needed No Help Needed No Help

## 2025-06-02 PROBLEM — I50.9 ACUTE EXACERBATION OF CHRONIC HEART FAILURE (HCC): Status: RESOLVED | Noted: 2025-05-13 | Resolved: 2025-06-02

## 2025-06-02 PROBLEM — I50.22 CHRONIC HFREF (HEART FAILURE WITH REDUCED EJECTION FRACTION) (HCC): Status: ACTIVE | Noted: 2025-05-13

## 2025-06-02 PROBLEM — Z87.19 HISTORY OF UPPER GASTROINTESTINAL BLEEDING: Status: ACTIVE | Noted: 2025-06-02

## 2025-06-02 NOTE — PROGRESS NOTES
Physician Progress Note      PATIENT:               SOO GOMEZ  CSN #:                  [id number]  :                       1955  ADMIT DATE:       2025 5:38 PM  DISCH DATE:        2025 6:34 PM  RESPONDING  PROVIDER #:        Chase Bell MD          QUERY TEXT:    Gastrointestinal bleeding is documented in the medical record D/C Summary.    Please specify the underlying cause:    The clinical indicators include:  -\"Acute on chronic anemia related to GI blood loss secondary to GI AVMs.    Patient remains hemodynamically stable, hemoglobin improved to 7.2 g/dL after   PRBC transfusion.  No signs symptoms of active/obvious GI blood loss.  6% iron   saturation 2 weeks ago.\" (Gastroenterology CN )    -\"Acute on chronic anemia, concerning for GI bleed  Recent acute lower GI bleed  H/H down from 8.3/28 on  to 6.8-->6.3/  S/p EGD on  showing multiple 4 AVMs and GI cleared for anticoagulation and   recommend outpatient follow-up in the clinic.  S/p 1 U PRBC, hbg is improved. Now stable mid-7s. CTM  GI consulted. Appreciate input. Rec start subQ octreotide, protonix QD and   hold eliquis x 24h and consider resuming AC thereafter and monitoring   (consider starting on heparin gtts instead of eliquis to start)\" (IM PN )    -\"Bleeding AVMs  Scope Hx:  EGD with push 2025  Impression:  Duodenum/jejunum:  4 AVMs found in the proximal jejunum/distal duodenum  These were treated with APC. Two of the larger AVMs were also treated with   clips afterwards. Two regular clips used in total.\" (Gastroenterology PN )    -\"Acute on chronic anemia, concerning for GI bleed  Recent acute lower GI bleed  H/H down from 8.3/28 on  to 6.-->6.3/  S/p EGD on  showing multiple 4 AVMs  S/p 1 U PRBC, hbg is improved. Now stable at 9.1  No further bleeding-Eliquis started - no signs of bleeding\" (D/C Summary)    -transfusion, hemoglobin monitoring, octreotide given  Options

## 2025-06-05 ENCOUNTER — OFFICE VISIT (OUTPATIENT)
Age: 70
End: 2025-06-05
Payer: MEDICARE

## 2025-06-05 VITALS
RESPIRATION RATE: 18 BRPM | WEIGHT: 144.38 LBS | TEMPERATURE: 97.1 F | HEART RATE: 75 BPM | BODY MASS INDEX: 24.05 KG/M2 | OXYGEN SATURATION: 96 % | DIASTOLIC BLOOD PRESSURE: 54 MMHG | SYSTOLIC BLOOD PRESSURE: 132 MMHG

## 2025-06-05 DIAGNOSIS — E11.9 TYPE 2 DIABETES MELLITUS WITHOUT COMPLICATION, WITH LONG-TERM CURRENT USE OF INSULIN (HCC): ICD-10-CM

## 2025-06-05 DIAGNOSIS — D50.0 IRON DEFICIENCY ANEMIA DUE TO CHRONIC BLOOD LOSS: ICD-10-CM

## 2025-06-05 DIAGNOSIS — Z79.4 TYPE 2 DIABETES MELLITUS WITHOUT COMPLICATION, WITH LONG-TERM CURRENT USE OF INSULIN (HCC): ICD-10-CM

## 2025-06-05 DIAGNOSIS — K31.811 GASTROINTESTINAL HEMORRHAGE ASSOCIATED WITH ANGIODYSPLASIA OF STOMACH AND DUODENUM: ICD-10-CM

## 2025-06-05 DIAGNOSIS — I10 BENIGN ESSENTIAL HYPERTENSION: ICD-10-CM

## 2025-06-05 DIAGNOSIS — E78.2 MIXED HYPERLIPIDEMIA: ICD-10-CM

## 2025-06-05 PROCEDURE — 1123F ACP DISCUSS/DSCN MKR DOCD: CPT | Performed by: INTERNAL MEDICINE

## 2025-06-05 PROCEDURE — 3075F SYST BP GE 130 - 139MM HG: CPT | Performed by: INTERNAL MEDICINE

## 2025-06-05 PROCEDURE — 3078F DIAST BP <80 MM HG: CPT | Performed by: INTERNAL MEDICINE

## 2025-06-05 PROCEDURE — 1126F AMNT PAIN NOTED NONE PRSNT: CPT | Performed by: INTERNAL MEDICINE

## 2025-06-05 PROCEDURE — 99214 OFFICE O/P EST MOD 30 MIN: CPT | Performed by: INTERNAL MEDICINE

## 2025-06-05 PROCEDURE — 1159F MED LIST DOCD IN RCRD: CPT | Performed by: INTERNAL MEDICINE

## 2025-06-05 PROCEDURE — 3044F HG A1C LEVEL LT 7.0%: CPT | Performed by: INTERNAL MEDICINE

## 2025-06-05 RX ORDER — OCTREOTIDE ACETATE 50 UG/ML
50 INJECTION, SOLUTION INTRAVENOUS; SUBCUTANEOUS EVERY 12 HOURS
Qty: 60 ML | Refills: 2 | Status: ON HOLD | OUTPATIENT
Start: 2025-06-05 | End: 2025-07-05

## 2025-06-05 SDOH — ECONOMIC STABILITY: FOOD INSECURITY: WITHIN THE PAST 12 MONTHS, THE FOOD YOU BOUGHT JUST DIDN'T LAST AND YOU DIDN'T HAVE MONEY TO GET MORE.: NEVER TRUE

## 2025-06-05 SDOH — ECONOMIC STABILITY: FOOD INSECURITY: WITHIN THE PAST 12 MONTHS, YOU WORRIED THAT YOUR FOOD WOULD RUN OUT BEFORE YOU GOT MONEY TO BUY MORE.: NEVER TRUE

## 2025-06-05 ASSESSMENT — PATIENT HEALTH QUESTIONNAIRE - PHQ9
SUM OF ALL RESPONSES TO PHQ QUESTIONS 1-9: 0
2. FEELING DOWN, DEPRESSED OR HOPELESS: NOT AT ALL
1. LITTLE INTEREST OR PLEASURE IN DOING THINGS: NOT AT ALL

## 2025-06-05 NOTE — PROGRESS NOTES
Duration 30 minutes primarily education, review of imaging, labs and records.    Assessment & Plan    Anemia: Continues to demonstrate dark stools and has been unable to get octreotide and notes that the insurance will not cover it.  Checking CBC today.  Until satisfactory plan can be put in place to decrease her GI bleeding in the face of dual antiplatelet therapy and DOAC therapy, she will likely require ongoing transfusion therapy.  She was educated that in the event that her hemoglobin is less than 7 that she will need to go to the hospital for transfusions.    Diabetes: Currently well-controlled.  No that the A1c was artifactually low due to recent blood transfusions.    Pulmonary embolism: Remains on apixaban    Recent placement of intracoronary stent: Presently on dual antiplatelet therapy without angina                Chief Complaint   Patient presents with    GI Problem     Follow up from last visit, med check          Orders Placed This Encounter   Procedures    CBC with Auto Differential     Standing Status:   Future     Expected Date:   6/5/2025     Expiration Date:   6/5/2026    Basic Metabolic Panel     Standing Status:   Future     Expected Date:   6/5/2025     Expiration Date:   6/5/2026       Jeffery Alexis MD, FACP      History of Present Illness  The patient is a 69-year-old female who is here today for a 1-week follow-up after presenting for evaluation of GI bleeding and profound anemia with a hemoglobin level of 6.8.    She has significant CAD with a recent ICS, now on DAPT.  Also found to have a small PE and placed on Eliquis.    Developed GI bleeding.  EGD demonstrated AVM     Octreotide prescribed at discharge but she has not picked this up from [facility].    Remains tired.    Scheduled for iron infusion next week.    Currently off Insulin.   FS            Allergies   Allergen Reactions    Lisinopril Headaches, Other (See Comments) and Swelling    Morphine Hives, Itching,

## 2025-06-05 NOTE — PROGRESS NOTES
Hyacinth Patterson is a 69 y.o. female presenting for/with:    Chief Complaint   Patient presents with    GI Problem     Follow up from last visit, med check        Vitals:    06/05/25 1002   BP: (!) 132/54   BP Site: Left Upper Arm   Patient Position: Sitting   BP Cuff Size: Medium Adult   Pulse: 75   Resp: 18   Temp: 97.1 °F (36.2 °C)   TempSrc: Temporal   SpO2: 96%   Weight: 65.5 kg (144 lb 6 oz)       Pain Scale: 0 - No pain/10  Pain Location:     \"Have you been to the ER, urgent care clinic since your last visit?  Hospitalized since your last visit?\"    NO    “Have you seen or consulted any other health care providers outside of [facility] since your last visit?”    NO                 6/5/2025    10:02 AM   PHQ-9    Little interest or pleasure in doing things 0   Feeling down, depressed, or hopeless 0   PHQ-2 Score 0   PHQ-9 Total Score 0           3/31/2025    11:30 AM 3/13/2025    10:40 AM 8/29/2024    10:40 AM 5/7/2024     9:20 AM 4/17/2024     9:00 AM 1/12/2024     7:30 AM 9/21/2023     3:00 PM   [facility] AMB LEARNING ASSESSMENT   Primary Learner Patient Patient Patient Patient Patient Patient Patient   co-learner caregiver       No   Primary Language ENGLISH ENGLISH ENGLISH ENGLISH ENGLISH ENGLISH ENGLISH   Learning Preference DEMONSTRATION DEMONSTRATION DEMONSTRATION DEMONSTRATION DEMONSTRATION PICTURES DEMONSTRATION   Answered By pt pt pt pt pt self pt   Relationship to Learner SELF SELF SELF SELF SELF SELF SELF            6/5/2025    10:02 AM   Amb Fall Risk Assessment and TUG Test   Do you feel unsteady or are you worried about falling?  no   2 or more falls in past year? no   Fall with injury in past year? no           6/5/2025    10:00 AM 5/22/2025     1:00 PM 5/8/2025     8:00 AM 3/24/2025     8:00 AM 12/27/2024     8:00 AM 11/25/2024     7:00 AM 10/28/2024    10:00 AM   ADL ASSESSMENT   Feeding yourself No Help Needed No Help Needed No Help Needed No Help Needed No Help Needed No Help Needed

## 2025-06-06 ENCOUNTER — RESULTS FOLLOW-UP (OUTPATIENT)
Age: 70
End: 2025-06-06

## 2025-06-06 ENCOUNTER — HOSPITAL ENCOUNTER (EMERGENCY)
Facility: HOSPITAL | Age: 70
Discharge: ANOTHER HOSPITAL | End: 2025-06-07
Attending: FAMILY MEDICINE | Admitting: FAMILY MEDICINE
Payer: MEDICARE

## 2025-06-06 VITALS
SYSTOLIC BLOOD PRESSURE: 134 MMHG | TEMPERATURE: 98.2 F | OXYGEN SATURATION: 100 % | RESPIRATION RATE: 18 BRPM | BODY MASS INDEX: 24.72 KG/M2 | DIASTOLIC BLOOD PRESSURE: 47 MMHG | WEIGHT: 148.4 LBS | HEIGHT: 65 IN | HEART RATE: 75 BPM

## 2025-06-06 DIAGNOSIS — K92.2 UGIB (UPPER GASTROINTESTINAL BLEED): Primary | ICD-10-CM

## 2025-06-06 LAB
ABO + RH BLD: NORMAL
ALBUMIN SERPL-MCNC: 2.6 G/DL (ref 3.5–5)
ALBUMIN SERPL-MCNC: 3.1 G/DL (ref 3.5–5)
ALBUMIN/GLOB SERPL: 0.8 (ref 1.1–2.2)
ALBUMIN/GLOB SERPL: 1 (ref 1.1–2.2)
ALP SERPL-CCNC: 73 U/L (ref 45–117)
ALP SERPL-CCNC: 88 U/L (ref 45–117)
ALT SERPL-CCNC: 19 U/L (ref 12–78)
ALT SERPL-CCNC: 26 U/L (ref 12–78)
ANION GAP SERPL CALC-SCNC: 4 MMOL/L (ref 2–12)
ANION GAP SERPL CALC-SCNC: 5 MMOL/L (ref 2–12)
AST SERPL-CCNC: 12 U/L (ref 15–37)
AST SERPL-CCNC: 14 U/L (ref 15–37)
BASOPHILS # BLD: 0.05 K/UL (ref 0–0.1)
BASOPHILS # BLD: 0.06 K/UL (ref 0–0.1)
BASOPHILS NFR BLD: 0.9 % (ref 0–1)
BASOPHILS NFR BLD: 1 % (ref 0–1)
BILIRUB SERPL-MCNC: 0.2 MG/DL (ref 0.2–1)
BILIRUB SERPL-MCNC: 0.2 MG/DL (ref 0.2–1)
BLD PROD TYP BPU: NORMAL
BLOOD BANK BLOOD PRODUCT EXPIRATION DATE: NORMAL
BLOOD BANK DISPENSE STATUS: NORMAL
BLOOD BANK ISBT PRODUCT BLOOD TYPE: 6200
BLOOD BANK UNIT TYPE AND RH: NORMAL
BLOOD GROUP ANTIBODIES SERPL: NORMAL
BPU ID: NORMAL
BUN SERPL-MCNC: 14 MG/DL (ref 6–20)
BUN SERPL-MCNC: 17 MG/DL (ref 6–20)
BUN/CREAT SERPL: 12 (ref 12–20)
BUN/CREAT SERPL: 15 (ref 12–20)
CALCIUM SERPL-MCNC: 8.7 MG/DL (ref 8.5–10.1)
CALCIUM SERPL-MCNC: 8.7 MG/DL (ref 8.5–10.1)
CHLORIDE SERPL-SCNC: 105 MMOL/L (ref 97–108)
CHLORIDE SERPL-SCNC: 105 MMOL/L (ref 97–108)
CHOLEST SERPL-MCNC: 150 MG/DL
CO2 SERPL-SCNC: 30 MMOL/L (ref 21–32)
CO2 SERPL-SCNC: 30 MMOL/L (ref 21–32)
CREAT SERPL-MCNC: 1.15 MG/DL (ref 0.55–1.02)
CREAT SERPL-MCNC: 1.16 MG/DL (ref 0.55–1.02)
CROSSMATCH RESULT: NORMAL
DIFFERENTIAL METHOD BLD: ABNORMAL
DIFFERENTIAL METHOD BLD: ABNORMAL
EOSINOPHIL # BLD: 0.2 K/UL (ref 0–0.4)
EOSINOPHIL # BLD: 0.2 K/UL (ref 0–0.4)
EOSINOPHIL NFR BLD: 3.3 % (ref 0–7)
EOSINOPHIL NFR BLD: 3.5 % (ref 0–0.7)
ERYTHROCYTE [DISTWIDTH] IN BLOOD BY AUTOMATED COUNT: 20.7 % (ref 11.5–14.5)
ERYTHROCYTE [DISTWIDTH] IN BLOOD BY AUTOMATED COUNT: 20.9 % (ref 11.5–14.5)
EST. AVERAGE GLUCOSE BLD GHB EST-MCNC: ABNORMAL MG/DL
FERRITIN SERPL-MCNC: 167 NG/ML (ref 26–388)
GLOBULIN SER CALC-MCNC: 3 G/DL (ref 2–4)
GLOBULIN SER CALC-MCNC: 3.3 G/DL (ref 2–4)
GLUCOSE SERPL-MCNC: 187 MG/DL (ref 65–100)
GLUCOSE SERPL-MCNC: 197 MG/DL (ref 65–100)
HBA1C MFR BLD: <3.8 % (ref 4–5.6)
HCT VFR BLD AUTO: 20.5 % (ref 35–47)
HCT VFR BLD AUTO: 24.4 % (ref 35–47)
HDLC SERPL-MCNC: 58 MG/DL
HDLC SERPL: 2.6 (ref 0–5)
HGB BLD-MCNC: 6.1 G/DL (ref 11.5–16)
HGB BLD-MCNC: 6.9 G/DL (ref 11.5–16)
HISTORY CHECK: NORMAL
IMM GRANULOCYTES # BLD AUTO: 0.01 K/UL (ref 0–0.04)
IMM GRANULOCYTES # BLD AUTO: 0.02 K/UL (ref 0–0.04)
IMM GRANULOCYTES NFR BLD AUTO: 0.2 % (ref 0–0.5)
IMM GRANULOCYTES NFR BLD AUTO: 0.3 % (ref 0–0.5)
LDLC SERPL CALC-MCNC: 49.8 MG/DL (ref 0–100)
LYMPHOCYTES # BLD: 1.23 K/UL (ref 0.8–3.5)
LYMPHOCYTES # BLD: 1.31 K/UL (ref 0.8–3.5)
LYMPHOCYTES NFR BLD: 20.1 % (ref 12–49)
LYMPHOCYTES NFR BLD: 22.9 % (ref 12–49)
MCH RBC QN AUTO: 26.8 PG (ref 26–34)
MCH RBC QN AUTO: 27.1 PG (ref 26–34)
MCHC RBC AUTO-ENTMCNC: 28.3 G/DL (ref 30–36.5)
MCHC RBC AUTO-ENTMCNC: 29.8 G/DL (ref 30–36.5)
MCV RBC AUTO: 91.1 FL (ref 80–99)
MCV RBC AUTO: 94.9 FL (ref 80–99)
MONOCYTES # BLD: 0.52 K/UL (ref 0–1)
MONOCYTES # BLD: 0.57 K/UL (ref 0–1)
MONOCYTES NFR BLD: 10 % (ref 5–13)
MONOCYTES NFR BLD: 8.6 % (ref 5–13)
NEUTS SEG # BLD: 3.56 K/UL (ref 1.8–8)
NEUTS SEG # BLD: 4.07 K/UL (ref 1.8–8)
NEUTS SEG NFR BLD: 62.5 % (ref 32–75)
NEUTS SEG NFR BLD: 66.7 % (ref 32–75)
NRBC # BLD: 0 K/UL (ref 0–0.01)
NRBC # BLD: 0.02 K/UL (ref 0–0.01)
NRBC BLD-RTO: 0 PER 100 WBC
NRBC BLD-RTO: 0.4 PER 100 WBC
PLATELET # BLD AUTO: 276 K/UL (ref 150–400)
PLATELET # BLD AUTO: 316 K/UL (ref 150–400)
PLATELET COMMENT: ADEQUATE
PMV BLD AUTO: 11.5 FL (ref 8.9–12.9)
PMV BLD AUTO: 12.2 FL (ref 8.9–12.9)
POTASSIUM SERPL-SCNC: 3.6 MMOL/L (ref 3.5–5.1)
POTASSIUM SERPL-SCNC: 3.7 MMOL/L (ref 3.5–5.1)
PROT SERPL-MCNC: 5.9 G/DL (ref 6.4–8.2)
PROT SERPL-MCNC: 6.1 G/DL (ref 6.4–8.2)
RBC # BLD AUTO: 2.25 M/UL (ref 3.8–5.2)
RBC # BLD AUTO: 2.57 M/UL (ref 3.8–5.2)
RBC MORPH BLD: ABNORMAL
SODIUM SERPL-SCNC: 139 MMOL/L (ref 136–145)
SODIUM SERPL-SCNC: 140 MMOL/L (ref 136–145)
SPECIMEN EXP DATE BLD: NORMAL
TRIGL SERPL-MCNC: 211 MG/DL
UNIT DIVISION: 0
UNIT ISSUE DATE/TIME: NORMAL
VLDLC SERPL CALC-MCNC: 42.2 MG/DL
WBC # BLD AUTO: 5.7 K/UL (ref 3.6–11)
WBC # BLD AUTO: 6.1 K/UL (ref 3.6–11)

## 2025-06-06 PROCEDURE — 86900 BLOOD TYPING SEROLOGIC ABO: CPT

## 2025-06-06 PROCEDURE — 86901 BLOOD TYPING SEROLOGIC RH(D): CPT

## 2025-06-06 PROCEDURE — 85025 COMPLETE CBC W/AUTO DIFF WBC: CPT

## 2025-06-06 PROCEDURE — 86923 COMPATIBILITY TEST ELECTRIC: CPT

## 2025-06-06 PROCEDURE — 36430 TRANSFUSION BLD/BLD COMPNT: CPT

## 2025-06-06 PROCEDURE — 86850 RBC ANTIBODY SCREEN: CPT

## 2025-06-06 PROCEDURE — 36415 COLL VENOUS BLD VENIPUNCTURE: CPT

## 2025-06-06 PROCEDURE — 80053 COMPREHEN METABOLIC PANEL: CPT

## 2025-06-06 PROCEDURE — P9016 RBC LEUKOCYTES REDUCED: HCPCS

## 2025-06-06 PROCEDURE — 99285 EMERGENCY DEPT VISIT HI MDM: CPT

## 2025-06-06 RX ORDER — SODIUM CHLORIDE 9 MG/ML
INJECTION, SOLUTION INTRAVENOUS PRN
Status: DISCONTINUED | OUTPATIENT
Start: 2025-06-06 | End: 2025-06-07 | Stop reason: HOSPADM

## 2025-06-06 RX ORDER — 0.9 % SODIUM CHLORIDE 0.9 %
250 INTRAVENOUS SOLUTION INTRAVENOUS ONCE
Status: DISCONTINUED | OUTPATIENT
Start: 2025-06-06 | End: 2025-06-06

## 2025-06-06 ASSESSMENT — PAIN - FUNCTIONAL ASSESSMENT: PAIN_FUNCTIONAL_ASSESSMENT: NONE - DENIES PAIN

## 2025-06-06 NOTE — PROGRESS NOTES
Physician Progress Note      PATIENT:               SOO GOMEZ  CSN #:                  [id number]  :                       1955  ADMIT DATE:       2025 5:38 PM  DISCH DATE:        2025 6:34 PM  RESPONDING  PROVIDER #:        Nicole Monteiro          QUERY TEXT:    Anemia is documented in the medical record D/C Summary. Please specify the   type:    The clinical indicators include:  -\"recently discharged from [facility] with history of GI bleed, anemia and PE.   Pt was notified by her PCP to go to the ER for blood transfusion d/t routine   labs showing low hbg.  She denies any new changes to her stool.  Reports \"they   are dark all the time because I'm on iron\".  She was given a unit of prbc and   transferred to [facility] for GI to evaluate due to concern of GI bleed.  Recent PE  Last dose of Eliquis was this AM.  Will hold further Eliquis for now  Consider starting therapeutic lovenox in the AM if hbg is stable and no active   bleeding  Will involve hematology to help eval for decision on AC given complexity of   medical condition.\" (H&P)    -\"1. Severe Anemia/GI bleed/AVMs/recent PE/recent cardiac stents:  PE on   25, no LE DVTs.  Was on asa and eliquis since last discharge.\" (Oncology   CN )    -\"Patient has extensive GI history with chronic anemia related to GI AVM   related blood loss.  Problem list:  Acute on chronic anemia related to GI blood loss secondary to GI AVMs.    Patient remains hemodynamically stable, hemoglobin improved to 7.2 g/dL after   PRBC transfusion.  No signs symptoms of active/obvious GI blood loss.  6% iron   saturation 2 weeks ago.\" (Gastroenterology CN )    -\"Acute on chronic anemia, concerning for GI bleed  Recent acute lower GI bleed  H/H down from 8.3/28 on  to 6.8/-->6.3/  S/p EGD on  showing multiple 4 AVMs  S/p 1 U PRBC, hbg is improved. Now stable at 9.1  Recommend outpatient follow-up in the clinic.  Continue Pantoprazole  Continue octreotide  at discharge  No further bleeding-Eliquis started 5/26- no signs of bleeding  Iron 5/13/2025 was 22, iron % saturation was 6  Continue iron supplement. Will follow up on iron infusion outpatient  Encouraged to eat iron fortified/rich food  Dietitian provided education on iron rich diet  GI was following, appreciate recommendations.\" (D/C Summary)    -Hgb = 8.3, 7.2, 7.1    -ferrous sulfate, hgb monitoring, gastro consult  Options provided:  -- Related to acute on chronic blood loss  -- Other - I will add my own diagnosis  -- Disagree - Not applicable / Not valid  -- Disagree - Clinically unable to determine / Unknown  -- Refer to Clinical Documentation Reviewer    PROVIDER RESPONSE TEXT:    The patients anemia is related to acute on chronic blood loss.      Query created by: Terra Abebe on 6/4/2025 3:56 PM      Electronically signed by:  Nicole Monteiro 6/5/2025 11:54 PM
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
## 2025-06-07 ENCOUNTER — HOSPITAL ENCOUNTER (INPATIENT)
Facility: HOSPITAL | Age: 70
LOS: 4 days | Discharge: HOME | DRG: 378 | End: 2025-06-11
Attending: STUDENT IN AN ORGANIZED HEALTH CARE EDUCATION/TRAINING PROGRAM | Admitting: STUDENT IN AN ORGANIZED HEALTH CARE EDUCATION/TRAINING PROGRAM
Payer: MEDICARE

## 2025-06-07 DIAGNOSIS — R01.1 HEART MURMUR: Primary | ICD-10-CM

## 2025-06-07 DIAGNOSIS — K92.1 GASTROINTESTINAL HEMORRHAGE WITH MELENA: ICD-10-CM

## 2025-06-07 PROBLEM — K92.2 GIB (GASTROINTESTINAL BLEEDING): Status: ACTIVE | Noted: 2025-06-07

## 2025-06-07 LAB
ABO + RH BLD: NORMAL
ANION GAP SERPL CALC-SCNC: 5 MMOL/L (ref 2–12)
BASOPHILS # BLD: 0.05 K/UL (ref 0–0.1)
BASOPHILS NFR BLD: 0.9 % (ref 0–1)
BLOOD GROUP ANTIBODIES SERPL: NORMAL
BUN SERPL-MCNC: 17 MG/DL (ref 6–20)
BUN/CREAT SERPL: 17 (ref 12–20)
CALCIUM SERPL-MCNC: 8.8 MG/DL (ref 8.5–10.1)
CHLORIDE SERPL-SCNC: 108 MMOL/L (ref 97–108)
CO2 SERPL-SCNC: 28 MMOL/L (ref 21–32)
COMMENT:: NORMAL
COMMENT:: NORMAL
CREAT SERPL-MCNC: 1 MG/DL (ref 0.55–1.02)
DIFFERENTIAL METHOD BLD: ABNORMAL
EOSINOPHIL # BLD: 0.2 K/UL (ref 0–0.4)
EOSINOPHIL NFR BLD: 3.4 % (ref 0–7)
ERYTHROCYTE [DISTWIDTH] IN BLOOD BY AUTOMATED COUNT: 19.8 % (ref 11.5–14.5)
GLUCOSE SERPL-MCNC: 137 MG/DL (ref 65–100)
HCT VFR BLD AUTO: 26.1 % (ref 35–47)
HGB BLD-MCNC: 7.6 G/DL (ref 11.5–16)
IMM GRANULOCYTES # BLD AUTO: 0.02 K/UL (ref 0–0.04)
IMM GRANULOCYTES NFR BLD AUTO: 0.3 % (ref 0–0.5)
INR PPP: 1.1 (ref 0.9–1.1)
LYMPHOCYTES # BLD: 1.08 K/UL (ref 0.8–3.5)
LYMPHOCYTES NFR BLD: 18.5 % (ref 12–49)
MCH RBC QN AUTO: 27.3 PG (ref 26–34)
MCHC RBC AUTO-ENTMCNC: 29.1 G/DL (ref 30–36.5)
MCV RBC AUTO: 93.9 FL (ref 80–99)
MONOCYTES # BLD: 0.54 K/UL (ref 0–1)
MONOCYTES NFR BLD: 9.2 % (ref 5–13)
NEUTS SEG # BLD: 3.95 K/UL (ref 1.8–8)
NEUTS SEG NFR BLD: 67.7 % (ref 32–75)
NRBC # BLD: 0 K/UL (ref 0–0.01)
NRBC BLD-RTO: 0 PER 100 WBC
PLATELET # BLD AUTO: 246 K/UL (ref 150–400)
PMV BLD AUTO: 11.8 FL (ref 8.9–12.9)
POTASSIUM SERPL-SCNC: 3.6 MMOL/L (ref 3.5–5.1)
PROTHROMBIN TIME: 11.4 SEC (ref 9.2–11.2)
RBC # BLD AUTO: 2.78 M/UL (ref 3.8–5.2)
SODIUM SERPL-SCNC: 141 MMOL/L (ref 136–145)
SPECIMEN EXP DATE BLD: NORMAL
SPECIMEN HOLD: NORMAL
SPECIMEN HOLD: NORMAL
WBC # BLD AUTO: 5.8 K/UL (ref 3.6–11)

## 2025-06-07 PROCEDURE — 80048 BASIC METABOLIC PNL TOTAL CA: CPT

## 2025-06-07 PROCEDURE — 86900 BLOOD TYPING SEROLOGIC ABO: CPT

## 2025-06-07 PROCEDURE — 6370000000 HC RX 637 (ALT 250 FOR IP): Performed by: STUDENT IN AN ORGANIZED HEALTH CARE EDUCATION/TRAINING PROGRAM

## 2025-06-07 PROCEDURE — 94640 AIRWAY INHALATION TREATMENT: CPT

## 2025-06-07 PROCEDURE — 87449 NOS EACH ORGANISM AG IA: CPT

## 2025-06-07 PROCEDURE — 6360000002 HC RX W HCPCS: Performed by: STUDENT IN AN ORGANIZED HEALTH CARE EDUCATION/TRAINING PROGRAM

## 2025-06-07 PROCEDURE — 87324 CLOSTRIDIUM AG IA: CPT

## 2025-06-07 PROCEDURE — 36415 COLL VENOUS BLD VENIPUNCTURE: CPT

## 2025-06-07 PROCEDURE — 85025 COMPLETE CBC W/AUTO DIFF WBC: CPT

## 2025-06-07 PROCEDURE — 6370000000 HC RX 637 (ALT 250 FOR IP): Performed by: PHYSICIAN ASSISTANT

## 2025-06-07 PROCEDURE — 2500000003 HC RX 250 WO HCPCS: Performed by: STUDENT IN AN ORGANIZED HEALTH CARE EDUCATION/TRAINING PROGRAM

## 2025-06-07 PROCEDURE — 86850 RBC ANTIBODY SCREEN: CPT

## 2025-06-07 PROCEDURE — 1100000003 HC PRIVATE W/ TELEMETRY

## 2025-06-07 PROCEDURE — 87506 IADNA-DNA/RNA PROBE TQ 6-11: CPT

## 2025-06-07 PROCEDURE — 86901 BLOOD TYPING SEROLOGIC RH(D): CPT

## 2025-06-07 PROCEDURE — 85610 PROTHROMBIN TIME: CPT

## 2025-06-07 RX ORDER — ACETAMINOPHEN 650 MG/1
650 SUPPOSITORY RECTAL EVERY 6 HOURS PRN
Status: DISCONTINUED | OUTPATIENT
Start: 2025-06-07 | End: 2025-06-11 | Stop reason: HOSPADM

## 2025-06-07 RX ORDER — SODIUM CHLORIDE 9 MG/ML
INJECTION, SOLUTION INTRAVENOUS PRN
Status: DISCONTINUED | OUTPATIENT
Start: 2025-06-07 | End: 2025-06-11 | Stop reason: HOSPADM

## 2025-06-07 RX ORDER — IPRATROPIUM BROMIDE AND ALBUTEROL SULFATE 2.5; .5 MG/3ML; MG/3ML
1 SOLUTION RESPIRATORY (INHALATION) EVERY 4 HOURS PRN
Status: DISCONTINUED | OUTPATIENT
Start: 2025-06-07 | End: 2025-06-11 | Stop reason: HOSPADM

## 2025-06-07 RX ORDER — CLOPIDOGREL BISULFATE 75 MG/1
75 TABLET ORAL DAILY
Status: DISCONTINUED | OUTPATIENT
Start: 2025-06-08 | End: 2025-06-07

## 2025-06-07 RX ORDER — ASPIRIN 81 MG/1
81 TABLET ORAL DAILY
Status: DISCONTINUED | OUTPATIENT
Start: 2025-06-07 | End: 2025-06-11 | Stop reason: HOSPADM

## 2025-06-07 RX ORDER — POTASSIUM CHLORIDE 1500 MG/1
40 TABLET, EXTENDED RELEASE ORAL PRN
Status: DISCONTINUED | OUTPATIENT
Start: 2025-06-07 | End: 2025-06-11 | Stop reason: HOSPADM

## 2025-06-07 RX ORDER — METOPROLOL SUCCINATE 25 MG/1
12.5 TABLET, EXTENDED RELEASE ORAL DAILY
Status: DISCONTINUED | OUTPATIENT
Start: 2025-06-07 | End: 2025-06-11 | Stop reason: HOSPADM

## 2025-06-07 RX ORDER — MONTELUKAST SODIUM 10 MG/1
10 TABLET ORAL NIGHTLY
Status: DISCONTINUED | OUTPATIENT
Start: 2025-06-07 | End: 2025-06-11 | Stop reason: HOSPADM

## 2025-06-07 RX ORDER — ATORVASTATIN CALCIUM 40 MG/1
80 TABLET, FILM COATED ORAL DAILY
Status: DISCONTINUED | OUTPATIENT
Start: 2025-06-07 | End: 2025-06-11 | Stop reason: HOSPADM

## 2025-06-07 RX ORDER — FERROUS SULFATE 325(65) MG
325 TABLET ORAL
Status: DISCONTINUED | OUTPATIENT
Start: 2025-06-08 | End: 2025-06-11 | Stop reason: HOSPADM

## 2025-06-07 RX ORDER — ACETAMINOPHEN 325 MG/1
650 TABLET ORAL EVERY 6 HOURS PRN
Status: DISCONTINUED | OUTPATIENT
Start: 2025-06-07 | End: 2025-06-11 | Stop reason: HOSPADM

## 2025-06-07 RX ORDER — POTASSIUM CHLORIDE 7.45 MG/ML
10 INJECTION INTRAVENOUS PRN
Status: DISCONTINUED | OUTPATIENT
Start: 2025-06-07 | End: 2025-06-11 | Stop reason: HOSPADM

## 2025-06-07 RX ORDER — OCTREOTIDE ACETATE 50 UG/ML
50 INJECTION, SOLUTION INTRAVENOUS; SUBCUTANEOUS EVERY 12 HOURS
Status: DISCONTINUED | OUTPATIENT
Start: 2025-06-07 | End: 2025-06-11 | Stop reason: HOSPADM

## 2025-06-07 RX ORDER — ONDANSETRON 4 MG/1
4 TABLET, ORALLY DISINTEGRATING ORAL EVERY 8 HOURS PRN
Status: DISCONTINUED | OUTPATIENT
Start: 2025-06-07 | End: 2025-06-11 | Stop reason: HOSPADM

## 2025-06-07 RX ORDER — SODIUM CHLORIDE 0.9 % (FLUSH) 0.9 %
5-40 SYRINGE (ML) INJECTION PRN
Status: DISCONTINUED | OUTPATIENT
Start: 2025-06-07 | End: 2025-06-11 | Stop reason: HOSPADM

## 2025-06-07 RX ORDER — OXYCODONE AND ACETAMINOPHEN 5; 325 MG/1; MG/1
1 TABLET ORAL EVERY 6 HOURS PRN
Refills: 0 | Status: DISCONTINUED | OUTPATIENT
Start: 2025-06-07 | End: 2025-06-11 | Stop reason: HOSPADM

## 2025-06-07 RX ORDER — POLYETHYLENE GLYCOL 3350 17 G/17G
17 POWDER, FOR SOLUTION ORAL DAILY PRN
Status: DISCONTINUED | OUTPATIENT
Start: 2025-06-07 | End: 2025-06-11 | Stop reason: HOSPADM

## 2025-06-07 RX ORDER — ONDANSETRON 2 MG/ML
4 INJECTION INTRAMUSCULAR; INTRAVENOUS EVERY 6 HOURS PRN
Status: DISCONTINUED | OUTPATIENT
Start: 2025-06-07 | End: 2025-06-11 | Stop reason: HOSPADM

## 2025-06-07 RX ORDER — CLOPIDOGREL BISULFATE 75 MG/1
75 TABLET ORAL DAILY
Status: ON HOLD | COMMUNITY
End: 2025-06-11 | Stop reason: HOSPADM

## 2025-06-07 RX ORDER — MAGNESIUM SULFATE IN WATER 40 MG/ML
2000 INJECTION, SOLUTION INTRAVENOUS PRN
Status: DISCONTINUED | OUTPATIENT
Start: 2025-06-07 | End: 2025-06-11 | Stop reason: HOSPADM

## 2025-06-07 RX ORDER — SODIUM CHLORIDE 0.9 % (FLUSH) 0.9 %
5-40 SYRINGE (ML) INJECTION EVERY 12 HOURS SCHEDULED
Status: DISCONTINUED | OUTPATIENT
Start: 2025-06-07 | End: 2025-06-11 | Stop reason: HOSPADM

## 2025-06-07 RX ORDER — PANTOPRAZOLE SODIUM 40 MG/1
40 TABLET, DELAYED RELEASE ORAL
Status: DISCONTINUED | OUTPATIENT
Start: 2025-06-07 | End: 2025-06-11 | Stop reason: HOSPADM

## 2025-06-07 RX ADMIN — SODIUM CHLORIDE, PRESERVATIVE FREE 5 ML: 5 INJECTION INTRAVENOUS at 21:54

## 2025-06-07 RX ADMIN — OCTREOTIDE ACETATE 50 MCG: 50 INJECTION, SOLUTION INTRAVENOUS; SUBCUTANEOUS at 21:25

## 2025-06-07 RX ADMIN — MONTELUKAST 10 MG: 10 TABLET, FILM COATED ORAL at 21:26

## 2025-06-07 RX ADMIN — METOPROLOL SUCCINATE 12.5 MG: 25 TABLET, EXTENDED RELEASE ORAL at 08:19

## 2025-06-07 RX ADMIN — ATORVASTATIN CALCIUM 80 MG: 40 TABLET, FILM COATED ORAL at 08:20

## 2025-06-07 RX ADMIN — OXYCODONE AND ACETAMINOPHEN 1 TABLET: 5; 325 TABLET ORAL at 21:53

## 2025-06-07 RX ADMIN — OCTREOTIDE ACETATE 50 MCG: 50 INJECTION, SOLUTION INTRAVENOUS; SUBCUTANEOUS at 08:20

## 2025-06-07 RX ADMIN — PANTOPRAZOLE SODIUM 40 MG: 40 TABLET, DELAYED RELEASE ORAL at 16:01

## 2025-06-07 RX ADMIN — PANTOPRAZOLE SODIUM 40 MG: 40 TABLET, DELAYED RELEASE ORAL at 06:42

## 2025-06-07 RX ADMIN — SODIUM CHLORIDE, PRESERVATIVE FREE 10 ML: 5 INJECTION INTRAVENOUS at 08:22

## 2025-06-07 RX ADMIN — ARFORMOTEROL TARTRATE: 15 SOLUTION RESPIRATORY (INHALATION) at 09:26

## 2025-06-07 RX ADMIN — ONDANSETRON 4 MG: 2 INJECTION, SOLUTION INTRAMUSCULAR; INTRAVENOUS at 21:25

## 2025-06-07 ASSESSMENT — PAIN DESCRIPTION - FREQUENCY: FREQUENCY: INTERMITTENT

## 2025-06-07 ASSESSMENT — PAIN SCALES - GENERAL
PAINLEVEL_OUTOF10: 0
PAINLEVEL_OUTOF10: 8
PAINLEVEL_OUTOF10: 0
PAINLEVEL_OUTOF10: 0

## 2025-06-07 ASSESSMENT — PAIN DESCRIPTION - LOCATION: LOCATION: ABDOMEN

## 2025-06-07 ASSESSMENT — PAIN DESCRIPTION - ONSET: ONSET: SUDDEN

## 2025-06-07 ASSESSMENT — PAIN - FUNCTIONAL ASSESSMENT: PAIN_FUNCTIONAL_ASSESSMENT: ACTIVITIES ARE NOT PREVENTED

## 2025-06-07 ASSESSMENT — PAIN DESCRIPTION - PAIN TYPE: TYPE: ACUTE PAIN

## 2025-06-07 ASSESSMENT — PAIN DESCRIPTION - ORIENTATION: ORIENTATION: ANTERIOR

## 2025-06-07 ASSESSMENT — PAIN DESCRIPTION - DESCRIPTORS: DESCRIPTORS: ACHING;CRAMPING;SHARP

## 2025-06-07 NOTE — CONSENT
Informed Consent for Blood Component Transfusion Note    I have discussed with the patient the rationale for blood component transfusion; its benefits in treating or preventing fatigue, organ damage, or death; and its risk which includes mild transfusion reactions, rare risk of blood borne infection, or more serious but rare reactions. I have discussed the alternatives to transfusion, including the risk and consequences of not receiving transfusion. The patient had an opportunity to ask questions and had agreed to proceed with transfusion of blood components.    Electronically signed by Prateek Cartagena DO on 6/7/25 at 3:08 AM EDT    Blood consent obtained preemptively-no plans for further transfusion at present

## 2025-06-07 NOTE — ED NOTES
Verbal handoff report given to [facility]. All questions answered. Pt transported with blood administration infusing at 125 ml/hr.

## 2025-06-07 NOTE — CONSENT
Informed Consent for Blood Component Transfusion Note    I have discussed with the patient the rationale for blood component transfusion; its benefits in treating or preventing fatigue, organ damage, or death; and its risk which includes mild transfusion reactions, rare risk of blood borne infection, or more serious but rare reactions. I have discussed the alternatives to transfusion, including the risk and consequences of not receiving transfusion. The patient had an opportunity to ask questions and had agreed to proceed with transfusion of blood components.    Electronically signed by Zulema De Leon MD on 6/6/25 at 9:00 PM EDT

## 2025-06-07 NOTE — ED NOTES
TRANSFER - OUT REPORT:    Verbal report given to ABDIRAHMAN Corrigan RN at [facility] on Hyacinth Patterson  being transferred to [facility] for routine progression of patient care       Report consisted of patient's Situation, Background, Assessment and   Recommendations(SBAR).     Information from the following report(s) Nurse Handoff Report, ED Encounter Summary, ED SBAR, Intake/Output, MAR, Recent Results, Med Rec Status, Cardiac Rhythm NSR, and Neuro Assessment was reviewed with the receiving nurse.    Kinder Fall Assessment:                           Lines:   Peripheral IV 06/06/25 Right Cephalic (Active)   Site Assessment Clean, dry & intact 06/06/25 2016   Line Status Blood return noted;Flushed;Normal saline locked 06/06/25 2016   Phlebitis Assessment No symptoms 06/06/25 2016   Infiltration Assessment 0 06/06/25 2016   Dressing Status New dressing applied;Clean, dry & intact 06/06/25 2016   Dressing Type Transparent 06/06/25 2016   Dressing Intervention New 06/06/25 2016       Peripheral IV 06/06/25 Posterior;Right Forearm (Active)   Site Assessment Clean, dry & intact 06/06/25 2116   Line Status Blood return noted;Flushed;Normal saline locked 06/06/25 2116   Phlebitis Assessment No symptoms 06/06/25 2116   Infiltration Assessment 0 06/06/25 2116   Dressing Status New dressing applied;Clean, dry & intact 06/06/25 2116   Dressing Type Transparent 06/06/25 2116   Dressing Intervention New 06/06/25 2116        Opportunity for questions and clarification was provided.      Patient transported with:  Monitor, [facility], Cardiac Monitoring

## 2025-06-07 NOTE — H&P
Hospitalist Admission Note    NAME:  Hyacinth Patterson   :  1955   MRN:  [id number]     Date/Time:  2025 2:09 AM    Patient PCP: Jeffery Alexis MD    ______________________________________________________________________  Given the patient's current clinical presentation, I have a high level of concern for decompensation if discharged from the emergency department.  Complex decision making was performed, which includes reviewing the patient's available past medical records, laboratory results, and x-ray films.       My assessment of this patient's clinical condition and my plan of care is as follows.    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  Admit to telemetry monitoring  Trend hemoglobin-transfuse to maintain hemoglobin greater than 7.0  Continue twice daily PPI  Obtain TTE to further evaluate aortic stenosis    Recent PE on Eliquis  Hold Eliquis    CAD status post FELA placed 2025  Combined heart failure with reduced ejection fraction-EF 35%  Concern for severe aortic stenosis  PAD  History of chronic mesenteric ischemia  Essential hypertension  Hyperlipidemia  Cardiology consulted, greatly appreciate their expertise  Continue aspirin, hold Plavix  Continue PTA atorvastatin, metoprolol    Schizophrenia  Not currently on medication    Reactive airway disease  Formulary substitution arformoterol budesonide nebulizer  Continue PTA montelukast  DuoNeb every 4 hours as needed    Financial

## 2025-06-07 NOTE — ED TRIAGE NOTES
Arrived ambulatory with c/o \" I need blood\" patient states \" Dr Alexis called me this afternoon and said I need to go to the er for a blood transfusion\"

## 2025-06-07 NOTE — PLAN OF CARE
Problem: Chronic Conditions and Co-morbidities  Goal: Patient's chronic conditions and co-morbidity symptoms are monitored and maintained or improved  Outcome: Progressing  Flowsheets (Taken 6/7/2025 0107 by Guerita Corrigan RN)  Care Plan - Patient's Chronic Conditions and Co-Morbidity Symptoms are Monitored and Maintained or Improved: Monitor and assess patient's chronic conditions and comorbid symptoms for stability, deterioration, or improvement     Problem: Cardiovascular - Adult  Goal: Maintains optimal cardiac output and hemodynamic stability  Outcome: Progressing  Flowsheets (Taken 6/7/2025 0107 by Guerita Corrigan RN)  Maintains optimal cardiac output and hemodynamic stability: Monitor blood pressure and heart rate

## 2025-06-07 NOTE — ED NOTES
Contacted [facility] regarding transfer of pt to [id number], spoke with Art, arranged for ALS transport with cardiac monitor, blood transfusion, no isolation, and with the patient on RA. Insurance information, ht/wt, and primary diagnosis provided. Received a 0230 ETA. ODILON Byrd RN made aware of ETA.

## 2025-06-08 ENCOUNTER — APPOINTMENT (OUTPATIENT)
Facility: HOSPITAL | Age: 70
DRG: 378 | End: 2025-06-08
Attending: STUDENT IN AN ORGANIZED HEALTH CARE EDUCATION/TRAINING PROGRAM
Payer: MEDICARE

## 2025-06-08 LAB
ANION GAP SERPL CALC-SCNC: 4 MMOL/L (ref 2–12)
BASOPHILS # BLD: 0.02 K/UL (ref 0–0.1)
BASOPHILS NFR BLD: 0.3 % (ref 0–1)
BUN SERPL-MCNC: 17 MG/DL (ref 6–20)
BUN/CREAT SERPL: 14 (ref 12–20)
C COLI+JEJUNI TUF STL QL NAA+PROBE: NEGATIVE
C DIFF GDH STL QL: NEGATIVE
C DIFF TOX A+B STL QL IA: NEGATIVE
C DIFF TOXIN INTERPRETATION: NORMAL
CALCIUM SERPL-MCNC: 8.8 MG/DL (ref 8.5–10.1)
CHLORIDE SERPL-SCNC: 112 MMOL/L (ref 97–108)
CO2 SERPL-SCNC: 26 MMOL/L (ref 21–32)
CREAT SERPL-MCNC: 1.2 MG/DL (ref 0.55–1.02)
DIFFERENTIAL METHOD BLD: ABNORMAL
EC STX1+STX2 GENES STL QL NAA+PROBE: NEGATIVE
ECHO BSA: 1.71 M2
EOSINOPHIL # BLD: 0.2 K/UL (ref 0–0.4)
EOSINOPHIL NFR BLD: 2.7 % (ref 0–7)
ERYTHROCYTE [DISTWIDTH] IN BLOOD BY AUTOMATED COUNT: 19.8 % (ref 11.5–14.5)
ETEC ELTA+ESTB GENES STL QL NAA+PROBE: NEGATIVE
GLUCOSE SERPL-MCNC: 158 MG/DL (ref 65–100)
HCT VFR BLD AUTO: 25.8 % (ref 35–47)
HGB BLD-MCNC: 7.5 G/DL (ref 11.5–16)
IMM GRANULOCYTES # BLD AUTO: 0.02 K/UL (ref 0–0.04)
IMM GRANULOCYTES NFR BLD AUTO: 0.3 % (ref 0–0.5)
LYMPHOCYTES # BLD: 0.63 K/UL (ref 0.8–3.5)
LYMPHOCYTES NFR BLD: 8.5 % (ref 12–49)
MCH RBC QN AUTO: 27.3 PG (ref 26–34)
MCHC RBC AUTO-ENTMCNC: 29.1 G/DL (ref 30–36.5)
MCV RBC AUTO: 93.8 FL (ref 80–99)
MONOCYTES # BLD: 0.46 K/UL (ref 0–1)
MONOCYTES NFR BLD: 6.2 % (ref 5–13)
NEUTS SEG # BLD: 6.07 K/UL (ref 1.8–8)
NEUTS SEG NFR BLD: 82 % (ref 32–75)
NRBC # BLD: 0 K/UL (ref 0–0.01)
NRBC BLD-RTO: 0 PER 100 WBC
P SHIGELLOIDES DNA STL QL NAA+PROBE: NEGATIVE
PLATELET # BLD AUTO: 252 K/UL (ref 150–400)
PMV BLD AUTO: 11.1 FL (ref 8.9–12.9)
POTASSIUM SERPL-SCNC: 3.7 MMOL/L (ref 3.5–5.1)
RBC # BLD AUTO: 2.75 M/UL (ref 3.8–5.2)
RBC MORPH BLD: ABNORMAL
RBC MORPH BLD: ABNORMAL
SALMONELLA SP SPAO STL QL NAA+PROBE: NEGATIVE
SHIGELLA SP+EIEC IPAH STL QL NAA+PROBE: NEGATIVE
SODIUM SERPL-SCNC: 142 MMOL/L (ref 136–145)
V CHOL+PARA+VUL DNA STL QL NAA+NON-PROBE: NEGATIVE
WBC # BLD AUTO: 7.4 K/UL (ref 3.6–11)
Y ENTEROCOL DNA STL QL NAA+NON-PROBE: NEGATIVE

## 2025-06-08 PROCEDURE — 94640 AIRWAY INHALATION TREATMENT: CPT

## 2025-06-08 PROCEDURE — 2500000003 HC RX 250 WO HCPCS: Performed by: STUDENT IN AN ORGANIZED HEALTH CARE EDUCATION/TRAINING PROGRAM

## 2025-06-08 PROCEDURE — 6370000000 HC RX 637 (ALT 250 FOR IP): Performed by: STUDENT IN AN ORGANIZED HEALTH CARE EDUCATION/TRAINING PROGRAM

## 2025-06-08 PROCEDURE — 6360000002 HC RX W HCPCS: Performed by: STUDENT IN AN ORGANIZED HEALTH CARE EDUCATION/TRAINING PROGRAM

## 2025-06-08 PROCEDURE — 1100000003 HC PRIVATE W/ TELEMETRY

## 2025-06-08 PROCEDURE — 80048 BASIC METABOLIC PNL TOTAL CA: CPT

## 2025-06-08 PROCEDURE — 99222 1ST HOSP IP/OBS MODERATE 55: CPT | Performed by: INTERNAL MEDICINE

## 2025-06-08 PROCEDURE — 93970 EXTREMITY STUDY: CPT

## 2025-06-08 PROCEDURE — 85025 COMPLETE CBC W/AUTO DIFF WBC: CPT

## 2025-06-08 PROCEDURE — 36415 COLL VENOUS BLD VENIPUNCTURE: CPT

## 2025-06-08 RX ADMIN — FERROUS SULFATE TAB 325 MG (65 MG ELEMENTAL FE) 325 MG: 325 (65 FE) TAB at 21:15

## 2025-06-08 RX ADMIN — MONTELUKAST 10 MG: 10 TABLET, FILM COATED ORAL at 21:15

## 2025-06-08 RX ADMIN — ARFORMOTEROL TARTRATE: 15 SOLUTION RESPIRATORY (INHALATION) at 20:57

## 2025-06-08 RX ADMIN — OCTREOTIDE ACETATE 50 MCG: 50 INJECTION, SOLUTION INTRAVENOUS; SUBCUTANEOUS at 21:15

## 2025-06-08 RX ADMIN — METOPROLOL SUCCINATE 12.5 MG: 25 TABLET, EXTENDED RELEASE ORAL at 08:09

## 2025-06-08 RX ADMIN — ASPIRIN 81 MG: 81 TABLET, COATED ORAL at 08:09

## 2025-06-08 RX ADMIN — ARFORMOTEROL TARTRATE: 15 SOLUTION RESPIRATORY (INHALATION) at 08:15

## 2025-06-08 RX ADMIN — SODIUM CHLORIDE, PRESERVATIVE FREE 10 ML: 5 INJECTION INTRAVENOUS at 21:15

## 2025-06-08 RX ADMIN — OCTREOTIDE ACETATE 50 MCG: 50 INJECTION, SOLUTION INTRAVENOUS; SUBCUTANEOUS at 08:09

## 2025-06-08 RX ADMIN — ATORVASTATIN CALCIUM 80 MG: 40 TABLET, FILM COATED ORAL at 08:09

## 2025-06-08 RX ADMIN — PANTOPRAZOLE SODIUM 40 MG: 40 TABLET, DELAYED RELEASE ORAL at 07:05

## 2025-06-08 RX ADMIN — PANTOPRAZOLE SODIUM 40 MG: 40 TABLET, DELAYED RELEASE ORAL at 15:51

## 2025-06-08 RX ADMIN — SODIUM CHLORIDE, PRESERVATIVE FREE 10 ML: 5 INJECTION INTRAVENOUS at 08:10

## 2025-06-08 ASSESSMENT — PAIN SCALES - GENERAL
PAINLEVEL_OUTOF10: 0

## 2025-06-08 NOTE — CONSULTS
[facility]  [address] [place] [address] [phone]  [email or web address]      Cardiology Progress Note      6/7/2025 12:39 PM    Admit Date: 6/7/2025    Admit Diagnosis:   GI bleed [K92.2]  GIB (gastrointestinal bleeding) [K92.2]    Subjective:     Hyacinth Patterson     Returns with recurrent bleeding. No CP    BP (!) 119/47   Pulse 71   Temp 98.2 °F (36.8 °C) (Oral)   Resp 18   Ht 1.651 m (5' 5\")   Wt 64.5 kg (142 lb 3.2 oz)   LMP  (LMP Unknown)   SpO2 100%   BMI 23.66 kg/m²     Current Facility-Administered Medications   Medication Dose Route Frequency    [Held by provider] apixaban (ELIQUIS) tablet 5 mg  5 mg Oral BID    aspirin EC tablet 81 mg  81 mg Oral Daily    atorvastatin (LIPITOR) tablet 80 mg  80 mg Oral Daily    [START ON 6/8/2025] ferrous sulfate (IRON 325) tablet 325 mg  325 mg Oral Q48H    arformoterol 15 mcg-budesonide 0.5 mg neb solution   Nebulization BID RT    metoprolol succinate (TOPROL XL) extended release tablet 12.5 mg  12.5 mg Oral Daily    montelukast (SINGULAIR) tablet 10 mg  10 mg Oral Nightly    octreotide (SANDOSTATIN) injection 50 mcg  50 mcg SubCUTAneous Q12H    pantoprazole (PROTONIX) tablet 40 mg  40 mg Oral BID AC    sodium chloride flush 0.9 % injection 5-40 mL  5-40 mL IntraVENous 2 times per day    sodium chloride flush 0.9 % injection 5-40 mL  5-40 mL IntraVENous PRN    0.9 % sodium chloride infusion   IntraVENous PRN    potassium chloride (KLOR-CON M) extended release tablet 40 mEq  40 mEq Oral PRN    Or    potassium bicarb-citric acid (EFFER-K) effervescent tablet 40 mEq  40 mEq Oral PRN    Or    potassium chloride 10 mEq/100 mL IVPB (Peripheral Line)  10 mEq IntraVENous PRN    magnesium sulfate 2000 mg in 50 mL IVPB premix  2,000 mg IntraVENous PRN    ondansetron (ZOFRAN-ODT) disintegrating tablet 4 mg  4 mg Oral Q8H PRN    Or    ondansetron (ZOFRAN) injection 4 mg  4 mg IntraVENous Q6H PRN    polyethylene 
   Initial GI Consult Note ([facility])    NAME:Hyacinth Patterson :1955 MRN:[id number]   ATTG: [unfilled]  PCP: Jeffery Alexis MD  Date/Time:  2025 4:04 PM     Assessment:      Likely some degree ongoing bleeding from SB AVMs  Acute on Chronic Anemia, exacerbated by anticoagulation needs in setting of recent PCI on DAPT and small PE, on Apixaban  Insurance putting up road blocks for Octreotide- common to do as the medication is expensive  No indication for repeat endoscopic evaluation at this time  Given the small size of her PE, in this setting where ongoing GI hemorrhage has been challenging to manage, may be reasonable to hold off on restarting Apixaban for the time being. Give blood counts time to recover, assess LE clot burden with doppler LE US and estimate risk of recurrent PE   Appreciate Cardiology input- stopping Clopidogrel will likely be the most substantive intervention we can offer her        Plan:      Hold Clopidogrel  Hold Apixaban  Consider LE venous doppler to eval clot burden/ risk of recurrent DVT  SC octreotide on an outpt basis  Defer to Cardiology as to her status for TAVR in the setting of her Aortic stenosis & this picture of Heyde's syndrome- she has an appt with her Cardiologist on Wed that she doesn't want to miss       Subjective:     70 yo F/ w/ DMII, CAD s/p FELA in , again in May '25, PAD w/ hx SMA & bilateral common iliac stents, HFrEF (EF 20-25%), segmental RUL PE started on Apixaban after discharge in late May, admitted in May for ongoing GIB related to SB AVMs, which in the setting of her aortic stenosis 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       Of note, patient was hospitalized - for acute on chronic GI bleed undergoing 
since last discharge.     The PE may have been provoked by recent hospitalizations.  In my opinion, the risk of bleeding outweighs the benefit of anticoagulation for 3 months.  Cardiology states d/c plavix and asa 81 mg is ok.    5/16/25 EGD showed 4 AVMs and note discussed the difficult situation this is with recurrent bleeding.    I feel the risks of restarting eliquis are too high.  I would be ok with primary team asking IR for IVC filter.  No DOAC at this time    Hyacinth Patterson, was evaluated through a synchronous (real-time) audio-video encounter. The patient (and/or guardian if applicable) is aware that this is a billable service, which includes applicable co-pays. This virtual visit was conducted with patient's (and/or legal guardian's) consent. Patient identification was verified, and a caregiver was present when appropriate.  The patient was located at Facility (Appt Department): [facility]  [facility]  [address] [facility] [address]  Loc: [phone]  The provider was located at Home (City/State): [place], [place]  Yes, I confirm.           I appreciate the opportunity to participate in Ms. Hyacinth Patterson's care.    Signed By: Juan Pablo Valero MD      No follow-ups on file.

## 2025-06-09 ENCOUNTER — HOSPITAL ENCOUNTER (INPATIENT)
Facility: HOSPITAL | Age: 70
Discharge: HOME | DRG: 378 | End: 2025-06-12
Attending: STUDENT IN AN ORGANIZED HEALTH CARE EDUCATION/TRAINING PROGRAM
Payer: MEDICARE

## 2025-06-09 VITALS
SYSTOLIC BLOOD PRESSURE: 145 MMHG | DIASTOLIC BLOOD PRESSURE: 47 MMHG | RESPIRATION RATE: 19 BRPM | OXYGEN SATURATION: 97 % | TEMPERATURE: 98.5 F | HEART RATE: 73 BPM

## 2025-06-09 LAB
ANION GAP SERPL CALC-SCNC: 3 MMOL/L (ref 2–12)
BASOPHILS # BLD: 0.01 K/UL (ref 0–0.1)
BASOPHILS NFR BLD: 0.2 % (ref 0–1)
BUN SERPL-MCNC: 11 MG/DL (ref 6–20)
BUN/CREAT SERPL: 10 (ref 12–20)
CALCIUM SERPL-MCNC: 9 MG/DL (ref 8.5–10.1)
CHLORIDE SERPL-SCNC: 113 MMOL/L (ref 97–108)
CO2 SERPL-SCNC: 25 MMOL/L (ref 21–32)
CREAT SERPL-MCNC: 1.12 MG/DL (ref 0.55–1.02)
DIFFERENTIAL METHOD BLD: ABNORMAL
EOSINOPHIL # BLD: 0.16 K/UL (ref 0–0.4)
EOSINOPHIL NFR BLD: 3.4 % (ref 0–7)
ERYTHROCYTE [DISTWIDTH] IN BLOOD BY AUTOMATED COUNT: 19 % (ref 11.5–14.5)
GLUCOSE SERPL-MCNC: 152 MG/DL (ref 65–100)
HCT VFR BLD AUTO: 27.7 % (ref 35–47)
HGB BLD-MCNC: 7.8 G/DL (ref 11.5–16)
IMM GRANULOCYTES # BLD AUTO: 0.01 K/UL (ref 0–0.04)
IMM GRANULOCYTES NFR BLD AUTO: 0.2 % (ref 0–0.5)
LYMPHOCYTES # BLD: 0.77 K/UL (ref 0.8–3.5)
LYMPHOCYTES NFR BLD: 16.3 % (ref 12–49)
MCH RBC QN AUTO: 26.8 PG (ref 26–34)
MCHC RBC AUTO-ENTMCNC: 28.2 G/DL (ref 30–36.5)
MCV RBC AUTO: 95.2 FL (ref 80–99)
MONOCYTES # BLD: 0.45 K/UL (ref 0–1)
MONOCYTES NFR BLD: 9.6 % (ref 5–13)
NEUTS SEG # BLD: 3.3 K/UL (ref 1.8–8)
NEUTS SEG NFR BLD: 70.3 % (ref 32–75)
NRBC # BLD: 0 K/UL (ref 0–0.01)
NRBC BLD-RTO: 0 PER 100 WBC
PLATELET # BLD AUTO: 231 K/UL (ref 150–400)
PMV BLD AUTO: 11.4 FL (ref 8.9–12.9)
POTASSIUM SERPL-SCNC: 3.8 MMOL/L (ref 3.5–5.1)
RBC # BLD AUTO: 2.91 M/UL (ref 3.8–5.2)
RBC MORPH BLD: ABNORMAL
SODIUM SERPL-SCNC: 141 MMOL/L (ref 136–145)
WBC # BLD AUTO: 4.7 K/UL (ref 3.6–11)

## 2025-06-09 PROCEDURE — 94640 AIRWAY INHALATION TREATMENT: CPT

## 2025-06-09 PROCEDURE — 80048 BASIC METABOLIC PNL TOTAL CA: CPT

## 2025-06-09 PROCEDURE — 85025 COMPLETE CBC W/AUTO DIFF WBC: CPT

## 2025-06-09 PROCEDURE — 94761 N-INVAS EAR/PLS OXIMETRY MLT: CPT

## 2025-06-09 PROCEDURE — 2500000003 HC RX 250 WO HCPCS: Performed by: STUDENT IN AN ORGANIZED HEALTH CARE EDUCATION/TRAINING PROGRAM

## 2025-06-09 PROCEDURE — 06H03DZ INSERTION OF INTRALUMINAL DEVICE INTO INFERIOR VENA CAVA, PERCUTANEOUS APPROACH: ICD-10-PCS | Performed by: RADIOLOGY

## 2025-06-09 PROCEDURE — 2709999900 IR GUIDED IVC FILTER PLACEMENT

## 2025-06-09 PROCEDURE — 6360000004 HC RX CONTRAST MEDICATION: Performed by: RADIOLOGY

## 2025-06-09 PROCEDURE — 6360000002 HC RX W HCPCS: Performed by: STUDENT IN AN ORGANIZED HEALTH CARE EDUCATION/TRAINING PROGRAM

## 2025-06-09 PROCEDURE — 1100000003 HC PRIVATE W/ TELEMETRY

## 2025-06-09 PROCEDURE — 6370000000 HC RX 637 (ALT 250 FOR IP): Performed by: STUDENT IN AN ORGANIZED HEALTH CARE EDUCATION/TRAINING PROGRAM

## 2025-06-09 PROCEDURE — 36415 COLL VENOUS BLD VENIPUNCTURE: CPT

## 2025-06-09 PROCEDURE — 6360000002 HC RX W HCPCS: Performed by: RADIOLOGY

## 2025-06-09 RX ORDER — HEPARIN SODIUM 200 [USP'U]/100ML
200 INJECTION, SOLUTION INTRAVENOUS ONCE
Status: DISCONTINUED | OUTPATIENT
Start: 2025-06-09 | End: 2025-06-13 | Stop reason: HOSPADM

## 2025-06-09 RX ORDER — MIDAZOLAM HYDROCHLORIDE 5 MG/5ML
INJECTION, SOLUTION INTRAMUSCULAR; INTRAVENOUS PRN
Status: COMPLETED | OUTPATIENT
Start: 2025-06-09 | End: 2025-06-09

## 2025-06-09 RX ORDER — FENTANYL CITRATE 50 UG/ML
INJECTION, SOLUTION INTRAMUSCULAR; INTRAVENOUS PRN
Status: COMPLETED | OUTPATIENT
Start: 2025-06-09 | End: 2025-06-09

## 2025-06-09 RX ORDER — LIDOCAINE HYDROCHLORIDE 20 MG/ML
20 INJECTION, SOLUTION INFILTRATION; PERINEURAL ONCE
Status: COMPLETED | OUTPATIENT
Start: 2025-06-09 | End: 2025-06-09

## 2025-06-09 RX ORDER — IOPAMIDOL 755 MG/ML
100 INJECTION, SOLUTION INTRAVASCULAR ONCE
Status: COMPLETED | OUTPATIENT
Start: 2025-06-09 | End: 2025-06-09

## 2025-06-09 RX ADMIN — MONTELUKAST 10 MG: 10 TABLET, FILM COATED ORAL at 21:31

## 2025-06-09 RX ADMIN — MIDAZOLAM HYDROCHLORIDE 1 MG: 1 INJECTION, SOLUTION INTRAMUSCULAR; INTRAVENOUS at 13:00

## 2025-06-09 RX ADMIN — PANTOPRAZOLE SODIUM 40 MG: 40 TABLET, DELAYED RELEASE ORAL at 06:53

## 2025-06-09 RX ADMIN — METOPROLOL SUCCINATE 12.5 MG: 25 TABLET, EXTENDED RELEASE ORAL at 09:44

## 2025-06-09 RX ADMIN — IOPAMIDOL 20 ML: 755 INJECTION, SOLUTION INTRAVENOUS at 13:14

## 2025-06-09 RX ADMIN — PANTOPRAZOLE SODIUM 40 MG: 40 TABLET, DELAYED RELEASE ORAL at 16:36

## 2025-06-09 RX ADMIN — FENTANYL CITRATE 25 MCG: 50 INJECTION, SOLUTION INTRAMUSCULAR; INTRAVENOUS at 13:00

## 2025-06-09 RX ADMIN — ASPIRIN 81 MG: 81 TABLET, COATED ORAL at 09:44

## 2025-06-09 RX ADMIN — ARFORMOTEROL TARTRATE: 15 SOLUTION RESPIRATORY (INHALATION) at 20:18

## 2025-06-09 RX ADMIN — ATORVASTATIN CALCIUM 80 MG: 40 TABLET, FILM COATED ORAL at 09:44

## 2025-06-09 RX ADMIN — OCTREOTIDE ACETATE 50 MCG: 50 INJECTION, SOLUTION INTRAVENOUS; SUBCUTANEOUS at 21:31

## 2025-06-09 RX ADMIN — LIDOCAINE HYDROCHLORIDE 5 ML: 20 INJECTION, SOLUTION INFILTRATION; PERINEURAL at 13:11

## 2025-06-09 RX ADMIN — OCTREOTIDE ACETATE 50 MCG: 50 INJECTION, SOLUTION INTRAVENOUS; SUBCUTANEOUS at 09:44

## 2025-06-09 RX ADMIN — SODIUM CHLORIDE, PRESERVATIVE FREE 10 ML: 5 INJECTION INTRAVENOUS at 21:36

## 2025-06-09 RX ADMIN — ARFORMOTEROL TARTRATE: 15 SOLUTION RESPIRATORY (INHALATION) at 08:08

## 2025-06-09 RX ADMIN — SODIUM CHLORIDE, PRESERVATIVE FREE 10 ML: 5 INJECTION INTRAVENOUS at 09:45

## 2025-06-09 RX ADMIN — MIDAZOLAM HYDROCHLORIDE 0.5 MG: 1 INJECTION, SOLUTION INTRAMUSCULAR; INTRAVENOUS at 13:05

## 2025-06-09 ASSESSMENT — PAIN - FUNCTIONAL ASSESSMENT
PAIN_FUNCTIONAL_ASSESSMENT: ADULT NONVERBAL PAIN SCALE (NPVS)
PAIN_FUNCTIONAL_ASSESSMENT: NONE - DENIES PAIN
PAIN_FUNCTIONAL_ASSESSMENT: NONE - DENIES PAIN
PAIN_FUNCTIONAL_ASSESSMENT: ADULT NONVERBAL PAIN SCALE (NPVS)
PAIN_FUNCTIONAL_ASSESSMENT: NONE - DENIES PAIN
PAIN_FUNCTIONAL_ASSESSMENT: NONE - DENIES PAIN
PAIN_FUNCTIONAL_ASSESSMENT: ADULT NONVERBAL PAIN SCALE (NPVS)

## 2025-06-09 ASSESSMENT — PAIN SCALES - GENERAL
PAINLEVEL_OUTOF10: 0
PAINLEVEL_OUTOF10: 0

## 2025-06-09 NOTE — PLAN OF CARE
Problem: Chronic Conditions and Co-morbidities  Goal: Patient's chronic conditions and co-morbidity symptoms are monitored and maintained or improved  Outcome: Progressing  Flowsheets (Taken 6/8/2025 2013)  Care Plan - Patient's Chronic Conditions and Co-Morbidity Symptoms are Monitored and Maintained or Improved: Monitor and assess patient's chronic conditions and comorbid symptoms for stability, deterioration, or improvement     Problem: Cardiovascular - Adult  Goal: Maintains optimal cardiac output and hemodynamic stability  Outcome: Progressing  Flowsheets (Taken 6/8/2025 2013)  Maintains optimal cardiac output and hemodynamic stability: Monitor blood pressure and heart rate     Problem: Safety - Adult  Goal: Free from fall injury  Outcome: Progressing     Problem: Pain  Goal: Verbalizes/displays adequate comfort level or baseline comfort level  Outcome: Progressing  Flowsheets (Taken 6/8/2025 2013)  Verbalizes/displays adequate comfort level or baseline comfort level: Encourage patient to monitor pain and request assistance     Problem: Respiratory - Adult  Goal: Achieves optimal ventilation and oxygenation  6/8/2025 2329 by Nathalie Hoffman, RN  Outcome: Progressing  6/8/2025 2136 by Josh Beltrán RCP  Outcome: Progressing  6/8/2025 2135 by Josh Beltrán RCP  Outcome: Progressing  Flowsheets (Taken 6/8/2025 2013 by Nathalie Hoffman, RN)  Achieves optimal ventilation and oxygenation: Assess for changes in respiratory status

## 2025-06-09 NOTE — PROGRESS NOTES
1315    Name of procedure: IVC filter Placement    Sedation medications given:    Versed: 1.5 mg    Fentanyl: 25 mcg    Reversal Agent Used: None    Sedation tolerated: Yes    Sedation start:  1300    Sedation end:  1315    Vital Signs: stable    Any complications related to procedure: None    Post Procedure Care Needed/order sets placed in connect care.     Patient is at increased fall risk due to medication given.    1345    TRANSFER - OUT REPORT:    Verbal report given to primary nurse Lisset steven Hyacinth PIÑA Patterson  being transferred to [facility] for routine post-op       Report consisted of patient's Situation, Background, Assessment and   Recommendations(SBAR).     Information from the following report(s) Nurse Handoff Report was reviewed with the receiving nurse.    Opportunity for questions and clarification was provided.      Patient transported with site CDI

## 2025-06-09 NOTE — CARE COORDINATION
Care Management Initial Assessment       RUR: 29%  Readmission? No  1st IM letter given? Yes   1st  letter given: No      CM completed room assessment with pt, at bedside.  Pt is known to reside alone in her one story home.  Pt is known to be active with PCP and use [facility].  Pt is known to be independent with ADLs and drive.  No reports of DME, and SNF.  Pt currently open to [facility]  to enter resumption orders at the time of d/c    Pt will potentially need a ride home at the time of d/c.  Pt pending ONC, Hemo,Cards, and GI clearance.    MATILDA Salas   [phone]       06/09/25 2082   Service Assessment   Patient Orientation Alert and Oriented   Cognition Alert   History Provided By Patient   Primary Caregiver Self   Support Systems Family Members   PCP Verified by CM Yes   Last Visit to PCP Within last 3 months   Prior Functional Level Independent in ADLs/IADLs   Current Functional Level Independent in ADLs/IADLs   Can patient return to prior living arrangement Yes   Ability to make needs known: Fair   Family able to assist with home care needs: Yes   Would you like for me to discuss the discharge plan with any other family members/significant others, and if so, who? Yes   Financial Resources Medicare   Social/Functional History   Lives With Alone   Type of Home House   Active  No   Mode of Transportation Other  (medicaid transport)   Discharge Planning   Type of Residence House   Living Arrangements Alone   Patient expects to be discharged to: House

## 2025-06-10 LAB
ABO + RH BLD: NORMAL
ANION GAP SERPL CALC-SCNC: 3 MMOL/L (ref 2–12)
BASOPHILS # BLD: 0.02 K/UL (ref 0–0.1)
BASOPHILS NFR BLD: 0.4 % (ref 0–1)
BLD PROD TYP BPU: NORMAL
BLOOD BANK BLOOD PRODUCT EXPIRATION DATE: NORMAL
BLOOD BANK DISPENSE STATUS: NORMAL
BLOOD BANK ISBT PRODUCT BLOOD TYPE: 6200
BLOOD BANK UNIT TYPE AND RH: NORMAL
BLOOD GROUP ANTIBODIES SERPL: NORMAL
BPU ID: NORMAL
BUN SERPL-MCNC: 11 MG/DL (ref 6–20)
BUN/CREAT SERPL: 10 (ref 12–20)
CALCIUM SERPL-MCNC: 8.6 MG/DL (ref 8.5–10.1)
CHLORIDE SERPL-SCNC: 112 MMOL/L (ref 97–108)
CO2 SERPL-SCNC: 25 MMOL/L (ref 21–32)
CREAT SERPL-MCNC: 1.14 MG/DL (ref 0.55–1.02)
CROSSMATCH RESULT: NORMAL
DIFFERENTIAL METHOD BLD: ABNORMAL
EOSINOPHIL # BLD: 0.2 K/UL (ref 0–0.4)
EOSINOPHIL NFR BLD: 3.7 % (ref 0–7)
ERYTHROCYTE [DISTWIDTH] IN BLOOD BY AUTOMATED COUNT: 18.1 % (ref 11.5–14.5)
GLUCOSE SERPL-MCNC: 158 MG/DL (ref 65–100)
HCT VFR BLD AUTO: 26.3 % (ref 35–47)
HGB BLD-MCNC: 7.7 G/DL (ref 11.5–16)
IMM GRANULOCYTES # BLD AUTO: 0.03 K/UL (ref 0–0.04)
IMM GRANULOCYTES NFR BLD AUTO: 0.5 % (ref 0–0.5)
LYMPHOCYTES # BLD: 0.68 K/UL (ref 0.8–3.5)
LYMPHOCYTES NFR BLD: 12.4 % (ref 12–49)
MCH RBC QN AUTO: 27.5 PG (ref 26–34)
MCHC RBC AUTO-ENTMCNC: 29.3 G/DL (ref 30–36.5)
MCV RBC AUTO: 93.9 FL (ref 80–99)
MONOCYTES # BLD: 0.5 K/UL (ref 0–1)
MONOCYTES NFR BLD: 9 % (ref 5–13)
NEUTS SEG # BLD: 4.07 K/UL (ref 1.8–8)
NEUTS SEG NFR BLD: 74 % (ref 32–75)
NRBC # BLD: 0 K/UL (ref 0–0.01)
NRBC BLD-RTO: 0 PER 100 WBC
PLATELET # BLD AUTO: 213 K/UL (ref 150–400)
PMV BLD AUTO: 11.5 FL (ref 8.9–12.9)
POTASSIUM SERPL-SCNC: 3.4 MMOL/L (ref 3.5–5.1)
RBC # BLD AUTO: 2.8 M/UL (ref 3.8–5.2)
RBC MORPH BLD: ABNORMAL
SODIUM SERPL-SCNC: 140 MMOL/L (ref 136–145)
SPECIMEN EXP DATE BLD: NORMAL
UNIT DIVISION: 0
UNIT ISSUE DATE/TIME: NORMAL
WBC # BLD AUTO: 5.5 K/UL (ref 3.6–11)

## 2025-06-10 PROCEDURE — 94761 N-INVAS EAR/PLS OXIMETRY MLT: CPT

## 2025-06-10 PROCEDURE — 85025 COMPLETE CBC W/AUTO DIFF WBC: CPT

## 2025-06-10 PROCEDURE — 2500000003 HC RX 250 WO HCPCS: Performed by: STUDENT IN AN ORGANIZED HEALTH CARE EDUCATION/TRAINING PROGRAM

## 2025-06-10 PROCEDURE — 6360000002 HC RX W HCPCS: Performed by: STUDENT IN AN ORGANIZED HEALTH CARE EDUCATION/TRAINING PROGRAM

## 2025-06-10 PROCEDURE — 1100000003 HC PRIVATE W/ TELEMETRY

## 2025-06-10 PROCEDURE — 6370000000 HC RX 637 (ALT 250 FOR IP): Performed by: STUDENT IN AN ORGANIZED HEALTH CARE EDUCATION/TRAINING PROGRAM

## 2025-06-10 PROCEDURE — 80048 BASIC METABOLIC PNL TOTAL CA: CPT

## 2025-06-10 PROCEDURE — 36415 COLL VENOUS BLD VENIPUNCTURE: CPT

## 2025-06-10 PROCEDURE — 94640 AIRWAY INHALATION TREATMENT: CPT

## 2025-06-10 RX ADMIN — METOPROLOL SUCCINATE 12.5 MG: 25 TABLET, EXTENDED RELEASE ORAL at 10:21

## 2025-06-10 RX ADMIN — SODIUM CHLORIDE, PRESERVATIVE FREE 10 ML: 5 INJECTION INTRAVENOUS at 21:40

## 2025-06-10 RX ADMIN — PANTOPRAZOLE SODIUM 40 MG: 40 TABLET, DELAYED RELEASE ORAL at 06:18

## 2025-06-10 RX ADMIN — ATORVASTATIN CALCIUM 80 MG: 40 TABLET, FILM COATED ORAL at 10:21

## 2025-06-10 RX ADMIN — ARFORMOTEROL TARTRATE: 15 SOLUTION RESPIRATORY (INHALATION) at 07:37

## 2025-06-10 RX ADMIN — PANTOPRAZOLE SODIUM 40 MG: 40 TABLET, DELAYED RELEASE ORAL at 17:07

## 2025-06-10 RX ADMIN — OCTREOTIDE ACETATE 50 MCG: 50 INJECTION, SOLUTION INTRAVENOUS; SUBCUTANEOUS at 21:39

## 2025-06-10 RX ADMIN — SODIUM CHLORIDE, PRESERVATIVE FREE 10 ML: 5 INJECTION INTRAVENOUS at 10:30

## 2025-06-10 RX ADMIN — ASPIRIN 81 MG: 81 TABLET, COATED ORAL at 10:21

## 2025-06-10 RX ADMIN — MONTELUKAST 10 MG: 10 TABLET, FILM COATED ORAL at 21:39

## 2025-06-10 RX ADMIN — OCTREOTIDE ACETATE 50 MCG: 50 INJECTION, SOLUTION INTRAVENOUS; SUBCUTANEOUS at 10:21

## 2025-06-10 RX ADMIN — FERROUS SULFATE TAB 325 MG (65 MG ELEMENTAL FE) 325 MG: 325 (65 FE) TAB at 21:39

## 2025-06-10 ASSESSMENT — PAIN SCALES - GENERAL: PAINLEVEL_OUTOF10: 0

## 2025-06-10 NOTE — PLAN OF CARE
Problem: Chronic Conditions and Co-morbidities  Goal: Patient's chronic conditions and co-morbidity symptoms are monitored and maintained or improved  Outcome: Progressing  Flowsheets (Taken 6/9/2025 1958)  Care Plan - Patient's Chronic Conditions and Co-Morbidity Symptoms are Monitored and Maintained or Improved: Monitor and assess patient's chronic conditions and comorbid symptoms for stability, deterioration, or improvement     Problem: Cardiovascular - Adult  Goal: Maintains optimal cardiac output and hemodynamic stability  Outcome: Progressing  Flowsheets (Taken 6/9/2025 1958)  Maintains optimal cardiac output and hemodynamic stability: Monitor blood pressure and heart rate     Problem: Safety - Adult  Goal: Free from fall injury  Outcome: Progressing  Flowsheets (Taken 6/9/2025 1958)  Free From Fall Injury: Instruct family/caregiver on patient safety     Problem: Pain  Goal: Verbalizes/displays adequate comfort level or baseline comfort level  Outcome: Progressing  Flowsheets (Taken 6/9/2025 1958)  Verbalizes/displays adequate comfort level or baseline comfort level:   Encourage patient to monitor pain and request assistance   Assess pain using appropriate pain scale   Administer analgesics based on type and severity of pain and evaluate response   Implement non-pharmacological measures as appropriate and evaluate response     Problem: Respiratory - Adult  Goal: Achieves optimal ventilation and oxygenation  6/10/2025 0332 by Guerita Corrigan RN  Outcome: Progressing  6/9/2025 2218 by Josh Beltrán RCP  Outcome: Progressing  Flowsheets  Taken 6/9/2025 1958 by Guerita Corrigan RN  Achieves optimal ventilation and oxygenation: Assess for changes in respiratory status  Taken 6/9/2025 0845 by Lisset Espinosa RN  Achieves optimal ventilation and oxygenation:   Assess for changes in respiratory status   Assess for changes in mentation and behavior   Position to facilitate oxygenation and minimize respiratory

## 2025-06-11 VITALS
SYSTOLIC BLOOD PRESSURE: 136 MMHG | WEIGHT: 140.43 LBS | TEMPERATURE: 98.4 F | RESPIRATION RATE: 16 BRPM | HEIGHT: 65 IN | DIASTOLIC BLOOD PRESSURE: 55 MMHG | BODY MASS INDEX: 23.4 KG/M2 | HEART RATE: 55 BPM | OXYGEN SATURATION: 99 %

## 2025-06-11 LAB
HCT VFR BLD AUTO: 31.8 % (ref 35–47)
HGB BLD-MCNC: 9.3 G/DL (ref 11.5–16)

## 2025-06-11 PROCEDURE — 6360000002 HC RX W HCPCS: Performed by: STUDENT IN AN ORGANIZED HEALTH CARE EDUCATION/TRAINING PROGRAM

## 2025-06-11 PROCEDURE — 85018 HEMOGLOBIN: CPT

## 2025-06-11 PROCEDURE — 94640 AIRWAY INHALATION TREATMENT: CPT

## 2025-06-11 PROCEDURE — 85014 HEMATOCRIT: CPT

## 2025-06-11 PROCEDURE — 6370000000 HC RX 637 (ALT 250 FOR IP): Performed by: STUDENT IN AN ORGANIZED HEALTH CARE EDUCATION/TRAINING PROGRAM

## 2025-06-11 PROCEDURE — 94761 N-INVAS EAR/PLS OXIMETRY MLT: CPT

## 2025-06-11 PROCEDURE — 2500000003 HC RX 250 WO HCPCS: Performed by: STUDENT IN AN ORGANIZED HEALTH CARE EDUCATION/TRAINING PROGRAM

## 2025-06-11 PROCEDURE — 36415 COLL VENOUS BLD VENIPUNCTURE: CPT

## 2025-06-11 RX ADMIN — ATORVASTATIN CALCIUM 80 MG: 40 TABLET, FILM COATED ORAL at 08:39

## 2025-06-11 RX ADMIN — METOPROLOL SUCCINATE 12.5 MG: 25 TABLET, EXTENDED RELEASE ORAL at 08:39

## 2025-06-11 RX ADMIN — OCTREOTIDE ACETATE 50 MCG: 50 INJECTION, SOLUTION INTRAVENOUS; SUBCUTANEOUS at 08:40

## 2025-06-11 RX ADMIN — ASPIRIN 81 MG: 81 TABLET, COATED ORAL at 08:39

## 2025-06-11 RX ADMIN — PANTOPRAZOLE SODIUM 40 MG: 40 TABLET, DELAYED RELEASE ORAL at 06:17

## 2025-06-11 RX ADMIN — SODIUM CHLORIDE, PRESERVATIVE FREE 10 ML: 5 INJECTION INTRAVENOUS at 08:41

## 2025-06-11 RX ADMIN — ARFORMOTEROL TARTRATE: 15 SOLUTION RESPIRATORY (INHALATION) at 09:02

## 2025-06-11 ASSESSMENT — PAIN SCALES - GENERAL: PAINLEVEL_OUTOF10: 0

## 2025-06-11 NOTE — PLAN OF CARE
Problem: Chronic Conditions and Co-morbidities  Goal: Patient's chronic conditions and co-morbidity symptoms are monitored and maintained or improved  6/11/2025 1834 by Amber Palacio RN  Outcome: Adequate for Discharge  6/11/2025 1057 by Amber Palacio RN  Outcome: Progressing     Problem: Cardiovascular - Adult  Goal: Maintains optimal cardiac output and hemodynamic stability  6/11/2025 1834 by Amber Palacio RN  Outcome: Adequate for Discharge  6/11/2025 1057 by Amber Palacio RN  Outcome: Progressing     Problem: Safety - Adult  Goal: Free from fall injury  6/11/2025 1834 by Amber Palacio RN  Outcome: Adequate for Discharge  6/11/2025 1057 by Amber Palacio RN  Outcome: Progressing     Problem: Pain  Goal: Verbalizes/displays adequate comfort level or baseline comfort level  6/11/2025 1834 by Amber Palacio RN  Outcome: Adequate for Discharge  6/11/2025 1057 by Amber Palacio RN  Outcome: Progressing     Problem: Respiratory - Adult  Goal: Achieves optimal ventilation and oxygenation  6/11/2025 1834 by Amber Palacio RN  Outcome: Adequate for Discharge  6/11/2025 1057 by Amber Palacio RN  Outcome: Progressing  6/11/2025 0903 by Estefani Walker, RT  Outcome: Progressing

## 2025-06-11 NOTE — PLAN OF CARE
Problem: Chronic Conditions and Co-morbidities  Goal: Patient's chronic conditions and co-morbidity symptoms are monitored and maintained or improved  Outcome: Progressing  Flowsheets (Taken 6/10/2025 1940)  Care Plan - Patient's Chronic Conditions and Co-Morbidity Symptoms are Monitored and Maintained or Improved: Monitor and assess patient's chronic conditions and comorbid symptoms for stability, deterioration, or improvement     Problem: Cardiovascular - Adult  Goal: Maintains optimal cardiac output and hemodynamic stability  Outcome: Progressing  Flowsheets (Taken 6/10/2025 1940)  Maintains optimal cardiac output and hemodynamic stability: Monitor blood pressure and heart rate     Problem: Safety - Adult  Goal: Free from fall injury  Outcome: Progressing     Problem: Pain  Goal: Verbalizes/displays adequate comfort level or baseline comfort level  Outcome: Progressing  Flowsheets (Taken 6/10/2025 1940)  Verbalizes/displays adequate comfort level or baseline comfort level: Encourage patient to monitor pain and request assistance     Problem: Respiratory - Adult  Goal: Achieves optimal ventilation and oxygenation  Outcome: Progressing  Flowsheets (Taken 6/10/2025 1940)  Achieves optimal ventilation and oxygenation: Assess for changes in respiratory status

## 2025-06-11 NOTE — PLAN OF CARE
Problem: Chronic Conditions and Co-morbidities  Goal: Patient's chronic conditions and co-morbidity symptoms are monitored and maintained or improved  6/11/2025 1057 by Amber Palacio RN  Outcome: Progressing  6/10/2025 2328 by Nathalie Hoffman RN  Outcome: Progressing  Flowsheets (Taken 6/10/2025 1940)  Care Plan - Patient's Chronic Conditions and Co-Morbidity Symptoms are Monitored and Maintained or Improved: Monitor and assess patient's chronic conditions and comorbid symptoms for stability, deterioration, or improvement     Problem: Cardiovascular - Adult  Goal: Maintains optimal cardiac output and hemodynamic stability  6/11/2025 1057 by Amber Palacio RN  Outcome: Progressing  6/10/2025 2328 by Nathalie Hoffman RN  Outcome: Progressing  Flowsheets (Taken 6/10/2025 1940)  Maintains optimal cardiac output and hemodynamic stability: Monitor blood pressure and heart rate     Problem: Safety - Adult  Goal: Free from fall injury  6/11/2025 1057 by Amber Palacio RN  Outcome: Progressing  6/10/2025 2328 by Nathalie Hoffman RN  Outcome: Progressing     Problem: Pain  Goal: Verbalizes/displays adequate comfort level or baseline comfort level  6/11/2025 1057 by Amber Palacio RN  Outcome: Progressing  6/10/2025 2328 by Nathalie Hoffman RN  Outcome: Progressing  Flowsheets (Taken 6/10/2025 1940)  Verbalizes/displays adequate comfort level or baseline comfort level: Encourage patient to monitor pain and request assistance     Problem: Respiratory - Adult  Goal: Achieves optimal ventilation and oxygenation  6/11/2025 1057 by Amber Palacio RN  Outcome: Progressing  6/11/2025 0903 by Estefani Walker, RT  Outcome: Progressing  6/10/2025 2328 by Nathalie Hoffman RN  Outcome: Progressing  Flowsheets (Taken 6/10/2025 1940)  Achieves optimal ventilation and oxygenation: Assess for changes in respiratory status

## 2025-06-11 NOTE — DISCHARGE SUMMARY
Discharge Summary    Name: Hyacinth Patterson  [id number]  YOB: 1955 (Age: 69 y.o.)   Date of Admission: 6/7/2025  Date of Discharge: 6/11/2025  Attending Physician: Daksha Hendrickson MD    Discharge Diagnosis:   Acute on chronic blood loss anemia POA- resolved, Hb >9.0 and stable now off plavix and Eliquis  Recurrent GI bleed POA- stopped  History of AVMs POA- cont to get Octreotide shots as able as recommended by GI Dr Goldman  Gastroesophageal reflux disease  History of diarrhea  History of recent pulmonary embolism S/p IVC filter by IR, taken of NOAC now  Coronary artery disease s/p PCI FELA 4/30/2025 POA- taken off Plavix as per Cardiology Dr Burkett, cont ASA only now  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  female with PMHx as listed below presenting as transfer from [facility] after presenting at direction of PCP for acute on chronic anemia in setting of known AVMs with chronic blood loss, which appears to have accelerated.  Of note, patient was hospitalized 5/23-5/27 for acute on chronic GI bleed undergoing EGD 5/16 demonstrating multiple AVMs.  Was planned for outpatient octreotide, which she has not started reporting that her insurance is declining to cover this injection.  She has since resumed Eliquis, aspirin, and Plavix.  ROS otherwise negative.     At OSH hemoglobin 6.1 down from 9.1 at discharge.     On arrival, patient afebrile and hemodynamically stable saturating upper 90s on room air.     We were asked to

## 2025-06-11 NOTE — CARE COORDINATION
06/11/25 1212   Services At/After Discharge   Transition of Care Consult (CM Consult) N/A   Internal Home Health No   Services At/After Discharge None   [place] Resource Information Provided? No   Mode of Transport at Discharge Other (see comment)  (family to transport)   Confirm Follow Up Transport Family   Condition of Participation: Discharge Planning   The Patient and/or Patient Representative was provided with a Choice of Provider? Patient   The Patient and/Or Patient Representative agree with the Discharge Plan? Yes   Freedom of Choice list was provided with basic dialogue that supports the patient's individualized plan of care/goals, treatment preferences, and shares the quality data associated with the providers?  Yes     CM aware that pt will d/c on today and will transition home with family support.  CM informed that pts family will transport home on today.  CM will provide pt with 2nd  Medicare Letter.    MATILDA Salas CM  [phone]

## 2025-06-11 NOTE — ADT AUTH CERT
[id number]   Dictation Time: 2025  3:52 PM Trans Time: 2025  3:52 PM Trans Doc Type: Progress Notes Trans Status: Available      Physician Progress Note        PATIENT:               OSO GOMEZ  CSN #:                  [id number]  :                       1955  ADMIT DATE:       2025 1:29 AM  DISCH DATE:  RESPONDING  PROVIDER #:        Nati Ugarte MD              QUERY TEXT:     Gastrointestinal bleeding is documented in the medical record H&P by   Prateek Cartagena,   2025.  Please specify the underlying cause:     The clinical indicators include:  > H&P by Prateek Cartagena, DO 2025- \"Recurrent GI bleed  Known AVMs\"  > Gastroenterology 2025- \"Likely some degree ongoing bleeding from SB   AVMs- \"patient was hospitalized - for acute on chronic GI bleed   undergoing EGD  demonstrating multiple AVMs\"  \"Hold Apixaban\"  Options provided:  -- GI bleeding related to extrinsic circulating anticoagulants  -- GI bleeding related to hemorrhoids  -- GI bleeding related to infectious colitis  -- GI bleeding related to ischemic colitis  -- GI bleeding related to, Please document cause.  -- GI bleeding related to SB AVM  -- Other - I will add my own diagnosis  -- Disagree - Not applicable / Not valid  -- Disagree - Clinically unable to determine / Unknown  -- Refer to Clinical Documentation Reviewer     PROVIDER RESPONSE TEXT:     This patient has GI bleeding related to extrinsic circulating anticoagulants.     Query created by: Ruba Celis on 2025 2:24 PM        Electronically signed by:  Nati Ugarte MD 2025 3:51 PM                       Electronically signed by Nati Ugarte MD on 2025  3:53 PM

## 2025-06-12 ENCOUNTER — OFFICE VISIT (OUTPATIENT)
Age: 70
End: 2025-06-12

## 2025-06-12 ENCOUNTER — TELEPHONE (OUTPATIENT)
Age: 70
End: 2025-06-12

## 2025-06-12 VITALS
OXYGEN SATURATION: 100 % | RESPIRATION RATE: 18 BRPM | TEMPERATURE: 97.6 F | SYSTOLIC BLOOD PRESSURE: 109 MMHG | HEIGHT: 65 IN | HEART RATE: 58 BPM | DIASTOLIC BLOOD PRESSURE: 51 MMHG | WEIGHT: 142.6 LBS | BODY MASS INDEX: 23.76 KG/M2

## 2025-06-12 DIAGNOSIS — K31.811 GASTROINTESTINAL HEMORRHAGE ASSOCIATED WITH ANGIODYSPLASIA OF STOMACH AND DUODENUM: ICD-10-CM

## 2025-06-12 DIAGNOSIS — Z09 HOSPITAL DISCHARGE FOLLOW-UP: Primary | ICD-10-CM

## 2025-06-12 DIAGNOSIS — M54.2 NECK PAIN: ICD-10-CM

## 2025-06-12 NOTE — PROGRESS NOTES
Hyacinth Patterson is a 69 y.o. female presenting for/with:    No chief complaint on file.      Vitals:    06/12/25 0811   BP: (!) 109/51   BP Site: Left Upper Arm   Patient Position: Sitting   BP Cuff Size: Medium Adult   Pulse: 58   Resp: 18   Temp: 97.6 °F (36.4 °C)   TempSrc: Temporal   SpO2: 100%   Weight: 64.7 kg (142 lb 9.6 oz)   Height: 1.651 m (5' 5\")       Pain Scale: /10  Pain Location:     \"Have you been to the ER, urgent care clinic since your last visit?  Hospitalized since your last visit?\"    Yes     “Have you seen or consulted any other health care providers outside of [facility] since your last visit?”    NO                 6/5/2025    10:02 AM   PHQ-9    Little interest or pleasure in doing things 0   Feeling down, depressed, or hopeless 0   PHQ-2 Score 0   PHQ-9 Total Score 0           3/31/2025    11:30 AM 3/13/2025    10:40 AM 8/29/2024    10:40 AM 5/7/2024     9:20 AM 4/17/2024     9:00 AM 1/12/2024     7:30 AM 9/21/2023     3:00 PM   [facility] AMB LEARNING ASSESSMENT   Primary Learner Patient Patient Patient Patient Patient Patient Patient   co-learner caregiver       No   Primary Language ENGLISH ENGLISH ENGLISH ENGLISH ENGLISH ENGLISH ENGLISH   Learning Preference DEMONSTRATION DEMONSTRATION DEMONSTRATION DEMONSTRATION DEMONSTRATION PICTURES DEMONSTRATION   Answered By pt pt pt pt pt self pt   Relationship to Learner SELF SELF SELF SELF SELF SELF SELF            6/5/2025    10:02 AM   Amb Fall Risk Assessment and TUG Test   Do you feel unsteady or are you worried about falling?  no   2 or more falls in past year? no   Fall with injury in past year? no           6/5/2025    10:00 AM 5/22/2025     1:00 PM 5/8/2025     8:00 AM 3/24/2025     8:00 AM 12/27/2024     8:00 AM 11/25/2024     7:00 AM 10/28/2024    10:00 AM   ADL ASSESSMENT   Feeding yourself No Help Needed No Help Needed No Help Needed No Help Needed No Help Needed No Help Needed No Help Needed   Getting from bed to chair No Help

## 2025-06-12 NOTE — PROGRESS NOTES
Duration 30 minutes primarily education, review of imaging, labs and records.    This is a JUSTINO encounter      Date of Admission: 6/7/2025  Date of Discharge: 6/11/2025  Attending Physician: Daksha Hendrickson MD     Discharge Diagnosis:   Acute on chronic blood loss anemia POA- resolved, Hb >9.0 and stable now off plavix and Eliquis  Recurrent GI bleed POA- stopped  History of AVMs POA- cont to get Octreotide shots as able as recommended by GI Dr Goldman    Assessment & Plan  1. Post-procedural neck pain.    There is no sign of hematoma, thrombosis or infection at this time.  There are no motor or senssory deficits.  - The neck pain is likely a result of the recent [place] filter placement procedure, which may have caused minor trauma to the muscle.  - There is no evidence of hematoma or thrombosis at the access site. Strength and  are normal, and there is no swelling.  - Telephone Consultation with Dr. Connor Dawson, a vascular surgeon, confirmed that the pain is likely due to the procedure and should resolve spontaneously.  - Advised to manage the pain with Tylenol or Advil as needed, apply cold packs to the affected area for 10 minutes twice daily, and use a neck brace if it provides relief. Avoid strenuous activities for the next week.    Follow-up  The patient will follow up in 1 week.          No chief complaint on file.        No orders of the defined types were placed in this encounter.      Jeffery Alexis MD, FACP      History of Present Illness  The patient is a 69-year-old female who presents for evaluation of neck pain.    She was recently hospitalized from 06/06/2025 to 06/11/2025, during which she received a blood transfusion and octreotide. Her anticoagulant medications, Eliquis and Plavix, were discontinued due to gastrointestinal bleeding. She has been scheduled for outpatient infusion therapy with octreotide starting tomorrow. A [place] filter was placed in her abdomen on 06/09/2025 via the

## 2025-06-12 NOTE — TELEPHONE ENCOUNTER
Care Transitions Initial Follow Up Call    Outreach made within 2 business days of discharge: Yes    Patient: Hyacinth Patterson Patient : 1955   MRN: [id number]  Reason for Admission: GIB  Discharge Date: 25       Spoke with: Patient - In office    Discharge department/facility: [facility] Interactive Patient Contact:  Was patient able to fill all prescriptions: Yes  Was patient instructed to bring all medications to the follow-up visit: Yes  Is patient taking all medications as directed in the discharge summary? Yes  Does patient understand their discharge instructions: Yes  Does patient have questions or concerns that need addressed prior to 7-14 day follow up office visit: no    Additional needs identified to be addressed with provider  No needs identified             Scheduled appointment with PCP within 7-14 days    Follow Up  Future Appointments   Date Time Provider Department Center   2025 10:30 AM [facility] CHAIR 5 [facility]   2025  1:45 PM Steve Stone MD [facility]   2025  8:15 AM Jeffery Alexis MD [facility] DEP   2025  1:00 PM [facility] CHAIR 5 [facility]   2025  1:00 PM [facility] CHAIR 8 [facility]       HUNTER BARRIENTOS, LASHELLN

## 2025-06-13 ENCOUNTER — HOSPITAL ENCOUNTER (OUTPATIENT)
Facility: HOSPITAL | Age: 70
Setting detail: INFUSION SERIES
Discharge: HOME | End: 2025-06-13
Payer: MEDICARE

## 2025-06-13 VITALS
WEIGHT: 144 LBS | OXYGEN SATURATION: 100 % | HEART RATE: 63 BPM | SYSTOLIC BLOOD PRESSURE: 126 MMHG | RESPIRATION RATE: 17 BRPM | DIASTOLIC BLOOD PRESSURE: 48 MMHG | TEMPERATURE: 98.1 F | BODY MASS INDEX: 23.96 KG/M2

## 2025-06-13 DIAGNOSIS — D50.0 IRON DEFICIENCY ANEMIA DUE TO CHRONIC BLOOD LOSS: ICD-10-CM

## 2025-06-13 DIAGNOSIS — K31.811 GASTROINTESTINAL HEMORRHAGE ASSOCIATED WITH ANGIODYSPLASIA OF STOMACH AND DUODENUM: Primary | ICD-10-CM

## 2025-06-13 DIAGNOSIS — D64.9 TRANSFUSION-DEPENDENT ANEMIA: Primary | ICD-10-CM

## 2025-06-13 PROCEDURE — 2580000003 HC RX 258: Performed by: INTERNAL MEDICINE

## 2025-06-13 PROCEDURE — 96365 THER/PROPH/DIAG IV INF INIT: CPT

## 2025-06-13 PROCEDURE — 6360000002 HC RX W HCPCS: Performed by: INTERNAL MEDICINE

## 2025-06-13 PROCEDURE — 96366 THER/PROPH/DIAG IV INF ADDON: CPT

## 2025-06-13 RX ORDER — HEPARIN 100 UNIT/ML
500 SYRINGE INTRAVENOUS PRN
OUTPATIENT
Start: 2025-06-27

## 2025-06-13 RX ORDER — SODIUM CHLORIDE 9 MG/ML
5-250 INJECTION, SOLUTION INTRAVENOUS PRN
OUTPATIENT
Start: 2025-06-27

## 2025-06-13 RX ORDER — ACETAMINOPHEN 325 MG/1
650 TABLET ORAL
Status: DISCONTINUED | OUTPATIENT
Start: 2025-06-13 | End: 2025-06-14 | Stop reason: HOSPADM

## 2025-06-13 RX ORDER — HYDROCORTISONE SODIUM SUCCINATE 100 MG/2ML
100 INJECTION INTRAMUSCULAR; INTRAVENOUS
Status: DISCONTINUED | OUTPATIENT
Start: 2025-06-13 | End: 2025-06-14 | Stop reason: HOSPADM

## 2025-06-13 RX ORDER — ONDANSETRON 2 MG/ML
8 INJECTION INTRAMUSCULAR; INTRAVENOUS
Status: DISCONTINUED | OUTPATIENT
Start: 2025-06-13 | End: 2025-06-14 | Stop reason: HOSPADM

## 2025-06-13 RX ORDER — SODIUM CHLORIDE 9 MG/ML
INJECTION, SOLUTION INTRAVENOUS CONTINUOUS
Status: DISCONTINUED | OUTPATIENT
Start: 2025-06-13 | End: 2025-06-14 | Stop reason: HOSPADM

## 2025-06-13 RX ORDER — SODIUM CHLORIDE 9 MG/ML
5-250 INJECTION, SOLUTION INTRAVENOUS PRN
Status: DISCONTINUED | OUTPATIENT
Start: 2025-06-13 | End: 2025-06-14 | Stop reason: HOSPADM

## 2025-06-13 RX ORDER — EPINEPHRINE 1 MG/ML
0.3 INJECTION, SOLUTION, CONCENTRATE INTRAVENOUS PRN
Status: DISCONTINUED | OUTPATIENT
Start: 2025-06-13 | End: 2025-06-14 | Stop reason: HOSPADM

## 2025-06-13 RX ORDER — ALBUTEROL SULFATE 90 UG/1
4 INHALANT RESPIRATORY (INHALATION) PRN
Status: DISCONTINUED | OUTPATIENT
Start: 2025-06-13 | End: 2025-06-14 | Stop reason: HOSPADM

## 2025-06-13 RX ORDER — ONDANSETRON 2 MG/ML
8 INJECTION INTRAMUSCULAR; INTRAVENOUS
OUTPATIENT
Start: 2025-06-27

## 2025-06-13 RX ORDER — SODIUM CHLORIDE 0.9 % (FLUSH) 0.9 %
5-40 SYRINGE (ML) INJECTION PRN
OUTPATIENT
Start: 2025-06-27

## 2025-06-13 RX ORDER — EPINEPHRINE 1 MG/ML
0.3 INJECTION, SOLUTION, CONCENTRATE INTRAVENOUS PRN
OUTPATIENT
Start: 2025-06-27

## 2025-06-13 RX ORDER — ACETAMINOPHEN 325 MG/1
650 TABLET ORAL
OUTPATIENT
Start: 2025-06-27

## 2025-06-13 RX ORDER — HYDROCORTISONE SODIUM SUCCINATE 100 MG/2ML
100 INJECTION INTRAMUSCULAR; INTRAVENOUS
OUTPATIENT
Start: 2025-06-27

## 2025-06-13 RX ORDER — DIPHENHYDRAMINE HYDROCHLORIDE 50 MG/ML
50 INJECTION, SOLUTION INTRAMUSCULAR; INTRAVENOUS
OUTPATIENT
Start: 2025-06-27

## 2025-06-13 RX ORDER — ALBUTEROL SULFATE 90 UG/1
4 INHALANT RESPIRATORY (INHALATION) PRN
OUTPATIENT
Start: 2025-06-27

## 2025-06-13 RX ORDER — DIPHENHYDRAMINE HYDROCHLORIDE 50 MG/ML
50 INJECTION, SOLUTION INTRAMUSCULAR; INTRAVENOUS
Status: DISCONTINUED | OUTPATIENT
Start: 2025-06-13 | End: 2025-06-14 | Stop reason: HOSPADM

## 2025-06-13 RX ORDER — SODIUM CHLORIDE 9 MG/ML
INJECTION, SOLUTION INTRAVENOUS CONTINUOUS
OUTPATIENT
Start: 2025-06-27

## 2025-06-13 RX ADMIN — IRON SUCROSE 300 MG: 20 INJECTION, SOLUTION INTRAVENOUS at 11:23

## 2025-06-13 NOTE — PROGRESS NOTES
[facility] [place] at [facility] CONSULTATION   Office Phone: [phone], Office Fax: [phone]    Date: 6/17/25    PATIENT PROFILE: Ms. Hyacinth Patterson is a 69 y.o. 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 Patterson is a 68 yo w/ pmhx of DM, chronic pancreatitis, htn, CAD s/p FELA 4/2025, chronic GIB 2/2 AVMs, PE s/p IVC filter presenting to establish care for CHITO.    Patient with longstanding history of CHITO 2/2 chronic GIB.  She has had multiple hospital evaluations across the last 1+ year.  More recently, patient has had issues with recurrent GIB exacerbated by recent DAPT and therapeutic anticoagulation needs 2/2 PE noted 5/2025.  She underwent IVC filter during 6/11/25 hospitalization.  Per cardiology recommendations she was taken off of plavix despite FELA 4/30/25.  She was discharged in stable condition.    Patient reports she has felt relatively well since returning home.  She denies seeing any melena, hematochezia or blood per rectum since discharge.  She has not identified signs of GI bleeding of note with prior bleeding episodes.  She denies any chest pain,

## 2025-06-13 NOTE — PLAN OF CARE
Problem: Safety - Adult  Goal: Free from fall injury  Outcome: Completed     Problem: Discharge Planning  Goal: Discharge to home or other facility with appropriate resources  Outcome: Completed

## 2025-06-13 NOTE — PROGRESS NOTES
1015: Pt arrived ambulatory and in no distress for Venofer.  Assessment completed. Patient has poor venous access. IV started R ac by [facility] [place].    Medications:  Venofer 300 mg up to infused over 90 mins    Patient given lunch    Venofer infused. Line flushed with N/S. Discharge instructions reviewed with IV removed.    1300 Discharged home ambulatory and in no distress. Next appointment 6/26/25 @ 1300

## 2025-06-15 NOTE — PROGRESS NOTES
Duration 30 minutes primarily education, review of imaging, labs and records.    Assessment & Plan  1. Anemia.  - Bone marrow appears to be functioning normally.  - Intravenous iron has significantly improved energy levels.  - Scheduled for a second dose of intravenous iron on 06/27/2025.  - Upcoming appointment with Dr. Stone at [facility]'s outpatient infusion center for lab work and follow-up in approximately 3 weeks. Advised to discontinue oral iron supplementation due to gut irritation and intravenous iron therapy.          Chief Complaint   Patient presents with    Follow-up     1 Week follow up.    Hypertension    Diabetes         No orders of the defined types were placed in this encounter.      Jeffery Alexis MD, FACP      History of Present Illness  The patient presents for evaluation of anemia.    She has a scheduled follow-up appointment with Dr. Stone on 07/11/2025, during which blood work will be conducted. She reports no complications associated with the intravenous iron therapy and notes a significant increase in her energy levels post-treatment. Her second dose of intravenous iron is due on 06/27/2025. She has not experienced any further episodes of bleeding.           Allergies   Allergen Reactions    Lisinopril Headaches, Other (See Comments) and Swelling    Morphine Hives, Itching, Other (See Comments) and Rash    Tramadol Itching and Other (See Comments)     Funny feeling    Dapagliflozin Other (See Comments)     Yeast infection    Yeast infection    Yeast infection    Gabapentin Itching and Other (See Comments)     Blurry vision       Outpatient Encounter Medications as of 6/19/2025   Medication Sig Dispense Refill    octreotide (SANDOSTATIN) 50 MCG/ML SOLN Inject 1 mL into the skin in the morning and 1 mL in the evening. 60 mL 2    montelukast (SINGULAIR) 10 MG tablet TAKE 1 TABLET BY MOUTH DAILY AT BEDTIME 30 tablet 5    metoprolol succinate (TOPROL XL) 25 MG extended

## 2025-06-16 RX ORDER — FUROSEMIDE 20 MG/1
TABLET ORAL
Qty: 60 TABLET | Refills: 1 | OUTPATIENT
Start: 2025-06-16

## 2025-06-17 ENCOUNTER — OFFICE VISIT (OUTPATIENT)
Age: 70
End: 2025-06-17
Payer: MEDICARE

## 2025-06-17 ENCOUNTER — HOSPITAL ENCOUNTER (OUTPATIENT)
Facility: HOSPITAL | Age: 70
Setting detail: INFUSION SERIES
Discharge: HOME | End: 2025-06-17
Payer: MEDICARE

## 2025-06-17 VITALS
HEART RATE: 66 BPM | HEIGHT: 65 IN | TEMPERATURE: 97.3 F | RESPIRATION RATE: 16 BRPM | OXYGEN SATURATION: 100 % | DIASTOLIC BLOOD PRESSURE: 52 MMHG | BODY MASS INDEX: 24.87 KG/M2 | SYSTOLIC BLOOD PRESSURE: 120 MMHG | WEIGHT: 149.25 LBS

## 2025-06-17 DIAGNOSIS — D64.9 TRANSFUSION-DEPENDENT ANEMIA: ICD-10-CM

## 2025-06-17 DIAGNOSIS — D50.0 IRON DEFICIENCY ANEMIA DUE TO CHRONIC BLOOD LOSS: Primary | ICD-10-CM

## 2025-06-17 DIAGNOSIS — K31.811 GASTROINTESTINAL HEMORRHAGE ASSOCIATED WITH ANGIODYSPLASIA OF STOMACH AND DUODENUM: ICD-10-CM

## 2025-06-17 LAB
ALBUMIN SERPL-MCNC: 2.9 G/DL (ref 3.5–5)
ALBUMIN/GLOB SERPL: 0.9 (ref 1.1–2.2)
ALP SERPL-CCNC: 81 U/L (ref 45–117)
ALT SERPL-CCNC: 15 U/L (ref 12–78)
ANION GAP SERPL CALC-SCNC: 6 MMOL/L (ref 2–12)
AST SERPL-CCNC: 11 U/L (ref 15–37)
BASOPHILS # BLD: 0.04 K/UL (ref 0–0.1)
BASOPHILS NFR BLD: 0.7 % (ref 0–1)
BILIRUB SERPL-MCNC: 0.3 MG/DL (ref 0.2–1)
BUN SERPL-MCNC: 15 MG/DL (ref 6–20)
BUN/CREAT SERPL: 13 (ref 12–20)
CALCIUM SERPL-MCNC: 8.8 MG/DL (ref 8.5–10.1)
CHLORIDE SERPL-SCNC: 108 MMOL/L (ref 97–108)
CO2 SERPL-SCNC: 31 MMOL/L (ref 21–32)
CREAT SERPL-MCNC: 1.13 MG/DL (ref 0.55–1.02)
DIFFERENTIAL METHOD BLD: ABNORMAL
EOSINOPHIL # BLD: 0.18 K/UL (ref 0–0.4)
EOSINOPHIL NFR BLD: 3.2 % (ref 0–0.7)
ERYTHROCYTE [DISTWIDTH] IN BLOOD BY AUTOMATED COUNT: 17.2 % (ref 11.5–14.5)
FERRITIN SERPL-MCNC: 300 NG/ML (ref 8–252)
GLOBULIN SER CALC-MCNC: 3.4 G/DL (ref 2–4)
GLUCOSE SERPL-MCNC: 165 MG/DL (ref 65–100)
HCT VFR BLD AUTO: 28.5 % (ref 35–47)
HGB BLD-MCNC: 8.4 G/DL (ref 11.5–16)
IMM GRANULOCYTES # BLD AUTO: 0.03 K/UL (ref 0–0.04)
IMM GRANULOCYTES NFR BLD AUTO: 0.5 % (ref 0–0.5)
IRON SATN MFR SERPL: 16 % (ref 20–50)
IRON SERPL-MCNC: 38 UG/DL (ref 50–170)
LDH SERPL L TO P-CCNC: 125 U/L (ref 81–246)
LYMPHOCYTES # BLD: 1.11 K/UL (ref 0.8–3.5)
LYMPHOCYTES NFR BLD: 19.6 % (ref 12–49)
MCH RBC QN AUTO: 27.4 PG (ref 26–34)
MCHC RBC AUTO-ENTMCNC: 29.5 G/DL (ref 30–36.5)
MCV RBC AUTO: 92.8 FL (ref 80–99)
MONOCYTES # BLD: 0.4 K/UL (ref 0–1)
MONOCYTES NFR BLD: 7.1 % (ref 5–13)
NEUTS SEG # BLD: 3.89 K/UL (ref 1.8–8)
NEUTS SEG NFR BLD: 68.9 % (ref 32–75)
NRBC # BLD: 0 K/UL (ref 0–0.01)
NRBC BLD-RTO: 0 PER 100 WBC
PLATELET # BLD AUTO: 198 K/UL (ref 150–400)
PMV BLD AUTO: 11.1 FL (ref 8.9–12.9)
POTASSIUM SERPL-SCNC: 3.9 MMOL/L (ref 3.5–5.1)
PROT SERPL-MCNC: 6.3 G/DL (ref 6.4–8.2)
RBC # BLD AUTO: 3.07 M/UL (ref 3.8–5.2)
RETICS # AUTO: 0.12 M/UL (ref 0.02–0.08)
RETICS/RBC NFR AUTO: 3.9 % (ref 0.7–2.1)
SODIUM SERPL-SCNC: 145 MMOL/L (ref 136–145)
TIBC SERPL-MCNC: 238 UG/DL (ref 250–450)
WBC # BLD AUTO: 5.7 K/UL (ref 3.6–11)

## 2025-06-17 PROCEDURE — 82728 ASSAY OF FERRITIN: CPT

## 2025-06-17 PROCEDURE — 1159F MED LIST DOCD IN RCRD: CPT | Performed by: STUDENT IN AN ORGANIZED HEALTH CARE EDUCATION/TRAINING PROGRAM

## 2025-06-17 PROCEDURE — 80053 COMPREHEN METABOLIC PANEL: CPT

## 2025-06-17 PROCEDURE — 83010 ASSAY OF HAPTOGLOBIN QUANT: CPT

## 2025-06-17 PROCEDURE — 83615 LACTATE (LD) (LDH) ENZYME: CPT

## 2025-06-17 PROCEDURE — 83540 ASSAY OF IRON: CPT

## 2025-06-17 PROCEDURE — 3078F DIAST BP <80 MM HG: CPT | Performed by: STUDENT IN AN ORGANIZED HEALTH CARE EDUCATION/TRAINING PROGRAM

## 2025-06-17 PROCEDURE — 1123F ACP DISCUSS/DSCN MKR DOCD: CPT | Performed by: STUDENT IN AN ORGANIZED HEALTH CARE EDUCATION/TRAINING PROGRAM

## 2025-06-17 PROCEDURE — 99204 OFFICE O/P NEW MOD 45 MIN: CPT | Performed by: STUDENT IN AN ORGANIZED HEALTH CARE EDUCATION/TRAINING PROGRAM

## 2025-06-17 PROCEDURE — 82746 ASSAY OF FOLIC ACID SERUM: CPT

## 2025-06-17 PROCEDURE — 83550 IRON BINDING TEST: CPT

## 2025-06-17 PROCEDURE — 85045 AUTOMATED RETICULOCYTE COUNT: CPT

## 2025-06-17 PROCEDURE — 85025 COMPLETE CBC W/AUTO DIFF WBC: CPT

## 2025-06-17 PROCEDURE — 82607 VITAMIN B-12: CPT

## 2025-06-17 PROCEDURE — 36415 COLL VENOUS BLD VENIPUNCTURE: CPT

## 2025-06-17 PROCEDURE — 3074F SYST BP LT 130 MM HG: CPT | Performed by: STUDENT IN AN ORGANIZED HEALTH CARE EDUCATION/TRAINING PROGRAM

## 2025-06-17 ASSESSMENT — PATIENT HEALTH QUESTIONNAIRE - PHQ9
1. LITTLE INTEREST OR PLEASURE IN DOING THINGS: NOT AT ALL
SUM OF ALL RESPONSES TO PHQ QUESTIONS 1-9: 0
2. FEELING DOWN, DEPRESSED OR HOPELESS: NOT AT ALL

## 2025-06-17 NOTE — PROGRESS NOTES
Labs drawn peripherally and in process. Band-aid applied to site without redness, swelling or pain.

## 2025-06-17 NOTE — PROGRESS NOTES
Hyacinth Patterson is a 69 y.o. female    Chief Complaint   Patient presents with    New Patient     Iron deficiency anemia       1. Have you been to the ER, urgent care clinic since your last visit?  Hospitalized since your last visit?No    2. Have you seen or consulted any other health care providers outside of the [facility] since your last visit?  Include any pap smears or colon screening. No

## 2025-06-17 NOTE — PATIENT INSTRUCTIONS
It was nice seeing you.  We reviewed your recent hospitalizations and need for blood transfusions and iv iron over the past.  We discussed continuing your IV iron treatments which are scheduled 6/27 and 7/11/25.  We will repeat your blood work on 7/11/25.  Following today, we will monitor your blood work every 6 weeks to determine if further IV iron therapy is needed (.  We will call you with these results and arrange IV iron therapy as needed.  I will see you in 12 weeks for an MD visit and labs at that time.

## 2025-06-17 NOTE — PROGRESS NOTES
End of Shift Note    Bedside shift change report given to ADRIANA Hackett (oncoming nurse) by Su Fleming LPN (offgoing nurse).  Report included the following information SBAR    Shift worked:  0700 - 1900     Shift summary and any significant changes:     No acute events throughout shift. Labs drawn, pts Hgb 7.6. Pt resting comfortably in bed upon completion of shift.     During shift change pt reported having 4 episodes of diarrhea and feeling nauseas. Night shift nurse notified and will message MD. Tenorio placed in toilet to collect stool sample if ordered.     Concerns for physician to address:  Diarrhea, nausea     Zone phone for oncoming shift:          Activity:  Level of Assistance: Independent  Number times ambulated in hallways past shift: 0  Number of times OOB to chair past shift: 1    Cardiac:   Cardiac Monitoring: Yes      Cardiac Rhythm: Sinus rhythm    Access:  Current line(s): PIV    Genitourinary:   Urinary Status: Voiding, Bathroom privileges    Respiratory:   O2 Device: None (Room air)  Chronic home O2 use?: NO  Incentive spirometer at bedside: N/A    GI:  Last BM (including prior to admit): 05/24/25  Current diet:  ADULT DIET; Regular  DIET ONE TIME MESSAGE;  Passing flatus: YES    Pain Management:   Patient states pain is manageable on current regimen: YES    Skin:  Edenilson Scale Score: 22  Interventions: Wound Offloading (Prevention Methods): Pillows, Repositioning, Turning    Patient Safety:  Fall Risk: Nursing Judgement-Fall Risk High(Add Comments): No  Fall Risk Interventions  Nursing Judgement-Fall Risk High(Add Comments): No  Toilet Every 2 Hours-In Advance of Need: Yes  Hourly Visual Checks: Awake, In bed  Fall Visual Posted: Armband, Socks, Fall sign posted  Room Door Open: Deferred to promote rest  Alarm On: Other (Comment)  Patient Moved Closer to Nursing Station: No    Active Consults:   IP CONSULT TO GI  IP CONSULT TO HEMATOLOGY  IP CONSULT TO CARDIOLOGY    Length of Stay:  Expected  LOS: 3  Actual LOS: 2    Su Fleming LPN       Detail Level: Zone

## 2025-06-19 ENCOUNTER — OFFICE VISIT (OUTPATIENT)
Age: 70
End: 2025-06-19

## 2025-06-19 VITALS
BODY MASS INDEX: 24.43 KG/M2 | RESPIRATION RATE: 20 BRPM | SYSTOLIC BLOOD PRESSURE: 117 MMHG | OXYGEN SATURATION: 99 % | WEIGHT: 146.8 LBS | DIASTOLIC BLOOD PRESSURE: 61 MMHG | TEMPERATURE: 98 F | HEART RATE: 65 BPM

## 2025-06-19 DIAGNOSIS — Z95.5 HISTORY OF HEART ARTERY STENT: Primary | ICD-10-CM

## 2025-06-19 DIAGNOSIS — Z79.899 HIGH RISK MEDICATION USE: ICD-10-CM

## 2025-06-19 DIAGNOSIS — D50.0 IRON DEFICIENCY ANEMIA DUE TO CHRONIC BLOOD LOSS: ICD-10-CM

## 2025-06-19 DIAGNOSIS — K31.811 GASTROINTESTINAL HEMORRHAGE ASSOCIATED WITH ANGIODYSPLASIA OF STOMACH AND DUODENUM: ICD-10-CM

## 2025-06-19 LAB
FOLATE SERPL-MCNC: 12.2 NG/ML (ref 5–21)
HAPTOGLOB SERPL-MCNC: 150 MG/DL (ref 30–200)
VIT B12 SERPL-MCNC: 359 PG/ML (ref 193–986)

## 2025-06-19 NOTE — PROGRESS NOTES
Hyacinth Patterson is a 69 y.o. female presenting for/with:    Chief Complaint   Patient presents with    Follow-up     1 Week follow up.    Hypertension    Diabetes       Vitals:    06/19/25 0800   BP: 117/61   BP Site: Right Upper Arm   Patient Position: Sitting   BP Cuff Size: Medium Adult   Pulse: 65   Resp: 20   Temp: 98 °F (36.7 °C)   TempSrc: Temporal   SpO2: 99%   Weight: 66.6 kg (146 lb 12.8 oz)       Pain Scale: 0 - No pain/10  Pain Location:     \"Have you been to the ER, urgent care clinic since your last visit?  Hospitalized since your last visit?\"    NO    “Have you seen or consulted any other health care providers outside of [facility] since your last visit?”    NO                 6/19/2025     8:00 AM   PHQ-9    Little interest or pleasure in doing things 0   Feeling down, depressed, or hopeless 0   PHQ-2 Score 0   PHQ-9 Total Score 0           3/31/2025    11:30 AM 3/13/2025    10:40 AM 8/29/2024    10:40 AM 5/7/2024     9:20 AM 4/17/2024     9:00 AM 1/12/2024     7:30 AM 9/21/2023     3:00 PM   [facility] AMB LEARNING ASSESSMENT   Primary Learner Patient Patient Patient Patient Patient Patient Patient   co-learner caregiver       No   Primary Language ENGLISH ENGLISH ENGLISH ENGLISH ENGLISH ENGLISH ENGLISH   Learning Preference DEMONSTRATION DEMONSTRATION DEMONSTRATION DEMONSTRATION DEMONSTRATION PICTURES DEMONSTRATION   Answered By pt pt pt pt pt self pt   Relationship to Learner SELF SELF SELF SELF SELF SELF SELF            6/19/2025     8:00 AM   Amb Fall Risk Assessment and TUG Test   Do you feel unsteady or are you worried about falling?  no   2 or more falls in past year? no   Fall with injury in past year? no           6/19/2025     8:00 AM 6/5/2025    10:00 AM 5/22/2025     1:00 PM 5/8/2025     8:00 AM 3/24/2025     8:00 AM 12/27/2024     8:00 AM 11/25/2024     7:00 AM   ADL ASSESSMENT   Feeding yourself No Help Needed No Help Needed No Help Needed No Help Needed No Help Needed No Help

## 2025-06-23 ENCOUNTER — TELEPHONE (OUTPATIENT)
Age: 70
End: 2025-06-23

## 2025-06-23 NOTE — TELEPHONE ENCOUNTER
Patient: Hyacinth Patterson  : 1955    Attempted to call patient 25, 25, 25/ and 25 to schedule a hospital follow up.  Mailbox on phone is full.  Could not leave a message.  Tried contact also, kept ringing.      Could not reach patient.      Will wait until she calls the office to schedule appointment.

## 2025-06-27 ENCOUNTER — HOSPITAL ENCOUNTER (OUTPATIENT)
Facility: HOSPITAL | Age: 70
Setting detail: INFUSION SERIES
Discharge: HOME | End: 2025-06-27
Payer: MEDICARE

## 2025-06-27 VITALS
WEIGHT: 159.2 LBS | RESPIRATION RATE: 16 BRPM | OXYGEN SATURATION: 99 % | HEART RATE: 78 BPM | DIASTOLIC BLOOD PRESSURE: 62 MMHG | HEIGHT: 65 IN | SYSTOLIC BLOOD PRESSURE: 146 MMHG | BODY MASS INDEX: 26.52 KG/M2 | TEMPERATURE: 97.7 F

## 2025-06-27 DIAGNOSIS — K31.811 GASTROINTESTINAL HEMORRHAGE ASSOCIATED WITH ANGIODYSPLASIA OF STOMACH AND DUODENUM: Primary | ICD-10-CM

## 2025-06-27 DIAGNOSIS — D50.0 IRON DEFICIENCY ANEMIA DUE TO CHRONIC BLOOD LOSS: ICD-10-CM

## 2025-06-27 PROCEDURE — 2580000003 HC RX 258: Performed by: INTERNAL MEDICINE

## 2025-06-27 PROCEDURE — 6360000002 HC RX W HCPCS: Performed by: INTERNAL MEDICINE

## 2025-06-27 PROCEDURE — 96366 THER/PROPH/DIAG IV INF ADDON: CPT

## 2025-06-27 PROCEDURE — 96365 THER/PROPH/DIAG IV INF INIT: CPT

## 2025-06-27 RX ORDER — ONDANSETRON 2 MG/ML
8 INJECTION INTRAMUSCULAR; INTRAVENOUS
Status: DISCONTINUED | OUTPATIENT
Start: 2025-06-27 | End: 2025-06-28 | Stop reason: HOSPADM

## 2025-06-27 RX ORDER — DIPHENHYDRAMINE HYDROCHLORIDE 50 MG/ML
50 INJECTION, SOLUTION INTRAMUSCULAR; INTRAVENOUS
OUTPATIENT
Start: 2025-07-11

## 2025-06-27 RX ORDER — ACETAMINOPHEN 325 MG/1
650 TABLET ORAL
OUTPATIENT
Start: 2025-07-11

## 2025-06-27 RX ORDER — DIPHENHYDRAMINE HYDROCHLORIDE 50 MG/ML
50 INJECTION, SOLUTION INTRAMUSCULAR; INTRAVENOUS
Status: DISCONTINUED | OUTPATIENT
Start: 2025-06-27 | End: 2025-06-28 | Stop reason: HOSPADM

## 2025-06-27 RX ORDER — HYDROCORTISONE SODIUM SUCCINATE 100 MG/2ML
100 INJECTION INTRAMUSCULAR; INTRAVENOUS
OUTPATIENT
Start: 2025-07-11

## 2025-06-27 RX ORDER — ALBUTEROL SULFATE 90 UG/1
4 INHALANT RESPIRATORY (INHALATION) PRN
OUTPATIENT
Start: 2025-07-11

## 2025-06-27 RX ORDER — EPINEPHRINE 1 MG/ML
0.3 INJECTION, SOLUTION, CONCENTRATE INTRAVENOUS PRN
Status: DISCONTINUED | OUTPATIENT
Start: 2025-06-27 | End: 2025-06-28 | Stop reason: HOSPADM

## 2025-06-27 RX ORDER — SODIUM CHLORIDE 9 MG/ML
INJECTION, SOLUTION INTRAVENOUS CONTINUOUS
Status: DISCONTINUED | OUTPATIENT
Start: 2025-06-27 | End: 2025-06-28 | Stop reason: HOSPADM

## 2025-06-27 RX ORDER — SODIUM CHLORIDE 9 MG/ML
5-250 INJECTION, SOLUTION INTRAVENOUS PRN
OUTPATIENT
Start: 2025-07-11

## 2025-06-27 RX ORDER — HYDROCORTISONE SODIUM SUCCINATE 100 MG/2ML
100 INJECTION INTRAMUSCULAR; INTRAVENOUS
Status: DISCONTINUED | OUTPATIENT
Start: 2025-06-27 | End: 2025-06-28 | Stop reason: HOSPADM

## 2025-06-27 RX ORDER — ACETAMINOPHEN 325 MG/1
650 TABLET ORAL
Status: DISCONTINUED | OUTPATIENT
Start: 2025-06-27 | End: 2025-06-28 | Stop reason: HOSPADM

## 2025-06-27 RX ORDER — ALBUTEROL SULFATE 90 UG/1
4 INHALANT RESPIRATORY (INHALATION) PRN
Status: DISCONTINUED | OUTPATIENT
Start: 2025-06-27 | End: 2025-06-28 | Stop reason: HOSPADM

## 2025-06-27 RX ORDER — EPINEPHRINE 1 MG/ML
0.3 INJECTION, SOLUTION, CONCENTRATE INTRAVENOUS PRN
OUTPATIENT
Start: 2025-07-11

## 2025-06-27 RX ORDER — HEPARIN 100 UNIT/ML
500 SYRINGE INTRAVENOUS PRN
OUTPATIENT
Start: 2025-07-11

## 2025-06-27 RX ORDER — SODIUM CHLORIDE 0.9 % (FLUSH) 0.9 %
5-40 SYRINGE (ML) INJECTION PRN
OUTPATIENT
Start: 2025-07-11

## 2025-06-27 RX ORDER — SODIUM CHLORIDE 9 MG/ML
INJECTION, SOLUTION INTRAVENOUS CONTINUOUS
OUTPATIENT
Start: 2025-07-11

## 2025-06-27 RX ORDER — SODIUM CHLORIDE 9 MG/ML
5-250 INJECTION, SOLUTION INTRAVENOUS PRN
Status: DISCONTINUED | OUTPATIENT
Start: 2025-06-27 | End: 2025-06-28 | Stop reason: HOSPADM

## 2025-06-27 RX ORDER — ONDANSETRON 2 MG/ML
8 INJECTION INTRAMUSCULAR; INTRAVENOUS
OUTPATIENT
Start: 2025-07-11

## 2025-06-27 RX ADMIN — SODIUM CHLORIDE 25 ML/HR: 0.9 INJECTION, SOLUTION INTRAVENOUS at 13:34

## 2025-06-27 RX ADMIN — IRON SUCROSE 300 MG: 20 INJECTION, SOLUTION INTRAVENOUS at 13:35

## 2025-06-27 ASSESSMENT — PAIN SCALES - GENERAL: PAINLEVEL_OUTOF10: 0

## 2025-06-27 NOTE — PROGRESS NOTES
\[facility] [place] [facility]\"" Progress Note    Date: 2025    Name: Hyacinth Patterson    MRN: [id number]         : 1955    Ms. Patterson was assessed and education was provided.     Ms. Patterson's vitals were reviewed.  Vitals:    25 1307   BP: (!) 140/58   Pulse: 75   Resp: 16   Temp: 97.7 °F (36.5 °C)   SpO2: 99%       #24 established in the left ac  NS @ KVO  1335 - 300mg Venofer IVPB up to infuse over 90 minutes  NS flushing line, did not stay the whole 30 minutes due to her ride having to be at work by 1530.  VSS.  IV discontinued, 2x2 and band-aid to site.    Armband was removed and shredded.    Ms. Patterson tolerated infusion, and had no complaints at this time.      [facility] at 1520. She is to return on 25 at 1520 for her next appointment.    Solange Forrest RN  2025  2:34 PM

## 2025-06-28 ENCOUNTER — HOSPITAL ENCOUNTER (OUTPATIENT)
Facility: HOSPITAL | Age: 70
Setting detail: OBSERVATION
Discharge: HOME | End: 2025-06-30
Attending: FAMILY MEDICINE | Admitting: INTERNAL MEDICINE
Payer: MEDICARE

## 2025-06-28 ENCOUNTER — APPOINTMENT (OUTPATIENT)
Facility: HOSPITAL | Age: 70
End: 2025-06-28
Payer: MEDICARE

## 2025-06-28 DIAGNOSIS — I50.23 ACUTE ON CHRONIC SYSTOLIC CONGESTIVE HEART FAILURE (HCC): ICD-10-CM

## 2025-06-28 DIAGNOSIS — R07.9 CHEST PAIN, UNSPECIFIED TYPE: Primary | ICD-10-CM

## 2025-06-28 DIAGNOSIS — E87.6 HYPOKALEMIA: ICD-10-CM

## 2025-06-28 PROBLEM — I50.43 CHF (CONGESTIVE HEART FAILURE), NYHA CLASS III, ACUTE ON CHRONIC, COMBINED (HCC): Status: ACTIVE | Noted: 2025-06-28

## 2025-06-28 PROBLEM — I50.43 CHF (CONGESTIVE HEART FAILURE), NYHA CLASS I, ACUTE ON CHRONIC, COMBINED (HCC): Status: ACTIVE | Noted: 2025-06-28

## 2025-06-28 LAB
ALBUMIN SERPL-MCNC: 3.2 G/DL (ref 3.5–5)
ALBUMIN/GLOB SERPL: 1 (ref 1.1–2.2)
ALP SERPL-CCNC: 90 U/L (ref 45–117)
ALT SERPL-CCNC: 23 U/L (ref 12–78)
ANION GAP SERPL CALC-SCNC: 8 MMOL/L (ref 2–12)
ANION GAP SERPL CALC-SCNC: 8 MMOL/L (ref 2–12)
AST SERPL-CCNC: 15 U/L (ref 15–37)
BASOPHILS # BLD: 0.05 K/UL (ref 0–0.1)
BASOPHILS NFR BLD: 0.6 % (ref 0–1)
BILIRUB SERPL-MCNC: 0.4 MG/DL (ref 0.2–1)
BUN SERPL-MCNC: 10 MG/DL (ref 6–20)
BUN SERPL-MCNC: 12 MG/DL (ref 6–20)
BUN/CREAT SERPL: 11 (ref 12–20)
BUN/CREAT SERPL: 8 (ref 12–20)
CALCIUM SERPL-MCNC: 9.2 MG/DL (ref 8.5–10.1)
CALCIUM SERPL-MCNC: 9.2 MG/DL (ref 8.5–10.1)
CHLORIDE SERPL-SCNC: 106 MMOL/L (ref 97–108)
CHLORIDE SERPL-SCNC: 108 MMOL/L (ref 97–108)
CO2 SERPL-SCNC: 26 MMOL/L (ref 21–32)
CO2 SERPL-SCNC: 28 MMOL/L (ref 21–32)
CREAT SERPL-MCNC: 1.1 MG/DL (ref 0.55–1.02)
CREAT SERPL-MCNC: 1.18 MG/DL (ref 0.55–1.02)
DIFFERENTIAL METHOD BLD: ABNORMAL
EOSINOPHIL # BLD: 0.19 K/UL (ref 0–0.4)
EOSINOPHIL NFR BLD: 2.2 % (ref 0–0.7)
ERYTHROCYTE [DISTWIDTH] IN BLOOD BY AUTOMATED COUNT: 16.7 % (ref 11.5–14.5)
GLOBULIN SER CALC-MCNC: 3.2 G/DL (ref 2–4)
GLUCOSE SERPL-MCNC: 137 MG/DL (ref 65–100)
GLUCOSE SERPL-MCNC: 154 MG/DL (ref 65–100)
HCT VFR BLD AUTO: 28.1 % (ref 35–47)
HGB BLD-MCNC: 8.4 G/DL (ref 11.5–16)
IMM GRANULOCYTES # BLD AUTO: 0.02 K/UL (ref 0–0.04)
IMM GRANULOCYTES NFR BLD AUTO: 0.2 % (ref 0–0.5)
LYMPHOCYTES # BLD: 0.86 K/UL (ref 0.8–3.5)
LYMPHOCYTES NFR BLD: 9.8 % (ref 12–49)
MCH RBC QN AUTO: 26.7 PG (ref 26–34)
MCHC RBC AUTO-ENTMCNC: 29.9 G/DL (ref 30–36.5)
MCV RBC AUTO: 89.2 FL (ref 80–99)
MONOCYTES # BLD: 0.49 K/UL (ref 0–1)
MONOCYTES NFR BLD: 5.6 % (ref 5–13)
NEUTS SEG # BLD: 7.13 K/UL (ref 1.8–8)
NEUTS SEG NFR BLD: 81.6 % (ref 32–75)
NRBC # BLD: 0 K/UL (ref 0–0.01)
NRBC BLD-RTO: 0 PER 100 WBC
NT PRO BNP: ABNORMAL PG/ML (ref 0–125)
PLATELET # BLD AUTO: 243 K/UL (ref 150–400)
PMV BLD AUTO: 12.2 FL (ref 8.9–12.9)
POTASSIUM SERPL-SCNC: 4 MMOL/L (ref 3.5–5.1)
POTASSIUM SERPL-SCNC: 4.2 MMOL/L (ref 3.5–5.1)
PROT SERPL-MCNC: 6.4 G/DL (ref 6.4–8.2)
RBC # BLD AUTO: 3.15 M/UL (ref 3.8–5.2)
SODIUM SERPL-SCNC: 142 MMOL/L (ref 136–145)
SODIUM SERPL-SCNC: 142 MMOL/L (ref 136–145)
TROPONIN I SERPL HS-MCNC: 55 NG/L (ref 0–51)
TROPONIN I SERPL HS-MCNC: 55 NG/L (ref 0–51)
WBC # BLD AUTO: 8.7 K/UL (ref 3.6–11)

## 2025-06-28 PROCEDURE — 99285 EMERGENCY DEPT VISIT HI MDM: CPT

## 2025-06-28 PROCEDURE — 96374 THER/PROPH/DIAG INJ IV PUSH: CPT

## 2025-06-28 PROCEDURE — 84484 ASSAY OF TROPONIN QUANT: CPT

## 2025-06-28 PROCEDURE — 2500000003 HC RX 250 WO HCPCS: Performed by: INTERNAL MEDICINE

## 2025-06-28 PROCEDURE — 6360000002 HC RX W HCPCS: Performed by: INTERNAL MEDICINE

## 2025-06-28 PROCEDURE — 94640 AIRWAY INHALATION TREATMENT: CPT

## 2025-06-28 PROCEDURE — 6360000002 HC RX W HCPCS: Performed by: FAMILY MEDICINE

## 2025-06-28 PROCEDURE — 6370000000 HC RX 637 (ALT 250 FOR IP): Performed by: INTERNAL MEDICINE

## 2025-06-28 PROCEDURE — G0378 HOSPITAL OBSERVATION PER HR: HCPCS

## 2025-06-28 PROCEDURE — 36415 COLL VENOUS BLD VENIPUNCTURE: CPT

## 2025-06-28 PROCEDURE — 96375 TX/PRO/DX INJ NEW DRUG ADDON: CPT

## 2025-06-28 PROCEDURE — 2700000000 HC OXYGEN THERAPY PER DAY

## 2025-06-28 PROCEDURE — 71045 X-RAY EXAM CHEST 1 VIEW: CPT

## 2025-06-28 PROCEDURE — 96376 TX/PRO/DX INJ SAME DRUG ADON: CPT

## 2025-06-28 PROCEDURE — 83880 ASSAY OF NATRIURETIC PEPTIDE: CPT

## 2025-06-28 PROCEDURE — 85025 COMPLETE CBC W/AUTO DIFF WBC: CPT

## 2025-06-28 PROCEDURE — 93005 ELECTROCARDIOGRAM TRACING: CPT | Performed by: FAMILY MEDICINE

## 2025-06-28 PROCEDURE — 94761 N-INVAS EAR/PLS OXIMETRY MLT: CPT

## 2025-06-28 PROCEDURE — 80053 COMPREHEN METABOLIC PANEL: CPT

## 2025-06-28 RX ORDER — POLYETHYLENE GLYCOL 3350 17 G/17G
17 POWDER, FOR SOLUTION ORAL DAILY PRN
Status: DISCONTINUED | OUTPATIENT
Start: 2025-06-28 | End: 2025-06-30 | Stop reason: HOSPADM

## 2025-06-28 RX ORDER — SODIUM CHLORIDE 0.9 % (FLUSH) 0.9 %
5-40 SYRINGE (ML) INJECTION EVERY 12 HOURS SCHEDULED
Status: DISCONTINUED | OUTPATIENT
Start: 2025-06-28 | End: 2025-06-30 | Stop reason: HOSPADM

## 2025-06-28 RX ORDER — FERROUS SULFATE 324(65)MG
325 TABLET, DELAYED RELEASE (ENTERIC COATED) ORAL
Status: DISCONTINUED | OUTPATIENT
Start: 2025-06-28 | End: 2025-06-28

## 2025-06-28 RX ORDER — FENTANYL CITRATE 50 UG/ML
100 INJECTION, SOLUTION INTRAMUSCULAR; INTRAVENOUS ONCE
Refills: 0 | Status: COMPLETED | OUTPATIENT
Start: 2025-06-28 | End: 2025-06-28

## 2025-06-28 RX ORDER — FERROUS SULFATE 324(65)MG
324 TABLET, DELAYED RELEASE (ENTERIC COATED) ORAL EVERY OTHER DAY
Status: DISCONTINUED | OUTPATIENT
Start: 2025-06-28 | End: 2025-06-30 | Stop reason: HOSPADM

## 2025-06-28 RX ORDER — ATORVASTATIN CALCIUM 40 MG/1
80 TABLET, FILM COATED ORAL DAILY
Status: DISCONTINUED | OUTPATIENT
Start: 2025-06-28 | End: 2025-06-30 | Stop reason: HOSPADM

## 2025-06-28 RX ORDER — FUROSEMIDE 10 MG/ML
40 INJECTION INTRAMUSCULAR; INTRAVENOUS 2 TIMES DAILY
Status: DISCONTINUED | OUTPATIENT
Start: 2025-06-28 | End: 2025-06-30 | Stop reason: HOSPADM

## 2025-06-28 RX ORDER — SODIUM CHLORIDE 9 MG/ML
INJECTION, SOLUTION INTRAVENOUS PRN
Status: DISCONTINUED | OUTPATIENT
Start: 2025-06-28 | End: 2025-06-30 | Stop reason: HOSPADM

## 2025-06-28 RX ORDER — ONDANSETRON 4 MG/1
4 TABLET, ORALLY DISINTEGRATING ORAL EVERY 8 HOURS PRN
Status: DISCONTINUED | OUTPATIENT
Start: 2025-06-28 | End: 2025-06-30 | Stop reason: HOSPADM

## 2025-06-28 RX ORDER — HEPARIN SODIUM 5000 [USP'U]/ML
5000 INJECTION, SOLUTION INTRAVENOUS; SUBCUTANEOUS 3 TIMES DAILY
Status: DISCONTINUED | OUTPATIENT
Start: 2025-06-29 | End: 2025-06-30

## 2025-06-28 RX ORDER — POTASSIUM CHLORIDE 7.45 MG/ML
10 INJECTION INTRAVENOUS PRN
Status: DISCONTINUED | OUTPATIENT
Start: 2025-06-28 | End: 2025-06-30 | Stop reason: HOSPADM

## 2025-06-28 RX ORDER — ONDANSETRON 2 MG/ML
4 INJECTION INTRAMUSCULAR; INTRAVENOUS ONCE
Status: COMPLETED | OUTPATIENT
Start: 2025-06-28 | End: 2025-06-28

## 2025-06-28 RX ORDER — METOPROLOL SUCCINATE 25 MG/1
12.5 TABLET, EXTENDED RELEASE ORAL DAILY
Status: DISCONTINUED | OUTPATIENT
Start: 2025-06-28 | End: 2025-06-30 | Stop reason: HOSPADM

## 2025-06-28 RX ORDER — FENTANYL CITRATE 50 UG/ML
50 INJECTION, SOLUTION INTRAMUSCULAR; INTRAVENOUS ONCE
Refills: 0 | Status: COMPLETED | OUTPATIENT
Start: 2025-06-28 | End: 2025-06-28

## 2025-06-28 RX ORDER — ASPIRIN 81 MG/1
81 TABLET ORAL DAILY
Status: DISCONTINUED | OUTPATIENT
Start: 2025-06-28 | End: 2025-06-30 | Stop reason: HOSPADM

## 2025-06-28 RX ORDER — FUROSEMIDE 10 MG/ML
20 INJECTION INTRAMUSCULAR; INTRAVENOUS
Status: COMPLETED | OUTPATIENT
Start: 2025-06-28 | End: 2025-06-28

## 2025-06-28 RX ORDER — ONDANSETRON 2 MG/ML
4 INJECTION INTRAMUSCULAR; INTRAVENOUS EVERY 6 HOURS PRN
Status: DISCONTINUED | OUTPATIENT
Start: 2025-06-28 | End: 2025-06-30 | Stop reason: HOSPADM

## 2025-06-28 RX ORDER — ACETAMINOPHEN 325 MG/1
650 TABLET ORAL EVERY 6 HOURS PRN
Status: DISCONTINUED | OUTPATIENT
Start: 2025-06-28 | End: 2025-06-30 | Stop reason: HOSPADM

## 2025-06-28 RX ORDER — SODIUM CHLORIDE 0.9 % (FLUSH) 0.9 %
5-40 SYRINGE (ML) INJECTION PRN
Status: DISCONTINUED | OUTPATIENT
Start: 2025-06-28 | End: 2025-06-30 | Stop reason: HOSPADM

## 2025-06-28 RX ORDER — PANTOPRAZOLE SODIUM 40 MG/1
40 TABLET, DELAYED RELEASE ORAL
Status: DISCONTINUED | OUTPATIENT
Start: 2025-06-28 | End: 2025-06-30 | Stop reason: HOSPADM

## 2025-06-28 RX ORDER — ACETAMINOPHEN 650 MG/1
650 SUPPOSITORY RECTAL EVERY 6 HOURS PRN
Status: DISCONTINUED | OUTPATIENT
Start: 2025-06-28 | End: 2025-06-30 | Stop reason: HOSPADM

## 2025-06-28 RX ORDER — MONTELUKAST SODIUM 10 MG/1
10 TABLET ORAL NIGHTLY
Status: DISCONTINUED | OUTPATIENT
Start: 2025-06-28 | End: 2025-06-30 | Stop reason: HOSPADM

## 2025-06-28 RX ORDER — MAGNESIUM SULFATE IN WATER 40 MG/ML
2000 INJECTION, SOLUTION INTRAVENOUS PRN
Status: DISCONTINUED | OUTPATIENT
Start: 2025-06-28 | End: 2025-06-30 | Stop reason: HOSPADM

## 2025-06-28 RX ORDER — POTASSIUM CHLORIDE 750 MG/1
40 TABLET, EXTENDED RELEASE ORAL PRN
Status: DISCONTINUED | OUTPATIENT
Start: 2025-06-28 | End: 2025-06-30 | Stop reason: HOSPADM

## 2025-06-28 RX ADMIN — ARFORMOTEROL TARTRATE: 15 SOLUTION RESPIRATORY (INHALATION) at 07:51

## 2025-06-28 RX ADMIN — METOPROLOL SUCCINATE 12.5 MG: 25 TABLET, EXTENDED RELEASE ORAL at 08:43

## 2025-06-28 RX ADMIN — FUROSEMIDE 40 MG: 10 INJECTION, SOLUTION INTRAMUSCULAR; INTRAVENOUS at 08:44

## 2025-06-28 RX ADMIN — ASPIRIN 81 MG: 81 TABLET, DELAYED RELEASE ORAL at 08:44

## 2025-06-28 RX ADMIN — ACETAMINOPHEN 650 MG: 325 TABLET ORAL at 20:44

## 2025-06-28 RX ADMIN — FUROSEMIDE 40 MG: 10 INJECTION, SOLUTION INTRAMUSCULAR; INTRAVENOUS at 17:19

## 2025-06-28 RX ADMIN — ONDANSETRON 4 MG: 2 INJECTION, SOLUTION INTRAMUSCULAR; INTRAVENOUS at 03:50

## 2025-06-28 RX ADMIN — MONTELUKAST 10 MG: 10 TABLET, FILM COATED ORAL at 20:44

## 2025-06-28 RX ADMIN — FENTANYL CITRATE 100 MCG: 0.05 INJECTION, SOLUTION INTRAMUSCULAR; INTRAVENOUS at 04:47

## 2025-06-28 RX ADMIN — FENTANYL CITRATE 50 MCG: 0.05 INJECTION, SOLUTION INTRAMUSCULAR; INTRAVENOUS at 03:50

## 2025-06-28 RX ADMIN — PANTOPRAZOLE SODIUM 40 MG: 40 TABLET, DELAYED RELEASE ORAL at 17:19

## 2025-06-28 RX ADMIN — SODIUM CHLORIDE, PRESERVATIVE FREE 10 ML: 5 INJECTION INTRAVENOUS at 08:44

## 2025-06-28 RX ADMIN — PANTOPRAZOLE SODIUM 40 MG: 40 TABLET, DELAYED RELEASE ORAL at 08:44

## 2025-06-28 RX ADMIN — ARFORMOTEROL TARTRATE: 15 SOLUTION RESPIRATORY (INHALATION) at 19:33

## 2025-06-28 RX ADMIN — ATORVASTATIN CALCIUM 80 MG: 40 TABLET, FILM COATED ORAL at 08:43

## 2025-06-28 RX ADMIN — FUROSEMIDE 20 MG: 10 INJECTION, SOLUTION INTRAMUSCULAR; INTRAVENOUS at 05:02

## 2025-06-28 ASSESSMENT — PAIN SCALES - GENERAL
PAINLEVEL_OUTOF10: 3
PAINLEVEL_OUTOF10: 0
PAINLEVEL_OUTOF10: 6
PAINLEVEL_OUTOF10: 7
PAINLEVEL_OUTOF10: 8
PAINLEVEL_OUTOF10: 8

## 2025-06-28 ASSESSMENT — PAIN DESCRIPTION - DESCRIPTORS
DESCRIPTORS: ACHING
DESCRIPTORS: CRAMPING

## 2025-06-28 ASSESSMENT — PAIN DESCRIPTION - LOCATION
LOCATION: ABDOMEN
LOCATION: ABDOMEN

## 2025-06-28 ASSESSMENT — HEART SCORE
ECG: NORMAL
ECG: NORMAL

## 2025-06-28 ASSESSMENT — PAIN - FUNCTIONAL ASSESSMENT: PAIN_FUNCTIONAL_ASSESSMENT: 0-10

## 2025-06-28 ASSESSMENT — PAIN DESCRIPTION - ORIENTATION: ORIENTATION: MID

## 2025-06-28 NOTE — ED NOTES
Admission SBAR Note  Situation/Background:     Patient is being transferred to tele ([facility]), Room# 124    Patient's Chief Complaint was chest pain and is admitted for chest pain.    CODE STATUS: Full  CSSRS: 0 - No Risk    ISOLATION/PRECAUTIONS: No  ISOLATION TYPE: na    Is this a behavioral health patient? No  Has wanding been completed No  Are belongings secure? No    Called outstanding consults: No    STAT labs collected: No    Repeat Lactic Acid DUE? No  TIME DUE: na    All STAT orders are complete: No    The following personal items will be sent with the patient during transfer to the floor:     All valuables: none       ASSESSMENT:    NEURO:   NIH SCORE: 0,1-4,5-15,15-20,21-42: 0   DOMINIQUE SWALLOW SCREEN COMPLETE: No  ORIENTATION LEVEL: ORIENTATION LEVEL: Person, Place, Time, and Situation  Cognition:  appropriate decision making  Speech: shows no evidence of impairment    Is patient impulsive? No  Is patient oriented? Yes  Do they follow commands? Yes  Is the patient ambulatory? Yes    FALL RISK? Yes  Interventions: Implemented/recommended use of non-skid footwear    RESPIRATORY:   Is patient on oxygen? Yes  Oxygen therapy: Nasal cannula  O2 rate: 3      CARDIAC:   Is cardiac monitoring ordered? Yes    Last Rhythm: Rhythm including paccardio: Normal Sinus Rhythm   Patient to transfer with tele box on? Yes  Infusions: Meds; iv fluids: none  LINE ACCESS: 20G Peripheral IV , Forearm ,        /GI:   Continent Bowel/Bladder? Yes  Urinary Output: brp    Chronic or Acute: Acute on Chronic  If Chronic, is it 3 days old, was it changed prior to specimen collection? No  Was UA with reflex sent to lab? No  If no, collect and send prior to transport to inpatient area.    INTEGUMENTARY:  IS THE PATIENT UNDRESSED? No  ARE THERE WOUNDS PRESENT? No  ARE THE WOUNDS DOCUMENTED? No    RESTRAINTS IN USE: No    IS THE DOCUMENTATION COMPLETE? No  Is there a

## 2025-06-28 NOTE — PLAN OF CARE
Problem: Chronic Conditions and Co-morbidities  Goal: Patient's chronic conditions and co-morbidity symptoms are monitored and maintained or improved  Outcome: Progressing  Flowsheets (Taken 6/28/2025 0700 by Blake Garcia, RN)  Care Plan - Patient's Chronic Conditions and Co-Morbidity Symptoms are Monitored and Maintained or Improved: Monitor and assess patient's chronic conditions and comorbid symptoms for stability, deterioration, or improvement     Problem: Pain  Goal: Verbalizes/displays adequate comfort level or baseline comfort level  Outcome: Progressing     Problem: Respiratory - Adult  Goal: Achieves optimal ventilation and oxygenation  6/28/2025 1217 by Deisy Harp, RN  Outcome: Progressing  6/28/2025 0755 by Karly Plaza, RT  Outcome: Progressing  Flowsheets (Taken 6/28/2025 0700 by Blake Garcia, RN)  Achieves optimal ventilation and oxygenation:   Assess for changes in respiratory status   Position to facilitate oxygenation and minimize respiratory effort

## 2025-06-28 NOTE — H&P
Hospitalist Admission Note    NAME:   Hyacinth Patterson   : 1955   MRN: [id number]     Date/Time: 2025 3:02 PM    Patient PCP: Jeffery Alexis MD    ______________________________________________________________________  Given the patient's current clinical presentation, I have a high level of concern for decompensation if discharged from the emergency department.  Complex decision making was performed, which includes reviewing the patient's available past medical records, laboratory results, and x-ray films.       My assessment of this patient's clinical condition and my plan of care is as follows.    Assessment / Plan:    Chest-pain  Coronary artery disease  -chest pain has resolved, last cardiac cath was 2025 after an NSTEMI and patient was found to have a high grade stenosis of the diagonal branch culprit for ACS  -PCI was performed however patient is currently only on aspirin due to an acute GI bleed  -troponin x 2 obtained with no new EKG changes  -suspect symptoms were from CHF exacerbation  -will continue to monitor with telemetry, place a cardiology consult for additional recommendations    3.   CHF exacerbation  -proBNP 16,816.  Last 2D echocardiogram 2025 noted severe global hypokinesis present with EF 20-25% and mild to moderate mitral regurgitation  -will continue IV Lasix BID with daily weights, strict I&O's  -Carvedilol 6.25  -Patient is not on any ACE/ARB/ARNI given risk of worsening renal function in the setting of CKD  -Cardiology consult for additional recommendations    4.   Chronic kidney disease stage 3  5.   Chronic pancreatitis  6.   PAD/PVD  -aware    7.   Schizophrenia  8.   Essential hypertension  9.   Asthma  -continue outpatient regimen    10.  DM Type 2  -SSI    11.   Iron deficiency anemia  12.   Chronic mesenteric ischemia  -recent GI bleeding  -continue to monitor with CBC, PPI therapy, avoid NSAIDS,       Medical Decision Making:   I personally reviewed labs:

## 2025-06-28 NOTE — ED PROVIDER NOTES
NSR, with resolution of T wave inversions from previous tracing of 5/23, when she was transferred to [facility] for a GI bleed. Her first troponin was 55, compared to 5/13, when she had a transfer to [facility] for CHF. Her pain was fairly well controlled with fentanyl 50 mcg, and then 100  mcg. Her CXR showed mild pulmonary edema, and her BNP was 16,816 compared to 10,238 on 5/17. She was given 20 mg furosemide IV in the ED. She required 3 liters of oxygen to keep her SpO2 above 90. Her troponins have been 55 and 55, an hour apart, as compared to 162 and 164 on 5/13/25.  Her case was discussed with Dr Main, hospitalist, who agreed that she was appropriate for admission to observation.          ED Course as of 06/28/25 0520   Sat Jun 28, 2025   0351 EKG: Sinus tachy, , C/w 5/23/25: Improved T wave inversions in I, II, V4,5,6. (My reading.) [VG]      ED Course User Index  [VG] Zulema De Leon MD       Disposition Considerations (Tests not done, Shared Decision Making, Pt Expectation of Test or Tx.): none     FINAL IMPRESSION     1. Chest pain, unspecified type    2. Acute on chronic systolic congestive heart failure (HCC)          DISPOSITION/PLAN   DISPOSITION                 Admit Note: Pt is being admitted by Hospitalist . The results of their tests and reason(s) for their admission have been discussed with pt and/or available family. They convey agreement and understanding for the need to be admitted and for the admission diagnosis.     PATIENT REFERRED TO:  No follow-up provider specified.       DISCHARGE MEDICATIONS:     Medication List        ASK your doctor about these medications      aspirin 81 MG EC tablet     atorvastatin 80 MG tablet  Commonly known as: LIPITOR  Take 1 tablet by mouth daily     ferrous sulfate 325 (65 Fe) MG tablet  Commonly known as: IRON 325  Take 1 tablet by mouth every 48 hours     fluticasone-salmeterol 250-50 MCG/ACT Aepb diskus inhaler  Commonly known as: ADVAIR  Inhale 1 puff into the

## 2025-06-28 NOTE — PROGRESS NOTES
P&T-Approved DVT Prophylaxis Dosing      Recent Labs     06/28/25  0337   HGB 8.4*          Estimated Creatinine Clearance: 46 mL/min (A) (based on SCr of 1.18 mg/dL (H)).     Wt Readings from Last 1 Encounters:   06/28/25 70.6 kg (155 lb 10.3 oz)         Per P&T Committee-approved protocol heparin 5000 units bid has been adjusted to heparin 5000 units tid based on weight and renal function as shown in the table below.            Giacomo Hayward, [facility]

## 2025-06-29 LAB
ALBUMIN SERPL-MCNC: 2.8 G/DL (ref 3.5–5)
ALBUMIN/GLOB SERPL: 0.8 (ref 1.1–2.2)
ALP SERPL-CCNC: 82 U/L (ref 45–117)
ALT SERPL-CCNC: 20 U/L (ref 12–78)
ANION GAP SERPL CALC-SCNC: 9 MMOL/L (ref 2–12)
AST SERPL-CCNC: 15 U/L (ref 15–37)
BASOPHILS # BLD: 0.04 K/UL (ref 0–0.1)
BASOPHILS NFR BLD: 0.8 % (ref 0–1)
BILIRUB DIRECT SERPL-MCNC: 0.1 MG/DL (ref 0–0.2)
BILIRUB SERPL-MCNC: 0.4 MG/DL (ref 0.2–1)
BUN SERPL-MCNC: 12 MG/DL (ref 6–20)
BUN/CREAT SERPL: 11 (ref 12–20)
CALCIUM SERPL-MCNC: 9 MG/DL (ref 8.5–10.1)
CHLORIDE SERPL-SCNC: 104 MMOL/L (ref 97–108)
CO2 SERPL-SCNC: 32 MMOL/L (ref 21–32)
CREAT SERPL-MCNC: 1.13 MG/DL (ref 0.55–1.02)
DIFFERENTIAL METHOD BLD: ABNORMAL
EKG ATRIAL RATE: 101 BPM
EKG DIAGNOSIS: NORMAL
EKG P AXIS: 54 DEGREES
EKG P-R INTERVAL: 142 MS
EKG Q-T INTERVAL: 398 MS
EKG QRS DURATION: 88 MS
EKG QTC CALCULATION (BAZETT): 516 MS
EKG R AXIS: 1 DEGREES
EKG T AXIS: 226 DEGREES
EKG VENTRICULAR RATE: 101 BPM
EOSINOPHIL # BLD: 0.19 K/UL (ref 0–0.4)
EOSINOPHIL NFR BLD: 3.8 % (ref 0–0.7)
ERYTHROCYTE [DISTWIDTH] IN BLOOD BY AUTOMATED COUNT: 16.5 % (ref 11.5–14.5)
GLOBULIN SER CALC-MCNC: 3.3 G/DL (ref 2–4)
GLUCOSE SERPL-MCNC: 133 MG/DL (ref 65–100)
HCT VFR BLD AUTO: 27 % (ref 35–47)
HGB BLD-MCNC: 8 G/DL (ref 11.5–16)
IMM GRANULOCYTES # BLD AUTO: 0.01 K/UL (ref 0–0.04)
IMM GRANULOCYTES NFR BLD AUTO: 0.2 % (ref 0–0.5)
LYMPHOCYTES # BLD: 0.83 K/UL (ref 0.8–3.5)
LYMPHOCYTES NFR BLD: 16.4 % (ref 12–49)
MCH RBC QN AUTO: 26.4 PG (ref 26–34)
MCHC RBC AUTO-ENTMCNC: 29.6 G/DL (ref 30–36.5)
MCV RBC AUTO: 89.1 FL (ref 80–99)
MONOCYTES # BLD: 0.41 K/UL (ref 0–1)
MONOCYTES NFR BLD: 8.1 % (ref 5–13)
NEUTS SEG # BLD: 3.58 K/UL (ref 1.8–8)
NEUTS SEG NFR BLD: 70.7 % (ref 32–75)
NRBC # BLD: 0 K/UL (ref 0–0.01)
NRBC BLD-RTO: 0 PER 100 WBC
PLATELET # BLD AUTO: 236 K/UL (ref 150–400)
PMV BLD AUTO: 11.8 FL (ref 8.9–12.9)
POTASSIUM SERPL-SCNC: 3.6 MMOL/L (ref 3.5–5.1)
PROT SERPL-MCNC: 6.1 G/DL (ref 6.4–8.2)
RBC # BLD AUTO: 3.03 M/UL (ref 3.8–5.2)
SODIUM SERPL-SCNC: 145 MMOL/L (ref 136–145)
TROPONIN I SERPL HS-MCNC: 49 NG/L (ref 0–51)
WBC # BLD AUTO: 5.1 K/UL (ref 3.6–11)

## 2025-06-29 PROCEDURE — 6360000002 HC RX W HCPCS: Performed by: INTERNAL MEDICINE

## 2025-06-29 PROCEDURE — 80076 HEPATIC FUNCTION PANEL: CPT

## 2025-06-29 PROCEDURE — 2500000003 HC RX 250 WO HCPCS: Performed by: INTERNAL MEDICINE

## 2025-06-29 PROCEDURE — 94640 AIRWAY INHALATION TREATMENT: CPT

## 2025-06-29 PROCEDURE — 84484 ASSAY OF TROPONIN QUANT: CPT

## 2025-06-29 PROCEDURE — 96372 THER/PROPH/DIAG INJ SC/IM: CPT

## 2025-06-29 PROCEDURE — 96376 TX/PRO/DX INJ SAME DRUG ADON: CPT

## 2025-06-29 PROCEDURE — G0378 HOSPITAL OBSERVATION PER HR: HCPCS

## 2025-06-29 PROCEDURE — 80048 BASIC METABOLIC PNL TOTAL CA: CPT

## 2025-06-29 PROCEDURE — 85025 COMPLETE CBC W/AUTO DIFF WBC: CPT

## 2025-06-29 PROCEDURE — 36415 COLL VENOUS BLD VENIPUNCTURE: CPT

## 2025-06-29 PROCEDURE — 6370000000 HC RX 637 (ALT 250 FOR IP): Performed by: INTERNAL MEDICINE

## 2025-06-29 RX ADMIN — SODIUM CHLORIDE, PRESERVATIVE FREE 10 ML: 5 INJECTION INTRAVENOUS at 21:56

## 2025-06-29 RX ADMIN — HEPARIN SODIUM 5000 UNITS: 5000 INJECTION INTRAVENOUS; SUBCUTANEOUS at 16:15

## 2025-06-29 RX ADMIN — HEPARIN SODIUM 5000 UNITS: 5000 INJECTION INTRAVENOUS; SUBCUTANEOUS at 09:05

## 2025-06-29 RX ADMIN — ATORVASTATIN CALCIUM 80 MG: 40 TABLET, FILM COATED ORAL at 09:04

## 2025-06-29 RX ADMIN — FUROSEMIDE 40 MG: 10 INJECTION, SOLUTION INTRAMUSCULAR; INTRAVENOUS at 09:04

## 2025-06-29 RX ADMIN — ARFORMOTEROL TARTRATE: 15 SOLUTION RESPIRATORY (INHALATION) at 07:44

## 2025-06-29 RX ADMIN — MONTELUKAST 10 MG: 10 TABLET, FILM COATED ORAL at 21:55

## 2025-06-29 RX ADMIN — PANTOPRAZOLE SODIUM 40 MG: 40 TABLET, DELAYED RELEASE ORAL at 16:15

## 2025-06-29 RX ADMIN — ARFORMOTEROL TARTRATE: 15 SOLUTION RESPIRATORY (INHALATION) at 19:33

## 2025-06-29 RX ADMIN — PANTOPRAZOLE SODIUM 40 MG: 40 TABLET, DELAYED RELEASE ORAL at 06:10

## 2025-06-29 RX ADMIN — HEPARIN SODIUM 5000 UNITS: 5000 INJECTION INTRAVENOUS; SUBCUTANEOUS at 21:55

## 2025-06-29 RX ADMIN — ASPIRIN 81 MG: 81 TABLET, DELAYED RELEASE ORAL at 09:03

## 2025-06-29 RX ADMIN — FUROSEMIDE 40 MG: 10 INJECTION, SOLUTION INTRAMUSCULAR; INTRAVENOUS at 18:16

## 2025-06-29 RX ADMIN — SODIUM CHLORIDE, PRESERVATIVE FREE 10 ML: 5 INJECTION INTRAVENOUS at 09:03

## 2025-06-29 RX ADMIN — METOPROLOL SUCCINATE 12.5 MG: 25 TABLET, EXTENDED RELEASE ORAL at 09:04

## 2025-06-29 NOTE — PLAN OF CARE
Problem: Chronic Conditions and Co-morbidities  Goal: Patient's chronic conditions and co-morbidity symptoms are monitored and maintained or improved  Outcome: Progressing     Problem: Pain  Goal: Verbalizes/displays adequate comfort level or baseline comfort level  Outcome: Progressing     Problem: Respiratory - Adult  Goal: Achieves optimal ventilation and oxygenation  6/29/2025 1058 by Deisy Harp, RN  Outcome: Progressing  6/29/2025 0747 by Karly Plaza, RT  Outcome: Progressing     Problem: Safety - Adult  Goal: Free from fall injury  Outcome: Progressing

## 2025-06-29 NOTE — PLAN OF CARE
Problem: Respiratory - Adult  Goal: Achieves optimal ventilation and oxygenation  6/29/2025 1949 by Nabila Ríos, RT  Outcome: Progressing  6/29/2025 1058 by Deisy Harp, RN  Outcome: Progressing  6/29/2025 0747 by Karly Plaza, RT  Outcome: Progressing

## 2025-06-29 NOTE — PLAN OF CARE
Problem: Respiratory - Adult  Goal: Achieves optimal ventilation and oxygenation  6/29/2025 0747 by Karly Plaza, RT  Outcome: Progressing  6/28/2025 2010 by Nabila Ríos, RT  Outcome: Progressing

## 2025-06-29 NOTE — PLAN OF CARE
Problem: Respiratory - Adult  Goal: Achieves optimal ventilation and oxygenation  6/28/2025 2010 by Nabila Roís, RT  Outcome: Progressing  6/28/2025 1217 by Deisy Harp, RN  Outcome: Progressing  6/28/2025 0755 by Karly Plaza, RT  Outcome: Progressing  Flowsheets (Taken 6/28/2025 0700 by Blake Garcia, RN)  Achieves optimal ventilation and oxygenation:   Assess for changes in respiratory status   Position to facilitate oxygenation and minimize respiratory effort

## 2025-06-29 NOTE — PROGRESS NOTES
Medicare Outpatient Observation Notice Letter explained to patient with an opportunity for questions provided. Patient verbalized understanding. Signed document placed on bedside chart to be scanned under media tab in EMR, copy given to patient.

## 2025-06-29 NOTE — PROGRESS NOTES
Hospitalist Progress Note    NAME:   Hyacinth Patterson   : 1955   MRN: [id number]     Date/Time: 2025 10:46 AM  Patient PCP: Jeffery Alexis MD    Estimated discharge date: 2025  Barriers: none      Assessment / Plan:    Chest-pain  Coronary artery disease  -chest pain has resolved, last cardiac cath was 2025 after an NSTEMI and patient was found to have a high grade stenosis of the diagonal branch culprit for ACS  -PCI was performed however patient is currently only on aspirin due to an acute GI bleed  -troponin x 2 obtained with no new EKG changes  -suspect symptoms were from CHF exacerbation  -will continue to monitor with telemetry, place a cardiology consult for additional recommendations     3.   CHF exacerbation  -proBNP 16,816.  Last 2D echocardiogram 2025 noted severe global hypokinesis present with EF 20-25% and mild to moderate mitral regurgitation  -will continue IV Lasix BID with daily weights, strict I&O's  -Carvedilol 6.25  -Patient is not on any ACE/ARB/ARNI given risk of worsening renal function in the setting of CKD  -Cardiology consult for additional recommendations     4.   Chronic kidney disease stage 3  5.   Chronic pancreatitis  6.   PAD/PVD  -aware     7.   Schizophrenia  8.   Essential hypertension  9.   Asthma  -continue outpatient regimen     10.  DM Type 2  -SSI     11.   Iron deficiency anemia  12.   Chronic mesenteric ischemia  -recent GI bleeding  -continue to monitor with CBC, PPI therapy, avoid NSAIDS,        Code Status: FULL  DVT Prophylaxis: Heparin      Subjective:     Chief Complaint / Reason for Physician Visit  This am patient seen sitting up in chair alert, oriented stating she feels \"great\".  No new nursing concerns overnight.        Objective:     VITALS:   Last 24hrs VS reviewed since prior progress note. Most recent are:  Patient Vitals for the past 24 hrs:   BP Temp Temp src Pulse Resp SpO2   25 0709 (!) 147/70 98.1 °F (36.7 °C) Oral

## 2025-06-30 VITALS
TEMPERATURE: 98.4 F | HEIGHT: 65 IN | WEIGHT: 144.18 LBS | BODY MASS INDEX: 24.02 KG/M2 | SYSTOLIC BLOOD PRESSURE: 150 MMHG | OXYGEN SATURATION: 98 % | HEART RATE: 65 BPM | RESPIRATION RATE: 16 BRPM | DIASTOLIC BLOOD PRESSURE: 69 MMHG

## 2025-06-30 LAB
ANION GAP SERPL CALC-SCNC: 7 MMOL/L (ref 2–12)
BASOPHILS # BLD: 0.03 K/UL (ref 0–0.1)
BASOPHILS NFR BLD: 0.6 % (ref 0–1)
BUN SERPL-MCNC: 9 MG/DL (ref 6–20)
BUN/CREAT SERPL: 8 (ref 12–20)
CALCIUM SERPL-MCNC: 9.5 MG/DL (ref 8.5–10.1)
CHLORIDE SERPL-SCNC: 104 MMOL/L (ref 97–108)
CO2 SERPL-SCNC: 31 MMOL/L (ref 21–32)
CREAT SERPL-MCNC: 1.1 MG/DL (ref 0.55–1.02)
DIFFERENTIAL METHOD BLD: ABNORMAL
EOSINOPHIL # BLD: 0.21 K/UL (ref 0–0.4)
EOSINOPHIL NFR BLD: 4.1 % (ref 0–0.7)
ERYTHROCYTE [DISTWIDTH] IN BLOOD BY AUTOMATED COUNT: 16.1 % (ref 11.5–14.5)
GLUCOSE SERPL-MCNC: 142 MG/DL (ref 65–100)
HCT VFR BLD AUTO: 28.1 % (ref 35–47)
HGB BLD-MCNC: 8.7 G/DL (ref 11.5–16)
IMM GRANULOCYTES # BLD AUTO: 0.01 K/UL (ref 0–0.04)
IMM GRANULOCYTES NFR BLD AUTO: 0.2 % (ref 0–0.5)
LYMPHOCYTES # BLD: 0.83 K/UL (ref 0.8–3.5)
LYMPHOCYTES NFR BLD: 16.1 % (ref 12–49)
MCH RBC QN AUTO: 26.9 PG (ref 26–34)
MCHC RBC AUTO-ENTMCNC: 31 G/DL (ref 30–36.5)
MCV RBC AUTO: 86.7 FL (ref 80–99)
MONOCYTES # BLD: 0.57 K/UL (ref 0–1)
MONOCYTES NFR BLD: 11.1 % (ref 5–13)
NEUTS SEG # BLD: 3.5 K/UL (ref 1.8–8)
NEUTS SEG NFR BLD: 67.9 % (ref 32–75)
NRBC # BLD: 0 K/UL (ref 0–0.01)
NRBC BLD-RTO: 0 PER 100 WBC
PLATELET # BLD AUTO: 229 K/UL (ref 150–400)
PMV BLD AUTO: 11.7 FL (ref 8.9–12.9)
POTASSIUM SERPL-SCNC: 3.3 MMOL/L (ref 3.5–5.1)
RBC # BLD AUTO: 3.24 M/UL (ref 3.8–5.2)
SODIUM SERPL-SCNC: 142 MMOL/L (ref 136–145)
WBC # BLD AUTO: 5.2 K/UL (ref 3.6–11)

## 2025-06-30 PROCEDURE — 6370000000 HC RX 637 (ALT 250 FOR IP): Performed by: INTERNAL MEDICINE

## 2025-06-30 PROCEDURE — 2500000003 HC RX 250 WO HCPCS: Performed by: INTERNAL MEDICINE

## 2025-06-30 PROCEDURE — 6360000002 HC RX W HCPCS: Performed by: INTERNAL MEDICINE

## 2025-06-30 PROCEDURE — 94640 AIRWAY INHALATION TREATMENT: CPT

## 2025-06-30 PROCEDURE — 80048 BASIC METABOLIC PNL TOTAL CA: CPT

## 2025-06-30 PROCEDURE — G0378 HOSPITAL OBSERVATION PER HR: HCPCS

## 2025-06-30 PROCEDURE — 85025 COMPLETE CBC W/AUTO DIFF WBC: CPT

## 2025-06-30 PROCEDURE — 96376 TX/PRO/DX INJ SAME DRUG ADON: CPT

## 2025-06-30 PROCEDURE — 96372 THER/PROPH/DIAG INJ SC/IM: CPT

## 2025-06-30 PROCEDURE — 36415 COLL VENOUS BLD VENIPUNCTURE: CPT

## 2025-06-30 PROCEDURE — 94760 N-INVAS EAR/PLS OXIMETRY 1: CPT

## 2025-06-30 RX ORDER — POTASSIUM CHLORIDE 1500 MG/1
20 TABLET, EXTENDED RELEASE ORAL DAILY
Qty: 30 TABLET | Refills: 0 | Status: SHIPPED | OUTPATIENT
Start: 2025-06-30

## 2025-06-30 RX ORDER — POTASSIUM CHLORIDE 750 MG/1
40 TABLET, EXTENDED RELEASE ORAL ONCE
Status: COMPLETED | OUTPATIENT
Start: 2025-06-30 | End: 2025-06-30

## 2025-06-30 RX ORDER — HEPARIN SODIUM 5000 [USP'U]/ML
5000 INJECTION, SOLUTION INTRAVENOUS; SUBCUTANEOUS 2 TIMES DAILY
Status: DISCONTINUED | OUTPATIENT
Start: 2025-06-30 | End: 2025-06-30 | Stop reason: HOSPADM

## 2025-06-30 RX ORDER — FUROSEMIDE 40 MG/1
40 TABLET ORAL DAILY
Qty: 30 TABLET | Refills: 0 | Status: SHIPPED | OUTPATIENT
Start: 2025-06-30

## 2025-06-30 RX ADMIN — FERROUS SULFATE TAB EC 324 MG (65 MG FE EQUIVALENT) 324 MG: 324 (65 FE) TABLET DELAYED RESPONSE at 09:30

## 2025-06-30 RX ADMIN — HEPARIN SODIUM 5000 UNITS: 5000 INJECTION INTRAVENOUS; SUBCUTANEOUS at 09:30

## 2025-06-30 RX ADMIN — SODIUM CHLORIDE, PRESERVATIVE FREE 10 ML: 5 INJECTION INTRAVENOUS at 09:31

## 2025-06-30 RX ADMIN — FUROSEMIDE 40 MG: 10 INJECTION, SOLUTION INTRAMUSCULAR; INTRAVENOUS at 09:30

## 2025-06-30 RX ADMIN — METOPROLOL SUCCINATE 12.5 MG: 25 TABLET, EXTENDED RELEASE ORAL at 09:29

## 2025-06-30 RX ADMIN — POTASSIUM CHLORIDE 40 MEQ: 750 TABLET, FILM COATED, EXTENDED RELEASE ORAL at 09:36

## 2025-06-30 RX ADMIN — ASPIRIN 81 MG: 81 TABLET, DELAYED RELEASE ORAL at 09:30

## 2025-06-30 RX ADMIN — ATORVASTATIN CALCIUM 80 MG: 40 TABLET, FILM COATED ORAL at 09:30

## 2025-06-30 RX ADMIN — ARFORMOTEROL TARTRATE: 15 SOLUTION RESPIRATORY (INHALATION) at 06:57

## 2025-06-30 RX ADMIN — PANTOPRAZOLE SODIUM 40 MG: 40 TABLET, DELAYED RELEASE ORAL at 09:30

## 2025-06-30 NOTE — PLAN OF CARE
Problem: Chronic Conditions and Co-morbidities  Goal: Patient's chronic conditions and co-morbidity symptoms are monitored and maintained or improved  6/30/2025 1125 by Dominique Castro RN  Outcome: Adequate for Discharge  Flowsheets (Taken 6/30/2025 0800)  Care Plan - Patient's Chronic Conditions and Co-Morbidity Symptoms are Monitored and Maintained or Improved:   Monitor and assess patient's chronic conditions and comorbid symptoms for stability, deterioration, or improvement   Collaborate with multidisciplinary team to address chronic and comorbid conditions and prevent exacerbation or deterioration   Update acute care plan with appropriate goals if chronic or comorbid symptoms are exacerbated and prevent overall improvement and discharge  6/29/2025 2235 by Tanmay Nova RN  Outcome: Progressing     Problem: Pain  Goal: Verbalizes/displays adequate comfort level or baseline comfort level  6/30/2025 1125 by Dominique Castro RN  Outcome: Adequate for Discharge  6/29/2025 2235 by Tanmay Nova RN  Outcome: Progressing     Problem: Respiratory - Adult  Goal: Achieves optimal ventilation and oxygenation  6/30/2025 1125 by Dominique Castor RN  Outcome: Adequate for Discharge  Flowsheets (Taken 6/30/2025 0800)  Achieves optimal ventilation and oxygenation:   Respiratory therapy support as indicated   Assess for changes in respiratory status  6/30/2025 0702 by Lino Ba Sr., P  Outcome: Progressing     Problem: Safety - Adult  Goal: Free from fall injury  6/30/2025 1125 by Dominique Castro RN  Outcome: Adequate for Discharge  6/29/2025 2235 by Tanmay Nova RN  Outcome: Progressing

## 2025-06-30 NOTE — ACP (ADVANCE CARE PLANNING)
Patient does not have ACP documents on file at this time. She does not want further information on ACP documentation.

## 2025-06-30 NOTE — DISCHARGE SUMMARY
Discharge Summary    Name: Hyacinth Patterson  [id number]  YOB: 1955 (Age: 69 y.o.)   Date of Admission: 6/28/2025  Date of Discharge: 6/30/2025  Attending Physician: Su Vicente MD    Discharge Diagnosis:   Chest pain-POA. Resolved  Acute on chronic systolic CHF exacerbation    Secondary Diagnosis  Coronary artery disease  Chronic kidney disease stage 3  Chronic pancreatitis  PAD/PVD  Schizophrenia  Essential hypertension  Asthma  DM Type 2  Iron deficiency anemia  Chronic mesenteric ischemia    Consultations:  IP CONSULT TO HEART FAILURE NURSE/COORDINATOR  IP CONSULT TO CARDIOLOGY  IP CONSULT TO CASE MANAGEMENT      Brief Admission History/Reason for Admission   Hyacinth Patterson is a 69 y.o.  female with an extensive medical history including HFrEF with EF 20-25%, CAD s/p PCI, iron deficiency anemia with recent GI bleeding, hypertension, hyperlipidemia, PVD/PAD, DM type 2 and schizophrenia who presented to the ED with complaints of chest pain and shortness of breath.       When she arrived to the ED vital signs noted a /82 and lab data revealed hemoglobin 8.4, Creat 1.10, hs Troponin 55 and proBNP 16,816.  CXR obtained noted cardiomegaly, mild pulmonary edema and small pleural effusions.  She was given 40mg IV Lasix and was admitted under the Hospitalist service for further management.  Please see H&P for additional details.      Brief Hospital Course by Main Problems:     Chest-pain  Coronary artery disease  -chest pain has resolved, last cardiac cath was 04/30/2025 after an NSTEMI and patient was found to have a high grade stenosis of the diagonal branch culprit for ACS  -PCI was performed however patient is currently only on aspirin due to an acute GI bleed  -troponin x 2 obtained with no new EKG changes  -suspect symptoms were from CHF exacerbation  -chest pain resolved prior to discharge.  Recommend outpatient Cardiology follow up at discharge.     3.   CHF

## 2025-06-30 NOTE — PLAN OF CARE
Problem: Respiratory - Adult  Goal: Achieves optimal ventilation and oxygenation  6/30/2025 0702 by Lino Ba Sr., RCP  Outcome: Progressing  6/29/2025 1949 by Nabila Ríos, RT  Outcome: Progressing

## 2025-06-30 NOTE — DISCHARGE INSTRUCTIONS
What to do at Home:  Recommended activity: activity as tolerated.    If you experience any of the following symptoms shortness of breath, please follow up with PCP or ER.    *  Please give a list of your current medications to your Primary Care Provider.    *  Please update this list whenever your medications are discontinued, doses are      changed, or new medications (including over-the-counter products) are added.    *  Please carry medication information at all times in case of emergency situations.    These are general instructions for a healthy lifestyle:    No smoking/ No tobacco products/ Avoid exposure to second hand smoke  Surgeon General's Warning:  Quitting smoking now greatly reduces serious risk to your health.    Obesity, smoking, and sedentary lifestyle greatly increases your risk for illness    A healthy diet, regular physical exercise & weight monitoring are important for maintaining a healthy lifestyle    You may be retaining fluid if you have a history of heart failure or if you experience any of the following symptoms:  Weight gain of 3 pounds or more overnight or 5 pounds in a week, increased swelling in our hands or feet or shortness of breath while lying flat in bed.  Please call your doctor as soon as you notice any of these symptoms; do not wait until your next office visit.        The discharge information has been reviewed with the patient.  The patient verbalized understanding.  Discharge medications reviewed with the patient and appropriate educational materials and side effects teaching were provided.  ___________________________________________________________________________________________________________________________________

## 2025-06-30 NOTE — CARE COORDINATION
06/30/25 0935   Readmission Assessment   Number of Days since last admission? 8-30 days   Previous Disposition Home Alone   Who is being Interviewed Patient   What was the patient's/caregiver's perception as to why they think they needed to return back to the hospital? Other (Comment)  (chest pains and SOB)   Did you visit your Primary Care Physician after you left the hospital, before you returned this time? Yes   Did you see a specialist, such as Cardiac, Pulmonary, Orthopedic Physician, etc. after you left the hospital? No   Who advised the patient to return to the hospital? Self-referral   Does the patient report anything that got in the way of taking their medications? No   In our efforts to provide the best possible care to you and others like you, can you think of anything that we could have done to help you after you left the hospital the first time, so that you might not have needed to return so soon? Other (Comment)  (\"no\")

## 2025-06-30 NOTE — PLAN OF CARE
Pt complains no pain, resting comfortably all night    Problem: Chronic Conditions and Co-morbidities  Goal: Patient's chronic conditions and co-morbidity symptoms are monitored and maintained or improved  6/29/2025 2235 by Tanmay Nova RN  Outcome: Progressing  6/29/2025 1058 by Deisy Harp RN  Outcome: Progressing     Problem: Pain  Goal: Verbalizes/displays adequate comfort level or baseline comfort level  6/29/2025 2235 by Tanmay Nova RN  Outcome: Progressing  6/29/2025 1058 by Deisy Harp RN  Outcome: Progressing     Problem: Respiratory - Adult  Goal: Achieves optimal ventilation and oxygenation  6/29/2025 1949 by Nabila Ríos, RT  Outcome: Progressing  6/29/2025 1058 by Deisy Harp RN  Outcome: Progressing     Problem: Safety - Adult  Goal: Free from fall injury  6/29/2025 2235 by Tanmay Nova RN  Outcome: Progressing  6/29/2025 1058 by Deisy Harp RN  Outcome: Progressing

## 2025-06-30 NOTE — CARE COORDINATION
Care Management Initial Assessment       RUR: n/a obs  Readmission? No  1st IM letter given? No  1st  letter given: No  [place] given 25 3362   Service Assessment   Patient Orientation Alert and Oriented;Person;Place;Situation;Self   Cognition Alert   History Provided By Patient   Primary Caregiver Self   Support Systems Family Members   Patient's Healthcare Decision Maker is: Patient Declined (Legal Next of Kin Remains as Decision Maker)   PCP Verified by CM Yes   Last Visit to PCP Within last 3 months   Prior Functional Level Independent in ADLs/IADLs   Current Functional Level Independent in ADLs/IADLs   Can patient return to prior living arrangement Yes   Ability to make needs known: Good   Family able to assist with home care needs: Yes   Would you like for me to discuss the discharge plan with any other family members/significant others, and if so, who? No   Financial Resources Medicare       Patient able to confirm 2 identifiers (name and ) and the following demographics:    Address: [address] [place] [id number]    Phone: [phone]    Pharmacy: [facility] [phone]    Patient able to confirm emergency contact is her niece, Christina Romero at [phone]. She confirmed that she last saw her PCP on Thursday. She is independent in ADLs and home chores. She lives alone in a 1 level house with 3 steps. She does not use [facility] but does use [facility]. She is an active . Will continue to follow with case management until the time of discharge.    Medicare Outpatient Observation Notice provided to Hyacinth Patterson. Oral explanation was provided and all questions answered. Signed document placed on the bedside chart to be scanned under the media tab. Copy provided to Hyacinth Patterson.    Referral sent to [facility] for readmission (per patient's choice, freedom of choice provided). Awaiting acceptance at this time.    1113 - [facility] accepted patient.

## 2025-06-30 NOTE — PROGRESS NOTES
Pt discharged home with friend. All discharge instructions explained. Pt has a full understanding.

## 2025-07-01 ENCOUNTER — TELEPHONE (OUTPATIENT)
Age: 70
End: 2025-07-01

## 2025-07-01 ENCOUNTER — APPOINTMENT (OUTPATIENT)
Facility: HOSPITAL | Age: 70
End: 2025-07-01
Payer: MEDICARE

## 2025-07-01 ENCOUNTER — HOSPITAL ENCOUNTER (EMERGENCY)
Facility: HOSPITAL | Age: 70
Discharge: HOME | End: 2025-07-02
Attending: EMERGENCY MEDICINE
Payer: MEDICARE

## 2025-07-01 VITALS
OXYGEN SATURATION: 98 % | SYSTOLIC BLOOD PRESSURE: 125 MMHG | DIASTOLIC BLOOD PRESSURE: 55 MMHG | WEIGHT: 142 LBS | HEIGHT: 65 IN | TEMPERATURE: 98.4 F | RESPIRATION RATE: 15 BRPM | BODY MASS INDEX: 23.66 KG/M2 | HEART RATE: 68 BPM

## 2025-07-01 DIAGNOSIS — K62.5 RECTAL BLEEDING: Primary | ICD-10-CM

## 2025-07-01 LAB
ABO + RH BLD: NORMAL
ALBUMIN SERPL-MCNC: 3.1 G/DL (ref 3.5–5)
ALBUMIN/GLOB SERPL: 0.9 (ref 1.1–2.2)
ALP SERPL-CCNC: 93 U/L (ref 45–117)
ALT SERPL-CCNC: 18 U/L (ref 12–78)
ANION GAP SERPL CALC-SCNC: 8 MMOL/L (ref 2–12)
AST SERPL-CCNC: 9 U/L (ref 15–37)
BASOPHILS # BLD: 0.03 K/UL (ref 0–0.1)
BASOPHILS NFR BLD: 0.6 % (ref 0–1)
BILIRUB SERPL-MCNC: 0.2 MG/DL (ref 0.2–1)
BLOOD GROUP ANTIBODIES SERPL: NORMAL
BUN SERPL-MCNC: 16 MG/DL (ref 6–20)
BUN/CREAT SERPL: 11 (ref 12–20)
CALCIUM SERPL-MCNC: 8.9 MG/DL (ref 8.5–10.1)
CHLORIDE SERPL-SCNC: 106 MMOL/L (ref 97–108)
CO2 SERPL-SCNC: 27 MMOL/L (ref 21–32)
CREAT SERPL-MCNC: 1.5 MG/DL (ref 0.55–1.02)
DIFFERENTIAL METHOD BLD: ABNORMAL
EOSINOPHIL # BLD: 0.24 K/UL (ref 0–0.4)
EOSINOPHIL NFR BLD: 4.5 % (ref 0–0.7)
ERYTHROCYTE [DISTWIDTH] IN BLOOD BY AUTOMATED COUNT: 16.2 % (ref 11.5–14.5)
GLOBULIN SER CALC-MCNC: 3.5 G/DL (ref 2–4)
GLUCOSE SERPL-MCNC: 141 MG/DL (ref 65–100)
HCT VFR BLD AUTO: 29.3 % (ref 35–47)
HEMOCCULT STL QL: NEGATIVE
HGB BLD-MCNC: 8.7 G/DL (ref 11.5–16)
IMM GRANULOCYTES # BLD AUTO: 0.01 K/UL (ref 0–0.04)
IMM GRANULOCYTES NFR BLD AUTO: 0.2 % (ref 0–0.5)
LACTATE SERPL-SCNC: 0.9 MMOL/L (ref 0.4–2)
LIPASE SERPL-CCNC: 39 U/L (ref 13–75)
LYMPHOCYTES # BLD: 1.22 K/UL (ref 0.8–3.5)
LYMPHOCYTES NFR BLD: 23 % (ref 12–49)
MCH RBC QN AUTO: 26.6 PG (ref 26–34)
MCHC RBC AUTO-ENTMCNC: 29.7 G/DL (ref 30–36.5)
MCV RBC AUTO: 89.6 FL (ref 80–99)
MONOCYTES # BLD: 0.61 K/UL (ref 0–1)
MONOCYTES NFR BLD: 11.5 % (ref 5–13)
NEUTS SEG # BLD: 3.2 K/UL (ref 1.8–8)
NEUTS SEG NFR BLD: 60.2 % (ref 32–75)
NRBC # BLD: 0 K/UL (ref 0–0.01)
NRBC BLD-RTO: 0 PER 100 WBC
PLATELET # BLD AUTO: 254 K/UL (ref 150–400)
PMV BLD AUTO: 12.1 FL (ref 8.9–12.9)
POTASSIUM SERPL-SCNC: 4 MMOL/L (ref 3.5–5.1)
PROT SERPL-MCNC: 6.6 G/DL (ref 6.4–8.2)
RBC # BLD AUTO: 3.27 M/UL (ref 3.8–5.2)
SODIUM SERPL-SCNC: 141 MMOL/L (ref 136–145)
SPECIMEN EXP DATE BLD: NORMAL
TROPONIN I SERPL HS-MCNC: 22 NG/L (ref 0–51)
WBC # BLD AUTO: 5.3 K/UL (ref 3.6–11)

## 2025-07-01 PROCEDURE — 86850 RBC ANTIBODY SCREEN: CPT

## 2025-07-01 PROCEDURE — 96374 THER/PROPH/DIAG INJ IV PUSH: CPT

## 2025-07-01 PROCEDURE — 99285 EMERGENCY DEPT VISIT HI MDM: CPT

## 2025-07-01 PROCEDURE — 74174 CTA ABD&PLVS W/CONTRAST: CPT

## 2025-07-01 PROCEDURE — 6360000002 HC RX W HCPCS: Performed by: EMERGENCY MEDICINE

## 2025-07-01 PROCEDURE — 83605 ASSAY OF LACTIC ACID: CPT

## 2025-07-01 PROCEDURE — 86901 BLOOD TYPING SEROLOGIC RH(D): CPT

## 2025-07-01 PROCEDURE — 80053 COMPREHEN METABOLIC PANEL: CPT

## 2025-07-01 PROCEDURE — 85025 COMPLETE CBC W/AUTO DIFF WBC: CPT

## 2025-07-01 PROCEDURE — 86900 BLOOD TYPING SEROLOGIC ABO: CPT

## 2025-07-01 PROCEDURE — 93005 ELECTROCARDIOGRAM TRACING: CPT

## 2025-07-01 PROCEDURE — 6360000004 HC RX CONTRAST MEDICATION: Performed by: EMERGENCY MEDICINE

## 2025-07-01 PROCEDURE — 2580000003 HC RX 258: Performed by: EMERGENCY MEDICINE

## 2025-07-01 PROCEDURE — 2500000003 HC RX 250 WO HCPCS: Performed by: EMERGENCY MEDICINE

## 2025-07-01 PROCEDURE — 82272 OCCULT BLD FECES 1-3 TESTS: CPT

## 2025-07-01 PROCEDURE — 83690 ASSAY OF LIPASE: CPT

## 2025-07-01 PROCEDURE — 36415 COLL VENOUS BLD VENIPUNCTURE: CPT

## 2025-07-01 PROCEDURE — 84484 ASSAY OF TROPONIN QUANT: CPT

## 2025-07-01 RX ORDER — IOPAMIDOL 755 MG/ML
100 INJECTION, SOLUTION INTRAVASCULAR
Status: COMPLETED | OUTPATIENT
Start: 2025-07-01 | End: 2025-07-01

## 2025-07-01 RX ORDER — SODIUM CHLORIDE 9 MG/ML
INJECTION, SOLUTION INTRAVENOUS CONTINUOUS
Status: DISCONTINUED | OUTPATIENT
Start: 2025-07-01 | End: 2025-07-01

## 2025-07-01 RX ORDER — 0.9 % SODIUM CHLORIDE 0.9 %
1000 INTRAVENOUS SOLUTION INTRAVENOUS ONCE
Status: COMPLETED | OUTPATIENT
Start: 2025-07-01 | End: 2025-07-01

## 2025-07-01 RX ORDER — 0.9 % SODIUM CHLORIDE 0.9 %
250 INTRAVENOUS SOLUTION INTRAVENOUS ONCE
Status: DISCONTINUED | OUTPATIENT
Start: 2025-07-01 | End: 2025-07-02 | Stop reason: HOSPADM

## 2025-07-01 RX ADMIN — SODIUM CHLORIDE 80 MG: 9 INJECTION INTRAMUSCULAR; INTRAVENOUS; SUBCUTANEOUS at 21:32

## 2025-07-01 RX ADMIN — SODIUM CHLORIDE 1000 ML: 0.9 INJECTION, SOLUTION INTRAVENOUS at 21:18

## 2025-07-01 RX ADMIN — IOPAMIDOL 100 ML: 755 INJECTION, SOLUTION INTRAVENOUS at 22:21

## 2025-07-01 ASSESSMENT — ENCOUNTER SYMPTOMS
NAUSEA: 0
VOMITING: 0
ABDOMINAL PAIN: 0
DIARRHEA: 1
SHORTNESS OF BREATH: 0
BLOOD IN STOOL: 1

## 2025-07-01 ASSESSMENT — PAIN - FUNCTIONAL ASSESSMENT: PAIN_FUNCTIONAL_ASSESSMENT: NONE - DENIES PAIN

## 2025-07-01 NOTE — TELEPHONE ENCOUNTER
Care Transitions Initial Follow Up Call    Outreach made within 2 business days of discharge: Yes    Patient: Hyacinth Patterson Patient : 1955   MRN: [id number]  Reason for Admission: chest pain   Discharge Date: 25       Spoke with: patient     Discharge department/facility: [facility] Interactive Patient Contact:  Was patient able to fill all prescriptions: Yes  Was patient instructed to bring all medications to the follow-up visit: Yes  Is patient taking all medications as directed in the discharge summary? Yes  Does patient understand their discharge instructions: Yes  Does patient have questions or concerns that need addressed prior to 7-14 day follow up office visit: no    Additional needs identified to be addressed with provider  No needs identified             Scheduled appointment with PCP within 7-14 days    Follow Up  Future Appointments   Date Time Provider Department Center   7/10/2025  2:00 PM Jeffery Alexis MD [facility]   2025 10:00 AM [facility] OPI CHAIR 8 [facility]   2025  1:00 PM [facility] OPI CHAIR 3 [facility]   8/15/2025  3:20 PM Johnson Smith APRN - NP [facility]   9/10/2025 10:30 AM [facility]   2025  8:30 AM Jeffery Alexis MD [facility] DEP       Sis Salas MA

## 2025-07-02 LAB
EKG ATRIAL RATE: 75 BPM
EKG ATRIAL RATE: 85 BPM
EKG DIAGNOSIS: NORMAL
EKG DIAGNOSIS: NORMAL
EKG P AXIS: 111 DEGREES
EKG P AXIS: 52 DEGREES
EKG P-R INTERVAL: 158 MS
EKG P-R INTERVAL: 162 MS
EKG Q-T INTERVAL: 398 MS
EKG Q-T INTERVAL: 420 MS
EKG QRS DURATION: 96 MS
EKG QRS DURATION: 98 MS
EKG QTC CALCULATION (BAZETT): 469 MS
EKG QTC CALCULATION (BAZETT): 473 MS
EKG R AXIS: 1 DEGREES
EKG R AXIS: 124 DEGREES
EKG T AXIS: 204 DEGREES
EKG T AXIS: 227 DEGREES
EKG VENTRICULAR RATE: 75 BPM
EKG VENTRICULAR RATE: 85 BPM

## 2025-07-02 PROCEDURE — 93005 ELECTROCARDIOGRAM TRACING: CPT

## 2025-07-02 NOTE — ED PROVIDER NOTES
and regular rhythm.      Pulses: Normal pulses.   Pulmonary:      Effort: Pulmonary effort is normal. No respiratory distress.   Abdominal:      General: Abdomen is flat. There is no distension.      Palpations: Abdomen is soft. There is no mass.      Tenderness: There is no abdominal tenderness. There is no guarding or rebound.      Hernia: No hernia is present.   Genitourinary:     Comments: Brown stool with a reddish tinge, no gross melena.  Musculoskeletal:         General: No tenderness or signs of injury. Normal range of motion.      Cervical back: Normal range of motion and neck supple. No rigidity or tenderness.      Right lower leg: No edema.      Left lower leg: No edema.   Skin:     General: Skin is warm and dry.      Capillary Refill: Capillary refill takes less than 2 seconds.      Findings: No rash.   Neurological:      General: No focal deficit present.      Mental Status: She is alert and oriented to person, place, and time. Mental status is at baseline.      Cranial Nerves: Cranial nerves 2-12 are intact. No cranial nerve deficit.      Sensory: Sensation is intact. No sensory deficit.      Motor: Motor function is intact. No weakness.   Psychiatric:         Mood and Affect: Mood normal.            DIAGNOSTIC RESULTS   LABS:     Recent Results (from the past 24 hours)   CBC with Auto Differential    Collection Time: 07/01/25  8:09 PM   Result Value Ref Range    WBC 5.3 3.6 - 11.0 K/uL    RBC 3.27 (L) 3.80 - 5.20 M/uL    Hemoglobin 8.7 (L) 11.5 - 16.0 g/dL    Hematocrit 29.3 (L) 35.0 - 47.0 %    MCV 89.6 80.0 - 99.0 FL    MCH 26.6 26.0 - 34.0 PG    MCHC 29.7 (L) 30.0 - 36.5 g/dL    RDW 16.2 (H) 11.5 - 14.5 %    Platelets 254 150 - 400 K/uL    MPV 12.1 8.9 - 12.9 FL    Nucleated RBCs 0.0 0  WBC    nRBC 0.00 0.00 - 0.01 K/uL    Neutrophils % 60.2 32.0 - 75.0 %    Lymphocytes % 23.0 12.0 - 49.0 %    Monocytes % 11.5 5.0 - 13.0 %    Eosinophils % 4.5 (H) 0.0 - 0.70 %    Basophils % 0.6 0.0 - 1.0 %

## 2025-07-10 ENCOUNTER — OFFICE VISIT (OUTPATIENT)
Age: 70
End: 2025-07-10
Payer: MEDICARE

## 2025-07-10 VITALS
RESPIRATION RATE: 18 BRPM | HEART RATE: 48 BPM | WEIGHT: 153.6 LBS | OXYGEN SATURATION: 100 % | DIASTOLIC BLOOD PRESSURE: 60 MMHG | SYSTOLIC BLOOD PRESSURE: 130 MMHG | HEIGHT: 65 IN | BODY MASS INDEX: 25.59 KG/M2 | TEMPERATURE: 97.9 F

## 2025-07-10 DIAGNOSIS — K31.811 GASTROINTESTINAL HEMORRHAGE ASSOCIATED WITH ANGIODYSPLASIA OF STOMACH AND DUODENUM: ICD-10-CM

## 2025-07-10 DIAGNOSIS — D50.0 IRON DEFICIENCY ANEMIA DUE TO CHRONIC BLOOD LOSS: Primary | ICD-10-CM

## 2025-07-10 PROCEDURE — 1124F ACP DISCUSS-NO DSCNMKR DOCD: CPT | Performed by: INTERNAL MEDICINE

## 2025-07-10 PROCEDURE — 3075F SYST BP GE 130 - 139MM HG: CPT | Performed by: INTERNAL MEDICINE

## 2025-07-10 PROCEDURE — 3078F DIAST BP <80 MM HG: CPT | Performed by: INTERNAL MEDICINE

## 2025-07-10 PROCEDURE — 1159F MED LIST DOCD IN RCRD: CPT | Performed by: INTERNAL MEDICINE

## 2025-07-10 PROCEDURE — 99214 OFFICE O/P EST MOD 30 MIN: CPT | Performed by: INTERNAL MEDICINE

## 2025-07-10 NOTE — PROGRESS NOTES
Duration 30 minutes primarily education, review of imaging, labs and records.    Assessment & Plan  1. Gastrointestinal bleeding.  - Abnormal blood vessels in the intestinal tract can break and bleed, leading to significant drops in hemoglobin levels.  - Multiple hospitalizations due to bleeding episodes; hemoglobin has been as low as 5.  - Advised to discuss the possibility of Crohn's disease with gastroenterologist during the upcoming appointment on 07/14/2025.  - Referral made to Dr. Funmi Nichols for the placement of a port to facilitate easier access for IV treatments.    2. Diabetes mellitus.  - Diabetes is currently under good control.  - No recent episodes of bleeding reported.  - Continue current management plan.    3. Neck pain.  - Persistent pain from neck down to arm, likely due to nerve involvement.  - Expected to take months to heal; improvement noted but still present.  - Monitor pain and report any changes.    4. Peripheral arterial disease.  - Peripheral arterial disease appears stable at this time.  - No changes to current management plan are necessary.  - Continue monitoring for any new symptoms.          Chief Complaint   Patient presents with    Stool Color Change         Orders Placed This Encounter   Procedures    [facility] - Meghan Stanford MD, General Surgery, [place]     Referral Priority:   Routine     Referral Type:   Eval and Treat     Referral Reason:   Specialty Services Required     Referred to Provider:   Meghan Stanford MD     Requested Specialty:   General Surgery     Number of Visits Requested:   1       Jeffery Alexis MD, FACP      History of Present Illness  The patient presents for evaluation of gastrointestinal bleeding, diabetes, and neck pain.    She reports feeling well overall and has not experienced any recent bleeding episodes. Currently, she is on aspirin but not taking Eliquis or Plavix. There is an upcoming appointment with Dr. Stone in September 2025. She 
you ever feel afraid of your partner? N   Are you in a relationship with someone who physically or mentally threatens you? N   Is it safe for you to go home? Y       Advance Care Planning     The patient has appointed the following active healthcare agents:    Primary Decision Maker: Christina Romero - Niece/Nephew - [phone]

## 2025-07-11 ENCOUNTER — HOSPITAL ENCOUNTER (OUTPATIENT)
Facility: HOSPITAL | Age: 70
Setting detail: INFUSION SERIES
Discharge: HOME | End: 2025-07-11
Payer: MEDICARE

## 2025-07-11 VITALS
OXYGEN SATURATION: 100 % | TEMPERATURE: 97.9 F | SYSTOLIC BLOOD PRESSURE: 142 MMHG | RESPIRATION RATE: 18 BRPM | BODY MASS INDEX: 25.69 KG/M2 | WEIGHT: 154.4 LBS | DIASTOLIC BLOOD PRESSURE: 57 MMHG | HEART RATE: 68 BPM

## 2025-07-11 DIAGNOSIS — K31.811 GASTROINTESTINAL HEMORRHAGE ASSOCIATED WITH ANGIODYSPLASIA OF STOMACH AND DUODENUM: ICD-10-CM

## 2025-07-11 DIAGNOSIS — D50.0 IRON DEFICIENCY ANEMIA DUE TO CHRONIC BLOOD LOSS: Primary | ICD-10-CM

## 2025-07-11 LAB
ABO + RH BLD: NORMAL
BASOPHILS # BLD: 0.05 K/UL (ref 0–0.1)
BASOPHILS NFR BLD: 0.9 % (ref 0–1)
BLOOD GROUP ANTIBODIES SERPL: NORMAL
DIFFERENTIAL METHOD BLD: ABNORMAL
EOSINOPHIL # BLD: 0.18 K/UL (ref 0–0.4)
EOSINOPHIL NFR BLD: 3.4 % (ref 0–0.7)
ERYTHROCYTE [DISTWIDTH] IN BLOOD BY AUTOMATED COUNT: 15.6 % (ref 11.5–14.5)
HCT VFR BLD AUTO: 32.8 % (ref 35–47)
HGB BLD-MCNC: 9.9 G/DL (ref 11.5–16)
IMM GRANULOCYTES # BLD AUTO: 0.01 K/UL (ref 0–0.04)
IMM GRANULOCYTES NFR BLD AUTO: 0.2 % (ref 0–0.5)
LYMPHOCYTES # BLD: 1.05 K/UL (ref 0.8–3.5)
LYMPHOCYTES NFR BLD: 19.6 % (ref 12–49)
MCH RBC QN AUTO: 26.3 PG (ref 26–34)
MCHC RBC AUTO-ENTMCNC: 30.2 G/DL (ref 30–36.5)
MCV RBC AUTO: 87.2 FL (ref 80–99)
MONOCYTES # BLD: 0.39 K/UL (ref 0–1)
MONOCYTES NFR BLD: 7.3 % (ref 5–13)
NEUTS SEG # BLD: 3.68 K/UL (ref 1.8–8)
NEUTS SEG NFR BLD: 68.6 % (ref 32–75)
NRBC # BLD: 0 K/UL (ref 0–0.01)
NRBC BLD-RTO: 0 PER 100 WBC
PLATELET # BLD AUTO: 167 K/UL (ref 150–400)
PMV BLD AUTO: 12.5 FL (ref 8.9–12.9)
RBC # BLD AUTO: 3.76 M/UL (ref 3.8–5.2)
SPECIMEN EXP DATE BLD: NORMAL
WBC # BLD AUTO: 5.4 K/UL (ref 3.6–11)

## 2025-07-11 PROCEDURE — 6360000002 HC RX W HCPCS: Performed by: INTERNAL MEDICINE

## 2025-07-11 PROCEDURE — 86900 BLOOD TYPING SEROLOGIC ABO: CPT

## 2025-07-11 PROCEDURE — 36415 COLL VENOUS BLD VENIPUNCTURE: CPT

## 2025-07-11 PROCEDURE — 86901 BLOOD TYPING SEROLOGIC RH(D): CPT

## 2025-07-11 PROCEDURE — 86850 RBC ANTIBODY SCREEN: CPT

## 2025-07-11 PROCEDURE — 96365 THER/PROPH/DIAG IV INF INIT: CPT

## 2025-07-11 PROCEDURE — 2580000003 HC RX 258: Performed by: INTERNAL MEDICINE

## 2025-07-11 PROCEDURE — 85025 COMPLETE CBC W/AUTO DIFF WBC: CPT

## 2025-07-11 RX ORDER — SODIUM CHLORIDE 9 MG/ML
INJECTION, SOLUTION INTRAVENOUS CONTINUOUS
Status: DISCONTINUED | OUTPATIENT
Start: 2025-07-11 | End: 2025-07-12 | Stop reason: HOSPADM

## 2025-07-11 RX ORDER — SODIUM CHLORIDE 9 MG/ML
INJECTION, SOLUTION INTRAVENOUS CONTINUOUS
OUTPATIENT
Start: 2025-07-11

## 2025-07-11 RX ORDER — SODIUM CHLORIDE 9 MG/ML
5-250 INJECTION, SOLUTION INTRAVENOUS PRN
OUTPATIENT
Start: 2025-07-11

## 2025-07-11 RX ORDER — ACETAMINOPHEN 325 MG/1
650 TABLET ORAL
OUTPATIENT
Start: 2025-07-11

## 2025-07-11 RX ORDER — SODIUM CHLORIDE 9 MG/ML
5-250 INJECTION, SOLUTION INTRAVENOUS PRN
Status: DISCONTINUED | OUTPATIENT
Start: 2025-07-11 | End: 2025-07-12 | Stop reason: HOSPADM

## 2025-07-11 RX ORDER — DIPHENHYDRAMINE HYDROCHLORIDE 50 MG/ML
50 INJECTION, SOLUTION INTRAMUSCULAR; INTRAVENOUS
Status: DISCONTINUED | OUTPATIENT
Start: 2025-07-11 | End: 2025-07-12 | Stop reason: HOSPADM

## 2025-07-11 RX ORDER — ALBUTEROL SULFATE 90 UG/1
4 INHALANT RESPIRATORY (INHALATION) PRN
OUTPATIENT
Start: 2025-07-11

## 2025-07-11 RX ORDER — HYDROCORTISONE SODIUM SUCCINATE 100 MG/2ML
100 INJECTION INTRAMUSCULAR; INTRAVENOUS
OUTPATIENT
Start: 2025-07-11

## 2025-07-11 RX ORDER — ONDANSETRON 2 MG/ML
8 INJECTION INTRAMUSCULAR; INTRAVENOUS
Status: DISCONTINUED | OUTPATIENT
Start: 2025-07-11 | End: 2025-07-12 | Stop reason: HOSPADM

## 2025-07-11 RX ORDER — EPINEPHRINE 1 MG/ML
0.3 INJECTION, SOLUTION, CONCENTRATE INTRAVENOUS PRN
OUTPATIENT
Start: 2025-07-11

## 2025-07-11 RX ORDER — ALBUTEROL SULFATE 90 UG/1
4 INHALANT RESPIRATORY (INHALATION) PRN
Status: DISCONTINUED | OUTPATIENT
Start: 2025-07-11 | End: 2025-07-12 | Stop reason: HOSPADM

## 2025-07-11 RX ORDER — ACETAMINOPHEN 325 MG/1
650 TABLET ORAL
Status: DISCONTINUED | OUTPATIENT
Start: 2025-07-11 | End: 2025-07-12 | Stop reason: HOSPADM

## 2025-07-11 RX ORDER — SODIUM CHLORIDE 0.9 % (FLUSH) 0.9 %
5-40 SYRINGE (ML) INJECTION PRN
OUTPATIENT
Start: 2025-07-11

## 2025-07-11 RX ORDER — SODIUM CHLORIDE 0.9 % (FLUSH) 0.9 %
5-40 SYRINGE (ML) INJECTION PRN
Status: DISCONTINUED | OUTPATIENT
Start: 2025-07-11 | End: 2025-07-12 | Stop reason: HOSPADM

## 2025-07-11 RX ORDER — HEPARIN 100 UNIT/ML
500 SYRINGE INTRAVENOUS PRN
OUTPATIENT
Start: 2025-07-11

## 2025-07-11 RX ORDER — HYDROCORTISONE SODIUM SUCCINATE 100 MG/2ML
100 INJECTION INTRAMUSCULAR; INTRAVENOUS
Status: DISCONTINUED | OUTPATIENT
Start: 2025-07-11 | End: 2025-07-12 | Stop reason: HOSPADM

## 2025-07-11 RX ORDER — DIPHENHYDRAMINE HYDROCHLORIDE 50 MG/ML
50 INJECTION, SOLUTION INTRAMUSCULAR; INTRAVENOUS
OUTPATIENT
Start: 2025-07-11

## 2025-07-11 RX ORDER — EPINEPHRINE 1 MG/ML
0.3 INJECTION, SOLUTION, CONCENTRATE INTRAVENOUS PRN
Status: DISCONTINUED | OUTPATIENT
Start: 2025-07-11 | End: 2025-07-12 | Stop reason: HOSPADM

## 2025-07-11 RX ORDER — ONDANSETRON 2 MG/ML
8 INJECTION INTRAMUSCULAR; INTRAVENOUS
OUTPATIENT
Start: 2025-07-11

## 2025-07-11 RX ADMIN — SODIUM CHLORIDE 50 ML/HR: 0.9 INJECTION, SOLUTION INTRAVENOUS at 11:40

## 2025-07-11 RX ADMIN — IRON SUCROSE 400 MG: 20 INJECTION, SOLUTION INTRAVENOUS at 11:52

## 2025-07-11 ASSESSMENT — PAIN SCALES - GENERAL: PAINLEVEL_OUTOF10: 0

## 2025-07-11 NOTE — PROGRESS NOTES
[facility] Progress Note    Date: 2025    Name: Hyacinth Patterson    MRN: [id number]         : 1955      Ms. Patterson was assessed and education was provided.     Ms. Patterson's vitals were reviewed and patient was observed for 5 minutes prior to treatment.   Vitals:    25 1320   BP: (!) 142/57   Pulse: 68   Resp: 18   Temp: 97.9 °F (36.6 °C)   SpO2: 100%       Lab results were obtained and reviewed.  Recent Results (from the past 12 hours)   CBC with Auto Differential    Collection Time: 25 10:30 AM   Result Value Ref Range    WBC 5.4 3.6 - 11.0 K/uL    RBC 3.76 (L) 3.80 - 5.20 M/uL    Hemoglobin 9.9 (L) 11.5 - 16.0 g/dL    Hematocrit 32.8 (L) 35.0 - 47.0 %    MCV 87.2 80.0 - 99.0 FL    MCH 26.3 26.0 - 34.0 PG    MCHC 30.2 30.0 - 36.5 g/dL    RDW 15.6 (H) 11.5 - 14.5 %    Platelets 167 150 - 400 K/uL    MPV 12.5 8.9 - 12.9 FL    Nucleated RBCs 0.0 0.0  WBC    nRBC 0.00 0.00 - 0.01 K/uL    Neutrophils % 68.6 32.0 - 75.0 %    Lymphocytes % 19.6 12.0 - 49.0 %    Monocytes % 7.3 5.0 - 13.0 %    Eosinophils % 3.4 (H) 0.0 - 0.70 %    Basophils % 0.9 0.0 - 1.0 %    Immature Granulocytes % 0.2 0 - 0.5 %    Neutrophils Absolute 3.68 1.80 - 8.00 K/UL    Lymphocytes Absolute 1.05 0.80 - 3.50 K/UL    Monocytes Absolute 0.39 0.00 - 1.00 K/UL    Eosinophils Absolute 0.18 0.00 - 0.40 K/UL    Basophils Absolute 0.05 0.00 - 0.10 K/UL    Immature Granulocytes Absolute 0.01 0.00 - 0.04 K/UL    Differential Type AUTOMATED     TYPE AND SCREEN    Collection Time: 25 10:30 AM   Result Value Ref Range    Crossmatch expiration date 2025,2359     ABO/Rh A POSITIVE     Antibody Screen NEG        24 gauge IV placed in the RFA.     NS infusing.   1152 - Iron Sucrose 400 mg infusing at 108 ml/hr.  1242 - IV site infiltrated. Infusion paused.   Multiple attempts at a new IV site made. Unable to establish a new IV site. Patient requests no more IV sticks. Patient to get a port in the near future. Notified

## 2025-07-22 RX ORDER — FUROSEMIDE 40 MG/1
40 TABLET ORAL DAILY
Qty: 30 TABLET | Refills: 0 | Status: SHIPPED | OUTPATIENT
Start: 2025-07-22

## 2025-07-29 ENCOUNTER — HOSPITAL ENCOUNTER (OUTPATIENT)
Facility: HOSPITAL | Age: 70
Setting detail: INFUSION SERIES
End: 2025-07-29

## 2025-07-29 ENCOUNTER — HOSPITAL ENCOUNTER (OUTPATIENT)
Facility: HOSPITAL | Age: 70
Discharge: HOME | End: 2025-08-01
Payer: MEDICARE

## 2025-07-29 LAB
ABO + RH BLD: NORMAL
BASOPHILS # BLD: 0.04 K/UL (ref 0–0.1)
BASOPHILS NFR BLD: 0.9 % (ref 0–1)
BLOOD GROUP ANTIBODIES SERPL: NORMAL
DIFFERENTIAL METHOD BLD: ABNORMAL
EOSINOPHIL # BLD: 0.19 K/UL (ref 0–0.4)
EOSINOPHIL NFR BLD: 4 % (ref 0–0.7)
ERYTHROCYTE [DISTWIDTH] IN BLOOD BY AUTOMATED COUNT: 15.5 % (ref 11.5–14.5)
FERRITIN SERPL-MCNC: 141 NG/ML (ref 8–252)
HCT VFR BLD AUTO: 32.9 % (ref 35–47)
HGB BLD-MCNC: 9.8 G/DL (ref 11.5–16)
IMM GRANULOCYTES # BLD AUTO: 0.01 K/UL (ref 0–0.04)
IMM GRANULOCYTES NFR BLD AUTO: 0.2 % (ref 0–0.5)
IRON SATN MFR SERPL: 16 % (ref 20–50)
IRON SERPL-MCNC: 42 UG/DL (ref 50–170)
LYMPHOCYTES # BLD: 1.05 K/UL (ref 0.8–3.5)
LYMPHOCYTES NFR BLD: 22.3 % (ref 12–49)
MCH RBC QN AUTO: 25.6 PG (ref 26–34)
MCHC RBC AUTO-ENTMCNC: 29.8 G/DL (ref 30–36.5)
MCV RBC AUTO: 85.9 FL (ref 80–99)
MONOCYTES # BLD: 0.4 K/UL (ref 0–1)
MONOCYTES NFR BLD: 8.5 % (ref 5–13)
NEUTS SEG # BLD: 3.01 K/UL (ref 1.8–8)
NEUTS SEG NFR BLD: 64.1 % (ref 32–75)
NRBC # BLD: 0 K/UL (ref 0–0.01)
NRBC BLD-RTO: 0 PER 100 WBC
PLATELET # BLD AUTO: 198 K/UL (ref 150–400)
PMV BLD AUTO: 10.8 FL (ref 8.9–12.9)
RBC # BLD AUTO: 3.83 M/UL (ref 3.8–5.2)
SPECIMEN EXP DATE BLD: NORMAL
TIBC SERPL-MCNC: 267 UG/DL (ref 250–450)
WBC # BLD AUTO: 4.7 K/UL (ref 3.6–11)

## 2025-07-29 PROCEDURE — 83550 IRON BINDING TEST: CPT

## 2025-07-29 PROCEDURE — 86900 BLOOD TYPING SEROLOGIC ABO: CPT

## 2025-07-29 PROCEDURE — 86901 BLOOD TYPING SEROLOGIC RH(D): CPT

## 2025-07-29 PROCEDURE — 85025 COMPLETE CBC W/AUTO DIFF WBC: CPT

## 2025-07-29 PROCEDURE — 82728 ASSAY OF FERRITIN: CPT

## 2025-07-29 PROCEDURE — 83540 ASSAY OF IRON: CPT

## 2025-07-29 PROCEDURE — 36415 COLL VENOUS BLD VENIPUNCTURE: CPT

## 2025-07-29 PROCEDURE — 86850 RBC ANTIBODY SCREEN: CPT

## 2025-08-15 ENCOUNTER — OFFICE VISIT (OUTPATIENT)
Age: 70
End: 2025-08-15
Payer: MEDICARE

## 2025-08-15 VITALS
OXYGEN SATURATION: 98 % | HEART RATE: 74 BPM | SYSTOLIC BLOOD PRESSURE: 138 MMHG | WEIGHT: 156 LBS | HEIGHT: 65 IN | BODY MASS INDEX: 25.99 KG/M2 | TEMPERATURE: 98 F | DIASTOLIC BLOOD PRESSURE: 76 MMHG

## 2025-08-15 DIAGNOSIS — I10 PRIMARY HYPERTENSION: ICD-10-CM

## 2025-08-15 DIAGNOSIS — N18.32 STAGE 3B CHRONIC KIDNEY DISEASE (HCC): ICD-10-CM

## 2025-08-15 DIAGNOSIS — K92.2 RECURRENT GASTROINTESTINAL HEMORRHAGE: ICD-10-CM

## 2025-08-15 DIAGNOSIS — I73.9 PAD (PERIPHERAL ARTERY DISEASE): ICD-10-CM

## 2025-08-15 DIAGNOSIS — I25.10 CORONARY ARTERY DISEASE INVOLVING NATIVE CORONARY ARTERY OF NATIVE HEART WITHOUT ANGINA PECTORIS: Primary | ICD-10-CM

## 2025-08-15 DIAGNOSIS — E78.00 HYPERCHOLESTEROLEMIA: ICD-10-CM

## 2025-08-15 DIAGNOSIS — I25.5 ISCHEMIC CARDIOMYOPATHY: ICD-10-CM

## 2025-08-15 DIAGNOSIS — G47.33 OSA (OBSTRUCTIVE SLEEP APNEA): ICD-10-CM

## 2025-08-15 PROCEDURE — 1126F AMNT PAIN NOTED NONE PRSNT: CPT | Performed by: NURSE PRACTITIONER

## 2025-08-15 PROCEDURE — 99214 OFFICE O/P EST MOD 30 MIN: CPT | Performed by: NURSE PRACTITIONER

## 2025-08-15 PROCEDURE — 3078F DIAST BP <80 MM HG: CPT | Performed by: NURSE PRACTITIONER

## 2025-08-15 PROCEDURE — 1123F ACP DISCUSS/DSCN MKR DOCD: CPT | Performed by: NURSE PRACTITIONER

## 2025-08-15 PROCEDURE — 3075F SYST BP GE 130 - 139MM HG: CPT | Performed by: NURSE PRACTITIONER

## 2025-08-15 RX ORDER — FUROSEMIDE 40 MG/1
40 TABLET ORAL DAILY
Qty: 30 TABLET | Refills: 3 | Status: SHIPPED | OUTPATIENT
Start: 2025-08-15

## 2025-08-15 RX ORDER — METOPROLOL SUCCINATE 25 MG/1
25 TABLET, EXTENDED RELEASE ORAL DAILY
Qty: 30 TABLET | Refills: 3 | Status: SHIPPED | OUTPATIENT
Start: 2025-08-15

## 2025-08-15 ASSESSMENT — PATIENT HEALTH QUESTIONNAIRE - PHQ9
SUM OF ALL RESPONSES TO PHQ QUESTIONS 1-9: 0
1. LITTLE INTEREST OR PLEASURE IN DOING THINGS: NOT AT ALL
SUM OF ALL RESPONSES TO PHQ QUESTIONS 1-9: 0
2. FEELING DOWN, DEPRESSED OR HOPELESS: NOT AT ALL
SUM OF ALL RESPONSES TO PHQ QUESTIONS 1-9: 0
SUM OF ALL RESPONSES TO PHQ QUESTIONS 1-9: 0

## 2025-08-29 ENCOUNTER — TELEPHONE (OUTPATIENT)
Age: 70
End: 2025-08-29

## (undated) DEVICE — Z DISCONTINUED PER MEDLINE LINE GAS SAMPLING O2/CO2 LNG AD 13 FT NSL W/ TBNG FILTERLINE

## (undated) DEVICE — PENCIL ES L3M BTTN SWCH S STL HEX LOK BLDE ELECTRD HOLSTER

## (undated) DEVICE — TRAP ENDOSCP POLYP 2 CHMBR DRAWER TYP

## (undated) DEVICE — 1200 GUARD II KIT W/5MM TUBE W/O VAC TUBE: Brand: GUARDIAN

## (undated) DEVICE — REM POLYHESIVE ADULT PATIENT RETURN ELECTRODE: Brand: VALLEYLAB

## (undated) DEVICE — HOLDER SHRP TEMP SH STP DISP -- ADVANTAGE

## (undated) DEVICE — SHEET,DRAPE,53X77,STERILE: Brand: MEDLINE

## (undated) DEVICE — SOLIDIFIER FLD 2OZ 1500CC N DISINF IN BTL DISP SAFESORB

## (undated) DEVICE — CUSTOM KT PTCA INFL DEV K05 00053H (ORDER MUTLIPLES OF 5 EACH)

## (undated) DEVICE — CATH IV AUTOGRD BC PNK 20GA 25 -- INSYTE

## (undated) DEVICE — CATHETER THROMCTMY L140CM RX L LUMN ASPIR TBNG INDIGO

## (undated) DEVICE — KENDALL SCD EXPRESS SLEEVES, KNEE LENGTH, MEDIUM: Brand: KENDALL SCD

## (undated) DEVICE — PINNACLE INTRODUCER SHEATH: Brand: PINNACLE

## (undated) DEVICE — Device: Brand: PROWATER

## (undated) DEVICE — SPONGE GZ W4XL4IN COT 12 PLY TYP VII WVN C FLD DSGN

## (undated) DEVICE — SCREW EXT FIX L14MM FOR DISTRCTN

## (undated) DEVICE — CLEANER ES TIP W2XL2IN ADH BK RADPQ FOR S STL ELECTRD

## (undated) DEVICE — DBD-PACK,LAPAROTOMY,2 REINFORCED GOWNS: Brand: MEDLINE

## (undated) DEVICE — GLIDESHEATH SLENDER ACCESS KIT: Brand: GLIDESHEATH SLENDER

## (undated) DEVICE — ROYAL SILK SURGICAL GOWN, XL: Brand: CONVERTORS

## (undated) DEVICE — COPILOT BLEEDBACK CONTROL VALVE: Brand: COPILOT

## (undated) DEVICE — SUTURE VCRL SZ 3-0 L27IN ABSRB UD FS-2 L19MM 1/2 CIR J423H

## (undated) DEVICE — TUBING, SUCTION, 1/4" X 10', STRAIGHT: Brand: MEDLINE

## (undated) DEVICE — PACK PROCEDURE SURG HRT CATH

## (undated) DEVICE — SET ADMIN 16ML TBNG L100IN 2 Y INJ SITE IV PIGGY BK DISP (ORDER IN MULIPLES OF 48)

## (undated) DEVICE — CATH 5F 100CM JR40 -- DXTERITY

## (undated) DEVICE — MEDI-TRACE CADENCE ADULT, DEFIBRILLATION ELECTRODE -RTS  (10 PR/PK) - PHYSIO-CONTROL: Brand: MEDI-TRACE CADENCE

## (undated) DEVICE — ELECTRODE,RADIOTRANSLUCENT,FOAM,5PK: Brand: MEDLINE

## (undated) DEVICE — Device

## (undated) DEVICE — CATH BLLN DIL 2.0X20MM RX -- EUPHORA

## (undated) DEVICE — SET ADMIN 16ML TBNG L100IN 2 Y INJ SITE IV PIGGY BK DISP

## (undated) DEVICE — SUTURE ABSORBABLE BRAIDED 2-0 CT-1 27 IN UD VICRYL J259H

## (undated) DEVICE — SET GRAV CK VLV NEEDLESS ST 3 GANGED 4WAY STPCOCK HI FLO 10

## (undated) DEVICE — BAG SPEC BIOHZRD 10 X 10 IN --

## (undated) DEVICE — 4-PORT MANIFOLD: Brand: NEPTUNE 2

## (undated) DEVICE — CUSTOM KT PTCA INFL DEV K05 00052M

## (undated) DEVICE — ENDOSCOPY PUMP TUBING/ CAP SET: Brand: ERBE

## (undated) DEVICE — ELECTRODE PT RET AD L9FT HI MOIST COND ADH HYDRGEL CORDED

## (undated) DEVICE — 3M™ TEGADERM™ TRANSPARENT FILM DRESSING FRAME STYLE, 1626W, 4 IN X 4-3/4 IN (10 CM X 12 CM), 50/CT 4CT/CASE: Brand: 3M™ TEGADERM™

## (undated) DEVICE — SYR 3ML LL TIP 1/10ML GRAD --

## (undated) DEVICE — FCPS RAD JAW 4LC 160CM W/NDL -- BX/20

## (undated) DEVICE — STENT RONYX25030UX RESOLUTE ONYX 2.50X30
Type: IMPLANTABLE DEVICE | Status: NON-FUNCTIONAL
Brand: RESOLUTE ONYX™

## (undated) DEVICE — KIT HND CTRL 3 W STPCOCK ROT END 54IN PREM HI PRSS TBNG AT

## (undated) DEVICE — SYR 10ML LUER LOK 1/5ML GRAD --

## (undated) DEVICE — SOLUTION IV 1000ML 0.9% SOD CHL

## (undated) DEVICE — INSULATED BLADE ELECTRODE: Brand: EDGE

## (undated) DEVICE — CATH GUID COR EB35 6FR 100CM -- LAUNCHER

## (undated) DEVICE — BITEBLOCK 54FR W/ DENT RIM BLOX

## (undated) DEVICE — SNARE ENDOSCP POLYP MED STD AD 2.4X27X240 CM 2.8 MM OVL SENS

## (undated) DEVICE — SPECIAL PROCEDURE DRAPE 32" X 34": Brand: SPECIAL PROCEDURE DRAPE

## (undated) DEVICE — PREP SKN PREVAIL 40ML APPL --

## (undated) DEVICE — CANISTER, RIGID, 3000CC: Brand: MEDLINE INDUSTRIES, INC.

## (undated) DEVICE — STERILE POLYISOPRENE POWDER-FREE SURGICAL GLOVES WITH EMOLLIENT COATING: Brand: PROTEXIS

## (undated) DEVICE — DRAPE, SLUSH XL, 44X66, STERILE: Brand: MEDLINE

## (undated) DEVICE — SUTURE SZ 0 27IN 5/8 CIR UR-6  TAPER PT VIOLET ABSRB VICRYL J603H

## (undated) DEVICE — SPONGE: SPECIALTY PEANUT XR 100/CS: Brand: MEDICAL ACTION INDUSTRIES

## (undated) DEVICE — Device: Brand: OMNIWIRE PRESSURE GUIDE WIRE

## (undated) DEVICE — SUTURE PERMAHAND SZ 3-0 L30IN NONABSORBABLE BLK SILK BRAID A304H

## (undated) DEVICE — ROCKER SWITCH PENCIL BLADE ELECTRODE, HOLSTER: Brand: EDGE

## (undated) DEVICE — LAMINECTOMY RICHMOND-LF: Brand: MEDLINE INDUSTRIES, INC.

## (undated) DEVICE — BIPOLAR FORCEPS CORD,BANANA LEADS: Brand: VALLEYLAB

## (undated) DEVICE — COVER,MAYO STAND,STERILE: Brand: MEDLINE

## (undated) DEVICE — KIT MFLD ISOLATN NACL CNTRST PRT TBNG SPIK W/ PRSS TRNSDUC

## (undated) DEVICE — CONTAINER SPEC 20 ML LID NEUT BUFF FORMALIN 10 % POLYPR STS

## (undated) DEVICE — CATH GUID COR JR4.0S 6FR 100CM -- LAUNCHER

## (undated) DEVICE — KIT ACCS INTRO 4FR L10CM NDL 21GA L7CM GWIRE L40CM

## (undated) DEVICE — HEART CATH-MRMC: Brand: MEDLINE INDUSTRIES, INC.

## (undated) DEVICE — INFECTION CONTROL KIT SYS

## (undated) DEVICE — HYPODERMIC SAFETY NEEDLE: Brand: MAGELLAN

## (undated) DEVICE — CATHETER GUID 5FR GWIRE 0.058IN COR EXTRA BKUP SUPP 3.5 ACT

## (undated) DEVICE — ENDOSCOPIC KIT COMPLIANCE ENDOKIT

## (undated) DEVICE — GUIDEWIRE VASCULAR L145CM 0.035IN J TIP L3MM PTFE FIX COR NAMIC

## (undated) DEVICE — CATHETER IV 22GA L1IN OD0.8382-0.9144MM ID0.6096-0.6858MM 382523

## (undated) DEVICE — YANKAUER,TAPERED BULBOUS TIP,W/O VENT: Brand: MEDLINE

## (undated) DEVICE — CATHETER GUID 6FR L150CM RAP EXCHG L25CM TIP 5.1FR PUSHROD

## (undated) DEVICE — GLIDESHEATH SLENDER STAINLESS STEEL KIT: Brand: GLIDESHEATH SLENDER

## (undated) DEVICE — TOOL 14MH30 LEGEND 14CM 3MM: Brand: MIDAS REX ™

## (undated) DEVICE — DRAPE,LAPAROTOMY,PED,STERILE: Brand: MEDLINE

## (undated) DEVICE — NDL SPNE QNCKE 18GX3.5IN LF --

## (undated) DEVICE — SOLUTION IRRIG 1000ML 0.9% SOD CHL USP POUR PLAS BTL

## (undated) DEVICE — 1230 DOUBLE MAGNET NEEDLE COUNTER,BLACK: Brand: DEVON

## (undated) DEVICE — KIT MED IMAG CNTRST AGNT W/ IOPAMIDOL REUSE

## (undated) DEVICE — CATH BLLN ANGIO 2.50X12MM SC EUPHORA RX

## (undated) DEVICE — ANGIOGRAPHY KIT

## (undated) DEVICE — TOWEL 4 PLY TISS 19X30 SUE WHT

## (undated) DEVICE — CATHETER CARDIAC MULTIPAK MULTIPAK 5FR PERFORMA

## (undated) DEVICE — PROVE COVER: Brand: UNBRANDED

## (undated) DEVICE — DERMABOND SKIN ADH 0.7ML -- DERMABOND ADVANCED 12/BX

## (undated) DEVICE — RUNTHROUGH NS EXTRA FLOPPY PTCA GUIDEWIRE: Brand: RUNTHROUGH

## (undated) DEVICE — SUTURE PERMAHAND SZ 3-0 L18IN NONABSORBABLE BLK L26MM SH C013D

## (undated) DEVICE — ELECTRODE NDL 2.8IN COAT VALLEYLAB

## (undated) DEVICE — COVER,TABLE,HEAVY DUTY,60"X90",STRL: Brand: MEDLINE

## (undated) DEVICE — SOLUTION BDINE 16OZ

## (undated) DEVICE — SUTURE VCRL SZ 3-0 L27IN ABSRB UD L24MM PS-1 3/8 CIR PRIM J936H

## (undated) DEVICE — FIAPC® PROBE W/ FILTER 2200 A OD 2.3MM/6.9FR; L 2.2M/7.2FT: Brand: ERBE

## (undated) DEVICE — TR BAND RADIAL ARTERY COMPRESSION DEVICE: Brand: TR BAND

## (undated) DEVICE — HANDLE LT SNAP ON ULT DURABLE LENS FOR TRUMPF ALC DISPOSABLE

## (undated) DEVICE — SKYLINE ANTERIOR CERVICAL PLATE SYSTEM DRILL 2.2 X 12MM: Brand: SKYLINE

## (undated) DEVICE — BASIN EMSIS 16OZ GRAPHITE PLAS KID SHP MOLD GRAD FOR ORAL

## (undated) DEVICE — NEEDLE HYPO 18GA L1.5IN PNK S STL HUB POLYPR SHLD REG BVL

## (undated) DEVICE — NEONATAL-ADULT SPO2 SENSOR: Brand: NELLCOR

## (undated) DEVICE — IV START KIT: Brand: MEDLINE

## (undated) DEVICE — DRESSING HEMOSTATIC INTVENT W/O SLT QUIKCLOT

## (undated) DEVICE — CATHETER DIAG 5FR L100CM LUMN ID0.047IN JL3.5 CRV 0 SIDE H

## (undated) DEVICE — ENDO CARRY-ON PROCEDURE KIT INCLUDES ENZYMATIC SPONGE, GAUZE, BIOHAZARD LABEL, TRAY, LUBRICANT, DIRTY SCOPE LABEL, WATER LABEL, TRAY, DRAWSTRING PAD, AND DEFENDO 4-PIECE KIT.: Brand: ENDO CARRY-ON PROCEDURE KIT

## (undated) DEVICE — UNIVERSAL SPLIT PACK II: Brand: CONVERTORS

## (undated) DEVICE — NEEDLE HYPO 22GA L1.5IN BLK S STL HUB POLYPR SHLD REG BVL

## (undated) DEVICE — SKIN PREP TRAY DRY SPNG

## (undated) DEVICE — BLOCK BITE ENDOSCP AD 21 MM W/ DIL BLU LF DISP

## (undated) DEVICE — CATH IV AUTOGRD BC GRN 18GA 30 -- INSYTE

## (undated) DEVICE — DRAPE XR C ARM 41X74IN LF --

## (undated) DEVICE — SYRINGE BLB FEED IV POLE BG 60ML

## (undated) DEVICE — FLOSEAL MATRIX IS INDICATED IN SURGICAL PROCEDURES (OTHER THAN IN OPHTHALMIC) AS AN ADJUNCT TO HEMOSTASIS WHEN CONTROL OF BLEEDING BY LIGATURE OR CONVENTIONALPROCEDURES IS INEFFECTIVE OR IMPRACTICAL.: Brand: FLOSEAL HEMOSTATIC MATRIX

## (undated) DEVICE — SUTURE MCRYL SZ 3-0 L27IN ABSRB UD L17MM RB-1 1/2 CIR Y215H

## (undated) DEVICE — TRAY CATH 16F URIN MTR LTX -- CONVERT TO ITEM 363111

## (undated) DEVICE — X-RAY DETECTABLE SPONGES,16 PLY: Brand: VISTEC

## (undated) DEVICE — HI-TORQUE VERSACORE FLOPPY GUIDE WIRE SYSTEM 145 CM: Brand: HI-TORQUE VERSACORE

## (undated) DEVICE — GUIDEWIRE VASC L260CM 0.035IN J TIP L3MM PTFE FIX COR NAMIC

## (undated) DEVICE — STRAP,POSITIONING,KNEE/BODY,FOAM,4X60": Brand: MEDLINE

## (undated) DEVICE — DRAIN KT WND 10FR RND 400ML --

## (undated) DEVICE — CATH GUID COR EB30 6FR 100CM -- LAUNCHER

## (undated) DEVICE — ADHESIVE DERMABOND TOPICAL SKIN MINI .36ML

## (undated) DEVICE — PERCLOSE™ PROSTYLE™ SUTURE-MEDIATED CLOSURE AND REPAIR SYSTEM: Brand: PERCLOSE™ PROSTYLE™

## (undated) DEVICE — LINER,SEMI-RIGID,3000CC,50EA/CS: Brand: MEDLINE

## (undated) DEVICE — SLIM BODY SKIN STAPLER: Brand: APPOSE ULC

## (undated) DEVICE — TIP SUCT TRNSPAR RIB SURF STD BLB RIG NVENT W/ 5IN1 CONN DYND50138] MEDLINE INDUSTRIES INC]

## (undated) DEVICE — COVIDIEN KENDALL DL DISPOSABLE 3 LEAD SY: Brand: MEDLINE RENEWAL

## (undated) DEVICE — CATHETER COR DIAG TRANSFORMER 3.5 5FR L100CM 0 SIDE H

## (undated) DEVICE — STAIN INDIA INK IN NACL 10ML --

## (undated) DEVICE — NEEDLE SCLERO 25GA L240CM OD0.51MM ID0.24MM EXTN L4MM SHTH

## (undated) DEVICE — FORCEPS BX L160CM DIA8MM GRSP DISECT CUP TIP NONLOCKING ROT

## (undated) DEVICE — MARKER,SKIN,WI/RULER AND LABELS: Brand: MEDLINE

## (undated) DEVICE — SUTURE PERMA-HAND SZ 2-0 L30IN NONABSORBABLE BLK L30MM FSL 679H

## (undated) DEVICE — CUFF BLD PRSS AD CLTH SGL TB W/ BAYNT CONN ROUNDED CORNER

## (undated) DEVICE — BITE BLK ENDOSCP AD 54FR GRN POLYETH ENDOSCP W STRP SLD

## (undated) DEVICE — SHEAR HARMONIC ACET 5MMX36CM -- ACE PLUS

## (undated) DEVICE — GUIDEWIRE VASC J 3 MM 0.035 INX210 CM FIX COR INQWIRE

## (undated) DEVICE — SYRINGE 50ML E/T

## (undated) DEVICE — SUTURE VCRL SZ 3-0 L27IN ABSRB UD L26MM SH 1/2 CIR J416H

## (undated) DEVICE — CATHETER DIAG 6FR L110CM INTRO 6FR BLLN DIA9MM 1CC PULM ART

## (undated) DEVICE — SUTURE PERMAHAND SZ 2-0 L30IN NONABSORBABLE BLK SILK W/O A305H

## (undated) DEVICE — NDL INJ SCLERO 25G 240CM -- INTERJECT M00518310 BX/5

## (undated) DEVICE — STAPLER INT L90MM DIA35MM TI STPL RELD DISPOSABLE DST SER TA

## (undated) DEVICE — BLADE ASSEMB CLP HAIR FINE --

## (undated) DEVICE — STAPLER WITH DST SERIES TECHNOLOGY: Brand: GIA

## (undated) DEVICE — GAUZE SPONGES,12 PLY: Brand: CURITY

## (undated) DEVICE — Device: Brand: ASAHI SION BLUE

## (undated) DEVICE — ANGIOGRAPHIC CATHETER: Brand: IMPULSE™

## (undated) DEVICE — KIT AT-X65 ANGIOTOUCH HAND CONTROLLER

## (undated) DEVICE — LOADING UNIT WITH DST SERIES TECHNOLOGY: Brand: GIA

## (undated) DEVICE — SURGICAL PROCEDURE PACK BASIN MAJ SET CUST NO CAUT

## (undated) DEVICE — NDL PRT INJ NSAF BLNT 18GX1.5 --

## (undated) DEVICE — SYR LR LCK 1ML GRAD NSAF 30ML --

## (undated) DEVICE — INTENDED FOR TISSUE SEPARATION, AND OTHER PROCEDURES THAT REQUIRE A SHARP SURGICAL BLADE TO PUNCTURE OR CUT.: Brand: BARD-PARKER SAFETY BLADES SIZE 15, STERILE

## (undated) DEVICE — EXTENSION GUIDEWIRE DOC